# Patient Record
Sex: FEMALE | Race: WHITE | Employment: OTHER | URBAN - NONMETROPOLITAN AREA
[De-identification: names, ages, dates, MRNs, and addresses within clinical notes are randomized per-mention and may not be internally consistent; named-entity substitution may affect disease eponyms.]

---

## 2017-04-14 DIAGNOSIS — R35.1 NOCTURIA: Primary | ICD-10-CM

## 2017-04-14 RX ORDER — OXYBUTYNIN CHLORIDE 5 MG/1
TABLET ORAL
Qty: 120 TABLET | Refills: 0 | Status: SHIPPED | OUTPATIENT
Start: 2017-04-14 | End: 2017-08-14 | Stop reason: SDUPTHER

## 2017-05-18 ENCOUNTER — OFFICE VISIT (OUTPATIENT)
Dept: NEUROLOGY | Age: 82
End: 2017-05-18
Payer: MEDICARE

## 2017-05-18 VITALS
HEART RATE: 55 BPM | HEIGHT: 60 IN | SYSTOLIC BLOOD PRESSURE: 134 MMHG | WEIGHT: 124 LBS | BODY MASS INDEX: 24.35 KG/M2 | DIASTOLIC BLOOD PRESSURE: 68 MMHG | OXYGEN SATURATION: 97 %

## 2017-05-18 DIAGNOSIS — R56.9 SEIZURE AS LATE EFFECT OF CEREBROVASCULAR ACCIDENT (CVA) (HCC): ICD-10-CM

## 2017-05-18 DIAGNOSIS — G30.1 LATE ONSET ALZHEIMER'S DISEASE WITHOUT BEHAVIORAL DISTURBANCE (HCC): Primary | ICD-10-CM

## 2017-05-18 DIAGNOSIS — I69.398 SEIZURE AS LATE EFFECT OF CEREBROVASCULAR ACCIDENT (CVA) (HCC): ICD-10-CM

## 2017-05-18 DIAGNOSIS — F02.80 LATE ONSET ALZHEIMER'S DISEASE WITHOUT BEHAVIORAL DISTURBANCE (HCC): Primary | ICD-10-CM

## 2017-05-18 PROCEDURE — 1036F TOBACCO NON-USER: CPT | Performed by: PSYCHIATRY & NEUROLOGY

## 2017-05-18 PROCEDURE — 99214 OFFICE O/P EST MOD 30 MIN: CPT | Performed by: PSYCHIATRY & NEUROLOGY

## 2017-05-18 PROCEDURE — 1123F ACP DISCUSS/DSCN MKR DOCD: CPT | Performed by: PSYCHIATRY & NEUROLOGY

## 2017-05-18 PROCEDURE — 4040F PNEUMOC VAC/ADMIN/RCVD: CPT | Performed by: PSYCHIATRY & NEUROLOGY

## 2017-05-18 PROCEDURE — G8420 CALC BMI NORM PARAMETERS: HCPCS | Performed by: PSYCHIATRY & NEUROLOGY

## 2017-05-18 PROCEDURE — G8400 PT W/DXA NO RESULTS DOC: HCPCS | Performed by: PSYCHIATRY & NEUROLOGY

## 2017-05-18 PROCEDURE — 1090F PRES/ABSN URINE INCON ASSESS: CPT | Performed by: PSYCHIATRY & NEUROLOGY

## 2017-05-18 PROCEDURE — G8427 DOCREV CUR MEDS BY ELIG CLIN: HCPCS | Performed by: PSYCHIATRY & NEUROLOGY

## 2017-05-18 RX ORDER — OXYBUTYNIN CHLORIDE 5 MG/1
10 TABLET ORAL 2 TIMES DAILY
COMMUNITY
Start: 2017-05-11 | End: 2018-03-18 | Stop reason: ALTCHOICE

## 2017-05-18 RX ORDER — PANTOPRAZOLE SODIUM 20 MG/1
20 TABLET, DELAYED RELEASE ORAL DAILY
COMMUNITY
Start: 2017-05-11 | End: 2019-09-19

## 2017-06-12 ENCOUNTER — OFFICE VISIT (OUTPATIENT)
Dept: UROLOGY | Facility: CLINIC | Age: 82
End: 2017-06-12

## 2017-06-12 VITALS — TEMPERATURE: 98.3 F | WEIGHT: 129 LBS | BODY MASS INDEX: 24.35 KG/M2 | HEIGHT: 61 IN

## 2017-06-12 DIAGNOSIS — N32.81 OAB (OVERACTIVE BLADDER): ICD-10-CM

## 2017-06-12 DIAGNOSIS — R35.1 NOCTURIA: Primary | ICD-10-CM

## 2017-06-12 PROCEDURE — 99213 OFFICE O/P EST LOW 20 MIN: CPT | Performed by: UROLOGY

## 2017-06-12 NOTE — PROGRESS NOTES
Ms. Powell is 81 y.o. female    CHIEF COMPLAINT: I am here for urinary frequency.     HPI  Overactive bladder symptoms  Location: Bladder  Severity: Moderate  Duration: 3 years  Timing: Intermittent  Context: Routine daily activity  Modifying factors: Anticholinergic medications have slightly improved it.  Fluid intake particular caffeine makes it worse.  Associated signs or symptoms: Frequency urgency and occasional nocturia    The following portions of the patient's history were reviewed and updated as appropriate: allergies, current medications, past family history, past medical history, past social history, past surgical history and problem list.    Review of Systems   Constitutional: Negative for chills and fever.   Gastrointestinal: Negative for abdominal pain, anal bleeding and blood in stool.   Genitourinary: Positive for frequency. Negative for flank pain and hematuria.         Current Outpatient Prescriptions:   •  acetaminophen (TYLENOL) 650 MG 8 hr tablet, Take 650 mg by mouth As Needed for mild pain (1-3)., Disp: , Rfl:   •  alendronate (FOSAMAX) 70 MG tablet, Take 70 mg by mouth Every 7 (Seven) Days., Disp: , Rfl:   •  amLODIPine (NORVASC) 2.5 MG tablet, Take 2.5 mg by mouth Daily., Disp: , Rfl:   •  B Complex Vitamins (VITAMIN B COMPLEX PO), Take 1 tablet by mouth Daily., Disp: , Rfl:   •  butalbital-acetaminophen-caffeine (FIORICET, ESGIC) -40 MG per tablet, Take 1 tablet by mouth As Needed for headaches., Disp: , Rfl:   •  Calcium Carb-Cholecalciferol (CALCIUM 600+D3) 600-800 MG-UNIT tablet, Take 1 tablet by mouth Daily., Disp: , Rfl:   •  camphor-menthol (SARNA) 0.5-0.5 % lotion, Apply  topically As Needed for itching., Disp: , Rfl:   •  cholecalciferol (VITAMIN D3) 1000 UNITS tablet, Take 1,000 Units by mouth Daily., Disp: , Rfl:   •  CloNIDine (CATAPRES) 0.1 MG tablet, Take 0.1 mg by mouth Daily., Disp: , Rfl:   •  colestipol (COLESTID) 1 G tablet, Take 1 g by mouth As Needed., Disp: ,  Rfl:   •  dipyridamole (PERSANTINE) 75 MG tablet, Take 75 mg by mouth 2 (Two) Times a Day., Disp: , Rfl:   •  docusate sodium (COLACE) 100 MG capsule, Take 100 mg by mouth As Needed for constipation., Disp: , Rfl:   •  donepezil (ARICEPT) 10 MG tablet, Take 10 mg by mouth Every Night., Disp: , Rfl:   •  fexofenadine (ALLEGRA) 180 MG tablet, Take 180 mg by mouth As Needed., Disp: , Rfl:   •  levETIRAcetam (KEPPRA) 750 MG tablet, Take 750 mg by mouth 2 (Two) Times a Day., Disp: , Rfl:   •  Lutein 20 MG tablet, Take 1 tablet by mouth Daily., Disp: , Rfl:   •  meloxicam (MOBIC) 15 MG tablet, Take 15 mg by mouth Daily., Disp: , Rfl:   •  nebivolol (BYSTOLIC) 10 MG tablet, Take 10 mg by mouth Daily., Disp: , Rfl:   •  Omega 3-6-9 Fatty Acids (OMEGA 3-6-9 COMPLEX PO), Take 1 capsule by mouth Daily., Disp: , Rfl:   •  oxybutynin (DITROPAN) 5 MG tablet, TAKE 2 TABLETS BY MOUTH TWICE DAILY., Disp: 120 tablet, Rfl: 0  •  pantoprazole (PROTONIX) 20 MG EC tablet, Take 20 mg by mouth Daily., Disp: , Rfl:   •  pravastatin (PRAVACHOL) 20 MG tablet, Take 20 mg by mouth Daily., Disp: , Rfl:   •  promethazine (PHENERGAN) 25 MG tablet, Take 25 mg by mouth Every 4 (Four) Hours As Needed for nausea or vomiting., Disp: , Rfl:   •  ramipril (ALTACE) 5 MG capsule, Take 5 mg by mouth Daily., Disp: , Rfl:   •  raNITIdine (ZANTAC) 150 MG tablet, Take 150 mg by mouth 2 (Two) Times a Day., Disp: , Rfl:   •  simethicone (MYLICON) 80 MG chewable tablet, Chew 80 mg As Needed for flatulence., Disp: , Rfl:   •  spironolactone (ALDACTONE) 25 MG tablet, Take 25 mg by mouth Daily., Disp: , Rfl:   •  vitamin C (ASCORBIC ACID) 500 MG tablet, Take 500 mg by mouth Daily., Disp: , Rfl:     Past Medical History:   Diagnosis Date   • Arthritis    • Frequency of urination    • Nocturia    • Seizure    • Sensory urge incontinence        Past Surgical History:   Procedure Laterality Date   • CHOLECYSTECTOMY     • TONSILLECTOMY     • WISDOM TOOTH EXTRACTION    "      Social History     Social History   • Marital status:      Spouse name: N/A   • Number of children: N/A   • Years of education: N/A     Social History Main Topics   • Smoking status: Never Smoker   • Smokeless tobacco: Not on file   • Alcohol use No   • Drug use: Not on file   • Sexual activity: Not on file     Other Topics Concern   • Not on file     Social History Narrative       Family History   Problem Relation Age of Onset   • No Known Problems Mother    • No Known Problems Father        Temp 98.3 °F (36.8 °C)  Ht 61\" (154.9 cm)  Wt 129 lb (58.5 kg)  BMI 24.37 kg/m2    Physical Exam  Alert and oriented ×3  Not agitated or distressed  No obvious deformities  No respiratory distress  Skin without pallor or diaphoresis      Results for orders placed or performed in visit on 12/06/16   POC Urinalysis Dipstick, Automated   Result Value Ref Range    Color Yellow Yellow, Straw, Dark Yellow, Lynnette    Clarity, UA Clear Clear    Glucose, UA Negative Negative mg/dL    Bilirubin Negative Negative    Ketones, UA Negative Negative    Specific Gravity  1.020 1.005 - 1.030    Blood, UA Trace (A) Negative    pH, Urine 5.5 5.0 - 8.0    Protein, POC Negative Negative mg/dL    Urobilinogen, UA Normal Normal    Leukocytes Negative Negative    Nitrite, UA Positive (A) Negative       Imaging Results (last 7 days)     ** No results found for the last 168 hours. **          Assessment and Plan  Diagnoses and all orders for this visit:    Nocturia    OAB (overactive bladder)  #1. Continue oxybutynin  #2. Limit fluid intake and especially caffeine.   #3. Nocturia is explained to the patient is a manifestation of one the following or a combination of voiding dysfunction, other medical conditions, or a sleep disorder.  Nocturia as a completely isolated symptommore often represents a non-urologic problem or medical condition.  It is explained that as patient's age, they often have a reverse Circadian rhythm voiding pattern " in which up to two thirds of the urine in a 24-hour period can be excreted at night.  We also went over sleep disorders of the patient which may affect them.  Volume-related disorders the cause mobilization of peripheral edema are also possible causes of nocturia including the medications used to treat them.  Therefore, treatment must be directed at the cause of the symptom.  I explained we will do our best to evaluate any urologic cause of symptoms, but oftentimes we find no abnormality in voiding dysfunction to correct.  Evaluation options are explained to the patient.      Nemesio Foster MD  06/12/17  2:07 PM      Cc:

## 2017-07-08 ENCOUNTER — HOSPITAL ENCOUNTER (EMERGENCY)
Age: 82
Discharge: HOME | End: 2017-07-08
Attending: EMERGENCY MEDICINE
Payer: MEDICARE

## 2017-07-08 ENCOUNTER — APPOINTMENT (OUTPATIENT)
Dept: CT IMAGING | Age: 82
End: 2017-07-08
Payer: MEDICARE

## 2017-07-08 VITALS
HEIGHT: 61 IN | TEMPERATURE: 98.1 F | SYSTOLIC BLOOD PRESSURE: 129 MMHG | WEIGHT: 125 LBS | OXYGEN SATURATION: 97 % | DIASTOLIC BLOOD PRESSURE: 62 MMHG | HEART RATE: 59 BPM | BODY MASS INDEX: 23.6 KG/M2 | RESPIRATION RATE: 18 BRPM

## 2017-07-08 DIAGNOSIS — N39.0 URINARY TRACT INFECTION WITHOUT HEMATURIA, SITE UNSPECIFIED: Primary | ICD-10-CM

## 2017-07-08 LAB
ALBUMIN SERPL-MCNC: 4.3 G/DL (ref 3.5–5.2)
ALP BLD-CCNC: 52 U/L (ref 35–104)
ALT SERPL-CCNC: 19 U/L (ref 5–33)
ANION GAP SERPL CALCULATED.3IONS-SCNC: 15 MMOL/L (ref 7–19)
AST SERPL-CCNC: 21 U/L (ref 5–32)
BACTERIA: ABNORMAL /HPF
BILIRUB SERPL-MCNC: 0.4 MG/DL (ref 0.2–1.2)
BILIRUBIN URINE: NEGATIVE
BLOOD, URINE: ABNORMAL
BUN BLDV-MCNC: 19 MG/DL (ref 8–23)
CALCIUM SERPL-MCNC: 9.3 MG/DL (ref 8.8–10.2)
CHLORIDE BLD-SCNC: 92 MMOL/L (ref 98–111)
CLARITY: ABNORMAL
CO2: 23 MMOL/L (ref 22–29)
COLOR: YELLOW
CREAT SERPL-MCNC: 1.2 MG/DL (ref 0.5–0.9)
EPITHELIAL CELLS, UA: 1 /HPF (ref 0–5)
GFR NON-AFRICAN AMERICAN: 43
GLUCOSE BLD-MCNC: 107 MG/DL (ref 74–109)
GLUCOSE URINE: NEGATIVE MG/DL
HCT VFR BLD CALC: 34.3 % (ref 37–47)
HEMOGLOBIN: 12 G/DL (ref 12–16)
HYALINE CASTS: 3 /HPF (ref 0–8)
KETONES, URINE: NEGATIVE MG/DL
LEUKOCYTE ESTERASE, URINE: ABNORMAL
MCH RBC QN AUTO: 33 PG (ref 27–31)
MCHC RBC AUTO-ENTMCNC: 35 G/DL (ref 33–37)
MCV RBC AUTO: 94.2 FL (ref 81–99)
NITRITE, URINE: POSITIVE
PDW BLD-RTO: 11.5 % (ref 11.5–14.5)
PH UA: 6.5
PLATELET # BLD: 209 K/UL (ref 130–400)
PMV BLD AUTO: 9.6 FL (ref 9.4–12.3)
POTASSIUM SERPL-SCNC: 4.2 MMOL/L (ref 3.5–5)
PROTEIN UA: NEGATIVE MG/DL
RBC # BLD: 3.64 M/UL (ref 4.2–5.4)
RBC UA: 2 /HPF (ref 0–4)
SODIUM BLD-SCNC: 130 MMOL/L (ref 136–145)
SPECIFIC GRAVITY UA: 1.01
TOTAL PROTEIN: 7.1 G/DL (ref 6.6–8.7)
UROBILINOGEN, URINE: 0.2 E.U./DL
WBC # BLD: 5.3 K/UL (ref 4.8–10.8)
WBC UA: 11 /HPF (ref 0–5)

## 2017-07-08 PROCEDURE — 70450 CT HEAD/BRAIN W/O DYE: CPT

## 2017-07-08 PROCEDURE — 85027 COMPLETE CBC AUTOMATED: CPT

## 2017-07-08 PROCEDURE — 87077 CULTURE AEROBIC IDENTIFY: CPT

## 2017-07-08 PROCEDURE — 80053 COMPREHEN METABOLIC PANEL: CPT

## 2017-07-08 PROCEDURE — 87086 URINE CULTURE/COLONY COUNT: CPT

## 2017-07-08 PROCEDURE — 93005 ELECTROCARDIOGRAM TRACING: CPT

## 2017-07-08 PROCEDURE — 99282 EMERGENCY DEPT VISIT SF MDM: CPT | Performed by: EMERGENCY MEDICINE

## 2017-07-08 PROCEDURE — 99284 EMERGENCY DEPT VISIT MOD MDM: CPT

## 2017-07-08 PROCEDURE — 36415 COLL VENOUS BLD VENIPUNCTURE: CPT

## 2017-07-08 PROCEDURE — 81001 URINALYSIS AUTO W/SCOPE: CPT

## 2017-07-08 PROCEDURE — 87185 SC STD ENZYME DETCJ PER NZM: CPT

## 2017-07-08 PROCEDURE — 80299 QUANTITATIVE ASSAY DRUG: CPT

## 2017-07-08 RX ORDER — AMLODIPINE BESYLATE 10 MG/1
5 TABLET ORAL 2 TIMES DAILY
COMMUNITY
End: 2020-02-23 | Stop reason: ALTCHOICE

## 2017-07-08 RX ORDER — CEPHALEXIN 500 MG/1
500 CAPSULE ORAL 4 TIMES DAILY
Qty: 28 CAPSULE | Refills: 0 | Status: SHIPPED | OUTPATIENT
Start: 2017-07-08 | End: 2017-07-15

## 2017-07-08 ASSESSMENT — ENCOUNTER SYMPTOMS
EYE REDNESS: 0
ALLERGIC/IMMUNOLOGIC NEGATIVE: 1
ABDOMINAL DISTENTION: 0
CHOKING: 0
EYE DISCHARGE: 0
COUGH: 0
SHORTNESS OF BREATH: 0
EYE PAIN: 0
APNEA: 0
EYES NEGATIVE: 1
EYE ITCHING: 0
ABDOMINAL PAIN: 0
CHEST TIGHTNESS: 0

## 2017-07-08 ASSESSMENT — PAIN SCALES - GENERAL: PAINLEVEL_OUTOF10: 0

## 2017-07-10 ENCOUNTER — TELEPHONE (OUTPATIENT)
Dept: NEUROLOGY | Age: 82
End: 2017-07-10

## 2017-07-10 LAB
KEPPRA: 49 UG/ML (ref 12–46)
ORGANISM: ABNORMAL
URINE CULTURE, ROUTINE: ABNORMAL
URINE CULTURE, ROUTINE: ABNORMAL

## 2017-07-11 LAB
EKG P AXIS: 66 DEGREES
EKG P-R INTERVAL: 194 MS
EKG Q-T INTERVAL: 436 MS
EKG QRS DURATION: 92 MS
EKG QTC CALCULATION (BAZETT): 428 MS
EKG T AXIS: 30 DEGREES

## 2017-07-14 ENCOUNTER — TELEPHONE (OUTPATIENT)
Dept: UROLOGY | Facility: CLINIC | Age: 82
End: 2017-07-14

## 2017-07-14 NOTE — TELEPHONE ENCOUNTER
----- Message from Yaa Beebe sent at 7/11/2017 11:24 AM CDT -----  Contact: DTR - KIKI Collins was in [facility] 7/8/17 and was dx with UTI. She had been found non responsive at the assisted living home. Was put on Keflex. Pao is faxing her records and the dtr would like Dr Foster to review them and be sure she is on the correct medication and does she need an apt with him. Also was told that UTI can sometimes cause seizures and wanting to ask him about this.

## 2017-07-14 NOTE — TELEPHONE ENCOUNTER
Spoke to Jessica and informed her if patient is prone to seizures then a uti could cause this. I also informed her that patient is on the right antibiotic and she would not need a follow up with us regarding this unless patient is having a problem. Jessica Voiced understanding HC

## 2017-07-15 ENCOUNTER — HOSPITAL ENCOUNTER (EMERGENCY)
Age: 82
Discharge: HOME | End: 2017-07-15
Attending: EMERGENCY MEDICINE
Payer: MEDICARE

## 2017-07-15 ENCOUNTER — APPOINTMENT (OUTPATIENT)
Dept: GENERAL RADIOLOGY | Age: 82
End: 2017-07-15
Payer: MEDICARE

## 2017-07-15 VITALS
WEIGHT: 130 LBS | OXYGEN SATURATION: 98 % | HEART RATE: 70 BPM | HEIGHT: 61 IN | TEMPERATURE: 98.2 F | RESPIRATION RATE: 16 BRPM | DIASTOLIC BLOOD PRESSURE: 69 MMHG | SYSTOLIC BLOOD PRESSURE: 150 MMHG | BODY MASS INDEX: 24.55 KG/M2

## 2017-07-15 DIAGNOSIS — N39.0 UTI (URINARY TRACT INFECTION), BACTERIAL: ICD-10-CM

## 2017-07-15 DIAGNOSIS — J30.9 ALLERGIC RHINITIS, UNSPECIFIED ALLERGIC RHINITIS TRIGGER, UNSPECIFIED RHINITIS SEASONALITY: ICD-10-CM

## 2017-07-15 DIAGNOSIS — E87.1 HYPONATREMIA: ICD-10-CM

## 2017-07-15 DIAGNOSIS — A49.9 UTI (URINARY TRACT INFECTION), BACTERIAL: ICD-10-CM

## 2017-07-15 DIAGNOSIS — R05.9 COUGH: Primary | ICD-10-CM

## 2017-07-15 LAB
ALBUMIN SERPL-MCNC: 4.3 G/DL (ref 3.5–5.2)
ALP BLD-CCNC: 52 U/L (ref 35–104)
ALT SERPL-CCNC: 18 U/L (ref 5–33)
ANION GAP SERPL CALCULATED.3IONS-SCNC: 14 MMOL/L (ref 7–19)
AST SERPL-CCNC: 20 U/L (ref 5–32)
BACTERIA: ABNORMAL /HPF
BASOPHILS ABSOLUTE: 0 K/UL (ref 0–0.2)
BASOPHILS RELATIVE PERCENT: 0.6 % (ref 0–1)
BILIRUB SERPL-MCNC: 0.5 MG/DL (ref 0.2–1.2)
BILIRUBIN URINE: NEGATIVE
BLOOD, URINE: ABNORMAL
BUN BLDV-MCNC: 14 MG/DL (ref 8–23)
CALCIUM SERPL-MCNC: 9.5 MG/DL (ref 8.8–10.2)
CHLORIDE BLD-SCNC: 88 MMOL/L (ref 98–111)
CLARITY: CLEAR
CO2: 23 MMOL/L (ref 22–29)
COLOR: YELLOW
CREAT SERPL-MCNC: 0.8 MG/DL (ref 0.5–0.9)
EOSINOPHILS ABSOLUTE: 0.2 K/UL (ref 0–0.6)
EOSINOPHILS RELATIVE PERCENT: 3.3 % (ref 0–5)
EPITHELIAL CELLS, UA: 0 /HPF (ref 0–5)
GFR NON-AFRICAN AMERICAN: >60
GLUCOSE BLD-MCNC: 95 MG/DL (ref 74–109)
GLUCOSE URINE: NEGATIVE MG/DL
HCT VFR BLD CALC: 32 % (ref 37–47)
HEMOGLOBIN: 11.4 G/DL (ref 12–16)
HYALINE CASTS: 1 /HPF (ref 0–8)
KETONES, URINE: NEGATIVE MG/DL
LACTIC ACID: 0.7 MG/DL (ref 0.5–1.9)
LEUKOCYTE ESTERASE, URINE: ABNORMAL
LYMPHOCYTES ABSOLUTE: 1.1 K/UL (ref 1.1–4.5)
LYMPHOCYTES RELATIVE PERCENT: 23.4 % (ref 20–40)
MCH RBC QN AUTO: 32.8 PG (ref 27–31)
MCHC RBC AUTO-ENTMCNC: 35.6 G/DL (ref 33–37)
MCV RBC AUTO: 92 FL (ref 81–99)
MONOCYTES ABSOLUTE: 0.4 K/UL (ref 0–0.9)
MONOCYTES RELATIVE PERCENT: 8.4 % (ref 0–10)
NEUTROPHILS ABSOLUTE: 3 K/UL (ref 1.5–7.5)
NEUTROPHILS RELATIVE PERCENT: 63.3 % (ref 50–65)
NITRITE, URINE: POSITIVE
PDW BLD-RTO: 11.4 % (ref 11.5–14.5)
PH UA: 7
PLATELET # BLD: 213 K/UL (ref 130–400)
PMV BLD AUTO: 9.3 FL (ref 9.4–12.3)
POTASSIUM SERPL-SCNC: 4.3 MMOL/L (ref 3.5–5)
PROTEIN UA: NEGATIVE MG/DL
RBC # BLD: 3.48 M/UL (ref 4.2–5.4)
RBC UA: 2 /HPF (ref 0–4)
SODIUM BLD-SCNC: 125 MMOL/L (ref 136–145)
SPECIFIC GRAVITY UA: 1.01
TOTAL PROTEIN: 6.9 G/DL (ref 6.6–8.7)
UROBILINOGEN, URINE: 0.2 E.U./DL
WBC # BLD: 4.8 K/UL (ref 4.8–10.8)
WBC UA: 42 /HPF (ref 0–5)

## 2017-07-15 PROCEDURE — 87077 CULTURE AEROBIC IDENTIFY: CPT

## 2017-07-15 PROCEDURE — 85025 COMPLETE CBC W/AUTO DIFF WBC: CPT

## 2017-07-15 PROCEDURE — 99283 EMERGENCY DEPT VISIT LOW MDM: CPT

## 2017-07-15 PROCEDURE — 99283 EMERGENCY DEPT VISIT LOW MDM: CPT | Performed by: EMERGENCY MEDICINE

## 2017-07-15 PROCEDURE — 83605 ASSAY OF LACTIC ACID: CPT

## 2017-07-15 PROCEDURE — 87185 SC STD ENZYME DETCJ PER NZM: CPT

## 2017-07-15 PROCEDURE — 71020 XR CHEST STANDARD TWO VW: CPT

## 2017-07-15 PROCEDURE — 36415 COLL VENOUS BLD VENIPUNCTURE: CPT

## 2017-07-15 PROCEDURE — 80053 COMPREHEN METABOLIC PANEL: CPT

## 2017-07-15 PROCEDURE — 87040 BLOOD CULTURE FOR BACTERIA: CPT

## 2017-07-15 PROCEDURE — 87086 URINE CULTURE/COLONY COUNT: CPT

## 2017-07-15 PROCEDURE — 2580000003 HC RX 258: Performed by: EMERGENCY MEDICINE

## 2017-07-15 PROCEDURE — 81001 URINALYSIS AUTO W/SCOPE: CPT

## 2017-07-15 RX ORDER — BENZONATATE 200 MG/1
200 CAPSULE ORAL 3 TIMES DAILY PRN
Qty: 20 CAPSULE | Refills: 0 | Status: SHIPPED | OUTPATIENT
Start: 2017-07-15 | End: 2017-07-22

## 2017-07-15 RX ORDER — CEFDINIR 300 MG/1
300 CAPSULE ORAL 2 TIMES DAILY
COMMUNITY
End: 2018-02-12

## 2017-07-15 RX ORDER — 0.9 % SODIUM CHLORIDE 0.9 %
500 INTRAVENOUS SOLUTION INTRAVENOUS ONCE
Status: COMPLETED | OUTPATIENT
Start: 2017-07-15 | End: 2017-07-15

## 2017-07-15 RX ORDER — ALBUTEROL SULFATE 90 UG/1
2 AEROSOL, METERED RESPIRATORY (INHALATION) EVERY 4 HOURS PRN
COMMUNITY
End: 2019-07-11

## 2017-07-15 RX ADMIN — SODIUM CHLORIDE 500 ML: 9 INJECTION, SOLUTION INTRAVENOUS at 16:07

## 2017-07-15 ASSESSMENT — ENCOUNTER SYMPTOMS
RHINORRHEA: 1
SHORTNESS OF BREATH: 0
COUGH: 1

## 2017-07-15 ASSESSMENT — PAIN SCALES - GENERAL: PAINLEVEL_OUTOF10: 7

## 2017-07-19 LAB
ORGANISM: ABNORMAL
URINE CULTURE, ROUTINE: ABNORMAL
URINE CULTURE, ROUTINE: ABNORMAL

## 2017-07-20 LAB
BLOOD CULTURE, ROUTINE: NORMAL
CULTURE, BLOOD 2: NORMAL

## 2017-08-03 ENCOUNTER — TRANSCRIBE ORDERS (OUTPATIENT)
Dept: PHYSICAL THERAPY | Facility: HOSPITAL | Age: 82
End: 2017-08-03

## 2017-08-03 DIAGNOSIS — R26.9 ABNORMALITY OF GAIT: ICD-10-CM

## 2017-08-03 DIAGNOSIS — M62.81 MUSCLE WEAKNESS (GENERALIZED): Primary | ICD-10-CM

## 2017-08-14 DIAGNOSIS — R35.1 NOCTURIA: ICD-10-CM

## 2017-08-14 RX ORDER — OXYBUTYNIN CHLORIDE 5 MG/1
TABLET ORAL
Qty: 120 TABLET | Refills: 0 | Status: SHIPPED | OUTPATIENT
Start: 2017-08-14 | End: 2018-02-13 | Stop reason: SDUPTHER

## 2017-10-12 LAB
ALBUMIN SERPL-MCNC: 4.4 G/DL (ref 3.5–5.2)
ALP BLD-CCNC: 53 U/L (ref 35–104)
ALT SERPL-CCNC: 21 U/L (ref 5–33)
ANION GAP SERPL CALCULATED.3IONS-SCNC: 13 MMOL/L (ref 7–19)
AST SERPL-CCNC: 25 U/L (ref 5–32)
BASOPHILS ABSOLUTE: 0 K/UL (ref 0–0.2)
BASOPHILS RELATIVE PERCENT: 0.8 % (ref 0–1)
BILIRUB SERPL-MCNC: 0.4 MG/DL (ref 0.2–1.2)
BUN BLDV-MCNC: 18 MG/DL (ref 8–23)
CALCIUM SERPL-MCNC: 9.9 MG/DL (ref 8.8–10.2)
CHLORIDE BLD-SCNC: 93 MMOL/L (ref 98–111)
CO2: 24 MMOL/L (ref 22–29)
CREAT SERPL-MCNC: 1 MG/DL (ref 0.5–0.9)
EOSINOPHILS ABSOLUTE: 0.1 K/UL (ref 0–0.6)
EOSINOPHILS RELATIVE PERCENT: 2.1 % (ref 0–5)
GFR NON-AFRICAN AMERICAN: 53
GLUCOSE BLD-MCNC: 89 MG/DL (ref 74–109)
HCT VFR BLD CALC: 35.5 % (ref 37–47)
HEMOGLOBIN: 12.2 G/DL (ref 12–16)
LYMPHOCYTES ABSOLUTE: 1.7 K/UL (ref 1.1–4.5)
LYMPHOCYTES RELATIVE PERCENT: 33 % (ref 20–40)
MCH RBC QN AUTO: 33.5 PG (ref 27–31)
MCHC RBC AUTO-ENTMCNC: 34.4 G/DL (ref 33–37)
MCV RBC AUTO: 97.5 FL (ref 81–99)
MONOCYTES ABSOLUTE: 0.4 K/UL (ref 0–0.9)
MONOCYTES RELATIVE PERCENT: 7.1 % (ref 0–10)
NEUTROPHILS ABSOLUTE: 3 K/UL (ref 1.5–7.5)
NEUTROPHILS RELATIVE PERCENT: 56.4 % (ref 50–65)
PDW BLD-RTO: 11.9 % (ref 11.5–14.5)
PLATELET # BLD: 200 K/UL (ref 130–400)
PMV BLD AUTO: 9.8 FL (ref 9.4–12.3)
POTASSIUM SERPL-SCNC: 4.1 MMOL/L (ref 3.5–5)
RBC # BLD: 3.64 M/UL (ref 4.2–5.4)
SODIUM BLD-SCNC: 130 MMOL/L (ref 136–145)
TOTAL PROTEIN: 6.9 G/DL (ref 6.6–8.7)
WBC # BLD: 5.2 K/UL (ref 4.8–10.8)

## 2017-11-20 ENCOUNTER — TELEPHONE (OUTPATIENT)
Dept: NEUROLOGY | Age: 82
End: 2017-11-20

## 2017-11-20 LAB
ALBUMIN SERPL-MCNC: 4.7 G/DL (ref 3.5–5.2)
ALP BLD-CCNC: 51 U/L (ref 35–104)
ALT SERPL-CCNC: 17 U/L (ref 5–33)
ANION GAP SERPL CALCULATED.3IONS-SCNC: 13 MMOL/L (ref 7–19)
AST SERPL-CCNC: 24 U/L (ref 5–32)
BASOPHILS ABSOLUTE: 0 K/UL (ref 0–0.2)
BASOPHILS RELATIVE PERCENT: 0.6 % (ref 0–1)
BILIRUB SERPL-MCNC: 0.3 MG/DL (ref 0.2–1.2)
BUN BLDV-MCNC: 20 MG/DL (ref 8–23)
CALCIUM SERPL-MCNC: 9.9 MG/DL (ref 8.8–10.2)
CHLORIDE BLD-SCNC: 96 MMOL/L (ref 98–111)
CO2: 25 MMOL/L (ref 22–29)
CREAT SERPL-MCNC: 1.2 MG/DL (ref 0.5–0.9)
EOSINOPHILS ABSOLUTE: 0 K/UL (ref 0–0.6)
EOSINOPHILS RELATIVE PERCENT: 0.6 % (ref 0–5)
GFR NON-AFRICAN AMERICAN: 43
GLUCOSE BLD-MCNC: 93 MG/DL (ref 74–109)
HCT VFR BLD CALC: 36.5 % (ref 37–47)
HEMOGLOBIN: 12.2 G/DL (ref 12–16)
LYMPHOCYTES ABSOLUTE: 1.8 K/UL (ref 1.1–4.5)
LYMPHOCYTES RELATIVE PERCENT: 27.1 % (ref 20–40)
MCH RBC QN AUTO: 32.8 PG (ref 27–31)
MCHC RBC AUTO-ENTMCNC: 33.4 G/DL (ref 33–37)
MCV RBC AUTO: 98.1 FL (ref 81–99)
MONOCYTES ABSOLUTE: 0.5 K/UL (ref 0–0.9)
MONOCYTES RELATIVE PERCENT: 7.1 % (ref 0–10)
NEUTROPHILS ABSOLUTE: 4.3 K/UL (ref 1.5–7.5)
NEUTROPHILS RELATIVE PERCENT: 64.2 % (ref 50–65)
PDW BLD-RTO: 11.4 % (ref 11.5–14.5)
PLATELET # BLD: 200 K/UL (ref 130–400)
PMV BLD AUTO: 10.2 FL (ref 9.4–12.3)
POTASSIUM SERPL-SCNC: 4.7 MMOL/L (ref 3.5–5)
RBC # BLD: 3.72 M/UL (ref 4.2–5.4)
SODIUM BLD-SCNC: 134 MMOL/L (ref 136–145)
TOTAL PROTEIN: 6.9 G/DL (ref 6.6–8.7)
WBC # BLD: 6.7 K/UL (ref 4.8–10.8)

## 2017-11-22 LAB — VITAMIN D 25-HYDROXY: 44.9 NG/ML

## 2018-01-15 ENCOUNTER — TELEPHONE (OUTPATIENT)
Dept: NEUROSURGERY | Age: 83
End: 2018-01-15

## 2018-02-02 LAB
ALBUMIN SERPL-MCNC: 4.9 G/DL (ref 3.5–5.2)
ALP BLD-CCNC: 51 U/L (ref 35–104)
ALT SERPL-CCNC: 17 U/L (ref 5–33)
ANION GAP SERPL CALCULATED.3IONS-SCNC: 16 MMOL/L (ref 7–19)
AST SERPL-CCNC: 21 U/L (ref 5–32)
BACTERIA: ABNORMAL /HPF
BASOPHILS ABSOLUTE: 0 K/UL (ref 0–0.2)
BASOPHILS RELATIVE PERCENT: 0.8 % (ref 0–1)
BILIRUB SERPL-MCNC: 0.6 MG/DL (ref 0.2–1.2)
BILIRUBIN URINE: NEGATIVE
BLOOD, URINE: NEGATIVE
BUN BLDV-MCNC: 22 MG/DL (ref 8–23)
CALCIUM SERPL-MCNC: 9.9 MG/DL (ref 8.8–10.2)
CHLORIDE BLD-SCNC: 93 MMOL/L (ref 98–111)
CHOLESTEROL, TOTAL: 219 MG/DL (ref 160–199)
CLARITY: ABNORMAL
CO2: 23 MMOL/L (ref 22–29)
COLOR: YELLOW
CREAT SERPL-MCNC: 1 MG/DL (ref 0.5–0.9)
CREATININE URINE: 90.3 MG/DL (ref 4.2–622)
EOSINOPHILS ABSOLUTE: 0.2 K/UL (ref 0–0.6)
EOSINOPHILS RELATIVE PERCENT: 3.6 % (ref 0–5)
EPITHELIAL CELLS, UA: 0 /HPF (ref 0–5)
GFR NON-AFRICAN AMERICAN: 53
GLUCOSE BLD-MCNC: 82 MG/DL (ref 74–109)
GLUCOSE URINE: NEGATIVE MG/DL
HCT VFR BLD CALC: 36.8 % (ref 37–47)
HDLC SERPL-MCNC: 101 MG/DL (ref 65–121)
HEMOGLOBIN: 12.3 G/DL (ref 12–16)
HYALINE CASTS: 2 /HPF (ref 0–8)
IRON: 115 UG/DL (ref 37–145)
KETONES, URINE: NEGATIVE MG/DL
LDL CHOLESTEROL CALCULATED: 99 MG/DL
LEUKOCYTE ESTERASE, URINE: ABNORMAL
LYMPHOCYTES ABSOLUTE: 1.3 K/UL (ref 1.1–4.5)
LYMPHOCYTES RELATIVE PERCENT: 25.4 % (ref 20–40)
MCH RBC QN AUTO: 33.2 PG (ref 27–31)
MCHC RBC AUTO-ENTMCNC: 33.4 G/DL (ref 33–37)
MCV RBC AUTO: 99.2 FL (ref 81–99)
MICROALBUMIN UR-MCNC: 1.9 MG/DL (ref 0–19)
MICROALBUMIN/CREAT UR-RTO: 21 MG/G
MONOCYTES ABSOLUTE: 0.4 K/UL (ref 0–0.9)
MONOCYTES RELATIVE PERCENT: 7 % (ref 0–10)
NEUTROPHILS ABSOLUTE: 3.2 K/UL (ref 1.5–7.5)
NEUTROPHILS RELATIVE PERCENT: 62.6 % (ref 50–65)
NITRITE, URINE: POSITIVE
PDW BLD-RTO: 11.9 % (ref 11.5–14.5)
PH UA: 5.5
PLATELET # BLD: 204 K/UL (ref 130–400)
PMV BLD AUTO: 11.1 FL (ref 9.4–12.3)
POTASSIUM SERPL-SCNC: 4 MMOL/L (ref 3.5–5)
PROTEIN UA: NEGATIVE MG/DL
RBC # BLD: 3.71 M/UL (ref 4.2–5.4)
RBC UA: 19 /HPF (ref 0–4)
SODIUM BLD-SCNC: 132 MMOL/L (ref 136–145)
SPECIFIC GRAVITY UA: 1.02
T3 FREE: 2.4 PG/ML (ref 2–4.4)
T4 FREE: 1.4 NG/DL (ref 0.9–1.7)
TOTAL IRON BINDING CAPACITY: 325 UG/DL (ref 250–400)
TOTAL PROTEIN: 7.3 G/DL (ref 6.6–8.7)
TRIGL SERPL-MCNC: 95 MG/DL (ref 0–149)
TSH SERPL DL<=0.05 MIU/L-ACNC: 1.09 UIU/ML (ref 0.27–4.2)
UROBILINOGEN, URINE: 0.2 E.U./DL
VITAMIN B-12: 1304 PG/ML (ref 211–946)
VITAMIN D 25-HYDROXY: 40.6 NG/ML
WBC # BLD: 5 K/UL (ref 4.8–10.8)
WBC UA: 50 /HPF (ref 0–5)

## 2018-02-04 LAB — KEPPRA: 61 UG/ML (ref 12–46)

## 2018-02-12 ENCOUNTER — HOSPITAL ENCOUNTER (EMERGENCY)
Age: 83
Discharge: HOME | End: 2018-02-13
Payer: MEDICARE

## 2018-02-12 ENCOUNTER — APPOINTMENT (OUTPATIENT)
Dept: GENERAL RADIOLOGY | Age: 83
End: 2018-02-12
Payer: MEDICARE

## 2018-02-12 DIAGNOSIS — N39.0 URINARY TRACT INFECTION, ACUTE: Primary | ICD-10-CM

## 2018-02-12 DIAGNOSIS — E87.1 HYPONATREMIA: ICD-10-CM

## 2018-02-12 LAB
BASOPHILS ABSOLUTE: 0 K/UL (ref 0–0.2)
BASOPHILS RELATIVE PERCENT: 0.5 % (ref 0–1)
EOSINOPHILS ABSOLUTE: 0.1 K/UL (ref 0–0.6)
EOSINOPHILS RELATIVE PERCENT: 2.1 % (ref 0–5)
HCT VFR BLD CALC: 33.3 % (ref 37–47)
HEMOGLOBIN: 11.4 G/DL (ref 12–16)
LYMPHOCYTES ABSOLUTE: 1.5 K/UL (ref 1.1–4.5)
LYMPHOCYTES RELATIVE PERCENT: 26.6 % (ref 20–40)
MCH RBC QN AUTO: 33 PG (ref 27–31)
MCHC RBC AUTO-ENTMCNC: 34.2 G/DL (ref 33–37)
MCV RBC AUTO: 96.5 FL (ref 81–99)
MONOCYTES ABSOLUTE: 0.4 K/UL (ref 0–0.9)
MONOCYTES RELATIVE PERCENT: 7.5 % (ref 0–10)
NEUTROPHILS ABSOLUTE: 3.6 K/UL (ref 1.5–7.5)
NEUTROPHILS RELATIVE PERCENT: 63 % (ref 50–65)
PDW BLD-RTO: 11.5 % (ref 11.5–14.5)
PERFORMED ON: NORMAL
PLATELET # BLD: 183 K/UL (ref 130–400)
PMV BLD AUTO: 9.8 FL (ref 9.4–12.3)
POC TROPONIN I: 0.01 NG/ML (ref 0–0.08)
RBC # BLD: 3.45 M/UL (ref 4.2–5.4)
WBC # BLD: 5.7 K/UL (ref 4.8–10.8)

## 2018-02-12 PROCEDURE — 99283 EMERGENCY DEPT VISIT LOW MDM: CPT

## 2018-02-12 PROCEDURE — 93005 ELECTROCARDIOGRAM TRACING: CPT

## 2018-02-12 PROCEDURE — 72072 X-RAY EXAM THORAC SPINE 3VWS: CPT

## 2018-02-12 PROCEDURE — 71101 X-RAY EXAM UNILAT RIBS/CHEST: CPT

## 2018-02-12 RX ORDER — LUTEIN 6 MG
20 TABLET ORAL DAILY
COMMUNITY

## 2018-02-12 RX ORDER — HYDRALAZINE HYDROCHLORIDE 25 MG/1
25 TABLET, FILM COATED ORAL 2 TIMES DAILY
COMMUNITY
End: 2019-07-11

## 2018-02-12 ASSESSMENT — ENCOUNTER SYMPTOMS
TROUBLE SWALLOWING: 0
BACK PAIN: 1
CONSTIPATION: 0
SHORTNESS OF BREATH: 0
RHINORRHEA: 0
VOMITING: 0
DIARRHEA: 0
COUGH: 0
SORE THROAT: 0
NAUSEA: 0
SINUS PRESSURE: 0
ABDOMINAL PAIN: 0

## 2018-02-12 ASSESSMENT — PAIN DESCRIPTION - ORIENTATION: ORIENTATION: RIGHT;MID

## 2018-02-12 ASSESSMENT — PAIN DESCRIPTION - PAIN TYPE: TYPE: ACUTE PAIN

## 2018-02-12 ASSESSMENT — PAIN DESCRIPTION - LOCATION: LOCATION: BACK

## 2018-02-12 ASSESSMENT — PAIN SCALES - GENERAL: PAINLEVEL_OUTOF10: 9

## 2018-02-13 VITALS
DIASTOLIC BLOOD PRESSURE: 75 MMHG | BODY MASS INDEX: 22.67 KG/M2 | OXYGEN SATURATION: 95 % | TEMPERATURE: 98.4 F | SYSTOLIC BLOOD PRESSURE: 142 MMHG | RESPIRATION RATE: 17 BRPM | HEART RATE: 53 BPM | WEIGHT: 120 LBS

## 2018-02-13 DIAGNOSIS — R35.1 NOCTURIA: ICD-10-CM

## 2018-02-13 LAB
ALBUMIN SERPL-MCNC: 4.3 G/DL (ref 3.5–5.2)
ALP BLD-CCNC: 48 U/L (ref 35–104)
ALT SERPL-CCNC: 15 U/L (ref 5–33)
ANION GAP SERPL CALCULATED.3IONS-SCNC: 12 MMOL/L (ref 7–19)
AST SERPL-CCNC: 20 U/L (ref 5–32)
BACTERIA: ABNORMAL /HPF
BILIRUB SERPL-MCNC: 0.3 MG/DL (ref 0.2–1.2)
BILIRUBIN URINE: NEGATIVE
BLOOD, URINE: NEGATIVE
BUN BLDV-MCNC: 16 MG/DL (ref 8–23)
CALCIUM SERPL-MCNC: 9.8 MG/DL (ref 8.8–10.2)
CHLORIDE BLD-SCNC: 95 MMOL/L (ref 98–111)
CLARITY: ABNORMAL
CO2: 24 MMOL/L (ref 22–29)
COLOR: YELLOW
CREAT SERPL-MCNC: 1.1 MG/DL (ref 0.5–0.9)
EPITHELIAL CELLS, UA: 0 /HPF (ref 0–5)
GFR NON-AFRICAN AMERICAN: 47
GLUCOSE BLD-MCNC: 94 MG/DL (ref 74–109)
GLUCOSE URINE: NEGATIVE MG/DL
HYALINE CASTS: 0 /HPF (ref 0–8)
KETONES, URINE: NEGATIVE MG/DL
LEUKOCYTE ESTERASE, URINE: ABNORMAL
NITRITE, URINE: POSITIVE
PH UA: 7
POTASSIUM SERPL-SCNC: 4.1 MMOL/L (ref 3.5–5)
PROTEIN UA: NEGATIVE MG/DL
RBC UA: 4 /HPF (ref 0–4)
SODIUM BLD-SCNC: 131 MMOL/L (ref 136–145)
SPECIFIC GRAVITY UA: 1.01
TOTAL PROTEIN: 6.4 G/DL (ref 6.6–8.7)
UROBILINOGEN, URINE: 0.2 E.U./DL
WBC UA: 260 /HPF (ref 0–5)

## 2018-02-13 PROCEDURE — 81001 URINALYSIS AUTO W/SCOPE: CPT

## 2018-02-13 PROCEDURE — 6370000000 HC RX 637 (ALT 250 FOR IP): Performed by: NURSE PRACTITIONER

## 2018-02-13 PROCEDURE — 2580000003 HC RX 258: Performed by: NURSE PRACTITIONER

## 2018-02-13 PROCEDURE — 99284 EMERGENCY DEPT VISIT MOD MDM: CPT | Performed by: NURSE PRACTITIONER

## 2018-02-13 PROCEDURE — 85025 COMPLETE CBC W/AUTO DIFF WBC: CPT

## 2018-02-13 PROCEDURE — 80053 COMPREHEN METABOLIC PANEL: CPT

## 2018-02-13 PROCEDURE — 84484 ASSAY OF TROPONIN QUANT: CPT

## 2018-02-13 PROCEDURE — 36415 COLL VENOUS BLD VENIPUNCTURE: CPT

## 2018-02-13 RX ORDER — CEFDINIR 300 MG/1
300 CAPSULE ORAL 2 TIMES DAILY
Qty: 20 CAPSULE | Refills: 0 | Status: ON HOLD | OUTPATIENT
Start: 2018-02-13 | End: 2018-02-21 | Stop reason: ALTCHOICE

## 2018-02-13 RX ORDER — CEFDINIR 300 MG/1
300 CAPSULE ORAL ONCE
Status: COMPLETED | OUTPATIENT
Start: 2018-02-13 | End: 2018-02-13

## 2018-02-13 RX ORDER — 0.9 % SODIUM CHLORIDE 0.9 %
250 INTRAVENOUS SOLUTION INTRAVENOUS ONCE
Status: COMPLETED | OUTPATIENT
Start: 2018-02-13 | End: 2018-02-13

## 2018-02-13 RX ADMIN — CEFDINIR 300 MG: 300 CAPSULE ORAL at 00:39

## 2018-02-13 RX ADMIN — SODIUM CHLORIDE 250 ML: 9 INJECTION, SOLUTION INTRAVENOUS at 00:51

## 2018-02-13 NOTE — ED PROVIDER NOTES
intolerance and heat intolerance. Genitourinary: Negative for flank pain, menstrual problem, pelvic pain, urgency and vaginal discharge. Musculoskeletal: Positive for back pain. Negative for arthralgias. Gait problem: right mid back. Skin: Negative for rash. Neurological: Negative for headaches. Psychiatric/Behavioral: Negative for dysphoric mood and sleep disturbance. The patient is not nervous/anxious. A complete review of systems was performed and is negative except as noted above in the HPI.        PAST MEDICAL HISTORY     Past Medical History:   Diagnosis Date    Anemia     Colitis, ischemic ([address]     Dementia     Diverticulosis     Hyperlipidemia     Hypertension     Osteoarthritis     Seizures (HCC)     Spastic colon     Stroke syndrome ([address]     Urinary incontinence          SURGICAL HISTORY       Past Surgical History:   Procedure Laterality Date    BREAST SURGERY Right     Biopsy    CHOLECYSTECTOMY      COLONOSCOPY  9/25/03    Dr Ana Cortez ischemic colitis, incomplete exam    COLONOSCOPY  8/29/03    Dr Nicholas Thorne colon-ischemic colitis    EYE SURGERY      LUMBAR FUSION      SPINE SURGERY      3,4, and 5    TONSILLECTOMY AND ADENOIDECTOMY           CURRENT MEDICATIONS       Previous Medications    ACETAMINOPHEN (TYLENOL) 500 MG TABLET    Take 2 tablets by mouth every 6 hours as needed for Pain Do not exceed 4 g of acetaminophen daily (patient is also on Fioricet PRN)    ALBUTEROL SULFATE  (90 BASE) MCG/ACT INHALER    Inhale 2 puffs into the lungs every 4 hours as needed for Wheezing    ALENDRONATE (FOSAMAX) 70 MG TABLET    Take 70 mg by mouth every 7 days Takes every Sunday      AMLODIPINE (NORVASC) 10 MG TABLET    Take 10 mg by mouth daily Indications: breakfast    ASCORBIC ACID (VITAMIN C PO)    Take 500 mg by mouth Breakfast and lunch    B COMPLEX VITAMINS CAPSULE    Take 1 capsule by mouth Daily with lunch B 100

## 2018-02-14 RX ORDER — OXYBUTYNIN CHLORIDE 5 MG/1
TABLET ORAL
Qty: 120 TABLET | Refills: 0 | Status: SHIPPED | OUTPATIENT
Start: 2018-02-14 | End: 2018-03-14 | Stop reason: ALTCHOICE

## 2018-02-15 LAB
EKG P AXIS: 68 DEGREES
EKG P-R INTERVAL: 182 MS
EKG Q-T INTERVAL: 406 MS
EKG QRS DURATION: 88 MS
EKG QTC CALCULATION (BAZETT): 406 MS
EKG T AXIS: 27 DEGREES

## 2018-02-20 ENCOUNTER — APPOINTMENT (OUTPATIENT)
Dept: CT IMAGING | Age: 83
DRG: 689 | End: 2018-02-20
Payer: MEDICARE

## 2018-02-20 ENCOUNTER — HOSPITAL ENCOUNTER (INPATIENT)
Age: 83
LOS: 2 days | Discharge: HOME HEALTH | DRG: 689 | End: 2018-02-23
Attending: EMERGENCY MEDICINE | Admitting: INTERNAL MEDICINE
Payer: MEDICARE

## 2018-02-20 DIAGNOSIS — G93.40 ACUTE ENCEPHALOPATHY: Primary | ICD-10-CM

## 2018-02-20 DIAGNOSIS — N39.0 URINARY TRACT INFECTION WITHOUT HEMATURIA, SITE UNSPECIFIED: ICD-10-CM

## 2018-02-20 DIAGNOSIS — E87.1 HYPONATREMIA: ICD-10-CM

## 2018-02-20 DIAGNOSIS — I10 ESSENTIAL HYPERTENSION: ICD-10-CM

## 2018-02-20 DIAGNOSIS — R10.9 FLANK PAIN: ICD-10-CM

## 2018-02-20 LAB
ANION GAP SERPL CALCULATED.3IONS-SCNC: 14 MMOL/L (ref 7–19)
BACTERIA: NEGATIVE /HPF
BASOPHILS ABSOLUTE: 0 K/UL (ref 0–0.2)
BASOPHILS RELATIVE PERCENT: 0.7 % (ref 0–1)
BILIRUBIN URINE: NEGATIVE
BLOOD, URINE: NEGATIVE
BUN BLDV-MCNC: 19 MG/DL (ref 8–23)
CALCIUM SERPL-MCNC: 9.8 MG/DL (ref 8.8–10.2)
CHLORIDE BLD-SCNC: 92 MMOL/L (ref 98–111)
CLARITY: CLEAR
CO2: 23 MMOL/L (ref 22–29)
COLOR: ABNORMAL
CREAT SERPL-MCNC: 1 MG/DL (ref 0.5–0.9)
EOSINOPHILS ABSOLUTE: 0.1 K/UL (ref 0–0.6)
EOSINOPHILS RELATIVE PERCENT: 2.4 % (ref 0–5)
EPITHELIAL CELLS, UA: 1 /HPF (ref 0–5)
GFR NON-AFRICAN AMERICAN: 53
GLUCOSE BLD-MCNC: 108 MG/DL (ref 74–109)
GLUCOSE URINE: NEGATIVE MG/DL
HCT VFR BLD CALC: 32.2 % (ref 37–47)
HEMOGLOBIN: 10.9 G/DL (ref 12–16)
HYALINE CASTS: 1 /HPF (ref 0–8)
KETONES, URINE: 15 MG/DL
LEUKOCYTE ESTERASE, URINE: ABNORMAL
LYMPHOCYTES ABSOLUTE: 1.8 K/UL (ref 1.1–4.5)
LYMPHOCYTES RELATIVE PERCENT: 33.3 % (ref 20–40)
MCH RBC QN AUTO: 32.8 PG (ref 27–31)
MCHC RBC AUTO-ENTMCNC: 33.9 G/DL (ref 33–37)
MCV RBC AUTO: 97 FL (ref 81–99)
MONOCYTES ABSOLUTE: 0.5 K/UL (ref 0–0.9)
MONOCYTES RELATIVE PERCENT: 8.8 % (ref 0–10)
NEUTROPHILS ABSOLUTE: 2.9 K/UL (ref 1.5–7.5)
NEUTROPHILS RELATIVE PERCENT: 54.2 % (ref 50–65)
NITRITE, URINE: POSITIVE
PDW BLD-RTO: 11.8 % (ref 11.5–14.5)
PH UA: 5
PLATELET # BLD: 210 K/UL (ref 130–400)
PMV BLD AUTO: 9.4 FL (ref 9.4–12.3)
POTASSIUM SERPL-SCNC: 3.7 MMOL/L (ref 3.5–5)
PROTEIN UA: NEGATIVE MG/DL
RBC # BLD: 3.32 M/UL (ref 4.2–5.4)
RBC UA: 2 /HPF (ref 0–4)
SODIUM BLD-SCNC: 129 MMOL/L (ref 136–145)
SPECIFIC GRAVITY UA: 1.02
URINE REFLEX TO CULTURE: YES
UROBILINOGEN, URINE: 1 E.U./DL
WBC # BLD: 5.3 K/UL (ref 4.8–10.8)
WBC UA: 1 /HPF (ref 0–5)

## 2018-02-20 PROCEDURE — 74177 CT ABD & PELVIS W/CONTRAST: CPT

## 2018-02-20 PROCEDURE — 36415 COLL VENOUS BLD VENIPUNCTURE: CPT

## 2018-02-20 PROCEDURE — 87086 URINE CULTURE/COLONY COUNT: CPT

## 2018-02-20 PROCEDURE — 81001 URINALYSIS AUTO W/SCOPE: CPT

## 2018-02-20 PROCEDURE — 6360000002 HC RX W HCPCS: Performed by: EMERGENCY MEDICINE

## 2018-02-20 PROCEDURE — 96365 THER/PROPH/DIAG IV INF INIT: CPT

## 2018-02-20 PROCEDURE — 80048 BASIC METABOLIC PNL TOTAL CA: CPT

## 2018-02-20 PROCEDURE — 99285 EMERGENCY DEPT VISIT HI MDM: CPT

## 2018-02-20 PROCEDURE — 96366 THER/PROPH/DIAG IV INF ADDON: CPT

## 2018-02-20 PROCEDURE — 2580000003 HC RX 258: Performed by: EMERGENCY MEDICINE

## 2018-02-20 PROCEDURE — 6360000004 HC RX CONTRAST MEDICATION: Performed by: EMERGENCY MEDICINE

## 2018-02-20 PROCEDURE — 96375 TX/PRO/DX INJ NEW DRUG ADDON: CPT

## 2018-02-20 PROCEDURE — 85025 COMPLETE CBC W/AUTO DIFF WBC: CPT

## 2018-02-20 RX ORDER — LEVOFLOXACIN 5 MG/ML
500 INJECTION, SOLUTION INTRAVENOUS ONCE
Status: COMPLETED | OUTPATIENT
Start: 2018-02-20 | End: 2018-02-21

## 2018-02-20 RX ORDER — 0.9 % SODIUM CHLORIDE 0.9 %
1000 INTRAVENOUS SOLUTION INTRAVENOUS ONCE
Status: COMPLETED | OUTPATIENT
Start: 2018-02-20 | End: 2018-02-21

## 2018-02-20 RX ORDER — LIDOCAINE 50 MG/G
1 PATCH TOPICAL DAILY
Status: DISCONTINUED | OUTPATIENT
Start: 2018-02-21 | End: 2018-02-21

## 2018-02-20 RX ORDER — KETOROLAC TROMETHAMINE 30 MG/ML
15 INJECTION, SOLUTION INTRAMUSCULAR; INTRAVENOUS ONCE
Status: COMPLETED | OUTPATIENT
Start: 2018-02-20 | End: 2018-02-20

## 2018-02-20 RX ADMIN — SODIUM CHLORIDE 1000 ML: 9 INJECTION, SOLUTION INTRAVENOUS at 22:50

## 2018-02-20 RX ADMIN — LEVOFLOXACIN 500 MG: 5 INJECTION, SOLUTION INTRAVENOUS at 22:50

## 2018-02-20 RX ADMIN — IOPAMIDOL 90 ML: 755 INJECTION, SOLUTION INTRAVENOUS at 21:41

## 2018-02-20 RX ADMIN — KETOROLAC TROMETHAMINE 15 MG: 30 INJECTION, SOLUTION INTRAMUSCULAR at 23:06

## 2018-02-20 ASSESSMENT — PAIN DESCRIPTION - ORIENTATION: ORIENTATION: UPPER

## 2018-02-20 ASSESSMENT — PAIN DESCRIPTION - PAIN TYPE: TYPE: ACUTE PAIN

## 2018-02-20 ASSESSMENT — PAIN SCALES - GENERAL
PAINLEVEL_OUTOF10: 8
PAINLEVEL_OUTOF10: 8

## 2018-02-20 ASSESSMENT — PAIN DESCRIPTION - LOCATION: LOCATION: BACK

## 2018-02-20 ASSESSMENT — PAIN DESCRIPTION - FREQUENCY: FREQUENCY: CONTINUOUS

## 2018-02-21 ENCOUNTER — APPOINTMENT (OUTPATIENT)
Dept: CT IMAGING | Age: 83
DRG: 689 | End: 2018-02-21
Payer: MEDICARE

## 2018-02-21 PROBLEM — G93.41 METABOLIC ENCEPHALOPATHY: Status: ACTIVE | Noted: 2018-02-21

## 2018-02-21 PROBLEM — R41.82 ALTERED MENTAL STATE: Status: ACTIVE | Noted: 2018-02-21

## 2018-02-21 PROBLEM — N30.00 ACUTE CYSTITIS WITHOUT HEMATURIA: Status: ACTIVE | Noted: 2018-02-21

## 2018-02-21 LAB
ANION GAP SERPL CALCULATED.3IONS-SCNC: 12 MMOL/L (ref 7–19)
BUN BLDV-MCNC: 12 MG/DL (ref 8–23)
CALCIUM SERPL-MCNC: 9.6 MG/DL (ref 8.8–10.2)
CHLORIDE BLD-SCNC: 100 MMOL/L (ref 98–111)
CO2: 24 MMOL/L (ref 22–29)
CREAT SERPL-MCNC: 0.8 MG/DL (ref 0.5–0.9)
GFR NON-AFRICAN AMERICAN: >60
GLUCOSE BLD-MCNC: 151 MG/DL (ref 74–109)
GLUCOSE BLD-MCNC: 87 MG/DL
GLUCOSE BLD-MCNC: 87 MG/DL (ref 70–99)
PERFORMED ON: NORMAL
POTASSIUM REFLEX MAGNESIUM: 3.6 MMOL/L (ref 3.5–5)
PRO-BNP: 287 PG/ML (ref 0–1800)
SODIUM BLD-SCNC: 136 MMOL/L (ref 136–145)

## 2018-02-21 PROCEDURE — 6360000002 HC RX W HCPCS: Performed by: PHYSICIAN ASSISTANT

## 2018-02-21 PROCEDURE — 82948 REAGENT STRIP/BLOOD GLUCOSE: CPT

## 2018-02-21 PROCEDURE — 36415 COLL VENOUS BLD VENIPUNCTURE: CPT

## 2018-02-21 PROCEDURE — 70450 CT HEAD/BRAIN W/O DYE: CPT

## 2018-02-21 PROCEDURE — 99222 1ST HOSP IP/OBS MODERATE 55: CPT | Performed by: HOSPITALIST

## 2018-02-21 PROCEDURE — 95816 EEG AWAKE AND DROWSY: CPT | Performed by: PSYCHIATRY & NEUROLOGY

## 2018-02-21 PROCEDURE — 80048 BASIC METABOLIC PNL TOTAL CA: CPT

## 2018-02-21 PROCEDURE — 95816 EEG AWAKE AND DROWSY: CPT

## 2018-02-21 PROCEDURE — 99285 EMERGENCY DEPT VISIT HI MDM: CPT | Performed by: EMERGENCY MEDICINE

## 2018-02-21 PROCEDURE — 6370000000 HC RX 637 (ALT 250 FOR IP): Performed by: HOSPITALIST

## 2018-02-21 PROCEDURE — 2580000003 HC RX 258: Performed by: HOSPITALIST

## 2018-02-21 PROCEDURE — 6370000000 HC RX 637 (ALT 250 FOR IP): Performed by: EMERGENCY MEDICINE

## 2018-02-21 PROCEDURE — 1210000000 HC MED SURG R&B

## 2018-02-21 PROCEDURE — 83880 ASSAY OF NATRIURETIC PEPTIDE: CPT

## 2018-02-21 PROCEDURE — 6370000000 HC RX 637 (ALT 250 FOR IP): Performed by: PHYSICIAN ASSISTANT

## 2018-02-21 PROCEDURE — 2580000003 HC RX 258: Performed by: PHYSICIAN ASSISTANT

## 2018-02-21 RX ORDER — PROMETHAZINE HYDROCHLORIDE 25 MG/1
25 TABLET ORAL 3 TIMES DAILY PRN
Status: DISCONTINUED | OUTPATIENT
Start: 2018-02-21 | End: 2018-02-23 | Stop reason: HOSPADM

## 2018-02-21 RX ORDER — RAMIPRIL 5 MG/1
10 CAPSULE ORAL 2 TIMES DAILY
Status: DISCONTINUED | OUTPATIENT
Start: 2018-02-21 | End: 2018-02-22

## 2018-02-21 RX ORDER — LIDOCAINE 50 MG/G
1 PATCH TOPICAL ONCE
Status: COMPLETED | OUTPATIENT
Start: 2018-02-21 | End: 2018-02-21

## 2018-02-21 RX ORDER — BUTALBITAL, ACETAMINOPHEN AND CAFFEINE 50; 325; 40 MG/1; MG/1; MG/1
1 TABLET ORAL EVERY 6 HOURS PRN
Status: DISCONTINUED | OUTPATIENT
Start: 2018-02-21 | End: 2018-02-23 | Stop reason: HOSPADM

## 2018-02-21 RX ORDER — NEBIVOLOL 5 MG/1
10 TABLET ORAL NIGHTLY
Status: DISCONTINUED | OUTPATIENT
Start: 2018-02-22 | End: 2018-02-23 | Stop reason: HOSPADM

## 2018-02-21 RX ORDER — PRAVASTATIN SODIUM 20 MG
20 TABLET ORAL EVERY MORNING
Status: DISCONTINUED | OUTPATIENT
Start: 2018-02-21 | End: 2018-02-22

## 2018-02-21 RX ORDER — FLUTICASONE PROPIONATE 50 MCG
1 SPRAY, SUSPENSION (ML) NASAL DAILY PRN
Status: DISCONTINUED | OUTPATIENT
Start: 2018-02-21 | End: 2018-02-23 | Stop reason: HOSPADM

## 2018-02-21 RX ORDER — DOCUSATE SODIUM 100 MG/1
100 CAPSULE, LIQUID FILLED ORAL 2 TIMES DAILY
Status: DISCONTINUED | OUTPATIENT
Start: 2018-02-21 | End: 2018-02-21

## 2018-02-21 RX ORDER — HYDRALAZINE HYDROCHLORIDE 25 MG/1
25 TABLET, FILM COATED ORAL 2 TIMES DAILY
Status: DISCONTINUED | OUTPATIENT
Start: 2018-02-21 | End: 2018-02-22

## 2018-02-21 RX ORDER — NEBIVOLOL 5 MG/1
10 TABLET ORAL DAILY
Status: DISCONTINUED | OUTPATIENT
Start: 2018-02-21 | End: 2018-02-21

## 2018-02-21 RX ORDER — M-VIT,TX,IRON,MINS/CALC/FOLIC 27MG-0.4MG
1 TABLET ORAL
Status: DISCONTINUED | OUTPATIENT
Start: 2018-02-21 | End: 2018-02-23 | Stop reason: HOSPADM

## 2018-02-21 RX ORDER — LUTEIN 6 MG
20 TABLET ORAL
Status: DISCONTINUED | OUTPATIENT
Start: 2018-02-21 | End: 2018-02-23 | Stop reason: HOSPADM

## 2018-02-21 RX ORDER — BISACODYL 10 MG
10 SUPPOSITORY, RECTAL RECTAL DAILY PRN
Status: DISCONTINUED | OUTPATIENT
Start: 2018-02-21 | End: 2018-02-23 | Stop reason: HOSPADM

## 2018-02-21 RX ORDER — RAMIPRIL 5 MG/1
10 CAPSULE ORAL 2 TIMES DAILY
Status: DISCONTINUED | OUTPATIENT
Start: 2018-02-21 | End: 2018-02-21

## 2018-02-21 RX ORDER — ACETAMINOPHEN 325 MG/1
650 TABLET ORAL EVERY 4 HOURS PRN
Status: DISCONTINUED | OUTPATIENT
Start: 2018-02-21 | End: 2018-02-23 | Stop reason: HOSPADM

## 2018-02-21 RX ORDER — PANTOPRAZOLE SODIUM 20 MG/1
20 TABLET, DELAYED RELEASE ORAL DAILY
Status: DISCONTINUED | OUTPATIENT
Start: 2018-02-21 | End: 2018-02-22

## 2018-02-21 RX ORDER — MELOXICAM 7.5 MG/1
15 TABLET ORAL NIGHTLY
Status: DISCONTINUED | OUTPATIENT
Start: 2018-02-21 | End: 2018-02-22

## 2018-02-21 RX ORDER — LIDOCAINE 50 MG/G
1 PATCH TOPICAL NIGHTLY
Status: DISCONTINUED | OUTPATIENT
Start: 2018-02-21 | End: 2018-02-22

## 2018-02-21 RX ORDER — DONEPEZIL HYDROCHLORIDE 5 MG/1
10 TABLET, FILM COATED ORAL NIGHTLY
Status: DISCONTINUED | OUTPATIENT
Start: 2018-02-21 | End: 2018-02-22

## 2018-02-21 RX ORDER — ACETAMINOPHEN 500 MG
500 TABLET ORAL 4 TIMES DAILY
Status: DISCONTINUED | OUTPATIENT
Start: 2018-02-21 | End: 2018-02-22

## 2018-02-21 RX ORDER — DIPYRIDAMOLE 25 MG
75 TABLET ORAL 2 TIMES DAILY
Status: DISCONTINUED | OUTPATIENT
Start: 2018-02-21 | End: 2018-02-21

## 2018-02-21 RX ORDER — ALBUTEROL SULFATE 90 UG/1
2 AEROSOL, METERED RESPIRATORY (INHALATION) EVERY 4 HOURS PRN
Status: DISCONTINUED | OUTPATIENT
Start: 2018-02-21 | End: 2018-02-23 | Stop reason: HOSPADM

## 2018-02-21 RX ORDER — BENZONATATE 100 MG/1
200 CAPSULE ORAL NIGHTLY
COMMUNITY
End: 2020-09-02

## 2018-02-21 RX ORDER — SODIUM CHLORIDE 1000 MG
1 TABLET, SOLUBLE MISCELLANEOUS DAILY
Status: DISCONTINUED | OUTPATIENT
Start: 2018-02-21 | End: 2018-02-22

## 2018-02-21 RX ORDER — AMLODIPINE BESYLATE 10 MG/1
10 TABLET ORAL DAILY
Status: DISCONTINUED | OUTPATIENT
Start: 2018-02-21 | End: 2018-02-22

## 2018-02-21 RX ORDER — SODIUM CHLORIDE 9 MG/ML
INJECTION, SOLUTION INTRAVENOUS CONTINUOUS
Status: DISCONTINUED | OUTPATIENT
Start: 2018-02-21 | End: 2018-02-23 | Stop reason: HOSPADM

## 2018-02-21 RX ORDER — SPIRONOLACTONE 25 MG/1
50 TABLET ORAL DAILY
Status: DISCONTINUED | OUTPATIENT
Start: 2018-02-22 | End: 2018-02-22

## 2018-02-21 RX ORDER — VITAMIN C
1 TAB ORAL
Status: DISCONTINUED | OUTPATIENT
Start: 2018-02-21 | End: 2018-02-23 | Stop reason: HOSPADM

## 2018-02-21 RX ORDER — PHENAZOPYRIDINE HYDROCHLORIDE 100 MG/1
100 TABLET, FILM COATED ORAL 3 TIMES DAILY PRN
COMMUNITY
End: 2018-04-12 | Stop reason: ALTCHOICE

## 2018-02-21 RX ORDER — OXYBUTYNIN CHLORIDE 5 MG/1
5 TABLET ORAL 2 TIMES DAILY
Status: DISCONTINUED | OUTPATIENT
Start: 2018-02-21 | End: 2018-02-21

## 2018-02-21 RX ORDER — SPIRONOLACTONE 25 MG/1
25 TABLET ORAL DAILY
Status: DISCONTINUED | OUTPATIENT
Start: 2018-02-21 | End: 2018-02-21

## 2018-02-21 RX ORDER — PHENAZOPYRIDINE HYDROCHLORIDE 100 MG/1
100 TABLET, FILM COATED ORAL
Status: DISCONTINUED | OUTPATIENT
Start: 2018-02-21 | End: 2018-02-23 | Stop reason: HOSPADM

## 2018-02-21 RX ORDER — SIMETHICONE 80 MG
125 TABLET,CHEWABLE ORAL EVERY 6 HOURS PRN
COMMUNITY
End: 2019-04-08

## 2018-02-21 RX ORDER — DOCUSATE SODIUM 100 MG/1
100 CAPSULE, LIQUID FILLED ORAL NIGHTLY
Status: DISCONTINUED | OUTPATIENT
Start: 2018-02-21 | End: 2018-02-22

## 2018-02-21 RX ORDER — DIPYRIDAMOLE 25 MG
75 TABLET ORAL 2 TIMES DAILY
Status: DISCONTINUED | OUTPATIENT
Start: 2018-02-21 | End: 2018-02-22

## 2018-02-21 RX ORDER — PANTOPRAZOLE SODIUM 20 MG/1
20 TABLET, DELAYED RELEASE ORAL DAILY
Status: DISCONTINUED | OUTPATIENT
Start: 2018-02-21 | End: 2018-02-21

## 2018-02-21 RX ORDER — CLONIDINE HYDROCHLORIDE 0.1 MG/1
0.1 TABLET ORAL PRN
Status: DISCONTINUED | OUTPATIENT
Start: 2018-02-21 | End: 2018-02-23 | Stop reason: HOSPADM

## 2018-02-21 RX ADMIN — RAMIPRIL 10 MG: 5 CAPSULE ORAL at 22:03

## 2018-02-21 RX ADMIN — SODIUM CHLORIDE: 9 INJECTION, SOLUTION INTRAVENOUS at 19:54

## 2018-02-21 RX ADMIN — MEROPENEM 1 G: 1 INJECTION, POWDER, FOR SOLUTION INTRAVENOUS at 19:52

## 2018-02-21 RX ADMIN — PHENAZOPYRIDINE HYDROCHLORIDE 100 MG: 100 TABLET ORAL at 18:31

## 2018-02-21 RX ADMIN — SODIUM CHLORIDE TAB 1 GM 1 G: 1 TAB at 11:00

## 2018-02-21 RX ADMIN — DONEPEZIL HYDROCHLORIDE 10 MG: 5 TABLET, FILM COATED ORAL at 19:57

## 2018-02-21 RX ADMIN — MEROPENEM 1 G: 1 INJECTION, POWDER, FOR SOLUTION INTRAVENOUS at 11:30

## 2018-02-21 RX ADMIN — DOCUSATE SODIUM 100 MG: 100 CAPSULE, LIQUID FILLED ORAL at 19:57

## 2018-02-21 RX ADMIN — ACETAMINOPHEN 500 MG: 500 TABLET, FILM COATED ORAL at 19:54

## 2018-02-21 RX ADMIN — Medication 1 TABLET: at 18:30

## 2018-02-21 RX ADMIN — LEVETIRACETAM 750 MG: 250 TABLET, FILM COATED ORAL at 11:00

## 2018-02-21 RX ADMIN — VITAMIN D, TAB 1000IU (100/BT) 5000 UNITS: 25 TAB at 18:30

## 2018-02-21 RX ADMIN — BUTALBITAL, ACETAMINOPHEN, AND CAFFEINE 1 TABLET: 50; 325; 40 TABLET ORAL at 20:16

## 2018-02-21 RX ADMIN — HYDRALAZINE HYDROCHLORIDE 25 MG: 25 TABLET, FILM COATED ORAL at 18:31

## 2018-02-21 RX ADMIN — MELOXICAM 15 MG: 7.5 TABLET ORAL at 19:57

## 2018-02-21 RX ADMIN — DIPYRIDAMOLE 75 MG: 25 TABLET, FILM COATED ORAL at 19:57

## 2018-02-21 RX ADMIN — PRAVASTATIN SODIUM 20 MG: 20 TABLET ORAL at 10:30

## 2018-02-21 RX ADMIN — AMLODIPINE BESYLATE 10 MG: 10 TABLET ORAL at 11:00

## 2018-02-21 RX ADMIN — LEVETIRACETAM 750 MG: 250 TABLET, FILM COATED ORAL at 22:02

## 2018-02-21 RX ADMIN — PANTOPRAZOLE SODIUM 20 MG: 20 TABLET, DELAYED RELEASE ORAL at 11:00

## 2018-02-21 ASSESSMENT — ENCOUNTER SYMPTOMS
VOMITING: 0
ABDOMINAL PAIN: 0
SHORTNESS OF BREATH: 0

## 2018-02-21 ASSESSMENT — PAIN SCALES - GENERAL
PAINLEVEL_OUTOF10: 7
PAINLEVEL_OUTOF10: 7

## 2018-02-21 NOTE — ED PROVIDER NOTES
Pneumococcal vaccines    FAMILY HISTORY       Family History   Problem Relation Age of Onset    Colon Cancer Neg Hx     Colon Polyps Neg Hx     Esophageal Cancer Neg Hx     Liver Cancer Neg Hx     Liver Disease Neg Hx     Rectal Cancer Neg Hx     Stomach Cancer Neg Hx           SOCIAL HISTORY       Social History     Social History    Marital status:      Spouse name: N/A    Number of children: 1    Years of education: N/A     Social History Main Topics    Smoking status: Never Smoker    Smokeless tobacco: Never Used    Alcohol use No    Drug use: No    Sexual activity: Not Asked     Other Topics Concern    None     Social History Narrative    None       SCREENINGS             PHYSICAL EXAM    (up to 7 for level 4, 8 or more for level 5)     ED Triage Vitals [02/20/18 2006]   BP Temp Temp Source Pulse Resp SpO2 Height Weight   127/64 98.8 °F (37.1 °C) Temporal 58 18 94 % 5' 1\" (1.549 m) 130 lb (59 kg)       Physical Exam   Constitutional: She is oriented to person, place, and time. She appears well-developed and well-nourished. Sitting up in bed no acute distress    HENT:   Head: Normocephalic and atraumatic. Eyes: EOM are normal. Pupils are equal, round, and reactive to light. Neck: Normal range of motion. Neck supple. Cardiovascular: Normal rate, regular rhythm and normal heart sounds. Pulmonary/Chest: Effort normal and breath sounds normal. No respiratory distress. She has no wheezes. Abdominal: Soft. Bowel sounds are normal. She exhibits no distension. There is no tenderness. Musculoskeletal: Normal range of motion. Neurological: She is alert and oriented to person, place, and time. At times slightly confused    Skin: Skin is warm and dry. Psychiatric:   Anxious    Nursing note and vitals reviewed.       DIAGNOSTIC RESULTS     EKG: All EKG's are interpreted by the Emergency Department Physician who either signs or Co-signs this chart in the absence of a CONSULTS:  None    PROCEDURES:  Unless otherwise noted below, none     Procedures    FINAL IMPRESSION      1. Acute encephalopathy    2. Essential hypertension    3. Urinary tract infection without hematuria, site unspecified    4. Flank pain          DISPOSITION/PLAN   DISPOSITION Decision To Admit 02/21/2018 12:30:57 AM      PATIENT REFERRED TO:  No follow-up provider specified.     DISCHARGE MEDICATIONS:  New Prescriptions    No medications on file          (Please note that portions of this note were completed with a voice recognition program.  Efforts were made to edit the dictations but occasionally words are mis-transcribed.)    Inderjit Ruvalcaba MD (electronically signed)  Attending Emergency Physician         Inderjit Ruvalcaba MD  02/21/18 7022

## 2018-02-21 NOTE — H&P
20 MG TABS Take 20 mg by mouth Daily with lunch   Yes Historical Provider, MD   fluticasone (VERAMYST) 27.5 MCG/SPRAY nasal spray 2 sprays by Nasal route daily   Yes Historical Provider, MD   albuterol sulfate  (90 Base) MCG/ACT inhaler Inhale 2 puffs into the lungs every 4 hours as needed for Wheezing   Yes Historical Provider, MD   amLODIPine (NORVASC) 10 MG tablet Take 10 mg by mouth daily Indications: breakfast   Yes Historical Provider, MD   pantoprazole (PROTONIX) 20 MG tablet Take 20 mg by mouth daily  5/11/17  Yes Historical Provider, MD   oxybutynin (DITROPAN) 5 MG tablet Take 10 mg by mouth 2 times daily  5/11/17  Yes Historical Provider, MD   camphor-menthol (SARNA) 0.5-0.5 % lotion Apply 0.5 mLs topically 2 times daily as needed for Itching Apply topically as needed.    Yes Historical Provider, MD   butalbital-acetaminophen-caffeine (ESGIC PLUS) -40 MG per tablet Take 1 tablet by mouth every 6 hours as needed for Headaches 7/28/16  Yes Sharla Brooke MD   acetaminophen (TYLENOL) 500 MG tablet Take 2 tablets by mouth every 6 hours as needed for Pain Do not exceed 4 g of acetaminophen daily (patient is also on Fioricet PRN) 7/28/16  Yes Sharla Brooke MD   levETIRAcetam (KEPPRA) 750 MG tablet Take 1 tablet by mouth every 12 hours 7/27/16  Yes Sharla Brooke MD   cloNIDine (CATAPRES) 0.1 MG tablet Take 1 tablet by mouth 2 times daily  Patient taking differently: Take 0.1 mg by mouth daily Indications: bedtime  7/27/16  Yes Sharla Brooke MD   donepezil (ARICEPT) 10 MG tablet Take 1 tablet by mouth nightly 7/27/16  Yes Sharla Brooke MD   Calcium Carb-Cholecalciferol (CALCIUM 600+D) 600-800 MG-UNIT TABS Take 1 tablet by mouth Daily with lunch   Yes Historical Provider, MD   promethazine (PHENERGAN) 25 MG tablet Take 25 mg by mouth 3 times daily as needed for Nausea   Yes Historical Provider, MD   docusate sodium (COLACE) 100 MG capsule Take 100 mg by mouth daily as needed for Constipation   Yes Historical Provider, MD   Omega 3-6-9 Fatty Acids (OMEGA 3-6-9 COMPLEX PO) Take 1 tablet by mouth Daily with lunch    Yes Historical Provider, MD   Cholecalciferol (VITAMIN D3) 1000 UNITS CAPS Take 1 tablet by mouth Daily with lunch    Yes Historical Provider, MD   alendronate (FOSAMAX) 70 MG tablet Take 70 mg by mouth every 7 days Takes every Sunday     Yes Historical Provider, MD   MICRONIZED COLESTIPOL HCL 1 G tablet Take 2 g by mouth as needed 2 tablets daily prn   12/30/15  Yes Historical Provider, MD   dipyridamole (PERSANTINE) 75 MG tablet 75 mg 2 times daily  12/16/15  Yes Historical Provider, MD   meloxicam (MOBIC) 15 MG tablet Take 15 mg by mouth nightly  2/9/16  Yes Historical Provider, MD   BYSTOLIC 10 MG tablet Take 10 mg by mouth daily Indications: bedtime  2/18/16  Yes Historical Provider, MD   pravastatin (PRAVACHOL) 20 MG tablet Take 20 mg by mouth every morning  3/1/16  Yes Historical Provider, MD   ramipril (ALTACE) 5 MG capsule Take 10 mg by mouth daily (with breakfast)  2/26/16  Yes Historical Provider, MD   spironolactone (ALDACTONE) 25 MG tablet 50 mg daily  2/19/16  Yes Historical Provider, MD   b complex vitamins capsule Take 1 capsule by mouth Daily with lunch B 100   Yes Historical Provider, MD   Multiple Vitamins-Minerals (THERAPEUTIC MULTIVITAMIN-MINERALS) tablet Take 1 tablet by mouth Daily with lunch    Yes Historical Provider, MD   cefdinir (OMNICEF) 300 MG capsule Take 1 capsule by mouth 2 times daily for 10 days 2/13/18 2/23/18  LAYTON Hilton   pantoprazole (PROTONIX) 20 MG tablet Take 1 tablet by mouth daily 7/28/16 8/27/16  Krista Hall MD     Allergies:    Aspirin; Codeine; Demerol hcl [meperidine]; Dilaudid [hydromorphone hcl]; Namenda [memantine hcl]; Norco [hydrocodone-acetaminophen];  Ondansetron; and Pneumococcal vaccines    Social History:    The patient currently lives at [facility] adenopathy, no carotid bruit, no JVD, supple, symmetrical, trachea midline and thyroid not enlarged, symmetric, no tenderness/mass/nodules  Lungs: clear to auscultation bilaterally,no rales or wheezes   Heart: regular rate and rhythm, S1, S2 normal, no murmur  Abdomen:soft, non-tender; non-distended, normal bowel sounds no masses, no organomegaly  Extremities:No lower extremity edema,  No erythema, no tenderness to palpation  Skin: Skin color, texture, turgor normal. No rashes or lesions  Lymphatic: No palpable lymph node enlargment  Neurologic: Alert and oriented X 3, normal strength and tone. Normal symmetric reflexes. Mental status: Alert, oriented, thought content appropriate  Cranial nerves:II-XII Grossly intact Sensory: normal Motor:grossly normal  Psychiatric: Alert and oriented, thought content appropriate, normal insight, mood appropriate    Diagnostic Data:  CBC:  Recent Labs      02/20/18 2104   WBC  5.3   HGB  10.9*   HCT  32.2*   PLT  210     BMP:  Recent Labs      02/20/18   2104   NA  129*   K  3.7   CL  92*   CO2  23   BUN  19   CREATININE  1.0*   CALCIUM  9.8     Urinalysis:  Lab Results   Component Value Date    NITRU POSITIVE 02/20/2018    WBCUA 1 02/20/2018    BACTERIA NEGATIVE 02/20/2018    RBCUA 2 02/20/2018    BLOODU Negative 02/20/2018    SPECGRAV 1.018 02/20/2018    GLUCOSEU Negative 02/20/2018     CT Head 02/21/2018  No acute intracranial abnormality. Chronic ischemic and atrophic changes. If symptoms persist, further evaluation with MR imaging of the brain  may be obtained. The above study was initially reviewed and reported by stat rads. I do  not find any discrepancies. CT Abdomen/Pelvis  1. No CT evidence of acute intra-abdominal/pelvic pathological  process. Particularly no urinary tract calculi or evidence of acute  renal pathology.     Assessment:  Principal Problem:    Acute cystitis without hematuria-She has been on Rocephin as OP, daughter concerned for rxn with Levaquin,

## 2018-02-21 NOTE — ED NOTES
Report given and care transferred to Dittmer, [address] [place] [address], RN  02/21/18 1953

## 2018-02-21 NOTE — PROGRESS NOTES
Pharmacy Renal Adjustment    Leanne Pallas is a 80 y.o. female. Pharmacy has renally adjusted medications per protocol. Recent Labs      02/20/18   2104 02/21/18   1140   BUN  19  12       Recent Labs      02/20/18   2104  02/21/18   1140   CREATININE  1.0*  0.8       Estimated Creatinine Clearance: 45 mL/min (based on SCr of 0.8 mg/dL). Height:   Ht Readings from Last 1 Encounters:   02/21/18 5' 1\" (1.549 m)     Weight:  Wt Readings from Last 1 Encounters:   02/20/18 130 lb (59 kg)       Plan: Adjust the following medications based on renal function:  Change Merrem to 1gm IV q 12 hours.     DALTON DIAZ, PHARM D, 2/21/2018, 2:36 PM

## 2018-02-22 LAB
ANION GAP SERPL CALCULATED.3IONS-SCNC: 14 MMOL/L (ref 7–19)
BACTERIA: NEGATIVE /HPF
BILIRUBIN URINE: NEGATIVE
BLOOD, URINE: NEGATIVE
BUN BLDV-MCNC: 13 MG/DL (ref 8–23)
CALCIUM SERPL-MCNC: 9.2 MG/DL (ref 8.8–10.2)
CHLORIDE BLD-SCNC: 93 MMOL/L (ref 98–111)
CLARITY: CLEAR
CO2: 24 MMOL/L (ref 22–29)
COLOR: ABNORMAL
CREAT SERPL-MCNC: 0.8 MG/DL (ref 0.5–0.9)
EPITHELIAL CELLS, UA: 0 /HPF (ref 0–5)
GFR NON-AFRICAN AMERICAN: >60
GLUCOSE BLD-MCNC: 100 MG/DL (ref 74–109)
GLUCOSE URINE: NEGATIVE MG/DL
HCT VFR BLD CALC: 30.3 % (ref 37–47)
HEMOGLOBIN: 10.3 G/DL (ref 12–16)
HYALINE CASTS: 0 /HPF (ref 0–8)
KETONES, URINE: ABNORMAL MG/DL
LEUKOCYTE ESTERASE, URINE: ABNORMAL
MCH RBC QN AUTO: 32.8 PG (ref 27–31)
MCHC RBC AUTO-ENTMCNC: 34 G/DL (ref 33–37)
MCV RBC AUTO: 96.5 FL (ref 81–99)
NITRITE, URINE: POSITIVE
PDW BLD-RTO: 11.7 % (ref 11.5–14.5)
PH UA: 6
PLATELET # BLD: 205 K/UL (ref 130–400)
PMV BLD AUTO: 9.7 FL (ref 9.4–12.3)
POTASSIUM SERPL-SCNC: 3.3 MMOL/L (ref 3.5–5)
PROTEIN UA: NEGATIVE MG/DL
RBC # BLD: 3.14 M/UL (ref 4.2–5.4)
RBC UA: 0 /HPF (ref 0–4)
SODIUM BLD-SCNC: 131 MMOL/L (ref 136–145)
SPECIFIC GRAVITY UA: 1.01
URINE CULTURE, ROUTINE: NORMAL
UROBILINOGEN, URINE: 1 E.U./DL
WBC # BLD: 4.6 K/UL (ref 4.8–10.8)
WBC UA: 1 /HPF (ref 0–5)

## 2018-02-22 PROCEDURE — 1210000000 HC MED SURG R&B

## 2018-02-22 PROCEDURE — 6360000002 HC RX W HCPCS: Performed by: PHYSICIAN ASSISTANT

## 2018-02-22 PROCEDURE — 85027 COMPLETE CBC AUTOMATED: CPT

## 2018-02-22 PROCEDURE — 81001 URINALYSIS AUTO W/SCOPE: CPT

## 2018-02-22 PROCEDURE — 2580000003 HC RX 258: Performed by: PHYSICIAN ASSISTANT

## 2018-02-22 PROCEDURE — 80048 BASIC METABOLIC PNL TOTAL CA: CPT

## 2018-02-22 PROCEDURE — 36415 COLL VENOUS BLD VENIPUNCTURE: CPT

## 2018-02-22 PROCEDURE — 6360000002 HC RX W HCPCS: Performed by: INTERNAL MEDICINE

## 2018-02-22 PROCEDURE — 6370000000 HC RX 637 (ALT 250 FOR IP): Performed by: HOSPITALIST

## 2018-02-22 PROCEDURE — 99232 SBSQ HOSP IP/OBS MODERATE 35: CPT | Performed by: HOSPITALIST

## 2018-02-22 PROCEDURE — 6370000000 HC RX 637 (ALT 250 FOR IP): Performed by: PHYSICIAN ASSISTANT

## 2018-02-22 RX ORDER — PANTOPRAZOLE SODIUM 20 MG/1
20 TABLET, DELAYED RELEASE ORAL DAILY
Status: DISCONTINUED | OUTPATIENT
Start: 2018-02-23 | End: 2018-02-23 | Stop reason: HOSPADM

## 2018-02-22 RX ORDER — AMLODIPINE BESYLATE 10 MG/1
10 TABLET ORAL DAILY
Status: DISCONTINUED | OUTPATIENT
Start: 2018-02-23 | End: 2018-02-23 | Stop reason: HOSPADM

## 2018-02-22 RX ORDER — MELOXICAM 7.5 MG/1
15 TABLET ORAL NIGHTLY
Status: DISCONTINUED | OUTPATIENT
Start: 2018-02-22 | End: 2018-02-23 | Stop reason: HOSPADM

## 2018-02-22 RX ORDER — DIPYRIDAMOLE 25 MG
75 TABLET ORAL 2 TIMES DAILY
Status: DISCONTINUED | OUTPATIENT
Start: 2018-02-22 | End: 2018-02-23 | Stop reason: HOSPADM

## 2018-02-22 RX ORDER — POTASSIUM CHLORIDE 20 MEQ/1
20 TABLET, EXTENDED RELEASE ORAL ONCE
Status: COMPLETED | OUTPATIENT
Start: 2018-02-22 | End: 2018-02-22

## 2018-02-22 RX ORDER — ACETAMINOPHEN 500 MG
1000 TABLET ORAL 4 TIMES DAILY
Status: DISCONTINUED | OUTPATIENT
Start: 2018-02-22 | End: 2018-02-23 | Stop reason: HOSPADM

## 2018-02-22 RX ORDER — OXYBUTYNIN CHLORIDE 5 MG/1
5 TABLET ORAL 2 TIMES DAILY
Status: DISCONTINUED | OUTPATIENT
Start: 2018-02-22 | End: 2018-02-23 | Stop reason: HOSPADM

## 2018-02-22 RX ORDER — DONEPEZIL HYDROCHLORIDE 5 MG/1
10 TABLET, FILM COATED ORAL NIGHTLY
Status: DISCONTINUED | OUTPATIENT
Start: 2018-02-22 | End: 2018-02-23 | Stop reason: HOSPADM

## 2018-02-22 RX ORDER — SODIUM CHLORIDE 1000 MG
1 TABLET, SOLUBLE MISCELLANEOUS
Status: DISCONTINUED | OUTPATIENT
Start: 2018-02-22 | End: 2018-02-23 | Stop reason: HOSPADM

## 2018-02-22 RX ORDER — ACETAMINOPHEN 500 MG
500 TABLET ORAL 4 TIMES DAILY
Status: DISCONTINUED | OUTPATIENT
Start: 2018-02-22 | End: 2018-02-22 | Stop reason: SDUPTHER

## 2018-02-22 RX ORDER — RAMIPRIL 5 MG/1
10 CAPSULE ORAL 2 TIMES DAILY
Status: DISCONTINUED | OUTPATIENT
Start: 2018-02-22 | End: 2018-02-23 | Stop reason: HOSPADM

## 2018-02-22 RX ORDER — PRAVASTATIN SODIUM 20 MG
20 TABLET ORAL EVERY MORNING
Status: DISCONTINUED | OUTPATIENT
Start: 2018-02-23 | End: 2018-02-23 | Stop reason: HOSPADM

## 2018-02-22 RX ORDER — DOCUSATE SODIUM 100 MG/1
100 CAPSULE, LIQUID FILLED ORAL NIGHTLY
Status: DISCONTINUED | OUTPATIENT
Start: 2018-02-22 | End: 2018-02-23 | Stop reason: HOSPADM

## 2018-02-22 RX ORDER — LIDOCAINE 50 MG/G
1 PATCH TOPICAL NIGHTLY
Status: DISCONTINUED | OUTPATIENT
Start: 2018-02-22 | End: 2018-02-23 | Stop reason: HOSPADM

## 2018-02-22 RX ORDER — SPIRONOLACTONE 25 MG/1
50 TABLET ORAL DAILY
Status: DISCONTINUED | OUTPATIENT
Start: 2018-02-23 | End: 2018-02-23 | Stop reason: HOSPADM

## 2018-02-22 RX ORDER — HYDRALAZINE HYDROCHLORIDE 25 MG/1
25 TABLET, FILM COATED ORAL 2 TIMES DAILY
Status: DISCONTINUED | OUTPATIENT
Start: 2018-02-22 | End: 2018-02-23 | Stop reason: HOSPADM

## 2018-02-22 RX ORDER — SODIUM CHLORIDE 1000 MG
1 TABLET, SOLUBLE MISCELLANEOUS DAILY
Status: DISCONTINUED | OUTPATIENT
Start: 2018-02-23 | End: 2018-02-22 | Stop reason: SDUPTHER

## 2018-02-22 RX ADMIN — LEVETIRACETAM 750 MG: 250 TABLET, FILM COATED ORAL at 10:20

## 2018-02-22 RX ADMIN — POTASSIUM CHLORIDE 20 MEQ: 20 TABLET, EXTENDED RELEASE ORAL at 22:06

## 2018-02-22 RX ADMIN — PHENAZOPYRIDINE HYDROCHLORIDE 100 MG: 100 TABLET ORAL at 10:23

## 2018-02-22 RX ADMIN — PANTOPRAZOLE SODIUM 20 MG: 20 TABLET, DELAYED RELEASE ORAL at 10:22

## 2018-02-22 RX ADMIN — ENOXAPARIN SODIUM 40 MG: 40 INJECTION SUBCUTANEOUS at 10:24

## 2018-02-22 RX ADMIN — HYDRALAZINE HYDROCHLORIDE 25 MG: 25 TABLET, FILM COATED ORAL at 22:06

## 2018-02-22 RX ADMIN — VITAMIN C 1 TABLET: TAB at 11:57

## 2018-02-22 RX ADMIN — ACETAMINOPHEN 1000 MG: 500 TABLET ORAL at 22:08

## 2018-02-22 RX ADMIN — MULTIPLE VITAMINS W/ MINERALS TAB 1 TABLET: TAB at 11:57

## 2018-02-22 RX ADMIN — MEROPENEM 1 G: 1 INJECTION, POWDER, FOR SOLUTION INTRAVENOUS at 03:10

## 2018-02-22 RX ADMIN — DIPYRIDAMOLE 75 MG: 25 TABLET, FILM COATED ORAL at 22:05

## 2018-02-22 RX ADMIN — LEVETIRACETAM 750 MG: 250 TABLET, FILM COATED ORAL at 22:05

## 2018-02-22 RX ADMIN — PRAVASTATIN SODIUM 20 MG: 20 TABLET ORAL at 10:20

## 2018-02-22 RX ADMIN — OXYBUTYNIN CHLORIDE 5 MG: 5 TABLET ORAL at 22:06

## 2018-02-22 RX ADMIN — RAMIPRIL 10 MG: 5 CAPSULE ORAL at 22:05

## 2018-02-22 RX ADMIN — DOCUSATE SODIUM 100 MG: 100 CAPSULE, LIQUID FILLED ORAL at 22:06

## 2018-02-22 RX ADMIN — HYDRALAZINE HYDROCHLORIDE 25 MG: 25 TABLET, FILM COATED ORAL at 10:20

## 2018-02-22 RX ADMIN — SODIUM CHLORIDE TAB 1 GM 1 G: 1 TAB at 18:25

## 2018-02-22 RX ADMIN — PHENAZOPYRIDINE HYDROCHLORIDE 100 MG: 100 TABLET ORAL at 16:06

## 2018-02-22 RX ADMIN — VITAMIN D, TAB 1000IU (100/BT) 1000 UNITS: 25 TAB at 11:56

## 2018-02-22 RX ADMIN — SPIRONOLACTONE 50 MG: 25 TABLET, FILM COATED ORAL at 13:41

## 2018-02-22 RX ADMIN — NEBIVOLOL HYDROCHLORIDE 10 MG: 5 TABLET ORAL at 22:06

## 2018-02-22 RX ADMIN — MEROPENEM 1 G: 1 INJECTION, POWDER, FOR SOLUTION INTRAVENOUS at 15:59

## 2018-02-22 RX ADMIN — MELOXICAM 15 MG: 7.5 TABLET ORAL at 22:05

## 2018-02-22 RX ADMIN — DIPYRIDAMOLE 75 MG: 25 TABLET, FILM COATED ORAL at 10:19

## 2018-02-22 RX ADMIN — RAMIPRIL 10 MG: 5 CAPSULE ORAL at 10:23

## 2018-02-22 RX ADMIN — DONEPEZIL HYDROCHLORIDE 10 MG: 5 TABLET, FILM COATED ORAL at 22:06

## 2018-02-22 RX ADMIN — Medication 1 TABLET: at 11:57

## 2018-02-22 RX ADMIN — AMLODIPINE BESYLATE 10 MG: 10 TABLET ORAL at 10:21

## 2018-02-22 ASSESSMENT — PAIN SCALES - GENERAL: PAINLEVEL_OUTOF10: 5

## 2018-02-23 VITALS
BODY MASS INDEX: 24.55 KG/M2 | HEART RATE: 61 BPM | OXYGEN SATURATION: 93 % | RESPIRATION RATE: 20 BRPM | HEIGHT: 61 IN | DIASTOLIC BLOOD PRESSURE: 56 MMHG | TEMPERATURE: 98.1 F | WEIGHT: 130 LBS | SYSTOLIC BLOOD PRESSURE: 110 MMHG

## 2018-02-23 LAB
ANION GAP SERPL CALCULATED.3IONS-SCNC: 10 MMOL/L (ref 7–19)
BUN BLDV-MCNC: 11 MG/DL (ref 8–23)
CALCIUM SERPL-MCNC: 8.9 MG/DL (ref 8.8–10.2)
CHLORIDE BLD-SCNC: 96 MMOL/L (ref 98–111)
CO2: 25 MMOL/L (ref 22–29)
CREAT SERPL-MCNC: 0.8 MG/DL (ref 0.5–0.9)
GFR NON-AFRICAN AMERICAN: >60
GLUCOSE BLD-MCNC: 110 MG/DL (ref 74–109)
HCT VFR BLD CALC: 30.4 % (ref 37–47)
HEMOGLOBIN: 10.2 G/DL (ref 12–16)
MCH RBC QN AUTO: 33.2 PG (ref 27–31)
MCHC RBC AUTO-ENTMCNC: 33.6 G/DL (ref 33–37)
MCV RBC AUTO: 99 FL (ref 81–99)
PDW BLD-RTO: 11.8 % (ref 11.5–14.5)
PLATELET # BLD: 201 K/UL (ref 130–400)
PMV BLD AUTO: 9.9 FL (ref 9.4–12.3)
POTASSIUM SERPL-SCNC: 4.4 MMOL/L (ref 3.5–5)
RBC # BLD: 3.07 M/UL (ref 4.2–5.4)
SODIUM BLD-SCNC: 131 MMOL/L (ref 136–145)
WBC # BLD: 5 K/UL (ref 4.8–10.8)

## 2018-02-23 PROCEDURE — 6370000000 HC RX 637 (ALT 250 FOR IP): Performed by: PHYSICIAN ASSISTANT

## 2018-02-23 PROCEDURE — 2580000003 HC RX 258: Performed by: PHYSICIAN ASSISTANT

## 2018-02-23 PROCEDURE — 94761 N-INVAS EAR/PLS OXIMETRY MLT: CPT

## 2018-02-23 PROCEDURE — 36415 COLL VENOUS BLD VENIPUNCTURE: CPT

## 2018-02-23 PROCEDURE — 6370000000 HC RX 637 (ALT 250 FOR IP): Performed by: HOSPITALIST

## 2018-02-23 PROCEDURE — 6360000002 HC RX W HCPCS: Performed by: PHYSICIAN ASSISTANT

## 2018-02-23 PROCEDURE — 80048 BASIC METABOLIC PNL TOTAL CA: CPT

## 2018-02-23 PROCEDURE — 85027 COMPLETE CBC AUTOMATED: CPT

## 2018-02-23 PROCEDURE — 99239 HOSP IP/OBS DSCHRG MGMT >30: CPT | Performed by: HOSPITALIST

## 2018-02-23 RX ORDER — SULFAMETHOXAZOLE AND TRIMETHOPRIM 800; 160 MG/1; MG/1
1 TABLET ORAL 2 TIMES DAILY
Qty: 10 TABLET | Refills: 0 | Status: SHIPPED | OUTPATIENT
Start: 2018-02-23 | End: 2018-02-28

## 2018-02-23 RX ADMIN — PHENAZOPYRIDINE HYDROCHLORIDE 100 MG: 100 TABLET ORAL at 18:00

## 2018-02-23 RX ADMIN — VITAMIN C 1 TABLET: TAB at 12:55

## 2018-02-23 RX ADMIN — PHENAZOPYRIDINE HYDROCHLORIDE 100 MG: 100 TABLET ORAL at 09:25

## 2018-02-23 RX ADMIN — SODIUM CHLORIDE TAB 1 GM 1 G: 1 TAB at 18:00

## 2018-02-23 RX ADMIN — ACETAMINOPHEN 1000 MG: 500 TABLET ORAL at 17:59

## 2018-02-23 RX ADMIN — MULTIPLE VITAMINS W/ MINERALS TAB 1 TABLET: TAB at 12:52

## 2018-02-23 RX ADMIN — HYDRALAZINE HYDROCHLORIDE 25 MG: 25 TABLET, FILM COATED ORAL at 09:32

## 2018-02-23 RX ADMIN — MEROPENEM 1 G: 1 INJECTION, POWDER, FOR SOLUTION INTRAVENOUS at 16:02

## 2018-02-23 RX ADMIN — SODIUM CHLORIDE TAB 1 GM 1 G: 1 TAB at 12:52

## 2018-02-23 RX ADMIN — PRAVASTATIN SODIUM 20 MG: 20 TABLET ORAL at 09:26

## 2018-02-23 RX ADMIN — RAMIPRIL 10 MG: 5 CAPSULE ORAL at 09:26

## 2018-02-23 RX ADMIN — Medication 1 TABLET: at 12:52

## 2018-02-23 RX ADMIN — SPIRONOLACTONE 50 MG: 25 TABLET, FILM COATED ORAL at 09:28

## 2018-02-23 RX ADMIN — MEROPENEM 1 G: 1 INJECTION, POWDER, FOR SOLUTION INTRAVENOUS at 03:16

## 2018-02-23 RX ADMIN — AMLODIPINE BESYLATE 10 MG: 10 TABLET ORAL at 09:27

## 2018-02-23 RX ADMIN — PANTOPRAZOLE SODIUM 20 MG: 20 TABLET, DELAYED RELEASE ORAL at 09:27

## 2018-02-23 RX ADMIN — LEVETIRACETAM 750 MG: 250 TABLET, FILM COATED ORAL at 09:27

## 2018-02-23 RX ADMIN — SODIUM CHLORIDE TAB 1 GM 1 G: 1 TAB at 09:26

## 2018-02-23 RX ADMIN — OXYBUTYNIN CHLORIDE 5 MG: 5 TABLET ORAL at 09:28

## 2018-02-23 RX ADMIN — DIPYRIDAMOLE 75 MG: 25 TABLET, FILM COATED ORAL at 09:32

## 2018-02-23 RX ADMIN — ENOXAPARIN SODIUM 40 MG: 40 INJECTION SUBCUTANEOUS at 09:29

## 2018-02-23 RX ADMIN — ACETAMINOPHEN 1000 MG: 500 TABLET ORAL at 12:00

## 2018-02-23 RX ADMIN — Medication 20 MG: at 14:02

## 2018-02-23 RX ADMIN — VITAMIN D, TAB 1000IU (100/BT) 1000 UNITS: 25 TAB at 12:52

## 2018-02-23 ASSESSMENT — PAIN SCALES - GENERAL
PAINLEVEL_OUTOF10: 4
PAINLEVEL_OUTOF10: 4

## 2018-02-23 NOTE — PROGRESS NOTES
16:30pm - Home oxygen evaluation performed and results are as follows: SaO2 = 96% on R/A at rest, SaO2 = 96% on 2 lpm (NC) O2, SaO2 = 96% on R/A while ambulating, SaO2 = 96% on 2 lpm(NC) while ambulating. (recovery SaO2).    TS RRT/RCP

## 2018-02-23 NOTE — PROGRESS NOTES
HOSPITAL MEDICINE  - PROGRESS NOTE    Admit Date: 2/20/2018         CC;  AMS    Subjective: feels fine    Objective: no distress    Diet: DIET CARDIAC;  Pain is:Mild  Nausea:None  Bowel Movement/Flatus yes    Data:   Scheduled Meds: Reviewed  Current Facility-Administered Medications   Medication Dose Route Frequency Provider Last Rate Last Dose    [START ON 2/23/2018] amLODIPine (NORVASC) tablet 10 mg  10 mg Oral Daily Alma Delia Passy, PA-C        dipyridamole (PERSANTINE) tablet 75 mg  75 mg Oral BID Alma Delia Passy, PA-C        docusate sodium (COLACE) capsule 100 mg  100 mg Oral Nightly Alma Delia Passy, PA-C        donepezil (ARICEPT) tablet 10 mg  10 mg Oral Nightly Alma Delia Passy, PA-C        [START ON 2/23/2018] enoxaparin (LOVENOX) injection 40 mg  40 mg Subcutaneous Daily Alma Delia Passy, PA-C        hydrALAZINE (APRESOLINE) tablet 25 mg  25 mg Oral BID Alma Delia Passy, PA-C        levETIRAcetam (KEPPRA) tablet 750 mg  750 mg Oral Q12H Alma Delia Passy, PA-C        lidocaine (LIDODERM) 5 % 1 patch  1 patch Transdermal Nightly Alma Delia Passy, PA-C        meloxicam [facility]) tablet 15 mg  15 mg Oral Nightly Alma Delia Passy, PA-C        [START ON 2/23/2018] pantoprazole (PROTONIX) tablet 20 mg  20 mg Oral Daily Alma Delia Passy, PA-C        [START ON 2/23/2018] pravastatin (PRAVACHOL) tablet 20 mg  20 mg Oral QAM Alma Delia Passy, PA-C        ramipril (ALTACE) capsule 10 mg  10 mg Oral BID Alma Delia Passy, PA-C        [START ON 2/23/2018] spironolactone (ALDACTONE) tablet 50 mg  50 mg Oral Daily Alma Delia Passy, PA-C        oxybutynin (DITROPAN) tablet 5 mg  5 mg Oral BID Rema Douglass MD        sodium chloride tablet 1 g  1 g Oral TID WC Rema Douglass MD   1 g at 02/22/18 1825    acetaminophen (TYLENOL) tablet 1,000 mg  1,000 mg Oral 4x Daily Rema Douglass MD        potassium chloride (MICRO-K) extended release Infusions:   sodium chloride 50 mL/hr at 02/21/18 1954       Intake/Output Summary (Last 24 hours) at 02/22/18 1903  Last data filed at 02/22/18 1624   Gross per 24 hour   Intake          4641.51 ml   Output             1950 ml   Net          2691.51 ml     CBC:   Recent Labs      02/20/18   2104  02/22/18   0344   WBC  5.3  4.6*   HGB  10.9*  10.3*   PLT  210  205     BMP:  Recent Labs      02/20/18   2104  02/21/18   0039  02/21/18   1140  02/22/18   0344   NA  129*   --   136  131*   K  3.7   --   3.6  3.3*   CL  92*   --   100  93*   CO2  23   --   24  24   BUN  19   --   12  13   CREATININE  1.0*   --   0.8  0.8   GLUCOSE  108  87  151*  100     ABGs: Lab Results   Component Value Date    PHART 7.490 07/26/2016    PO2ART 83.0 07/26/2016    DCE5BDC 33.0 07/26/2016         Objective:   Vitals: /67   Pulse 54   Temp 97.8 °F (36.6 °C) (Temporal)   Resp 18   Ht 5' 1\" (1.549 m)   Wt 130 lb (59 kg)   SpO2 95%   BMI 24.56 kg/m²   General appearance: alert, appears stated age and cooperative  Skin: Skin color, texture, turgor normal.   HEENT: Head: Normocephalic, no lesions, without obvious abnormality.   Neck: no adenopathy, no carotid bruit, no JVD, supple, symmetrical, trachea midline and thyroid not enlarged, symmetric, no tenderness/mass/nodules  Lungs: clear to auscultation bilaterally  Heart: regular rate and rhythm, S1, S2 normal, no murmur, click, rub or gallop  Abdomen: soft, non-tender; bowel sounds normal; no masses,  no organomegaly  Extremities: extremities normal, atraumatic, no cyanosis or edema  Lymphatic: No significant lymph node enlargement papable  Neurologic: Mental status: Alert, oriented, thought content appropriate        Assessment & Plan:    Acute cystitis without hematuria-cultures grew contaminant- repeat UA and cultures if needed  Metabolic encephalopathy-resolved  Hyponatremia-chronic  Anemia   Hx seizure    Home am with [address]

## 2018-02-23 NOTE — DISCHARGE SUMMARY
Physician Discharge Summary     Patient ID:  Leanne Pallas  [id number]  3 y.o.  1935    Admit date: 2/20/2018    Discharge date and time: 2/23/2018    Discharge Physician: Kayla Harvey MD    Discharge Diagnoses:     Acute cystitis without hematuria-on bactrim- follow up final cultures OP  Metabolic encephalopathy-resolved  Hyponatremia-chronic  Anemia   Hx seizure    Discharged Condition: good    Indication for Admission: UTI/AMS    Hospital Course:     80 y.o. female presented with right flank pain for one week. The patient was seen in the ER before and started on Omnicef. The patient saw her regular doctor was then transitioned to [facility] and got Rocephin. The patient did not have fever, she had decreased by mouth intake. At times she has had some confusion. 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 The patient is on Keppra. She is taking her medications as prescribed. Her UA was consistent with UTI but urine cultures grew contaminants. She was on Merrem, repeat UA still shows UTI and final cultures are pending. She will have to follow up OP. She is discharged home pain free on Bactrim in stable condition with . Consults: none    Significant Diagnostic Studies:     CT Head WO Contrast [[id number]] Resulted: 02/21/18 0837      Order Status: Completed Updated: 02/21/18 0739     Narrative:       Examination. CT HEAD WO CONTRAST  History: Altered mental status. DLP: 887 mGy. The CT scan of the head is performed without intravenous contrast  enhancement. The images are acquired in axial plane with subsequent  reconstruction in coronal and sagittal plane. The comparison is made with the previous study dated 7/8/2017. There is no interval change. There is no evidence of intracranial hemorrhage or hematoma. Moderately dilated ventricles, basal cistern and the cortical sulci  suggest chronic cerebral atrophy.   There are extensive chronic white matter ischemic changes in the  centrum semiovale bilaterally which appears stable since the previous  study. Increased density of the superior and inferior sagittal sinuses are  probably due to the adjacent pleural calcification. There is normal gray-white matter differentiation. The images reviewed in bone window show no acute bony abnormality.     Impression:       No acute intracranial abnormality. Chronic ischemic and atrophic changes. If symptoms persist, further evaluation with MR imaging of the brain  may be obtained. The above study was initially reviewed and reported by [facility]. I do  not find any discrepancies. Signed by Dr Gus Bashir on 2/21/2018 7:37 AM     CT ABDOMEN PELVIS W IV CONTRAST Additional Contrast? None [[id number]] Resulted: 02/20/18 2306     Order Status: Completed Updated: 02/20/18 2208     Narrative:       CT ABDOMEN PELVIS W IV CONTRAST 2/20/2018 8:45 PM  HISTORY: Right-sided abdominal pain/flank pain. Clinical findings  worrisome for pyelonephritis. COMPARISON: 9/30/2013  TECHNIQUE:  Thin section sequential transaxial images were obtained. 2-D sagittal and coronal reconstruction images were generated. Intravenous contrast was administered. Oral contrast was not ingested. Radiation dose equals  mGy cm. AUTOMATED EXPOSURE CONTROL dose  reduction technique was implemented. FINDINGS:   Calcified granuloma noted in the right lung base. The gallbladder surgically absent. There are no focal hepatic lesions. The biliary tree is mildly  prominent compatible reservoir effect noted similarly on the previous  exam.  The spleen is not enlarged. There are no adrenal masses. There are no pancreatic lesions. The kidneys enhance symmetrically. There are no focal pancreatic  lesions identified. There is no pelvic caliectasis or hydroureter. There is no peripancreatic inflammatory changes.   Urinary bladder is mildly distended without focal bladder (TYLENOL) 500 MG tablet  Take 2 tablets by mouth every 6 hours as needed for Pain Do not exceed 4 g of acetaminophen daily (patient is also on Fioricet PRN)             albuterol sulfate  (90 Base) MCG/ACT inhaler  Inhale 2 puffs into the lungs every 4 hours as needed for Wheezing             alendronate (FOSAMAX) 70 MG tablet  Take 70 mg by mouth every 7 days Takes every Sunday               amLODIPine (NORVASC) 10 MG tablet  Take 10 mg by mouth daily Indications: breakfast             Ascorbic Acid (VITAMIN C PO)  Take 1,000 mg by mouth Daily with lunch Breakfast and lunch              b complex vitamins capsule  Take 1 capsule by mouth Daily with lunch B 100             benzonatate (TESSALON) 100 MG capsule  Take 200 mg by mouth 3 times daily as needed for Cough             butalbital-acetaminophen-caffeine (ESGIC PLUS) -40 MG per tablet  Take 1 tablet by mouth every 6 hours as needed for Headaches             BYSTOLIC 10 MG tablet  Take 10 mg by mouth nightly              Calcium Carb-Cholecalciferol (CALCIUM 600+D) 600-800 MG-UNIT TABS  Take 1 tablet by mouth Daily with lunch             camphor-menthol (SARNA) 0.5-0.5 % lotion  Apply 0.5 mLs topically 2 times daily as needed for Itching Apply topically as needed.              Cholecalciferol (VITAMIN D3) 5000 units TABS  Take 5,000 Units by mouth Daily with lunch             cloNIDine (CATAPRES) 0.1 MG tablet  Take 1 tablet by mouth 2 times daily             dipyridamole (PERSANTINE) 75 MG tablet  75 mg 2 times daily              docusate sodium (COLACE) 100 MG capsule  Take 100 mg by mouth nightly              donepezil (ARICEPT) 10 MG tablet  Take 1 tablet by mouth nightly             fluticasone (VERAMYST) 27.5 MCG/SPRAY nasal spray  2 sprays by Nasal route daily as needed for Rhinitis              hydrALAZINE (APRESOLINE) 25 MG tablet  Take 25 mg by mouth 2 times daily             levETIRAcetam (KEPPRA) 750 MG tablet  Take 1 tablet by mouth tolerated. Diet: DIET CARDIAC; Disposition: Patient is medically stable and will be discharged home with [facility]. Dc time 33 min.     Rosi Ruelas MD

## 2018-02-27 ENCOUNTER — TELEPHONE (OUTPATIENT)
Dept: NEUROLOGY | Age: 83
End: 2018-02-27

## 2018-03-02 ENCOUNTER — HOSPITAL ENCOUNTER (INPATIENT)
Age: 83
LOS: 4 days | Discharge: HOME HEALTH | DRG: 689 | End: 2018-03-07
Attending: EMERGENCY MEDICINE | Admitting: HOSPITALIST
Payer: MEDICARE

## 2018-03-02 DIAGNOSIS — N30.01 ACUTE CYSTITIS WITH HEMATURIA: Primary | ICD-10-CM

## 2018-03-02 DIAGNOSIS — E87.1 HYPONATREMIA: ICD-10-CM

## 2018-03-02 DIAGNOSIS — E86.0 DEHYDRATION: ICD-10-CM

## 2018-03-02 LAB
ANION GAP SERPL CALCULATED.3IONS-SCNC: 14 MMOL/L (ref 7–19)
BACTERIA: NEGATIVE /HPF
BASOPHILS ABSOLUTE: 0.1 K/UL (ref 0–0.2)
BASOPHILS RELATIVE PERCENT: 0.7 % (ref 0–1)
BILIRUBIN URINE: NEGATIVE
BLOOD, URINE: ABNORMAL
BUN BLDV-MCNC: 22 MG/DL (ref 8–23)
CALCIUM SERPL-MCNC: 9.4 MG/DL (ref 8.8–10.2)
CHLORIDE BLD-SCNC: 87 MMOL/L (ref 98–111)
CLARITY: ABNORMAL
CO2: 21 MMOL/L (ref 22–29)
COLOR: ABNORMAL
CREAT SERPL-MCNC: 1.4 MG/DL (ref 0.5–0.9)
EOSINOPHILS ABSOLUTE: 0.2 K/UL (ref 0–0.6)
EOSINOPHILS RELATIVE PERCENT: 2.3 % (ref 0–5)
EPITHELIAL CELLS, UA: 31 /HPF (ref 0–5)
GFR NON-AFRICAN AMERICAN: 36
GLUCOSE BLD-MCNC: 84 MG/DL (ref 74–109)
GLUCOSE URINE: NEGATIVE MG/DL
HCT VFR BLD CALC: 28.4 % (ref 37–47)
HEMOGLOBIN: 9.8 G/DL (ref 12–16)
HYALINE CASTS: 26 /HPF (ref 0–8)
KETONES, URINE: NEGATIVE MG/DL
LEUKOCYTE ESTERASE, URINE: ABNORMAL
LYMPHOCYTES ABSOLUTE: 1.5 K/UL (ref 1.1–4.5)
LYMPHOCYTES RELATIVE PERCENT: 20.8 % (ref 20–40)
MCH RBC QN AUTO: 33.2 PG (ref 27–31)
MCHC RBC AUTO-ENTMCNC: 34.5 G/DL (ref 33–37)
MCV RBC AUTO: 96.3 FL (ref 81–99)
MONOCYTES ABSOLUTE: 0.5 K/UL (ref 0–0.9)
MONOCYTES RELATIVE PERCENT: 7.7 % (ref 0–10)
NEUTROPHILS ABSOLUTE: 4.8 K/UL (ref 1.5–7.5)
NEUTROPHILS RELATIVE PERCENT: 68.1 % (ref 50–65)
NITRITE, URINE: NEGATIVE
PDW BLD-RTO: 11.9 % (ref 11.5–14.5)
PH UA: 5.5
PLATELET # BLD: 189 K/UL (ref 130–400)
PMV BLD AUTO: 9.3 FL (ref 9.4–12.3)
POTASSIUM SERPL-SCNC: 5.1 MMOL/L (ref 3.5–5)
PROTEIN UA: 100 MG/DL
RBC # BLD: 2.95 M/UL (ref 4.2–5.4)
RBC UA: 13 /HPF (ref 0–4)
SODIUM BLD-SCNC: 122 MMOL/L (ref 136–145)
SPECIFIC GRAVITY UA: 1.02
URINE REFLEX TO CULTURE: YES
UROBILINOGEN, URINE: 0.2 E.U./DL
WBC # BLD: 7 K/UL (ref 4.8–10.8)
WBC UA: 807 /HPF (ref 0–5)

## 2018-03-02 PROCEDURE — 99285 EMERGENCY DEPT VISIT HI MDM: CPT

## 2018-03-02 RX ORDER — SODIUM CHLORIDE 9 MG/ML
INJECTION, SOLUTION INTRAVENOUS CONTINUOUS
Status: DISCONTINUED | OUTPATIENT
Start: 2018-03-02 | End: 2018-03-07 | Stop reason: HOSPADM

## 2018-03-02 RX ORDER — 0.9 % SODIUM CHLORIDE 0.9 %
500 INTRAVENOUS SOLUTION INTRAVENOUS ONCE
Status: COMPLETED | OUTPATIENT
Start: 2018-03-02 | End: 2018-03-03

## 2018-03-02 ASSESSMENT — PAIN SCALES - GENERAL: PAINLEVEL_OUTOF10: 10

## 2018-03-02 ASSESSMENT — ENCOUNTER SYMPTOMS
ABDOMINAL PAIN: 1
NAUSEA: 0
COUGH: 0
VOMITING: 0
SHORTNESS OF BREATH: 0

## 2018-03-03 ENCOUNTER — APPOINTMENT (OUTPATIENT)
Dept: GENERAL RADIOLOGY | Age: 83
DRG: 689 | End: 2018-03-03
Payer: MEDICARE

## 2018-03-03 ENCOUNTER — APPOINTMENT (OUTPATIENT)
Dept: ULTRASOUND IMAGING | Age: 83
DRG: 689 | End: 2018-03-03
Payer: MEDICARE

## 2018-03-03 PROBLEM — N39.0 UTI (URINARY TRACT INFECTION): Status: ACTIVE | Noted: 2018-03-03

## 2018-03-03 LAB
ANION GAP SERPL CALCULATED.3IONS-SCNC: 14 MMOL/L (ref 7–19)
BUN BLDV-MCNC: 15 MG/DL (ref 8–23)
CALCIUM SERPL-MCNC: 9.4 MG/DL (ref 8.8–10.2)
CHLORIDE BLD-SCNC: 90 MMOL/L (ref 98–111)
CO2: 21 MMOL/L (ref 22–29)
CREAT SERPL-MCNC: 1 MG/DL (ref 0.5–0.9)
GFR NON-AFRICAN AMERICAN: 53
GLUCOSE BLD-MCNC: 107 MG/DL (ref 74–109)
MAGNESIUM: 1.8 MG/DL (ref 1.6–2.4)
POTASSIUM SERPL-SCNC: 4.3 MMOL/L (ref 3.5–5)
SODIUM BLD-SCNC: 125 MMOL/L (ref 136–145)

## 2018-03-03 PROCEDURE — 6370000000 HC RX 637 (ALT 250 FOR IP): Performed by: PSYCHIATRY & NEUROLOGY

## 2018-03-03 PROCEDURE — 1210000000 HC MED SURG R&B

## 2018-03-03 PROCEDURE — 6360000002 HC RX W HCPCS: Performed by: PHYSICIAN ASSISTANT

## 2018-03-03 PROCEDURE — 80048 BASIC METABOLIC PNL TOTAL CA: CPT

## 2018-03-03 PROCEDURE — 83735 ASSAY OF MAGNESIUM: CPT

## 2018-03-03 PROCEDURE — 99285 EMERGENCY DEPT VISIT HI MDM: CPT | Performed by: EMERGENCY MEDICINE

## 2018-03-03 PROCEDURE — 2580000003 HC RX 258: Performed by: PHYSICIAN ASSISTANT

## 2018-03-03 PROCEDURE — 6360000002 HC RX W HCPCS: Performed by: EMERGENCY MEDICINE

## 2018-03-03 PROCEDURE — 2580000003 HC RX 258: Performed by: EMERGENCY MEDICINE

## 2018-03-03 PROCEDURE — 99223 1ST HOSP IP/OBS HIGH 75: CPT | Performed by: INTERNAL MEDICINE

## 2018-03-03 PROCEDURE — 96374 THER/PROPH/DIAG INJ IV PUSH: CPT

## 2018-03-03 PROCEDURE — 85025 COMPLETE CBC W/AUTO DIFF WBC: CPT

## 2018-03-03 PROCEDURE — 87077 CULTURE AEROBIC IDENTIFY: CPT

## 2018-03-03 PROCEDURE — 87186 SC STD MICRODIL/AGAR DIL: CPT

## 2018-03-03 PROCEDURE — 76770 US EXAM ABDO BACK WALL COMP: CPT

## 2018-03-03 PROCEDURE — 87040 BLOOD CULTURE FOR BACTERIA: CPT

## 2018-03-03 PROCEDURE — 6370000000 HC RX 637 (ALT 250 FOR IP): Performed by: PHYSICIAN ASSISTANT

## 2018-03-03 PROCEDURE — 36415 COLL VENOUS BLD VENIPUNCTURE: CPT

## 2018-03-03 PROCEDURE — 99222 1ST HOSP IP/OBS MODERATE 55: CPT | Performed by: PSYCHIATRY & NEUROLOGY

## 2018-03-03 PROCEDURE — 87086 URINE CULTURE/COLONY COUNT: CPT

## 2018-03-03 PROCEDURE — 71046 X-RAY EXAM CHEST 2 VIEWS: CPT

## 2018-03-03 PROCEDURE — 81001 URINALYSIS AUTO W/SCOPE: CPT

## 2018-03-03 PROCEDURE — 80299 QUANTITATIVE ASSAY DRUG: CPT

## 2018-03-03 RX ORDER — SODIUM CHLORIDE 0.9 % (FLUSH) 0.9 %
10 SYRINGE (ML) INJECTION PRN
Status: DISCONTINUED | OUTPATIENT
Start: 2018-03-03 | End: 2018-03-07 | Stop reason: HOSPADM

## 2018-03-03 RX ORDER — OXYBUTYNIN CHLORIDE 5 MG/1
10 TABLET ORAL 2 TIMES DAILY
Status: DISCONTINUED | OUTPATIENT
Start: 2018-03-03 | End: 2018-03-06

## 2018-03-03 RX ORDER — SIMETHICONE 80 MG
125 TABLET,CHEWABLE ORAL EVERY 6 HOURS PRN
Status: DISCONTINUED | OUTPATIENT
Start: 2018-03-03 | End: 2018-03-07 | Stop reason: HOSPADM

## 2018-03-03 RX ORDER — DOCUSATE SODIUM 100 MG/1
100 CAPSULE, LIQUID FILLED ORAL NIGHTLY
Status: DISCONTINUED | OUTPATIENT
Start: 2018-03-03 | End: 2018-03-07 | Stop reason: HOSPADM

## 2018-03-03 RX ORDER — RAMIPRIL 5 MG/1
10 CAPSULE ORAL 2 TIMES DAILY
Status: DISCONTINUED | OUTPATIENT
Start: 2018-03-03 | End: 2018-03-07 | Stop reason: HOSPADM

## 2018-03-03 RX ORDER — LUTEIN 6 MG
20 TABLET ORAL
Status: DISCONTINUED | OUTPATIENT
Start: 2018-03-03 | End: 2018-03-03

## 2018-03-03 RX ORDER — DIPYRIDAMOLE 25 MG
75 TABLET ORAL 2 TIMES DAILY
Status: DISCONTINUED | OUTPATIENT
Start: 2018-03-03 | End: 2018-03-07 | Stop reason: HOSPADM

## 2018-03-03 RX ORDER — PROMETHAZINE HYDROCHLORIDE 25 MG/1
25 TABLET ORAL 3 TIMES DAILY PRN
Status: DISCONTINUED | OUTPATIENT
Start: 2018-03-03 | End: 2018-03-07 | Stop reason: HOSPADM

## 2018-03-03 RX ORDER — DONEPEZIL HYDROCHLORIDE 5 MG/1
10 TABLET, FILM COATED ORAL NIGHTLY
Status: DISCONTINUED | OUTPATIENT
Start: 2018-03-03 | End: 2018-03-07 | Stop reason: HOSPADM

## 2018-03-03 RX ORDER — NEBIVOLOL 5 MG/1
10 TABLET ORAL NIGHTLY
Status: DISCONTINUED | OUTPATIENT
Start: 2018-03-03 | End: 2018-03-07 | Stop reason: HOSPADM

## 2018-03-03 RX ORDER — PANTOPRAZOLE SODIUM 20 MG/1
20 TABLET, DELAYED RELEASE ORAL DAILY
Status: DISCONTINUED | OUTPATIENT
Start: 2018-03-03 | End: 2018-03-07 | Stop reason: HOSPADM

## 2018-03-03 RX ORDER — AMLODIPINE BESYLATE 5 MG/1
5 TABLET ORAL DAILY
Status: DISCONTINUED | OUTPATIENT
Start: 2018-03-03 | End: 2018-03-07 | Stop reason: HOSPADM

## 2018-03-03 RX ORDER — ACETAMINOPHEN 500 MG
1000 TABLET ORAL EVERY 8 HOURS PRN
Status: DISCONTINUED | OUTPATIENT
Start: 2018-03-03 | End: 2018-03-07 | Stop reason: HOSPADM

## 2018-03-03 RX ORDER — FLUTICASONE PROPIONATE 50 MCG
2 SPRAY, SUSPENSION (ML) NASAL DAILY
Status: DISCONTINUED | OUTPATIENT
Start: 2018-03-03 | End: 2018-03-07 | Stop reason: HOSPADM

## 2018-03-03 RX ORDER — LIDOCAINE 50 MG/G
1 PATCH TOPICAL DAILY
Status: DISCONTINUED | OUTPATIENT
Start: 2018-03-03 | End: 2018-03-07 | Stop reason: HOSPADM

## 2018-03-03 RX ORDER — LEVETIRACETAM 500 MG/1
1000 TABLET ORAL 2 TIMES DAILY
Status: DISCONTINUED | OUTPATIENT
Start: 2018-03-03 | End: 2018-03-07 | Stop reason: HOSPADM

## 2018-03-03 RX ORDER — SODIUM CHLORIDE 1000 MG
1 TABLET, SOLUBLE MISCELLANEOUS 2 TIMES DAILY WITH MEALS
Status: DISCONTINUED | OUTPATIENT
Start: 2018-03-03 | End: 2018-03-07 | Stop reason: HOSPADM

## 2018-03-03 RX ORDER — ALBUTEROL SULFATE 90 UG/1
2 AEROSOL, METERED RESPIRATORY (INHALATION) EVERY 4 HOURS PRN
Status: DISCONTINUED | OUTPATIENT
Start: 2018-03-03 | End: 2018-03-07 | Stop reason: HOSPADM

## 2018-03-03 RX ORDER — CLONIDINE HYDROCHLORIDE 0.1 MG/1
0.1 TABLET ORAL PRN
Status: DISCONTINUED | OUTPATIENT
Start: 2018-03-03 | End: 2018-03-07 | Stop reason: HOSPADM

## 2018-03-03 RX ORDER — SODIUM CHLORIDE 0.9 % (FLUSH) 0.9 %
10 SYRINGE (ML) INJECTION EVERY 12 HOURS SCHEDULED
Status: DISCONTINUED | OUTPATIENT
Start: 2018-03-03 | End: 2018-03-07 | Stop reason: HOSPADM

## 2018-03-03 RX ORDER — PRAVASTATIN SODIUM 20 MG
20 TABLET ORAL EVERY MORNING
Status: DISCONTINUED | OUTPATIENT
Start: 2018-03-03 | End: 2018-03-07 | Stop reason: HOSPADM

## 2018-03-03 RX ADMIN — LEVETIRACETAM 1000 MG: 500 TABLET ORAL at 20:49

## 2018-03-03 RX ADMIN — CHOLECALCIFEROL CAP 125 MCG (5000 UNIT) 5000 UNITS: 125 CAP at 11:36

## 2018-03-03 RX ADMIN — RAMIPRIL 10 MG: 5 CAPSULE ORAL at 17:15

## 2018-03-03 RX ADMIN — DOCUSATE SODIUM 100 MG: 100 CAPSULE, LIQUID FILLED ORAL at 20:50

## 2018-03-03 RX ADMIN — SODIUM CHLORIDE TAB 1 GM 1 G: 1 TAB at 17:15

## 2018-03-03 RX ADMIN — NEBIVOLOL HYDROCHLORIDE 10 MG: 5 TABLET ORAL at 20:50

## 2018-03-03 RX ADMIN — OXYBUTYNIN CHLORIDE 10 MG: 5 TABLET ORAL at 20:49

## 2018-03-03 RX ADMIN — AMLODIPINE BESYLATE 5 MG: 5 TABLET ORAL at 09:27

## 2018-03-03 RX ADMIN — SODIUM CHLORIDE: 9 INJECTION, SOLUTION INTRAVENOUS at 21:53

## 2018-03-03 RX ADMIN — LEVETIRACETAM 1000 MG: 500 TABLET ORAL at 11:36

## 2018-03-03 RX ADMIN — RAMIPRIL 10 MG: 5 CAPSULE ORAL at 10:14

## 2018-03-03 RX ADMIN — Medication 10 ML: at 10:15

## 2018-03-03 RX ADMIN — DIPYRIDAMOLE 75 MG: 25 TABLET, FILM COATED ORAL at 20:49

## 2018-03-03 RX ADMIN — ENOXAPARIN SODIUM 30 MG: 30 INJECTION SUBCUTANEOUS at 10:14

## 2018-03-03 RX ADMIN — SODIUM CHLORIDE: 9 INJECTION, SOLUTION INTRAVENOUS at 10:25

## 2018-03-03 RX ADMIN — FLUTICASONE PROPIONATE 2 SPRAY: 50 SPRAY, METERED NASAL at 10:14

## 2018-03-03 RX ADMIN — SODIUM CHLORIDE TAB 1 GM 1 G: 1 TAB at 11:37

## 2018-03-03 RX ADMIN — SODIUM CHLORIDE 500 ML: 9 INJECTION, SOLUTION INTRAVENOUS at 00:45

## 2018-03-03 RX ADMIN — DONEPEZIL HYDROCHLORIDE 10 MG: 5 TABLET, FILM COATED ORAL at 20:49

## 2018-03-03 RX ADMIN — SODIUM CHLORIDE: 9 INJECTION, SOLUTION INTRAVENOUS at 02:32

## 2018-03-03 RX ADMIN — DIPYRIDAMOLE 75 MG: 25 TABLET, FILM COATED ORAL at 09:27

## 2018-03-03 RX ADMIN — WATER 1 G: 1 INJECTION INTRAMUSCULAR; INTRAVENOUS; SUBCUTANEOUS at 00:45

## 2018-03-03 RX ADMIN — PANTOPRAZOLE SODIUM 20 MG: 20 TABLET, DELAYED RELEASE ORAL at 10:14

## 2018-03-03 RX ADMIN — OXYBUTYNIN CHLORIDE 10 MG: 5 TABLET ORAL at 10:14

## 2018-03-03 RX ADMIN — PRAVASTATIN SODIUM 20 MG: 20 TABLET ORAL at 10:14

## 2018-03-03 ASSESSMENT — PAIN SCALES - GENERAL
PAINLEVEL_OUTOF10: 0
PAINLEVEL_OUTOF10: 0

## 2018-03-03 NOTE — CONSULTS
ALT, ALB, BILITOT, ALKPHOS in the last 72 hours. Lipids: No results for input(s): CHOL, HDL in the last 72 hours. Invalid input(s): LDLCALCU  INR: No results for input(s): INR in the last 72 hours. ?  ?  Assessment and Plan   1. Seizure disorder possible recent recurrence. No ongoing seizure activity currently hyponatremia could have been partially causative for her recent episode. She should avoid the quinolones. We'll go ahead and increase her Keppra to 1000mgtwice a day. She is tolerating the lower dose fine so far. ?2. Recurrent urinary tract infections, per primary care physician  3. Hyponatremia per primary care physician.   ?      Conchita Su MD  Board Certified in Neurology

## 2018-03-03 NOTE — H&P
Nasal route daily as needed for Rhinitis     Historical Provider, MD   promethazine (PHENERGAN) 25 MG tablet Take 25 mg by mouth 3 times daily as needed for Nausea    Historical Provider, MD   MICRONIZED COLESTIPOL HCL 1 G tablet Take 2 g by mouth as needed 2 tablets daily prn   12/30/15   Historical Provider, MD     Allergies:    Aspirin; Codeine; Demerol hcl [meperidine]; Dilaudid [hydromorphone hcl]; Levaquin [levofloxacin in d5w]; Namenda [memantine hcl]; Norco [hydrocodone-acetaminophen]; Ondansetron; and Pneumococcal vaccines    Social History:    The patient currently lives in an assisted living facility. Tobacco:   reports that she has never smoked. She has never used smokeless tobacco.  Alcohol:   reports that she does not drink alcohol.   Illicit Drugs: denies    Family History:      Problem Relation Age of Onset    Colon Cancer Neg Hx     Colon Polyps Neg Hx     Esophageal Cancer Neg Hx     Liver Cancer Neg Hx     Liver Disease Neg Hx     Rectal Cancer Neg Hx     Stomach Cancer Neg Hx      Review of Systems:   Constitutional / general:  Denies fever / chills / sweats +fatigue  Head:  Denies headache / neck stiffness / trauma / visual change  Eyes:  Denies blurry vision / acute visual change or loss / itching / redness  ENT: Denies sore throat / hoarseness / nasal drainage / ear pain  CV:  Denies chest pain / palpitations/ orthopnea   Respiratory:  Denies cough / shortness of breath / sputum / hemoptysis  GI: Denies nausea / vomiting / abdominal pain / diarrhea / constipation  :  +dysuria /+ hesitancy /+ urgency / Denies hematuria   Neuro: Denies paralysis / syncope / seizure / dysphagia / headache / paresthesias  Musculoskeletal:  + muscle weakness /Denies joint stiffness /+ pain  Vascular: Denies edema / claudication / varicosities  Heme / endocrine: Denies easy bruising / bleeding / excessive sweating / heat or cold intolerance  Psychiatric:  Denies depression / anxiety / insomnia / mood no increased work of breathing, CTAB, no wheezing/rhonchi/rales  Heart: RRR, +s1/s2, no rub  Abdomen: soft, nt/nd, +BS, no rebound/gaurding  Extremities: atraumatic, no edema, no cyanosis   Neurologic: AAOx2-3, no focal deficits  Skin: no rashes  Psych: Normal affect      Impression / Plan:  UTI  Acute met enceph w/ underlying dementia  Hyponatremia  renetta  Dehydration  Weakness  Seizure disorder    Agree w/ above  Continue abx and f/u culture  renetta already starting to improved w/ ivf and renal u/s normal  Monitor sodium response to ivf  Continue AEDs  PT consult  Dietician consult for supplementation needs    Electronically signed by Mulugeta Ramírez DO on 3/3/2018 at 5:10 PM

## 2018-03-03 NOTE — ED TRIAGE NOTES
Pt reports \"bladder spasms\" rates them a 10/10. Pt was recently treated for a UTI. Pts daughter states [facility] reran a UA and it did not show infection.

## 2018-03-03 NOTE — ED PROVIDER NOTES
incontinence          SURGICAL HISTORY       Past Surgical History:   Procedure Laterality Date    BREAST SURGERY Right     Biopsy    CHOLECYSTECTOMY      COLONOSCOPY  9/25/03    Dr Corrinne Soho ischemic colitis, incomplete exam    COLONOSCOPY  8/29/03    Dr Alcon Shepard colon-ischemic colitis    EYE SURGERY      LUMBAR FUSION      SPINE SURGERY      3,4, and 5    TONSILLECTOMY AND ADENOIDECTOMY           CURRENT MEDICATIONS       Previous Medications    ACETAMINOPHEN (TYLENOL) 500 MG TABLET    Take 2 tablets by mouth every 6 hours as needed for Pain Do not exceed 4 g of acetaminophen daily (patient is also on Fioricet PRN)    ALBUTEROL SULFATE  (90 BASE) MCG/ACT INHALER    Inhale 2 puffs into the lungs every 4 hours as needed for Wheezing    ALENDRONATE (FOSAMAX) 70 MG TABLET    Take 70 mg by mouth every 7 days Takes every Sunday      AMLODIPINE (NORVASC) 10 MG TABLET    Take 10 mg by mouth daily Indications: breakfast    ASCORBIC ACID (VITAMIN C PO)    Take 1,000 mg by mouth Daily with lunch Breakfast and lunch     B COMPLEX VITAMINS CAPSULE    Take 1 capsule by mouth Daily with lunch B 100    BENZONATATE (TESSALON) 100 MG CAPSULE    Take 200 mg by mouth 3 times daily as needed for Cough    BUTALBITAL-ACETAMINOPHEN-CAFFEINE (ESGIC PLUS) -40 MG PER TABLET    Take 1 tablet by mouth every 6 hours as needed for Headaches    BYSTOLIC 10 MG TABLET    Take 10 mg by mouth nightly     CALCIUM CARB-CHOLECALCIFEROL (CALCIUM 600+D) 600-800 MG-UNIT TABS    Take 1 tablet by mouth Daily with lunch    CAMPHOR-MENTHOL (SARNA) 0.5-0.5 % LOTION    Apply 0.5 mLs topically 2 times daily as needed for Itching Apply topically as needed.     CHOLECALCIFEROL (VITAMIN D3) 5000 UNITS TABS    Take 5,000 Units by mouth Daily with lunch    CLONIDINE (CATAPRES) 0.1 MG TABLET    Take 1 tablet by mouth 2 times daily    DIPYRIDAMOLE (PERSANTINE) 75 MG TABLET    75 mg 2 times daily     DOCUSATE SODIUM (COLACE) 100 MG [meperidine]; Dilaudid [hydromorphone hcl]; Levaquin [levofloxacin in d5w]; Namenda [memantine hcl]; Norco [hydrocodone-acetaminophen]; Ondansetron; and Pneumococcal vaccines    FAMILY HISTORY       Family History   Problem Relation Age of Onset    Colon Cancer Neg Hx     Colon Polyps Neg Hx     Esophageal Cancer Neg Hx     Liver Cancer Neg Hx     Liver Disease Neg Hx     Rectal Cancer Neg Hx     Stomach Cancer Neg Hx           SOCIAL HISTORY       Social History     Social History    Marital status:      Spouse name: N/A    Number of children: 1    Years of education: N/A     Social History Main Topics    Smoking status: Never Smoker    Smokeless tobacco: Never Used    Alcohol use No    Drug use: No    Sexual activity: Not Asked     Other Topics Concern    None     Social History Narrative    None       SCREENINGS             PHYSICAL EXAM    (up to 7 for level 4, 8 or more for level 5)     ED Triage Vitals   BP Temp Temp Source Pulse Resp SpO2 Height Weight   03/02/18 2207 03/02/18 2158 03/02/18 2158 03/02/18 2207 03/02/18 2207 03/02/18 2207 03/02/18 2158 03/02/18 2158   138/63 98.5 °F (36.9 °C) Oral 57 16 92 % 5' 1\" (1.549 m) 120 lb (54.4 kg)       Physical Exam   Constitutional: She is oriented to person, place, and time. She is cooperative. HENT:   Head: Atraumatic. Mouth/Throat: Mucous membranes are not dry. No posterior oropharyngeal erythema. Eyes: Pupils are equal, round, and reactive to light. No scleral icterus. Neck: No tracheal deviation present. Cardiovascular: Normal rate, regular rhythm, normal heart sounds and intact distal pulses. No murmur heard. Pulmonary/Chest: Effort normal and breath sounds normal. No stridor. No respiratory distress. Abdominal: Soft. She exhibits no distension. There is tenderness (mild, suprapubic). There is no guarding. Neurological: She is alert and oriented to person, place, and time. She has normal strength.    Skin: No rash

## 2018-03-04 LAB
ANION GAP SERPL CALCULATED.3IONS-SCNC: 14 MMOL/L (ref 7–19)
BUN BLDV-MCNC: 14 MG/DL (ref 8–23)
CALCIUM SERPL-MCNC: 8.8 MG/DL (ref 8.8–10.2)
CHLORIDE BLD-SCNC: 94 MMOL/L (ref 98–111)
CO2: 21 MMOL/L (ref 22–29)
CREAT SERPL-MCNC: 0.9 MG/DL (ref 0.5–0.9)
GFR NON-AFRICAN AMERICAN: 60
GLUCOSE BLD-MCNC: 81 MG/DL (ref 74–109)
HCT VFR BLD CALC: 27.6 % (ref 37–47)
HEMOGLOBIN: 9.3 G/DL (ref 12–16)
MCH RBC QN AUTO: 33 PG (ref 27–31)
MCHC RBC AUTO-ENTMCNC: 33.7 G/DL (ref 33–37)
MCV RBC AUTO: 97.9 FL (ref 81–99)
PDW BLD-RTO: 11.9 % (ref 11.5–14.5)
PLATELET # BLD: 197 K/UL (ref 130–400)
PMV BLD AUTO: 9.8 FL (ref 9.4–12.3)
POTASSIUM REFLEX MAGNESIUM: 4.7 MMOL/L (ref 3.5–5)
RBC # BLD: 2.82 M/UL (ref 4.2–5.4)
SODIUM BLD-SCNC: 129 MMOL/L (ref 136–145)
WBC # BLD: 4.3 K/UL (ref 4.8–10.8)

## 2018-03-04 PROCEDURE — 2580000003 HC RX 258: Performed by: INTERNAL MEDICINE

## 2018-03-04 PROCEDURE — 6370000000 HC RX 637 (ALT 250 FOR IP): Performed by: PHYSICIAN ASSISTANT

## 2018-03-04 PROCEDURE — 2580000003 HC RX 258: Performed by: PHYSICIAN ASSISTANT

## 2018-03-04 PROCEDURE — 99232 SBSQ HOSP IP/OBS MODERATE 35: CPT | Performed by: PHYSICIAN ASSISTANT

## 2018-03-04 PROCEDURE — 85027 COMPLETE CBC AUTOMATED: CPT

## 2018-03-04 PROCEDURE — 6360000002 HC RX W HCPCS: Performed by: PHYSICIAN ASSISTANT

## 2018-03-04 PROCEDURE — 80048 BASIC METABOLIC PNL TOTAL CA: CPT

## 2018-03-04 PROCEDURE — 1210000000 HC MED SURG R&B

## 2018-03-04 PROCEDURE — 6370000000 HC RX 637 (ALT 250 FOR IP): Performed by: PSYCHIATRY & NEUROLOGY

## 2018-03-04 PROCEDURE — 36415 COLL VENOUS BLD VENIPUNCTURE: CPT

## 2018-03-04 PROCEDURE — 99232 SBSQ HOSP IP/OBS MODERATE 35: CPT | Performed by: PSYCHIATRY & NEUROLOGY

## 2018-03-04 PROCEDURE — 6360000002 HC RX W HCPCS: Performed by: INTERNAL MEDICINE

## 2018-03-04 RX ORDER — VANCOMYCIN HYDROCHLORIDE 1 G/200ML
1000 INJECTION, SOLUTION INTRAVENOUS
Status: DISCONTINUED | OUTPATIENT
Start: 2018-03-04 | End: 2018-03-06

## 2018-03-04 RX ORDER — POLYVINYL ALCOHOL 14 MG/ML
1 SOLUTION/ DROPS OPHTHALMIC PRN
Status: DISCONTINUED | OUTPATIENT
Start: 2018-03-04 | End: 2018-03-07 | Stop reason: HOSPADM

## 2018-03-04 RX ORDER — HYDRALAZINE HYDROCHLORIDE 25 MG/1
25 TABLET, FILM COATED ORAL 2 TIMES DAILY
Status: DISCONTINUED | OUTPATIENT
Start: 2018-03-04 | End: 2018-03-07 | Stop reason: HOSPADM

## 2018-03-04 RX ADMIN — OXYBUTYNIN CHLORIDE 10 MG: 5 TABLET ORAL at 21:01

## 2018-03-04 RX ADMIN — VANCOMYCIN HYDROCHLORIDE 1000 MG: 1 INJECTION, SOLUTION INTRAVENOUS at 13:43

## 2018-03-04 RX ADMIN — Medication 10 ML: at 08:44

## 2018-03-04 RX ADMIN — LEVETIRACETAM 1000 MG: 500 TABLET ORAL at 21:01

## 2018-03-04 RX ADMIN — NEBIVOLOL HYDROCHLORIDE 10 MG: 5 TABLET ORAL at 21:01

## 2018-03-04 RX ADMIN — OXYBUTYNIN CHLORIDE 10 MG: 5 TABLET ORAL at 08:36

## 2018-03-04 RX ADMIN — DOCUSATE SODIUM 100 MG: 100 CAPSULE, LIQUID FILLED ORAL at 21:02

## 2018-03-04 RX ADMIN — CHOLECALCIFEROL CAP 125 MCG (5000 UNIT) 5000 UNITS: 125 CAP at 11:10

## 2018-03-04 RX ADMIN — DONEPEZIL HYDROCHLORIDE 10 MG: 5 TABLET, FILM COATED ORAL at 21:02

## 2018-03-04 RX ADMIN — ENOXAPARIN SODIUM 30 MG: 30 INJECTION SUBCUTANEOUS at 11:10

## 2018-03-04 RX ADMIN — FLUTICASONE PROPIONATE 2 SPRAY: 50 SPRAY, METERED NASAL at 09:11

## 2018-03-04 RX ADMIN — Medication 1 G: at 00:31

## 2018-03-04 RX ADMIN — RAMIPRIL 10 MG: 5 CAPSULE ORAL at 08:35

## 2018-03-04 RX ADMIN — LEVETIRACETAM 1000 MG: 500 TABLET ORAL at 08:36

## 2018-03-04 RX ADMIN — PANTOPRAZOLE SODIUM 20 MG: 20 TABLET, DELAYED RELEASE ORAL at 08:36

## 2018-03-04 RX ADMIN — HYDRALAZINE HYDROCHLORIDE 25 MG: 25 TABLET, FILM COATED ORAL at 21:02

## 2018-03-04 RX ADMIN — SODIUM CHLORIDE TAB 1 GM 1 G: 1 TAB at 08:37

## 2018-03-04 RX ADMIN — AMLODIPINE BESYLATE 5 MG: 5 TABLET ORAL at 08:36

## 2018-03-04 RX ADMIN — HYDRALAZINE HYDROCHLORIDE 25 MG: 25 TABLET, FILM COATED ORAL at 09:10

## 2018-03-04 RX ADMIN — DIPYRIDAMOLE 75 MG: 25 TABLET, FILM COATED ORAL at 21:01

## 2018-03-04 RX ADMIN — DIPYRIDAMOLE 75 MG: 25 TABLET, FILM COATED ORAL at 08:35

## 2018-03-04 RX ADMIN — PRAVASTATIN SODIUM 20 MG: 20 TABLET ORAL at 08:36

## 2018-03-04 RX ADMIN — SODIUM CHLORIDE TAB 1 GM 1 G: 1 TAB at 16:40

## 2018-03-04 NOTE — PROGRESS NOTES
tongue  Neck-symmetric, no masses noted, no jugular vein distension  Respiration- chest wall appears symmetric, good expansion,   normal effort without use of accessory muscles  Cardiovascular- RRR  Musculoskeletal - no significant wasting of muscles noted, no bony deformities, gait no gross ataxia  Extremities-no clubbing, cyanosis or edema  Skin - warm, dry, and intact. No rash, erythema, or pallor. Psychiatric - mood, affect, and behavior appear normal.      Neurology  NEUROLOGICAL EXAM:      Mental status   Awake, alert, fluent oriented x 3 appropriate affect  Attention and concentration appear appropriate  Recent and remote memory appears unremarkable  Speech normal without dysarthria  No clear issues with language       Cranial Nerves   CN II- Visual fields grossly unremarkable  CN III, IV,VI-EOMI, No nystagmus, conjugate eye movements, no ptosis  CN VII-no facial assymetry  CN VIII-Hearing intact    Motor function  Antigravity x 4     Sensory function Intact to light touch     Cerebellar F-N intact     Tremor None present     Gait                  Not tested           Nursing/pcp notes, imaging,labs and vitals reviewed.      PT,OT and/or speech notes reviewed    Lab Results   Component Value Date    WBC 4.3 (L) 03/04/2018    HGB 9.3 (L) 03/04/2018    HCT 27.6 (L) 03/04/2018    MCV 97.9 03/04/2018     03/04/2018     Lab Results   Component Value Date     (L) 03/04/2018    K 4.7 03/04/2018    CL 94 (L) 03/04/2018    CO2 21 (L) 03/04/2018    BUN 14 03/04/2018    CREATININE 0.9 03/04/2018    GLUCOSE 81 03/04/2018    CALCIUM 8.8 03/04/2018    PROT 6.4 (L) 02/12/2018    LABALBU 4.3 02/12/2018    BILITOT 0.3 02/12/2018    ALKPHOS 48 02/12/2018    AST 20 02/12/2018    ALT 15 02/12/2018    LABGLOM 60 (A) 03/04/2018    GLOB 2.0 07/16/2016     Lab Results   Component Value Date    INR 0.92 07/25/2016    INR 1.01 08/30/2012     Lab Results   Component Value Date    CRP 4.5 07/25/2016             RECORD REVIEW: Previous medical records, medications were reviewed at today's visit    IMPRESSION:   1. Seizure disorder possible recent recurrence. No ongoing seizure activity currently hyponatremia could have been partially causative for her recent episode. She should avoid the quinolones. We'll go ahead and increase her Keppra to 1000mgtwice a day. She is tolerating the  dose fine so far. ?2. Recurrent urinary tract infections, per primary care physician  3. Hyponatremia per primary care physician. ?I am available as needed. Otherwise okay to discharge home from a neurological standpoint when okay with others. She should follow up with Dr. Ra Dumont as scheduled.

## 2018-03-04 NOTE — PROGRESS NOTES
paresthesias  Musculoskeletal:  + muscle weakness /joint stiffness / +pain  Vascular: Denies edema / claudication / varicosities  Heme / endocrine: Denies easy bruising / bleeding / excessive sweating / heat or cold intolerance  Psychiatric:  Denies depression / anxiety / insomnia / mood changes  Skin:  Denies new rashes / lesions / skin hair or nail changes    14 point review of systems is negative except as specifically addressed above. Objective:   VITALS:  /66   Pulse 52   Temp 96.9 °F (36.1 °C) (Temporal)   Resp 18   Ht 5' 1\" (1.549 m)   Wt 120 lb 4 oz (54.5 kg)   SpO2 93%   BMI 22.72 kg/m²   24HR INTAKE/OUTPUT:    Intake/Output Summary (Last 24 hours) at 03/04/18 0844  Last data filed at 03/04/18 0331   Gross per 24 hour   Intake          2804.65 ml   Output                0 ml   Net          2804.65 ml     General appearance: alert, appears stated age, cooperative and no distress, resting comfortably in bed   Head: Normocephalic, without obvious abnormality, atraumatic  Eyes: conjunctivae/corneas clear. PERRL, EOM's intact. Ears: normal external ears and nose, throat without exudate  Neck: no adenopathy, no carotid bruit, no JVD, supple, symmetrical, trachea midline and thyroid not enlarged, symmetric, no tenderness/mass/nodules  Lungs: clear to auscultation bilaterally,no rales or wheezes   Heart: regular rate and rhythm, S1, S2 normal, no murmur  Abdomen:soft, non-tender; non-distended, normal bowel sounds no masses, no organomegaly  Extremities:No lower extremity edema,  No erythema, no tenderness to palpation  Skin: Skin color, texture, turgor normal. No rashes or lesions  Lymphatic: No palpable lymph node enlargment  Neurologic: Alert and oriented X 3, normal strength and tone. Normal symmetric reflexes.  Mental status: Alert, oriented, thought content appropriate  Cranial nerves:II-XII Grossly intact Sensory: normal Motor:grossly normal  Psychiatric: Alert and oriented, thought content

## 2018-03-05 LAB
ANION GAP SERPL CALCULATED.3IONS-SCNC: 10 MMOL/L (ref 7–19)
BUN BLDV-MCNC: 9 MG/DL (ref 8–23)
CALCIUM SERPL-MCNC: 8.5 MG/DL (ref 8.8–10.2)
CHLORIDE BLD-SCNC: 95 MMOL/L (ref 98–111)
CO2: 25 MMOL/L (ref 22–29)
CREAT SERPL-MCNC: 0.7 MG/DL (ref 0.5–0.9)
GFR NON-AFRICAN AMERICAN: >60
GLUCOSE BLD-MCNC: 91 MG/DL (ref 74–109)
HCT VFR BLD CALC: 27 % (ref 37–47)
HEMOGLOBIN: 9.1 G/DL (ref 12–16)
MCH RBC QN AUTO: 33.2 PG (ref 27–31)
MCHC RBC AUTO-ENTMCNC: 33.7 G/DL (ref 33–37)
MCV RBC AUTO: 98.5 FL (ref 81–99)
PDW BLD-RTO: 12.2 % (ref 11.5–14.5)
PLATELET # BLD: 188 K/UL (ref 130–400)
PMV BLD AUTO: 9.7 FL (ref 9.4–12.3)
POTASSIUM SERPL-SCNC: 3.9 MMOL/L (ref 3.5–5)
RBC # BLD: 2.74 M/UL (ref 4.2–5.4)
SODIUM BLD-SCNC: 130 MMOL/L (ref 136–145)
WBC # BLD: 3.7 K/UL (ref 4.8–10.8)

## 2018-03-05 PROCEDURE — 6360000002 HC RX W HCPCS: Performed by: PHYSICIAN ASSISTANT

## 2018-03-05 PROCEDURE — 99232 SBSQ HOSP IP/OBS MODERATE 35: CPT | Performed by: INTERNAL MEDICINE

## 2018-03-05 PROCEDURE — 85027 COMPLETE CBC AUTOMATED: CPT

## 2018-03-05 PROCEDURE — 6370000000 HC RX 637 (ALT 250 FOR IP): Performed by: PSYCHIATRY & NEUROLOGY

## 2018-03-05 PROCEDURE — G8978 MOBILITY CURRENT STATUS: HCPCS

## 2018-03-05 PROCEDURE — 1210000000 HC MED SURG R&B

## 2018-03-05 PROCEDURE — 97750 PHYSICAL PERFORMANCE TEST: CPT

## 2018-03-05 PROCEDURE — 97116 GAIT TRAINING THERAPY: CPT

## 2018-03-05 PROCEDURE — 97530 THERAPEUTIC ACTIVITIES: CPT

## 2018-03-05 PROCEDURE — 80048 BASIC METABOLIC PNL TOTAL CA: CPT

## 2018-03-05 PROCEDURE — 36415 COLL VENOUS BLD VENIPUNCTURE: CPT

## 2018-03-05 PROCEDURE — 2580000003 HC RX 258: Performed by: PHYSICIAN ASSISTANT

## 2018-03-05 PROCEDURE — 6370000000 HC RX 637 (ALT 250 FOR IP): Performed by: PHYSICIAN ASSISTANT

## 2018-03-05 PROCEDURE — G8979 MOBILITY GOAL STATUS: HCPCS

## 2018-03-05 PROCEDURE — 97162 PT EVAL MOD COMPLEX 30 MIN: CPT

## 2018-03-05 PROCEDURE — 2580000003 HC RX 258: Performed by: INTERNAL MEDICINE

## 2018-03-05 RX ADMIN — AMLODIPINE BESYLATE 5 MG: 5 TABLET ORAL at 09:38

## 2018-03-05 RX ADMIN — CHOLECALCIFEROL CAP 125 MCG (5000 UNIT) 5000 UNITS: 125 CAP at 12:12

## 2018-03-05 RX ADMIN — LEVETIRACETAM 1000 MG: 500 TABLET ORAL at 20:46

## 2018-03-05 RX ADMIN — DIPYRIDAMOLE 75 MG: 25 TABLET, FILM COATED ORAL at 09:36

## 2018-03-05 RX ADMIN — ENOXAPARIN SODIUM 30 MG: 30 INJECTION SUBCUTANEOUS at 12:12

## 2018-03-05 RX ADMIN — SODIUM CHLORIDE: 9 INJECTION, SOLUTION INTRAVENOUS at 20:47

## 2018-03-05 RX ADMIN — Medication 10 ML: at 09:36

## 2018-03-05 RX ADMIN — NEBIVOLOL HYDROCHLORIDE 10 MG: 5 TABLET ORAL at 20:46

## 2018-03-05 RX ADMIN — OXYBUTYNIN CHLORIDE 10 MG: 5 TABLET ORAL at 09:36

## 2018-03-05 RX ADMIN — SODIUM CHLORIDE TAB 1 GM 1 G: 1 TAB at 17:28

## 2018-03-05 RX ADMIN — RAMIPRIL 10 MG: 5 CAPSULE ORAL at 22:14

## 2018-03-05 RX ADMIN — LEVETIRACETAM 1000 MG: 500 TABLET ORAL at 09:36

## 2018-03-05 RX ADMIN — Medication 10 ML: at 20:47

## 2018-03-05 RX ADMIN — DOCUSATE SODIUM 100 MG: 100 CAPSULE, LIQUID FILLED ORAL at 20:45

## 2018-03-05 RX ADMIN — HYDRALAZINE HYDROCHLORIDE 25 MG: 25 TABLET, FILM COATED ORAL at 20:46

## 2018-03-05 RX ADMIN — SODIUM CHLORIDE: 9 INJECTION, SOLUTION INTRAVENOUS at 00:03

## 2018-03-05 RX ADMIN — OXYBUTYNIN CHLORIDE 10 MG: 5 TABLET ORAL at 20:46

## 2018-03-05 RX ADMIN — SODIUM CHLORIDE TAB 1 GM 1 G: 1 TAB at 09:36

## 2018-03-05 RX ADMIN — PANTOPRAZOLE SODIUM 20 MG: 20 TABLET, DELAYED RELEASE ORAL at 09:36

## 2018-03-05 RX ADMIN — DONEPEZIL HYDROCHLORIDE 10 MG: 5 TABLET, FILM COATED ORAL at 20:46

## 2018-03-05 RX ADMIN — DIPYRIDAMOLE 75 MG: 25 TABLET, FILM COATED ORAL at 20:46

## 2018-03-05 RX ADMIN — HYDRALAZINE HYDROCHLORIDE 25 MG: 25 TABLET, FILM COATED ORAL at 09:37

## 2018-03-05 RX ADMIN — RAMIPRIL 10 MG: 5 CAPSULE ORAL at 09:36

## 2018-03-05 RX ADMIN — FLUTICASONE PROPIONATE 2 SPRAY: 50 SPRAY, METERED NASAL at 09:37

## 2018-03-05 RX ADMIN — PRAVASTATIN SODIUM 20 MG: 20 TABLET ORAL at 09:36

## 2018-03-05 ASSESSMENT — PAIN SCALES - GENERAL: PAINLEVEL_OUTOF10: 0

## 2018-03-05 NOTE — PLAN OF CARE
Problem: Risk for Impaired Skin Integrity  Goal: Tissue integrity - skin and mucous membranes  Structural intactness and normal physiological function of skin and  mucous membranes.    Outcome: Ongoing      Problem: Falls - Risk of  Goal: Absence of falls  Outcome: Ongoing      Problem: Nutrition  Goal: Optimal nutrition therapy  Nutrition Problem: Inadequate oral intake  Intervention: Food and/or Nutrient Delivery: Modify current diet, Start ONS  Nutritional Goals: po 50% or more, wt stable     Outcome: Ongoing

## 2018-03-05 NOTE — PROGRESS NOTES
Screening  Intervention List: Patient able to continue with treatment    Orientation  Orientation  Overall Orientation Status: Impaired  Orientation Level: Oriented to place;Oriented to person    Social/Functional History  Social/Functional History  Lives With: Alone  Type of Home: Assisted living ([facility])  Home Layout: One level  Home Equipment: Rolling walker  Receives Help From: Other (comment) (staff at the [facility])  ADL Assistance: Needs assistance (mainly indep, but needs A at times)  Ambulation Assistance: Independent (with RW or holding onto furniture in her apt.)  Transfer Assistance: Independent  Active : No  Additional Comments: pt. amb. to the DR for meals. Daughter present and giving extensive review of last few weeks. Pt. has PT/OT. Objective     Observation/Palpation  Posture: Good    AROM RLE (degrees)  RLE AROM: WFL  AROM LLE (degrees)  LLE AROM : WFL  Strength RLE  Strength RLE: WFL  Comment: grossly 4-/5  Strength LLE  Strength LLE: WFL  Comment: grossly 4-/5        Bed mobility  Supine to Sit: Contact guard assistance  Sit to Supine: Unable to assess  Comment: pt. sat on EOB x 5 mins to put on an undershirt, gown, unhook IV, etc.  Transfers  Sit to Stand: Minimal Assistance (with RW)  Stand to sit: Minimal Assistance  Comment: PCA unhooked IV prior to amb. and RN notified to [facility] it afterwards. Min A on and off toilet. Pt. stood at sink to wash hands x 1 min CGA, no LOB. Ambulation  Ambulation?: Yes  WB Status: no restrictions  Ambulation 1  Surface: level tile  Device: Rolling Walker  Assistance: Contact guard assistance  Quality of Gait: slow pace, decreased toe clearance  Distance: 15' x 2     Balance  Posture: Good  Sitting - Static: Good;+  Sitting - Dynamic: Good  Standing - Static: Good;-  Standing - Dynamic: Fair;+        Assessment   Body structures, Functions, Activity limitations: Decreased functional mobility ; Decreased strength;Decreased safe awareness;Decreased balance;Decreased endurance  Assessment: Pt. will benefit from skilled PT to decrease impairments. Pt. a fall risk and should not attempt mobility on her own at this time. Do not anticipate pt. would initiate movement on her own and do not think she would be prepared to return to the [facility] at this time due to weakness, decreased balance and lack of initiative. Treatment Diagnosis: impaired gait and mobility  Prognosis: Guarded  Decision Making: Medium Complexity  Patient Education: purpose of PT, safety, fall risk, staff A, call bell use  REQUIRES PT FOLLOW UP: Yes  Activity Tolerance  Activity Tolerance: Treatment limited secondary to agitation;Patient limited by pain  Activity Tolerance: pt. cooperative, but makes comments that indicate agitation. When asked if she was lightheaded with standing, she replied,\"What does that matter?!\"     Discharge Recommendations:  Continue to assess pending progress, Patient would benefit from continued therapy after discharge, [address] [place] [address]  Times per week: 7  Times per day: Daily  Current Treatment Recommendations: Strengthening, Balance Training, Functional Mobility Training, Transfer Training, Gait Training, Endurance Training, Safety Education & Training, Pain Management, Patient/Caregiver Education & Training, Equipment Evaluation, Education, & procurement, Positioning  Plan Comment: cont. PT per POC. Safety Devices  Type of devices: Call light within reach, Gait belt, Patient at risk for falls, Left in chair, Nurse notified (daughter present)    G-Code  PT G-Codes  Functional Assessment Tool Used: sit to stand  Score: CGA  Functional Limitation: Mobility: Walking and moving around  Mobility: Walking and Moving Around Current Status ():  At least 20 percent but less than 40 percent impaired, limited or restricted  Mobility: Walking and Moving Around Goal Status (): 0 percent impaired, limited or restricted  OutComes Score AM-PAC Score             Goals  Short term goals  Time Frame for Short term goals: 2 wks  Short term goal 1: supine to sit indep  Short term goal 2: sit to stand indep  Short term goal 3: amb. 200' with RW modified indep  Short term goal 4: bed to chair indep  Patient Goals   Patient goals : go back to the [facility].        Therapy Time   Individual Concurrent Group Co-treatment   Time In           Time Out           Minutes                   Zulema Arredondo PT    Electronically signed by Zulema Arredondo PT on 3/5/2018 at 12:32 PM

## 2018-03-05 NOTE — PROGRESS NOTES
Physical Therapy  Name: Jay Jay Nelson  MRN:  [id number]  Date of service:  3/5/2018       03/05/18 1415   Restrictions/Precautions   Restrictions/Precautions Fall Risk   Required Braces or Orthoses? No   General   Additional Pertinent Hx recent d/c from [facility] 2/23/18. Family / Caregiver Present No   Referring Practitioner Dr. Tim Finnegan wanting to go to BR; pt reports chair is hard and hurts her bottom   General Comment   Comments in chair to start, to bed at finish. Monet Mcnair pt particular about arrangment of furniture and requested recliner, bedside table, and bed be moved   Pain Screening   Patient Currently in Pain Yes  (states her R hip hurts on occasion)   Pain Assessment   Pain Assessment Advanced Dementia   Bed Mobility   Sit to Supine Stand by assistance   Transfers   Sit to Stand Minimal Assistance;Contact guard assistance   Stand to sit Contact guard assistance   Comment sit-stand from elevated toilet seat and recliner   Ambulation   Ambulation? Yes   WB Status no restrictions   Ambulation 1   Surface level tile   Device Rolling Walker   Assistance Contact guard assistance   Quality of Gait slow pace, decreased toe clearance, lurches forward onto walker on a couple of occasions reporting R hip pain   Distance 100'   Other Activities   Comment pt assisted into BR; able to manage clothing with CG@ and also stood at sink for hand and oral hygiene with CG@ and leaning on sink; pt slow with these tasks   Patient Goals    Patient goals  go back to the [facility].    Short term goals   Time Frame for Short term goals 2 wks   Short term goal 1 supine to sit indep   Short term goal 2 sit to stand indep   Short term goal 3 amb. 200' with RW modified indep   Short term goal 4 bed to chair indep   Conditions Requiring Skilled Therapeutic Intervention   Assessment pt expresses dislike for chair and likely to refuse sitting in it next visit; pt dec gt distance stating R hip hurts on 2 occasions and

## 2018-03-05 NOTE — PROGRESS NOTES
Nutrition Assessment    Type and Reason for Visit: Reassess, Consult    Nutrition Recommendations: Ensure BID. Liberalize diet for increased options. Malnutrition Assessment:  · Malnutrition Status: At risk for malnutrition  · Context: Acute illness or injury    Nutrition Diagnosis:   · Problem: Inadequate oral intake  · Etiology: related to Early satiety     Signs and symptoms:  as evidenced by Patient report of    Nutrition Assessment:  · Subjective Assessment: Consulted for supplement. Intake has been fair. Pt has multiple food preferences, reports hx of wt loss, unable to quantify. · Nutrition-Focused Physical Findings:    · Wound Type: None  · Current Nutrition Therapies:  · Oral Diet Orders: Cardiac   · Oral Diet intake: 51-75%  · Oral Nutrition Supplement (ONS) Orders: None  · Anthropometric Measures:  · Ht: 5' 1\" (154.9 cm)   · Current Body Wt:    · Admission Body Wt: 120 lb (54.4 kg)  · Usual Body Wt:    · % Weight Change:  ,     · Ideal Body Wt: 105 lb (47.6 kg), % Ideal Body    · BMI Classification: BMI 18.5 - 24.9 Normal Weight    Estimated Intake vs Estimated Needs: Intake Improving    Nutrition Risk Level: Moderate    Nutrition Interventions:   Modify current diet, Start ONS  Continued Inpatient Monitoring    Nutrition Evaluation:   · Evaluation: Progressing toward goals   · Goals: po 50% or more, wt stable    · Monitoring: Meal Intake, Supplement Intake, Weight, Pertinent Labs    See Adult Nutrition Doc Flowsheet for more detail.      Electronically signed by Yakelin Cabrera MS, RD, LD on 3/5/18 at 9:03 AM    Contact Number: [phone]

## 2018-03-06 LAB
ANION GAP SERPL CALCULATED.3IONS-SCNC: 13 MMOL/L (ref 7–19)
BUN BLDV-MCNC: 8 MG/DL (ref 8–23)
CALCIUM SERPL-MCNC: 9.2 MG/DL (ref 8.8–10.2)
CHLORIDE BLD-SCNC: 94 MMOL/L (ref 98–111)
CO2: 23 MMOL/L (ref 22–29)
CREAT SERPL-MCNC: 0.7 MG/DL (ref 0.5–0.9)
GFR NON-AFRICAN AMERICAN: >60
GLUCOSE BLD-MCNC: 142 MG/DL (ref 74–109)
HCT VFR BLD CALC: 31.5 % (ref 37–47)
HEMOGLOBIN: 10.8 G/DL (ref 12–16)
KEPPRA: 22 UG/ML (ref 12–46)
MCH RBC QN AUTO: 33.5 PG (ref 27–31)
MCHC RBC AUTO-ENTMCNC: 34.3 G/DL (ref 33–37)
MCV RBC AUTO: 97.8 FL (ref 81–99)
ORGANISM: ABNORMAL
PDW BLD-RTO: 12.1 % (ref 11.5–14.5)
PLATELET # BLD: 225 K/UL (ref 130–400)
PMV BLD AUTO: 9.7 FL (ref 9.4–12.3)
POTASSIUM SERPL-SCNC: 3.7 MMOL/L (ref 3.5–5)
RBC # BLD: 3.22 M/UL (ref 4.2–5.4)
SODIUM BLD-SCNC: 130 MMOL/L (ref 136–145)
URINE CULTURE, ROUTINE: ABNORMAL
URINE CULTURE, ROUTINE: ABNORMAL
WBC # BLD: 4.7 K/UL (ref 4.8–10.8)

## 2018-03-06 PROCEDURE — 1210000000 HC MED SURG R&B

## 2018-03-06 PROCEDURE — 99232 SBSQ HOSP IP/OBS MODERATE 35: CPT | Performed by: HOSPITALIST

## 2018-03-06 PROCEDURE — 6370000000 HC RX 637 (ALT 250 FOR IP): Performed by: PSYCHIATRY & NEUROLOGY

## 2018-03-06 PROCEDURE — 2580000003 HC RX 258: Performed by: INTERNAL MEDICINE

## 2018-03-06 PROCEDURE — 36415 COLL VENOUS BLD VENIPUNCTURE: CPT

## 2018-03-06 PROCEDURE — 97116 GAIT TRAINING THERAPY: CPT

## 2018-03-06 PROCEDURE — 6360000002 HC RX W HCPCS: Performed by: PHYSICIAN ASSISTANT

## 2018-03-06 PROCEDURE — 2580000003 HC RX 258: Performed by: PHYSICIAN ASSISTANT

## 2018-03-06 PROCEDURE — 6370000000 HC RX 637 (ALT 250 FOR IP): Performed by: PHYSICIAN ASSISTANT

## 2018-03-06 PROCEDURE — 80048 BASIC METABOLIC PNL TOTAL CA: CPT

## 2018-03-06 PROCEDURE — 85027 COMPLETE CBC AUTOMATED: CPT

## 2018-03-06 PROCEDURE — 6370000000 HC RX 637 (ALT 250 FOR IP): Performed by: HOSPITALIST

## 2018-03-06 RX ORDER — LINEZOLID 600 MG/1
600 TABLET, FILM COATED ORAL EVERY 12 HOURS SCHEDULED
Status: DISCONTINUED | OUTPATIENT
Start: 2018-03-06 | End: 2018-03-07 | Stop reason: HOSPADM

## 2018-03-06 RX ORDER — MELOXICAM 7.5 MG/1
15 TABLET ORAL NIGHTLY
Status: DISCONTINUED | OUTPATIENT
Start: 2018-03-06 | End: 2018-03-07 | Stop reason: HOSPADM

## 2018-03-06 RX ADMIN — MELOXICAM 15 MG: 7.5 TABLET ORAL at 21:04

## 2018-03-06 RX ADMIN — RAMIPRIL 10 MG: 5 CAPSULE ORAL at 08:04

## 2018-03-06 RX ADMIN — LEVETIRACETAM 1000 MG: 500 TABLET ORAL at 21:04

## 2018-03-06 RX ADMIN — SODIUM CHLORIDE: 9 INJECTION, SOLUTION INTRAVENOUS at 21:03

## 2018-03-06 RX ADMIN — SODIUM CHLORIDE: 9 INJECTION, SOLUTION INTRAVENOUS at 21:05

## 2018-03-06 RX ADMIN — RAMIPRIL 10 MG: 5 CAPSULE ORAL at 21:05

## 2018-03-06 RX ADMIN — OXYBUTYNIN CHLORIDE 10 MG: 5 TABLET ORAL at 08:04

## 2018-03-06 RX ADMIN — HYDRALAZINE HYDROCHLORIDE 25 MG: 25 TABLET, FILM COATED ORAL at 21:05

## 2018-03-06 RX ADMIN — DIPYRIDAMOLE 75 MG: 25 TABLET, FILM COATED ORAL at 08:04

## 2018-03-06 RX ADMIN — LINEZOLID 600 MG: 600 TABLET, FILM COATED ORAL at 21:05

## 2018-03-06 RX ADMIN — HYDRALAZINE HYDROCHLORIDE 25 MG: 25 TABLET, FILM COATED ORAL at 08:05

## 2018-03-06 RX ADMIN — Medication 10 ML: at 21:06

## 2018-03-06 RX ADMIN — LEVETIRACETAM 1000 MG: 500 TABLET ORAL at 08:04

## 2018-03-06 RX ADMIN — ENOXAPARIN SODIUM 30 MG: 30 INJECTION SUBCUTANEOUS at 12:06

## 2018-03-06 RX ADMIN — CHOLECALCIFEROL CAP 125 MCG (5000 UNIT) 5000 UNITS: 125 CAP at 12:06

## 2018-03-06 RX ADMIN — AMLODIPINE BESYLATE 5 MG: 5 TABLET ORAL at 08:05

## 2018-03-06 RX ADMIN — ACETAMINOPHEN 1000 MG: 500 TABLET ORAL at 08:04

## 2018-03-06 RX ADMIN — NEBIVOLOL HYDROCHLORIDE 10 MG: 5 TABLET ORAL at 21:04

## 2018-03-06 RX ADMIN — FLUTICASONE PROPIONATE 2 SPRAY: 50 SPRAY, METERED NASAL at 08:05

## 2018-03-06 RX ADMIN — SODIUM CHLORIDE TAB 1 GM 1 G: 1 TAB at 18:16

## 2018-03-06 RX ADMIN — DOCUSATE SODIUM 100 MG: 100 CAPSULE, LIQUID FILLED ORAL at 21:04

## 2018-03-06 RX ADMIN — DONEPEZIL HYDROCHLORIDE 10 MG: 5 TABLET, FILM COATED ORAL at 21:04

## 2018-03-06 RX ADMIN — Medication 10 ML: at 08:05

## 2018-03-06 RX ADMIN — VANCOMYCIN HYDROCHLORIDE 1000 MG: 1 INJECTION, SOLUTION INTRAVENOUS at 01:50

## 2018-03-06 RX ADMIN — PANTOPRAZOLE SODIUM 20 MG: 20 TABLET, DELAYED RELEASE ORAL at 08:04

## 2018-03-06 RX ADMIN — PRAVASTATIN SODIUM 20 MG: 20 TABLET ORAL at 08:04

## 2018-03-06 RX ADMIN — DIPYRIDAMOLE 75 MG: 25 TABLET, FILM COATED ORAL at 21:04

## 2018-03-06 RX ADMIN — SODIUM CHLORIDE TAB 1 GM 1 G: 1 TAB at 08:04

## 2018-03-06 ASSESSMENT — PAIN DESCRIPTION - PAIN TYPE: TYPE: ACUTE PAIN

## 2018-03-06 ASSESSMENT — PAIN SCALES - GENERAL
PAINLEVEL_OUTOF10: 4
PAINLEVEL_OUTOF10: 10
PAINLEVEL_OUTOF10: 0

## 2018-03-06 ASSESSMENT — PAIN DESCRIPTION - FREQUENCY: FREQUENCY: CONTINUOUS

## 2018-03-06 NOTE — PROGRESS NOTES
(PROTONIX) tablet 20 mg  20 mg Oral Daily Nada Enid, PA-C   20 mg at 03/06/18 9628    pravastatin (PRAVACHOL) tablet 20 mg  20 mg Oral QAM Nada Enid, PA-C   20 mg at 03/06/18 1424    promethazine (PHENERGAN) tablet 25 mg  25 mg Oral TID PRN Nada Enid, PA-C        ramipril (ALTACE) capsule 10 mg  10 mg Oral BID Paguate Enid, PA-C   10 mg at 03/06/18 8541    simethicone (MYLICON) chewable tablet 120 mg  120 mg Oral Q6H PRN Nada Enid, PA-C        sodium chloride (OCEAN, BABY AYR) 0.65 % nasal spray 1 spray  1 spray Nasal PRN Paguate Enid, PA-C        sodium chloride tablet 1 g  1 g Oral BID WC Paguate Enid, PA-C   1 g at 03/06/18 1879    sodium chloride flush 0.9 % injection 10 mL  10 mL Intravenous 2 times per day Nada Enid, PA-C   10 mL at 03/06/18 0805    sodium chloride flush 0.9 % injection 10 mL  10 mL Intravenous PRN Nada Enid, PA-C        acetaminophen (TYLENOL) tablet 1,000 mg  1,000 mg Oral Q8H PRN Nada Enid, PA-C   1,000 mg at 03/06/18 0804    magnesium hydroxide (MILK OF MAGNESIA) 400 MG/5ML suspension 30 mL  30 mL Oral Daily PRN Paguate Enid, PA-C        enoxaparin (LOVENOX) injection 30 mg  30 mg Subcutaneous Q24H Nada Enid, PA-C   30 mg at 03/05/18 1212    levETIRAcetam (KEPPRA) tablet 1,000 mg  1,000 mg Oral BID Niraj Rainey MD   1,000 mg at 03/06/18 0804    lidocaine (LIDODERM) 5 % 1 patch  1 patch Transdermal Daily Nada Enid, PA-C   1 patch at 03/06/18 0805    0.9 % sodium chloride infusion   Intravenous Continuous Vesta Lopez DO 50 mL/hr at 03/05/18 2047       DVT Prophylaxis: Lovenox 40 mg sq daily    Continuous Infusions:   sodium chloride 50 mL/hr at 03/05/18 2047       CBC:   Recent Labs      03/04/18   0316  03/05/18   0856  03/06/18   1004   WBC  4.3*  3.7*  4.7*   HGB  9.3*  9.1*  10.8*   PLT  197  188  225     BMP:  Recent Labs      03/04/18   0316  03/05/18   0856  03/06/18   1004   NA  129*  130*  130* K  4.7  3.9  3.7   CL  94*  95*  94*   CO2  21*  25  23   BUN  14  9  8   CREATININE  0.9  0.7  0.7   GLUCOSE  81  91  142*     ABGs: Lab Results   Component Value Date    PHART 7.490 07/26/2016    PO2ART 83.0 07/26/2016    ETL7KQG 33.0 07/26/2016         Objective:   Vitals: BP (!) 149/73   Pulse 61   Temp 98.1 °F (36.7 °C) (Temporal)   Resp 20   Ht 5' 1\" (1.549 m)   Wt 120 lb 4 oz (54.5 kg)   SpO2 94%   BMI 22.72 kg/m²   General appearance: alert, appears stated age and cooperative  Skin: Skin color, texture, turgor normal.   HEENT: Head: Normocephalic, no lesions, without obvious abnormality.   Neck: no adenopathy, no carotid bruit, no JVD and supple, symmetrical, trachea midline  Lungs: clear to auscultation bilaterally  Heart: regular rate and rhythm, S1, S2 normal, no murmur, click, rub or gallop  Abdomen: soft, non-tender; bowel sounds normal; no masses,  no organomegaly  Extremities: extremities normal, atraumatic, no cyanosis or edema  Lymphatic: No significant lymph node enlargement papable  Neurologic: Mental status: Alert, oriented, thought content appropriate        Assessment & Plan:    Acute cystitis with hematuria growing VRE- changes to zyvox, pt has vague allergy to [facility], and failed OP with macrobid-home today if zyvox approved  Hyponatremia - improving w/ ivf  Seizure, on keppra  Metabolic encephalopathy - resolved   Dehydration - improved  Hyperkalemia - resolved  Weakness - pt/ot  GUERO - resolved w/ ivf            Maico Jim

## 2018-03-06 NOTE — PROGRESS NOTES
Nutrition Assessment    Type and Reason for Visit: Reassess    Nutrition Recommendations: continue menu selection. Obtain actual wt. Malnutrition Assessment:  · Malnutrition Status: At risk for malnutrition  · Context: Acute illness or injury    Nutrition Diagnosis:   · Problem: Inadequate oral intake  · Etiology: related to Early satiety     Signs and symptoms:  as evidenced by Patient report of    Nutrition Assessment:  · Subjective Assessment: Intake remains fair, ate about 50% of lunch, saving supplement for later. · Nutrition-Focused Physical Findings:    · Wound Type: None  · Current Nutrition Therapies:  · Oral Diet Orders: Cardiac   · Oral Diet intake: 26-50% (variable)  · Oral Nutrition Supplement (ONS) Orders: None  · Anthropometric Measures:  · Ht: 5' 1\" (154.9 cm)   · Current Body Wt:    · Admission Body Wt: 120 lb (54.4 kg)  · Usual Body Wt:    · % Weight Change:  ,     · Ideal Body Wt: 105 lb (47.6 kg), % Ideal Body    · BMI Classification: BMI 18.5 - 24.9 Normal Weight    Estimated Intake vs Estimated Needs: Intake Improving    Nutrition Risk Level: Moderate    Nutrition Interventions:   Modify current diet, Start ONS  Continued Inpatient Monitoring    Nutrition Evaluation:   · Evaluation: Progressing toward goals   · Goals: po 50% or more, wt stable    · Monitoring: Meal Intake, Supplement Intake, Weight, Pertinent Labs    See Adult Nutrition Doc Flowsheet for more detail.      Electronically signed by Sandoval Aleman MS, RD, LD on 3/6/18 at 2:02 PM    Contact Number: [phone]

## 2018-03-07 VITALS
TEMPERATURE: 97.9 F | RESPIRATION RATE: 20 BRPM | SYSTOLIC BLOOD PRESSURE: 148 MMHG | HEART RATE: 54 BPM | BODY MASS INDEX: 22.7 KG/M2 | OXYGEN SATURATION: 94 % | WEIGHT: 120.25 LBS | HEIGHT: 61 IN | DIASTOLIC BLOOD PRESSURE: 65 MMHG

## 2018-03-07 PROCEDURE — 6370000000 HC RX 637 (ALT 250 FOR IP): Performed by: PSYCHIATRY & NEUROLOGY

## 2018-03-07 PROCEDURE — 6370000000 HC RX 637 (ALT 250 FOR IP): Performed by: HOSPITALIST

## 2018-03-07 PROCEDURE — 6360000002 HC RX W HCPCS: Performed by: PHYSICIAN ASSISTANT

## 2018-03-07 PROCEDURE — 6370000000 HC RX 637 (ALT 250 FOR IP): Performed by: PHYSICIAN ASSISTANT

## 2018-03-07 PROCEDURE — 99239 HOSP IP/OBS DSCHRG MGMT >30: CPT | Performed by: HOSPITALIST

## 2018-03-07 RX ORDER — LEVETIRACETAM 1000 MG/1
1000 TABLET ORAL 2 TIMES DAILY
Qty: 60 TABLET | Refills: 0 | Status: SHIPPED | OUTPATIENT
Start: 2018-03-07 | End: 2019-04-08 | Stop reason: SDUPTHER

## 2018-03-07 RX ORDER — LINEZOLID 600 MG/1
600 TABLET, FILM COATED ORAL EVERY 12 HOURS SCHEDULED
Qty: 20 TABLET | Refills: 0 | Status: SHIPPED | OUTPATIENT
Start: 2018-03-07 | End: 2018-03-17

## 2018-03-07 RX ADMIN — RAMIPRIL 10 MG: 5 CAPSULE ORAL at 09:52

## 2018-03-07 RX ADMIN — HYDRALAZINE HYDROCHLORIDE 25 MG: 25 TABLET, FILM COATED ORAL at 10:00

## 2018-03-07 RX ADMIN — ENOXAPARIN SODIUM 30 MG: 30 INJECTION SUBCUTANEOUS at 09:51

## 2018-03-07 RX ADMIN — FLUTICASONE PROPIONATE 2 SPRAY: 50 SPRAY, METERED NASAL at 09:50

## 2018-03-07 RX ADMIN — LEVETIRACETAM 1000 MG: 500 TABLET ORAL at 09:51

## 2018-03-07 RX ADMIN — SODIUM CHLORIDE TAB 1 GM 1 G: 1 TAB at 10:04

## 2018-03-07 RX ADMIN — PRAVASTATIN SODIUM 20 MG: 20 TABLET ORAL at 09:51

## 2018-03-07 RX ADMIN — CHOLECALCIFEROL CAP 125 MCG (5000 UNIT) 5000 UNITS: 125 CAP at 13:13

## 2018-03-07 RX ADMIN — PANTOPRAZOLE SODIUM 20 MG: 20 TABLET, DELAYED RELEASE ORAL at 09:52

## 2018-03-07 RX ADMIN — LINEZOLID 600 MG: 600 TABLET, FILM COATED ORAL at 09:52

## 2018-03-07 RX ADMIN — DIPYRIDAMOLE 75 MG: 25 TABLET, FILM COATED ORAL at 10:00

## 2018-03-07 RX ADMIN — AMLODIPINE BESYLATE 5 MG: 5 TABLET ORAL at 09:52

## 2018-03-07 NOTE — CARE COORDINATION
Pt is dc'ing home (back to [facility]) with [facility] services as well as an independent sitter per pt's daughter.

## 2018-03-07 NOTE — CARE COORDINATION
[facility] notified of patient discharge today and [employer] orders. DC Summary and DC med list faxed.   Electronically signed by Rae Liao RN on 3/7/18 at 5:55 PM

## 2018-03-07 NOTE — PROGRESS NOTES
Nutrition Assessment    Type and Reason for Visit: Consult    Nutrition Recommendations: continue POC    Malnutrition Assessment:  · Malnutrition Status: At risk for malnutrition  · Context: Acute illness or injury    Nutrition Diagnosis:   · Problem: Inadequate oral intake  · Etiology: related to Early satiety     Signs and symptoms:  as evidenced by Patient report of    Nutrition Assessment:  · Subjective Assessment: Still working on breakfast at 11 am. Intake still inadequate per documentation, but pt states she is eating \"enough,\" pleased with her meal options, reports that she does drink all of her supplement between meals. Anticipates discharge today. · Nutrition-Focused Physical Findings:    · Wound Type: None  · Current Nutrition Therapies:  · Oral Diet Orders: Cardiac   · Oral Diet intake: 1-25%  · Oral Nutrition Supplement (ONS) Orders: None  · ONS intake: %  · Anthropometric Measures:  · Ht: 5' 1\" (154.9 cm)   · Current Body Wt:    · Admission Body Wt: 120 lb (54.4 kg)  · Ideal Body Wt: 105 lb (47.6 kg), % Ideal Body    · BMI Classification: BMI 18.5 - 24.9 Normal Weight    Estimated Intake vs Estimated Needs: Intake Less Than Needs    Nutrition Risk Level: Moderate    Nutrition Interventions:   Continue current diet, Continue current ONS  Continued Inpatient Monitoring    Nutrition Evaluation:   · Evaluation: Progressing toward goals   · Goals: po 50% or more, wt stable    · Monitoring: Meal Intake, Supplement Intake, Weight, Pertinent Labs    See Adult Nutrition Doc Flowsheet for more detail.      Electronically signed by Arcenio Davenport MS, RD, LD on 3/7/18 at 12:26 PM    Contact Number: [phone]

## 2018-03-08 ENCOUNTER — OFFICE VISIT (OUTPATIENT)
Dept: UROLOGY | Facility: CLINIC | Age: 83
End: 2018-03-08

## 2018-03-08 VITALS — WEIGHT: 120 LBS | TEMPERATURE: 97.8 F | HEIGHT: 61 IN | BODY MASS INDEX: 22.66 KG/M2

## 2018-03-08 DIAGNOSIS — N39.0 URINARY TRACT INFECTION WITHOUT HEMATURIA, SITE UNSPECIFIED: Primary | ICD-10-CM

## 2018-03-08 LAB
BLOOD CULTURE, ROUTINE: NORMAL
CULTURE, BLOOD 2: NORMAL

## 2018-03-08 PROCEDURE — 99213 OFFICE O/P EST LOW 20 MIN: CPT | Performed by: UROLOGY

## 2018-03-08 RX ORDER — HYDRALAZINE HYDROCHLORIDE 50 MG/1
50 TABLET, FILM COATED ORAL 3 TIMES DAILY
COMMUNITY
End: 2018-09-19

## 2018-03-08 RX ORDER — SODIUM CHLORIDE 1000 MG
1 TABLET, SOLUBLE MISCELLANEOUS 3 TIMES DAILY
COMMUNITY

## 2018-03-08 RX ORDER — NITROFURANTOIN MACROCRYSTALS 50 MG/1
50 CAPSULE ORAL NIGHTLY
Qty: 30 CAPSULE | Refills: 2 | Status: SHIPPED | OUTPATIENT
Start: 2018-03-08 | End: 2018-06-15 | Stop reason: SDUPTHER

## 2018-03-08 RX ORDER — LINEZOLID 600 MG/1
600 TABLET, FILM COATED ORAL 2 TIMES DAILY
COMMUNITY
End: 2018-09-19

## 2018-03-11 LAB
ANION GAP SERPL CALCULATED.3IONS-SCNC: 17 MMOL/L (ref 7–19)
BUN BLDV-MCNC: 12 MG/DL (ref 8–23)
CALCIUM SERPL-MCNC: 9.9 MG/DL (ref 8.8–10.2)
CHLORIDE BLD-SCNC: 89 MMOL/L (ref 98–111)
CO2: 24 MMOL/L (ref 22–29)
CREAT SERPL-MCNC: 0.9 MG/DL (ref 0.5–0.9)
GFR NON-AFRICAN AMERICAN: 60
GLUCOSE BLD-MCNC: 126 MG/DL (ref 74–109)
POTASSIUM SERPL-SCNC: 3.9 MMOL/L (ref 3.5–5)
SODIUM BLD-SCNC: 130 MMOL/L (ref 136–145)

## 2018-03-14 ENCOUNTER — OFFICE VISIT (OUTPATIENT)
Dept: OBSTETRICS AND GYNECOLOGY | Facility: CLINIC | Age: 83
End: 2018-03-14

## 2018-03-14 ENCOUNTER — TRANSCRIBE ORDERS (OUTPATIENT)
Dept: ADMINISTRATIVE | Facility: HOSPITAL | Age: 83
End: 2018-03-14

## 2018-03-14 ENCOUNTER — APPOINTMENT (OUTPATIENT)
Dept: LAB | Facility: HOSPITAL | Age: 83
End: 2018-03-14

## 2018-03-14 VITALS
BODY MASS INDEX: 22.66 KG/M2 | SYSTOLIC BLOOD PRESSURE: 120 MMHG | WEIGHT: 120 LBS | HEIGHT: 61 IN | DIASTOLIC BLOOD PRESSURE: 70 MMHG

## 2018-03-14 DIAGNOSIS — Z87.440 HISTORY OF RECURRENT UTIS: ICD-10-CM

## 2018-03-14 DIAGNOSIS — E87.1 HYPONATREMIA: Primary | ICD-10-CM

## 2018-03-14 DIAGNOSIS — N32.81 OAB (OVERACTIVE BLADDER): Primary | ICD-10-CM

## 2018-03-14 DIAGNOSIS — N39.41 URGE INCONTINENCE: ICD-10-CM

## 2018-03-14 DIAGNOSIS — R35.0 URINARY FREQUENCY: ICD-10-CM

## 2018-03-14 DIAGNOSIS — N95.2 VAGINAL ATROPHY: ICD-10-CM

## 2018-03-14 LAB
ANION GAP SERPL CALCULATED.3IONS-SCNC: 12 MMOL/L (ref 4–13)
BUN BLD-MCNC: 17 MG/DL (ref 5–21)
BUN/CREAT SERPL: 11.5 (ref 7–25)
CALCIUM SPEC-SCNC: 9.7 MG/DL (ref 8.4–10.4)
CHLORIDE SERPL-SCNC: 89 MMOL/L (ref 98–110)
CO2 SERPL-SCNC: 24 MMOL/L (ref 24–31)
CREAT BLD-MCNC: 1.48 MG/DL (ref 0.5–1.4)
GFR SERPL CREATININE-BSD FRML MDRD: 34 ML/MIN/1.73
GLUCOSE BLD-MCNC: 89 MG/DL (ref 70–100)
POTASSIUM BLD-SCNC: 4.3 MMOL/L (ref 3.5–5.3)
SODIUM BLD-SCNC: 125 MMOL/L (ref 135–145)

## 2018-03-14 PROCEDURE — G8419 CALC BMI OUT NRM PARAM NOF/U: HCPCS | Performed by: NURSE PRACTITIONER

## 2018-03-14 PROCEDURE — 1036F TOBACCO NON-USER: CPT | Performed by: NURSE PRACTITIONER

## 2018-03-14 PROCEDURE — 99203 OFFICE O/P NEW LOW 30 MIN: CPT | Performed by: NURSE PRACTITIONER

## 2018-03-14 PROCEDURE — 80048 BASIC METABOLIC PNL TOTAL CA: CPT | Performed by: INTERNAL MEDICINE

## 2018-03-14 PROCEDURE — 36415 COLL VENOUS BLD VENIPUNCTURE: CPT | Performed by: INTERNAL MEDICINE

## 2018-03-14 NOTE — PROGRESS NOTES
Bel Powell is a 82 y.o. No obstetric history on file.     Chief Complaint   Patient presents with   • Vaginal Prolapse     Pt has had several UTI's.             HPI - Bel was in the hospital earlier in the month and had a severe UTI.  She just recently saw Dr. Foster, she is to finish her antibiotics Friday.  She is in assistant living at the [facility]. She is accompanied by her daughter.  Bel is taking  2 oxybutynin am and 2 hs.  She is also going to start daily Macrodantin in hopes of preventing future UTI's.  Her daughter tells me that she is sent here to see if she needs a pessary.  I reviewed Dr. Foster's notes and he said she could stop the oxybutynin and if incontinence and frequency returns, he suggested Myrbetriq.          The following portions of the patient's history were reviewed and updated as appropriate:vital signs, allergies, current medications, past family history, past medical history, past social history, past surgical history and problem list.      Current Outpatient Prescriptions:   •  acetaminophen (TYLENOL) 650 MG 8 hr tablet, Take 1,000 mg by mouth As Needed for Mild Pain ., Disp: , Rfl:   •  alendronate (FOSAMAX) 70 MG tablet, Take 70 mg by mouth Every 7 (Seven) Days., Disp: , Rfl:   •  amLODIPine (NORVASC) 2.5 MG tablet, Take 5 mg by mouth Daily., Disp: , Rfl:   •  B Complex Vitamins (VITAMIN B COMPLEX PO), Take 1 tablet by mouth Daily., Disp: , Rfl:   •  butalbital-acetaminophen-caffeine (FIORICET, ESGIC) -40 MG per tablet, Take 1 tablet by mouth As Needed for headaches., Disp: , Rfl:   •  Calcium Carb-Cholecalciferol (CALCIUM 600+D3) 600-800 MG-UNIT tablet, Take 1 tablet by mouth Daily., Disp: , Rfl:   •  camphor-menthol (SARNA) 0.5-0.5 % lotion, Apply  topically As Needed for itching., Disp: , Rfl:   •  cholecalciferol (VITAMIN D3) 1000 UNITS tablet, Take 1,000 Units by mouth Daily., Disp: , Rfl:   •  colestipol (COLESTID) 1 G tablet, Take 1 g by mouth As  Needed., Disp: , Rfl:   •  dipyridamole (PERSANTINE) 75 MG tablet, Take 75 mg by mouth 2 (Two) Times a Day., Disp: , Rfl:   •  docusate sodium (COLACE) 100 MG capsule, Take 100 mg by mouth As Needed for constipation., Disp: , Rfl:   •  donepezil (ARICEPT) 10 MG tablet, Take 10 mg by mouth Every Night., Disp: , Rfl:   •  hydrALAZINE (APRESOLINE) 50 MG tablet, Take 50 mg by mouth 3 (Three) Times a Day., Disp: , Rfl:   •  levETIRAcetam (KEPPRA) 750 MG tablet, Take 1,000 mg by mouth 2 (Two) Times a Day., Disp: , Rfl:   •  linezolid (ZYVOX) 600 MG tablet, Take 600 mg by mouth 2 (Two) Times a Day., Disp: , Rfl:   •  Lutein 20 MG tablet, Take 1 tablet by mouth Daily., Disp: , Rfl:   •  meloxicam (MOBIC) 15 MG tablet, Take 15 mg by mouth Daily., Disp: , Rfl:   •  nebivolol (BYSTOLIC) 10 MG tablet, Take 10 mg by mouth 2 (Two) Times a Day., Disp: , Rfl:   •  nitrofurantoin (MACRODANTIN) 50 MG capsule, Take 1 capsule by mouth Every Night for 30 days., Disp: 30 capsule, Rfl: 2  •  Omega 3-6-9 Fatty Acids (OMEGA 3-6-9 COMPLEX PO), Take 1 capsule by mouth Daily., Disp: , Rfl:   •  pantoprazole (PROTONIX) 20 MG EC tablet, Take 20 mg by mouth Daily., Disp: , Rfl:   •  pravastatin (PRAVACHOL) 20 MG tablet, Take 20 mg by mouth Daily., Disp: , Rfl:   •  promethazine (PHENERGAN) 25 MG tablet, Take 25 mg by mouth Every 4 (Four) Hours As Needed for nausea or vomiting., Disp: , Rfl:   •  ramipril (ALTACE) 5 MG capsule, Take 5 mg by mouth Daily., Disp: , Rfl:   •  simethicone (MYLICON) 80 MG chewable tablet, Chew 125 mg As Needed for Flatulence., Disp: , Rfl:   •  sodium chloride 1 g tablet, Take 1 g by mouth 3 (Three) Times a Day., Disp: , Rfl:   •  spironolactone (ALDACTONE) 25 MG tablet, Take 25 mg by mouth Daily., Disp: , Rfl:   •  vitamin C (ASCORBIC ACID) 500 MG tablet, Take 500 mg by mouth Daily., Disp: , Rfl:   •  conjugated estrogens (PREMARIN) 0.625 MG/GM vaginal cream, 1 gm vaginally three times per week for 3 weeks then twice  "weekly thereafter      Also, apply small amount to the urethra, Disp: 1 each, Rfl: 3    Review of Systems   Constitutional: Positive for fatigue. Negative for activity change.   HENT: Negative for congestion and facial swelling.    Eyes: Negative for discharge and itching.   Respiratory: Negative for apnea and wheezing.    Cardiovascular: Negative for chest pain.   Gastrointestinal: Negative for abdominal distention and nausea.   Endocrine: Negative for cold intolerance and polydipsia.   Genitourinary: Positive for frequency and urgency. Negative for difficulty urinating.        Urge and stress incontinence   Musculoskeletal: Positive for arthralgias, joint swelling and neck pain.   Neurological: Positive for seizures and weakness.   Psychiatric/Behavioral: Negative for agitation and decreased concentration. The patient is not nervous/anxious.      Breast ROS: negative    Objective      /70   Ht 154.9 cm (61\")   Wt 54.4 kg (120 lb)   BMI 22.67 kg/m²       Physical Exam   Constitutional: She appears well-developed.   HENT:   Head: Atraumatic.   Eyes: Right eye exhibits no discharge. Left eye exhibits no discharge.   Pulmonary/Chest: Effort normal.   Abdominal: Soft.   Genitourinary: Rectal exam shows no mass. There is no rash or tenderness on the right labia. There is no rash or tenderness on the left labia. Right adnexum displays no mass. Left adnexum displays no mass. There is erythema in the vagina. No foreign body in the vagina. No signs of injury around the vagina. No vaginal discharge found.   Genitourinary Comments: With small spec., the exam was uncomfortable for Bel  Tissues are very dry.    Urethral pale, no masses   Skin: Skin is warm and dry.   Psychiatric: She has a normal mood and affect. Her behavior is normal.   Nursing note and vitals reviewed.         Diagnoses and all orders for this visit:    By my exam, Bel does not have a sig. cystocele.     She and her daughter both agree that " she voids normal amounts, she denies any symptoms of not being able to urinate.  She apparently had testing 2 years ago that confirmed that she did empty her bladder, had no sig. Urinary retention.  I did not review these records but per the patient and her daughter.  I did review her recent visit with Dr. Foster.    OAB (overactive bladder)  Will stop Oxybutynin and start Myrbetriq 25 mgm with sife effects to report    Urinary frequency  Finish present antibiotic and then per Dr. Foster, she is to start macrodantin q d.    Vaginal atrophy  Premarin 1 gm PV  3 times per week for 3 week then twice per week     Apply small amount to her urethra also    GORDY (stress urinary incontinence), male    Urge incontinence  Myrbetriq 25 mgm  (samples given)    History of recurrent UTIs  Patient is to call Dr. [facility] if she has symptoms of UTI as she will need  In and out cath.    Other orders  -     conjugated estrogens (PREMARIN) 0.625 MG/GM vaginal cream; 1 gm vaginally three times per week for 3 weeks then twice weekly thereafter      Also, apply small amount to the urethra    RTO 6 months/prn        Raeann Coleman, APRN  3/14/2018

## 2018-03-18 ENCOUNTER — HOSPITAL ENCOUNTER (EMERGENCY)
Age: 83
Discharge: HOME | End: 2018-03-19
Attending: EMERGENCY MEDICINE
Payer: MEDICARE

## 2018-03-18 DIAGNOSIS — E87.1 HYPONATREMIA: Primary | ICD-10-CM

## 2018-03-18 DIAGNOSIS — N32.89 BLADDER SPASMS: ICD-10-CM

## 2018-03-18 LAB
ALBUMIN SERPL-MCNC: 4.3 G/DL (ref 3.5–5.2)
ALP BLD-CCNC: 51 U/L (ref 35–104)
ALT SERPL-CCNC: 23 U/L (ref 5–33)
ANION GAP SERPL CALCULATED.3IONS-SCNC: 12 MMOL/L (ref 7–19)
AST SERPL-CCNC: 20 U/L (ref 5–32)
BASOPHILS ABSOLUTE: 0.1 K/UL (ref 0–0.2)
BASOPHILS RELATIVE PERCENT: 1.2 % (ref 0–1)
BILIRUB SERPL-MCNC: 0.4 MG/DL (ref 0.2–1.2)
BILIRUBIN URINE: NEGATIVE
BLOOD, URINE: NEGATIVE
BUN BLDV-MCNC: 24 MG/DL (ref 8–23)
CALCIUM SERPL-MCNC: 9.6 MG/DL (ref 8.8–10.2)
CHLORIDE BLD-SCNC: 93 MMOL/L (ref 98–111)
CLARITY: CLEAR
CO2: 22 MMOL/L (ref 22–29)
COLOR: YELLOW
CREAT SERPL-MCNC: 1.2 MG/DL (ref 0.5–0.9)
EOSINOPHILS ABSOLUTE: 0.2 K/UL (ref 0–0.6)
EOSINOPHILS RELATIVE PERCENT: 3 % (ref 0–5)
GFR NON-AFRICAN AMERICAN: 43
GLUCOSE BLD-MCNC: 98 MG/DL (ref 74–109)
GLUCOSE URINE: NEGATIVE MG/DL
HCT VFR BLD CALC: 30.6 % (ref 37–47)
HEMOGLOBIN: 10.4 G/DL (ref 12–16)
KETONES, URINE: NEGATIVE MG/DL
LEUKOCYTE ESTERASE, URINE: NEGATIVE
LYMPHOCYTES ABSOLUTE: 1.4 K/UL (ref 1.1–4.5)
LYMPHOCYTES RELATIVE PERCENT: 24.3 % (ref 20–40)
MCH RBC QN AUTO: 33.9 PG (ref 27–31)
MCHC RBC AUTO-ENTMCNC: 34 G/DL (ref 33–37)
MCV RBC AUTO: 99.7 FL (ref 81–99)
MONOCYTES ABSOLUTE: 0.4 K/UL (ref 0–0.9)
MONOCYTES RELATIVE PERCENT: 6.3 % (ref 0–10)
NEUTROPHILS ABSOLUTE: 3.7 K/UL (ref 1.5–7.5)
NEUTROPHILS RELATIVE PERCENT: 65 % (ref 50–65)
NITRITE, URINE: NEGATIVE
PDW BLD-RTO: 13.1 % (ref 11.5–14.5)
PH UA: 5.5
PLATELET # BLD: 169 K/UL (ref 130–400)
PMV BLD AUTO: 9.2 FL (ref 9.4–12.3)
POTASSIUM SERPL-SCNC: 4.8 MMOL/L (ref 3.5–5)
PROTEIN UA: NEGATIVE MG/DL
RBC # BLD: 3.07 M/UL (ref 4.2–5.4)
SODIUM BLD-SCNC: 127 MMOL/L (ref 136–145)
SPECIFIC GRAVITY UA: 1.02
TOTAL PROTEIN: 6.2 G/DL (ref 6.6–8.7)
URINE REFLEX TO CULTURE: NORMAL
UROBILINOGEN, URINE: 0.2 E.U./DL
WBC # BLD: 5.8 K/UL (ref 4.8–10.8)

## 2018-03-18 PROCEDURE — 80053 COMPREHEN METABOLIC PANEL: CPT

## 2018-03-18 PROCEDURE — 99283 EMERGENCY DEPT VISIT LOW MDM: CPT

## 2018-03-18 PROCEDURE — 2580000003 HC RX 258: Performed by: EMERGENCY MEDICINE

## 2018-03-18 PROCEDURE — 85025 COMPLETE CBC W/AUTO DIFF WBC: CPT

## 2018-03-18 PROCEDURE — 36415 COLL VENOUS BLD VENIPUNCTURE: CPT

## 2018-03-18 PROCEDURE — 81003 URINALYSIS AUTO W/O SCOPE: CPT

## 2018-03-18 PROCEDURE — 99284 EMERGENCY DEPT VISIT MOD MDM: CPT | Performed by: EMERGENCY MEDICINE

## 2018-03-18 RX ORDER — NITROFURANTOIN 25; 75 MG/1; MG/1
50 CAPSULE ORAL NIGHTLY
COMMUNITY
End: 2019-04-08

## 2018-03-18 RX ORDER — 0.9 % SODIUM CHLORIDE 0.9 %
1000 INTRAVENOUS SOLUTION INTRAVENOUS ONCE
Status: COMPLETED | OUTPATIENT
Start: 2018-03-18 | End: 2018-03-19

## 2018-03-18 RX ADMIN — SODIUM CHLORIDE 1000 ML: 9 INJECTION, SOLUTION INTRAVENOUS at 23:22

## 2018-03-18 ASSESSMENT — PAIN DESCRIPTION - LOCATION: LOCATION: GROIN

## 2018-03-18 ASSESSMENT — PAIN SCALES - GENERAL: PAINLEVEL_OUTOF10: 8

## 2018-03-18 ASSESSMENT — ENCOUNTER SYMPTOMS
CHEST TIGHTNESS: 0
SHORTNESS OF BREATH: 0

## 2018-03-19 ENCOUNTER — TELEPHONE (OUTPATIENT)
Dept: OBSTETRICS AND GYNECOLOGY | Facility: CLINIC | Age: 83
End: 2018-03-19

## 2018-03-19 VITALS
TEMPERATURE: 98.4 F | OXYGEN SATURATION: 94 % | WEIGHT: 120 LBS | RESPIRATION RATE: 18 BRPM | BODY MASS INDEX: 22.66 KG/M2 | HEIGHT: 61 IN | DIASTOLIC BLOOD PRESSURE: 74 MMHG | SYSTOLIC BLOOD PRESSURE: 164 MMHG | HEART RATE: 62 BPM

## 2018-03-19 NOTE — TELEPHONE ENCOUNTER
Pt daughter called pt went to [facility] Sunday night because she had not voided since 1:30 Sunday and was very minimal. Daughter said [facility] said her sodium was decreased and they gave her fluids. Daughter is asking about her Mybetriq she started Wednesday and took Thursday night and Friday night and then she said there was a mix up at her assisted living and she was given another mybetriq Saturday morning. She is asking for a call back to find out if she is to stop the mybetriq and if she is to stop what is her replacement for her incontinence.

## 2018-03-19 NOTE — ED PROVIDER NOTES
to person, place, and time. She has normal strength. Skin: No rash noted. No pallor. Nursing note and vitals reviewed. DIAGNOSTIC RESULTS         LABS:  Labs Reviewed   COMPREHENSIVE METABOLIC PANEL - Abnormal; Notable for the following:        Result Value    Sodium 127 (*)     Chloride 93 (*)     BUN 24 (*)     CREATININE 1.2 (*)     GFR Non- 43 (*)     Total Protein 6.2 (*)     All other components within normal limits   CBC WITH AUTO DIFFERENTIAL - Abnormal; Notable for the following:     RBC 3.07 (*)     Hemoglobin 10.4 (*)     Hematocrit 30.6 (*)     MCV 99.7 (*)     MCH 33.9 (*)     MPV 9.2 (*)     Basophils % 1.2 (*)     All other components within normal limits   URINE RT REFLEX TO CULTURE       All other labs were within normal range or not returned as of this dictation. EMERGENCY DEPARTMENT COURSE and DIFFERENTIAL DIAGNOSIS/MDM:   Vitals:    Vitals:    03/18/18 2105 03/18/18 2231 03/18/18 2302 03/18/18 2332   BP: 134/71 (!) 158/62 (!) 157/85 (!) 166/73   Pulse: 60 57 61 59   Resp: 20 16 17 16   Temp: 98.4 °F (36.9 °C)      SpO2: 94% 98% 92% 94%   Weight: 120 lb (54.4 kg)      Height: 5' 1\" (1.549 m)          MDM  Number of Diagnoses or Management Options     Amount and/or Complexity of Data Reviewed  Clinical lab tests: ordered and reviewed    Risk of Complications, Morbidity, and/or Mortality  Presenting problems: high  Management options: low        Reassessment    Bladder scan revealed 80cc of urine in bladder. Laboratory studies demonstrate mild hyponatremia, was 130 upon discharge from the hospital. Her creatinine is slightly increased at 1.2. I will plan to give her normal saline fluid bolus here in the ED. Urinalysis is negative for evidence of urinary tract infection. Suspect that these bladder spasms are chronic in nature.  We'll plan to discharge her home and have her follow-up with her PMD along with home health for repeat laboratory studies this coming week.      PROCEDURES:  Unless otherwise noted below, none     Procedures    FINAL IMPRESSION      1. Hyponatremia    2.  Bladder spasms          DISPOSITION/PLAN   DISPOSITION Discharge - Pending Orders Complete 03/18/2018 11:52:20 PM      PATIENT REFERRED TO:  Nata Fernandez DO  [address] [phone]            (Please note that portions of this note were completed with a voice recognition program.  Efforts were made to edit the dictations but occasionally words are mis-transcribed.)    Nina Sanchez MD (electronically signed)  Attending Emergency Physician         Nina Sanchez MD  03/18/18 [id number]

## 2018-03-20 NOTE — TELEPHONE ENCOUNTER
I spoke with Bel's daughter (Jessica) re: Bel's visit Sunday night to the ER.  She had just recently started Myrbetriq 25 mgm vs Oxybutynin for OAB and urge incontinence.  She had taken the Myrbetriq about 4 days.  She went hours Sunday without voiding, Jessica said she could not urinate.  I reviewed the ER reports and she was diagnosed with bladder spasms and hyponatremia.  She was given IV fluids.   I advised not to take the Myrbetriq (hyponatremia not a common side effect) but should stop as inability to void is indication to DC.  She is seeing her PCP (Dr. Horacio Campos) Monday afternoon and will discuss this with him as to a plan as far as her medications .

## 2018-03-22 ENCOUNTER — OFFICE VISIT (OUTPATIENT)
Dept: UROLOGY | Age: 83
End: 2018-03-22
Payer: MEDICARE

## 2018-03-22 VITALS — TEMPERATURE: 97.1 F | WEIGHT: 120 LBS | BODY MASS INDEX: 22.66 KG/M2 | HEIGHT: 61 IN

## 2018-03-22 DIAGNOSIS — N39.46 MIXED STRESS AND URGE URINARY INCONTINENCE: ICD-10-CM

## 2018-03-22 DIAGNOSIS — R39.11 URINARY HESITANCY: ICD-10-CM

## 2018-03-22 DIAGNOSIS — N39.0 RECURRENT UTI: Primary | ICD-10-CM

## 2018-03-22 LAB
ANION GAP SERPL CALCULATED.3IONS-SCNC: 14 MMOL/L (ref 7–19)
APPEARANCE FLUID: CLEAR
BILIRUBIN, POC: 0
BLOOD URINE, POC: NORMAL
BUN BLDV-MCNC: 18 MG/DL (ref 8–23)
CALCIUM SERPL-MCNC: 9.8 MG/DL (ref 8.8–10.2)
CHLORIDE BLD-SCNC: 94 MMOL/L (ref 98–111)
CLARITY, POC: CLEAR
CO2: 24 MMOL/L (ref 22–29)
COLOR, POC: YELLOW
CREAT SERPL-MCNC: 1 MG/DL (ref 0.5–0.9)
GFR NON-AFRICAN AMERICAN: 53
GLUCOSE BLD-MCNC: 97 MG/DL (ref 74–109)
GLUCOSE URINE, POC: NORMAL
KETONES, POC: NORMAL
LEUKOCYTE EST, POC: NORMAL
NITRITE, POC: NORMAL
PH, POC: 5
POTASSIUM SERPL-SCNC: 4.2 MMOL/L (ref 3.5–5)
PROTEIN, POC: NORMAL
SODIUM BLD-SCNC: 132 MMOL/L (ref 136–145)
SPECIFIC GRAVITY, POC: 1.02
UROBILINOGEN, POC: 0.2

## 2018-03-22 PROCEDURE — 0509F URINE INCON PLAN DOCD: CPT | Performed by: PHYSICIAN ASSISTANT

## 2018-03-22 PROCEDURE — G8400 PT W/DXA NO RESULTS DOC: HCPCS | Performed by: PHYSICIAN ASSISTANT

## 2018-03-22 PROCEDURE — 81003 URINALYSIS AUTO W/O SCOPE: CPT | Performed by: PHYSICIAN ASSISTANT

## 2018-03-22 PROCEDURE — 1036F TOBACCO NON-USER: CPT | Performed by: PHYSICIAN ASSISTANT

## 2018-03-22 PROCEDURE — 1123F ACP DISCUSS/DSCN MKR DOCD: CPT | Performed by: PHYSICIAN ASSISTANT

## 2018-03-22 PROCEDURE — 1111F DSCHRG MED/CURRENT MED MERGE: CPT | Performed by: PHYSICIAN ASSISTANT

## 2018-03-22 PROCEDURE — G8428 CUR MEDS NOT DOCUMENT: HCPCS | Performed by: PHYSICIAN ASSISTANT

## 2018-03-22 PROCEDURE — 99202 OFFICE O/P NEW SF 15 MIN: CPT | Performed by: PHYSICIAN ASSISTANT

## 2018-03-22 PROCEDURE — G8484 FLU IMMUNIZE NO ADMIN: HCPCS | Performed by: PHYSICIAN ASSISTANT

## 2018-03-22 PROCEDURE — 4040F PNEUMOC VAC/ADMIN/RCVD: CPT | Performed by: PHYSICIAN ASSISTANT

## 2018-03-22 PROCEDURE — 1090F PRES/ABSN URINE INCON ASSESS: CPT | Performed by: PHYSICIAN ASSISTANT

## 2018-03-22 PROCEDURE — G8420 CALC BMI NORM PARAMETERS: HCPCS | Performed by: PHYSICIAN ASSISTANT

## 2018-03-22 ASSESSMENT — ENCOUNTER SYMPTOMS
EYE PAIN: 0
SINUS PAIN: 0
BLURRED VISION: 0
SHORTNESS OF BREATH: 0
VOMITING: 0
BACK PAIN: 0
WHEEZING: 0
ABDOMINAL PAIN: 0

## 2018-03-22 NOTE — PROGRESS NOTES
Elissa Monzon is a 80 y.o. female who presents today   Chief Complaint   Patient presents with    New Patient     SELF REFERRAL RECURRENT UTI      Recurrent uti,s:  Patient is an atrial-year-old female accompanied by her daughter with a number of urologic complaints she has a history of recurrent urinary tract infections had had fairly regular follow-up with Dr. Tiburcio Hernandez for this. She has had a culture positive urinary tract infections. She had been on daily Macrodantin. Has just been started on a compounded urethral cream which is mostly an estrogen based cream I believe by Dr. Davis Courser. These have been occurring for several months. She wanted to switch to this urology group. Most recent culture I see was done when she went to the emergency room 03/02/18 he grew enterococcue faecalis. She is still currently on a daily Macrodantin. Mixed Incontinence:  Patient with several year history of worsening incontinence. She describes uncontrollable leaking with coughing sneezing straining getting up from a seated to standing position or getting out of bed. She just has no control. Patient denies any previous  trauma. She denies any burning with urination fever chills nausea vomiting or flank pain at this time. She's never had any sling procedures done. She does have history of cystocele according to the daughter due to the fact that a pessary has been discussed as an option. However she also describes some leaking with urgency although this is not as severe as the stress incontinence. I told her other than doing Kegel exercises the only treatment option for true stress incontinence is a surgical procedure on do not think she is a candidate for the TVT sling. She has been on Myrbetriq for the urge type incontinence and other overactive bladder symptoms.     Patient's had a few episodes of what the daughter described as not be held urinary retention however when I looked at the ER visit when she went due to inability COMPLEX PO) Take 2 tablets by mouth Daily with lunch       alendronate (FOSAMAX) 70 MG tablet Take 70 mg by mouth every 7 days Takes every Sunday        MICRONIZED COLESTIPOL HCL 1 G tablet Take 2 g by mouth as needed 2 tablets daily prn        dipyridamole (PERSANTINE) 75 MG tablet 75 mg 2 times daily       meloxicam (MOBIC) 15 MG tablet Take 15 mg by mouth nightly       BYSTOLIC 10 MG tablet Take 10 mg by mouth nightly       pravastatin (PRAVACHOL) 20 MG tablet Take 20 mg by mouth every morning       ramipril (ALTACE) 5 MG capsule Take 10 mg by mouth 2 times daily       spironolactone (ALDACTONE) 25 MG tablet 50 mg daily       b complex vitamins capsule Take 1 capsule by mouth Daily with lunch B 100      Multiple Vitamins-Minerals (THERAPEUTIC MULTIVITAMIN-MINERALS) tablet Take 1 tablet by mouth Daily with lunch       Mirabegron ER (MYRBETRIQ) 25 MG TB24 Take by mouth      phenazopyridine (PYRIDIUM) 100 MG tablet Take 100 mg by mouth 3 times daily as needed for Pain      sodium chloride (OCEAN, BABY AYR) 0.65 % nasal spray 1 spray by Nasal route as needed for Congestion       No current facility-administered medications for this visit. Allergies   Allergen Reactions    Aspirin     Codeine     Demerol Hcl [Meperidine]     Dilaudid [Hydromorphone Hcl]     Levaquin [Levofloxacin In D5w] Other (See Comments)     Seizure like activity      Namenda [Memantine Hcl]     Norco [Hydrocodone-Acetaminophen]     Ondansetron     Pneumococcal Vaccines     Quinoline Yellow Ss [Yellow Dye #11]      Causes seizures.         Social History     Social History    Marital status:      Spouse name: N/A    Number of children: 1    Years of education: N/A     Social History Main Topics    Smoking status: Never Smoker    Smokeless tobacco: Never Used    Alcohol use No    Drug use: No    Sexual activity: Not Asked     Other Topics Concern    None     Social History Narrative    None

## 2018-03-23 ENCOUNTER — NURSE ONLY (OUTPATIENT)
Dept: UROLOGY | Age: 83
End: 2018-03-23
Payer: MEDICARE

## 2018-03-23 VITALS — BODY MASS INDEX: 23.95 KG/M2 | WEIGHT: 122 LBS | HEIGHT: 60 IN | TEMPERATURE: 98.1 F

## 2018-03-23 DIAGNOSIS — R39.11 URINARY HESITANCY: Primary | ICD-10-CM

## 2018-03-23 PROCEDURE — 99999 PR OFFICE/OUTPT VISIT,PROCEDURE ONLY: CPT | Performed by: PHYSICIAN ASSISTANT

## 2018-03-23 PROCEDURE — 99999 PR MEASUREMENT,POST-VOID RESIDUAL VOLUME BY US,NON-IMAGING: CPT | Performed by: PHYSICIAN ASSISTANT

## 2018-03-23 PROCEDURE — 51798 US URINE CAPACITY MEASURE: CPT | Performed by: PHYSICIAN ASSISTANT

## 2018-03-27 LAB
ANION GAP SERPL CALCULATED.3IONS-SCNC: 12 MMOL/L (ref 7–19)
BUN BLDV-MCNC: 19 MG/DL (ref 8–23)
CALCIUM SERPL-MCNC: 9.7 MG/DL (ref 8.8–10.2)
CHLORIDE BLD-SCNC: 95 MMOL/L (ref 98–111)
CO2: 26 MMOL/L (ref 22–29)
CREAT SERPL-MCNC: 1.2 MG/DL (ref 0.5–0.9)
GFR NON-AFRICAN AMERICAN: 43
GLUCOSE BLD-MCNC: 71 MG/DL (ref 74–109)
POTASSIUM SERPL-SCNC: 4.7 MMOL/L (ref 3.5–5)
SODIUM BLD-SCNC: 133 MMOL/L (ref 136–145)

## 2018-03-30 ENCOUNTER — TELEPHONE (OUTPATIENT)
Dept: UROLOGY | Age: 83
End: 2018-03-30

## 2018-04-10 LAB
ANION GAP SERPL CALCULATED.3IONS-SCNC: 18 MMOL/L (ref 7–19)
BUN BLDV-MCNC: 17 MG/DL (ref 8–23)
CALCIUM SERPL-MCNC: 10 MG/DL (ref 8.8–10.2)
CHLORIDE BLD-SCNC: 92 MMOL/L (ref 98–111)
CO2: 23 MMOL/L (ref 22–29)
CREAT SERPL-MCNC: 1 MG/DL (ref 0.5–0.9)
GFR NON-AFRICAN AMERICAN: 53
GLUCOSE BLD-MCNC: 72 MG/DL (ref 74–109)
POTASSIUM SERPL-SCNC: 4.1 MMOL/L (ref 3.5–5)
SODIUM BLD-SCNC: 133 MMOL/L (ref 136–145)

## 2018-04-12 ENCOUNTER — OFFICE VISIT (OUTPATIENT)
Dept: NEUROLOGY | Age: 83
End: 2018-04-12
Payer: MEDICARE

## 2018-04-12 VITALS
HEIGHT: 61 IN | DIASTOLIC BLOOD PRESSURE: 58 MMHG | BODY MASS INDEX: 23.03 KG/M2 | HEART RATE: 60 BPM | WEIGHT: 122 LBS | SYSTOLIC BLOOD PRESSURE: 116 MMHG

## 2018-04-12 DIAGNOSIS — F02.80 LATE ONSET ALZHEIMER'S DISEASE WITHOUT BEHAVIORAL DISTURBANCE (HCC): Primary | ICD-10-CM

## 2018-04-12 DIAGNOSIS — R56.9 SEIZURE AS LATE EFFECT OF CEREBROVASCULAR ACCIDENT (CVA) (HCC): ICD-10-CM

## 2018-04-12 DIAGNOSIS — I69.398 SEIZURE AS LATE EFFECT OF CEREBROVASCULAR ACCIDENT (CVA) (HCC): ICD-10-CM

## 2018-04-12 DIAGNOSIS — G30.1 LATE ONSET ALZHEIMER'S DISEASE WITHOUT BEHAVIORAL DISTURBANCE (HCC): Primary | ICD-10-CM

## 2018-04-12 PROCEDURE — 1090F PRES/ABSN URINE INCON ASSESS: CPT | Performed by: PSYCHIATRY & NEUROLOGY

## 2018-04-12 PROCEDURE — 1123F ACP DISCUSS/DSCN MKR DOCD: CPT | Performed by: PSYCHIATRY & NEUROLOGY

## 2018-04-12 PROCEDURE — G8400 PT W/DXA NO RESULTS DOC: HCPCS | Performed by: PSYCHIATRY & NEUROLOGY

## 2018-04-12 PROCEDURE — 99213 OFFICE O/P EST LOW 20 MIN: CPT | Performed by: PSYCHIATRY & NEUROLOGY

## 2018-04-12 PROCEDURE — 4040F PNEUMOC VAC/ADMIN/RCVD: CPT | Performed by: PSYCHIATRY & NEUROLOGY

## 2018-04-12 PROCEDURE — G8427 DOCREV CUR MEDS BY ELIG CLIN: HCPCS | Performed by: PSYCHIATRY & NEUROLOGY

## 2018-04-12 PROCEDURE — G8420 CALC BMI NORM PARAMETERS: HCPCS | Performed by: PSYCHIATRY & NEUROLOGY

## 2018-04-12 PROCEDURE — 1036F TOBACCO NON-USER: CPT | Performed by: PSYCHIATRY & NEUROLOGY

## 2018-04-12 RX ORDER — SAW/VIT E/SOD SEL/LYC/BETA/PYG 160-100
TABLET ORAL DAILY
COMMUNITY
End: 2019-04-08

## 2018-04-12 RX ORDER — CETIRIZINE HYDROCHLORIDE 10 MG/1
10 TABLET ORAL NIGHTLY PRN
COMMUNITY
End: 2019-04-08

## 2018-04-12 RX ORDER — TOLTERODINE TARTRATE 2 MG/1
2 TABLET, EXTENDED RELEASE ORAL NIGHTLY
COMMUNITY
End: 2018-08-30

## 2018-04-16 LAB
ANION GAP SERPL CALCULATED.3IONS-SCNC: 12 MMOL/L (ref 7–19)
BUN BLDV-MCNC: 20 MG/DL (ref 8–23)
CALCIUM SERPL-MCNC: 9.9 MG/DL (ref 8.8–10.2)
CHLORIDE BLD-SCNC: 97 MMOL/L (ref 98–111)
CO2: 25 MMOL/L (ref 22–29)
CREAT SERPL-MCNC: 1.1 MG/DL (ref 0.5–0.9)
GFR NON-AFRICAN AMERICAN: 47
GLUCOSE BLD-MCNC: 86 MG/DL (ref 74–109)
POTASSIUM SERPL-SCNC: 4.2 MMOL/L (ref 3.5–5)
SODIUM BLD-SCNC: 134 MMOL/L (ref 136–145)

## 2018-06-15 DIAGNOSIS — N39.0 URINARY TRACT INFECTION WITHOUT HEMATURIA, SITE UNSPECIFIED: ICD-10-CM

## 2018-06-15 RX ORDER — NITROFURANTOIN MACROCRYSTALS 50 MG/1
CAPSULE ORAL
Qty: 30 CAPSULE | Refills: 0 | Status: SHIPPED | OUTPATIENT
Start: 2018-06-15 | End: 2018-07-14 | Stop reason: SDUPTHER

## 2018-06-19 LAB
ALBUMIN SERPL-MCNC: 4.5 G/DL (ref 3.5–5.2)
ALP BLD-CCNC: 58 U/L (ref 35–104)
ALT SERPL-CCNC: 19 U/L (ref 5–33)
ANION GAP SERPL CALCULATED.3IONS-SCNC: 16 MMOL/L (ref 7–19)
AST SERPL-CCNC: 22 U/L (ref 5–32)
BASOPHILS ABSOLUTE: 0 K/UL (ref 0–0.2)
BASOPHILS RELATIVE PERCENT: 0.7 % (ref 0–1)
BILIRUB SERPL-MCNC: 0.3 MG/DL (ref 0.2–1.2)
BUN BLDV-MCNC: 27 MG/DL (ref 8–23)
CALCIUM SERPL-MCNC: 10.1 MG/DL (ref 8.8–10.2)
CHLORIDE BLD-SCNC: 98 MMOL/L (ref 98–111)
CHOLESTEROL, TOTAL: 198 MG/DL (ref 160–199)
CO2: 24 MMOL/L (ref 22–29)
CREAT SERPL-MCNC: 1.3 MG/DL (ref 0.5–0.9)
EOSINOPHILS ABSOLUTE: 0.2 K/UL (ref 0–0.6)
EOSINOPHILS RELATIVE PERCENT: 3.4 % (ref 0–5)
GFR NON-AFRICAN AMERICAN: 39
GLUCOSE BLD-MCNC: 87 MG/DL (ref 74–109)
HCT VFR BLD CALC: 36.3 % (ref 37–47)
HDLC SERPL-MCNC: 91 MG/DL (ref 65–121)
HEMOGLOBIN: 11.7 G/DL (ref 12–16)
LDL CHOLESTEROL CALCULATED: 76 MG/DL
LYMPHOCYTES ABSOLUTE: 1.6 K/UL (ref 1.1–4.5)
LYMPHOCYTES RELATIVE PERCENT: 25.9 % (ref 20–40)
MAGNESIUM: 2 MG/DL (ref 1.6–2.4)
MCH RBC QN AUTO: 32 PG (ref 27–31)
MCHC RBC AUTO-ENTMCNC: 32.2 G/DL (ref 33–37)
MCV RBC AUTO: 99.2 FL (ref 81–99)
MONOCYTES ABSOLUTE: 0.5 K/UL (ref 0–0.9)
MONOCYTES RELATIVE PERCENT: 8.3 % (ref 0–10)
NEUTROPHILS ABSOLUTE: 3.8 K/UL (ref 1.5–7.5)
NEUTROPHILS RELATIVE PERCENT: 61.2 % (ref 50–65)
PDW BLD-RTO: 11.6 % (ref 11.5–14.5)
PLATELET # BLD: 199 K/UL (ref 130–400)
PMV BLD AUTO: 10.5 FL (ref 9.4–12.3)
POTASSIUM SERPL-SCNC: 4.8 MMOL/L (ref 3.5–5)
RBC # BLD: 3.66 M/UL (ref 4.2–5.4)
SODIUM BLD-SCNC: 138 MMOL/L (ref 136–145)
T4 FREE: 1.2 NG/DL (ref 0.9–1.7)
TOTAL PROTEIN: 7 G/DL (ref 6.6–8.7)
TRIGL SERPL-MCNC: 155 MG/DL (ref 0–149)
TSH SERPL DL<=0.05 MIU/L-ACNC: 1.12 UIU/ML (ref 0.27–4.2)
VITAMIN B-12: 1497 PG/ML (ref 211–946)
VITAMIN D 25-HYDROXY: 44.2 NG/ML
WBC # BLD: 6.2 K/UL (ref 4.8–10.8)

## 2018-06-22 ENCOUNTER — OFFICE VISIT (OUTPATIENT)
Dept: UROLOGY | Age: 83
End: 2018-06-22
Payer: MEDICARE

## 2018-06-22 VITALS
DIASTOLIC BLOOD PRESSURE: 57 MMHG | BODY MASS INDEX: 23.37 KG/M2 | SYSTOLIC BLOOD PRESSURE: 128 MMHG | TEMPERATURE: 97.1 F | HEIGHT: 62 IN | WEIGHT: 127 LBS

## 2018-06-22 DIAGNOSIS — N39.0 RECURRENT UTI: Primary | ICD-10-CM

## 2018-06-22 DIAGNOSIS — N39.46 MIXED INCONTINENCE: ICD-10-CM

## 2018-06-22 LAB
BACTERIA URINE, POC: NORMAL
BILIRUBIN URINE: 0 MG/DL
BLOOD, URINE: NEGATIVE
CASTS URINE, POC: NORMAL
CLARITY: NORMAL
COLOR: NORMAL
CRYSTALS URINE, POC: NORMAL
EPI CELLS URINE, POC: NORMAL
GLUCOSE URINE: NORMAL
KETONES, URINE: NEGATIVE
LEUKOCYTE EST, POC: NORMAL
NITRITE, URINE: NEGATIVE
PH UA: 5 (ref 4.5–8)
PROTEIN UA: NEGATIVE
RBC URINE, POC: NORMAL
SPECIFIC GRAVITY UA: 1.01 (ref 1–1.03)
UROBILINOGEN, URINE: NORMAL
WBC URINE, POC: NORMAL
YEAST URINE, POC: NORMAL

## 2018-06-22 PROCEDURE — G8400 PT W/DXA NO RESULTS DOC: HCPCS | Performed by: PHYSICIAN ASSISTANT

## 2018-06-22 PROCEDURE — 0509F URINE INCON PLAN DOCD: CPT | Performed by: PHYSICIAN ASSISTANT

## 2018-06-22 PROCEDURE — 1036F TOBACCO NON-USER: CPT | Performed by: PHYSICIAN ASSISTANT

## 2018-06-22 PROCEDURE — 99213 OFFICE O/P EST LOW 20 MIN: CPT | Performed by: PHYSICIAN ASSISTANT

## 2018-06-22 PROCEDURE — 81001 URINALYSIS AUTO W/SCOPE: CPT | Performed by: PHYSICIAN ASSISTANT

## 2018-06-22 PROCEDURE — G8427 DOCREV CUR MEDS BY ELIG CLIN: HCPCS | Performed by: PHYSICIAN ASSISTANT

## 2018-06-22 PROCEDURE — 1090F PRES/ABSN URINE INCON ASSESS: CPT | Performed by: PHYSICIAN ASSISTANT

## 2018-06-22 PROCEDURE — 4040F PNEUMOC VAC/ADMIN/RCVD: CPT | Performed by: PHYSICIAN ASSISTANT

## 2018-06-22 PROCEDURE — 1123F ACP DISCUSS/DSCN MKR DOCD: CPT | Performed by: PHYSICIAN ASSISTANT

## 2018-06-22 PROCEDURE — G8420 CALC BMI NORM PARAMETERS: HCPCS | Performed by: PHYSICIAN ASSISTANT

## 2018-06-22 RX ORDER — SPIRONOLACTONE 50 MG/1
50 TABLET, FILM COATED ORAL DAILY
Status: ON HOLD | COMMUNITY
Start: 2018-06-15 | End: 2019-09-17 | Stop reason: HOSPADM

## 2018-06-22 RX ORDER — TOLTERODINE 4 MG/1
CAPSULE, EXTENDED RELEASE ORAL
COMMUNITY
Start: 2018-06-15 | End: 2019-02-18

## 2018-06-22 RX ORDER — AZELASTINE 1 MG/ML
2 SPRAY, METERED NASAL 2 TIMES DAILY
COMMUNITY
Start: 2018-05-03

## 2018-06-22 ASSESSMENT — ENCOUNTER SYMPTOMS
BLURRED VISION: 0
WHEEZING: 0
EYE PAIN: 0
ABDOMINAL PAIN: 0
VOMITING: 0
BACK PAIN: 0
SINUS PAIN: 0
SHORTNESS OF BREATH: 0

## 2018-07-11 ENCOUNTER — NURSE ONLY (OUTPATIENT)
Dept: UROLOGY | Age: 83
End: 2018-07-11
Payer: MEDICARE

## 2018-07-11 DIAGNOSIS — N39.46 MIXED STRESS AND URGE URINARY INCONTINENCE: Primary | ICD-10-CM

## 2018-07-11 PROCEDURE — 99999 PR OFFICE/OUTPT VISIT,PROCEDURE ONLY: CPT | Performed by: PHYSICIAN ASSISTANT

## 2018-07-11 PROCEDURE — 64566 NEUROELTRD STIM POST TIBIAL: CPT | Performed by: PHYSICIAN ASSISTANT

## 2018-07-11 ASSESSMENT — ENCOUNTER SYMPTOMS
NAUSEA: 0
WHEEZING: 0
SHORTNESS OF BREATH: 0
SORE THROAT: 0
BLURRED VISION: 0
HEARTBURN: 0
DOUBLE VISION: 0

## 2018-07-11 NOTE — PROGRESS NOTES
camphor-menthol (SARNA) 0.5-0.5 % lotion Apply 0.5 mLs topically 2 times daily as needed for Itching Apply topically as needed.       butalbital-acetaminophen-caffeine (ESGIC PLUS) -40 MG per tablet Take 1 tablet by mouth every 6 hours as needed for Headaches 15 tablet 0    acetaminophen (TYLENOL) 500 MG tablet Take 2 tablets by mouth every 6 hours as needed for Pain Do not exceed 4 g of acetaminophen daily (patient is also on Fioricet PRN) 100 tablet 0    cloNIDine (CATAPRES) 0.1 MG tablet Take 1 tablet by mouth 2 times daily (Patient taking differently: Take 0.1 mg by mouth as needed for High Blood Pressure (for BP greater than 170/100) ) 60 tablet 0    donepezil (ARICEPT) 10 MG tablet Take 1 tablet by mouth nightly 30 tablet 0    Calcium Carb-Cholecalciferol (CALCIUM 600+D) 600-800 MG-UNIT TABS Take 1 tablet by mouth Daily with lunch      promethazine (PHENERGAN) 25 MG tablet Take 25 mg by mouth 3 times daily as needed for Nausea      docusate sodium (COLACE) 100 MG capsule Take 100 mg by mouth nightly       Omega 3-6-9 Fatty Acids (OMEGA 3-6-9 COMPLEX PO) Take 2 tablets by mouth Daily with lunch       alendronate (FOSAMAX) 70 MG tablet Take 70 mg by mouth every 7 days Takes every Sunday        MICRONIZED COLESTIPOL HCL 1 G tablet Take 2 g by mouth as needed 2 tablets daily prn        dipyridamole (PERSANTINE) 75 MG tablet 75 mg 2 times daily       meloxicam (MOBIC) 15 MG tablet Take 15 mg by mouth nightly       BYSTOLIC 10 MG tablet Take 10 mg by mouth nightly       pravastatin (PRAVACHOL) 20 MG tablet Take 20 mg by mouth every morning       ramipril (ALTACE) 5 MG capsule Take 10 mg by mouth 2 times daily       spironolactone (ALDACTONE) 25 MG tablet 50 mg daily       b complex vitamins capsule Take 1 capsule by mouth Daily with lunch B 100      Multiple Vitamins-Minerals (THERAPEUTIC MULTIVITAMIN-MINERALS) tablet Take 1 tablet by mouth Daily with lunch        No current

## 2018-07-14 DIAGNOSIS — N39.0 URINARY TRACT INFECTION WITHOUT HEMATURIA, SITE UNSPECIFIED: ICD-10-CM

## 2018-07-16 RX ORDER — NITROFURANTOIN MACROCRYSTALS 50 MG/1
CAPSULE ORAL
Qty: 30 CAPSULE | Refills: 0 | Status: SHIPPED | OUTPATIENT
Start: 2018-07-16 | End: 2018-08-15 | Stop reason: SDUPTHER

## 2018-07-19 ENCOUNTER — NURSE ONLY (OUTPATIENT)
Dept: UROLOGY | Age: 83
End: 2018-07-19
Payer: MEDICARE

## 2018-07-19 DIAGNOSIS — N39.46 MIXED STRESS AND URGE URINARY INCONTINENCE: Primary | ICD-10-CM

## 2018-07-19 PROCEDURE — 99999 PR OFFICE/OUTPT VISIT,PROCEDURE ONLY: CPT | Performed by: PHYSICIAN ASSISTANT

## 2018-07-19 PROCEDURE — 64566 NEUROELTRD STIM POST TIBIAL: CPT | Performed by: PHYSICIAN ASSISTANT

## 2018-07-19 ASSESSMENT — ENCOUNTER SYMPTOMS
DOUBLE VISION: 0
NAUSEA: 0
WHEEZING: 0
SORE THROAT: 0
HEARTBURN: 0
SHORTNESS OF BREATH: 0
BLURRED VISION: 0

## 2018-07-19 NOTE — PROGRESS NOTES
Cassi Vee is a 80 y.o. female who presents today   Chief Complaint   Patient presents with    Follow-up     I'm here for uroplasty #2     Mixed incontinence follow-up:   Patient is a 80-year-old female with several year history of worsening incontinence she has both elements of stress and urge incontinence. She had been on Myrbetriq and one time which did not help she is currently on Detrol 4 mg and is here for her 2nd neuromodulation treatment. I excised the patient that most patients as previous studies get improvement after 5-6 weeks. I explained this procedure in detail. 1. Patient is seated with the right treatment leg elevated. 2. 34 gauge fine needle electrode is inserted into lower inner aspect of the leg. Slightly cephalad to the medial malleolus. 3. Surface electrode pad is placed over the medial aspect of the calcaneus on the same leg. 4.Needle electrode is connected to the external pulse generator. 5.The pulse generator is turned on and the millivolts used are 10. 6.Patient is treated for 30 minutes. 7.The needle and pad are removed.     Past Medical History:   Diagnosis Date    Anemia     Colitis, ischemic ([address]     Dementia     Diverticulosis     Hyperlipidemia     Hypertension     Osteoarthritis     Seizures (HCC)     Spastic colon     Stroke syndrome ([address]     Urinary incontinence        Past Surgical History:   Procedure Laterality Date    BREAST SURGERY Right     Biopsy    CHOLECYSTECTOMY      COLONOSCOPY  9/25/03    Dr Mohamud Jaimes ischemic colitis, incomplete exam    COLONOSCOPY  8/29/03    Dr Huang Gatica colon-ischemic colitis    EYE SURGERY      LUMBAR FUSION      SPINE SURGERY      3,4, and 5    TONSILLECTOMY AND ADENOIDECTOMY         Current Outpatient Prescriptions   Medication Sig Dispense Refill    tolterodine (DETROL LA) 4 MG extended release capsule       spironolactone (ALDACTONE) 50 MG tablet       azelastine (ASTELIN) 0.1 % nasal oropharyngeal exudate. Eyes: Left eye exhibits no discharge. No scleral icterus. Neck: No JVD present. No tracheal deviation present. No thyromegaly present. Cardiovascular: Exam reveals no gallop and no friction rub. Pulmonary/Chest: No stridor. She has no rales. Abdominal: She exhibits no distension and no mass. There is no rebound and no guarding. Musculoskeletal: She exhibits no edema. Neurological: No cranial nerve deficit. She exhibits normal muscle tone. Coordination normal.   Skin: She is not diaphoretic. No pallor. 1. Mixed stress and urge urinary incontinence  Patient here for 2nd posterior tibial nerve stimulation treatment. I adjusted her voltage to 10. She will return in 1 week for repeat treatment. - [place] POST TIBIAL NEUROSTIMULATION,PERC NEEDLE ELECTRODE      No orders of the defined types were placed in this encounter. No orders of the defined types were placed in this encounter. Plan:  Follow up in 1 week for next treatment.

## 2018-07-25 ENCOUNTER — NURSE ONLY (OUTPATIENT)
Dept: UROLOGY | Age: 83
End: 2018-07-25
Payer: MEDICARE

## 2018-07-25 VITALS — TEMPERATURE: 96.9 F

## 2018-07-25 DIAGNOSIS — N39.46 MIXED STRESS AND URGE URINARY INCONTINENCE: Primary | ICD-10-CM

## 2018-07-25 PROCEDURE — 99999 PR OFFICE/OUTPT VISIT,PROCEDURE ONLY: CPT | Performed by: PHYSICIAN ASSISTANT

## 2018-07-25 PROCEDURE — 64566 NEUROELTRD STIM POST TIBIAL: CPT | Performed by: PHYSICIAN ASSISTANT

## 2018-07-25 ASSESSMENT — ENCOUNTER SYMPTOMS
BLURRED VISION: 0
NAUSEA: 0
HEARTBURN: 0
DOUBLE VISION: 0
SORE THROAT: 0
SHORTNESS OF BREATH: 0
WHEEZING: 0

## 2018-07-25 NOTE — PROGRESS NOTES
Emma Guadalupe is a 80 y.o. female who presents today   Chief Complaint   Patient presents with    Follow-up     im here today for my uroplasty      Mixed incontinence follow-up:   Patient is a 80-year-old female with several year history of worsening incontinence she has both elements of stress and urge incontinence. She had been on Myrbetriq and one time which did not help she is currently on Detrol 4 mg and is here for her 2nd neuromodulation treatment. I excised the patient that most patients as previous studies get improvement after 5-6 weeks. I explained this procedure in detail. 1. Patient is seated with the right treatment leg elevated. 2. 34 gauge fine needle electrode is inserted into lower inner aspect of the leg. Slightly cephalad to the medial malleolus. 3. Surface electrode pad is placed over the medial aspect of the calcaneus on the same leg. 4.Needle electrode is connected to the external pulse generator. 5.The pulse generator is turned on and the millivolts used are 10. 6.Patient is treated for 30 minutes. 7.The needle and pad are removed.     Past Medical History:   Diagnosis Date    Anemia     Colitis, ischemic ([address]     Dementia     Diverticulosis     Hyperlipidemia     Hypertension     Osteoarthritis     Seizures (HCC)     Spastic colon     Stroke syndrome ([address]     Urinary incontinence        Past Surgical History:   Procedure Laterality Date    BREAST SURGERY Right     Biopsy    CHOLECYSTECTOMY      COLONOSCOPY  9/25/03    Dr Jessenia Green ischemic colitis, incomplete exam    COLONOSCOPY  8/29/03    Dr Sheela Haywood colon-ischemic colitis    EYE SURGERY      LUMBAR FUSION      SPINE SURGERY      3,4, and 5    TONSILLECTOMY AND ADENOIDECTOMY         Current Outpatient Prescriptions   Medication Sig Dispense Refill    tolterodine (DETROL LA) 4 MG extended release capsule       spironolactone (ALDACTONE) 50 MG tablet       azelastine (ASTELIN) 0.1 % nasal spray       Probiotic Product (PROBIOTIC & ACIDOPHILUS EX ST) CAPS Take by mouth daily      cetirizine (ZYRTEC ALLERGY) 10 MG tablet Take 10 mg by mouth nightly      tolterodine (DETROL) 2 MG tablet Take 2 mg by mouth nightly      conjugated estrogens (PREMARIN) 0.625 MG/GM vaginal cream 1 gm vaginally three times per week for 3 weeks then twice weekly thereafter      Also, apply small amount to the urethra      nitrofurantoin, macrocrystal-monohydrate, (MACROBID) 100 MG capsule Take 50 mg by mouth nightly       levETIRAcetam (KEPPRA) 1000 MG tablet Take 1 tablet by mouth 2 times daily 60 tablet 0    Cholecalciferol (VITAMIN D3) 5000 units TABS Take 5,000 Units by mouth Daily with lunch      benzonatate (TESSALON) 100 MG capsule Take 200 mg by mouth 3 times daily as needed for Cough      simethicone (MYLICON) 80 MG chewable tablet Take 125 mg by mouth every 6 hours as needed for Flatulence      polyvinyl alcohol-povidone (HYPOTEARS) 1.4-0.6 % ophthalmic solution Place 1-2 drops into both eyes as needed      sodium chloride (OCEAN, BABY AYR) 0.65 % nasal spray 1 spray by Nasal route as needed for Congestion      tobramycin-dexamethasone (TOBRADEX) 0.3-0.1 % ophthalmic ointment Place into the left eye as needed 3 times daily.  hydrALAZINE (APRESOLINE) 25 MG tablet Take 25 mg by mouth 2 times daily      SODIUM CHLORIDE PO Take 1 g by mouth 3 times daily Breakfast lunch and dinner.        Ascorbic Acid (VITAMIN C PO) Take 1,000 mg by mouth Daily with lunch Breakfast and lunch       Lutein 20 MG TABS Take 20 mg by mouth Daily with lunch      fluticasone (VERAMYST) 27.5 MCG/SPRAY nasal spray 2 sprays by Nasal route daily       albuterol sulfate  (90 Base) MCG/ACT inhaler Inhale 2 puffs into the lungs every 4 hours as needed for Wheezing      amLODIPine (NORVASC) 10 MG tablet Take by mouth daily       pantoprazole (PROTONIX) 20 MG tablet Take 20 mg by mouth daily       camphor-menthol No oropharyngeal exudate. Eyes: Left eye exhibits no discharge. No scleral icterus. Neck: No JVD present. No tracheal deviation present. No thyromegaly present. Cardiovascular: Exam reveals no gallop and no friction rub. Pulmonary/Chest: No stridor. She has no rales. Abdominal: She exhibits no distension and no mass. There is no rebound and no guarding. Musculoskeletal: She exhibits no edema. Neurological: No cranial nerve deficit. She exhibits normal muscle tone. Coordination normal.   Skin: She is not diaphoretic. No pallor. 1. Mixed stress and urge urinary incontinence  Patient here for 3rd posterior tibial nerve stimulation treatment. I adjusted her voltage to 10. She will return in 1 week for repeat treatment. - [place] POST TIBIAL NEUROSTIMULATION,PERC NEEDLE ELECTRODE      No orders of the defined types were placed in this encounter. No orders of the defined types were placed in this encounter.     Plan:  Follow up in 1 week for next treatment # 4

## 2018-08-07 ENCOUNTER — NURSE ONLY (OUTPATIENT)
Dept: UROLOGY | Age: 83
End: 2018-08-07
Payer: MEDICARE

## 2018-08-07 DIAGNOSIS — N39.46 MIXED STRESS AND URGE URINARY INCONTINENCE: Primary | ICD-10-CM

## 2018-08-07 PROCEDURE — 64566 NEUROELTRD STIM POST TIBIAL: CPT | Performed by: PHYSICIAN ASSISTANT

## 2018-08-07 PROCEDURE — 99999 PR OFFICE/OUTPT VISIT,PROCEDURE ONLY: CPT | Performed by: PHYSICIAN ASSISTANT

## 2018-08-07 ASSESSMENT — ENCOUNTER SYMPTOMS
NAUSEA: 0
SORE THROAT: 0
SHORTNESS OF BREATH: 0
BLURRED VISION: 0
HEARTBURN: 0
WHEEZING: 0
DOUBLE VISION: 0

## 2018-08-07 NOTE — PROGRESS NOTES
Per Mcdonald is a 80 y.o. female who presents today   Chief Complaint   Patient presents with    Follow-up     I am here today for my uroplasty treatment # 4 for stress incontinence     Mixed incontinence follow-up:   Patient is a 25-year-old female with several year history of worsening incontinence she has both elements of stress and urge incontinence. She had been on Myrbetriq and one time which did not help she is currently on Detrol 4 mg and is here for her 4th neuromodulation treatment. I excised the patient that most patients as previous studies get improvement after 5-6 weeks. I explained this procedure in detail. Up to this point she's had no appreciable improvement in symptoms. 1. Patient is seated with the right treatment leg elevated. 2. 34 gauge fine needle electrode is inserted into lower inner aspect of the leg. Slightly cephalad to the medial malleolus. 3. Surface electrode pad is placed over the medial aspect of the calcaneus on the same leg. 4.Needle electrode is connected to the external pulse generator. 5.The pulse generator is turned on and the millivolts used are 7. 6.Patient is treated for 30 minutes. 7.The needle and pad are removed.     Past Medical History:   Diagnosis Date    Anemia     Colitis, ischemic ([address]     Dementia     Diverticulosis     Hyperlipidemia     Hypertension     Osteoarthritis     Seizures (HCC)     Spastic colon     Stroke syndrome ([address]     Urinary incontinence        Past Surgical History:   Procedure Laterality Date    BREAST SURGERY Right     Biopsy    CHOLECYSTECTOMY      COLONOSCOPY  9/25/03    Dr Jackeline Solomon ischemic colitis, incomplete exam    COLONOSCOPY  8/29/03    Dr Domonique Fan colon-ischemic colitis    EYE SURGERY      LUMBAR FUSION      SPINE SURGERY      3,4, and 5    TONSILLECTOMY AND ADENOIDECTOMY         Current Outpatient Prescriptions   Medication Sig Dispense Refill    tolterodine (DETROL LA) 4 MG vitals taken for this visit. Physical Exam   Constitutional: No distress. HENT:   Mouth/Throat: No oropharyngeal exudate. Eyes: Left eye exhibits no discharge. No scleral icterus. Neck: No JVD present. No tracheal deviation present. No thyromegaly present. Cardiovascular: Exam reveals no gallop and no friction rub. Pulmonary/Chest: No stridor. She has no rales. Abdominal: She exhibits no distension and no mass. There is no rebound and no guarding. Musculoskeletal: She exhibits no edema. Neurological: No cranial nerve deficit. She exhibits normal muscle tone. Coordination normal.   Skin: She is not diaphoretic. No pallor. 1. Mixed stress and urge urinary incontinence  Patient here for 4th posterior tibial nerve stimulation treatment. She will return in 1 week for repeat treatment. - [place] POST TIBIAL NEUROSTIMULATION,PERC NEEDLE ELECTRODE      Orders Placed This Encounter   Procedures    [place] POST TIBIAL NEUROSTIMULATION,PERC NEEDLE ELECTRODE     # 4     No orders of the defined types were placed in this encounter.     Plan:  Follow up in 1 week for next treatment # 4

## 2018-08-14 LAB
ALBUMIN SERPL-MCNC: 4.8 G/DL (ref 3.5–5.2)
ALP BLD-CCNC: 66 U/L (ref 35–104)
ALT SERPL-CCNC: 20 U/L (ref 5–33)
ANION GAP SERPL CALCULATED.3IONS-SCNC: 13 MMOL/L (ref 7–19)
AST SERPL-CCNC: 24 U/L (ref 5–32)
BASOPHILS ABSOLUTE: 0.1 K/UL (ref 0–0.2)
BASOPHILS RELATIVE PERCENT: 0.8 % (ref 0–1)
BILIRUB SERPL-MCNC: <0.2 MG/DL (ref 0.2–1.2)
BUN BLDV-MCNC: 26 MG/DL (ref 8–23)
CALCIUM SERPL-MCNC: 10.5 MG/DL (ref 8.8–10.2)
CHLORIDE BLD-SCNC: 98 MMOL/L (ref 98–111)
CO2: 26 MMOL/L (ref 22–29)
CREAT SERPL-MCNC: 1.2 MG/DL (ref 0.5–0.9)
EOSINOPHILS ABSOLUTE: 0.1 K/UL (ref 0–0.6)
EOSINOPHILS RELATIVE PERCENT: 2 % (ref 0–5)
GFR NON-AFRICAN AMERICAN: 43
GLUCOSE BLD-MCNC: 88 MG/DL (ref 74–109)
HCT VFR BLD CALC: 35 % (ref 37–47)
HEMOGLOBIN: 11.5 G/DL (ref 12–16)
LYMPHOCYTES ABSOLUTE: 1.7 K/UL (ref 1.1–4.5)
LYMPHOCYTES RELATIVE PERCENT: 27.5 % (ref 20–40)
MCH RBC QN AUTO: 32.3 PG (ref 27–31)
MCHC RBC AUTO-ENTMCNC: 32.9 G/DL (ref 33–37)
MCV RBC AUTO: 98.3 FL (ref 81–99)
MONOCYTES ABSOLUTE: 0.4 K/UL (ref 0–0.9)
MONOCYTES RELATIVE PERCENT: 7.2 % (ref 0–10)
NEUTROPHILS ABSOLUTE: 3.8 K/UL (ref 1.5–7.5)
NEUTROPHILS RELATIVE PERCENT: 61.8 % (ref 50–65)
PDW BLD-RTO: 11.9 % (ref 11.5–14.5)
PLATELET # BLD: 197 K/UL (ref 130–400)
PMV BLD AUTO: 10.3 FL (ref 9.4–12.3)
POTASSIUM SERPL-SCNC: 4.7 MMOL/L (ref 3.5–5)
RBC # BLD: 3.56 M/UL (ref 4.2–5.4)
SODIUM BLD-SCNC: 137 MMOL/L (ref 136–145)
TOTAL PROTEIN: 7.4 G/DL (ref 6.6–8.7)
WBC # BLD: 6.1 K/UL (ref 4.8–10.8)

## 2018-08-15 ENCOUNTER — NURSE ONLY (OUTPATIENT)
Dept: UROLOGY | Age: 83
End: 2018-08-15
Payer: MEDICARE

## 2018-08-15 DIAGNOSIS — N39.0 URINARY TRACT INFECTION WITHOUT HEMATURIA, SITE UNSPECIFIED: ICD-10-CM

## 2018-08-15 DIAGNOSIS — N39.46 MIXED STRESS AND URGE URINARY INCONTINENCE: Primary | ICD-10-CM

## 2018-08-15 PROCEDURE — 64566 NEUROELTRD STIM POST TIBIAL: CPT | Performed by: PHYSICIAN ASSISTANT

## 2018-08-15 PROCEDURE — 99999 PR OFFICE/OUTPT VISIT,PROCEDURE ONLY: CPT | Performed by: PHYSICIAN ASSISTANT

## 2018-08-15 RX ORDER — NITROFURANTOIN MACROCRYSTALS 50 MG/1
CAPSULE ORAL
Qty: 30 CAPSULE | Refills: 0 | Status: SHIPPED | OUTPATIENT
Start: 2018-08-15 | End: 2018-08-23 | Stop reason: SDUPTHER

## 2018-08-15 ASSESSMENT — ENCOUNTER SYMPTOMS
NAUSEA: 0
SORE THROAT: 0
SHORTNESS OF BREATH: 0
HEARTBURN: 0
WHEEZING: 0
BLURRED VISION: 0
DOUBLE VISION: 0

## 2018-08-15 NOTE — PROGRESS NOTES
daily       pantoprazole (PROTONIX) 20 MG tablet Take 20 mg by mouth daily       camphor-menthol (SARNA) 0.5-0.5 % lotion Apply 0.5 mLs topically 2 times daily as needed for Itching Apply topically as needed.       butalbital-acetaminophen-caffeine (ESGIC PLUS) -40 MG per tablet Take 1 tablet by mouth every 6 hours as needed for Headaches 15 tablet 0    acetaminophen (TYLENOL) 500 MG tablet Take 2 tablets by mouth every 6 hours as needed for Pain Do not exceed 4 g of acetaminophen daily (patient is also on Fioricet PRN) 100 tablet 0    cloNIDine (CATAPRES) 0.1 MG tablet Take 1 tablet by mouth 2 times daily (Patient taking differently: Take 0.1 mg by mouth as needed for High Blood Pressure (for BP greater than 170/100) ) 60 tablet 0    donepezil (ARICEPT) 10 MG tablet Take 1 tablet by mouth nightly 30 tablet 0    Calcium Carb-Cholecalciferol (CALCIUM 600+D) 600-800 MG-UNIT TABS Take 1 tablet by mouth Daily with lunch      promethazine (PHENERGAN) 25 MG tablet Take 25 mg by mouth 3 times daily as needed for Nausea      docusate sodium (COLACE) 100 MG capsule Take 100 mg by mouth nightly       Omega 3-6-9 Fatty Acids (OMEGA 3-6-9 COMPLEX PO) Take 2 tablets by mouth Daily with lunch       alendronate (FOSAMAX) 70 MG tablet Take 70 mg by mouth every 7 days Takes every Sunday        MICRONIZED COLESTIPOL HCL 1 G tablet Take 2 g by mouth as needed 2 tablets daily prn        dipyridamole (PERSANTINE) 75 MG tablet 75 mg 2 times daily       meloxicam (MOBIC) 15 MG tablet Take 15 mg by mouth nightly       BYSTOLIC 10 MG tablet Take 10 mg by mouth nightly       pravastatin (PRAVACHOL) 20 MG tablet Take 20 mg by mouth every morning       ramipril (ALTACE) 5 MG capsule Take 10 mg by mouth 2 times daily       spironolactone (ALDACTONE) 25 MG tablet 50 mg daily       b complex vitamins capsule Take 1 capsule by mouth Daily with lunch B 100      Multiple Vitamins-Minerals (THERAPEUTIC MULTIVITAMIN-MINERALS) tablet Take 1 tablet by mouth Daily with lunch        No current facility-administered medications for this visit. Allergies   Allergen Reactions    Aspirin     Codeine     Demerol Hcl [Meperidine]     Dilaudid [Hydromorphone Hcl]     Levaquin [Levofloxacin In D5w] Other (See Comments)     Seizure like activity      Myrbetriq [Mirabegron]     Namenda [Memantine Hcl]     Norco [Hydrocodone-Acetaminophen]     Ondansetron     Pneumococcal Vaccines     Quinoline Yellow Ss [Yellow Dye #11]      Causes seizures. Social History     Social History    Marital status:      Spouse name: N/A    Number of children: 1    Years of education: N/A     Social History Main Topics    Smoking status: Never Smoker    Smokeless tobacco: Never Used    Alcohol use No    Drug use: No    Sexual activity: Not on file     Other Topics Concern    Not on file     Social History Narrative    No narrative on file       Family History   Problem Relation Age of Onset    Colon Cancer Neg Hx     Colon Polyps Neg Hx     Esophageal Cancer Neg Hx     Liver Cancer Neg Hx     Liver Disease Neg Hx     Rectal Cancer Neg Hx     Stomach Cancer Neg Hx        REVIEW OF SYSTEMS:  Review of Systems   Constitutional: Negative for chills and fever. HENT: Negative for congestion and sore throat. Eyes: Negative for blurred vision and double vision. Respiratory: Negative for shortness of breath and wheezing. Cardiovascular: Negative for chest pain and palpitations. Gastrointestinal: Negative for heartburn and nausea. Genitourinary: Positive for frequency and urgency. Negative for dysuria, flank pain and hematuria. Musculoskeletal: Negative for falls and neck pain. Skin: Negative for itching and rash. Neurological: Negative for dizziness and tingling. Endo/Heme/Allergies: Does not bruise/bleed easily. Psychiatric/Behavioral: The patient does not have insomnia.         PHYSICAL EXAM:  There were no

## 2018-08-22 ENCOUNTER — NURSE ONLY (OUTPATIENT)
Dept: UROLOGY | Age: 83
End: 2018-08-22
Payer: MEDICARE

## 2018-08-22 DIAGNOSIS — N39.46 MIXED STRESS AND URGE URINARY INCONTINENCE: Primary | ICD-10-CM

## 2018-08-22 PROCEDURE — 64566 NEUROELTRD STIM POST TIBIAL: CPT | Performed by: PHYSICIAN ASSISTANT

## 2018-08-22 PROCEDURE — 99999 PR OFFICE/OUTPT VISIT,PROCEDURE ONLY: CPT | Performed by: PHYSICIAN ASSISTANT

## 2018-08-22 PROCEDURE — 99999 PR POST TIBIAL NEUROSTIMULATION,PERC NEEDLE ELECTRODE: CPT | Performed by: PHYSICIAN ASSISTANT

## 2018-08-22 ASSESSMENT — ENCOUNTER SYMPTOMS
WHEEZING: 0
SHORTNESS OF BREATH: 0
NAUSEA: 0
HEARTBURN: 0
BLURRED VISION: 0
SORE THROAT: 0
DOUBLE VISION: 0

## 2018-08-22 NOTE — PROGRESS NOTES
release capsule       spironolactone (ALDACTONE) 50 MG tablet       azelastine (ASTELIN) 0.1 % nasal spray       Probiotic Product (PROBIOTIC & ACIDOPHILUS EX ST) CAPS Take by mouth daily      cetirizine (ZYRTEC ALLERGY) 10 MG tablet Take 10 mg by mouth nightly      tolterodine (DETROL) 2 MG tablet Take 2 mg by mouth nightly      conjugated estrogens (PREMARIN) 0.625 MG/GM vaginal cream 1 gm vaginally three times per week for 3 weeks then twice weekly thereafter      Also, apply small amount to the urethra      nitrofurantoin, macrocrystal-monohydrate, (MACROBID) 100 MG capsule Take 50 mg by mouth nightly       levETIRAcetam (KEPPRA) 1000 MG tablet Take 1 tablet by mouth 2 times daily 60 tablet 0    Cholecalciferol (VITAMIN D3) 5000 units TABS Take 5,000 Units by mouth Daily with lunch      benzonatate (TESSALON) 100 MG capsule Take 200 mg by mouth 3 times daily as needed for Cough      simethicone (MYLICON) 80 MG chewable tablet Take 125 mg by mouth every 6 hours as needed for Flatulence      polyvinyl alcohol-povidone (HYPOTEARS) 1.4-0.6 % ophthalmic solution Place 1-2 drops into both eyes as needed      sodium chloride (OCEAN, BABY AYR) 0.65 % nasal spray 1 spray by Nasal route as needed for Congestion      tobramycin-dexamethasone (TOBRADEX) 0.3-0.1 % ophthalmic ointment Place into the left eye as needed 3 times daily.  hydrALAZINE (APRESOLINE) 25 MG tablet Take 25 mg by mouth 2 times daily      SODIUM CHLORIDE PO Take 1 g by mouth 3 times daily Breakfast lunch and dinner.        Ascorbic Acid (VITAMIN C PO) Take 1,000 mg by mouth Daily with lunch Breakfast and lunch       Lutein 20 MG TABS Take 20 mg by mouth Daily with lunch      fluticasone (VERAMYST) 27.5 MCG/SPRAY nasal spray 2 sprays by Nasal route daily       albuterol sulfate  (90 Base) MCG/ACT inhaler Inhale 2 puffs into the lungs every 4 hours as needed for Wheezing      amLODIPine (NORVASC) 10 MG tablet Take by mouth daily       pantoprazole (PROTONIX) 20 MG tablet Take 20 mg by mouth daily       camphor-menthol (SARNA) 0.5-0.5 % lotion Apply 0.5 mLs topically 2 times daily as needed for Itching Apply topically as needed.       butalbital-acetaminophen-caffeine (ESGIC PLUS) -40 MG per tablet Take 1 tablet by mouth every 6 hours as needed for Headaches 15 tablet 0    acetaminophen (TYLENOL) 500 MG tablet Take 2 tablets by mouth every 6 hours as needed for Pain Do not exceed 4 g of acetaminophen daily (patient is also on Fioricet PRN) 100 tablet 0    cloNIDine (CATAPRES) 0.1 MG tablet Take 1 tablet by mouth 2 times daily (Patient taking differently: Take 0.1 mg by mouth as needed for High Blood Pressure (for BP greater than 170/100) ) 60 tablet 0    donepezil (ARICEPT) 10 MG tablet Take 1 tablet by mouth nightly 30 tablet 0    Calcium Carb-Cholecalciferol (CALCIUM 600+D) 600-800 MG-UNIT TABS Take 1 tablet by mouth Daily with lunch      promethazine (PHENERGAN) 25 MG tablet Take 25 mg by mouth 3 times daily as needed for Nausea      docusate sodium (COLACE) 100 MG capsule Take 100 mg by mouth nightly       Omega 3-6-9 Fatty Acids (OMEGA 3-6-9 COMPLEX PO) Take 2 tablets by mouth Daily with lunch       alendronate (FOSAMAX) 70 MG tablet Take 70 mg by mouth every 7 days Takes every Sunday        MICRONIZED COLESTIPOL HCL 1 G tablet Take 2 g by mouth as needed 2 tablets daily prn        dipyridamole (PERSANTINE) 75 MG tablet 75 mg 2 times daily       meloxicam (MOBIC) 15 MG tablet Take 15 mg by mouth nightly       BYSTOLIC 10 MG tablet Take 10 mg by mouth nightly       pravastatin (PRAVACHOL) 20 MG tablet Take 20 mg by mouth every morning       ramipril (ALTACE) 5 MG capsule Take 10 mg by mouth 2 times daily       spironolactone (ALDACTONE) 25 MG tablet 50 mg daily       b complex vitamins capsule Take 1 capsule by mouth Daily with lunch B 100      Multiple Vitamins-Minerals (THERAPEUTIC MULTIVITAMIN-MINERALS)

## 2018-08-23 DIAGNOSIS — N39.0 URINARY TRACT INFECTION WITHOUT HEMATURIA, SITE UNSPECIFIED: ICD-10-CM

## 2018-08-24 RX ORDER — NITROFURANTOIN MACROCRYSTALS 50 MG/1
CAPSULE ORAL
Qty: 30 CAPSULE | Refills: 0 | Status: SHIPPED | OUTPATIENT
Start: 2018-08-24 | End: 2018-09-19

## 2018-08-30 ENCOUNTER — NURSE ONLY (OUTPATIENT)
Dept: UROLOGY | Age: 83
End: 2018-08-30
Payer: MEDICARE

## 2018-08-30 DIAGNOSIS — N39.46 MIXED STRESS AND URGE URINARY INCONTINENCE: Primary | ICD-10-CM

## 2018-08-30 PROCEDURE — 64566 NEUROELTRD STIM POST TIBIAL: CPT | Performed by: PHYSICIAN ASSISTANT

## 2018-08-30 PROCEDURE — 99999 PR OFFICE/OUTPT VISIT,PROCEDURE ONLY: CPT | Performed by: PHYSICIAN ASSISTANT

## 2018-08-30 RX ORDER — NITROFURANTOIN MACROCRYSTALS 50 MG/1
CAPSULE ORAL
COMMUNITY
Start: 2018-08-24 | End: 2020-03-23 | Stop reason: SDUPTHER

## 2018-08-30 ASSESSMENT — ENCOUNTER SYMPTOMS
NAUSEA: 0
BLURRED VISION: 0
SHORTNESS OF BREATH: 0
SORE THROAT: 0
WHEEZING: 0
HEARTBURN: 0
DOUBLE VISION: 0

## 2018-08-30 NOTE — PROGRESS NOTES
(MACRODANTIN) 50 MG capsule TAKE 1 CAPSULE AT BEDTIME      tolterodine (DETROL LA) 4 MG extended release capsule       spironolactone (ALDACTONE) 50 MG tablet       azelastine (ASTELIN) 0.1 % nasal spray       Probiotic Product (PROBIOTIC & ACIDOPHILUS EX ST) CAPS Take by mouth daily      cetirizine (ZYRTEC ALLERGY) 10 MG tablet Take 10 mg by mouth nightly      conjugated estrogens (PREMARIN) 0.625 MG/GM vaginal cream 1 gm vaginally three times per week for 3 weeks then twice weekly thereafter      Also, apply small amount to the urethra      nitrofurantoin, macrocrystal-monohydrate, (MACROBID) 100 MG capsule Take 50 mg by mouth nightly       levETIRAcetam (KEPPRA) 1000 MG tablet Take 1 tablet by mouth 2 times daily 60 tablet 0    Cholecalciferol (VITAMIN D3) 5000 units TABS Take 5,000 Units by mouth Daily with lunch      benzonatate (TESSALON) 100 MG capsule Take 200 mg by mouth 3 times daily as needed for Cough      simethicone (MYLICON) 80 MG chewable tablet Take 125 mg by mouth every 6 hours as needed for Flatulence      polyvinyl alcohol-povidone (HYPOTEARS) 1.4-0.6 % ophthalmic solution Place 1-2 drops into both eyes as needed      sodium chloride (OCEAN, BABY AYR) 0.65 % nasal spray 1 spray by Nasal route as needed for Congestion      tobramycin-dexamethasone (TOBRADEX) 0.3-0.1 % ophthalmic ointment Place into the left eye as needed 3 times daily.  hydrALAZINE (APRESOLINE) 25 MG tablet Take 25 mg by mouth 2 times daily      SODIUM CHLORIDE PO Take 1 g by mouth 3 times daily Breakfast lunch and dinner.        Ascorbic Acid (VITAMIN C PO) Take 1,000 mg by mouth Daily with lunch Breakfast and lunch       Lutein 20 MG TABS Take 20 mg by mouth Daily with lunch      fluticasone (VERAMYST) 27.5 MCG/SPRAY nasal spray 2 sprays by Nasal route daily       albuterol sulfate  (90 Base) MCG/ACT inhaler Inhale 2 puffs into the lungs every 4 hours as needed for Wheezing      amLODIPine (THERAPEUTIC MULTIVITAMIN-MINERALS) tablet Take 1 tablet by mouth Daily with lunch        No current facility-administered medications for this visit. Allergies   Allergen Reactions    Aspirin     Codeine     Demerol Hcl [Meperidine]     Dilaudid [Hydromorphone Hcl]     Levaquin [Levofloxacin In D5w] Other (See Comments)     Seizure like activity      Myrbetriq [Mirabegron]     Namenda [Memantine Hcl]     Norco [Hydrocodone-Acetaminophen]     Ondansetron     Pneumococcal Vaccines     Quinoline Yellow Ss [Yellow Dye #11]      Causes seizures. Social History     Social History    Marital status:      Spouse name: N/A    Number of children: 1    Years of education: N/A     Social History Main Topics    Smoking status: Never Smoker    Smokeless tobacco: Never Used    Alcohol use No    Drug use: No    Sexual activity: Not Asked     Other Topics Concern    None     Social History Narrative    None       Family History   Problem Relation Age of Onset    Colon Cancer Neg Hx     Colon Polyps Neg Hx     Esophageal Cancer Neg Hx     Liver Cancer Neg Hx     Liver Disease Neg Hx     Rectal Cancer Neg Hx     Stomach Cancer Neg Hx        REVIEW OF SYSTEMS:  Review of Systems   Constitutional: Negative for chills and fever. HENT: Negative for congestion and sore throat. Eyes: Negative for blurred vision and double vision. Respiratory: Negative for shortness of breath and wheezing. Cardiovascular: Negative for chest pain and palpitations. Gastrointestinal: Negative for heartburn and nausea. Genitourinary: Positive for frequency and urgency. Negative for dysuria, flank pain and hematuria. Musculoskeletal: Negative for falls and neck pain. Skin: Negative for itching and rash. Neurological: Negative for dizziness and tingling. Endo/Heme/Allergies: Does not bruise/bleed easily. Psychiatric/Behavioral: The patient does not have insomnia.         PHYSICAL EXAM:  There were no vitals taken for this visit. Physical Exam   Constitutional: No distress. HENT:   Mouth/Throat: No oropharyngeal exudate. Eyes: Left eye exhibits no discharge. No scleral icterus. Neck: No JVD present. No tracheal deviation present. No thyromegaly present. Cardiovascular: Exam reveals no gallop and no friction rub. Pulmonary/Chest: No stridor. She has no rales. Abdominal: She exhibits no distension and no mass. There is no rebound and no guarding. Musculoskeletal: She exhibits no edema. Neurological: No cranial nerve deficit. She exhibits normal muscle tone. Coordination normal.   Skin: She is not diaphoretic. No pallor. 1. Mixed stress and urge urinary incontinence  Patient here for 7th posterior tibial nerve stimulation treatment. She will return in 1 week for repeat treatment. Firstly so far patient has not had any noticeable improvement in her symptoms with these treatments will reassess after her 12 week is complete.  - [place] POST TIBIAL NEUROSTIMULATION,PERC NEEDLE ELECTRODE      Orders Placed This Encounter   Procedures    [place] POST TIBIAL NEUROSTIMULATION,PERC NEEDLE ELECTRODE     # 7     No orders of the defined types were placed in this encounter.     Plan:  Follow up in 1 week for next treatment # 8

## 2018-09-06 ENCOUNTER — NURSE ONLY (OUTPATIENT)
Dept: UROLOGY | Age: 83
End: 2018-09-06
Payer: MEDICARE

## 2018-09-06 DIAGNOSIS — N39.46 MIXED STRESS AND URGE URINARY INCONTINENCE: Primary | ICD-10-CM

## 2018-09-06 PROCEDURE — 64566 NEUROELTRD STIM POST TIBIAL: CPT | Performed by: PHYSICIAN ASSISTANT

## 2018-09-06 ASSESSMENT — ENCOUNTER SYMPTOMS
HEARTBURN: 0
SORE THROAT: 0
NAUSEA: 0
DOUBLE VISION: 0
SHORTNESS OF BREATH: 0
WHEEZING: 0
BLURRED VISION: 0

## 2018-09-06 NOTE — PROGRESS NOTES
ADENOIDECTOMY         Current Outpatient Prescriptions   Medication Sig Dispense Refill    nitrofurantoin (MACRODANTIN) 50 MG capsule TAKE 1 CAPSULE AT BEDTIME      tolterodine (DETROL LA) 4 MG extended release capsule       spironolactone (ALDACTONE) 50 MG tablet       azelastine (ASTELIN) 0.1 % nasal spray       Probiotic Product (PROBIOTIC & ACIDOPHILUS EX ST) CAPS Take by mouth daily      cetirizine (ZYRTEC ALLERGY) 10 MG tablet Take 10 mg by mouth nightly      conjugated estrogens (PREMARIN) 0.625 MG/GM vaginal cream 1 gm vaginally three times per week for 3 weeks then twice weekly thereafter      Also, apply small amount to the urethra      nitrofurantoin, macrocrystal-monohydrate, (MACROBID) 100 MG capsule Take 50 mg by mouth nightly       levETIRAcetam (KEPPRA) 1000 MG tablet Take 1 tablet by mouth 2 times daily 60 tablet 0    Cholecalciferol (VITAMIN D3) 5000 units TABS Take 5,000 Units by mouth Daily with lunch      benzonatate (TESSALON) 100 MG capsule Take 200 mg by mouth 3 times daily as needed for Cough      simethicone (MYLICON) 80 MG chewable tablet Take 125 mg by mouth every 6 hours as needed for Flatulence      polyvinyl alcohol-povidone (HYPOTEARS) 1.4-0.6 % ophthalmic solution Place 1-2 drops into both eyes as needed      sodium chloride (OCEAN, BABY AYR) 0.65 % nasal spray 1 spray by Nasal route as needed for Congestion      tobramycin-dexamethasone (TOBRADEX) 0.3-0.1 % ophthalmic ointment Place into the left eye as needed 3 times daily.  hydrALAZINE (APRESOLINE) 25 MG tablet Take 25 mg by mouth 2 times daily      SODIUM CHLORIDE PO Take 1 g by mouth 3 times daily Breakfast lunch and dinner.        Ascorbic Acid (VITAMIN C PO) Take 1,000 mg by mouth Daily with lunch Breakfast and lunch       Lutein 20 MG TABS Take 20 mg by mouth Daily with lunch      fluticasone (VERAMYST) 27.5 MCG/SPRAY nasal spray 2 sprays by Nasal route daily       albuterol sulfate  (90 Base) Endo/Heme/Allergies: Does not bruise/bleed easily. Psychiatric/Behavioral: The patient does not have insomnia. PHYSICAL EXAM:  There were no vitals taken for this visit. Physical Exam   Constitutional: No distress. HENT:   Mouth/Throat: No oropharyngeal exudate. Eyes: Left eye exhibits no discharge. No scleral icterus. Neck: No JVD present. No tracheal deviation present. No thyromegaly present. Cardiovascular: Exam reveals no gallop and no friction rub. Pulmonary/Chest: No stridor. She has no rales. Abdominal: She exhibits no distension and no mass. There is no rebound and no guarding. Musculoskeletal: She exhibits no edema. Neurological: No cranial nerve deficit. She exhibits normal muscle tone. Coordination normal.   Skin: She is not diaphoretic. No pallor. 1. Mixed stress and urge urinary incontinence  Patient here for 8th posterior tibial nerve stimulation treatment. She will return in 1 week for repeat treatment. Firstly so far patient has not had any noticeable improvement in her symptoms with these treatments will reassess after her 12 week is complete.  - [place] POST TIBIAL NEUROSTIMULATION,PERC NEEDLE ELECTRODE      No orders of the defined types were placed in this encounter. No orders of the defined types were placed in this encounter.     Plan:  Follow up in 1 week for next treatment # 9

## 2018-09-13 ENCOUNTER — NURSE ONLY (OUTPATIENT)
Dept: UROLOGY | Age: 83
End: 2018-09-13
Payer: MEDICARE

## 2018-09-13 VITALS — TEMPERATURE: 97.3 F

## 2018-09-13 DIAGNOSIS — N39.46 MIXED STRESS AND URGE URINARY INCONTINENCE: Primary | ICD-10-CM

## 2018-09-13 PROCEDURE — 99999 PR OFFICE/OUTPT VISIT,PROCEDURE ONLY: CPT | Performed by: PHYSICIAN ASSISTANT

## 2018-09-13 PROCEDURE — 64566 NEUROELTRD STIM POST TIBIAL: CPT | Performed by: PHYSICIAN ASSISTANT

## 2018-09-13 ASSESSMENT — ENCOUNTER SYMPTOMS
SHORTNESS OF BREATH: 0
NAUSEA: 0
WHEEZING: 0
DOUBLE VISION: 0
SORE THROAT: 0
HEARTBURN: 0
BLURRED VISION: 0

## 2018-09-13 NOTE — PROGRESS NOTES
(90 Base) MCG/ACT inhaler Inhale 2 puffs into the lungs every 4 hours as needed for Wheezing      amLODIPine (NORVASC) 10 MG tablet Take by mouth daily       pantoprazole (PROTONIX) 20 MG tablet Take 20 mg by mouth daily       camphor-menthol (SARNA) 0.5-0.5 % lotion Apply 0.5 mLs topically 2 times daily as needed for Itching Apply topically as needed.       butalbital-acetaminophen-caffeine (ESGIC PLUS) -40 MG per tablet Take 1 tablet by mouth every 6 hours as needed for Headaches 15 tablet 0    acetaminophen (TYLENOL) 500 MG tablet Take 2 tablets by mouth every 6 hours as needed for Pain Do not exceed 4 g of acetaminophen daily (patient is also on Fioricet PRN) 100 tablet 0    cloNIDine (CATAPRES) 0.1 MG tablet Take 1 tablet by mouth 2 times daily (Patient taking differently: Take 0.1 mg by mouth as needed for High Blood Pressure (for BP greater than 170/100) ) 60 tablet 0    donepezil (ARICEPT) 10 MG tablet Take 1 tablet by mouth nightly 30 tablet 0    Calcium Carb-Cholecalciferol (CALCIUM 600+D) 600-800 MG-UNIT TABS Take 1 tablet by mouth Daily with lunch      promethazine (PHENERGAN) 25 MG tablet Take 25 mg by mouth 3 times daily as needed for Nausea      docusate sodium (COLACE) 100 MG capsule Take 100 mg by mouth nightly       Omega 3-6-9 Fatty Acids (OMEGA 3-6-9 COMPLEX PO) Take 2 tablets by mouth Daily with lunch       alendronate (FOSAMAX) 70 MG tablet Take 70 mg by mouth every 7 days Takes every Sunday        MICRONIZED COLESTIPOL HCL 1 G tablet Take 2 g by mouth as needed 2 tablets daily prn        dipyridamole (PERSANTINE) 75 MG tablet 75 mg 2 times daily       meloxicam (MOBIC) 15 MG tablet Take 15 mg by mouth nightly       BYSTOLIC 10 MG tablet Take 10 mg by mouth nightly       pravastatin (PRAVACHOL) 20 MG tablet Take 20 mg by mouth every morning       ramipril (ALTACE) 5 MG capsule Take 10 mg by mouth 2 times daily       spironolactone (ALDACTONE) 25 MG tablet 50 mg daily       b complex vitamins capsule Take 1 capsule by mouth Daily with lunch B 100      Multiple Vitamins-Minerals (THERAPEUTIC MULTIVITAMIN-MINERALS) tablet Take 1 tablet by mouth Daily with lunch        No current facility-administered medications for this visit. Allergies   Allergen Reactions    Aspirin     Codeine     Demerol Hcl [Meperidine]     Dilaudid [Hydromorphone Hcl]     Levaquin [Levofloxacin In D5w] Other (See Comments)     Seizure like activity      Myrbetriq [Mirabegron]     Namenda [Memantine Hcl]     Norco [Hydrocodone-Acetaminophen]     Ondansetron     Pneumococcal Vaccines     Quinoline Yellow Ss [Yellow Dye #11]      Causes seizures. Social History     Social History    Marital status:      Spouse name: N/A    Number of children: 1    Years of education: N/A     Social History Main Topics    Smoking status: Never Smoker    Smokeless tobacco: Never Used    Alcohol use No    Drug use: No    Sexual activity: Not Asked     Other Topics Concern    None     Social History Narrative    None       Family History   Problem Relation Age of Onset    Colon Cancer Neg Hx     Colon Polyps Neg Hx     Esophageal Cancer Neg Hx     Liver Cancer Neg Hx     Liver Disease Neg Hx     Rectal Cancer Neg Hx     Stomach Cancer Neg Hx        REVIEW OF SYSTEMS:  Review of Systems   Constitutional: Negative for chills and fever. HENT: Negative for congestion and sore throat. Eyes: Negative for blurred vision and double vision. Respiratory: Negative for shortness of breath and wheezing. Cardiovascular: Negative for chest pain and palpitations. Gastrointestinal: Negative for heartburn and nausea. Genitourinary: Positive for frequency and urgency. Negative for dysuria, flank pain and hematuria. Musculoskeletal: Negative for falls and neck pain. Skin: Negative for itching and rash. Neurological: Negative for dizziness and tingling.    Endo/Heme/Allergies: Does not bruise/bleed easily. Psychiatric/Behavioral: The patient does not have insomnia. PHYSICAL EXAM:  Temp 97.3 °F (36.3 °C) (Temporal)   Physical Exam   Constitutional: No distress. HENT:   Mouth/Throat: No oropharyngeal exudate. Eyes: Left eye exhibits no discharge. No scleral icterus. Neck: No JVD present. No tracheal deviation present. No thyromegaly present. Cardiovascular: Exam reveals no gallop and no friction rub. Pulmonary/Chest: No stridor. She has no rales. Abdominal: She exhibits no distension and no mass. There is no rebound and no guarding. Musculoskeletal: She exhibits no edema. Neurological: No cranial nerve deficit. She exhibits normal muscle tone. Coordination normal.   Skin: She is not diaphoretic. No pallor. 1. Mixed stress and urge urinary incontinence  Patient here for 9th posterior tibial nerve stimulation treatment. She will return in 1 week for repeat treatment. According to healthcare staff where she resides the abdomen that she is going less frequently than previous.    - OR POST TIBIAL [address]      No orders of the defined types were placed in this encounter. No orders of the defined types were placed in this encounter.     Plan:  Follow up in 1 week for next treatment # 10

## 2018-09-19 ENCOUNTER — OFFICE VISIT (OUTPATIENT)
Dept: OBSTETRICS AND GYNECOLOGY | Facility: CLINIC | Age: 83
End: 2018-09-19

## 2018-09-19 VITALS
SYSTOLIC BLOOD PRESSURE: 112 MMHG | BODY MASS INDEX: 23.6 KG/M2 | HEIGHT: 61 IN | DIASTOLIC BLOOD PRESSURE: 60 MMHG | WEIGHT: 125 LBS

## 2018-09-19 DIAGNOSIS — N95.2 VAGINAL ATROPHY: Primary | ICD-10-CM

## 2018-09-19 DIAGNOSIS — Z12.31 ENCOUNTER FOR SCREENING MAMMOGRAM FOR MALIGNANT NEOPLASM OF BREAST: ICD-10-CM

## 2018-09-19 PROCEDURE — 99213 OFFICE O/P EST LOW 20 MIN: CPT | Performed by: NURSE PRACTITIONER

## 2018-09-19 NOTE — PROGRESS NOTES
Laenn Powell is a 83 y.o. No obstetric history on file.     Chief Complaint   Patient presents with   • Vaginal Pain     Pt. is here for follow up to see if cream is helping.           HPI - LEANN IS USING COMPOUNDED BIEST 80-20  VAGINALLY TO IMPROVE THE VAGINAL TISSUES.  HER PROBLEMS WITH URINARY FREQUENCY ARE IMPROVED SINCE HAVING TREATMENTS (NERVE STIMULATION).  SHE AND HER DAUGHTER ARE BOTH HAPPY ABOUT THAT.      The following portions of the patient's history were reviewed and updated as appropriate:vital signs, allergies, current medications, past family history, past medical history, past social history, past surgical history and problem list.      Current Outpatient Prescriptions:   •  acetaminophen (TYLENOL) 650 MG 8 hr tablet, Take 1,000 mg by mouth As Needed for Mild Pain ., Disp: , Rfl:   •  alendronate (FOSAMAX) 70 MG tablet, Take 70 mg by mouth Every 7 (Seven) Days., Disp: , Rfl:   •  amLODIPine (NORVASC) 2.5 MG tablet, Take 5 mg by mouth Daily., Disp: , Rfl:   •  B Complex Vitamins (VITAMIN B COMPLEX PO), Take 1 tablet by mouth Daily., Disp: , Rfl:   •  butalbital-acetaminophen-caffeine (FIORICET, ESGIC) -40 MG per tablet, Take 1 tablet by mouth As Needed for headaches., Disp: , Rfl:   •  Calcium Carb-Cholecalciferol (CALCIUM 600+D3) 600-800 MG-UNIT tablet, Take 1 tablet by mouth Daily., Disp: , Rfl:   •  camphor-menthol (SARNA) 0.5-0.5 % lotion, Apply  topically As Needed for itching., Disp: , Rfl:   •  cholecalciferol (VITAMIN D3) 1000 UNITS tablet, Take 1,000 Units by mouth Daily., Disp: , Rfl:   •  colestipol (COLESTID) 1 G tablet, Take 1 g by mouth As Needed., Disp: , Rfl:   •  conjugated estrogens (PREMARIN) 0.625 MG/GM vaginal cream, 1 gm vaginally three times per week for 3 weeks then twice weekly thereafter      Also, apply small amount to the urethra, Disp: 1 each, Rfl: 3  •  dipyridamole (PERSANTINE) 75 MG tablet, Take 75 mg by mouth 2 (Two) Times a Day., Disp: , Rfl:    •  docusate sodium (COLACE) 100 MG capsule, Take 100 mg by mouth As Needed for constipation., Disp: , Rfl:   •  donepezil (ARICEPT) 10 MG tablet, Take 10 mg by mouth Every Night., Disp: , Rfl:   •  Hormone Cream Base cream, 80:20  Biest 1 mg/ml   Insert 1 gm vaginally 3 x per week for 3 weeks then twice per week thereafter and apply small amount to urethra, Disp: 1 bottle, Rfl: 12  •  levETIRAcetam (KEPPRA) 750 MG tablet, Take 1,000 mg by mouth 2 (Two) Times a Day., Disp: , Rfl:   •  Lutein 20 MG tablet, Take 1 tablet by mouth Daily., Disp: , Rfl:   •  meloxicam (MOBIC) 15 MG tablet, Take 15 mg by mouth Daily., Disp: , Rfl:   •  nebivolol (BYSTOLIC) 10 MG tablet, Take 10 mg by mouth 2 (Two) Times a Day., Disp: , Rfl:   •  Omega 3-6-9 Fatty Acids (OMEGA 3-6-9 COMPLEX PO), Take 1 capsule by mouth Daily., Disp: , Rfl:   •  pantoprazole (PROTONIX) 20 MG EC tablet, Take 20 mg by mouth Daily., Disp: , Rfl:   •  pravastatin (PRAVACHOL) 20 MG tablet, Take 20 mg by mouth Daily., Disp: , Rfl:   •  promethazine (PHENERGAN) 25 MG tablet, Take 25 mg by mouth Every 4 (Four) Hours As Needed for nausea or vomiting., Disp: , Rfl:   •  ramipril (ALTACE) 5 MG capsule, Take 5 mg by mouth Daily., Disp: , Rfl:   •  simethicone (MYLICON) 80 MG chewable tablet, Chew 125 mg As Needed for Flatulence., Disp: , Rfl:   •  sodium chloride 1 g tablet, Take 1 g by mouth 3 (Three) Times a Day., Disp: , Rfl:   •  spironolactone (ALDACTONE) 25 MG tablet, Take 25 mg by mouth Daily., Disp: , Rfl:   •  vitamin C (ASCORBIC ACID) 500 MG tablet, Take 500 mg by mouth Daily., Disp: , Rfl:     Review of Systems   Constitutional: Negative for activity change and fever.   HENT: Negative for congestion, dental problem, facial swelling, rhinorrhea and voice change.    Eyes: Negative for blurred vision, double vision and pain.   Respiratory: Negative for apnea, chest tightness and shortness of breath.    Gastrointestinal: Negative for abdominal distention,  "abdominal pain and GERD.   Endocrine: Positive for cold intolerance. Negative for heat intolerance and polyphagia.   Genitourinary: Positive for frequency and urgency. Negative for urinary incontinence, decreased libido, hematuria, pelvic pain and breast lump.   Musculoskeletal: Positive for arthralgias and back pain. Negative for bursitis.   Neurological: Positive for weakness, numbness and memory problem.   Psychiatric/Behavioral: Negative for agitation, behavioral problems and depressed mood.     Breast ROS: negative    Objective      /60   Ht 154.9 cm (61\")   Wt 56.7 kg (125 lb)   BMI 23.62 kg/m²       Physical Exam   Constitutional: She appears well-developed and well-nourished.   HENT:   Head: Normocephalic and atraumatic.   Eyes: Right eye exhibits no discharge. Left eye exhibits no discharge.   Pulmonary/Chest: Effort normal.   Abdominal: Soft. Bowel sounds are normal. She exhibits no distension.   Genitourinary: There is no rash, tenderness or lesion on the right labia. There is no tenderness or lesion on the left labia. Right adnexum displays no mass, no tenderness and no fullness. Left adnexum displays no mass, no tenderness and no fullness. There is tenderness in the vagina. No erythema or bleeding in the vagina. No foreign body in the vagina. No signs of injury around the vagina. No vaginal discharge found.   Musculoskeletal: She exhibits no edema or deformity.   Neurological: She is alert.   Skin: Skin is warm and dry.   Psychiatric: She has a normal mood and affect. Her behavior is normal.   Nursing note and vitals reviewed.       Assessment/Plan     Bel was seen today for vaginal pain.    Diagnoses and all orders for this visit:    Vaginal atrophy  IMPROVED WITH Bi-Est 80-29 1 ml 1-2 times per week    Encounter for screening mammogram for malignant neoplasm of breast  -     Mammo screening digital tomosynthesis bilateral w CAD; Future            Raeann Coleman, " APRN  9/19/2018

## 2018-09-20 ENCOUNTER — NURSE ONLY (OUTPATIENT)
Dept: UROLOGY | Age: 83
End: 2018-09-20
Payer: MEDICARE

## 2018-09-20 VITALS — TEMPERATURE: 97.3 F

## 2018-09-20 DIAGNOSIS — N39.46 MIXED STRESS AND URGE URINARY INCONTINENCE: Primary | ICD-10-CM

## 2018-09-20 PROCEDURE — 99999 PR OFFICE/OUTPT VISIT,PROCEDURE ONLY: CPT | Performed by: PHYSICIAN ASSISTANT

## 2018-09-20 PROCEDURE — 64566 NEUROELTRD STIM POST TIBIAL: CPT | Performed by: PHYSICIAN ASSISTANT

## 2018-09-20 ASSESSMENT — ENCOUNTER SYMPTOMS
DOUBLE VISION: 0
SORE THROAT: 0
HEARTBURN: 0
WHEEZING: 0
SHORTNESS OF BREATH: 0
BLURRED VISION: 0
NAUSEA: 0

## 2018-09-20 NOTE — PROGRESS NOTES
ADENOIDECTOMY         Current Outpatient Prescriptions   Medication Sig Dispense Refill    nitrofurantoin (MACRODANTIN) 50 MG capsule TAKE 1 CAPSULE AT BEDTIME      tolterodine (DETROL LA) 4 MG extended release capsule       spironolactone (ALDACTONE) 50 MG tablet       azelastine (ASTELIN) 0.1 % nasal spray       Probiotic Product (PROBIOTIC & ACIDOPHILUS EX ST) CAPS Take by mouth daily      cetirizine (ZYRTEC ALLERGY) 10 MG tablet Take 10 mg by mouth nightly      conjugated estrogens (PREMARIN) 0.625 MG/GM vaginal cream 1 gm vaginally three times per week for 3 weeks then twice weekly thereafter      Also, apply small amount to the urethra      nitrofurantoin, macrocrystal-monohydrate, (MACROBID) 100 MG capsule Take 50 mg by mouth nightly       levETIRAcetam (KEPPRA) 1000 MG tablet Take 1 tablet by mouth 2 times daily 60 tablet 0    Cholecalciferol (VITAMIN D3) 5000 units TABS Take 5,000 Units by mouth Daily with lunch      benzonatate (TESSALON) 100 MG capsule Take 200 mg by mouth 3 times daily as needed for Cough      simethicone (MYLICON) 80 MG chewable tablet Take 125 mg by mouth every 6 hours as needed for Flatulence      polyvinyl alcohol-povidone (HYPOTEARS) 1.4-0.6 % ophthalmic solution Place 1-2 drops into both eyes as needed      sodium chloride (OCEAN, BABY AYR) 0.65 % nasal spray 1 spray by Nasal route as needed for Congestion      tobramycin-dexamethasone (TOBRADEX) 0.3-0.1 % ophthalmic ointment Place into the left eye as needed 3 times daily.  hydrALAZINE (APRESOLINE) 25 MG tablet Take 25 mg by mouth 2 times daily      SODIUM CHLORIDE PO Take 1 g by mouth 3 times daily Breakfast lunch and dinner.        Ascorbic Acid (VITAMIN C PO) Take 1,000 mg by mouth Daily with lunch Breakfast and lunch       Lutein 20 MG TABS Take 20 mg by mouth Daily with lunch      fluticasone (VERAMYST) 27.5 MCG/SPRAY nasal spray 2 sprays by Nasal route daily       albuterol sulfate  (90 Base) MCG/ACT inhaler Inhale 2 puffs into the lungs every 4 hours as needed for Wheezing      amLODIPine (NORVASC) 10 MG tablet Take by mouth daily       pantoprazole (PROTONIX) 20 MG tablet Take 20 mg by mouth daily       camphor-menthol (SARNA) 0.5-0.5 % lotion Apply 0.5 mLs topically 2 times daily as needed for Itching Apply topically as needed.       butalbital-acetaminophen-caffeine (ESGIC PLUS) -40 MG per tablet Take 1 tablet by mouth every 6 hours as needed for Headaches 15 tablet 0    acetaminophen (TYLENOL) 500 MG tablet Take 2 tablets by mouth every 6 hours as needed for Pain Do not exceed 4 g of acetaminophen daily (patient is also on Fioricet PRN) 100 tablet 0    cloNIDine (CATAPRES) 0.1 MG tablet Take 1 tablet by mouth 2 times daily (Patient taking differently: Take 0.1 mg by mouth as needed for High Blood Pressure (for BP greater than 170/100) ) 60 tablet 0    donepezil (ARICEPT) 10 MG tablet Take 1 tablet by mouth nightly 30 tablet 0    Calcium Carb-Cholecalciferol (CALCIUM 600+D) 600-800 MG-UNIT TABS Take 1 tablet by mouth Daily with lunch      promethazine (PHENERGAN) 25 MG tablet Take 25 mg by mouth 3 times daily as needed for Nausea      docusate sodium (COLACE) 100 MG capsule Take 100 mg by mouth nightly       Omega 3-6-9 Fatty Acids (OMEGA 3-6-9 COMPLEX PO) Take 2 tablets by mouth Daily with lunch       alendronate (FOSAMAX) 70 MG tablet Take 70 mg by mouth every 7 days Takes every Sunday        MICRONIZED COLESTIPOL HCL 1 G tablet Take 2 g by mouth as needed 2 tablets daily prn        dipyridamole (PERSANTINE) 75 MG tablet 75 mg 2 times daily       meloxicam (MOBIC) 15 MG tablet Take 15 mg by mouth nightly       BYSTOLIC 10 MG tablet Take 10 mg by mouth nightly       pravastatin (PRAVACHOL) 20 MG tablet Take 20 mg by mouth every morning       ramipril (ALTACE) 5 MG capsule Take 10 mg by mouth 2 times daily       spironolactone (ALDACTONE) 25 MG tablet 50 mg daily       b complex vitamins capsule Take 1 capsule by mouth Daily with lunch B 100      Multiple Vitamins-Minerals (THERAPEUTIC MULTIVITAMIN-MINERALS) tablet Take 1 tablet by mouth Daily with lunch        No current facility-administered medications for this visit. Allergies   Allergen Reactions    Aspirin     Codeine     Demerol Hcl [Meperidine]     Dilaudid [Hydromorphone Hcl]     Levaquin [Levofloxacin In D5w] Other (See Comments)     Seizure like activity      Myrbetriq [Mirabegron]     Namenda [Memantine Hcl]     Norco [Hydrocodone-Acetaminophen]     Ondansetron     Pneumococcal Vaccines     Quinoline Yellow Ss [Yellow Dye #11] Itching     Causes seizures. Causes seizures. Social History     Social History    Marital status:      Spouse name: N/A    Number of children: 1    Years of education: N/A     Social History Main Topics    Smoking status: Never Smoker    Smokeless tobacco: Never Used    Alcohol use No    Drug use: No    Sexual activity: Not on file     Other Topics Concern    Not on file     Social History Narrative    No narrative on file       Family History   Problem Relation Age of Onset    Colon Cancer Neg Hx     Colon Polyps Neg Hx     Esophageal Cancer Neg Hx     Liver Cancer Neg Hx     Liver Disease Neg Hx     Rectal Cancer Neg Hx     Stomach Cancer Neg Hx        REVIEW OF SYSTEMS:  Review of Systems   Constitutional: Negative for chills and fever. HENT: Negative for congestion and sore throat. Eyes: Negative for blurred vision and double vision. Respiratory: Negative for shortness of breath and wheezing. Cardiovascular: Negative for chest pain and palpitations. Gastrointestinal: Negative for heartburn and nausea. Genitourinary: Positive for frequency and urgency. Negative for dysuria, flank pain and hematuria. Musculoskeletal: Negative for falls and neck pain. Skin: Negative for itching and rash.    Neurological: Negative for dizziness

## 2018-10-03 ENCOUNTER — NURSE ONLY (OUTPATIENT)
Dept: UROLOGY | Age: 83
End: 2018-10-03
Payer: MEDICARE

## 2018-10-03 DIAGNOSIS — N39.46 MIXED INCONTINENCE: Primary | ICD-10-CM

## 2018-10-03 PROCEDURE — 64566 NEUROELTRD STIM POST TIBIAL: CPT | Performed by: PHYSICIAN ASSISTANT

## 2018-10-03 PROCEDURE — 99999 PR OFFICE/OUTPT VISIT,PROCEDURE ONLY: CPT | Performed by: PHYSICIAN ASSISTANT

## 2018-10-03 ASSESSMENT — ENCOUNTER SYMPTOMS
HEARTBURN: 0
WHEEZING: 0
NAUSEA: 0
DOUBLE VISION: 0
BLURRED VISION: 0
SORE THROAT: 0
SHORTNESS OF BREATH: 0

## 2018-10-03 NOTE — PROGRESS NOTES
BEDTIME      tolterodine (DETROL LA) 4 MG extended release capsule       spironolactone (ALDACTONE) 50 MG tablet       azelastine (ASTELIN) 0.1 % nasal spray       Probiotic Product (PROBIOTIC & ACIDOPHILUS EX ST) CAPS Take by mouth daily      cetirizine (ZYRTEC ALLERGY) 10 MG tablet Take 10 mg by mouth nightly      conjugated estrogens (PREMARIN) 0.625 MG/GM vaginal cream 1 gm vaginally three times per week for 3 weeks then twice weekly thereafter      Also, apply small amount to the urethra      nitrofurantoin, macrocrystal-monohydrate, (MACROBID) 100 MG capsule Take 50 mg by mouth nightly       levETIRAcetam (KEPPRA) 1000 MG tablet Take 1 tablet by mouth 2 times daily 60 tablet 0    Cholecalciferol (VITAMIN D3) 5000 units TABS Take 5,000 Units by mouth Daily with lunch      benzonatate (TESSALON) 100 MG capsule Take 200 mg by mouth 3 times daily as needed for Cough      simethicone (MYLICON) 80 MG chewable tablet Take 125 mg by mouth every 6 hours as needed for Flatulence      polyvinyl alcohol-povidone (HYPOTEARS) 1.4-0.6 % ophthalmic solution Place 1-2 drops into both eyes as needed      sodium chloride (OCEAN, BABY AYR) 0.65 % nasal spray 1 spray by Nasal route as needed for Congestion      tobramycin-dexamethasone (TOBRADEX) 0.3-0.1 % ophthalmic ointment Place into the left eye as needed 3 times daily.  hydrALAZINE (APRESOLINE) 25 MG tablet Take 25 mg by mouth 2 times daily      SODIUM CHLORIDE PO Take 1 g by mouth 3 times daily Breakfast lunch and dinner.        Ascorbic Acid (VITAMIN C PO) Take 1,000 mg by mouth Daily with lunch Breakfast and lunch       Lutein 20 MG TABS Take 20 mg by mouth Daily with lunch      fluticasone (VERAMYST) 27.5 MCG/SPRAY nasal spray 2 sprays by Nasal route daily       albuterol sulfate  (90 Base) MCG/ACT inhaler Inhale 2 puffs into the lungs every 4 hours as needed for Wheezing      amLODIPine (NORVASC) 10 MG tablet Take by mouth daily       tablet by mouth Daily with lunch        No current facility-administered medications for this visit. Allergies   Allergen Reactions    Aspirin     Codeine     Demerol Hcl [Meperidine]     Dilaudid [Hydromorphone Hcl]     Levaquin [Levofloxacin In D5w] Other (See Comments)     Seizure like activity      Myrbetriq [Mirabegron]     Namenda [Memantine Hcl]     Norco [Hydrocodone-Acetaminophen]     Ondansetron     Pneumococcal Vaccines     Quinoline Yellow Ss [Yellow Dye #11] Itching     Causes seizures. Causes seizures. Social History     Social History    Marital status:      Spouse name: N/A    Number of children: 1    Years of education: N/A     Social History Main Topics    Smoking status: Never Smoker    Smokeless tobacco: Never Used    Alcohol use No    Drug use: No    Sexual activity: Not on file     Other Topics Concern    Not on file     Social History Narrative    No narrative on file       Family History   Problem Relation Age of Onset    Colon Cancer Neg Hx     Colon Polyps Neg Hx     Esophageal Cancer Neg Hx     Liver Cancer Neg Hx     Liver Disease Neg Hx     Rectal Cancer Neg Hx     Stomach Cancer Neg Hx        REVIEW OF SYSTEMS:  Review of Systems   Constitutional: Negative for chills and fever. HENT: Negative for congestion and sore throat. Eyes: Negative for blurred vision and double vision. Respiratory: Negative for shortness of breath and wheezing. Cardiovascular: Negative for chest pain and palpitations. Gastrointestinal: Negative for heartburn and nausea. Genitourinary: Positive for frequency and urgency. Negative for dysuria, flank pain and hematuria. Musculoskeletal: Negative for falls and neck pain. Skin: Negative for itching and rash. Neurological: Negative for dizziness and tingling. Endo/Heme/Allergies: Does not bruise/bleed easily. Psychiatric/Behavioral: The patient does not have insomnia.         PHYSICAL

## 2018-10-08 DIAGNOSIS — Z12.31 ENCOUNTER FOR SCREENING MAMMOGRAM FOR MALIGNANT NEOPLASM OF BREAST: ICD-10-CM

## 2018-10-08 LAB
ALBUMIN SERPL-MCNC: 4.6 G/DL (ref 3.5–5.2)
ALP BLD-CCNC: 59 U/L (ref 35–104)
ALT SERPL-CCNC: 22 U/L (ref 5–33)
ANION GAP SERPL CALCULATED.3IONS-SCNC: 14 MMOL/L (ref 7–19)
AST SERPL-CCNC: 24 U/L (ref 5–32)
BASOPHILS ABSOLUTE: 0 K/UL (ref 0–0.2)
BASOPHILS RELATIVE PERCENT: 0.7 % (ref 0–1)
BILIRUB SERPL-MCNC: 0.5 MG/DL (ref 0.2–1.2)
BUN BLDV-MCNC: 18 MG/DL (ref 8–23)
CALCIUM SERPL-MCNC: 10.8 MG/DL (ref 8.8–10.2)
CHLORIDE BLD-SCNC: 98 MMOL/L (ref 98–111)
CHOLESTEROL, TOTAL: 190 MG/DL (ref 160–199)
CO2: 24 MMOL/L (ref 22–29)
CREAT SERPL-MCNC: 1.2 MG/DL (ref 0.5–0.9)
EOSINOPHILS ABSOLUTE: 0.2 K/UL (ref 0–0.6)
EOSINOPHILS RELATIVE PERCENT: 3.5 % (ref 0–5)
GFR NON-AFRICAN AMERICAN: 43
GLUCOSE BLD-MCNC: 98 MG/DL (ref 74–109)
HBA1C MFR BLD: 4.8 % (ref 4–6)
HCT VFR BLD CALC: 35.3 % (ref 37–47)
HDLC SERPL-MCNC: 80 MG/DL (ref 65–121)
HEMOGLOBIN: 11.9 G/DL (ref 12–16)
LDL CHOLESTEROL CALCULATED: 82 MG/DL
LYMPHOCYTES ABSOLUTE: 1.2 K/UL (ref 1.1–4.5)
LYMPHOCYTES RELATIVE PERCENT: 28.5 % (ref 20–40)
MAGNESIUM: 1.9 MG/DL (ref 1.6–2.4)
MCH RBC QN AUTO: 32.5 PG (ref 27–31)
MCHC RBC AUTO-ENTMCNC: 33.7 G/DL (ref 33–37)
MCV RBC AUTO: 96.4 FL (ref 81–99)
MONOCYTES ABSOLUTE: 0.4 K/UL (ref 0–0.9)
MONOCYTES RELATIVE PERCENT: 9.6 % (ref 0–10)
NEUTROPHILS ABSOLUTE: 2.4 K/UL (ref 1.5–7.5)
NEUTROPHILS RELATIVE PERCENT: 57 % (ref 50–65)
PDW BLD-RTO: 11.9 % (ref 11.5–14.5)
PLATELET # BLD: 202 K/UL (ref 130–400)
PMV BLD AUTO: 10.1 FL (ref 9.4–12.3)
POTASSIUM SERPL-SCNC: 4.4 MMOL/L (ref 3.5–5)
RBC # BLD: 3.66 M/UL (ref 4.2–5.4)
SODIUM BLD-SCNC: 136 MMOL/L (ref 136–145)
T3 FREE: 2.6 PG/ML (ref 2–4.4)
T4 FREE: 1.3 NG/DL (ref 0.9–1.7)
TOTAL PROTEIN: 6.8 G/DL (ref 6.6–8.7)
TRIGL SERPL-MCNC: 138 MG/DL (ref 0–149)
TSH SERPL DL<=0.05 MIU/L-ACNC: 1.21 UIU/ML (ref 0.27–4.2)
VITAMIN B-12: 1548 PG/ML (ref 211–946)
VITAMIN D 25-HYDROXY: 46.5 NG/ML
WBC # BLD: 4.3 K/UL (ref 4.8–10.8)

## 2018-10-10 ENCOUNTER — NURSE ONLY (OUTPATIENT)
Dept: UROLOGY | Age: 83
End: 2018-10-10
Payer: MEDICARE

## 2018-10-10 DIAGNOSIS — N39.46 MIXED INCONTINENCE: Primary | ICD-10-CM

## 2018-10-10 DIAGNOSIS — N39.46 MIXED STRESS AND URGE URINARY INCONTINENCE: ICD-10-CM

## 2018-10-10 PROCEDURE — 99999 PR OFFICE/OUTPT VISIT,PROCEDURE ONLY: CPT | Performed by: NURSE PRACTITIONER

## 2018-10-10 PROCEDURE — 64566 NEUROELTRD STIM POST TIBIAL: CPT | Performed by: NURSE PRACTITIONER

## 2018-10-10 ASSESSMENT — ENCOUNTER SYMPTOMS
BLURRED VISION: 0
DOUBLE VISION: 0
NAUSEA: 0
SORE THROAT: 0
SHORTNESS OF BREATH: 0
WHEEZING: 0
HEARTBURN: 0

## 2018-10-10 NOTE — PROGRESS NOTES
by mouth daily       camphor-menthol (SARNA) 0.5-0.5 % lotion Apply 0.5 mLs topically 2 times daily as needed for Itching Apply topically as needed.       butalbital-acetaminophen-caffeine (ESGIC PLUS) -40 MG per tablet Take 1 tablet by mouth every 6 hours as needed for Headaches 15 tablet 0    acetaminophen (TYLENOL) 500 MG tablet Take 2 tablets by mouth every 6 hours as needed for Pain Do not exceed 4 g of acetaminophen daily (patient is also on Fioricet PRN) 100 tablet 0    cloNIDine (CATAPRES) 0.1 MG tablet Take 1 tablet by mouth 2 times daily (Patient taking differently: Take 0.1 mg by mouth as needed for High Blood Pressure (for BP greater than 170/100) ) 60 tablet 0    donepezil (ARICEPT) 10 MG tablet Take 1 tablet by mouth nightly 30 tablet 0    Calcium Carb-Cholecalciferol (CALCIUM 600+D) 600-800 MG-UNIT TABS Take 1 tablet by mouth Daily with lunch      promethazine (PHENERGAN) 25 MG tablet Take 25 mg by mouth 3 times daily as needed for Nausea      docusate sodium (COLACE) 100 MG capsule Take 100 mg by mouth nightly       Omega 3-6-9 Fatty Acids (OMEGA 3-6-9 COMPLEX PO) Take 2 tablets by mouth Daily with lunch       alendronate (FOSAMAX) 70 MG tablet Take 70 mg by mouth every 7 days Takes every Sunday        MICRONIZED COLESTIPOL HCL 1 G tablet Take 2 g by mouth as needed 2 tablets daily prn        dipyridamole (PERSANTINE) 75 MG tablet 75 mg 2 times daily       meloxicam (MOBIC) 15 MG tablet Take 15 mg by mouth nightly       BYSTOLIC 10 MG tablet Take 10 mg by mouth nightly       pravastatin (PRAVACHOL) 20 MG tablet Take 20 mg by mouth every morning       ramipril (ALTACE) 5 MG capsule Take 10 mg by mouth 2 times daily       spironolactone (ALDACTONE) 25 MG tablet 50 mg daily       b complex vitamins capsule Take 1 capsule by mouth Daily with lunch B 100      Multiple Vitamins-Minerals (THERAPEUTIC MULTIVITAMIN-MINERALS) tablet Take 1 tablet by mouth Daily with lunch        No visit.  Physical Exam   Constitutional: No distress. HENT:   Mouth/Throat: No oropharyngeal exudate. Eyes: Left eye exhibits no discharge. No scleral icterus. Neck: No JVD present. No tracheal deviation present. No thyromegaly present. Cardiovascular: Exam reveals no gallop and no friction rub. Pulmonary/Chest: No stridor. She has no rales. Abdominal: She exhibits no distension and no mass. There is no rebound and no guarding. Musculoskeletal: She exhibits no edema. Neurological: No cranial nerve deficit. She exhibits normal muscle tone. Coordination normal.   Skin: She is not diaphoretic. No pallor. 1. Mixed stress and urge urinary incontinence  Patient here for 12 th posterior tibial nerve stimulation treatment. She will return in 1 month for repeat treatment.  According to healthcare staff and family member she is able to make it to the commode most of the time without incontinence which is a big improvement from previous. - [place] [facility] [address]      Plan:  Follow up in 1 month for next uroplasty    LAYTON Cao

## 2018-10-11 ENCOUNTER — OFFICE VISIT (OUTPATIENT)
Dept: NEUROLOGY | Age: 83
End: 2018-10-11
Payer: MEDICARE

## 2018-10-11 VITALS
HEART RATE: 58 BPM | SYSTOLIC BLOOD PRESSURE: 123 MMHG | WEIGHT: 133.4 LBS | RESPIRATION RATE: 16 BRPM | DIASTOLIC BLOOD PRESSURE: 68 MMHG | HEIGHT: 61 IN | BODY MASS INDEX: 25.19 KG/M2

## 2018-10-11 DIAGNOSIS — G30.1 LATE ONSET ALZHEIMER'S DISEASE WITHOUT BEHAVIORAL DISTURBANCE (HCC): Primary | ICD-10-CM

## 2018-10-11 DIAGNOSIS — F02.80 LATE ONSET ALZHEIMER'S DISEASE WITHOUT BEHAVIORAL DISTURBANCE (HCC): Primary | ICD-10-CM

## 2018-10-11 DIAGNOSIS — I69.398 SEIZURE AS LATE EFFECT OF CEREBROVASCULAR ACCIDENT (CVA) (HCC): ICD-10-CM

## 2018-10-11 DIAGNOSIS — G43.009 MIGRAINE WITHOUT AURA AND WITHOUT STATUS MIGRAINOSUS, NOT INTRACTABLE: ICD-10-CM

## 2018-10-11 DIAGNOSIS — R56.9 SEIZURE AS LATE EFFECT OF CEREBROVASCULAR ACCIDENT (CVA) (HCC): ICD-10-CM

## 2018-10-11 PROCEDURE — G8419 CALC BMI OUT NRM PARAM NOF/U: HCPCS | Performed by: PSYCHIATRY & NEUROLOGY

## 2018-10-11 PROCEDURE — G8427 DOCREV CUR MEDS BY ELIG CLIN: HCPCS | Performed by: PSYCHIATRY & NEUROLOGY

## 2018-10-11 PROCEDURE — 1090F PRES/ABSN URINE INCON ASSESS: CPT | Performed by: PSYCHIATRY & NEUROLOGY

## 2018-10-11 PROCEDURE — 99213 OFFICE O/P EST LOW 20 MIN: CPT | Performed by: PSYCHIATRY & NEUROLOGY

## 2018-10-11 PROCEDURE — 1123F ACP DISCUSS/DSCN MKR DOCD: CPT | Performed by: PSYCHIATRY & NEUROLOGY

## 2018-10-11 PROCEDURE — G8484 FLU IMMUNIZE NO ADMIN: HCPCS | Performed by: PSYCHIATRY & NEUROLOGY

## 2018-10-11 PROCEDURE — 1101F PT FALLS ASSESS-DOCD LE1/YR: CPT | Performed by: PSYCHIATRY & NEUROLOGY

## 2018-10-11 PROCEDURE — 4040F PNEUMOC VAC/ADMIN/RCVD: CPT | Performed by: PSYCHIATRY & NEUROLOGY

## 2018-10-11 PROCEDURE — G8400 PT W/DXA NO RESULTS DOC: HCPCS | Performed by: PSYCHIATRY & NEUROLOGY

## 2018-10-11 PROCEDURE — 1036F TOBACCO NON-USER: CPT | Performed by: PSYCHIATRY & NEUROLOGY

## 2018-10-11 NOTE — PROGRESS NOTES
[id number] [facility]  [address] [place] [address] Drive, [address]  [place], [place]  Phone [phone]  Fax [phone] [address] [facility] Follow Up Encounter  10/11/2018 3:18 PM    Information:   Patient Name: Daria Lemons  :   1935  Age:   80 y.o. MRN:   [id number]  Account #:  [de-identified]  Today:  10/11/18    Provider: Darryl Lopez M.D. Chief Complaint:   Chief Complaint   Patient presents with    6 Month Follow-Up     Alzheimer's disease       Subjective:   Daria Lemons is a 80 y.o. woman  with a history of stroke, small vessel cerebrovascular disease, dementia, and seizures who is following up. She resides in the [facility]. She has had some further memory decline. She requires her medications to be managed. She has had no further stroke symptoms or seizures. Objective:     Past Medical History:  Past Medical History:   Diagnosis Date    Anemia     Colitis, ischemic (HCC)     Dementia     Diverticulosis     Hyperlipidemia     Hypertension     Osteoarthritis     Seizures ([address]     Spastic colon     Stroke syndrome     Urinary incontinence        Past Surgical History:   Procedure Laterality Date    BREAST SURGERY Right     Biopsy    CHOLECYSTECTOMY      COLONOSCOPY  03    Dr Rudolph Vale ischemic colitis, incomplete exam    COLONOSCOPY  03    Dr Salinas Rater colon-ischemic colitis    EYE SURGERY      LUMBAR FUSION      SPINE SURGERY      3,4, and 5    TONSILLECTOMY AND ADENOIDECTOMY         Recent Hospitalizations  · None    Significant Injuries  · None    Habits  Daria Lemons reports that she has never smoked. She has never used smokeless tobacco. She reports that she does not drink alcohol or use drugs.     Family History   Problem Relation Age of Onset    Colon Cancer Neg Hx     Colon Polyps Neg Hx     Esophageal Cancer Neg Hx     Liver Cancer Neg Hx     Liver Disease Neg Hx     Rectal Cancer Neg Hx     Stomach

## 2018-11-12 ENCOUNTER — NURSE ONLY (OUTPATIENT)
Dept: UROLOGY | Age: 83
End: 2018-11-12
Payer: MEDICARE

## 2018-11-12 DIAGNOSIS — N39.46 MIXED STRESS AND URGE URINARY INCONTINENCE: Primary | ICD-10-CM

## 2018-11-12 PROCEDURE — 64566 NEUROELTRD STIM POST TIBIAL: CPT | Performed by: PHYSICIAN ASSISTANT

## 2018-11-12 PROCEDURE — 99999 PR OFFICE/OUTPT VISIT,PROCEDURE ONLY: CPT | Performed by: PHYSICIAN ASSISTANT

## 2018-11-12 ASSESSMENT — ENCOUNTER SYMPTOMS
HEARTBURN: 0
SORE THROAT: 0
SHORTNESS OF BREATH: 0
DOUBLE VISION: 0
WHEEZING: 0
BLURRED VISION: 0
NAUSEA: 0

## 2018-11-12 NOTE — PROGRESS NOTES
Apply 0.5 mLs topically 2 times daily as needed for Itching Apply topically as needed.  butalbital-acetaminophen-caffeine (ESGIC PLUS) -40 MG per tablet Take 1 tablet by mouth every 6 hours as needed for Headaches 15 tablet 0    acetaminophen (TYLENOL) 500 MG tablet Take 2 tablets by mouth every 6 hours as needed for Pain Do not exceed 4 g of acetaminophen daily (patient is also on Fioricet PRN) 100 tablet 0    cloNIDine (CATAPRES) 0.1 MG tablet Take 1 tablet by mouth 2 times daily (Patient taking differently: Take 0.1 mg by mouth as needed for High Blood Pressure (for BP greater than 170/100) ) 60 tablet 0    donepezil (ARICEPT) 10 MG tablet Take 1 tablet by mouth nightly 30 tablet 0    Calcium Carb-Cholecalciferol (CALCIUM 600+D) 600-800 MG-UNIT TABS Take 1 tablet by mouth Daily with lunch      promethazine (PHENERGAN) 25 MG tablet Take 25 mg by mouth 3 times daily as needed for Nausea      docusate sodium (COLACE) 100 MG capsule Take 100 mg by mouth nightly       Omega 3-6-9 Fatty Acids (OMEGA 3-6-9 COMPLEX PO) Take 2 tablets by mouth Daily with lunch       alendronate (FOSAMAX) 70 MG tablet Take 70 mg by mouth every 7 days Takes every Sunday        MICRONIZED COLESTIPOL HCL 1 G tablet Take 2 g by mouth as needed 2 tablets daily prn        dipyridamole (PERSANTINE) 75 MG tablet 75 mg 2 times daily       meloxicam (MOBIC) 15 MG tablet Take 15 mg by mouth nightly       BYSTOLIC 10 MG tablet Take 10 mg by mouth nightly       pravastatin (PRAVACHOL) 20 MG tablet Take 20 mg by mouth every morning       ramipril (ALTACE) 5 MG capsule Take 10 mg by mouth 2 times daily       spironolactone (ALDACTONE) 25 MG tablet 50 mg daily       b complex vitamins capsule Take 1 capsule by mouth Daily with lunch B 100      Multiple Vitamins-Minerals (THERAPEUTIC MULTIVITAMIN-MINERALS) tablet Take 1 tablet by mouth Daily with lunch        No current facility-administered medications for this visit. Allergies   Allergen Reactions    Aspirin     Codeine     Demerol Hcl [Meperidine]     Dilaudid [Hydromorphone Hcl]     Levaquin [Levofloxacin In D5w] Other (See Comments)     Seizure like activity      Myrbetriq [Mirabegron]     Namenda [Memantine Hcl]     Norco [Hydrocodone-Acetaminophen]     Ondansetron     Pneumococcal Vaccines     Quinoline Yellow Ss [Yellow Dye #11] Itching     Causes seizures. Causes seizures. Social History     Social History    Marital status:      Spouse name: N/A    Number of children: 1    Years of education: N/A     Social History Main Topics    Smoking status: Never Smoker    Smokeless tobacco: Never Used    Alcohol use No    Drug use: No    Sexual activity: Not on file     Other Topics Concern    Not on file     Social History Narrative    No narrative on file       Family History   Problem Relation Age of Onset    Colon Cancer Neg Hx     Colon Polyps Neg Hx     Esophageal Cancer Neg Hx     Liver Cancer Neg Hx     Liver Disease Neg Hx     Rectal Cancer Neg Hx     Stomach Cancer Neg Hx        REVIEW OF SYSTEMS:  Review of Systems   Constitutional: Negative for chills and fever. HENT: Negative for congestion and sore throat. Eyes: Negative for blurred vision and double vision. Respiratory: Negative for shortness of breath and wheezing. Cardiovascular: Negative for chest pain and palpitations. Gastrointestinal: Negative for heartburn and nausea. Genitourinary: Positive for frequency and urgency. Negative for dysuria, flank pain and hematuria. Musculoskeletal: Negative for falls and neck pain. Skin: Negative for itching and rash. Neurological: Negative for dizziness and tingling. Endo/Heme/Allergies: Does not bruise/bleed easily. Psychiatric/Behavioral: The patient does not have insomnia. PHYSICAL EXAM:  There were no vitals taken for this visit. Physical Exam   Constitutional: No distress.    HENT:

## 2018-12-13 ENCOUNTER — NURSE ONLY (OUTPATIENT)
Dept: UROLOGY | Age: 83
End: 2018-12-13
Payer: MEDICARE

## 2018-12-13 DIAGNOSIS — N39.46 MIXED STRESS AND URGE URINARY INCONTINENCE: Primary | ICD-10-CM

## 2018-12-13 PROCEDURE — 64566 NEUROELTRD STIM POST TIBIAL: CPT | Performed by: PHYSICIAN ASSISTANT

## 2018-12-13 PROCEDURE — 99999 PR POST TIBIAL NEUROSTIMULATION,PERC NEEDLE ELECTRODE: CPT | Performed by: PHYSICIAN ASSISTANT

## 2018-12-13 PROCEDURE — 99999 PR OFFICE/OUTPT VISIT,PROCEDURE ONLY: CPT | Performed by: PHYSICIAN ASSISTANT

## 2018-12-13 ASSESSMENT — ENCOUNTER SYMPTOMS
BLURRED VISION: 0
WHEEZING: 0
SHORTNESS OF BREATH: 0
NAUSEA: 0
DOUBLE VISION: 0
SORE THROAT: 0
HEARTBURN: 0

## 2018-12-13 NOTE — PROGRESS NOTES
spironolactone (ALDACTONE) 50 MG tablet       azelastine (ASTELIN) 0.1 % nasal spray       Probiotic Product (PROBIOTIC & ACIDOPHILUS EX ST) CAPS Take by mouth daily      cetirizine (ZYRTEC ALLERGY) 10 MG tablet Take 10 mg by mouth nightly      conjugated estrogens (PREMARIN) 0.625 MG/GM vaginal cream 1 gm vaginally weekly     Also, apply small amount to the urethra      nitrofurantoin, macrocrystal-monohydrate, (MACROBID) 100 MG capsule Take 50 mg by mouth nightly       levETIRAcetam (KEPPRA) 1000 MG tablet Take 1 tablet by mouth 2 times daily 60 tablet 0    Cholecalciferol (VITAMIN D3) 5000 units TABS Take 5,000 Units by mouth Daily with lunch      benzonatate (TESSALON) 100 MG capsule Take 200 mg by mouth 3 times daily as needed for Cough      simethicone (MYLICON) 80 MG chewable tablet Take 125 mg by mouth every 6 hours as needed for Flatulence      polyvinyl alcohol-povidone (HYPOTEARS) 1.4-0.6 % ophthalmic solution Place 1-2 drops into both eyes as needed      sodium chloride (OCEAN, BABY AYR) 0.65 % nasal spray 1 spray by Nasal route as needed for Congestion      tobramycin-dexamethasone (TOBRADEX) 0.3-0.1 % ophthalmic ointment Place into the left eye as needed 3 times daily.  hydrALAZINE (APRESOLINE) 25 MG tablet Take 25 mg by mouth 2 times daily      SODIUM CHLORIDE PO Take 1 g by mouth 3 times daily Breakfast lunch and dinner.        Ascorbic Acid (VITAMIN C PO) Take 1,000 mg by mouth Daily with lunch Breakfast and lunch       Lutein 20 MG TABS Take 20 mg by mouth Daily with lunch      fluticasone (VERAMYST) 27.5 MCG/SPRAY nasal spray 2 sprays by Nasal route daily       albuterol sulfate  (90 Base) MCG/ACT inhaler Inhale 2 puffs into the lungs every 4 hours as needed for Wheezing      amLODIPine (NORVASC) 10 MG tablet Take by mouth daily       pantoprazole (PROTONIX) 20 MG tablet Take 20 mg by mouth daily       camphor-menthol (SARNA) 0.5-0.5 % lotion Apply 0.5 mLs topically

## 2019-01-14 ENCOUNTER — NURSE ONLY (OUTPATIENT)
Dept: UROLOGY | Age: 84
End: 2019-01-14
Payer: MEDICARE

## 2019-01-14 ENCOUNTER — HOSPITAL ENCOUNTER (EMERGENCY)
Age: 84
Discharge: HOME | End: 2019-01-14
Attending: EMERGENCY MEDICINE
Payer: MEDICARE

## 2019-01-14 VITALS
WEIGHT: 140 LBS | DIASTOLIC BLOOD PRESSURE: 72 MMHG | HEIGHT: 65 IN | BODY MASS INDEX: 23.32 KG/M2 | RESPIRATION RATE: 17 BRPM | TEMPERATURE: 98.2 F | OXYGEN SATURATION: 98 % | HEART RATE: 82 BPM | SYSTOLIC BLOOD PRESSURE: 152 MMHG

## 2019-01-14 DIAGNOSIS — N39.0 RECURRENT UTI: Primary | ICD-10-CM

## 2019-01-14 DIAGNOSIS — N30.00 ACUTE CYSTITIS WITHOUT HEMATURIA: Primary | ICD-10-CM

## 2019-01-14 LAB
ALBUMIN SERPL-MCNC: 5 G/DL (ref 3.5–5.2)
ALP BLD-CCNC: 72 U/L (ref 35–104)
ALT SERPL-CCNC: 20 U/L (ref 5–33)
ANION GAP SERPL CALCULATED.3IONS-SCNC: 10 MMOL/L (ref 7–19)
AST SERPL-CCNC: 23 U/L (ref 5–32)
BACTERIA: NORMAL /HPF
BASOPHILS ABSOLUTE: 0 K/UL (ref 0–0.2)
BASOPHILS RELATIVE PERCENT: 0.4 % (ref 0–1)
BILIRUB SERPL-MCNC: 0.5 MG/DL (ref 0.2–1.2)
BILIRUBIN URINE: NEGATIVE
BLOOD, URINE: ABNORMAL
BUN BLDV-MCNC: 20 MG/DL (ref 8–23)
CALCIUM SERPL-MCNC: 10.6 MG/DL (ref 8.8–10.2)
CHLORIDE BLD-SCNC: 101 MMOL/L (ref 98–111)
CLARITY: ABNORMAL
CO2: 26 MMOL/L (ref 22–29)
COLOR: YELLOW
CREAT SERPL-MCNC: 0.9 MG/DL (ref 0.5–0.9)
EOSINOPHILS ABSOLUTE: 0.1 K/UL (ref 0–0.6)
EOSINOPHILS RELATIVE PERCENT: 0.9 % (ref 0–5)
EPITHELIAL CELLS, UA: NORMAL /HPF
GFR NON-AFRICAN AMERICAN: 60
GLUCOSE BLD-MCNC: 117 MG/DL (ref 74–109)
GLUCOSE URINE: NEGATIVE MG/DL
HCT VFR BLD CALC: 36.6 % (ref 37–47)
HEMOGLOBIN: 12.5 G/DL (ref 12–16)
KETONES, URINE: ABNORMAL MG/DL
LEUKOCYTE ESTERASE, URINE: ABNORMAL
LYMPHOCYTES ABSOLUTE: 0.8 K/UL (ref 1.1–4.5)
LYMPHOCYTES RELATIVE PERCENT: 10.8 % (ref 20–40)
MCH RBC QN AUTO: 33 PG (ref 27–31)
MCHC RBC AUTO-ENTMCNC: 34.2 G/DL (ref 33–37)
MCV RBC AUTO: 96.6 FL (ref 81–99)
MONOCYTES ABSOLUTE: 0.3 K/UL (ref 0–0.9)
MONOCYTES RELATIVE PERCENT: 4 % (ref 0–10)
NEUTROPHILS ABSOLUTE: 6.4 K/UL (ref 1.5–7.5)
NEUTROPHILS RELATIVE PERCENT: 83.5 % (ref 50–65)
NITRITE, URINE: NEGATIVE
PDW BLD-RTO: 11.7 % (ref 11.5–14.5)
PH UA: 6.5
PLATELET # BLD: 184 K/UL (ref 130–400)
PMV BLD AUTO: 9.9 FL (ref 9.4–12.3)
POTASSIUM SERPL-SCNC: 4.2 MMOL/L (ref 3.5–5)
PROTEIN UA: 30 MG/DL
RBC # BLD: 3.79 M/UL (ref 4.2–5.4)
RBC UA: NORMAL /HPF (ref 0–2)
SODIUM BLD-SCNC: 137 MMOL/L (ref 136–145)
SPECIFIC GRAVITY UA: 1.02
TOTAL PROTEIN: 7.2 G/DL (ref 6.6–8.7)
TROPONIN: <0.01 NG/ML (ref 0–0.03)
URINE REFLEX TO CULTURE: YES
UROBILINOGEN, URINE: 0.2 E.U./DL
WBC # BLD: 7.7 K/UL (ref 4.8–10.8)
WBC UA: NORMAL /HPF (ref 0–5)

## 2019-01-14 PROCEDURE — 84484 ASSAY OF TROPONIN QUANT: CPT

## 2019-01-14 PROCEDURE — 99283 EMERGENCY DEPT VISIT LOW MDM: CPT

## 2019-01-14 PROCEDURE — 99284 EMERGENCY DEPT VISIT MOD MDM: CPT | Performed by: EMERGENCY MEDICINE

## 2019-01-14 PROCEDURE — 81001 URINALYSIS AUTO W/SCOPE: CPT

## 2019-01-14 PROCEDURE — P9612 CATHETERIZE FOR URINE SPEC: HCPCS | Performed by: PHYSICIAN ASSISTANT

## 2019-01-14 PROCEDURE — 99999 PR OFFICE/OUTPT VISIT,PROCEDURE ONLY: CPT | Performed by: PHYSICIAN ASSISTANT

## 2019-01-14 PROCEDURE — 85025 COMPLETE CBC W/AUTO DIFF WBC: CPT

## 2019-01-14 PROCEDURE — 2580000003 HC RX 258: Performed by: EMERGENCY MEDICINE

## 2019-01-14 PROCEDURE — 87086 URINE CULTURE/COLONY COUNT: CPT

## 2019-01-14 PROCEDURE — 93005 ELECTROCARDIOGRAM TRACING: CPT

## 2019-01-14 PROCEDURE — 80053 COMPREHEN METABOLIC PANEL: CPT

## 2019-01-14 PROCEDURE — 36415 COLL VENOUS BLD VENIPUNCTURE: CPT

## 2019-01-14 RX ORDER — CEFDINIR 300 MG/1
300 CAPSULE ORAL 2 TIMES DAILY
Qty: 14 CAPSULE | Refills: 0 | Status: SHIPPED | OUTPATIENT
Start: 2019-01-14 | End: 2019-01-21

## 2019-01-14 RX ORDER — 0.9 % SODIUM CHLORIDE 0.9 %
500 INTRAVENOUS SOLUTION INTRAVENOUS ONCE
Status: COMPLETED | OUTPATIENT
Start: 2019-01-14 | End: 2019-01-14

## 2019-01-14 RX ADMIN — SODIUM CHLORIDE 500 ML: 9 INJECTION, SOLUTION INTRAVENOUS at 12:35

## 2019-01-14 ASSESSMENT — ENCOUNTER SYMPTOMS
DIARRHEA: 0
SORE THROAT: 0
ABDOMINAL PAIN: 0
RHINORRHEA: 0
VOMITING: 0
NAUSEA: 0
SHORTNESS OF BREATH: 0
BACK PAIN: 0
COUGH: 0

## 2019-01-15 LAB
EKG P AXIS: 75 DEGREES
EKG P-R INTERVAL: 190 MS
EKG Q-T INTERVAL: 412 MS
EKG QRS DURATION: 90 MS
EKG QTC CALCULATION (BAZETT): 419 MS
EKG T AXIS: 50 DEGREES

## 2019-01-16 ENCOUNTER — NURSE ONLY (OUTPATIENT)
Dept: UROLOGY | Age: 84
End: 2019-01-16
Payer: MEDICARE

## 2019-01-16 VITALS — TEMPERATURE: 97.3 F

## 2019-01-16 DIAGNOSIS — N39.46 MIXED STRESS AND URGE URINARY INCONTINENCE: Primary | ICD-10-CM

## 2019-01-16 LAB
URINE CULTURE, ROUTINE: NORMAL
URINE CULTURE, ROUTINE: NORMAL

## 2019-01-16 PROCEDURE — 99999 PR OFFICE/OUTPT VISIT,PROCEDURE ONLY: CPT | Performed by: PHYSICIAN ASSISTANT

## 2019-01-16 PROCEDURE — 64566 NEUROELTRD STIM POST TIBIAL: CPT | Performed by: PHYSICIAN ASSISTANT

## 2019-01-16 ASSESSMENT — ENCOUNTER SYMPTOMS
HEARTBURN: 0
SHORTNESS OF BREATH: 0
NAUSEA: 0
DOUBLE VISION: 0
WHEEZING: 0
SORE THROAT: 0
BLURRED VISION: 0

## 2019-01-21 ENCOUNTER — OFFICE VISIT (OUTPATIENT)
Dept: NEUROLOGY | Age: 84
End: 2019-01-21
Payer: MEDICARE

## 2019-01-21 VITALS — HEART RATE: 58 BPM | SYSTOLIC BLOOD PRESSURE: 130 MMHG | DIASTOLIC BLOOD PRESSURE: 70 MMHG | RESPIRATION RATE: 16 BRPM

## 2019-01-21 DIAGNOSIS — I63.9 SMALL VESSEL CEREBROVASCULAR ACCIDENT (CVA) (HCC): ICD-10-CM

## 2019-01-21 DIAGNOSIS — G30.1 LATE ONSET ALZHEIMER'S DISEASE WITHOUT BEHAVIORAL DISTURBANCE (HCC): ICD-10-CM

## 2019-01-21 DIAGNOSIS — G45.9 TIA (TRANSIENT ISCHEMIC ATTACK): Primary | ICD-10-CM

## 2019-01-21 DIAGNOSIS — I69.398 SEIZURE AS LATE EFFECT OF CEREBROVASCULAR ACCIDENT (CVA) (HCC): ICD-10-CM

## 2019-01-21 DIAGNOSIS — F02.80 LATE ONSET ALZHEIMER'S DISEASE WITHOUT BEHAVIORAL DISTURBANCE (HCC): ICD-10-CM

## 2019-01-21 DIAGNOSIS — R56.9 SEIZURE AS LATE EFFECT OF CEREBROVASCULAR ACCIDENT (CVA) (HCC): ICD-10-CM

## 2019-01-21 DIAGNOSIS — I63.89 OTHER CEREBRAL INFARCTION (HCC): ICD-10-CM

## 2019-01-21 PROCEDURE — G8484 FLU IMMUNIZE NO ADMIN: HCPCS | Performed by: PSYCHIATRY & NEUROLOGY

## 2019-01-21 PROCEDURE — G8400 PT W/DXA NO RESULTS DOC: HCPCS | Performed by: PSYCHIATRY & NEUROLOGY

## 2019-01-21 PROCEDURE — G8599 NO ASA/ANTIPLAT THER USE RNG: HCPCS | Performed by: PSYCHIATRY & NEUROLOGY

## 2019-01-21 PROCEDURE — 1101F PT FALLS ASSESS-DOCD LE1/YR: CPT | Performed by: PSYCHIATRY & NEUROLOGY

## 2019-01-21 PROCEDURE — G8427 DOCREV CUR MEDS BY ELIG CLIN: HCPCS | Performed by: PSYCHIATRY & NEUROLOGY

## 2019-01-21 PROCEDURE — 99214 OFFICE O/P EST MOD 30 MIN: CPT | Performed by: PSYCHIATRY & NEUROLOGY

## 2019-01-21 PROCEDURE — 1036F TOBACCO NON-USER: CPT | Performed by: PSYCHIATRY & NEUROLOGY

## 2019-01-21 PROCEDURE — 4040F PNEUMOC VAC/ADMIN/RCVD: CPT | Performed by: PSYCHIATRY & NEUROLOGY

## 2019-01-21 PROCEDURE — G8420 CALC BMI NORM PARAMETERS: HCPCS | Performed by: PSYCHIATRY & NEUROLOGY

## 2019-01-21 PROCEDURE — 1090F PRES/ABSN URINE INCON ASSESS: CPT | Performed by: PSYCHIATRY & NEUROLOGY

## 2019-01-21 PROCEDURE — 1123F ACP DISCUSS/DSCN MKR DOCD: CPT | Performed by: PSYCHIATRY & NEUROLOGY

## 2019-01-21 RX ORDER — SIMETHICONE 125 MG
125 TABLET,CHEWABLE ORAL
COMMUNITY
End: 2020-03-09

## 2019-01-21 RX ORDER — LEVOCETIRIZINE DIHYDROCHLORIDE 5 MG/1
5 TABLET, FILM COATED ORAL NIGHTLY PRN
COMMUNITY
Start: 2019-01-10 | End: 2019-07-11

## 2019-01-22 ENCOUNTER — TELEPHONE (OUTPATIENT)
Dept: NEUROLOGY | Age: 84
End: 2019-01-22

## 2019-01-25 ENCOUNTER — HOSPITAL ENCOUNTER (OUTPATIENT)
Dept: MRI IMAGING | Age: 84
Discharge: HOME | End: 2019-01-25
Payer: MEDICARE

## 2019-01-25 ENCOUNTER — HOSPITAL ENCOUNTER (OUTPATIENT)
Dept: VASCULAR LAB | Age: 84
Discharge: HOME | End: 2019-01-25
Payer: MEDICARE

## 2019-01-25 ENCOUNTER — HOSPITAL ENCOUNTER (OUTPATIENT)
Dept: NEUROLOGY | Age: 84
Discharge: HOME | End: 2019-01-25
Payer: MEDICARE

## 2019-01-25 DIAGNOSIS — I69.398 SEIZURE AS LATE EFFECT OF CEREBROVASCULAR ACCIDENT (CVA) (HCC): ICD-10-CM

## 2019-01-25 DIAGNOSIS — I63.9 SMALL VESSEL CEREBROVASCULAR ACCIDENT (CVA) (HCC): ICD-10-CM

## 2019-01-25 DIAGNOSIS — G45.9 TIA (TRANSIENT ISCHEMIC ATTACK): ICD-10-CM

## 2019-01-25 DIAGNOSIS — I63.89 OTHER CEREBRAL INFARCTION (HCC): ICD-10-CM

## 2019-01-25 DIAGNOSIS — R56.9 SEIZURE AS LATE EFFECT OF CEREBROVASCULAR ACCIDENT (CVA) (HCC): ICD-10-CM

## 2019-01-25 PROCEDURE — 95812 EEG 41-60 MINUTES: CPT

## 2019-01-25 PROCEDURE — 95819 EEG AWAKE AND ASLEEP: CPT | Performed by: PSYCHIATRY & NEUROLOGY

## 2019-01-25 PROCEDURE — 70551 MRI BRAIN STEM W/O DYE: CPT

## 2019-01-25 PROCEDURE — 93880 EXTRACRANIAL BILAT STUDY: CPT

## 2019-01-27 LAB — KEPPRA: 79 UG/ML (ref 12–46)

## 2019-01-30 ENCOUNTER — TELEPHONE (OUTPATIENT)
Dept: NEUROLOGY | Age: 84
End: 2019-01-30

## 2019-02-11 ENCOUNTER — TELEPHONE (OUTPATIENT)
Dept: NEUROLOGY | Age: 84
End: 2019-02-11

## 2019-02-15 ENCOUNTER — TELEPHONE (OUTPATIENT)
Dept: NEUROLOGY | Age: 84
End: 2019-02-15

## 2019-02-15 ENCOUNTER — TELEPHONE (OUTPATIENT)
Dept: UROLOGY | Age: 84
End: 2019-02-15

## 2019-02-18 ENCOUNTER — NURSE ONLY (OUTPATIENT)
Dept: UROLOGY | Age: 84
End: 2019-02-18
Payer: MEDICARE

## 2019-02-18 VITALS — TEMPERATURE: 96.6 F

## 2019-02-18 DIAGNOSIS — N39.46 MIXED STRESS AND URGE URINARY INCONTINENCE: Primary | ICD-10-CM

## 2019-02-18 PROCEDURE — 99999 PR OFFICE/OUTPT VISIT,PROCEDURE ONLY: CPT | Performed by: PHYSICIAN ASSISTANT

## 2019-02-18 PROCEDURE — 64566 NEUROELTRD STIM POST TIBIAL: CPT | Performed by: PHYSICIAN ASSISTANT

## 2019-02-18 RX ORDER — NITROFURANTOIN MACROCRYSTALS 50 MG/1
50 CAPSULE ORAL 4 TIMES DAILY
Qty: 30 CAPSULE | Refills: 5 | Status: SHIPPED | OUTPATIENT
Start: 2019-02-18 | End: 2019-02-18 | Stop reason: CLARIF

## 2019-02-18 RX ORDER — NITROFURANTOIN MACROCRYSTALS 50 MG/1
50 CAPSULE ORAL NIGHTLY
Qty: 30 CAPSULE | Refills: 5 | Status: SHIPPED | OUTPATIENT
Start: 2019-02-18 | End: 2019-02-19 | Stop reason: SDUPTHER

## 2019-02-18 ASSESSMENT — ENCOUNTER SYMPTOMS
BLURRED VISION: 0
NAUSEA: 0
SORE THROAT: 0
WHEEZING: 0
HEARTBURN: 0
DOUBLE VISION: 0
SHORTNESS OF BREATH: 0

## 2019-02-19 RX ORDER — NITROFURANTOIN MACROCRYSTALS 50 MG/1
50 CAPSULE ORAL NIGHTLY
Qty: 30 CAPSULE | Refills: 5 | Status: SHIPPED | OUTPATIENT
Start: 2019-02-19 | End: 2019-03-21

## 2019-03-18 ENCOUNTER — NURSE ONLY (OUTPATIENT)
Dept: UROLOGY | Age: 84
End: 2019-03-18
Payer: MEDICARE

## 2019-03-18 DIAGNOSIS — N39.46 MIXED STRESS AND URGE URINARY INCONTINENCE: Primary | ICD-10-CM

## 2019-03-18 PROCEDURE — 99999 PR OFFICE/OUTPT VISIT,PROCEDURE ONLY: CPT | Performed by: PHYSICIAN ASSISTANT

## 2019-03-18 PROCEDURE — 64566 NEUROELTRD STIM POST TIBIAL: CPT | Performed by: PHYSICIAN ASSISTANT

## 2019-03-18 ASSESSMENT — ENCOUNTER SYMPTOMS
SORE THROAT: 0
HEARTBURN: 0
WHEEZING: 0
DOUBLE VISION: 0
BLURRED VISION: 0
SHORTNESS OF BREATH: 0
NAUSEA: 0

## 2019-03-21 LAB
ALBUMIN SERPL-MCNC: 4.7 G/DL (ref 3.5–5.2)
ALP BLD-CCNC: 69 U/L (ref 35–104)
ALT SERPL-CCNC: 17 U/L (ref 5–33)
ANION GAP SERPL CALCULATED.3IONS-SCNC: 12 MMOL/L (ref 7–19)
AST SERPL-CCNC: 28 U/L (ref 5–32)
BASOPHILS ABSOLUTE: 0.1 K/UL (ref 0–0.2)
BASOPHILS RELATIVE PERCENT: 0.8 % (ref 0–1)
BILIRUB SERPL-MCNC: 0.4 MG/DL (ref 0.2–1.2)
BUN BLDV-MCNC: 18 MG/DL (ref 8–23)
CALCIUM SERPL-MCNC: 10.1 MG/DL (ref 8.8–10.2)
CHLORIDE BLD-SCNC: 105 MMOL/L (ref 98–111)
CHOLESTEROL, TOTAL: 171 MG/DL (ref 160–199)
CO2: 21 MMOL/L (ref 22–29)
CREAT SERPL-MCNC: 1.2 MG/DL (ref 0.5–0.9)
CREATININE URINE: 176.4 MG/DL (ref 4.2–622)
EOSINOPHILS ABSOLUTE: 0.5 K/UL (ref 0–0.6)
EOSINOPHILS RELATIVE PERCENT: 8.1 % (ref 0–5)
GFR NON-AFRICAN AMERICAN: 43
GLUCOSE BLD-MCNC: 92 MG/DL (ref 74–109)
HCT VFR BLD CALC: 35 % (ref 37–47)
HDLC SERPL-MCNC: 68 MG/DL (ref 65–121)
HEMOGLOBIN: 12.1 G/DL (ref 12–16)
LDL CHOLESTEROL CALCULATED: 74 MG/DL
LYMPHOCYTES ABSOLUTE: 1.8 K/UL (ref 1.1–4.5)
LYMPHOCYTES RELATIVE PERCENT: 27.9 % (ref 20–40)
MAGNESIUM: 1.7 MG/DL (ref 1.6–2.4)
MCH RBC QN AUTO: 34.3 PG (ref 27–31)
MCHC RBC AUTO-ENTMCNC: 34.6 G/DL (ref 33–37)
MCV RBC AUTO: 99.2 FL (ref 81–99)
MONOCYTES ABSOLUTE: 0.4 K/UL (ref 0–0.9)
MONOCYTES RELATIVE PERCENT: 5.6 % (ref 0–10)
NEUTROPHILS ABSOLUTE: 3.7 K/UL (ref 1.5–7.5)
NEUTROPHILS RELATIVE PERCENT: 57.1 % (ref 50–65)
PDW BLD-RTO: 12.3 % (ref 11.5–14.5)
PLATELET # BLD: 238 K/UL (ref 130–400)
PMV BLD AUTO: 10.4 FL (ref 9.4–12.3)
POTASSIUM SERPL-SCNC: 4 MMOL/L (ref 3.5–5)
RBC # BLD: 3.53 M/UL (ref 4.2–5.4)
SODIUM BLD-SCNC: 138 MMOL/L (ref 136–145)
SODIUM URINE: 72 MMOL/L
TOTAL PROTEIN: 6.9 G/DL (ref 6.6–8.7)
TRIGL SERPL-MCNC: 145 MG/DL (ref 0–149)
TSH SERPL DL<=0.05 MIU/L-ACNC: 1.13 UIU/ML (ref 0.27–4.2)
VITAMIN B-12: 1844 PG/ML (ref 211–946)
VITAMIN D 25-HYDROXY: 56.9 NG/ML
WBC # BLD: 6.6 K/UL (ref 4.8–10.8)

## 2019-03-28 ENCOUNTER — TELEPHONE (OUTPATIENT)
Dept: NEUROLOGY | Age: 84
End: 2019-03-28

## 2019-04-08 ENCOUNTER — OFFICE VISIT (OUTPATIENT)
Dept: NEUROLOGY | Age: 84
End: 2019-04-08
Payer: MEDICARE

## 2019-04-08 VITALS
SYSTOLIC BLOOD PRESSURE: 114 MMHG | DIASTOLIC BLOOD PRESSURE: 56 MMHG | HEART RATE: 60 BPM | HEIGHT: 61 IN | BODY MASS INDEX: 23.98 KG/M2 | WEIGHT: 127 LBS

## 2019-04-08 DIAGNOSIS — G30.1 LATE ONSET ALZHEIMER'S DISEASE WITHOUT BEHAVIORAL DISTURBANCE (HCC): ICD-10-CM

## 2019-04-08 DIAGNOSIS — F02.80 LATE ONSET ALZHEIMER'S DISEASE WITHOUT BEHAVIORAL DISTURBANCE (HCC): ICD-10-CM

## 2019-04-08 DIAGNOSIS — I69.398 SEIZURE AS LATE EFFECT OF CEREBROVASCULAR ACCIDENT (CVA) (HCC): Primary | ICD-10-CM

## 2019-04-08 DIAGNOSIS — R56.9 SEIZURE AS LATE EFFECT OF CEREBROVASCULAR ACCIDENT (CVA) (HCC): Primary | ICD-10-CM

## 2019-04-08 PROCEDURE — 1036F TOBACCO NON-USER: CPT | Performed by: PSYCHIATRY & NEUROLOGY

## 2019-04-08 PROCEDURE — 4040F PNEUMOC VAC/ADMIN/RCVD: CPT | Performed by: PSYCHIATRY & NEUROLOGY

## 2019-04-08 PROCEDURE — G8599 NO ASA/ANTIPLAT THER USE RNG: HCPCS | Performed by: PSYCHIATRY & NEUROLOGY

## 2019-04-08 PROCEDURE — 1123F ACP DISCUSS/DSCN MKR DOCD: CPT | Performed by: PSYCHIATRY & NEUROLOGY

## 2019-04-08 PROCEDURE — G8427 DOCREV CUR MEDS BY ELIG CLIN: HCPCS | Performed by: PSYCHIATRY & NEUROLOGY

## 2019-04-08 PROCEDURE — G8400 PT W/DXA NO RESULTS DOC: HCPCS | Performed by: PSYCHIATRY & NEUROLOGY

## 2019-04-08 PROCEDURE — 99214 OFFICE O/P EST MOD 30 MIN: CPT | Performed by: PSYCHIATRY & NEUROLOGY

## 2019-04-08 PROCEDURE — 1090F PRES/ABSN URINE INCON ASSESS: CPT | Performed by: PSYCHIATRY & NEUROLOGY

## 2019-04-08 PROCEDURE — G8420 CALC BMI NORM PARAMETERS: HCPCS | Performed by: PSYCHIATRY & NEUROLOGY

## 2019-04-08 RX ORDER — LEVETIRACETAM 750 MG/1
750 TABLET ORAL 2 TIMES DAILY
Qty: 60 TABLET | Refills: 5 | Status: ON HOLD | OUTPATIENT
Start: 2019-04-08 | End: 2019-09-14 | Stop reason: CLARIF

## 2019-04-08 NOTE — PROGRESS NOTES
[facility] Neurology  [address] [place] [address] Drive, [address]  [place], Wil[place] Horowitz  Phone [phone]  Fax [phone]     [facility] Neurology Follow Up Encounter  2019 11:44 AM    Information:   Patient Name: Dominic Hanna  :   1935  Age:   80 y.o. MRN:   [id number]  Account #:  [de-identified]  Today:  19    Provider: Delorse Hashimoto, M.D. Chief Complaint:   Chief Complaint   Patient presents with    Follow-up    Transient Ischemic Attack       Subjective:   Dominic Hanna is a 80 y.o. woman with a history of stroke, seizures, and dementia who is following up. She has had small strokes years ago. She did not tolerate Aggrenox and was a fast metabolizer of Plavix per [id number] testing. She takes ASA and persantine. Her daughter reports that she has had high BP. She has had no further spells. She is improving. She is ambulating. Her daughter complains of her having a tremor in her hands. She and her daughter complain that it takes her up to 4 hours to have a bowel movement in the am.  They deny constipation though. Objective:     Past Medical History:  Past Medical History:   Diagnosis Date    Anemia     Colitis, ischemic (HCC)     Dementia     Diverticulosis     Hyperlipidemia     Hypertension     Osteoarthritis     Seizures ([address]     Spastic colon     Stroke syndrome     Urinary incontinence        Past Surgical History:   Procedure Laterality Date    BREAST SURGERY Right     Biopsy    CHOLECYSTECTOMY      COLONOSCOPY  03    Dr Ashley Krishnan ischemic colitis, incomplete exam    COLONOSCOPY  03    Dr Eagle Cook colon-ischemic colitis    EYE SURGERY      LUMBAR FUSION      SPINE SURGERY      3,4, and 5    TONSILLECTOMY AND ADENOIDECTOMY         Recent Hospitalizations  · None    Significant Injuries  · None    Habits  Dominic Hanna reports that she has never smoked.  She has never used smokeless tobacco. She reports that she does not drink alcohol or use drugs. Family History   Problem Relation Age of Onset    Colon Cancer Neg Hx     Colon Polyps Neg Hx     Esophageal Cancer Neg Hx     Liver Cancer Neg Hx     Liver Disease Neg Hx     Rectal Cancer Neg Hx     Stomach Cancer Neg Hx        Social History  Temo Arce is , lives in [place], [place], and is retired. Medications:  Current Outpatient Medications   Medication Sig Dispense Refill    tiotropium (SPIRIVA) 18 MCG inhalation capsule Inhale 18 mcg into the lungs nightly      levocetirizine (XYZAL) 5 MG tablet Take 5 mg by mouth nightly as needed       simethicone (PHAZYME) 125 MG chewable tablet Take 250 mg by mouth 2 times daily       nitrofurantoin (MACRODANTIN) 50 MG capsule TAKE 1 CAPSULE AT BEDTIME      spironolactone (ALDACTONE) 50 MG tablet Take 50 mg by mouth daily       azelastine (ASTELIN) 0.1 % nasal spray 2 sprays by Nasal route 2 times daily       conjugated estrogens (PREMARIN) 0.625 MG/GM vaginal cream 1 gm vaginally weekly     Also, apply small amount to the urethra      levETIRAcetam (KEPPRA) 1000 MG tablet Take 1 tablet by mouth 2 times daily 60 tablet 0    Cholecalciferol (VITAMIN D3) 5000 units TABS Take 5,000 Units by mouth Daily with lunch      benzonatate (TESSALON) 100 MG capsule Take 200 mg by mouth 3 times daily as needed for Cough      polyvinyl alcohol-povidone (HYPOTEARS) 1.4-0.6 % ophthalmic solution Place 1-2 drops into both eyes as needed      sodium chloride (OCEAN, BABY AYR) 0.65 % nasal spray 1 spray by Nasal route as needed for Congestion      tobramycin-dexamethasone (TOBRADEX) 0.3-0.1 % ophthalmic ointment Place into the left eye as needed 3 times daily.  hydrALAZINE (APRESOLINE) 25 MG tablet Take 25 mg by mouth 2 times daily      SODIUM CHLORIDE PO Take 1 g by mouth 3 times daily Breakfast lunch and dinner.        Ascorbic Acid (VITAMIN C PO) Take 1,000 mg by mouth Daily with lunch Breakfast and lunch       Lutein 20 MG TABS Take 20 mg by mouth Daily with lunch      fluticasone (VERAMYST) 27.5 MCG/SPRAY nasal spray 2 sprays by Nasal route daily       albuterol sulfate  (90 Base) MCG/ACT inhaler Inhale 2 puffs into the lungs every 4 hours as needed for Wheezing      amLODIPine (NORVASC) 10 MG tablet Take by mouth daily       pantoprazole (PROTONIX) 20 MG tablet Take 20 mg by mouth daily       camphor-menthol (SARNA) 0.5-0.5 % lotion Apply 0.5 mLs topically 2 times daily as needed for Itching Apply topically as needed.       butalbital-acetaminophen-caffeine (ESGIC PLUS) -40 MG per tablet Take 1 tablet by mouth every 6 hours as needed for Headaches 15 tablet 0    acetaminophen (TYLENOL) 500 MG tablet Take 2 tablets by mouth every 6 hours as needed for Pain Do not exceed 4 g of acetaminophen daily (patient is also on Fioricet PRN) 100 tablet 0    cloNIDine (CATAPRES) 0.1 MG tablet Take 1 tablet by mouth 2 times daily (Patient taking differently: Take 0.1 mg by mouth as needed for High Blood Pressure (for BP greater than 170/100) ) 60 tablet 0    donepezil (ARICEPT) 10 MG tablet Take 1 tablet by mouth nightly 30 tablet 0    Calcium Carb-Cholecalciferol (CALCIUM 600+D) 600-800 MG-UNIT TABS Take 1 tablet by mouth Daily with lunch      promethazine (PHENERGAN) 25 MG tablet Take 25 mg by mouth 3 times daily as needed for Nausea      docusate sodium (COLACE) 100 MG capsule Take 100 mg by mouth nightly as needed for Constipation       Omega 3-6-9 Fatty Acids (OMEGA 3-6-9 COMPLEX PO) Take 2 tablets by mouth Daily with lunch       alendronate (FOSAMAX) 70 MG tablet Take 70 mg by mouth every 7 days Takes every Sunday        MICRONIZED COLESTIPOL HCL 1 G tablet Take 2 g by mouth as needed 2 tablets daily prn        dipyridamole (PERSANTINE) 75 MG tablet 75 mg 2 times daily       meloxicam (MOBIC) 15 MG tablet Take 15 mg by mouth nightly       BYSTOLIC 10 MG tablet Take 10 mg by mouth daily Indications: in the a.m.       pravastatin (PRAVACHOL) 20 MG tablet Take 20 mg by mouth every morning       ramipril (ALTACE) 5 MG capsule Take 10 mg by mouth 2 times daily       b complex vitamins capsule Take 1 capsule by mouth Daily with lunch B 100      Multiple Vitamins-Minerals (THERAPEUTIC MULTIVITAMIN-MINERALS) tablet Take 1 tablet by mouth Daily with lunch        No current facility-administered medications for this visit. Allergies:   Allergies as of 04/08/2019 - Review Complete 04/08/2019   Allergen Reaction Noted    Aspirin  03/17/2016    Codeine  08/11/2016    Demerol hcl [meperidine]  03/17/2016    Dilaudid [hydromorphone hcl]  03/17/2016    Levaquin [levofloxacin in d5w] Other (See Comments) 02/21/2018    Myrbetriq [mirabegron]  04/12/2018    Namenda [memantine hcl]  03/17/2016    Norco [hydrocodone-acetaminophen]  03/17/2016    Ondansetron  03/17/2016    Pneumococcal vaccines  03/17/2016    Quinoline yellow ss [yellow dye #11] Itching 03/19/2018       Review of Systems:  General ROS: negative for - chills or fever  Psychological ROS: negative for - anxiety or depression  ENT ROS: negative for - headaches or sinus pain  Hematological and Lymphatic ROS: negative for - bleeding problems, bruising or swollen lymph nodes  Respiratory ROS: negative for - cough, hemoptysis or shortness of breath  Cardiovascular ROS: negative for - chest pain or palpitations  Gastrointestinal ROS: negative for - blood in stools, constipation, diarrhea or nausea/vomiting  Genito-Urinary ROS: negative for - hematuria or urinary frequency/urgency  Musculoskeletal ROS: negative for - joint pain, joint stiffness or joint swelling  Neurological ROS: negative for - numbness/tingling or weakness     Examination:  Vitals:  BP (!) 114/56   Pulse 60   Ht 5' 1\" (1.549 m)   Wt 127 lb (57.6 kg)   BMI 24.00 kg/m²   General appearance: alert and cooperative with exam  HEENT: Sclera clear, anicteric, Oropharynx clear, no lesions, Neck supple with midline trachea, Thyroid without masses and Trachea midline  Heart:: regular rate and rhythm, S1, S2 normal, no murmur, click, rub or gallop  Lungs: clear to auscultation bilaterally  Extremities: extremities normal, atraumatic, no cyanosis or edema  Neurologic: Extraocular movements are intact without nystagmus. Visual fields are full to confrontation. Facial movements are symmetrical and normal. Speech is precise. Extremity strength is normal in both uppers and lowers. Deep tendon reflexes are intact and symmetrical. Rapid alternating movements are unimpaired. Finger-to-nose testing is performed well, without dysmetria. Her balance is poor and her gait with walker is very slow. Pertinent Diagnostic Studies:  Labs. Keppra level was elevated but was not a trough level. Assessment:       ICD-10-CM    1. 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 Late onset Alzheimer's disease without behavioral disturbance G30.1     F02.80    she is improving. Plan:   1. Reduce the Keppra to 750 mg twice a day  2.  Continue the other medications      Electronically signed by Jenny Cloud MD on 4/8/2019

## 2019-04-18 ENCOUNTER — NURSE ONLY (OUTPATIENT)
Dept: UROLOGY | Age: 84
End: 2019-04-18
Payer: MEDICARE

## 2019-04-18 DIAGNOSIS — N39.46 MIXED STRESS AND URGE URINARY INCONTINENCE: Primary | ICD-10-CM

## 2019-04-18 PROCEDURE — 64566 NEUROELTRD STIM POST TIBIAL: CPT | Performed by: PHYSICIAN ASSISTANT

## 2019-04-18 ASSESSMENT — ENCOUNTER SYMPTOMS
SHORTNESS OF BREATH: 0
DOUBLE VISION: 0
SORE THROAT: 0
NAUSEA: 0
BLURRED VISION: 0
HEARTBURN: 0
WHEEZING: 0

## 2019-04-18 NOTE — PROGRESS NOTES
Leila Ortega is a 80 y.o. female who presents today   Chief Complaint   Patient presents with    Follow-up     1 month uroplasty     Mixed incontinence follow-up:     Patient is a 49-year-old female with several year history of worsening incontinence she has both elements of stress and urge incontinence. She had no improvement on lifestyle changes and or medications the last few weeks. Patient is here for her monthly treatment. She has had significant improvement in her overall urinary symptoms and complaints. 1. Patient is seated with the left treatment leg elevated. 2. 34 gauge fine needle electrode is inserted into lower inner aspect of the leg. Slightly cephalad to the medial malleolus. 3. Surface electrode pad is placed over the medial aspect of the calcaneus on the same leg. 4.Needle electrode is connected to the external pulse generator. 5.The pulse generator is turned on and the millivolts used are 10. 6.Patient is treated for 30 minutes. 7.The needle and pad are removed. Patient tolerated procedure.     Past Medical History:   Diagnosis Date    Anemia     Colitis, ischemic ([address]     Dementia     Diverticulosis     Hyperlipidemia     Hypertension     Osteoarthritis     Seizures (HCC)     Spastic colon     Stroke syndrome     Urinary incontinence        Past Surgical History:   Procedure Laterality Date    BREAST SURGERY Right     Biopsy    CHOLECYSTECTOMY      COLONOSCOPY  9/25/03    Dr Yvette Adorno ischemic colitis, incomplete exam    COLONOSCOPY  8/29/03    Dr German Lujan colon-ischemic colitis    EYE SURGERY      LUMBAR FUSION      SPINE SURGERY      3,4, and 5    TONSILLECTOMY AND ADENOIDECTOMY         Current Outpatient Medications   Medication Sig Dispense Refill    tiotropium (SPIRIVA) 18 MCG inhalation capsule Inhale 18 mcg into the lungs nightly      levETIRAcetam (KEPPRA) 750 MG tablet Take 1 tablet by mouth 2 times daily 60 tablet 5    levocetirizine (XYZAL) 5 MG tablet Take 5 mg by mouth nightly as needed       simethicone (PHAZYME) 125 MG chewable tablet Take 250 mg by mouth 2 times daily       nitrofurantoin (MACRODANTIN) 50 MG capsule TAKE 1 CAPSULE AT BEDTIME      spironolactone (ALDACTONE) 50 MG tablet Take 50 mg by mouth daily       azelastine (ASTELIN) 0.1 % nasal spray 2 sprays by Nasal route 2 times daily       conjugated estrogens (PREMARIN) 0.625 MG/GM vaginal cream 1 gm vaginally weekly     Also, apply small amount to the urethra      Cholecalciferol (VITAMIN D3) 5000 units TABS Take 5,000 Units by mouth Daily with lunch      benzonatate (TESSALON) 100 MG capsule Take 200 mg by mouth 3 times daily as needed for Cough      polyvinyl alcohol-povidone (HYPOTEARS) 1.4-0.6 % ophthalmic solution Place 1-2 drops into both eyes as needed      sodium chloride (OCEAN, BABY AYR) 0.65 % nasal spray 1 spray by Nasal route as needed for Congestion      tobramycin-dexamethasone (TOBRADEX) 0.3-0.1 % ophthalmic ointment Place into the left eye as needed 3 times daily.  hydrALAZINE (APRESOLINE) 25 MG tablet Take 25 mg by mouth 2 times daily      SODIUM CHLORIDE PO Take 1 g by mouth 3 times daily Breakfast lunch and dinner.  Ascorbic Acid (VITAMIN C PO) Take 1,000 mg by mouth Daily with lunch Breakfast and lunch       Lutein 20 MG TABS Take 20 mg by mouth Daily with lunch      fluticasone (VERAMYST) 27.5 MCG/SPRAY nasal spray 2 sprays by Nasal route daily       albuterol sulfate  (90 Base) MCG/ACT inhaler Inhale 2 puffs into the lungs every 4 hours as needed for Wheezing      amLODIPine (NORVASC) 10 MG tablet Take by mouth daily       pantoprazole (PROTONIX) 20 MG tablet Take 20 mg by mouth daily       camphor-menthol (SARNA) 0.5-0.5 % lotion Apply 0.5 mLs topically 2 times daily as needed for Itching Apply topically as needed.       butalbital-acetaminophen-caffeine (ESGIC PLUS) -40 MG per tablet Take 1 tablet by mouth every 6 hours as needed for Headaches 15 tablet 0    acetaminophen (TYLENOL) 500 MG tablet Take 2 tablets by mouth every 6 hours as needed for Pain Do not exceed 4 g of acetaminophen daily (patient is also on Fioricet PRN) 100 tablet 0    cloNIDine (CATAPRES) 0.1 MG tablet Take 1 tablet by mouth 2 times daily (Patient taking differently: Take 0.1 mg by mouth as needed for High Blood Pressure (for BP greater than 170/100) ) 60 tablet 0    donepezil (ARICEPT) 10 MG tablet Take 1 tablet by mouth nightly 30 tablet 0    Calcium Carb-Cholecalciferol (CALCIUM 600+D) 600-800 MG-UNIT TABS Take 1 tablet by mouth Daily with lunch      promethazine (PHENERGAN) 25 MG tablet Take 25 mg by mouth 3 times daily as needed for Nausea      docusate sodium (COLACE) 100 MG capsule Take 100 mg by mouth nightly as needed for Constipation       Omega 3-6-9 Fatty Acids (OMEGA 3-6-9 COMPLEX PO) Take 2 tablets by mouth Daily with lunch       alendronate (FOSAMAX) 70 MG tablet Take 70 mg by mouth every 7 days Takes every Sunday        MICRONIZED COLESTIPOL HCL 1 G tablet Take 2 g by mouth as needed 2 tablets daily prn        dipyridamole (PERSANTINE) 75 MG tablet 75 mg 2 times daily       meloxicam (MOBIC) 15 MG tablet Take 15 mg by mouth nightly       BYSTOLIC 10 MG tablet Take 10 mg by mouth daily Indications: in the a.m.  pravastatin (PRAVACHOL) 20 MG tablet Take 20 mg by mouth every morning       ramipril (ALTACE) 5 MG capsule Take 10 mg by mouth 2 times daily       b complex vitamins capsule Take 1 capsule by mouth Daily with lunch B 100      Multiple Vitamins-Minerals (THERAPEUTIC MULTIVITAMIN-MINERALS) tablet Take 1 tablet by mouth Daily with lunch        No current facility-administered medications for this visit.         Allergies   Allergen Reactions    Aspirin     Codeine     Demerol Hcl [Meperidine]     Dilaudid [Hydromorphone Hcl]     Levaquin [Levofloxacin In D5w] Other (See Comments)     Seizure like activity      Myrbetriq [Mirabegron]     Namenda [Memantine Hcl]     Norco [Hydrocodone-Acetaminophen]     Ondansetron     Pneumococcal Vaccines     Quinoline Yellow Ss [Yellow Dye #11] Itching     Causes seizures. Causes seizures. Social History     Socioeconomic History    Marital status:      Spouse name: Not on file    Number of children: 1    Years of education: Not on file    Highest education level: Not on file   Occupational History    Not on file   Social Needs    Financial resource strain: Not on file    Food insecurity:     Worry: Not on file     Inability: Not on file    Transportation needs:     Medical: Not on file     Non-medical: Not on file   Tobacco Use    Smoking status: Never Smoker    Smokeless tobacco: Never Used   Substance and Sexual Activity    Alcohol use: No    Drug use: No    Sexual activity: Not on file   Lifestyle    Physical activity:     Days per week: Not on file     Minutes per session: Not on file    Stress: Not on file   Relationships    Social connections:     Talks on phone: Not on file     Gets together: Not on file     Attends Spiritism service: Not on file     Active member of club or organization: Not on file     Attends meetings of clubs or organizations: Not on file     Relationship status: Not on file    Intimate partner violence:     Fear of current or ex partner: Not on file     Emotionally abused: Not on file     Physically abused: Not on file     Forced sexual activity: Not on file   Other Topics Concern    Not on file   Social History Narrative    Not on file       Family History   Problem Relation Age of Onset    Colon Cancer Neg Hx     Colon Polyps Neg Hx     Esophageal Cancer Neg Hx     Liver Cancer Neg Hx     Liver Disease Neg Hx     Rectal Cancer Neg Hx     Stomach Cancer Neg Hx        REVIEW OF SYSTEMS:  Review of Systems   Constitutional: Negative for chills and fever.    HENT: Negative for congestion and sore throat. Eyes: Negative for blurred vision and double vision. Respiratory: Negative for shortness of breath and wheezing. Cardiovascular: Negative for chest pain and palpitations. Gastrointestinal: Negative for heartburn and nausea. Genitourinary: Positive for frequency and urgency. Negative for dysuria, flank pain and hematuria. Musculoskeletal: Negative for falls and neck pain. Skin: Negative for itching and rash. Neurological: Negative for dizziness and tingling. Endo/Heme/Allergies: Does not bruise/bleed easily. Psychiatric/Behavioral: The patient does not have insomnia. PHYSICAL EXAM:  There were no vitals taken for this visit. Physical Exam   Constitutional: No distress. HENT:   Mouth/Throat: No oropharyngeal exudate. Eyes: Left eye exhibits no discharge. No scleral icterus. Neck: No JVD present. No tracheal deviation present. No thyromegaly present. Cardiovascular: Exam reveals no gallop and no friction rub. Pulmonary/Chest: No stridor. She has no rales. Abdominal: She exhibits no distension and no mass. There is no rebound and no guarding. Musculoskeletal: She exhibits no edema. Neurological: No cranial nerve deficit. She exhibits normal muscle tone. Coordination normal.   Skin: She is not diaphoretic. No pallor. 1. Mixed stress and urge urinary incontinence  Patient here for her 1 month treatment. Patient continues to have control and improvement in symptoms on the monthly treatments after about the 3rd week she does have some worsening of symptoms before her next treatment.    - LA POST TIBIAL NEUROSTIMULATION,PERC NEEDLE ELECTRODE        Plan:  Follow up in 1 month for next uroplasty    Merritt Wilhelm PA-C

## 2019-05-03 ENCOUNTER — OFFICE VISIT (OUTPATIENT)
Dept: GASTROENTEROLOGY | Age: 84
End: 2019-05-03
Payer: MEDICARE

## 2019-05-03 VITALS
BODY MASS INDEX: 24.13 KG/M2 | SYSTOLIC BLOOD PRESSURE: 110 MMHG | WEIGHT: 127.8 LBS | HEART RATE: 64 BPM | DIASTOLIC BLOOD PRESSURE: 70 MMHG | HEIGHT: 61 IN

## 2019-05-03 DIAGNOSIS — R14.1 GAS PAIN: Primary | ICD-10-CM

## 2019-05-03 DIAGNOSIS — R10.31 CHRONIC BILATERAL LOWER ABDOMINAL PAIN: ICD-10-CM

## 2019-05-03 DIAGNOSIS — Z87.19 HISTORY OF ISCHEMIC COLITIS: ICD-10-CM

## 2019-05-03 DIAGNOSIS — R10.32 CHRONIC BILATERAL LOWER ABDOMINAL PAIN: ICD-10-CM

## 2019-05-03 DIAGNOSIS — G89.29 CHRONIC BILATERAL LOWER ABDOMINAL PAIN: ICD-10-CM

## 2019-05-03 PROCEDURE — 1123F ACP DISCUSS/DSCN MKR DOCD: CPT | Performed by: NURSE PRACTITIONER

## 2019-05-03 PROCEDURE — G8427 DOCREV CUR MEDS BY ELIG CLIN: HCPCS | Performed by: NURSE PRACTITIONER

## 2019-05-03 PROCEDURE — G8400 PT W/DXA NO RESULTS DOC: HCPCS | Performed by: NURSE PRACTITIONER

## 2019-05-03 PROCEDURE — 1090F PRES/ABSN URINE INCON ASSESS: CPT | Performed by: NURSE PRACTITIONER

## 2019-05-03 PROCEDURE — 1036F TOBACCO NON-USER: CPT | Performed by: NURSE PRACTITIONER

## 2019-05-03 PROCEDURE — 4040F PNEUMOC VAC/ADMIN/RCVD: CPT | Performed by: NURSE PRACTITIONER

## 2019-05-03 PROCEDURE — G8599 NO ASA/ANTIPLAT THER USE RNG: HCPCS | Performed by: NURSE PRACTITIONER

## 2019-05-03 PROCEDURE — 99203 OFFICE O/P NEW LOW 30 MIN: CPT | Performed by: NURSE PRACTITIONER

## 2019-05-03 PROCEDURE — G8420 CALC BMI NORM PARAMETERS: HCPCS | Performed by: NURSE PRACTITIONER

## 2019-05-03 ASSESSMENT — ENCOUNTER SYMPTOMS
TROUBLE SWALLOWING: 0
BLOOD IN STOOL: 0
DIARRHEA: 0
CONSTIPATION: 1
VOICE CHANGE: 0
ANAL BLEEDING: 0
BACK PAIN: 1
COUGH: 0
ABDOMINAL DISTENTION: 0
NAUSEA: 0
SHORTNESS OF BREATH: 1
VOMITING: 0
RECTAL PAIN: 0
ABDOMINAL PAIN: 1
SORE THROAT: 0

## 2019-05-03 NOTE — PATIENT INSTRUCTIONS
You are going to have a colonoscopy and here are some instructions: You will be given specific directions regarding restrictions to diet and bowel prep instructions including laxatives. Please read these instructions one week prior to your scheduled procedure to ensure that you are prepared. Follow prep instructions provided for bowel prep. Take all of the bowel prep as directed. If you are having problems with nausea, stop your prep for 30-45 min to allow the nausea to subside before resuming your prep. Nothing to eat or drink after midnight the day of the procedure EXCEPT:  PLEASE TAKE MEDICATION(S) FOR HIGH BLOOD PRESSURE, THYROID, SEIZURES, AND HEART THE MORNING OF THE PROCEDURE WITH A SIP OF WATER  AT LEAST 2 HOURS PRIOR TO ARRIVAL TIME.   YOU MAY ALSO TAKE ANY INHALERS YOU ARE PRESCRIBED. You will not be able to drive for 24 hours after the procedure due to sedation. Bring a  with you the day of the procedure. No aspirin, ibuprofen, naproxen, fish oil or vitamin E for 5 days before procedure. If you are on blood thinners, clearance from the prescribing physician will be obtained before your procedure is scheduled. Increased [employer] may be associated with discontinuation of your blood thinner and include, but not limited to, stroke, TIA, or cardiac event. If polyps are removed during the procedure they will be sent to a pathologist for analysis. You will be notified by mail of the pathology results in 2-3 weeks. Your physician may also schedule a follow up appointment with the nurse practitioner to discuss pathology, symptoms or to check if you have had any problems related to your procedure. If you prefer not to return to the office after your procedure please discuss this with your physician on the day of your colonoscopy. Final recommendations are based on the pathologist report.      Your diet before a colonoscopy bowel preparation is very important to ensure a successful colon exam. It is recommended to consider certain changes to your diet three to four days prior to the procedure. Remember that your bowels need to be empty for the exam.    What foods are good to eat? Cut down on heavy solid foods three to four days before the procedure and start introducing lighter meals to your diet. The following food suggestions are a good part of your diet before a colonoscopy bowel preparation. Light meat that is easily digestible such as chicken (without the skin)   Potatoes without skin   Cheese   Eggs   A light meal of steamed white fish   Light clear soups    Foods and drinks to avoid  Avoid foods that contain too much fiber. Stay clear of dark colored beverages. They can stick to the walls of the digestive tract and make it difficult to differentiate from blood. Some of these foods are:  Red meat, rice, nuts and vegetables   Milk, other milk based fluids and cream   Most fruit and puddings   Whole grain pasta   Cereals, bran and seeds   Colored beverages, especially those that are red or purple in color   Red colored Jell-O   On the day before the colonoscopy, continue to drink plenty of clear fluids. It is important   to keep yourself hydrated before the exam.     Please follow all instructions as provided for cleansing the bowel. Failure to have an adequately prepped colon may cause you to have incomplete exam with further testing required.

## 2019-05-03 NOTE — PROGRESS NOTES
Subjective:      Sandip Carroll is a80 y.o. female  Chief Complaint   Patient presents with    Abdominal Pain     since 1961 when her daughter was born, Dr Josh Oviedo is requesting a colonoscopy       HPI  PCP: Josh Oviedo DO  Referring Provider: Trent Hernandez DO  Pt is referred here for a colonoscopy by her PCP. Pt reports she has abd cramping, gas, and mild constipation. Patient's daughter reports she has lower abd cramping every morning for 4 hours. However pt reports this isn't true, that she just has cramping sometimes. Chronic conditions since 1961, at which time she was diagnosed with IBS. Currently on metamucil daily and colace daily. And this helps sometimes. Gas-X helps sometimes too. Has taken antispasmotics, which worsen her complaints. We have previously deferred a colonoscopy when I saw her before. Today she agrees to have a colonoscopy. But is requesting a clearance with Dr Jojo Caruso, neuro, due to her hx of jha and TIAs.    Colonoscopy 9/2003 [facility])- (performed as a f/u from ischemic colitis)- healed ischemic colitis with incomplete exam without visualization of right colon. BE ordered, I do not have the report of this for review. Family HX:    Pt denies family hx of colon polyps, colon CA, inflammatory bowel dx, gastric CA and esophageal CA.     Past Medical History:   Diagnosis Date    Anemia     Back pain     Colitis, ischemic (HCC)     Dementia     Diverticulosis     Hyperlipidemia     Hypertension     Osteoarthritis     Seizures (HCC)     Spastic colon     Stroke syndrome     Urinary incontinence           Past Surgical History:   Procedure Laterality Date    BREAST SURGERY Right     Biopsy    CHOLECYSTECTOMY      COLONOSCOPY  9/25/03    Dr Alicia Schuster ischemic colitis, incomplete exam    COLONOSCOPY  8/29/03    Dr Beard Patches colon-ischemic colitis    EYE SURGERY      LUMBAR FUSION      SPINE SURGERY      3,4, and 5    TONSILLECTOMY AND ADENOIDECTOMY         Social History     Socioeconomic History    Marital status:      Spouse name: None    Number of children: 1    Years of education: None    Highest education level: None   Occupational History    None   Social Needs    Financial resource strain: None    Food insecurity:     Worry: None     Inability: None    Transportation needs:     Medical: None     Non-medical: None   Tobacco Use    Smoking status: Never Smoker    Smokeless tobacco: Never Used   Substance and Sexual Activity    Alcohol use: No    Drug use: No    Sexual activity: None   Lifestyle    Physical activity:     Days per week: None     Minutes per session: None    Stress: None   Relationships    Social connections:     Talks on phone: None     Gets together: None     Attends Roman Catholic service: None     Active member of club or organization: None     Attends meetings of clubs or organizations: None     Relationship status: None    Intimate partner violence:     Fear of current or ex partner: None     Emotionally abused: None     Physically abused: None     Forced sexual activity: None   Other Topics Concern    None   Social History Narrative    None       Allergies   Allergen Reactions    Aspirin     Codeine     Demerol Hcl [Meperidine]     Dilaudid [Hydromorphone Hcl]     Levaquin [Levofloxacin In D5w] Other (See Comments)     Seizure like activity      Myrbetriq [Mirabegron]     Namenda [Memantine Hcl]     Norco [Hydrocodone-Acetaminophen]     Ondansetron     Pneumococcal Vaccines     Quinoline Yellow Ss [Yellow Dye #11] Itching     Causes seizures. Causes seizures.         Current Outpatient Medications   Medication Sig Dispense Refill    tiotropium (SPIRIVA) 18 MCG inhalation capsule Inhale 18 mcg into the lungs nightly      levETIRAcetam (KEPPRA) 750 MG tablet Take 1 tablet by mouth 2 times daily 60 tablet 5    levocetirizine (XYZAL) 5 MG tablet Take 5 mg by mouth nightly as needed  simethicone (PHAZYME) 125 MG chewable tablet Take 250 mg by mouth 2 times daily       nitrofurantoin (MACRODANTIN) 50 MG capsule TAKE 1 CAPSULE AT BEDTIME      spironolactone (ALDACTONE) 50 MG tablet Take 50 mg by mouth daily       azelastine (ASTELIN) 0.1 % nasal spray 2 sprays by Nasal route 2 times daily       conjugated estrogens (PREMARIN) 0.625 MG/GM vaginal cream 1 gm vaginally weekly     Also, apply small amount to the urethra      Cholecalciferol (VITAMIN D3) 5000 units TABS Take 5,000 Units by mouth Daily with lunch      benzonatate (TESSALON) 100 MG capsule Take 200 mg by mouth 3 times daily as needed for Cough      polyvinyl alcohol-povidone (HYPOTEARS) 1.4-0.6 % ophthalmic solution Place 1-2 drops into both eyes as needed      sodium chloride (OCEAN, BABY AYR) 0.65 % nasal spray 1 spray by Nasal route as needed for Congestion      tobramycin-dexamethasone (TOBRADEX) 0.3-0.1 % ophthalmic ointment Place into the left eye as needed 3 times daily.  SODIUM CHLORIDE PO Take 1 g by mouth 3 times daily Breakfast lunch and dinner.  Ascorbic Acid (VITAMIN C PO) Take 1,000 mg by mouth Daily with lunch Breakfast and lunch       Lutein 20 MG TABS Take 20 mg by mouth Daily with lunch      fluticasone (VERAMYST) 27.5 MCG/SPRAY nasal spray 2 sprays by Nasal route daily       albuterol sulfate  (90 Base) MCG/ACT inhaler Inhale 2 puffs into the lungs every 4 hours as needed for Wheezing      amLODIPine (NORVASC) 10 MG tablet Take by mouth daily       pantoprazole (PROTONIX) 20 MG tablet Take 20 mg by mouth daily       camphor-menthol (SARNA) 0.5-0.5 % lotion Apply 0.5 mLs topically 2 times daily as needed for Itching Apply topically as needed.       butalbital-acetaminophen-caffeine (ESGIC PLUS) -40 MG per tablet Take 1 tablet by mouth every 6 hours as needed for Headaches 15 tablet 0    acetaminophen (TYLENOL) 500 MG tablet Take 2 tablets by mouth every 6 hours as needed for Pain Do not exceed 4 g of acetaminophen daily (patient is also on Fioricet PRN) 100 tablet 0    cloNIDine (CATAPRES) 0.1 MG tablet Take 1 tablet by mouth 2 times daily (Patient taking differently: Take 0.1 mg by mouth as needed for High Blood Pressure (for BP greater than 170/100) ) 60 tablet 0    donepezil (ARICEPT) 10 MG tablet Take 1 tablet by mouth nightly 30 tablet 0    Calcium Carb-Cholecalciferol (CALCIUM 600+D) 600-800 MG-UNIT TABS Take 1 tablet by mouth Daily with lunch      promethazine (PHENERGAN) 25 MG tablet Take 25 mg by mouth 3 times daily as needed for Nausea      docusate sodium (COLACE) 100 MG capsule Take 100 mg by mouth nightly as needed for Constipation       Omega 3-6-9 Fatty Acids (OMEGA 3-6-9 COMPLEX PO) Take 2 tablets by mouth Daily with lunch       alendronate (FOSAMAX) 70 MG tablet Take 70 mg by mouth every 7 days Takes every Sunday        MICRONIZED COLESTIPOL HCL 1 G tablet Take 2 g by mouth as needed 2 tablets daily prn        dipyridamole (PERSANTINE) 75 MG tablet 75 mg 2 times daily       meloxicam (MOBIC) 15 MG tablet Take 15 mg by mouth nightly       BYSTOLIC 10 MG tablet Take 10 mg by mouth daily Indications: in the a.m.  pravastatin (PRAVACHOL) 20 MG tablet Take 20 mg by mouth every morning       ramipril (ALTACE) 5 MG capsule Take 10 mg by mouth 2 times daily       b complex vitamins capsule Take 1 capsule by mouth Daily with lunch B 100      Multiple Vitamins-Minerals (THERAPEUTIC MULTIVITAMIN-MINERALS) tablet Take 1 tablet by mouth Daily with lunch       Psyllium (METAMUCIL FIBER PO) Take by mouth daily      hydrALAZINE (APRESOLINE) 25 MG tablet Take 25 mg by mouth 2 times daily       No current facility-administered medications for this visit. Review of Systems   Constitutional: Negative for appetite change, fatigue, fever and unexpected weight change. HENT: Negative for sore throat, trouble swallowing and voice change.     Respiratory: Positive for shortness of breath (at times). Negative for cough. Cardiovascular: Negative for chest pain, palpitations and leg swelling. Gastrointestinal: Positive for abdominal pain and constipation (controlled). Negative for abdominal distention, anal bleeding, blood in stool, diarrhea, nausea, rectal pain and vomiting. Genitourinary: Negative for hematuria. Musculoskeletal: Positive for arthralgias, back pain and neck pain. Neurological: Negative for dizziness, weakness, light-headedness and headaches. Psychiatric/Behavioral: Positive for confusion, dysphoric mood and sleep disturbance. The patient is nervous/anxious. All other systems reviewed and are negative. Objective:     Physical Exam   Constitutional: She is oriented to person, place, and time. She appears well-developed and well-nourished. /70   Pulse 64   Ht 5' 1\" (1.549 m)   Wt 127 lb 12.8 oz (58 kg)   BMI 24.15 kg/m²    Eyes: Pupils are equal, round, and reactive to light. Conjunctivae and EOM are normal. No scleral icterus. Neck: Normal range of motion. Neck supple. No thyromegaly present. Cardiovascular: Normal rate, regular rhythm and normal heart sounds. Exam reveals no gallop and no friction rub. No murmur heard. Pulmonary/Chest: Effort normal and breath sounds normal. No respiratory distress. Abdominal: Soft. Bowel sounds are normal. She exhibits no distension. There is no tenderness. There is no rebound. Musculoskeletal: Normal range of motion. She exhibits no edema or deformity. Neurological: She is alert and oriented to person, place, and time. No cranial nerve deficit. Psychiatric: She has a normal mood and affect. Judgment normal.   Nursing note and vitals reviewed. Assessment:       Diagnosis Orders   1. Gas pain  COLONOSCOPY W/ OR W/O BIOPSY   2.  Chronic bilateral lower abdominal pain  COLONOSCOPY W/ OR W/O BIOPSY   3. History of ischemic colitis  COLONOSCOPY W/ OR W/O BIOPSY         Plan: 1.Schedule outpatient colonoscopy- trilight prep due to pt reported hx of renal impairment. Patient advised no Aspirin, Fish Oil, Vit E or NSAIDs 5 (five) days before procedure. Follow-up Visit: per Dr Frannie Quevedo clearance from Dr. Jarrett Abdalla for conscious sedation. Pt education:  Risks, benefits, and alternatives to colonoscopy were discussed. Risks of colonoscopy include, but are not limited to, perforation, bleeding, and infection. We discussed that the risk for perforation is 1-3 in 5,000  at the time of colonoscopy;   and 1-2% risk of bleeding post-polypectomy. All questions answered to the satisfaction of the patient. Pt is agreeable to proceed. 2. Connor Jitendra notified that all her appts need to be 40 min appts due to the time involved obtaining information from patient and her daughter.

## 2019-05-15 ENCOUNTER — NURSE ONLY (OUTPATIENT)
Dept: UROLOGY | Age: 84
End: 2019-05-15
Payer: MEDICARE

## 2019-05-15 DIAGNOSIS — N39.46 MIXED STRESS AND URGE URINARY INCONTINENCE: Primary | ICD-10-CM

## 2019-05-15 LAB
BILIRUBIN, POC: 0
BLOOD URINE, POC: NORMAL
CLARITY, POC: CLEAR
COLOR, POC: YELLOW
GLUCOSE URINE, POC: NORMAL
KETONES, POC: NORMAL
LEUKOCYTE EST, POC: NORMAL
NITRITE, POC: NORMAL
PH, POC: 5
PROTEIN, POC: NORMAL
SPECIFIC GRAVITY, POC: 1.02
UROBILINOGEN, POC: 0.2

## 2019-05-15 PROCEDURE — 64566 NEUROELTRD STIM POST TIBIAL: CPT | Performed by: NURSE PRACTITIONER

## 2019-05-15 PROCEDURE — 81003 URINALYSIS AUTO W/O SCOPE: CPT | Performed by: NURSE PRACTITIONER

## 2019-05-15 PROCEDURE — P9612 CATHETERIZE FOR URINE SPEC: HCPCS | Performed by: NURSE PRACTITIONER

## 2019-05-15 ASSESSMENT — ENCOUNTER SYMPTOMS
BLURRED VISION: 0
DOUBLE VISION: 0
SORE THROAT: 0
NAUSEA: 0
SHORTNESS OF BREATH: 0
HEARTBURN: 0
WHEEZING: 0

## 2019-05-15 NOTE — PROGRESS NOTES
Westley Briones is a 80 y.o. female who presents today   Chief Complaint   Patient presents with    Follow-up     pt here today for uroplasty      Mixed incontinence follow-up:     Patient is a 70-year-old female with several year history of worsening incontinence she has both elements of stress and urge incontinence. She had no improvement on lifestyle changes and or medications the last few weeks. Patient is here for her monthly treatment. She has had significant improvement in her overall urinary symptoms and complaints. She does complain that the treatments are lasting around 3 weeks. 1. Patient is seated with the left treatment leg elevated. 2. 34 gauge fine needle electrode is inserted into lower inner aspect of the leg. Slightly cephalad to the medial malleolus. 3. Surface electrode pad is placed over the medial aspect of the calcaneus on the same leg. 4.Needle electrode is connected to the external pulse generator. 5.The pulse generator is turned on and the millivolts used are 7. 6.Patient is treated for 30 minutes. 7.The needle and pad are removed. Patient tolerated procedure. Patient daughter also complained of patient having fatigue. The fatigue started 3 days ago. They are concerned for UTI.   Past Medical History:   Diagnosis Date    Anemia     Back pain     Colitis, ischemic (HCC)     Dementia     Diverticulosis     Hyperlipidemia     Hypertension     Osteoarthritis     Seizures (HCC)     Spastic colon     Stroke syndrome     Urinary incontinence        Past Surgical History:   Procedure Laterality Date    BREAST SURGERY Right     Biopsy    CHOLECYSTECTOMY      COLONOSCOPY  9/25/03    Dr Urban Sofia ischemic colitis, incomplete exam    COLONOSCOPY  8/29/03    Dr Yury Whitman colon-ischemic colitis    EYE SURGERY      LUMBAR FUSION      SPINE SURGERY      3,4, and 5    TONSILLECTOMY AND ADENOIDECTOMY         Current Outpatient Medications Medication Sig Dispense Refill    Psyllium (METAMUCIL FIBER PO) Take by mouth daily      tiotropium (SPIRIVA) 18 MCG inhalation capsule Inhale 18 mcg into the lungs nightly      levETIRAcetam (KEPPRA) 750 MG tablet Take 1 tablet by mouth 2 times daily 60 tablet 5    levocetirizine (XYZAL) 5 MG tablet Take 5 mg by mouth nightly as needed       simethicone (PHAZYME) 125 MG chewable tablet Take 250 mg by mouth 2 times daily       nitrofurantoin (MACRODANTIN) 50 MG capsule TAKE 1 CAPSULE AT BEDTIME      spironolactone (ALDACTONE) 50 MG tablet Take 50 mg by mouth daily       azelastine (ASTELIN) 0.1 % nasal spray 2 sprays by Nasal route 2 times daily       conjugated estrogens (PREMARIN) 0.625 MG/GM vaginal cream 1 gm vaginally weekly     Also, apply small amount to the urethra      Cholecalciferol (VITAMIN D3) 5000 units TABS Take 5,000 Units by mouth Daily with lunch      benzonatate (TESSALON) 100 MG capsule Take 200 mg by mouth 3 times daily as needed for Cough      polyvinyl alcohol-povidone (HYPOTEARS) 1.4-0.6 % ophthalmic solution Place 1-2 drops into both eyes as needed      sodium chloride (OCEAN, BABY AYR) 0.65 % nasal spray 1 spray by Nasal route as needed for Congestion      tobramycin-dexamethasone (TOBRADEX) 0.3-0.1 % ophthalmic ointment Place into the left eye as needed 3 times daily.  hydrALAZINE (APRESOLINE) 25 MG tablet Take 25 mg by mouth 2 times daily      SODIUM CHLORIDE PO Take 1 g by mouth 3 times daily Breakfast lunch and dinner.        Ascorbic Acid (VITAMIN C PO) Take 1,000 mg by mouth Daily with lunch Breakfast and lunch       Lutein 20 MG TABS Take 20 mg by mouth Daily with lunch      fluticasone (VERAMYST) 27.5 MCG/SPRAY nasal spray 2 sprays by Nasal route daily       albuterol sulfate  (90 Base) MCG/ACT inhaler Inhale 2 puffs into the lungs every 4 hours as needed for Wheezing      amLODIPine (NORVASC) 10 MG tablet Take by mouth daily       pantoprazole (PROTONIX) 20 MG tablet Take 20 mg by mouth daily       camphor-menthol (SARNA) 0.5-0.5 % lotion Apply 0.5 mLs topically 2 times daily as needed for Itching Apply topically as needed.  butalbital-acetaminophen-caffeine (ESGIC PLUS) -40 MG per tablet Take 1 tablet by mouth every 6 hours as needed for Headaches 15 tablet 0    acetaminophen (TYLENOL) 500 MG tablet Take 2 tablets by mouth every 6 hours as needed for Pain Do not exceed 4 g of acetaminophen daily (patient is also on Fioricet PRN) 100 tablet 0    cloNIDine (CATAPRES) 0.1 MG tablet Take 1 tablet by mouth 2 times daily (Patient taking differently: Take 0.1 mg by mouth as needed for High Blood Pressure (for BP greater than 170/100) ) 60 tablet 0    donepezil (ARICEPT) 10 MG tablet Take 1 tablet by mouth nightly 30 tablet 0    Calcium Carb-Cholecalciferol (CALCIUM 600+D) 600-800 MG-UNIT TABS Take 1 tablet by mouth Daily with lunch      promethazine (PHENERGAN) 25 MG tablet Take 25 mg by mouth 3 times daily as needed for Nausea      docusate sodium (COLACE) 100 MG capsule Take 100 mg by mouth nightly as needed for Constipation       Omega 3-6-9 Fatty Acids (OMEGA 3-6-9 COMPLEX PO) Take 2 tablets by mouth Daily with lunch       alendronate (FOSAMAX) 70 MG tablet Take 70 mg by mouth every 7 days Takes every Sunday        MICRONIZED COLESTIPOL HCL 1 G tablet Take 2 g by mouth as needed 2 tablets daily prn        dipyridamole (PERSANTINE) 75 MG tablet 75 mg 2 times daily       meloxicam (MOBIC) 15 MG tablet Take 15 mg by mouth nightly       BYSTOLIC 10 MG tablet Take 10 mg by mouth daily Indications: in the a.m.        pravastatin (PRAVACHOL) 20 MG tablet Take 20 mg by mouth every morning       ramipril (ALTACE) 5 MG capsule Take 10 mg by mouth 2 times daily       b complex vitamins capsule Take 1 capsule by mouth Daily with lunch B 100      Multiple Vitamins-Minerals (THERAPEUTIC MULTIVITAMIN-MINERALS) tablet Take 1 tablet by mouth Daily with lunch        No current facility-administered medications for this visit. Allergies   Allergen Reactions    Aspirin     Codeine     Demerol Hcl [Meperidine]     Dilaudid [Hydromorphone Hcl]     Levaquin [Levofloxacin In D5w] Other (See Comments)     Seizure like activity      Myrbetriq [Mirabegron]     Namenda [Memantine Hcl]     Norco [Hydrocodone-Acetaminophen]     Ondansetron     Pneumococcal Vaccines     Quinoline Yellow Ss [Yellow Dye #11] Itching     Causes seizures. Causes seizures.         Social History     Socioeconomic History    Marital status:      Spouse name: None    Number of children: 1    Years of education: None    Highest education level: None   Occupational History    None   Social Needs    Financial resource strain: None    Food insecurity:     Worry: None     Inability: None    Transportation needs:     Medical: None     Non-medical: None   Tobacco Use    Smoking status: Never Smoker    Smokeless tobacco: Never Used   Substance and Sexual Activity    Alcohol use: No    Drug use: No    Sexual activity: None   Lifestyle    Physical activity:     Days per week: None     Minutes per session: None    Stress: None   Relationships    Social connections:     Talks on phone: None     Gets together: None     Attends Hinduism service: None     Active member of club or organization: None     Attends meetings of clubs or organizations: None     Relationship status: None    Intimate partner violence:     Fear of current or ex partner: None     Emotionally abused: None     Physically abused: None     Forced sexual activity: None   Other Topics Concern    None   Social History Narrative    None       Family History   Problem Relation Age of Onset    Colon Cancer Neg Hx     Colon Polyps Neg Hx     Esophageal Cancer Neg Hx     Liver Cancer Neg Hx     Liver Disease Neg Hx     Rectal Cancer Neg Hx     Stomach Cancer Neg Hx        REVIEW OF SYSTEMS:  Review of Systems   Constitutional: Negative for chills and fever. HENT: Negative for congestion and sore throat. Eyes: Negative for blurred vision and double vision. Respiratory: Negative for shortness of breath and wheezing. Cardiovascular: Negative for chest pain and palpitations. Gastrointestinal: Negative for heartburn and nausea. Genitourinary: Positive for frequency and urgency. Negative for dysuria, flank pain and hematuria. Musculoskeletal: Negative for falls and neck pain. Skin: Negative for itching and rash. Neurological: Negative for dizziness and tingling. Endo/Heme/Allergies: Does not bruise/bleed easily. Psychiatric/Behavioral: The patient does not have insomnia. PHYSICAL EXAM:    Physical Exam   Constitutional: No distress. HENT:   Mouth/Throat: No oropharyngeal exudate. Eyes: Left eye exhibits no discharge. No scleral icterus. Neck: No JVD present. No tracheal deviation present. No thyromegaly present. Cardiovascular: Exam reveals no gallop and no friction rub. Pulmonary/Chest: No stridor. She has no rales. Abdominal: She exhibits no distension and no mass. There is no rebound and no guarding. Musculoskeletal: She exhibits no edema. Neurological: No cranial nerve deficit. She exhibits normal muscle tone. Coordination normal.   Skin: She is not diaphoretic. No pallor. 1. Mixed stress and urge urinary incontinence  Patient here for her 1 month treatment. Patient continues to have control and improvement in symptoms on the monthly treatments after about the 3rd week she does have some worsening of symptoms before her next treatment. - PA POST TIBIAL NEUROSTIMULATION,PERC NEEDLE ELECTRODE    Sterile catheterized urine specimen was obtained and urine dipstick was negative for any signs of infection. Plan:  Follow up in 1 month for next uroplasty  Shahana Rosenberg

## 2019-06-12 ENCOUNTER — NURSE ONLY (OUTPATIENT)
Dept: UROLOGY | Age: 84
End: 2019-06-12
Payer: MEDICARE

## 2019-06-12 VITALS — TEMPERATURE: 97.3 F

## 2019-06-12 DIAGNOSIS — N39.46 MIXED STRESS AND URGE URINARY INCONTINENCE: Primary | ICD-10-CM

## 2019-06-12 PROCEDURE — 64566 NEUROELTRD STIM POST TIBIAL: CPT | Performed by: NURSE PRACTITIONER

## 2019-06-12 RX ORDER — VALACYCLOVIR HYDROCHLORIDE 1 G/1
TABLET, FILM COATED ORAL PRN
COMMUNITY
Start: 2019-06-06

## 2019-06-12 ASSESSMENT — ENCOUNTER SYMPTOMS
SHORTNESS OF BREATH: 0
WHEEZING: 0
HEARTBURN: 0
BLURRED VISION: 0
SORE THROAT: 0
DOUBLE VISION: 0
NAUSEA: 0

## 2019-06-12 NOTE — PROGRESS NOTES
Phil Garcia is a 80 y.o. female who presents today   Chief Complaint   Patient presents with    Follow-up     monthly uroplasty      Mixed incontinence follow-up:     Patient is a 63-year-old female with several year history of worsening incontinence she has both elements of stress and urge incontinence. She had no improvement on lifestyle changes and or medications the last few weeks. Patient is here for her monthly treatment. She has had significant improvement in her overall urinary symptoms and complaints. She does complain that the treatments are lasting around 3 weeks. 1. Patient is seated with the left treatment leg elevated. 2. 34 gauge fine needle electrode is inserted into lower inner aspect of the leg. Slightly cephalad to the medial malleolus. 3. Surface electrode pad is placed over the medial aspect of the calcaneus on the same leg. 4.Needle electrode is connected to the external pulse generator. 5.The pulse generator is turned on and the millivolts used are 10. 6.Patient is treated for 30 minutes. 7.The needle and pad are removed. Patient tolerated procedure. Patient daughter also complained of patient having fatigue. The fatigue started 3 days ago. They are concerned for UTI.   Past Medical History:   Diagnosis Date    Anemia     Back pain     Colitis, ischemic (HCC)     Dementia     Diverticulosis     Hyperlipidemia     Hypertension     Osteoarthritis     Seizures (HCC)     Spastic colon     Stroke syndrome     Urinary incontinence        Past Surgical History:   Procedure Laterality Date    BREAST SURGERY Right     Biopsy    CHOLECYSTECTOMY      COLONOSCOPY  9/25/03    Dr Herminio Mack ischemic colitis, incomplete exam    COLONOSCOPY  8/29/03    Dr Faheem Garcia colon-ischemic colitis    EYE SURGERY      LUMBAR FUSION      SPINE SURGERY      3,4, and 5    TONSILLECTOMY AND ADENOIDECTOMY         Current Outpatient Medications   Medication Sig Dispense Refill    Psyllium (METAMUCIL FIBER PO) Take by mouth daily      tiotropium (SPIRIVA) 18 MCG inhalation capsule Inhale 18 mcg into the lungs nightly      levETIRAcetam (KEPPRA) 750 MG tablet Take 1 tablet by mouth 2 times daily 60 tablet 5    levocetirizine (XYZAL) 5 MG tablet Take 5 mg by mouth nightly as needed       simethicone (PHAZYME) 125 MG chewable tablet Take 250 mg by mouth 2 times daily       nitrofurantoin (MACRODANTIN) 50 MG capsule TAKE 1 CAPSULE AT BEDTIME      spironolactone (ALDACTONE) 50 MG tablet Take 50 mg by mouth daily       azelastine (ASTELIN) 0.1 % nasal spray 2 sprays by Nasal route 2 times daily       conjugated estrogens (PREMARIN) 0.625 MG/GM vaginal cream 1 gm vaginally weekly     Also, apply small amount to the urethra      Cholecalciferol (VITAMIN D3) 5000 units TABS Take 5,000 Units by mouth Daily with lunch      benzonatate (TESSALON) 100 MG capsule Take 200 mg by mouth 3 times daily as needed for Cough      polyvinyl alcohol-povidone (HYPOTEARS) 1.4-0.6 % ophthalmic solution Place 1-2 drops into both eyes as needed      sodium chloride (OCEAN, BABY AYR) 0.65 % nasal spray 1 spray by Nasal route as needed for Congestion      tobramycin-dexamethasone (TOBRADEX) 0.3-0.1 % ophthalmic ointment Place into the left eye as needed 3 times daily.  hydrALAZINE (APRESOLINE) 25 MG tablet Take 25 mg by mouth 2 times daily      SODIUM CHLORIDE PO Take 1 g by mouth 3 times daily Breakfast lunch and dinner.        Ascorbic Acid (VITAMIN C PO) Take 1,000 mg by mouth Daily with lunch Breakfast and lunch       Lutein 20 MG TABS Take 20 mg by mouth Daily with lunch      fluticasone (VERAMYST) 27.5 MCG/SPRAY nasal spray 2 sprays by Nasal route daily       albuterol sulfate  (90 Base) MCG/ACT inhaler Inhale 2 puffs into the lungs every 4 hours as needed for Wheezing      amLODIPine (NORVASC) 10 MG tablet Take by mouth daily       pantoprazole (PROTONIX) 20 MG tablet Take 20 mg by mouth daily       camphor-menthol (SARNA) 0.5-0.5 % lotion Apply 0.5 mLs topically 2 times daily as needed for Itching Apply topically as needed.  butalbital-acetaminophen-caffeine (ESGIC PLUS) -40 MG per tablet Take 1 tablet by mouth every 6 hours as needed for Headaches 15 tablet 0    acetaminophen (TYLENOL) 500 MG tablet Take 2 tablets by mouth every 6 hours as needed for Pain Do not exceed 4 g of acetaminophen daily (patient is also on Fioricet PRN) 100 tablet 0    cloNIDine (CATAPRES) 0.1 MG tablet Take 1 tablet by mouth 2 times daily (Patient taking differently: Take 0.1 mg by mouth as needed for High Blood Pressure (for BP greater than 170/100) ) 60 tablet 0    donepezil (ARICEPT) 10 MG tablet Take 1 tablet by mouth nightly 30 tablet 0    Calcium Carb-Cholecalciferol (CALCIUM 600+D) 600-800 MG-UNIT TABS Take 1 tablet by mouth Daily with lunch      promethazine (PHENERGAN) 25 MG tablet Take 25 mg by mouth 3 times daily as needed for Nausea      docusate sodium (COLACE) 100 MG capsule Take 100 mg by mouth nightly as needed for Constipation       Omega 3-6-9 Fatty Acids (OMEGA 3-6-9 COMPLEX PO) Take 2 tablets by mouth Daily with lunch       alendronate (FOSAMAX) 70 MG tablet Take 70 mg by mouth every 7 days Takes every Sunday        MICRONIZED COLESTIPOL HCL 1 G tablet Take 2 g by mouth as needed 2 tablets daily prn        dipyridamole (PERSANTINE) 75 MG tablet 75 mg 2 times daily       meloxicam (MOBIC) 15 MG tablet Take 15 mg by mouth nightly       BYSTOLIC 10 MG tablet Take 10 mg by mouth daily Indications: in the a.m.        pravastatin (PRAVACHOL) 20 MG tablet Take 20 mg by mouth every morning       ramipril (ALTACE) 5 MG capsule Take 10 mg by mouth 2 times daily       b complex vitamins capsule Take 1 capsule by mouth Daily with lunch B 100      Multiple Vitamins-Minerals (THERAPEUTIC MULTIVITAMIN-MINERALS) tablet Take 1 tablet by mouth Daily with lunch  valACYclovir (VALTREX) 1 g tablet        No current facility-administered medications for this visit. Allergies   Allergen Reactions    Aspirin     Codeine     Demerol Hcl [Meperidine]     Dilaudid [Hydromorphone Hcl]     Levaquin [Levofloxacin In D5w] Other (See Comments)     Seizure like activity      Myrbetriq [Mirabegron]     Namenda [Memantine Hcl]     Norco [Hydrocodone-Acetaminophen]     Ondansetron     Pneumococcal Vaccines     Quinoline Yellow Ss [Yellow Dye #11] Itching     Causes seizures. Causes seizures.         Social History     Socioeconomic History    Marital status:      Spouse name: Not on file    Number of children: 1    Years of education: Not on file    Highest education level: Not on file   Occupational History    Not on file   Social Needs    Financial resource strain: Not on file    Food insecurity:     Worry: Not on file     Inability: Not on file    Transportation needs:     Medical: Not on file     Non-medical: Not on file   Tobacco Use    Smoking status: Never Smoker    Smokeless tobacco: Never Used   Substance and Sexual Activity    Alcohol use: No    Drug use: No    Sexual activity: Not on file   Lifestyle    Physical activity:     Days per week: Not on file     Minutes per session: Not on file    Stress: Not on file   Relationships    Social connections:     Talks on phone: Not on file     Gets together: Not on file     Attends Tenriism service: Not on file     Active member of club or organization: Not on file     Attends meetings of clubs or organizations: Not on file     Relationship status: Not on file    Intimate partner violence:     Fear of current or ex partner: Not on file     Emotionally abused: Not on file     Physically abused: Not on file     Forced sexual activity: Not on file   Other Topics Concern    Not on file   Social History Narrative    Not on file       Family History   Problem Relation Age of Onset    Colon Cancer Neg Hx     Colon Polyps Neg Hx     Esophageal Cancer Neg Hx     Liver Cancer Neg Hx     Liver Disease Neg Hx     Rectal Cancer Neg Hx     Stomach Cancer Neg Hx        REVIEW OF SYSTEMS:  Review of Systems   Constitutional: Negative for chills and fever. HENT: Negative for congestion and sore throat. Eyes: Negative for blurred vision and double vision. Respiratory: Negative for shortness of breath and wheezing. Cardiovascular: Negative for chest pain and palpitations. Gastrointestinal: Negative for heartburn and nausea. Genitourinary: Positive for frequency and urgency. Negative for dysuria, flank pain and hematuria. Musculoskeletal: Negative for falls and neck pain. Skin: Negative for itching and rash. Neurological: Negative for dizziness and tingling. Endo/Heme/Allergies: Does not bruise/bleed easily. Psychiatric/Behavioral: The patient does not have insomnia. PHYSICAL EXAM:    Physical Exam   Constitutional: No distress. HENT:   Mouth/Throat: No oropharyngeal exudate. Eyes: Left eye exhibits no discharge. No scleral icterus. Neck: No JVD present. No tracheal deviation present. No thyromegaly present. Cardiovascular: Exam reveals no gallop and no friction rub. Pulmonary/Chest: No stridor. She has no rales. Abdominal: She exhibits no distension and no mass. There is no rebound and no guarding. Musculoskeletal: She exhibits no edema. Neurological: No cranial nerve deficit. She exhibits normal muscle tone. Coordination normal.   Skin: She is not diaphoretic. No pallor. 1. Mixed stress and urge urinary incontinence  Patient here for her 1 month treatment. Patient continues to have control and improvement in symptoms on the monthly treatments after about the 3rd week she does have some worsening of symptoms before her next treatment.    - [place] POST TIBIAL NEUROSTIMULATION,PERC NEEDLE ELECTRODE    Sterile catheterized urine specimen was obtained and urine

## 2019-07-01 ENCOUNTER — TELEPHONE (OUTPATIENT)
Dept: UROLOGY | Age: 84
End: 2019-07-01

## 2019-07-08 ENCOUNTER — NURSE ONLY (OUTPATIENT)
Dept: UROLOGY | Age: 84
End: 2019-07-08
Payer: MEDICARE

## 2019-07-08 DIAGNOSIS — N39.46 MIXED STRESS AND URGE URINARY INCONTINENCE: Primary | ICD-10-CM

## 2019-07-08 PROCEDURE — 64566 NEUROELTRD STIM POST TIBIAL: CPT | Performed by: PHYSICIAN ASSISTANT

## 2019-07-08 ASSESSMENT — ENCOUNTER SYMPTOMS
BLURRED VISION: 0
WHEEZING: 0
HEARTBURN: 0
NAUSEA: 0
DOUBLE VISION: 0
SORE THROAT: 0
SHORTNESS OF BREATH: 0

## 2019-07-10 ENCOUNTER — TELEPHONE (OUTPATIENT)
Dept: NEUROLOGY | Age: 84
End: 2019-07-10

## 2019-07-11 ENCOUNTER — OFFICE VISIT (OUTPATIENT)
Dept: NEUROLOGY | Age: 84
End: 2019-07-11
Payer: MEDICARE

## 2019-07-11 VITALS
WEIGHT: 128 LBS | HEIGHT: 61 IN | BODY MASS INDEX: 24.17 KG/M2 | SYSTOLIC BLOOD PRESSURE: 109 MMHG | HEART RATE: 64 BPM | DIASTOLIC BLOOD PRESSURE: 64 MMHG

## 2019-07-11 DIAGNOSIS — G30.1 LATE ONSET ALZHEIMER'S DISEASE WITHOUT BEHAVIORAL DISTURBANCE (HCC): ICD-10-CM

## 2019-07-11 DIAGNOSIS — I63.9 SMALL VESSEL CEREBROVASCULAR ACCIDENT (CVA) (HCC): ICD-10-CM

## 2019-07-11 DIAGNOSIS — F02.80 LATE ONSET ALZHEIMER'S DISEASE WITHOUT BEHAVIORAL DISTURBANCE (HCC): ICD-10-CM

## 2019-07-11 DIAGNOSIS — R56.9 SEIZURE AS LATE EFFECT OF CEREBROVASCULAR ACCIDENT (CVA) (HCC): Primary | ICD-10-CM

## 2019-07-11 DIAGNOSIS — I69.398 SEIZURE AS LATE EFFECT OF CEREBROVASCULAR ACCIDENT (CVA) (HCC): Primary | ICD-10-CM

## 2019-07-11 PROCEDURE — G8420 CALC BMI NORM PARAMETERS: HCPCS | Performed by: PSYCHIATRY & NEUROLOGY

## 2019-07-11 PROCEDURE — 99215 OFFICE O/P EST HI 40 MIN: CPT | Performed by: PSYCHIATRY & NEUROLOGY

## 2019-07-11 PROCEDURE — 4040F PNEUMOC VAC/ADMIN/RCVD: CPT | Performed by: PSYCHIATRY & NEUROLOGY

## 2019-07-11 PROCEDURE — G8599 NO ASA/ANTIPLAT THER USE RNG: HCPCS | Performed by: PSYCHIATRY & NEUROLOGY

## 2019-07-11 PROCEDURE — 1090F PRES/ABSN URINE INCON ASSESS: CPT | Performed by: PSYCHIATRY & NEUROLOGY

## 2019-07-11 PROCEDURE — G8427 DOCREV CUR MEDS BY ELIG CLIN: HCPCS | Performed by: PSYCHIATRY & NEUROLOGY

## 2019-07-11 PROCEDURE — 1036F TOBACCO NON-USER: CPT | Performed by: PSYCHIATRY & NEUROLOGY

## 2019-07-11 PROCEDURE — 1123F ACP DISCUSS/DSCN MKR DOCD: CPT | Performed by: PSYCHIATRY & NEUROLOGY

## 2019-07-11 PROCEDURE — G8400 PT W/DXA NO RESULTS DOC: HCPCS | Performed by: PSYCHIATRY & NEUROLOGY

## 2019-07-11 RX ORDER — DIPYRIDAMOLE 50 MG
100 TABLET ORAL 2 TIMES DAILY
Qty: 120 TABLET | Refills: 5 | Status: SHIPPED | OUTPATIENT
Start: 2019-07-11 | End: 2020-06-11 | Stop reason: SDUPTHER

## 2019-07-13 NOTE — PROGRESS NOTES
Visual Disturbance [] Double Vision [x] Slurred Speech [] Trouble swallowing  [] Vertigo [] Tingling [] Numbness [] Weakness [] Loss of Balance   [] Loss of Consciousness [x] Memory Loss [x] Seizures  [] Denies all unless marked  Psychiatric/Behavioral:[] Depression [x] Anxiety  [] Denies all unless marked  Sleep: []  Insomnia [] Sleep Disturbance [] Snoring [] Restless Legs [] Daytime Sleepiness [] Sleep Apnea  [x] Denies all unless marked                 Current Outpatient Medications   Medication Sig Dispense Refill    dipyridamole (PERSANTINE) 50 MG tablet Take 2 tablets by mouth 2 times daily 120 tablet 5    valACYclovir (VALTREX) 1 g tablet       Psyllium (METAMUCIL FIBER PO) Take by mouth daily      tiotropium (SPIRIVA) 18 MCG inhalation capsule Inhale 18 mcg into the lungs nightly      levETIRAcetam (KEPPRA) 750 MG tablet Take 1 tablet by mouth 2 times daily 60 tablet 5    simethicone (PHAZYME) 125 MG chewable tablet Take 250 mg by mouth 2 times daily       nitrofurantoin (MACRODANTIN) 50 MG capsule TAKE 1 CAPSULE AT BEDTIME      spironolactone (ALDACTONE) 50 MG tablet Take 50 mg by mouth daily       azelastine (ASTELIN) 0.1 % nasal spray 2 sprays by Nasal route 2 times daily       conjugated estrogens (PREMARIN) 0.625 MG/GM vaginal cream 1 gm vaginally weekly     Also, apply small amount to the urethra      Cholecalciferol (VITAMIN D3) 5000 units TABS Take 5,000 Units by mouth Daily with lunch      benzonatate (TESSALON) 100 MG capsule Take 200 mg by mouth 3 times daily as needed for Cough      polyvinyl alcohol-povidone (HYPOTEARS) 1.4-0.6 % ophthalmic solution Place 1-2 drops into both eyes as needed      sodium chloride (OCEAN, BABY AYR) 0.65 % nasal spray 1 spray by Nasal route as needed for Congestion      tobramycin-dexamethasone (TOBRADEX) 0.3-0.1 % ophthalmic ointment Place into the left eye as needed 3 times daily.       SODIUM CHLORIDE PO Take 1 g by mouth 3 times daily Breakfast

## 2019-07-18 ENCOUNTER — TELEPHONE (OUTPATIENT)
Dept: GASTROENTEROLOGY | Age: 84
End: 2019-07-18

## 2019-08-02 ENCOUNTER — NURSE ONLY (OUTPATIENT)
Dept: UROLOGY | Age: 84
End: 2019-08-02
Payer: MEDICARE

## 2019-08-02 DIAGNOSIS — N32.81 OAB (OVERACTIVE BLADDER): Primary | ICD-10-CM

## 2019-08-02 PROCEDURE — 64566 NEUROELTRD STIM POST TIBIAL: CPT | Performed by: NURSE PRACTITIONER

## 2019-08-02 PROCEDURE — P9612 CATHETERIZE FOR URINE SPEC: HCPCS | Performed by: NURSE PRACTITIONER

## 2019-08-02 ASSESSMENT — ENCOUNTER SYMPTOMS
DOUBLE VISION: 0
SORE THROAT: 0
SHORTNESS OF BREATH: 0
NAUSEA: 0
HEARTBURN: 0
BLURRED VISION: 0
WHEEZING: 0

## 2019-08-02 NOTE — PROGRESS NOTES
Tristan Quick is a 80 y.o. female who presents today   Chief Complaint   Patient presents with    Follow-up     uroplasty     Mixed incontinence follow-up:     Patient is a 80-year-old female with several year history of worsening incontinence she has both elements of stress and urge incontinence. She had no improvement on lifestyle changes and or medications the last few weeks. Patient is here for her monthly treatment. She has had significant improvement in her overall urinary symptoms and complaints. She does complain that the treatments are lasting around 3 weeks. 1. Patient is seated with the left treatment leg elevated. 2. 34 gauge fine needle electrode is inserted into lower inner aspect of the leg. Slightly cephalad to the medial malleolus. 3. Surface electrode pad is placed over the medial aspect of the calcaneus on the same leg. 4.Needle electrode is connected to the external pulse generator. 5.The pulse generator is turned on and the millivolts used are 3. 6.Patient is treated for 30 minutes. 7.The needle and pad are removed. Patient tolerated procedure. Patient daughter also complained of patient having fatigue. The fatigue started 3 days ago. They are concerned for UTI.   Past Medical History:   Diagnosis Date    Anemia     Back pain     Colitis, ischemic (HCC)     Dementia     Diverticulosis     Hyperlipidemia     Hypertension     Osteoarthritis     Seizures (HCC)     Spastic colon     Stroke syndrome     Urinary incontinence        Past Surgical History:   Procedure Laterality Date    BREAST SURGERY Right     Biopsy    CHOLECYSTECTOMY      COLONOSCOPY  9/25/03    Dr Honorio Mahajan ischemic colitis, incomplete exam    COLONOSCOPY  8/29/03    Dr Bree Thibodeaux colon-ischemic colitis    EYE SURGERY      LUMBAR FUSION      SPINE SURGERY      3,4, and 5    TONSILLECTOMY AND ADENOIDECTOMY         Current Outpatient Medications   Medication Sig Dispense pain and palpitations. Gastrointestinal: Negative for heartburn and nausea. Genitourinary: Positive for frequency and urgency. Negative for dysuria, flank pain and hematuria. Musculoskeletal: Negative for falls and neck pain. Skin: Negative for itching and rash. Neurological: Negative for dizziness and tingling. Endo/Heme/Allergies: Does not bruise/bleed easily. Psychiatric/Behavioral: The patient does not have insomnia. PHYSICAL EXAM:    Physical Exam   Constitutional: No distress. HENT:   Mouth/Throat: No oropharyngeal exudate. Eyes: Left eye exhibits no discharge. No scleral icterus. Neck: No JVD present. No tracheal deviation present. No thyromegaly present. Cardiovascular: Exam reveals no gallop and no friction rub. Pulmonary/Chest: No stridor. She has no rales. Abdominal: She exhibits no distension and no mass. There is no rebound and no guarding. Musculoskeletal: She exhibits no edema. Neurological: No cranial nerve deficit. She exhibits normal muscle tone. Coordination normal.   Skin: She is not diaphoretic. No pallor. 1. Mixed stress and urge urinary incontinence  Patient here for her 1 month treatment. Patient continues to have control and improvement in symptoms on the monthly treatments after about the 3rd week she does have some worsening of symptoms before her next treatment. - [place] POST TIBIAL NEUROSTIMULATION,PERC NEEDLE ELECTRODE    Sterile catheterized urine specimen was obtained and urine dipstick was negative for any signs of infection. Plan:  Follow up in 1 month for next uroplasty  Shahana Tamez

## 2019-08-13 ENCOUNTER — TELEPHONE (OUTPATIENT)
Dept: UROLOGY | Age: 84
End: 2019-08-13

## 2019-08-23 ENCOUNTER — HOSPITAL ENCOUNTER (EMERGENCY)
Age: 84
Discharge: HOME | End: 2019-08-23
Attending: EMERGENCY MEDICINE
Payer: MEDICARE

## 2019-08-23 ENCOUNTER — APPOINTMENT (OUTPATIENT)
Dept: CT IMAGING | Age: 84
End: 2019-08-23
Payer: MEDICARE

## 2019-08-23 VITALS
RESPIRATION RATE: 18 BRPM | HEART RATE: 60 BPM | DIASTOLIC BLOOD PRESSURE: 68 MMHG | SYSTOLIC BLOOD PRESSURE: 118 MMHG | OXYGEN SATURATION: 94 % | TEMPERATURE: 98.2 F

## 2019-08-23 DIAGNOSIS — R56.9 SEIZURE-LIKE ACTIVITY (HCC): ICD-10-CM

## 2019-08-23 DIAGNOSIS — R25.1 TREMOR: Primary | ICD-10-CM

## 2019-08-23 LAB
ALBUMIN SERPL-MCNC: 4.9 G/DL (ref 3.5–5.2)
ALP BLD-CCNC: 89 U/L (ref 35–104)
ALT SERPL-CCNC: 19 U/L (ref 5–33)
ANION GAP SERPL CALCULATED.3IONS-SCNC: 14 MMOL/L (ref 7–19)
APTT: 28.6 SEC (ref 26–36.2)
AST SERPL-CCNC: 21 U/L (ref 5–32)
BASOPHILS ABSOLUTE: 0.1 K/UL (ref 0–0.2)
BASOPHILS RELATIVE PERCENT: 0.8 % (ref 0–1)
BILIRUB SERPL-MCNC: <0.2 MG/DL (ref 0.2–1.2)
BILIRUBIN URINE: NEGATIVE
BLOOD, URINE: NEGATIVE
BUN BLDV-MCNC: 30 MG/DL (ref 8–23)
CALCIUM SERPL-MCNC: 10 MG/DL (ref 8.8–10.2)
CHLORIDE BLD-SCNC: 94 MMOL/L (ref 98–111)
CLARITY: CLEAR
CO2: 22 MMOL/L (ref 22–29)
COLOR: YELLOW
CREAT SERPL-MCNC: 1.3 MG/DL (ref 0.5–0.9)
EOSINOPHILS ABSOLUTE: 0.2 K/UL (ref 0–0.6)
EOSINOPHILS RELATIVE PERCENT: 2.9 % (ref 0–5)
GFR NON-AFRICAN AMERICAN: 39
GLUCOSE BLD-MCNC: 106 MG/DL (ref 74–109)
GLUCOSE URINE: NEGATIVE MG/DL
HCT VFR BLD CALC: 32.2 % (ref 37–47)
HEMOGLOBIN: 10.7 G/DL (ref 12–16)
IMMATURE GRANULOCYTES #: 0.1 K/UL
INR BLD: 0.99 (ref 0.88–1.18)
KETONES, URINE: NEGATIVE MG/DL
LEUKOCYTE ESTERASE, URINE: NEGATIVE
LYMPHOCYTES ABSOLUTE: 1.5 K/UL (ref 1.1–4.5)
LYMPHOCYTES RELATIVE PERCENT: 21 % (ref 20–40)
MCH RBC QN AUTO: 34.2 PG (ref 27–31)
MCHC RBC AUTO-ENTMCNC: 33.2 G/DL (ref 33–37)
MCV RBC AUTO: 102.9 FL (ref 81–99)
MONOCYTES ABSOLUTE: 0.7 K/UL (ref 0–0.9)
MONOCYTES RELATIVE PERCENT: 9.3 % (ref 0–10)
NEUTROPHILS ABSOLUTE: 4.7 K/UL (ref 1.5–7.5)
NEUTROPHILS RELATIVE PERCENT: 64.1 % (ref 50–65)
NITRITE, URINE: NEGATIVE
PDW BLD-RTO: 11.9 % (ref 11.5–14.5)
PH UA: 5.5 (ref 5–8)
PLATELET # BLD: 228 K/UL (ref 130–400)
PMV BLD AUTO: 9.7 FL (ref 9.4–12.3)
POTASSIUM SERPL-SCNC: 5.1 MMOL/L (ref 3.5–5)
PROTEIN UA: NEGATIVE MG/DL
PROTHROMBIN TIME: 12.5 SEC (ref 12–14.6)
RBC # BLD: 3.13 M/UL (ref 4.2–5.4)
SODIUM BLD-SCNC: 130 MMOL/L (ref 136–145)
SPECIFIC GRAVITY UA: 1.02 (ref 1–1.03)
TOTAL PROTEIN: 7.4 G/DL (ref 6.6–8.7)
URINE REFLEX TO CULTURE: NORMAL
UROBILINOGEN, URINE: 0.2 E.U./DL
WBC # BLD: 7.3 K/UL (ref 4.8–10.8)

## 2019-08-23 PROCEDURE — 70450 CT HEAD/BRAIN W/O DYE: CPT

## 2019-08-23 PROCEDURE — 85025 COMPLETE CBC W/AUTO DIFF WBC: CPT

## 2019-08-23 PROCEDURE — 85730 THROMBOPLASTIN TIME PARTIAL: CPT

## 2019-08-23 PROCEDURE — 85610 PROTHROMBIN TIME: CPT

## 2019-08-23 PROCEDURE — 99285 EMERGENCY DEPT VISIT HI MDM: CPT

## 2019-08-23 PROCEDURE — 80053 COMPREHEN METABOLIC PANEL: CPT

## 2019-08-23 PROCEDURE — 81003 URINALYSIS AUTO W/O SCOPE: CPT

## 2019-08-23 PROCEDURE — 93005 ELECTROCARDIOGRAM TRACING: CPT

## 2019-08-23 PROCEDURE — 36415 COLL VENOUS BLD VENIPUNCTURE: CPT

## 2019-08-23 RX ORDER — DESONIDE 0.5 MG/G
CREAM TOPICAL PRN
COMMUNITY
End: 2021-10-20

## 2019-08-23 RX ORDER — MUPIROCIN CALCIUM 20 MG/G
CREAM TOPICAL PRN
COMMUNITY
End: 2021-07-21

## 2019-08-23 ASSESSMENT — ENCOUNTER SYMPTOMS
SORE THROAT: 0
SHORTNESS OF BREATH: 0
BACK PAIN: 0
COUGH: 0
RHINORRHEA: 0
ABDOMINAL PAIN: 0
VOMITING: 0
NAUSEA: 0
DIARRHEA: 0

## 2019-08-23 NOTE — LETTER
[address] [facility]  [place] [address]  Phone: [phone]               August 23, 2019    Patient: Donnie Cool   YOB: 1935   Date of Visit: 8/23/2019       To Whom It May Concern:    Anabella Mariee was seen and treated in our emergency department on 8/23/2019. She may return to normal activities as tolerated.       Sincerely,       Ksenia Hitchcock RN         Signature:__________________________________

## 2019-08-24 NOTE — ED NOTES
approx 6 weeks ago, symptoms of daily TIAs (garbbled speech, wobbley legs, front lobe head pain to back of right neck, right hand tremors), Today started walking to the left, equilibrium is off, pupils dilated, right hand tremors increased.       Brian Vega RN  08/23/19 [id number]

## 2019-08-24 NOTE — ED PROVIDER NOTES
Historical Med      Cholecalciferol (VITAMIN D3) 5000 units TABS Take 5,000 Units by mouth Daily with lunchHistorical Med      polyvinyl alcohol-povidone (HYPOTEARS) 1.4-0.6 % ophthalmic solution Place 1-2 drops into both eyes as neededHistorical Med      sodium chloride (OCEAN, BABY AYR) 0.65 % nasal spray 1 spray by Nasal route as needed for CongestionHistorical Med      SODIUM CHLORIDE PO Take 1 g by mouth 3 times daily Breakfast lunch and dinner.  Historical Med      Ascorbic Acid (VITAMIN C PO) Take 1,000 mg by mouth Daily with lunch Breakfast and lunch Historical Med      Lutein 20 MG TABS Take 20 mg by mouth Daily with lunchHistorical Med      fluticasone (VERAMYST) 27.5 MCG/SPRAY nasal spray 2 sprays by Nasal route daily Historical Med      amLODIPine (NORVASC) 10 MG tablet Take 5 mg by mouth daily Indications: 7.5mg in morning and 2.5 mg at 5pm Historical Med      pantoprazole (PROTONIX) 20 MG tablet Take 20 mg by mouth daily Historical Med      camphor-menthol (SARNA) 0.5-0.5 % lotion Apply 0.5 mLs topically 2 times daily as needed for Itching Apply topically as needed., Topical, 2 TIMES DAILY PRN, Until Discontinued, Historical Med      butalbital-acetaminophen-caffeine (ESGIC PLUS) -40 MG per tablet Take 1 tablet by mouth every 6 hours as needed for Headaches, Disp-15 tablet, R-0      acetaminophen (TYLENOL) 500 MG tablet Take 2 tablets by mouth every 6 hours as needed for Pain Do not exceed 4 g of acetaminophen daily (patient is also on Fioricet PRN), Disp-100 tablet, R-0      donepezil (ARICEPT) 10 MG tablet Take 1 tablet by mouth nightly, Disp-30 tablet, R-0      Calcium Carb-Cholecalciferol (CALCIUM 600+D) 600-800 MG-UNIT TABS Take 1 tablet by mouth Daily with lunch      Omega 3-6-9 Fatty Acids (OMEGA 3-6-9 COMPLEX PO) Take 2 tablets by mouth Daily with lunch Historical Med      alendronate (FOSAMAX) 70 MG tablet Take 70 mg by mouth every 7 days Takes every Sunday        !! dipyridamole (PERSANTINE) 75 MG tablet 50 mg 2 times daily Historical Med      meloxicam (MOBIC) 15 MG tablet Take 15 mg by mouth nightly       BYSTOLIC 5 MG tablet Take 5 mg by mouth daily Indications: in the a.m. , DAWHistorical Med      pravastatin (PRAVACHOL) 20 MG tablet Take 20 mg by mouth every morning       ramipril (ALTACE) 5 MG capsule Take 10 mg by mouth 2 times daily Historical Med      b complex vitamins capsule Take 1 capsule by mouth Daily with lunch B 100Historical Med      Multiple Vitamins-Minerals (THERAPEUTIC MULTIVITAMIN-MINERALS) tablet Take 1 tablet by mouth Daily with lunch       mupirocin (BACTROBAN) 2 % cream Apply topically as needed Apply topically 3 times daily. , Topical, PRN, Historical Med      valACYclovir (VALTREX) 1 g tablet Historical Med      benzonatate (TESSALON) 100 MG capsule Take 200 mg by mouth 3 times daily as needed for CoughHistorical Med      tobramycin-dexamethasone (TOBRADEX) 0.3-0.1 % ophthalmic ointment Place into the left eye as needed 3 times daily. , Left Eye, PRN, Historical Med      cloNIDine (CATAPRES) 0.1 MG tablet Take 1 tablet by mouth 2 times daily, Disp-60 tablet, R-0      promethazine (PHENERGAN) 25 MG tablet Take 25 mg by mouth 3 times daily as needed for Nausea      docusate sodium (COLACE) 100 MG capsule Take 100 mg by mouth nightly as needed for Constipation Historical Med       !! - Potential duplicate medications found. Please discuss with provider. ALLERGIES     Aspirin; Codeine; Demerol hcl [meperidine]; Dilaudid [hydromorphone hcl]; Levaquin [levofloxacin in d5w]; Cruz Amend; Namenda [memantine hcl]; Norco [hydrocodone-acetaminophen];  Ondansetron; Pneumococcal vaccines; and Quinoline yellow ss [yellow dye #11]    FAMILY HISTORY       Family History   Problem Relation Age of Onset    Colon Cancer Neg Hx     Colon Polyps Neg Hx     Esophageal Cancer Neg Hx     Liver Cancer Neg Hx     Liver Disease Neg Hx     Rectal Cancer Neg Hx     EOM are normal.   Neck: Normal range of motion. No tracheal deviation present. Cardiovascular: Normal rate, regular rhythm, normal heart sounds and intact distal pulses. No murmur heard. Pulmonary/Chest: Breath sounds normal. No respiratory distress. She has no wheezes. She has no rales. Abdominal: Soft. She exhibits no mass. There is no tenderness. Musculoskeletal: Normal range of motion. She exhibits no edema. Neurological: She is alert and oriented to person, place, and time. No cranial nerve deficit or sensory deficit. She exhibits normal muscle tone. Coordination normal. GCS eye subscore is 4. GCS verbal subscore is 5. GCS motor subscore is 6. Speech clear, no aphasia, no dysmetria   Skin: Skin is warm and dry. Vitals reviewed. DIAGNOSTIC RESULTS     EKG: All EKG's areinterpreted by the Emergency Department Physician who either signs or Co-signs this chart in the absence of a cardiologist.    64, normal sinus rhythm, no ST changes, nondiagnostic EKG    RADIOLOGY:  Non-plain film images such as CT, Ultrasound and MRI are read by the radiologist. Plain radiographic images are visualized and preliminarily interpreted bythe emergency physician with the below findings:      CT Head WO Contrast   Final Result   Moderate cerebral and cerebellar volume loss with chronic   microvascular disease but no evidence of acute intracranial process.    Signed by Dr Crystal Garcia on 8/23/2019 10:03 PM              LABS:  Labs Reviewed   CBC WITH AUTO DIFFERENTIAL - Abnormal; Notable for the following components:       Result Value    RBC 3.13 (*)     Hemoglobin 10.7 (*)     Hematocrit 32.2 (*)     .9 (*)     MCH 34.2 (*)     All other components within normal limits   COMPREHENSIVE METABOLIC PANEL - Abnormal; Notable for the following components:    Sodium 130 (*)     Potassium 5.1 (*)     Chloride 94 (*)     BUN 30 (*)     CREATININE 1.3 (*)     GFR Non- 39 (*)     All other components

## 2019-08-25 LAB
EKG P AXIS: 75 DEGREES
EKG P-R INTERVAL: 182 MS
EKG Q-T INTERVAL: 410 MS
EKG QRS DURATION: 84 MS
EKG QTC CALCULATION (BAZETT): 415 MS
EKG T AXIS: 40 DEGREES

## 2019-08-26 ENCOUNTER — NURSE ONLY (OUTPATIENT)
Dept: UROLOGY | Age: 84
End: 2019-08-26
Payer: MEDICARE

## 2019-08-26 ENCOUNTER — TELEPHONE (OUTPATIENT)
Dept: NEUROLOGY | Age: 84
End: 2019-08-26

## 2019-08-26 DIAGNOSIS — N39.46 MIXED STRESS AND URGE URINARY INCONTINENCE: ICD-10-CM

## 2019-08-26 DIAGNOSIS — N32.81 OAB (OVERACTIVE BLADDER): Primary | ICD-10-CM

## 2019-08-26 PROCEDURE — 64566 NEUROELTRD STIM POST TIBIAL: CPT | Performed by: PHYSICIAN ASSISTANT

## 2019-08-26 ASSESSMENT — ENCOUNTER SYMPTOMS
BLURRED VISION: 0
SORE THROAT: 0
NAUSEA: 0
DOUBLE VISION: 0
WHEEZING: 0
SHORTNESS OF BREATH: 0
HEARTBURN: 0

## 2019-08-26 NOTE — PROGRESS NOTES
ADENOIDECTOMY         Current Outpatient Medications   Medication Sig Dispense Refill    desonide (DESOWEN) 0.05 % cream Apply topically 2 times daily Apply topically 2 times daily.  mupirocin (BACTROBAN) 2 % cream Apply topically as needed Apply topically 3 times daily.  dipyridamole (PERSANTINE) 50 MG tablet Take 2 tablets by mouth 2 times daily 120 tablet 5    valACYclovir (VALTREX) 1 g tablet       Psyllium (METAMUCIL FIBER PO) Take by mouth daily      tiotropium (SPIRIVA) 18 MCG inhalation capsule Inhale 18 mcg into the lungs nightly      levETIRAcetam (KEPPRA) 750 MG tablet Take 1 tablet by mouth 2 times daily 60 tablet 5    simethicone (PHAZYME) 125 MG chewable tablet Take 250 mg by mouth 2 times daily       nitrofurantoin (MACRODANTIN) 50 MG capsule TAKE 1 CAPSULE AT BEDTIME      spironolactone (ALDACTONE) 50 MG tablet Take 50 mg by mouth daily       azelastine (ASTELIN) 0.1 % nasal spray 2 sprays by Nasal route 2 times daily       conjugated estrogens (PREMARIN) 0.625 MG/GM vaginal cream 1 gm vaginally weekly     Also, apply small amount to the urethra      Cholecalciferol (VITAMIN D3) 5000 units TABS Take 5,000 Units by mouth Daily with lunch      benzonatate (TESSALON) 100 MG capsule Take 200 mg by mouth 3 times daily as needed for Cough      polyvinyl alcohol-povidone (HYPOTEARS) 1.4-0.6 % ophthalmic solution Place 1-2 drops into both eyes as needed      sodium chloride (OCEAN, BABY AYR) 0.65 % nasal spray 1 spray by Nasal route as needed for Congestion      tobramycin-dexamethasone (TOBRADEX) 0.3-0.1 % ophthalmic ointment Place into the left eye as needed 3 times daily.  SODIUM CHLORIDE PO Take 1 g by mouth 3 times daily Breakfast lunch and dinner.        Ascorbic Acid (VITAMIN C PO) Take 1,000 mg by mouth Daily with lunch Breakfast and lunch       Lutein 20 MG TABS Take 20 mg by mouth Daily with lunch      fluticasone (VERAMYST) 27.5 MCG/SPRAY nasal spray 2 sprays by

## 2019-09-03 ENCOUNTER — OFFICE VISIT (OUTPATIENT)
Dept: NEUROLOGY | Age: 84
End: 2019-09-03
Payer: MEDICARE

## 2019-09-03 VITALS
DIASTOLIC BLOOD PRESSURE: 63 MMHG | SYSTOLIC BLOOD PRESSURE: 100 MMHG | BODY MASS INDEX: 22.86 KG/M2 | HEART RATE: 60 BPM | WEIGHT: 121 LBS

## 2019-09-03 DIAGNOSIS — G40.219 PARTIAL SYMPTOMATIC EPILEPSY WITH COMPLEX PARTIAL SEIZURES, INTRACTABLE, WITHOUT STATUS EPILEPTICUS (HCC): Primary | ICD-10-CM

## 2019-09-03 DIAGNOSIS — F01.50 VASCULAR DEMENTIA WITHOUT BEHAVIORAL DISTURBANCE (HCC): ICD-10-CM

## 2019-09-03 DIAGNOSIS — I67.9 CEREBROVASCULAR SMALL VESSEL DISEASE: ICD-10-CM

## 2019-09-03 DIAGNOSIS — G44.89 OTHER HEADACHE SYNDROME: ICD-10-CM

## 2019-09-03 DIAGNOSIS — G40.219 PARTIAL SYMPTOMATIC EPILEPSY WITH COMPLEX PARTIAL SEIZURES, INTRACTABLE, WITHOUT STATUS EPILEPTICUS (HCC): ICD-10-CM

## 2019-09-03 LAB
ALBUMIN SERPL-MCNC: 4.4 G/DL (ref 3.5–5.2)
ALP BLD-CCNC: 83 U/L (ref 35–104)
ALT SERPL-CCNC: 20 U/L (ref 5–33)
ANION GAP SERPL CALCULATED.3IONS-SCNC: 17 MMOL/L (ref 7–19)
AST SERPL-CCNC: 21 U/L (ref 5–32)
BACTERIA: NEGATIVE /HPF
BASOPHILS ABSOLUTE: 0.1 K/UL (ref 0–0.2)
BASOPHILS RELATIVE PERCENT: 1.1 % (ref 0–1)
BILIRUB SERPL-MCNC: 0.3 MG/DL (ref 0.2–1.2)
BILIRUBIN URINE: NEGATIVE
BLOOD, URINE: ABNORMAL
BUN BLDV-MCNC: 25 MG/DL (ref 8–23)
CALCIUM SERPL-MCNC: 9.6 MG/DL (ref 8.8–10.2)
CHLORIDE BLD-SCNC: 92 MMOL/L (ref 98–111)
CHOLESTEROL, TOTAL: 190 MG/DL (ref 160–199)
CLARITY: ABNORMAL
CO2: 16 MMOL/L (ref 22–29)
COLOR: YELLOW
CREAT SERPL-MCNC: 1.3 MG/DL (ref 0.5–0.9)
CREATININE URINE: 107.6 MG/DL (ref 4.2–622)
EOSINOPHILS ABSOLUTE: 0.2 K/UL (ref 0–0.6)
EOSINOPHILS RELATIVE PERCENT: 2.4 % (ref 0–5)
EPITHELIAL CELLS, UA: 0 /HPF (ref 0–5)
GFR NON-AFRICAN AMERICAN: 39
GLUCOSE BLD-MCNC: 63 MG/DL (ref 74–109)
GLUCOSE URINE: NEGATIVE MG/DL
HBA1C MFR BLD: 4.9 % (ref 4–6)
HCT VFR BLD CALC: 32.7 % (ref 37–47)
HDLC SERPL-MCNC: 66 MG/DL (ref 65–121)
HEMOGLOBIN: 11 G/DL (ref 12–16)
HYALINE CASTS: 9 /HPF (ref 0–8)
IMMATURE GRANULOCYTES #: 0.1 K/UL
KETONES, URINE: NEGATIVE MG/DL
LDL CHOLESTEROL CALCULATED: 96 MG/DL
LEUKOCYTE ESTERASE, URINE: ABNORMAL
LYMPHOCYTES ABSOLUTE: 1.5 K/UL (ref 1.1–4.5)
LYMPHOCYTES RELATIVE PERCENT: 23.3 % (ref 20–40)
MAGNESIUM: 1.7 MG/DL (ref 1.6–2.4)
MCH RBC QN AUTO: 34.8 PG (ref 27–31)
MCHC RBC AUTO-ENTMCNC: 33.6 G/DL (ref 33–37)
MCV RBC AUTO: 103.5 FL (ref 81–99)
MICROALBUMIN UR-MCNC: 8.2 MG/DL (ref 0–19)
MICROALBUMIN/CREAT UR-RTO: 76.2 MG/G
MONOCYTES ABSOLUTE: 0.6 K/UL (ref 0–0.9)
MONOCYTES RELATIVE PERCENT: 9.6 % (ref 0–10)
NEUTROPHILS ABSOLUTE: 4.1 K/UL (ref 1.5–7.5)
NEUTROPHILS RELATIVE PERCENT: 61.8 % (ref 50–65)
NITRITE, URINE: NEGATIVE
PDW BLD-RTO: 11.7 % (ref 11.5–14.5)
PH UA: 5.5 (ref 5–8)
PLATELET # BLD: 263 K/UL (ref 130–400)
PMV BLD AUTO: 9.8 FL (ref 9.4–12.3)
POTASSIUM SERPL-SCNC: 4.6 MMOL/L (ref 3.5–5)
PROTEIN UA: ABNORMAL MG/DL
RBC # BLD: 3.16 M/UL (ref 4.2–5.4)
RBC UA: 5 /HPF (ref 0–4)
SEDIMENTATION RATE, ERYTHROCYTE: 13 MM/HR (ref 0–25)
SODIUM BLD-SCNC: 125 MMOL/L (ref 136–145)
SPECIFIC GRAVITY UA: 1.02 (ref 1–1.03)
TOTAL PROTEIN: 6.7 G/DL (ref 6.6–8.7)
TRIGL SERPL-MCNC: 142 MG/DL (ref 0–149)
TSH SERPL DL<=0.05 MIU/L-ACNC: 1.07 UIU/ML (ref 0.27–4.2)
UROBILINOGEN, URINE: 0.2 E.U./DL
VITAMIN B-12: >2000 PG/ML (ref 211–946)
VITAMIN D 25-HYDROXY: 57.2 NG/ML
WBC # BLD: 6.6 K/UL (ref 4.8–10.8)
WBC UA: 69 /HPF (ref 0–5)

## 2019-09-03 PROCEDURE — 1036F TOBACCO NON-USER: CPT | Performed by: PSYCHIATRY & NEUROLOGY

## 2019-09-03 PROCEDURE — G8400 PT W/DXA NO RESULTS DOC: HCPCS | Performed by: PSYCHIATRY & NEUROLOGY

## 2019-09-03 PROCEDURE — G8420 CALC BMI NORM PARAMETERS: HCPCS | Performed by: PSYCHIATRY & NEUROLOGY

## 2019-09-03 PROCEDURE — 4040F PNEUMOC VAC/ADMIN/RCVD: CPT | Performed by: PSYCHIATRY & NEUROLOGY

## 2019-09-03 PROCEDURE — 1123F ACP DISCUSS/DSCN MKR DOCD: CPT | Performed by: PSYCHIATRY & NEUROLOGY

## 2019-09-03 PROCEDURE — G8599 NO ASA/ANTIPLAT THER USE RNG: HCPCS | Performed by: PSYCHIATRY & NEUROLOGY

## 2019-09-03 PROCEDURE — 99214 OFFICE O/P EST MOD 30 MIN: CPT | Performed by: PSYCHIATRY & NEUROLOGY

## 2019-09-03 PROCEDURE — G8427 DOCREV CUR MEDS BY ELIG CLIN: HCPCS | Performed by: PSYCHIATRY & NEUROLOGY

## 2019-09-03 PROCEDURE — 1090F PRES/ABSN URINE INCON ASSESS: CPT | Performed by: PSYCHIATRY & NEUROLOGY

## 2019-09-03 NOTE — PROGRESS NOTES
50 MG capsule TAKE 1 CAPSULE AT BEDTIME      spironolactone (ALDACTONE) 50 MG tablet Take 50 mg by mouth daily       azelastine (ASTELIN) 0.1 % nasal spray 2 sprays by Nasal route 2 times daily       conjugated estrogens (PREMARIN) 0.625 MG/GM vaginal cream 1 gm vaginally weekly     Also, apply small amount to the urethra      Cholecalciferol (VITAMIN D3) 5000 units TABS Take 5,000 Units by mouth Daily with lunch      benzonatate (TESSALON) 100 MG capsule Take 200 mg by mouth 3 times daily as needed for Cough      polyvinyl alcohol-povidone (HYPOTEARS) 1.4-0.6 % ophthalmic solution Place 1-2 drops into both eyes as needed      sodium chloride (OCEAN, BABY AYR) 0.65 % nasal spray 1 spray by Nasal route as needed for Congestion      tobramycin-dexamethasone (TOBRADEX) 0.3-0.1 % ophthalmic ointment Place into the left eye as needed 3 times daily.  SODIUM CHLORIDE PO Take 1 g by mouth 3 times daily Breakfast lunch and dinner.  Ascorbic Acid (VITAMIN C PO) Take 1,000 mg by mouth Daily with lunch Breakfast and lunch       Lutein 20 MG TABS Take 20 mg by mouth Daily with lunch      fluticasone (VERAMYST) 27.5 MCG/SPRAY nasal spray 2 sprays by Nasal route daily       amLODIPine (NORVASC) 10 MG tablet Take 5 mg by mouth daily Indications: 7.5mg in morning and 2.5 mg at 5pm       pantoprazole (PROTONIX) 20 MG tablet Take 20 mg by mouth daily       camphor-menthol (SARNA) 0.5-0.5 % lotion Apply 0.5 mLs topically 2 times daily as needed for Itching Apply topically as needed.       butalbital-acetaminophen-caffeine (ESGIC PLUS) -40 MG per tablet Take 1 tablet by mouth every 6 hours as needed for Headaches 15 tablet 0    acetaminophen (TYLENOL) 500 MG tablet Take 2 tablets by mouth every 6 hours as needed for Pain Do not exceed 4 g of acetaminophen daily (patient is also on Fioricet PRN) 100 tablet 0    cloNIDine (CATAPRES) 0.1 MG tablet Take 1 tablet by mouth 2 times daily (Patient taking moderate cerebral   and cerebellar volume loss, with an associated increase in the   prominence of the ventricles and sulci. The basilar cisterns are   normal in size and configuration. There is no evidence of intracranial   hemorrhage or mass-effect. There is low attenuation in the   periventricular white matter, consistent with chronic ischemic change. There are no abnormal extra-axial fluid collections. There is no   evidence of tonsillar herniation. The included orbits and their contents are unremarkable. The   visualized paranasal sinuses, mastoid air cells and middle ear   cavities are clear. The visualized osseous structures and overlying   soft tissues of the skull and face are intact.        Impression   Moderate cerebral and cerebellar volume loss with chronic   microvascular disease but no evidence of acute intracranial process. Signed by Dr Crystal Garcia on 8/23/2019 10:03 PM       Assessment:       ICD-10-CM    1. Partial symptomatic epilepsy with complex partial seizures, intractable, without status epilepticus ([address] G40.219    2. Cerebrovascular small vessel disease I67.9    3. Vascular dementia without behavioral disturbance F01.50    4. Other headache syndrome G44.89    Not much to offer given age, degenerative nature of her diseases, and previous medication trials, allergies, and intolerances. Plan:   1. Labs - Keppra level and sed rate  2. Continue same medications for now  3.  FU in 6 months    Electronically signed by Dallas Samuel MD on 9/3/2019

## 2019-09-05 ENCOUNTER — TELEPHONE (OUTPATIENT)
Dept: NEUROLOGY | Age: 84
End: 2019-09-05

## 2019-09-05 LAB
INSULIN: 16 UIU/ML (ref 3–19)
KEPPRA: 65 UG/ML (ref 12–46)

## 2019-09-06 ENCOUNTER — TELEPHONE (OUTPATIENT)
Dept: NEUROLOGY | Age: 84
End: 2019-09-06

## 2019-09-06 RX ORDER — LEVETIRACETAM 500 MG/1
500 TABLET ORAL 2 TIMES DAILY
Qty: 60 TABLET | Refills: 5 | Status: SHIPPED | OUTPATIENT
Start: 2019-09-06 | End: 2019-09-18 | Stop reason: SDUPTHER

## 2019-09-06 NOTE — TELEPHONE ENCOUNTER
----- Message from Fabiola Rasheed MD sent at 9/6/2019 12:15 AM CDT -----  Keppra level was still high. Cut back to 500 mg BID. [facility] Also her sodium is low at 125. Needs to talk to her PCP regarding this. Otherwise labs were OK.

## 2019-09-09 ENCOUNTER — OFFICE VISIT (OUTPATIENT)
Dept: UROLOGY | Age: 84
End: 2019-09-09
Payer: MEDICARE

## 2019-09-09 DIAGNOSIS — N39.0 URINARY TRACT INFECTION WITHOUT HEMATURIA, SITE UNSPECIFIED: ICD-10-CM

## 2019-09-09 DIAGNOSIS — N39.0 RECURRENT UTI: Primary | ICD-10-CM

## 2019-09-09 PROCEDURE — G8599 NO ASA/ANTIPLAT THER USE RNG: HCPCS | Performed by: PHYSICIAN ASSISTANT

## 2019-09-09 PROCEDURE — 1123F ACP DISCUSS/DSCN MKR DOCD: CPT | Performed by: PHYSICIAN ASSISTANT

## 2019-09-09 PROCEDURE — G8420 CALC BMI NORM PARAMETERS: HCPCS | Performed by: PHYSICIAN ASSISTANT

## 2019-09-09 PROCEDURE — 1090F PRES/ABSN URINE INCON ASSESS: CPT | Performed by: PHYSICIAN ASSISTANT

## 2019-09-09 PROCEDURE — G8400 PT W/DXA NO RESULTS DOC: HCPCS | Performed by: PHYSICIAN ASSISTANT

## 2019-09-09 PROCEDURE — G8427 DOCREV CUR MEDS BY ELIG CLIN: HCPCS | Performed by: PHYSICIAN ASSISTANT

## 2019-09-09 PROCEDURE — 4040F PNEUMOC VAC/ADMIN/RCVD: CPT | Performed by: PHYSICIAN ASSISTANT

## 2019-09-09 PROCEDURE — 99214 OFFICE O/P EST MOD 30 MIN: CPT | Performed by: PHYSICIAN ASSISTANT

## 2019-09-09 PROCEDURE — 1036F TOBACCO NON-USER: CPT | Performed by: PHYSICIAN ASSISTANT

## 2019-09-09 RX ORDER — CEFDINIR 300 MG/1
300 CAPSULE ORAL 2 TIMES DAILY
Qty: 6 CAPSULE | Refills: 0 | Status: SHIPPED | OUTPATIENT
Start: 2019-09-09 | End: 2019-09-12

## 2019-09-09 ASSESSMENT — ENCOUNTER SYMPTOMS
ABDOMINAL PAIN: 0
EYE DISCHARGE: 0
SHORTNESS OF BREATH: 0
BACK PAIN: 0
COUGH: 0
DIARRHEA: 0
CONSTIPATION: 0
EYE REDNESS: 0
WHEEZING: 0

## 2019-09-12 LAB — URINE CULTURE, ROUTINE: NORMAL

## 2019-09-13 ENCOUNTER — TELEPHONE (OUTPATIENT)
Dept: UROLOGY | Age: 84
End: 2019-09-13

## 2019-09-13 ENCOUNTER — APPOINTMENT (OUTPATIENT)
Dept: GENERAL RADIOLOGY | Age: 84
DRG: 641 | End: 2019-09-13
Payer: MEDICARE

## 2019-09-13 ENCOUNTER — APPOINTMENT (OUTPATIENT)
Dept: CT IMAGING | Age: 84
DRG: 641 | End: 2019-09-13
Payer: MEDICARE

## 2019-09-13 ENCOUNTER — HOSPITAL ENCOUNTER (INPATIENT)
Age: 84
LOS: 4 days | Discharge: OTHER INSTITUTION | DRG: 641 | End: 2019-09-17
Attending: EMERGENCY MEDICINE | Admitting: FAMILY MEDICINE
Payer: MEDICARE

## 2019-09-13 DIAGNOSIS — K57.90 DIVERTICULOSIS: ICD-10-CM

## 2019-09-13 DIAGNOSIS — R10.31 RIGHT LOWER QUADRANT ABDOMINAL PAIN: ICD-10-CM

## 2019-09-13 DIAGNOSIS — E87.1 HYPONATREMIA: Primary | ICD-10-CM

## 2019-09-13 DIAGNOSIS — H35.3210 EXUDATIVE AGE-RELATED MACULAR DEGENERATION OF RIGHT EYE, UNSPECIFIED STAGE (HCC): ICD-10-CM

## 2019-09-13 DIAGNOSIS — R53.1 GENERALIZED WEAKNESS: ICD-10-CM

## 2019-09-13 DIAGNOSIS — E44.1 MILD PROTEIN-CALORIE MALNUTRITION (HCC): ICD-10-CM

## 2019-09-13 DIAGNOSIS — R93.5 ABNORMAL ABDOMINAL CT SCAN: ICD-10-CM

## 2019-09-13 DIAGNOSIS — H35.3120 NONEXUDATIVE AGE-RELATED MACULAR DEGENERATION OF LEFT EYE, UNSPECIFIED STAGE: ICD-10-CM

## 2019-09-13 PROBLEM — Z91.81 RISK FOR FALLS: Chronic | Status: ACTIVE | Noted: 2019-09-13

## 2019-09-13 LAB
ALBUMIN SERPL-MCNC: 4.3 G/DL (ref 3.5–5.2)
ALP BLD-CCNC: 77 U/L (ref 35–104)
ALT SERPL-CCNC: 18 U/L (ref 5–33)
ANION GAP SERPL CALCULATED.3IONS-SCNC: 14 MMOL/L (ref 7–19)
AST SERPL-CCNC: 19 U/L (ref 5–32)
BASOPHILS ABSOLUTE: 0 K/UL (ref 0–0.2)
BASOPHILS RELATIVE PERCENT: 0.4 % (ref 0–1)
BILIRUB SERPL-MCNC: 0.4 MG/DL (ref 0.2–1.2)
BILIRUBIN URINE: NEGATIVE
BLOOD, URINE: NEGATIVE
BUN BLDV-MCNC: 21 MG/DL (ref 8–23)
CALCIUM SERPL-MCNC: 9.9 MG/DL (ref 8.8–10.2)
CHLORIDE BLD-SCNC: 85 MMOL/L (ref 98–111)
CLARITY: CLEAR
CO2: 21 MMOL/L (ref 22–29)
COLOR: YELLOW
CREAT SERPL-MCNC: 1.1 MG/DL (ref 0.5–0.9)
EOSINOPHILS ABSOLUTE: 0.1 K/UL (ref 0–0.6)
EOSINOPHILS RELATIVE PERCENT: 1.9 % (ref 0–5)
GFR NON-AFRICAN AMERICAN: 47
GLUCOSE BLD-MCNC: 92 MG/DL (ref 74–109)
GLUCOSE URINE: NEGATIVE MG/DL
HCT VFR BLD CALC: 30 % (ref 37–47)
HEMOGLOBIN: 10.6 G/DL (ref 12–16)
IMMATURE GRANULOCYTES #: 0.1 K/UL
KETONES, URINE: NEGATIVE MG/DL
LEUKOCYTE ESTERASE, URINE: NEGATIVE
LYMPHOCYTES ABSOLUTE: 1.4 K/UL (ref 1.1–4.5)
LYMPHOCYTES RELATIVE PERCENT: 18.9 % (ref 20–40)
MCH RBC QN AUTO: 35.1 PG (ref 27–31)
MCHC RBC AUTO-ENTMCNC: 35.3 G/DL (ref 33–37)
MCV RBC AUTO: 99.3 FL (ref 81–99)
MONOCYTES ABSOLUTE: 0.5 K/UL (ref 0–0.9)
MONOCYTES RELATIVE PERCENT: 6.4 % (ref 0–10)
NEUTROPHILS ABSOLUTE: 5.2 K/UL (ref 1.5–7.5)
NEUTROPHILS RELATIVE PERCENT: 71.3 % (ref 50–65)
NITRITE, URINE: NEGATIVE
OSMOLALITY URINE: 230 MOSM/KG (ref 250–1200)
OSMOLALITY: 257 MOSM/KG (ref 275–300)
PDW BLD-RTO: 11.2 % (ref 11.5–14.5)
PH UA: 7 (ref 5–8)
PLATELET # BLD: 207 K/UL (ref 130–400)
PMV BLD AUTO: 9.1 FL (ref 9.4–12.3)
POTASSIUM REFLEX MAGNESIUM: 5.2 MMOL/L (ref 3.5–5)
PROTEIN UA: NEGATIVE MG/DL
RBC # BLD: 3.02 M/UL (ref 4.2–5.4)
SODIUM BLD-SCNC: 120 MMOL/L (ref 136–145)
SODIUM URINE: 59 MMOL/L
SPECIFIC GRAVITY UA: 1.01 (ref 1–1.03)
TOTAL PROTEIN: 6.8 G/DL (ref 6.6–8.7)
TROPONIN: <0.01 NG/ML (ref 0–0.03)
TSH SERPL DL<=0.05 MIU/L-ACNC: 0.96 UIU/ML (ref 0.27–4.2)
URINE REFLEX TO CULTURE: NORMAL
UROBILINOGEN, URINE: 0.2 E.U./DL
WBC # BLD: 7.2 K/UL (ref 4.8–10.8)

## 2019-09-13 PROCEDURE — 85025 COMPLETE CBC W/AUTO DIFF WBC: CPT

## 2019-09-13 PROCEDURE — 6360000004 HC RX CONTRAST MEDICATION: Performed by: EMERGENCY MEDICINE

## 2019-09-13 PROCEDURE — 80053 COMPREHEN METABOLIC PANEL: CPT

## 2019-09-13 PROCEDURE — 84484 ASSAY OF TROPONIN QUANT: CPT

## 2019-09-13 PROCEDURE — 84300 ASSAY OF URINE SODIUM: CPT

## 2019-09-13 PROCEDURE — 83935 ASSAY OF URINE OSMOLALITY: CPT

## 2019-09-13 PROCEDURE — 71045 X-RAY EXAM CHEST 1 VIEW: CPT

## 2019-09-13 PROCEDURE — 2580000003 HC RX 258: Performed by: EMERGENCY MEDICINE

## 2019-09-13 PROCEDURE — 36415 COLL VENOUS BLD VENIPUNCTURE: CPT

## 2019-09-13 PROCEDURE — 1210000000 HC MED SURG R&B

## 2019-09-13 PROCEDURE — 74177 CT ABD & PELVIS W/CONTRAST: CPT

## 2019-09-13 PROCEDURE — 99285 EMERGENCY DEPT VISIT HI MDM: CPT

## 2019-09-13 PROCEDURE — 83930 ASSAY OF BLOOD OSMOLALITY: CPT

## 2019-09-13 PROCEDURE — 84443 ASSAY THYROID STIM HORMONE: CPT

## 2019-09-13 PROCEDURE — 93005 ELECTROCARDIOGRAM TRACING: CPT | Performed by: EMERGENCY MEDICINE

## 2019-09-13 PROCEDURE — 2580000003 HC RX 258: Performed by: HOSPITALIST

## 2019-09-13 PROCEDURE — 81003 URINALYSIS AUTO W/O SCOPE: CPT

## 2019-09-13 RX ORDER — SODIUM CHLORIDE 0.9 % (FLUSH) 0.9 %
10 SYRINGE (ML) INJECTION EVERY 12 HOURS SCHEDULED
Status: DISCONTINUED | OUTPATIENT
Start: 2019-09-13 | End: 2019-09-17 | Stop reason: HOSPADM

## 2019-09-13 RX ORDER — POLYETHYLENE GLYCOL 3350 17 G/17G
17 POWDER, FOR SOLUTION ORAL DAILY PRN
Status: DISCONTINUED | OUTPATIENT
Start: 2019-09-13 | End: 2019-09-17 | Stop reason: HOSPADM

## 2019-09-13 RX ORDER — SODIUM CHLORIDE 9 MG/ML
INJECTION, SOLUTION INTRAVENOUS CONTINUOUS
Status: DISCONTINUED | OUTPATIENT
Start: 2019-09-13 | End: 2019-09-14

## 2019-09-13 RX ORDER — SODIUM CHLORIDE 0.9 % (FLUSH) 0.9 %
10 SYRINGE (ML) INJECTION PRN
Status: DISCONTINUED | OUTPATIENT
Start: 2019-09-13 | End: 2019-09-17 | Stop reason: HOSPADM

## 2019-09-13 RX ORDER — 0.9 % SODIUM CHLORIDE 0.9 %
1000 INTRAVENOUS SOLUTION INTRAVENOUS ONCE
Status: COMPLETED | OUTPATIENT
Start: 2019-09-13 | End: 2019-09-13

## 2019-09-13 RX ADMIN — IOPAMIDOL 75 ML: 755 INJECTION, SOLUTION INTRAVENOUS at 19:25

## 2019-09-13 RX ADMIN — SODIUM CHLORIDE 1000 ML: 9 INJECTION, SOLUTION INTRAVENOUS at 20:00

## 2019-09-13 RX ADMIN — Medication 10 ML: at 23:46

## 2019-09-13 RX ADMIN — SODIUM CHLORIDE: 9 INJECTION, SOLUTION INTRAVENOUS at 23:45

## 2019-09-13 SDOH — HEALTH STABILITY: MENTAL HEALTH: HOW OFTEN DO YOU HAVE A DRINK CONTAINING ALCOHOL?: NEVER

## 2019-09-13 ASSESSMENT — ENCOUNTER SYMPTOMS
CHOKING: 0
SORE THROAT: 0
ABDOMINAL PAIN: 1
NAUSEA: 0
FACIAL SWELLING: 0
SINUS PRESSURE: 0
EYE DISCHARGE: 0
VOICE CHANGE: 0
SHORTNESS OF BREATH: 0
DIARRHEA: 0
APNEA: 0
CONSTIPATION: 0
BLOOD IN STOOL: 0

## 2019-09-13 NOTE — LETTER
[facility]  [address]  Phone: [phone]          September 17, 2019     Patient: Sergey Phelan   YOB: 1935   Date of Visit: 9/13/2019       To Whom It May Concern: It is my medical opinion that Estevan Arriaga needs to have meal trays delivered to room for safety until cleared by her PCP. If you have any questions or concerns, please don't hesitate to call.     Sincerely,      Jey La LPN

## 2019-09-13 NOTE — TELEPHONE ENCOUNTER
Patient daughter, Tavares Hewitt called wanting culture results. I told her it came back no growth. She asked if someone could call her as she does not understand why urine and labs were abnormal that dr Dominic Florentino did prior.

## 2019-09-14 LAB
ANION GAP SERPL CALCULATED.3IONS-SCNC: 13 MMOL/L (ref 7–19)
ANION GAP SERPL CALCULATED.3IONS-SCNC: 13 MMOL/L (ref 7–19)
ANION GAP SERPL CALCULATED.3IONS-SCNC: 16 MMOL/L (ref 7–19)
BASOPHILS ABSOLUTE: 0.1 K/UL (ref 0–0.2)
BASOPHILS RELATIVE PERCENT: 0.7 % (ref 0–1)
BUN BLDV-MCNC: 20 MG/DL (ref 8–23)
BUN BLDV-MCNC: 22 MG/DL (ref 8–23)
BUN BLDV-MCNC: 25 MG/DL (ref 8–23)
CALCIUM SERPL-MCNC: 9.8 MG/DL (ref 8.8–10.2)
CALCIUM SERPL-MCNC: 9.9 MG/DL (ref 8.8–10.2)
CALCIUM SERPL-MCNC: 9.9 MG/DL (ref 8.8–10.2)
CHLORIDE BLD-SCNC: 95 MMOL/L (ref 98–111)
CHLORIDE BLD-SCNC: 96 MMOL/L (ref 98–111)
CHLORIDE BLD-SCNC: 97 MMOL/L (ref 98–111)
CO2: 18 MMOL/L (ref 22–29)
CO2: 21 MMOL/L (ref 22–29)
CO2: 22 MMOL/L (ref 22–29)
CREAT SERPL-MCNC: 1 MG/DL (ref 0.5–0.9)
CREAT SERPL-MCNC: 1.1 MG/DL (ref 0.5–0.9)
CREAT SERPL-MCNC: 1.2 MG/DL (ref 0.5–0.9)
EOSINOPHILS ABSOLUTE: 0.2 K/UL (ref 0–0.6)
EOSINOPHILS RELATIVE PERCENT: 2.2 % (ref 0–5)
GFR NON-AFRICAN AMERICAN: 43
GFR NON-AFRICAN AMERICAN: 47
GFR NON-AFRICAN AMERICAN: 53
GLUCOSE BLD-MCNC: 105 MG/DL (ref 74–109)
GLUCOSE BLD-MCNC: 108 MG/DL (ref 74–109)
GLUCOSE BLD-MCNC: 82 MG/DL (ref 74–109)
HCT VFR BLD CALC: 33.2 % (ref 37–47)
HEMOGLOBIN: 11.3 G/DL (ref 12–16)
IMMATURE GRANULOCYTES #: 0.1 K/UL
LACTIC ACID: 1.2 MMOL/L (ref 0.5–1.9)
LYMPHOCYTES ABSOLUTE: 1.6 K/UL (ref 1.1–4.5)
LYMPHOCYTES RELATIVE PERCENT: 18.4 % (ref 20–40)
MCH RBC QN AUTO: 34.5 PG (ref 27–31)
MCHC RBC AUTO-ENTMCNC: 34 G/DL (ref 33–37)
MCV RBC AUTO: 101.2 FL (ref 81–99)
MONOCYTES ABSOLUTE: 0.7 K/UL (ref 0–0.9)
MONOCYTES RELATIVE PERCENT: 7.4 % (ref 0–10)
NEUTROPHILS ABSOLUTE: 6.2 K/UL (ref 1.5–7.5)
NEUTROPHILS RELATIVE PERCENT: 69.7 % (ref 50–65)
PDW BLD-RTO: 11.1 % (ref 11.5–14.5)
PLATELET # BLD: 244 K/UL (ref 130–400)
PMV BLD AUTO: 9.9 FL (ref 9.4–12.3)
POTASSIUM REFLEX MAGNESIUM: 4.7 MMOL/L (ref 3.5–5)
POTASSIUM SERPL-SCNC: 4.9 MMOL/L (ref 3.5–5)
POTASSIUM SERPL-SCNC: 5.1 MMOL/L (ref 3.5–5)
PRO-BNP: 430 PG/ML (ref 0–1800)
RBC # BLD: 3.28 M/UL (ref 4.2–5.4)
SODIUM BLD-SCNC: 129 MMOL/L (ref 136–145)
SODIUM BLD-SCNC: 129 MMOL/L (ref 136–145)
SODIUM BLD-SCNC: 131 MMOL/L (ref 136–145)
SODIUM BLD-SCNC: 131 MMOL/L (ref 136–145)
WBC # BLD: 8.9 K/UL (ref 4.8–10.8)

## 2019-09-14 PROCEDURE — 2580000003 HC RX 258: Performed by: HOSPITALIST

## 2019-09-14 PROCEDURE — 84295 ASSAY OF SERUM SODIUM: CPT

## 2019-09-14 PROCEDURE — 6370000000 HC RX 637 (ALT 250 FOR IP): Performed by: HOSPITALIST

## 2019-09-14 PROCEDURE — 85025 COMPLETE CBC W/AUTO DIFF WBC: CPT

## 2019-09-14 PROCEDURE — 83605 ASSAY OF LACTIC ACID: CPT

## 2019-09-14 PROCEDURE — 83880 ASSAY OF NATRIURETIC PEPTIDE: CPT

## 2019-09-14 PROCEDURE — 80048 BASIC METABOLIC PNL TOTAL CA: CPT

## 2019-09-14 PROCEDURE — 6360000002 HC RX W HCPCS: Performed by: HOSPITALIST

## 2019-09-14 PROCEDURE — 1210000000 HC MED SURG R&B

## 2019-09-14 PROCEDURE — 36415 COLL VENOUS BLD VENIPUNCTURE: CPT

## 2019-09-14 RX ORDER — BLACK COHOSH ROOT EXTRACT 80 MG
1 CAPSULE ORAL DAILY
Status: DISCONTINUED | OUTPATIENT
Start: 2019-09-14 | End: 2019-09-14 | Stop reason: CLARIF

## 2019-09-14 RX ORDER — AMLODIPINE BESYLATE 5 MG/1
5 TABLET ORAL DAILY
Status: DISCONTINUED | OUTPATIENT
Start: 2019-09-14 | End: 2019-09-14

## 2019-09-14 RX ORDER — AZELASTINE 1 MG/ML
2 SPRAY, METERED NASAL 2 TIMES DAILY
Status: DISCONTINUED | OUTPATIENT
Start: 2019-09-14 | End: 2019-09-14 | Stop reason: CLARIF

## 2019-09-14 RX ORDER — M-VIT,TX,IRON,MINS/CALC/FOLIC 27MG-0.4MG
1 TABLET ORAL DAILY
Status: DISCONTINUED | OUTPATIENT
Start: 2019-09-14 | End: 2019-09-14

## 2019-09-14 RX ORDER — LIDOCAINE 4 G/G
1 PATCH TOPICAL DAILY PRN
Status: DISCONTINUED | OUTPATIENT
Start: 2019-09-14 | End: 2019-09-17 | Stop reason: HOSPADM

## 2019-09-14 RX ORDER — FLUTICASONE PROPIONATE 50 MCG
2 SPRAY, SUSPENSION (ML) NASAL DAILY
Status: DISCONTINUED | OUTPATIENT
Start: 2019-09-14 | End: 2019-09-14

## 2019-09-14 RX ORDER — SODIUM CHLORIDE 1000 MG
1 TABLET, SOLUBLE MISCELLANEOUS 3 TIMES DAILY
Status: DISCONTINUED | OUTPATIENT
Start: 2019-09-14 | End: 2019-09-14

## 2019-09-14 RX ORDER — FLUTICASONE PROPIONATE 50 MCG
2 SPRAY, SUSPENSION (ML) NASAL 2 TIMES DAILY
Status: DISCONTINUED | OUTPATIENT
Start: 2019-09-14 | End: 2019-09-17 | Stop reason: HOSPADM

## 2019-09-14 RX ORDER — ACETAMINOPHEN 500 MG
1000 TABLET ORAL EVERY 6 HOURS PRN
Status: DISCONTINUED | OUTPATIENT
Start: 2019-09-14 | End: 2019-09-17 | Stop reason: HOSPADM

## 2019-09-14 RX ORDER — NEBIVOLOL 5 MG/1
5 TABLET ORAL NIGHTLY
Status: DISCONTINUED | OUTPATIENT
Start: 2019-09-14 | End: 2019-09-17 | Stop reason: HOSPADM

## 2019-09-14 RX ORDER — POLYVINYL ALCOHOL 14 MG/ML
1 SOLUTION/ DROPS OPHTHALMIC PRN
Status: DISCONTINUED | OUTPATIENT
Start: 2019-09-14 | End: 2019-09-17 | Stop reason: HOSPADM

## 2019-09-14 RX ORDER — BENZONATATE 100 MG/1
200 CAPSULE ORAL 3 TIMES DAILY PRN
Status: DISCONTINUED | OUTPATIENT
Start: 2019-09-14 | End: 2019-09-17 | Stop reason: HOSPADM

## 2019-09-14 RX ORDER — AMLODIPINE BESYLATE 5 MG/1
7.5 TABLET ORAL DAILY
Status: DISCONTINUED | OUTPATIENT
Start: 2019-09-15 | End: 2019-09-17 | Stop reason: HOSPADM

## 2019-09-14 RX ORDER — AMLODIPINE BESYLATE 5 MG/1
5 TABLET ORAL NIGHTLY
COMMUNITY
End: 2019-09-25

## 2019-09-14 RX ORDER — M-VIT,TX,IRON,MINS/CALC/FOLIC 27MG-0.4MG
1 TABLET ORAL DAILY
Status: DISCONTINUED | OUTPATIENT
Start: 2019-09-14 | End: 2019-09-17 | Stop reason: HOSPADM

## 2019-09-14 RX ORDER — ASCORBIC ACID 500 MG
500 TABLET ORAL EVERY 12 HOURS
Status: DISCONTINUED | OUTPATIENT
Start: 2019-09-14 | End: 2019-09-15

## 2019-09-14 RX ORDER — PANTOPRAZOLE SODIUM 20 MG/1
20 TABLET, DELAYED RELEASE ORAL DAILY
Status: DISCONTINUED | OUTPATIENT
Start: 2019-09-14 | End: 2019-09-15

## 2019-09-14 RX ORDER — LEVETIRACETAM 500 MG/1
500 TABLET ORAL 2 TIMES DAILY
Status: DISCONTINUED | OUTPATIENT
Start: 2019-09-14 | End: 2019-09-17 | Stop reason: HOSPADM

## 2019-09-14 RX ORDER — DONEPEZIL HYDROCHLORIDE 5 MG/1
10 TABLET, FILM COATED ORAL NIGHTLY
Status: DISCONTINUED | OUTPATIENT
Start: 2019-09-14 | End: 2019-09-17 | Stop reason: HOSPADM

## 2019-09-14 RX ORDER — RAMIPRIL 5 MG/1
10 CAPSULE ORAL 2 TIMES DAILY
Status: DISCONTINUED | OUTPATIENT
Start: 2019-09-14 | End: 2019-09-14

## 2019-09-14 RX ORDER — LUTEIN 6 MG
20 TABLET ORAL DAILY
Status: DISCONTINUED | OUTPATIENT
Start: 2019-09-14 | End: 2019-09-14 | Stop reason: CLARIF

## 2019-09-14 RX ORDER — NITROFURANTOIN MACROCRYSTALS 50 MG/1
50 CAPSULE ORAL NIGHTLY
Status: DISCONTINUED | OUTPATIENT
Start: 2019-09-14 | End: 2019-09-17 | Stop reason: HOSPADM

## 2019-09-14 RX ORDER — SIMETHICONE 80 MG
125 TABLET,CHEWABLE ORAL
Status: DISCONTINUED | OUTPATIENT
Start: 2019-09-14 | End: 2019-09-17 | Stop reason: HOSPADM

## 2019-09-14 RX ORDER — SPIRONOLACTONE 50 MG/1
50 TABLET, FILM COATED ORAL DAILY
Status: DISCONTINUED | OUTPATIENT
Start: 2019-09-14 | End: 2019-09-14

## 2019-09-14 RX ORDER — PROMETHAZINE HYDROCHLORIDE 25 MG/1
25 TABLET ORAL 3 TIMES DAILY PRN
Status: DISCONTINUED | OUTPATIENT
Start: 2019-09-14 | End: 2019-09-17 | Stop reason: HOSPADM

## 2019-09-14 RX ORDER — PRAVASTATIN SODIUM 20 MG
20 TABLET ORAL EVERY MORNING
Status: DISCONTINUED | OUTPATIENT
Start: 2019-09-14 | End: 2019-09-17 | Stop reason: HOSPADM

## 2019-09-14 RX ORDER — HEPARIN SODIUM 5000 [USP'U]/ML
5000 INJECTION, SOLUTION INTRAVENOUS; SUBCUTANEOUS EVERY 8 HOURS SCHEDULED
Status: DISCONTINUED | OUTPATIENT
Start: 2019-09-14 | End: 2019-09-17 | Stop reason: HOSPADM

## 2019-09-14 RX ORDER — AMLODIPINE BESYLATE 5 MG/1
7.5 TABLET ORAL DAILY
Status: DISCONTINUED | OUTPATIENT
Start: 2019-09-14 | End: 2019-09-14

## 2019-09-14 RX ORDER — DEXTROSE MONOHYDRATE 50 MG/ML
INJECTION, SOLUTION INTRAVENOUS CONTINUOUS
Status: ACTIVE | OUTPATIENT
Start: 2019-09-14 | End: 2019-09-14

## 2019-09-14 RX ORDER — VITAMIN C
1 TAB ORAL DAILY
Status: DISCONTINUED | OUTPATIENT
Start: 2019-09-14 | End: 2019-09-17 | Stop reason: HOSPADM

## 2019-09-14 RX ORDER — DIPYRIDAMOLE 25 MG
100 TABLET ORAL 2 TIMES DAILY
Status: DISCONTINUED | OUTPATIENT
Start: 2019-09-14 | End: 2019-09-17 | Stop reason: HOSPADM

## 2019-09-14 RX ORDER — AMLODIPINE BESYLATE 5 MG/1
5 TABLET ORAL NIGHTLY
Status: DISCONTINUED | OUTPATIENT
Start: 2019-09-14 | End: 2019-09-17 | Stop reason: HOSPADM

## 2019-09-14 RX ORDER — LIDOCAINE 4 G/G
1 PATCH TOPICAL DAILY PRN
COMMUNITY

## 2019-09-14 RX ORDER — VITAMIN C
1 TAB ORAL DAILY
Status: DISCONTINUED | OUTPATIENT
Start: 2019-09-14 | End: 2019-09-14

## 2019-09-14 RX ADMIN — SODIUM CHLORIDE: 4.5 INJECTION, SOLUTION INTRAVENOUS at 10:01

## 2019-09-14 RX ADMIN — OXYCODONE HYDROCHLORIDE AND ACETAMINOPHEN 500 MG: 500 TABLET ORAL at 09:57

## 2019-09-14 RX ADMIN — LEVETIRACETAM 500 MG: 500 TABLET, FILM COATED ORAL at 09:46

## 2019-09-14 RX ADMIN — AMLODIPINE BESYLATE 5 MG: 5 TABLET ORAL at 09:47

## 2019-09-14 RX ADMIN — NITROFURANTOIN MACROCRYSTALS 50 MG: 50 CAPSULE ORAL at 21:39

## 2019-09-14 RX ADMIN — Medication 1 TABLET: at 14:58

## 2019-09-14 RX ADMIN — NEBIVOLOL HYDROCHLORIDE 5 MG: 5 TABLET ORAL at 21:38

## 2019-09-14 RX ADMIN — SIMETHICONE CHEW TAB 80 MG 120 MG: 80 TABLET ORAL at 12:20

## 2019-09-14 RX ADMIN — Medication 1 G: at 14:58

## 2019-09-14 RX ADMIN — DIPYRIDAMOLE 100 MG: 25 TABLET, FILM COATED ORAL at 14:58

## 2019-09-14 RX ADMIN — DIPYRIDAMOLE 100 MG: 25 TABLET, FILM COATED ORAL at 21:37

## 2019-09-14 RX ADMIN — ACETAMINOPHEN 1000 MG: 500 TABLET, FILM COATED ORAL at 18:11

## 2019-09-14 RX ADMIN — FLUTICASONE PROPIONATE 2 SPRAY: 50 SPRAY, METERED NASAL at 21:39

## 2019-09-14 RX ADMIN — HEPARIN SODIUM 5000 UNITS: 5000 INJECTION, SOLUTION INTRAVENOUS; SUBCUTANEOUS at 21:39

## 2019-09-14 RX ADMIN — PANTOPRAZOLE SODIUM 20 MG: 20 TABLET, DELAYED RELEASE ORAL at 09:47

## 2019-09-14 RX ADMIN — TIOTROPIUM BROMIDE INHALATION SPRAY 2 PUFF: 3.12 SPRAY, METERED RESPIRATORY (INHALATION) at 21:56

## 2019-09-14 RX ADMIN — Medication 10 ML: at 10:00

## 2019-09-14 RX ADMIN — VITAMIN D, TAB 1000IU (100/BT) 1000 UNITS: 25 TAB at 14:58

## 2019-09-14 RX ADMIN — VITAMIN C 1 TABLET: TAB at 14:58

## 2019-09-14 RX ADMIN — SIMETHICONE CHEW TAB 80 MG 120 MG: 80 TABLET ORAL at 16:07

## 2019-09-14 RX ADMIN — MULTIPLE VITAMINS W/ MINERALS TAB 1 TABLET: TAB at 14:58

## 2019-09-14 RX ADMIN — ACETAMINOPHEN 1000 MG: 500 TABLET, FILM COATED ORAL at 09:44

## 2019-09-14 RX ADMIN — LEVETIRACETAM 500 MG: 500 TABLET, FILM COATED ORAL at 21:38

## 2019-09-14 RX ADMIN — AMLODIPINE BESYLATE 5 MG: 5 TABLET ORAL at 21:38

## 2019-09-14 RX ADMIN — Medication 1 G: at 09:46

## 2019-09-14 RX ADMIN — PRAVASTATIN SODIUM 20 MG: 20 TABLET ORAL at 09:46

## 2019-09-14 RX ADMIN — PSYLLIUM HUSK 1 PACKET: 3.4 POWDER ORAL at 21:39

## 2019-09-14 RX ADMIN — DEXTROSE MONOHYDRATE: 50 INJECTION, SOLUTION INTRAVENOUS at 20:15

## 2019-09-14 RX ADMIN — SIMETHICONE CHEW TAB 80 MG 120 MG: 80 TABLET ORAL at 06:37

## 2019-09-14 RX ADMIN — DONEPEZIL HYDROCHLORIDE 10 MG: 5 TABLET, FILM COATED ORAL at 21:39

## 2019-09-14 RX ADMIN — Medication 10 ML: at 21:39

## 2019-09-14 RX ADMIN — HEPARIN SODIUM 5000 UNITS: 5000 INJECTION, SOLUTION INTRAVENOUS; SUBCUTANEOUS at 06:37

## 2019-09-14 RX ADMIN — FLUTICASONE PROPIONATE 2 SPRAY: 50 SPRAY, METERED NASAL at 09:44

## 2019-09-14 RX ADMIN — OXYCODONE HYDROCHLORIDE AND ACETAMINOPHEN 500 MG: 500 TABLET ORAL at 21:39

## 2019-09-14 ASSESSMENT — PAIN SCALES - GENERAL
PAINLEVEL_OUTOF10: 0
PAINLEVEL_OUTOF10: 3
PAINLEVEL_OUTOF10: 0
PAINLEVEL_OUTOF10: 3

## 2019-09-14 ASSESSMENT — PAIN DESCRIPTION - DESCRIPTORS: DESCRIPTORS: ACHING

## 2019-09-14 ASSESSMENT — PAIN DESCRIPTION - LOCATION: LOCATION: BACK

## 2019-09-14 ASSESSMENT — PAIN DESCRIPTION - FREQUENCY: FREQUENCY: INTERMITTENT

## 2019-09-14 ASSESSMENT — PAIN DESCRIPTION - PROGRESSION: CLINICAL_PROGRESSION: NOT CHANGED

## 2019-09-14 ASSESSMENT — PAIN DESCRIPTION - PAIN TYPE: TYPE: CHRONIC PAIN

## 2019-09-14 ASSESSMENT — PAIN DESCRIPTION - ONSET: ONSET: ON-GOING

## 2019-09-14 ASSESSMENT — PAIN DESCRIPTION - ORIENTATION: ORIENTATION: MID;LOWER

## 2019-09-14 ASSESSMENT — PAIN - FUNCTIONAL ASSESSMENT: PAIN_FUNCTIONAL_ASSESSMENT: PREVENTS OR INTERFERES SOME ACTIVE ACTIVITIES AND ADLS

## 2019-09-14 NOTE — PROGRESS NOTES
Intake 838.33 ml   Output 2900 ml   Net -2061.67 ml     CBC:   Recent Labs     09/13/19  1752 09/14/19  0200   WBC 7.2 8.9   HGB 10.6* 11.3*    244     BMP:  Recent Labs     09/13/19  1752 09/14/19  0207   * 129*   K 5.2* 4.7   CL 85* 95*   CO2 21* 18*   BUN 21 20   CREATININE 1.1* 1.0*   GLUCOSE 92 82     ABGs:   Lab Results   Component Value Date    PHART 7.490 07/26/2016    PO2ART 83.0 07/26/2016    HDN3NMO 33.0 07/26/2016     INR: No results for input(s): INR in the last 72 hours. Objective:   Vitals: /65   Pulse 68   Temp 97.7 °F (36.5 °C) (Temporal)   Resp 18   Ht 5' 5\" (1.651 m)   Wt 130 lb 12.8 oz (59.3 kg)   SpO2 96%   BMI 21.77 kg/m²   General appearance: alert, appears stated age and cooperative  Skin: Skin color, texture, turgor normal.   HEENT: Head: Normocephalic, no lesions, without obvious abnormality.   Neck: no adenopathy, no carotid bruit, no JVD and supple, symmetrical, trachea midline  Lungs: clear to auscultation bilaterally  Heart: regular rate and rhythm, S1, S2 normal, no murmur, click, rub or gallop  Abdomen: soft, non-tender; bowel sounds normal; no masses,  no organomegaly  Extremities: extremities normal, atraumatic, no cyanosis or edema  Lymphatic: No significant lymph node enlargement papable  Neurologic: Mental status: Alert, oriented, thought content appropriate        Assessment & Plan:    · Hyponatremia - corrected already - keep sodium below 130 - stop ACEI and spironolactone - further recommendation per nephrology   · Questionable seizures in pt with known seizure history- continue keppra- neurology consult  · CKD  · Late onset Alzheimer's disease without behavioral disturbance  · Seizure as late effect of cerebrovascular accident (CVA) ([facility] [address]  · Hyperkalemia       Disposition: NOE Garcia

## 2019-09-14 NOTE — PROGRESS NOTES
PHARMACY NOTE  Manjit Frias was ordered [employer]. Per the [address], this medication is non-formulary and not stocked by pharmacy. It has been discontinued. The medication can be reordered at discharge.      Electronically signed by Dante Ramirez [facility] on 9/14/2019 at 3:33 AM

## 2019-09-14 NOTE — PROGRESS NOTES
PHARMACY NOTE  Manjit Frias was ordered [employer]. Per the [address], this medication is non-formulary and not stocked by pharmacy. It has been discontinued. The medication can be reordered at discharge.      Electronically signed by Dante Ramirez, [address] [place] [address] on 9/14/2019 at 3:14 AM

## 2019-09-15 PROBLEM — R26.9 GAIT ABNORMALITY: Status: ACTIVE | Noted: 2019-09-15

## 2019-09-15 LAB
ANION GAP SERPL CALCULATED.3IONS-SCNC: 12 MMOL/L (ref 7–19)
ANION GAP SERPL CALCULATED.3IONS-SCNC: 12 MMOL/L (ref 7–19)
BUN BLDV-MCNC: 24 MG/DL (ref 8–23)
BUN BLDV-MCNC: 29 MG/DL (ref 8–23)
CALCIUM SERPL-MCNC: 9.1 MG/DL (ref 8.8–10.2)
CALCIUM SERPL-MCNC: 9.5 MG/DL (ref 8.8–10.2)
CHLORIDE BLD-SCNC: 97 MMOL/L (ref 98–111)
CHLORIDE BLD-SCNC: 98 MMOL/L (ref 98–111)
CO2: 19 MMOL/L (ref 22–29)
CO2: 20 MMOL/L (ref 22–29)
CREAT SERPL-MCNC: 1.2 MG/DL (ref 0.5–0.9)
CREAT SERPL-MCNC: 1.4 MG/DL (ref 0.5–0.9)
EKG P AXIS: 63 DEGREES
EKG P-R INTERVAL: 196 MS
EKG Q-T INTERVAL: 420 MS
EKG QRS DURATION: 88 MS
EKG QTC CALCULATION (BAZETT): 405 MS
EKG T AXIS: 28 DEGREES
GFR NON-AFRICAN AMERICAN: 36
GFR NON-AFRICAN AMERICAN: 43
GLUCOSE BLD-MCNC: 119 MG/DL (ref 74–109)
GLUCOSE BLD-MCNC: 99 MG/DL (ref 74–109)
OSMOLALITY URINE: 446 MOSM/KG (ref 250–1200)
POTASSIUM SERPL-SCNC: 4.7 MMOL/L (ref 3.5–5)
POTASSIUM SERPL-SCNC: 5.4 MMOL/L (ref 3.5–5)
SODIUM BLD-SCNC: 128 MMOL/L (ref 136–145)
SODIUM BLD-SCNC: 130 MMOL/L (ref 136–145)
SODIUM URINE: 49 MMOL/L
URIC ACID, SERUM: 4.3 MG/DL (ref 2.4–5.7)

## 2019-09-15 PROCEDURE — 84300 ASSAY OF URINE SODIUM: CPT

## 2019-09-15 PROCEDURE — 6370000000 HC RX 637 (ALT 250 FOR IP): Performed by: HOSPITALIST

## 2019-09-15 PROCEDURE — 83935 ASSAY OF URINE OSMOLALITY: CPT

## 2019-09-15 PROCEDURE — 2580000003 HC RX 258: Performed by: HOSPITALIST

## 2019-09-15 PROCEDURE — 1210000000 HC MED SURG R&B

## 2019-09-15 PROCEDURE — 36415 COLL VENOUS BLD VENIPUNCTURE: CPT

## 2019-09-15 PROCEDURE — 99223 1ST HOSP IP/OBS HIGH 75: CPT | Performed by: PSYCHIATRY & NEUROLOGY

## 2019-09-15 PROCEDURE — 80048 BASIC METABOLIC PNL TOTAL CA: CPT

## 2019-09-15 PROCEDURE — 6360000002 HC RX W HCPCS: Performed by: HOSPITALIST

## 2019-09-15 PROCEDURE — 84550 ASSAY OF BLOOD/URIC ACID: CPT

## 2019-09-15 PROCEDURE — 6370000000 HC RX 637 (ALT 250 FOR IP): Performed by: INTERNAL MEDICINE

## 2019-09-15 RX ORDER — SODIUM BICARBONATE 650 MG/1
1300 TABLET ORAL 3 TIMES DAILY
Status: DISCONTINUED | OUTPATIENT
Start: 2019-09-15 | End: 2019-09-17 | Stop reason: HOSPADM

## 2019-09-15 RX ORDER — PANTOPRAZOLE SODIUM 40 MG/1
40 TABLET, DELAYED RELEASE ORAL DAILY
Status: DISCONTINUED | OUTPATIENT
Start: 2019-09-16 | End: 2019-09-17 | Stop reason: HOSPADM

## 2019-09-15 RX ORDER — SODIUM BICARBONATE 650 MG/1
650 TABLET ORAL 4 TIMES DAILY
Status: DISCONTINUED | OUTPATIENT
Start: 2019-09-15 | End: 2019-09-15

## 2019-09-15 RX ORDER — FUROSEMIDE 20 MG/1
10 TABLET ORAL DAILY
Status: DISCONTINUED | OUTPATIENT
Start: 2019-09-15 | End: 2019-09-17 | Stop reason: HOSPADM

## 2019-09-15 RX ADMIN — SIMETHICONE CHEW TAB 80 MG 120 MG: 80 TABLET ORAL at 16:04

## 2019-09-15 RX ADMIN — PRAVASTATIN SODIUM 20 MG: 20 TABLET ORAL at 10:30

## 2019-09-15 RX ADMIN — VITAMIN C 1 TABLET: TAB at 14:48

## 2019-09-15 RX ADMIN — NITROFURANTOIN MACROCRYSTALS 50 MG: 50 CAPSULE ORAL at 22:08

## 2019-09-15 RX ADMIN — FUROSEMIDE 10 MG: 20 TABLET ORAL at 14:48

## 2019-09-15 RX ADMIN — NEBIVOLOL HYDROCHLORIDE 5 MG: 5 TABLET ORAL at 22:08

## 2019-09-15 RX ADMIN — AMLODIPINE BESYLATE 5 MG: 5 TABLET ORAL at 22:09

## 2019-09-15 RX ADMIN — FLUTICASONE PROPIONATE 2 SPRAY: 50 SPRAY, METERED NASAL at 10:30

## 2019-09-15 RX ADMIN — HEPARIN SODIUM 5000 UNITS: 5000 INJECTION, SOLUTION INTRAVENOUS; SUBCUTANEOUS at 14:48

## 2019-09-15 RX ADMIN — MULTIPLE VITAMINS W/ MINERALS TAB 1 TABLET: TAB at 14:48

## 2019-09-15 RX ADMIN — LEVETIRACETAM 500 MG: 500 TABLET, FILM COATED ORAL at 22:08

## 2019-09-15 RX ADMIN — DONEPEZIL HYDROCHLORIDE 10 MG: 5 TABLET, FILM COATED ORAL at 22:09

## 2019-09-15 RX ADMIN — SODIUM BICARBONATE 1300 MG: 650 TABLET ORAL at 22:09

## 2019-09-15 RX ADMIN — PSYLLIUM HUSK 1 PACKET: 3.4 POWDER ORAL at 22:08

## 2019-09-15 RX ADMIN — PANTOPRAZOLE SODIUM 20 MG: 20 TABLET, DELAYED RELEASE ORAL at 07:00

## 2019-09-15 RX ADMIN — AMLODIPINE BESYLATE 7.5 MG: 5 TABLET ORAL at 10:30

## 2019-09-15 RX ADMIN — TIOTROPIUM BROMIDE INHALATION SPRAY 2 PUFF: 3.12 SPRAY, METERED RESPIRATORY (INHALATION) at 23:10

## 2019-09-15 RX ADMIN — FLUTICASONE PROPIONATE 2 SPRAY: 50 SPRAY, METERED NASAL at 22:26

## 2019-09-15 RX ADMIN — SIMETHICONE CHEW TAB 80 MG 120 MG: 80 TABLET ORAL at 11:19

## 2019-09-15 RX ADMIN — Medication 10 ML: at 22:27

## 2019-09-15 RX ADMIN — Medication 10 ML: at 10:31

## 2019-09-15 RX ADMIN — SODIUM BICARBONATE 1300 MG: 650 TABLET ORAL at 14:48

## 2019-09-15 RX ADMIN — Medication 1 TABLET: at 14:49

## 2019-09-15 RX ADMIN — HEPARIN SODIUM 5000 UNITS: 5000 INJECTION, SOLUTION INTRAVENOUS; SUBCUTANEOUS at 22:09

## 2019-09-15 RX ADMIN — SIMETHICONE CHEW TAB 80 MG 120 MG: 80 TABLET ORAL at 07:50

## 2019-09-15 RX ADMIN — VITAMIN D, TAB 1000IU (100/BT) 1000 UNITS: 25 TAB at 14:48

## 2019-09-15 RX ADMIN — OXYCODONE HYDROCHLORIDE AND ACETAMINOPHEN 500 MG: 500 TABLET ORAL at 10:30

## 2019-09-15 RX ADMIN — LEVETIRACETAM 500 MG: 500 TABLET, FILM COATED ORAL at 10:30

## 2019-09-15 RX ADMIN — DIPYRIDAMOLE 100 MG: 25 TABLET, FILM COATED ORAL at 22:08

## 2019-09-15 RX ADMIN — DIPYRIDAMOLE 100 MG: 25 TABLET, FILM COATED ORAL at 10:30

## 2019-09-15 ASSESSMENT — PAIN SCALES - GENERAL
PAINLEVEL_OUTOF10: 0
PAINLEVEL_OUTOF10: 0

## 2019-09-15 NOTE — CONSULTS
[facility] Neurology Consult        Patient:   Ronnie Jackson  MR#:    [id number]  Account Number:                   [id number]      Room:    [address] [place] [address]-   YOB: 1935  Date of Progress Note: 9/15/2019  Time of Note                           11:09 AM  Attending Physician:  Julienne Mark, *  Consulting Physician:   Dana Lorenz M.D.        [address]:  Seizure        HISTORY OF PRESENT ILLNESS:   This 80 y.o. female seen for evaluation of weakness. The patient's daughter provides the history. In 2016 she had a cardiac arrest with a generalized seizure with her body contorting to the left although Dr Wilbert Perla note indicates she has right sided symptoms and her EEG report indicates she had runs of left fronto central sharp and slow waves. She was having issues with aphasia at that time and the daughter indicates that prior to that she was having episodes where she did not recall what happened. She had a prolonged hospitalized followed by aggressive therapy and she soon returned to her baseline. She was started on Keppra 750 mg twice a day. She had chronic issues with sodium but was doing relatively well. In January of 2019 she had had a rather precipitous decline She has generalized weakness and fatigue along with tremulousness and slurred speech. She was thought to have a urinary tract infection which has been recurrent due to her urinary incontinence. Her MRI of the brain following this had no evidence acute ischemic changes and her EEG was normal. Her Keppra was increased to 1000 mg twice a day. The daughter feels that her right leg has been weak since then. She does get injections in her back. She has had issues and decline in function since then. She has had mild renal dysfunction and issues with hyponatremia as well since January. In April she was having issues with tremor in her hands and her Keppra was decreased to 750 mg twice a day.  She has had two elevated Keppra levels since  LUMBAR FUSION      2012 90 days after the spine surgeries    SPINE SURGERY      L3,4, and 5   done in 2012   Nay Haskins      as a child at age 9        Medications in Hospital:      Current Facility-Administered Medications:     sodium bicarbonate tablet 650 mg, 650 mg, Oral, 4x Daily, Juan Ward MD    acetaminophen (TYLENOL) tablet 1,000 mg, 1,000 mg, Oral, Q6H PRN, Lucinda Boyer MD, 1,000 mg at 09/14/19 1811    amLODIPine (NORVASC) tablet 5 mg, 5 mg, Oral, Nightly, Lucinda Boyer MD, 5 mg at 09/14/19 2138    vitamin C (ASCORBIC ACID) tablet 500 mg, 500 mg, Oral, Q12H, Lucinda Boyer MD, 500 mg at 09/15/19 1030    benzonatate (TESSALON) capsule 200 mg, 200 mg, Oral, TID PRN, Lucinda Boyer MD    nebivolol (BYSTOLIC) tablet 5 mg, 5 mg, Oral, Nightly, Lucinda Boyer MD, 5 mg at 09/14/19 2138    Calcium + D3 600-200 MG-UNIT tablet 1 tablet, 1 tablet, Oral, Daily, Lucinda Boyer MD, 1 tablet at 09/14/19 1458    camphor-menthol (SARNA) lotion 0.5 mL, 0.5 mL, Topical, BID PRN, Lucinda Boyer MD    donepezil (ARICEPT) tablet 10 mg, 10 mg, Oral, Nightly, Lucinda Boyer MD, 10 mg at 09/14/19 2139    levETIRAcetam (KEPPRA) tablet 500 mg, 500 mg, Oral, BID, Lucinda Boyer MD, 500 mg at 09/15/19 1030    lidocaine 4 % external patch 1 patch, 1 patch, Transdermal, Daily PRN, Lucidna Boyer MD    sodium chloride (OCEAN, BABY AYR) 0.65 % nasal spray 1 spray, 1 spray, Nasal, PRN, Lucinda Boyer MD    [facility]) chewable tablet 120 mg, 120 mg, Oral, TID AC, Lucinda Boyer MD, 120 mg at 09/15/19 0750    psyllium (METAMUCIL) 58.12 % packet 1 packet, 1 packet, Oral, Nightly, Lucinda Boyer MD, 1 packet at 09/14/19 7793    promethazine (PHENERGAN) tablet 25 mg, 25 mg, Oral, TID PRN, Lucinda Boyer MD    pravastatin (PRAVACHOL) tablet 20 mg, 20 mg, Oral, QAM, Lucinda Boyer MD, 20 mg at 09/15/19 1030    polyvinyl alcohol (LIQUIFILM TEARS) 1.4 % ophthalmic solution 1

## 2019-09-15 NOTE — PLAN OF CARE
needs will improve  Description  Identification of resources available to assist in meeting health care needs will improve  Outcome: Ongoing     Problem: Role Relationship:  Goal: Ability to participate appropriately in conversations will improve  Description  Ability to participate appropriately in conversations will improve  Outcome: Ongoing     Problem: Self-Care:  Goal: Ability to participate in self-care as condition permits will improve  Description  Ability to participate in self-care as condition permits will improve  Outcome: Ongoing     Problem: Coping:  Goal: Ability to verbalize feelings about incontinence will improve  Description  Ability to verbalize feelings about incontinence will improve  Outcome: Ongoing     Problem: Urinary Elimination:  Goal: Risk for impaired skin integrity will decrease  Description  Risk for impaired skin integrity will decrease  Outcome: Ongoing  Goal: Pelvic muscle control will improve  Description  Pelvic muscle control will improve  Outcome: Ongoing  Goal: Incidence of incontinence will decrease  Description  Incidence of incontinence will decrease  Outcome: Ongoing

## 2019-09-15 NOTE — CONSULTS
Nephrology ([facility]) Consult Note      Patient:  Maida Avila  YOB: 1935  Date of Service: 9/15/2019  MRN: [id number]   Acct: [de-identified]   Primary Care Physician: Kvng Fajardo DO  Advance Directive: DNR-CCA  Admit Date: 9/13/2019       Hospital Day: 2  Referring Provider: Zan Aggarwal, *    Patient independently seen and examined, Chart, Consults, Notes, Operative notes, Labs, Cardiology, and Radiology studies reviewed as able. Chief complaint: Abnormal labs. Subjective:  Maida Avila is a 80 y.o. female  whom we were consulted for HYPONATREMIA/CKD stage III. Patient was admitted on September 13 with multiple complaints including nausea, fatigue and lack of energy. Incidental finding of the lab shows patient has hyponatremia, serum sodium was 120 mmol. Apparently she has been on Aldactone and was drinking lots of fluid to keep herself hydrated. Her blood pressure was also running low. Patient also has possible bladder prolapse and has stress incontinence of urine with frequency, recurrent urinary tract infection. Hospital course remarkable for discontinuation of Aldactone and restriction of her intake leading to improvement of serum sodium. Her creatinine is around 1.3 mg with estimated GFR of 45 mL which seems to be a baseline related to hypertensive renal disease. This morning she looks more alert and awake and talking clearly. Her daughter is at the bedside who has given me extensive history regarding her medical problems. Allergies:  Aspirin; Dilaudid [hydromorphone hcl]; Ondansetron; Quinolones; Sulfa antibiotics; Codeine; Demerol hcl [meperidine]; Levaquin [levofloxacin in d5w]; Adriane Bathe; Namenda [memantine hcl];  Norco [hydrocodone-acetaminophen]; and Pneumococcal vaccines    Medicines:  Current Facility-Administered Medications   Medication Dose Route Frequency Provider Last Rate Last Dose    sodium bicarbonate lumbar spine and lower thoracic spine. There are no acute osseous abnormalities identified. 1. Cystic changes centrally within the uterus; a fluid distended endometrial cavity considered, GYN consultation suggested. Uterine fibroids. 2. Colonic diverticulosis without CT evidence of acute diverticulitis. Otherwise, No CT evidence of acute bowel pathology 3. Uncomplicated left-sided superior lumbar hernia. Signed by Dr Regine Mcdonough on 9/13/2019 8:19 PM    Xr Chest Portable    Result Date: 9/13/2019  XR CHEST PORTABLE 9/13/2019 8:00 PM HISTORY:   Hyponatremia, fatigue  Single view. COMPARISONS:  3/3/2018 FINDINGS: The level of inspiration is shallow and lung volumes diminished. Granuloma appreciated. The lungs are clear without acute infiltrates. The heart is likely normal in size without heart failure. Changes from lumbar spine surgery noted. Degenerative changes observed in the glenohumeral joints particularly on the right. Radiographically, the chest is unchanged. No acute cardiopulmonary process. Signed by Dr Regine Mcdonough on 9/13/2019 10:22 PM       Assessment   1. HYPONATREMIA related to use of Aldactone. 2.  Excessive intake of water contributed to hyponatremia. 3.  Stage III chronic kidney disease baseline. 4.  Metabolic acidemia. 5.  History of stress incontinence related to bladder prolapse. 6.  Recurrent urinary tract infection. 7.  Possible dementia. 8.  History of seizure disorders. Plan:  1.  P.o. water restriction to 40 ounces a day. 2.  Low-dose loop diuretics. 3.  Sodium bicarbonate. 4.  Extensively discussed with her daughter. Thank you for the consult, we appreciate the opportunity to provide care to your patients. Feel free to contact me if I can be of any further assistance.       Vandana Roth MD  09/15/19  11:03 AM

## 2019-09-16 LAB
ALBUMIN SERPL-MCNC: 4.1 G/DL (ref 3.5–5.2)
ALP BLD-CCNC: 68 U/L (ref 35–104)
ALT SERPL-CCNC: 16 U/L (ref 5–33)
ANION GAP SERPL CALCULATED.3IONS-SCNC: 10 MMOL/L (ref 7–19)
AST SERPL-CCNC: 16 U/L (ref 5–32)
BILIRUB SERPL-MCNC: 0.3 MG/DL (ref 0.2–1.2)
BUN BLDV-MCNC: 18 MG/DL (ref 8–23)
CALCIUM SERPL-MCNC: 9.4 MG/DL (ref 8.8–10.2)
CHLORIDE BLD-SCNC: 94 MMOL/L (ref 98–111)
CO2: 23 MMOL/L (ref 22–29)
CREAT SERPL-MCNC: 1 MG/DL (ref 0.5–0.9)
GFR NON-AFRICAN AMERICAN: 53
GLUCOSE BLD-MCNC: 87 MG/DL (ref 74–109)
HCT VFR BLD CALC: 29.6 % (ref 37–47)
HEMOGLOBIN: 10 G/DL (ref 12–16)
MCH RBC QN AUTO: 34 PG (ref 27–31)
MCHC RBC AUTO-ENTMCNC: 33.8 G/DL (ref 33–37)
MCV RBC AUTO: 100.7 FL (ref 81–99)
PDW BLD-RTO: 11.2 % (ref 11.5–14.5)
PLATELET # BLD: 214 K/UL (ref 130–400)
PMV BLD AUTO: 9.3 FL (ref 9.4–12.3)
POTASSIUM SERPL-SCNC: 4.5 MMOL/L (ref 3.5–5)
RBC # BLD: 2.94 M/UL (ref 4.2–5.4)
SODIUM BLD-SCNC: 127 MMOL/L (ref 136–145)
TOTAL PROTEIN: 6.2 G/DL (ref 6.6–8.7)
WBC # BLD: 5.6 K/UL (ref 4.8–10.8)

## 2019-09-16 PROCEDURE — 6370000000 HC RX 637 (ALT 250 FOR IP): Performed by: HOSPITALIST

## 2019-09-16 PROCEDURE — 99233 SBSQ HOSP IP/OBS HIGH 50: CPT | Performed by: PSYCHIATRY & NEUROLOGY

## 2019-09-16 PROCEDURE — 1210000000 HC MED SURG R&B

## 2019-09-16 PROCEDURE — 92610 EVALUATE SWALLOWING FUNCTION: CPT

## 2019-09-16 PROCEDURE — 36415 COLL VENOUS BLD VENIPUNCTURE: CPT

## 2019-09-16 PROCEDURE — 2580000003 HC RX 258: Performed by: HOSPITALIST

## 2019-09-16 PROCEDURE — 80053 COMPREHEN METABOLIC PANEL: CPT

## 2019-09-16 PROCEDURE — 85027 COMPLETE CBC AUTOMATED: CPT

## 2019-09-16 PROCEDURE — 6370000000 HC RX 637 (ALT 250 FOR IP): Performed by: FAMILY MEDICINE

## 2019-09-16 PROCEDURE — 6360000002 HC RX W HCPCS: Performed by: HOSPITALIST

## 2019-09-16 PROCEDURE — 6370000000 HC RX 637 (ALT 250 FOR IP): Performed by: INTERNAL MEDICINE

## 2019-09-16 RX ORDER — DRONABINOL 2.5 MG/1
2.5 CAPSULE ORAL 2 TIMES DAILY
Status: DISCONTINUED | OUTPATIENT
Start: 2019-09-16 | End: 2019-09-17 | Stop reason: HOSPADM

## 2019-09-16 RX ADMIN — VITAMIN D, TAB 1000IU (100/BT) 1000 UNITS: 25 TAB at 14:29

## 2019-09-16 RX ADMIN — SODIUM BICARBONATE 1300 MG: 650 TABLET ORAL at 14:29

## 2019-09-16 RX ADMIN — NITROFURANTOIN MACROCRYSTALS 50 MG: 50 CAPSULE ORAL at 21:31

## 2019-09-16 RX ADMIN — PANTOPRAZOLE SODIUM 40 MG: 40 TABLET, DELAYED RELEASE ORAL at 07:10

## 2019-09-16 RX ADMIN — LEVETIRACETAM 500 MG: 500 TABLET, FILM COATED ORAL at 09:11

## 2019-09-16 RX ADMIN — DIPYRIDAMOLE 100 MG: 25 TABLET, FILM COATED ORAL at 21:31

## 2019-09-16 RX ADMIN — VITAMIN C 1 TABLET: TAB at 14:29

## 2019-09-16 RX ADMIN — LEVETIRACETAM 500 MG: 500 TABLET, FILM COATED ORAL at 21:30

## 2019-09-16 RX ADMIN — PRAVASTATIN SODIUM 20 MG: 20 TABLET ORAL at 09:07

## 2019-09-16 RX ADMIN — DIPYRIDAMOLE 100 MG: 25 TABLET, FILM COATED ORAL at 10:11

## 2019-09-16 RX ADMIN — FLUTICASONE PROPIONATE 2 SPRAY: 50 SPRAY, METERED NASAL at 09:11

## 2019-09-16 RX ADMIN — DRONABINOL 2.5 MG: 2.5 CAPSULE ORAL at 21:30

## 2019-09-16 RX ADMIN — Medication 10 ML: at 21:40

## 2019-09-16 RX ADMIN — HEPARIN SODIUM 5000 UNITS: 5000 INJECTION, SOLUTION INTRAVENOUS; SUBCUTANEOUS at 21:38

## 2019-09-16 RX ADMIN — Medication 10 ML: at 09:08

## 2019-09-16 RX ADMIN — MULTIPLE VITAMINS W/ MINERALS TAB 1 TABLET: TAB at 14:29

## 2019-09-16 RX ADMIN — SODIUM BICARBONATE 1300 MG: 650 TABLET ORAL at 21:30

## 2019-09-16 RX ADMIN — SIMETHICONE CHEW TAB 80 MG 120 MG: 80 TABLET ORAL at 10:12

## 2019-09-16 RX ADMIN — SODIUM BICARBONATE 1300 MG: 650 TABLET ORAL at 09:07

## 2019-09-16 RX ADMIN — AMLODIPINE BESYLATE 5 MG: 5 TABLET ORAL at 21:30

## 2019-09-16 RX ADMIN — Medication 1 TABLET: at 14:29

## 2019-09-16 RX ADMIN — AMLODIPINE BESYLATE 7.5 MG: 5 TABLET ORAL at 09:07

## 2019-09-16 RX ADMIN — TIOTROPIUM BROMIDE INHALATION SPRAY 2 PUFF: 3.12 SPRAY, METERED RESPIRATORY (INHALATION) at 21:31

## 2019-09-16 RX ADMIN — FLUTICASONE PROPIONATE 2 SPRAY: 50 SPRAY, METERED NASAL at 21:50

## 2019-09-16 RX ADMIN — HEPARIN SODIUM 5000 UNITS: 5000 INJECTION, SOLUTION INTRAVENOUS; SUBCUTANEOUS at 14:33

## 2019-09-16 RX ADMIN — DONEPEZIL HYDROCHLORIDE 10 MG: 5 TABLET, FILM COATED ORAL at 21:31

## 2019-09-16 RX ADMIN — NEBIVOLOL HYDROCHLORIDE 5 MG: 5 TABLET ORAL at 21:37

## 2019-09-16 RX ADMIN — SIMETHICONE CHEW TAB 80 MG 120 MG: 80 TABLET ORAL at 16:36

## 2019-09-16 RX ADMIN — SIMETHICONE CHEW TAB 80 MG 120 MG: 80 TABLET ORAL at 07:10

## 2019-09-16 RX ADMIN — FUROSEMIDE 10 MG: 20 TABLET ORAL at 09:07

## 2019-09-16 RX ADMIN — HEPARIN SODIUM 5000 UNITS: 5000 INJECTION, SOLUTION INTRAVENOUS; SUBCUTANEOUS at 07:26

## 2019-09-16 ASSESSMENT — PAIN SCALES - GENERAL: PAINLEVEL_OUTOF10: 0

## 2019-09-16 NOTE — PROGRESS NOTES
protection.)  Pharyngeal: No outward S/S penetration/aspiration was noted with any regular solid consistency trial, nectar thick liquid trial, or thin liquid trial administered during assessment this date. At this time, recommend continuation current diet. Patient may benefit from appetite stimulant if PO intake is documented as low.      Electronically signed by CHENTE Reyna on 9/16/2019 at 10:54 AM

## 2019-09-16 NOTE — PROGRESS NOTES
Robert Goldman MD   5,000 Units at 09/16/19 2187    therapeutic multivitamin-minerals 1 tablet  1 tablet Oral Daily Yoanna Escalante MD   1 tablet at 09/15/19 1448    vitamin B and C (TOTAL B-C) 1 tablet  1 tablet Oral Daily Yoanna Escalante MD   1 tablet at 09/15/19 1448    vitamin D (CHOLECALCIFEROL) tablet 1,000 Units  1,000 Units Oral Daily oYanna Escalante MD   1,000 Units at 09/15/19 1448    amLODIPine (NORVASC) tablet 7.5 mg  7.5 mg Oral Daily Yoanna Escalante MD   7.5 mg at 09/16/19 5016    fluticasone (FLONASE) 50 MCG/ACT nasal spray 2 spray  2 spray Each Nostril BID Yoanna Escalante MD   2 spray at 09/16/19 0911    dipyridamole (PERSANTINE) tablet 100 mg  100 mg Oral BID Yoanna Escalante MD   100 mg at 09/16/19 1011    tiotropium (SPIRIVA RESPIMAT) 2.5 MCG/ACT inhaler 2 puff  2 puff Inhalation Nightly Yoanna Escalante MD   2 puff at 09/15/19 2310    sodium chloride flush 0.9 % injection 10 mL  10 mL Intravenous 2 times per day Shantanu Aldana MD   10 mL at 09/16/19 0908    sodium chloride flush 0.9 % injection 10 mL  10 mL Intravenous PRN Shantanu Aldana MD        polyethylene glycol [facility]) packet 17 g  17 g Oral Daily PRN Shantanu Aldana MD           Past Medical History:  Past Medical History:   Diagnosis Date    Age-related macular degeneration, wet, right eye ([address]     Anemia     Back pain     Colitis, ischemic ([address]     Dementia     Diverticulosis     Dry age-related macular degeneration of left eye     Hyperlipidemia     Hypertension     Osteoarthritis     Risk for falls 9/13/2019    Seizures ([address]     Spastic colon     Stroke syndrome     Urinary incontinence        Past Surgical History:  Past Surgical History:   Procedure Laterality Date    BREAST SURGERY Right     FNA Right Breast \"oh heavens, maybe 20 years ago\"    CHOLECYSTECTOMY      COLONOSCOPY  9/25/03    Dr Boubacar Velasco ischemic colitis, incomplete exam    kidneys enhance symmetrically. There are no urinary tract calculi or obstructive uropathy changes. The urinary bladder is minimally distended without focal bladder wall abnormality. Uterine fibroids appreciated. There is cystic changes identified in the uterine fundus centrally, dilated fluid-filled endometrial cavity questioned. GYN consultation suggested. There is stool and gas identified throughout the colon including including the rectum without dilatation. A moderate amount of stool observed. There are scattered diverticuli in the sigmoid region. The cecum extends into the right hemipelvis. The appendix is not inflamed. The small bowel is not dilated. The duodenal sweep imaged appropriately. The stomach is not distended. There is a left-sided superior lumbar hernia with left colon extending into the defect without evidence of incarceration or destruction. There is atherosclerotic aortoiliac calcifications. There are origin calcifications along the celiac axis and SMA with out significant stenosis. Distally the vessels are intact without thromboembolic changes. The SONNY is intact. A circumaortic left renal vein identified, normal variant. There are no pathologically enlarged lymph nodes. There is no ascites or pneumoperitoneum. Extensive postsurgical changes noted in the lumbar spine. There is advanced disc degeneration spondylosis throughout the lumbar spine and lower thoracic spine. There are no acute osseous abnormalities identified. 1. Cystic changes centrally within the uterus; a fluid distended endometrial cavity considered, GYN consultation suggested. Uterine fibroids. 2. Colonic diverticulosis without CT evidence of acute diverticulitis. Otherwise, No CT evidence of acute bowel pathology 3. Uncomplicated left-sided superior lumbar hernia.  Signed by Dr Basilia Miller on 9/13/2019 8:19 PM    Xr Chest Portable    Result Date: 9/13/2019  XR CHEST PORTABLE 9/13/2019 8:00 PM HISTORY:   Hyponatremia, fatigue

## 2019-09-16 NOTE — PROGRESS NOTES
0710    acetaminophen (TYLENOL) tablet 1,000 mg, 1,000 mg, Oral, Q6H PRN, Anurag Maynard MD, 1,000 mg at 09/14/19 1811    amLODIPine (NORVASC) tablet 5 mg, 5 mg, Oral, Nightly, Anurag Maynard MD, 5 mg at 09/15/19 2209    benzonatate (TESSALON) capsule 200 mg, 200 mg, Oral, TID PRN, Anurag Maynard MD    nebivolol (BYSTOLIC) tablet 5 mg, 5 mg, Oral, Nightly, Anurag Maynard MD, 5 mg at 09/15/19 2208    Calcium + D3 600-200 MG-UNIT tablet 1 tablet, 1 tablet, Oral, Daily, Anurag Maynard MD, 1 tablet at 09/15/19 1449    camphor-menthol (SARNA) lotion 0.5 mL, 0.5 mL, Topical, BID PRN, Anurag Maynard MD    donepezil (ARICEPT) tablet 10 mg, 10 mg, Oral, Nightly, Anurag Maynard MD, 10 mg at 09/15/19 2209    levETIRAcetam (KEPPRA) tablet 500 mg, 500 mg, Oral, BID, Anurag Maynard MD, 500 mg at 09/15/19 2208    lidocaine 4 % external patch 1 patch, 1 patch, Transdermal, Daily PRN, Anurag Maynard MD    sodium chloride (OCEAN, BABY AYR) 0.65 % nasal spray 1 spray, 1 spray, Nasal, PRN, Anurag Maynard MD    [facility]) chewable tablet 120 mg, 120 mg, Oral, TID AC, Anurag Maynard MD, 120 mg at 09/16/19 0710    psyllium (METAMUCIL) 58.12 % packet 1 packet, 1 packet, Oral, Nightly, Anurag Maynard MD, 1 packet at 09/15/19 2208    promethazine (PHENERGAN) tablet 25 mg, 25 mg, Oral, TID PRN, Anurag Maynard MD    pravastatin (PRAVACHOL) tablet 20 mg, 20 mg, Oral, QAM, Anurag Maynard MD, 20 mg at 09/15/19 1030    polyvinyl alcohol (LIQUIFILM TEARS) 1.4 % ophthalmic solution 1 drop, 1 drop, Both Eyes, PRN, Anurag Maynard MD    nitrofurantoin (MACRODANTIN) capsule 50 mg, 50 mg, Oral, Nightly, Anurag Maynard MD, 50 mg at 09/15/19 2208    heparin (porcine) injection 5,000 Units, 5,000 Units, Subcutaneous, 3 times per day, Anurag Maynard MD, 5,000 Units at 09/16/19 0726    therapeutic multivitamin-minerals 1 tablet, 1 tablet, Oral, Daily, Yesy Kunz MD, 1 tablet at 09/15/19 1448    vitamin B and C (TOTAL

## 2019-09-17 VITALS
BODY MASS INDEX: 21.83 KG/M2 | OXYGEN SATURATION: 95 % | HEIGHT: 65 IN | HEART RATE: 60 BPM | TEMPERATURE: 97.8 F | RESPIRATION RATE: 16 BRPM | SYSTOLIC BLOOD PRESSURE: 108 MMHG | DIASTOLIC BLOOD PRESSURE: 59 MMHG | WEIGHT: 131 LBS

## 2019-09-17 PROBLEM — Z51.5 PALLIATIVE CARE PATIENT: Status: ACTIVE | Noted: 2019-09-17

## 2019-09-17 LAB
ANION GAP SERPL CALCULATED.3IONS-SCNC: 13 MMOL/L (ref 7–19)
BUN BLDV-MCNC: 21 MG/DL (ref 8–23)
CALCIUM SERPL-MCNC: 9.8 MG/DL (ref 8.8–10.2)
CHLORIDE BLD-SCNC: 94 MMOL/L (ref 98–111)
CO2: 24 MMOL/L (ref 22–29)
CREAT SERPL-MCNC: 1.2 MG/DL (ref 0.5–0.9)
GFR NON-AFRICAN AMERICAN: 43
GLUCOSE BLD-MCNC: 95 MG/DL (ref 74–109)
POTASSIUM SERPL-SCNC: 4.2 MMOL/L (ref 3.5–5)
SODIUM BLD-SCNC: 131 MMOL/L (ref 136–145)

## 2019-09-17 PROCEDURE — 2580000003 HC RX 258: Performed by: HOSPITALIST

## 2019-09-17 PROCEDURE — 6370000000 HC RX 637 (ALT 250 FOR IP): Performed by: HOSPITALIST

## 2019-09-17 PROCEDURE — 36415 COLL VENOUS BLD VENIPUNCTURE: CPT

## 2019-09-17 PROCEDURE — 92526 ORAL FUNCTION THERAPY: CPT

## 2019-09-17 PROCEDURE — 6370000000 HC RX 637 (ALT 250 FOR IP): Performed by: INTERNAL MEDICINE

## 2019-09-17 PROCEDURE — 6370000000 HC RX 637 (ALT 250 FOR IP): Performed by: FAMILY MEDICINE

## 2019-09-17 PROCEDURE — 80048 BASIC METABOLIC PNL TOTAL CA: CPT

## 2019-09-17 PROCEDURE — 6360000002 HC RX W HCPCS: Performed by: HOSPITALIST

## 2019-09-17 RX ORDER — SODIUM BICARBONATE 650 MG/1
1300 TABLET ORAL 3 TIMES DAILY
Qty: 90 TABLET | Refills: 0 | Status: SHIPPED | OUTPATIENT
Start: 2019-09-17 | End: 2019-09-25

## 2019-09-17 RX ORDER — DRONABINOL 2.5 MG/1
2.5 CAPSULE ORAL 2 TIMES DAILY
Qty: 6 CAPSULE | Refills: 0 | Status: SHIPPED | OUTPATIENT
Start: 2019-09-17 | End: 2019-09-20

## 2019-09-17 RX ORDER — FUROSEMIDE 20 MG/1
10 TABLET ORAL DAILY
Qty: 15 TABLET | Refills: 0 | Status: SHIPPED | OUTPATIENT
Start: 2019-09-18 | End: 2020-02-23 | Stop reason: ALTCHOICE

## 2019-09-17 RX ADMIN — ACETAMINOPHEN 1000 MG: 500 TABLET, FILM COATED ORAL at 01:40

## 2019-09-17 RX ADMIN — FLUTICASONE PROPIONATE 2 SPRAY: 50 SPRAY, METERED NASAL at 09:27

## 2019-09-17 RX ADMIN — VITAMIN D, TAB 1000IU (100/BT) 1000 UNITS: 25 TAB at 16:13

## 2019-09-17 RX ADMIN — SODIUM BICARBONATE 1300 MG: 650 TABLET ORAL at 09:25

## 2019-09-17 RX ADMIN — SODIUM BICARBONATE 1300 MG: 650 TABLET ORAL at 16:14

## 2019-09-17 RX ADMIN — Medication 1 TABLET: at 16:20

## 2019-09-17 RX ADMIN — Medication 10 ML: at 09:26

## 2019-09-17 RX ADMIN — MULTIPLE VITAMINS W/ MINERALS TAB 1 TABLET: TAB at 16:13

## 2019-09-17 RX ADMIN — PRAVASTATIN SODIUM 20 MG: 20 TABLET ORAL at 09:26

## 2019-09-17 RX ADMIN — ACETAMINOPHEN 1000 MG: 500 TABLET, FILM COATED ORAL at 16:20

## 2019-09-17 RX ADMIN — HEPARIN SODIUM 5000 UNITS: 5000 INJECTION, SOLUTION INTRAVENOUS; SUBCUTANEOUS at 07:11

## 2019-09-17 RX ADMIN — SIMETHICONE CHEW TAB 80 MG 120 MG: 80 TABLET ORAL at 12:29

## 2019-09-17 RX ADMIN — SIMETHICONE CHEW TAB 80 MG 120 MG: 80 TABLET ORAL at 06:44

## 2019-09-17 RX ADMIN — LEVETIRACETAM 500 MG: 500 TABLET, FILM COATED ORAL at 09:25

## 2019-09-17 RX ADMIN — DRONABINOL 2.5 MG: 2.5 CAPSULE ORAL at 20:31

## 2019-09-17 RX ADMIN — DRONABINOL 2.5 MG: 2.5 CAPSULE ORAL at 09:23

## 2019-09-17 RX ADMIN — VITAMIN C 1 TABLET: TAB at 16:13

## 2019-09-17 RX ADMIN — PANTOPRAZOLE SODIUM 40 MG: 40 TABLET, DELAYED RELEASE ORAL at 06:44

## 2019-09-17 RX ADMIN — SODIUM BICARBONATE 1300 MG: 650 TABLET ORAL at 20:30

## 2019-09-17 RX ADMIN — FUROSEMIDE 10 MG: 20 TABLET ORAL at 09:23

## 2019-09-17 RX ADMIN — DIPYRIDAMOLE 100 MG: 25 TABLET, FILM COATED ORAL at 09:23

## 2019-09-17 RX ADMIN — AMLODIPINE BESYLATE 7.5 MG: 5 TABLET ORAL at 09:25

## 2019-09-17 ASSESSMENT — PAIN SCALES - GENERAL
PAINLEVEL_OUTOF10: 9
PAINLEVEL_OUTOF10: 8

## 2019-09-17 NOTE — PROGRESS NOTES
Nephrology ([address]) Progress Note    Patient:  Sarwat Watt  YOB: 1935  Date of Service: 9/17/2019  MRN: [id number]   Acct: [de-identified]   Primary Care Physician: Antonio Parkinson DO  Advance Directive: DNR-CCA  Admit Date: 9/13/2019       Hospital Day: 4  Referring Provider: Corinne Griffith, DO    Patient independently seen and examined, Chart, Consults, Notes, Operative notes, Labs, Cardiology, and Radiology studies reviewed as able. Subjective:  Sarwat Watt is a 80 y.o. female  whom we were consulted for hyponatremia and CKD stage III. Patient was admitted on September 13 with multiple complaints including nausea, fatigue and lack of energy. Incidental finding of the lab work shows patient has hyponatremia, serum sodium was 120. Apparently she has been on Aldactone and was drinking lots of fluid to keep herself hydrated. Her blood pressure was also running low. Patient also has possible bladder prolapse and has stress incontinence of urine with frequency, recurrent urinary tract infection. Hospital course remarkable for discontinuation of Aldactone and restriction of her intake leading to some improvement of serum sodium. Her creatinine was around 1.3 with estimated GFR of 45 which seems to be a baseline related to hypertensive renal disease.     Today, no new events. Family present. Questions about fluid management. She denies headache, dizziness, chest pain, nausea or vomiting. Allergies:  Aspirin; Dilaudid [hydromorphone hcl]; Ondansetron; Quinolones; Sulfa antibiotics; Codeine; Demerol hcl [meperidine]; Levaquin [levofloxacin in d5w]; Lawerence Draft; Namenda [memantine hcl];  Norco [hydrocodone-acetaminophen]; and Pneumococcal vaccines    Medicines:  Current Facility-Administered Medications   Medication Dose Route Frequency Provider Last Rate Last Dose    dronabinol (MARINOL) capsule 2.5 mg  2.5 mg Oral BID Corinne Griffith, DO   2.5 mg at 09/17/19 week: Not on file     Minutes per session: Not on file    Stress: Not on file   Relationships    Social connections:     Talks on phone: Not on file     Gets together: Not on file     Attends Adventist service: Not on file     Active member of club or organization: Not on file     Attends meetings of clubs or organizations: Not on file     Relationship status: Not on file    Intimate partner violence:     Fear of current or ex partner: Not on file     Emotionally abused: Not on file     Physically abused: Not on file     Forced sexual activity: Not on file   Other Topics Concern    Not on file   Social History Narrative    CODE STATUS: DNR-CCA    HEALTH CARE PROXY / Legal PoA Financial and Healthcare: her daughter, Mrs. Rahel Gannon, [phone]    AMBULATES: with walker during day, wheelchair at night    DOMICILED: lives in the [facility] assisted living [facility], has no stairs or steps, has a cat, her daughter is there periodically         Review of Systems:  History obtained from chart review and the patient  General ROS: No fever or chills  Respiratory ROS: No cough, shortness of breath, wheezing  Cardiovascular ROS: No chest pain or palpitations  Gastrointestinal ROS: No abdominal pain or melena  Genito-Urinary ROS: No dysuria or hematuria  Musculoskeletal ROS: No joint pain or swelling         Objective:  Patient Vitals for the past 24 hrs:   BP Temp Temp src Pulse Resp SpO2 Weight   09/17/19 0750 -- 97.8 °F (36.6 °C) Temporal -- -- 95 % --   09/17/19 0646 (!) 108/59 -- -- 60 16 -- --   09/17/19 0310 -- -- -- -- -- -- 131 lb (59.4 kg)   09/16/19 1919 (!) 128/59 97.5 °F (36.4 °C) Temporal 62 16 99 % --       Intake/Output Summary (Last 24 hours) at 9/17/2019 1538  Last data filed at 9/17/2019 1530  Gross per 24 hour   Intake 677 ml   Output 775 ml   Net -98 ml     General: awake/alert   Chest:  clear to auscultation bilaterally without respiratory distress  CVS: regular rate and rhythm  Abdominal: soft, radiation dose. TECHNIQUE: Serial axial tomographic images of the brain were obtained without the use of intravenous contrast. FINDINGS: The midline structures are nondisplaced. There is moderate cerebral and cerebellar volume loss, with an associated increase in the prominence of the ventricles and sulci. The basilar cisterns are normal in size and configuration. There is no evidence of intracranial hemorrhage or mass-effect. There is low attenuation in the periventricular white matter, consistent with chronic ischemic change. There are no abnormal extra-axial fluid collections. There is no evidence of tonsillar herniation. The included orbits and their contents are unremarkable. The visualized paranasal sinuses, mastoid air cells and middle ear cavities are clear. The visualized osseous structures and overlying soft tissues of the skull and face are intact. Moderate cerebral and cerebellar volume loss with chronic microvascular disease but no evidence of acute intracranial process. Signed by Dr Deejay Galvan on 8/23/2019 10:03 PM    Ct Abdomen Pelvis W Iv Contrast Additional Contrast? None    Result Date: 9/13/2019  CT ABDOMEN PELVIS W IV CONTRAST 9/13/2019 5:45 PM HISTORY: Right lower quadrant pain COMPARISON: Abdomen pelvis CT dated 2/20/2018 TECHNIQUE:  Thin section sequential transaxial images were obtained. 2-D sagittal and coronal reconstruction images were generated. Intravenous contrast was administered. Oral contrast was not ingested. Radiation dose equals  mGy cm. AUTOMATED EXPOSURE CONTROL dose reduction technique was implemented. FINDINGS: Calcified granuloma observed in the right middle lobe. The lung bases are grossly clear. Gallbladder surgically absent. There is no biliary ductal dilatation with mild reservoir effect observed. Mild diffuse hepatic steatosis questioned. The spleen is not enlarged. There are no adrenal masses. No pancreatic abnormalities appreciated.  The kidneys enhance

## 2019-09-17 NOTE — PROGRESS NOTES
Thin  Recommended Form of Meds: PO     Compensatory Swallowing Strategies  Compensatory Swallowing Strategies: Upright as possible for all oral intake;Small bites/sips;Eat/Feed slowly; Alternate solids and liquids; Remain upright for 30-45 minutes after meals     General  Chart Reviewed: Yes  Behavior/Cognition: Alert; Cooperative  Communication Observation: (SLP ranked functional intelligibility of speech for unfamiliar listeners at 100% in verbalizations.)  Follows Directions: Simple  Dentition: Adequate  Patient Positioning: Upright in bed  Consistencies Administered: Reg solid; Thin - cup     Observed patient's swallowing function with the following observations noted:     Oral Phase  Oral Phase - Comment: Patient exhibited slow oral prep of regular solid consistency presentations administered independently. Oral transit of regular solid consistency primarily measured 1-2 seconds in length and min oral cavity residue was noted post swallows; residue cleared with additional dry swallows. Oral transit of thin liquid presentations, administered independently via cup, primarily measured 1 second in length. Pharyngeal Phase  Decreased Laryngeal Elevation: (Patient exhibited sluggish, moderate-severely decreased laryngeal elevation for swallow airway protection.)  Pharyngeal: No outward S/S penetration/aspiration was noted with any regular solid consistency presentation or thin liquid presentation administered during treatment session this date.     At this time, recommend continuation current diet. No further acute speech therapy is felt to be warranted. Patient to be D/C.      Electronically signed by CHENTE Silver on 9/17/2019 at 10:32 AM

## 2019-09-17 NOTE — CARE COORDINATION
SW received return call from [facility] re: script faxed for Marinol; rep indicated the cost was 8.50 for 6 capsules.     Electronically signed by ASHELY Vigil on 9/17/2019 at 3:29 PM

## 2019-09-17 NOTE — DISCHARGE SUMMARY
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 None    Result Date: 2019  CT ABDOMEN PELVIS W IV CONTRAST 2019 5:45 PM HISTORY: Right lower quadrant pain COMPARISON: Abdomen pelvis CT dated 2018 TECHNIQUE:  Thin section sequential transaxial images were obtained. 2-D sagittal and coronal reconstruction images were generated. Intravenous contrast was administered. Oral contrast was not ingested. Radiation dose equals  mGy cm. AUTOMATED EXPOSURE CONTROL dose reduction technique was implemented. FINDINGS: Calcified granuloma observed in the right middle lobe. The lung bases are grossly clear. Gallbladder surgically absent. There is no biliary ductal dilatation with mild reservoir effect observed. Mild diffuse hepatic steatosis questioned. The spleen is not enlarged. There are no adrenal masses. No pancreatic abnormalities appreciated. The kidneys enhance symmetrically. There are no urinary tract calculi or obstructive uropathy changes. The urinary bladder is minimally distended without focal bladder wall abnormality. Uterine fibroids appreciated. There is cystic changes identified in the uterine fundus centrally, dilated fluid-filled endometrial cavity questioned. GYN consultation suggested. There is stool and gas identified throughout the colon including including the rectum without dilatation.  A moderate amount of stool camphor-menthol (SARNA) 0.5-0.5 % lotion  Apply 0.5 mLs topically 2 times daily as needed for Itching Apply topically as needed. Cholecalciferol (VITAMIN D3) 5000 units TABS  Take 1,000 Units by mouth daily Indications: AT 2:30 PM              cloNIDine (CATAPRES) 0.1 MG tablet  Take 1 tablet by mouth 2 times daily             conjugated estrogens (PREMARIN) 0.625 MG/GM vaginal cream  1 gm vaginally weekly     Also, apply small amount to the urethra             desonide (DESOWEN) 0.05 % cream  Apply topically 2 times daily Apply topically 2 times daily. dipyridamole (PERSANTINE) 50 MG tablet  Take 2 tablets by mouth 2 times daily             docusate sodium (COLACE) 100 MG capsule  Take 100 mg by mouth nightly as needed for Constipation              donepezil (ARICEPT) 10 MG tablet  Take 1 tablet by mouth nightly             dronabinol (MARINOL) 2.5 MG capsule  Take 1 capsule by mouth 2 times daily for 3 days. fluticasone (VERAMYST) 27.5 MCG/SPRAY nasal spray  2 sprays by Nasal route every 12 hours              furosemide (LASIX) 20 MG tablet  Take 0.5 tablets by mouth daily             levETIRAcetam (KEPPRA) 500 MG tablet  Take 1 tablet by mouth 2 times daily             lidocaine 4 % external patch  Place 1 patch onto the skin daily as needed (LEFT LOWER BACK PAIN)             Lutein 20 MG TABS  Take 20 mg by mouth daily Indications: 2:30 PM DAILY              Multiple Vitamins-Minerals (THERAPEUTIC MULTIVITAMIN-MINERALS) tablet  Take 1 tablet by mouth daily Indications: AT 2:30 PM              mupirocin (BACTROBAN) 2 % cream  Apply topically as needed Apply topically 3 times daily.              nitrofurantoin (MACRODANTIN) 50 MG capsule  TAKE 1 CAPSULE AT BEDTIME             NONFORMULARY  Indications: dic 3%, lidocaine 2%, cyclo 2%, prilo 2% baclo 2% 1 to 2 pumps up to TID PRN              Omega 3-6-9 Fatty Acids (OMEGA 3-6-9 COMPLEX PO)  Take 2 tablets by mouth daily

## 2019-09-18 ENCOUNTER — TELEPHONE (OUTPATIENT)
Dept: GASTROENTEROLOGY | Age: 84
End: 2019-09-18

## 2019-09-18 ENCOUNTER — APPOINTMENT (OUTPATIENT)
Dept: CT IMAGING | Age: 84
End: 2019-09-18
Payer: MEDICARE

## 2019-09-18 ENCOUNTER — HOSPITAL ENCOUNTER (EMERGENCY)
Age: 84
Discharge: HOME | End: 2019-09-18
Attending: EMERGENCY MEDICINE
Payer: MEDICARE

## 2019-09-18 ENCOUNTER — APPOINTMENT (OUTPATIENT)
Dept: GENERAL RADIOLOGY | Age: 84
End: 2019-09-18
Payer: MEDICARE

## 2019-09-18 VITALS
SYSTOLIC BLOOD PRESSURE: 108 MMHG | OXYGEN SATURATION: 95 % | HEART RATE: 54 BPM | HEIGHT: 65 IN | DIASTOLIC BLOOD PRESSURE: 48 MMHG | WEIGHT: 131 LBS | RESPIRATION RATE: 14 BRPM | BODY MASS INDEX: 21.83 KG/M2

## 2019-09-18 DIAGNOSIS — R40.20 LOSS OF CONSCIOUSNESS (HCC): Primary | ICD-10-CM

## 2019-09-18 LAB
ALBUMIN SERPL-MCNC: 4.1 G/DL (ref 3.5–5.2)
ALP BLD-CCNC: 66 U/L (ref 35–104)
ALT SERPL-CCNC: 16 U/L (ref 5–33)
ANION GAP SERPL CALCULATED.3IONS-SCNC: 12 MMOL/L (ref 7–19)
APTT: 28.6 SEC (ref 26–36.2)
AST SERPL-CCNC: 18 U/L (ref 5–32)
BASOPHILS ABSOLUTE: 0 K/UL (ref 0–0.2)
BASOPHILS RELATIVE PERCENT: 0.5 % (ref 0–1)
BILIRUB SERPL-MCNC: 0.3 MG/DL (ref 0.2–1.2)
BILIRUBIN URINE: NEGATIVE
BLOOD, URINE: NEGATIVE
BUN BLDV-MCNC: 26 MG/DL (ref 8–23)
CALCIUM SERPL-MCNC: 9.6 MG/DL (ref 8.8–10.2)
CHLORIDE BLD-SCNC: 90 MMOL/L (ref 98–111)
CLARITY: CLEAR
CO2: 26 MMOL/L (ref 22–29)
COLOR: YELLOW
CREAT SERPL-MCNC: 1.2 MG/DL (ref 0.5–0.9)
EOSINOPHILS ABSOLUTE: 0.1 K/UL (ref 0–0.6)
EOSINOPHILS RELATIVE PERCENT: 1.5 % (ref 0–5)
GFR NON-AFRICAN AMERICAN: 43
GLUCOSE BLD-MCNC: 111 MG/DL (ref 74–109)
GLUCOSE URINE: NEGATIVE MG/DL
HCT VFR BLD CALC: 29.7 % (ref 37–47)
HEMOGLOBIN: 10.1 G/DL (ref 12–16)
IMMATURE GRANULOCYTES #: 0.1 K/UL
KETONES, URINE: NEGATIVE MG/DL
LEUKOCYTE ESTERASE, URINE: NEGATIVE
LYMPHOCYTES ABSOLUTE: 0.8 K/UL (ref 1.1–4.5)
LYMPHOCYTES RELATIVE PERCENT: 9.6 % (ref 20–40)
MCH RBC QN AUTO: 34.2 PG (ref 27–31)
MCHC RBC AUTO-ENTMCNC: 34 G/DL (ref 33–37)
MCV RBC AUTO: 100.7 FL (ref 81–99)
MONOCYTES ABSOLUTE: 0.7 K/UL (ref 0–0.9)
MONOCYTES RELATIVE PERCENT: 8.4 % (ref 0–10)
NEUTROPHILS ABSOLUTE: 6.4 K/UL (ref 1.5–7.5)
NEUTROPHILS RELATIVE PERCENT: 79 % (ref 50–65)
NITRITE, URINE: NEGATIVE
PDW BLD-RTO: 11.2 % (ref 11.5–14.5)
PH UA: 7.5 (ref 5–8)
PLATELET # BLD: 191 K/UL (ref 130–400)
PMV BLD AUTO: 9.2 FL (ref 9.4–12.3)
POTASSIUM REFLEX MAGNESIUM: 4.2 MMOL/L (ref 3.5–5)
PROTEIN UA: NEGATIVE MG/DL
RBC # BLD: 2.95 M/UL (ref 4.2–5.4)
SODIUM BLD-SCNC: 128 MMOL/L (ref 136–145)
SPECIFIC GRAVITY UA: 1.01 (ref 1–1.03)
TOTAL PROTEIN: 6.4 G/DL (ref 6.6–8.7)
TROPONIN: <0.01 NG/ML (ref 0–0.03)
URINE REFLEX TO CULTURE: NORMAL
UROBILINOGEN, URINE: 0.2 E.U./DL
WBC # BLD: 8.1 K/UL (ref 4.8–10.8)

## 2019-09-18 PROCEDURE — 36415 COLL VENOUS BLD VENIPUNCTURE: CPT

## 2019-09-18 PROCEDURE — 70450 CT HEAD/BRAIN W/O DYE: CPT

## 2019-09-18 PROCEDURE — 85025 COMPLETE CBC W/AUTO DIFF WBC: CPT

## 2019-09-18 PROCEDURE — 93005 ELECTROCARDIOGRAM TRACING: CPT | Performed by: EMERGENCY MEDICINE

## 2019-09-18 PROCEDURE — 71045 X-RAY EXAM CHEST 1 VIEW: CPT

## 2019-09-18 PROCEDURE — 85730 THROMBOPLASTIN TIME PARTIAL: CPT

## 2019-09-18 PROCEDURE — 84484 ASSAY OF TROPONIN QUANT: CPT

## 2019-09-18 PROCEDURE — 81003 URINALYSIS AUTO W/O SCOPE: CPT

## 2019-09-18 PROCEDURE — 99285 EMERGENCY DEPT VISIT HI MDM: CPT

## 2019-09-18 PROCEDURE — 80053 COMPREHEN METABOLIC PANEL: CPT

## 2019-09-18 PROCEDURE — 6370000000 HC RX 637 (ALT 250 FOR IP): Performed by: EMERGENCY MEDICINE

## 2019-09-18 RX ORDER — FUROSEMIDE 40 MG/1
20 TABLET ORAL ONCE
Status: COMPLETED | OUTPATIENT
Start: 2019-09-18 | End: 2019-09-18

## 2019-09-18 RX ORDER — LEVETIRACETAM 750 MG/1
750 TABLET ORAL 2 TIMES DAILY
Qty: 60 TABLET | Refills: 0 | Status: SHIPPED | OUTPATIENT
Start: 2019-09-18 | End: 2020-02-23 | Stop reason: ALTCHOICE

## 2019-09-18 RX ADMIN — FUROSEMIDE 20 MG: 40 TABLET ORAL at 16:46

## 2019-09-18 ASSESSMENT — ENCOUNTER SYMPTOMS
SHORTNESS OF BREATH: 0
PHOTOPHOBIA: 0
ABDOMINAL PAIN: 1
VOMITING: 0
EYE PAIN: 0
RHINORRHEA: 0
CHEST TIGHTNESS: 0
DIARRHEA: 0
NAUSEA: 0
SORE THROAT: 0
BACK PAIN: 0
COUGH: 0

## 2019-09-18 ASSESSMENT — PAIN DESCRIPTION - LOCATION: LOCATION: ABDOMEN

## 2019-09-18 ASSESSMENT — PAIN SCALES - GENERAL: PAINLEVEL_OUTOF10: 10

## 2019-09-18 NOTE — ED PROVIDER NOTES
amount to the urethra      benzonatate (TESSALON) 100 MG capsule Take 200 mg by mouth 3 times daily as needed for Cough      polyvinyl alcohol-povidone (HYPOTEARS) 1.4-0.6 % ophthalmic solution Place 1-2 drops into both eyes as needed      sodium chloride (OCEAN, BABY AYR) 0.65 % nasal spray 1 spray by Nasal route as needed for Congestion      tobramycin-dexamethasone (TOBRADEX) 0.3-0.1 % ophthalmic ointment Place into the left eye as needed 3 times daily.       Lutein 20 MG TABS Take 20 mg by mouth daily Indications: 2:30 PM DAILY       fluticasone (VERAMYST) 27.5 MCG/SPRAY nasal spray 2 sprays by Nasal route every 12 hours       !! amLODIPine (NORVASC) 10 MG tablet Take 7.5 mg by mouth daily       pantoprazole (PROTONIX) 20 MG tablet Take 20 mg by mouth daily       butalbital-acetaminophen-caffeine (ESGIC PLUS) -40 MG per tablet Take 1 tablet by mouth every 6 hours as needed for Headaches  Qty: 15 tablet, Refills: 0      acetaminophen (TYLENOL) 500 MG tablet Take 2 tablets by mouth every 6 hours as needed for Pain Do not exceed 4 g of acetaminophen daily (patient is also on Fioricet PRN)  Qty: 100 tablet, Refills: 0      cloNIDine (CATAPRES) 0.1 MG tablet Take 1 tablet by mouth 2 times daily  Qty: 60 tablet, Refills: 0      donepezil (ARICEPT) 10 MG tablet Take 1 tablet by mouth nightly  Qty: 30 tablet, Refills: 0      Calcium Carb-Cholecalciferol (CALCIUM 600+D) 600-800 MG-UNIT TABS Take 1 tablet by mouth daily Indications: AT 2:30 PM       promethazine (PHENERGAN) 25 MG tablet Take 25 mg by mouth 3 times daily as needed for Nausea      docusate sodium (COLACE) 100 MG capsule Take 100 mg by mouth nightly as needed for Constipation       Omega 3-6-9 Fatty Acids (OMEGA 3-6-9 COMPLEX PO) Take 2 tablets by mouth daily Indications: AT 2:30 PM       alendronate (FOSAMAX) 70 MG tablet Take 70 mg by mouth every 7 days Takes every Sunday        BYSTOLIC 5 MG tablet Take 5 mg by mouth nightly Indications: nightly AUTO DIFFERENTIAL - Abnormal; Notable for the following components:       Result Value    RBC 2.95 (*)     Hemoglobin 10.1 (*)     Hematocrit 29.7 (*)     .7 (*)     MCH 34.2 (*)     RDW 11.2 (*)     MPV 9.2 (*)     Neutrophils % 79.0 (*)     Lymphocytes % 9.6 (*)     Lymphocytes Absolute 0.8 (*)     All other components within normal limits   COMPREHENSIVE METABOLIC PANEL W/ REFLEX TO MG FOR LOW K - Abnormal; Notable for the following components:    Sodium 128 (*)     Chloride 90 (*)     Glucose 111 (*)     BUN 26 (*)     CREATININE 1.2 (*)     GFR Non- 43 (*)     Total Protein 6.4 (*)     All other components within normal limits   TROPONIN   APTT   URINE RT REFLEX TO CULTURE     other labs were within normal range or not returned as of this dictation. EMERGENCY DEPARTMENT COURSE and DIFFERENTIAL DIAGNOSIS/MDM:   Vitals:    Vitals:    09/18/19 1302 09/18/19 1332 09/18/19 1401 09/18/19 1502   BP: (!) 107/53 (!) 110/51 (!) 105/54 (!) 108/48   Pulse:       Resp:       SpO2: 94% 93% 94% 95%   Weight:       Height:           MDM  Number of Diagnoses or Management Options  Diagnosis management comments: 3year-old female with a loss of consciousness and convulsive-like movements while trying to have a bowel movement this morning. Possible postictal interval.  Plan for orthostatics, telemetry monitoring, EKG, imaging, labs and reevaluation. Will discuss with neurology and see about further recommendations regarding potential EEG versus medication changes. ED Course  ED Course as of Sep 18 1642   Wed Sep 18, 2019   1310 Eating lab results. Spoke with Dr. Javier House from neurology. He does not feel that the patient requires an EEG at this point in time, thinks it potentially could be convulsive syncope. Feels that the patient could go home with an increase of her maintenance Keppra pending any significant abnormalities. Will discuss further if there are any significant abnormalities.     [EP] [id number] with patient and the daughter regarding the labs. His sodium although low, no significant difference from prior 2 days. Awaiting urinalysis. Discussed possibility of convulsive syncope versus seizure. Likely to be discharged home with increase of Keppra. [EP]      ED Course User Index  [EP] Jeanne Jernigan MD     The patient has no history or physical exam evidence concerning for intracranial injury, acute stroke, aneurysm, or other serious neurologic etiology. Physical exam is nonfocal and patient demonstrates normal gross motor function as well as normal cerebellar function. Patient reexamined prior to discharge. Appears clinically well and neurological exam remains nonfocal. Appears to be at baseline mental status. Strict return precautions were reviewed. Patient and/or guardian demonstrates understanding and agreement with proposed plan. Patient is well-appearing, stable for discharge and follow-up without fail with his/her medical doctor. I had a detailed discussion with the patient and/or guardian regarding the historical points, exam findings, and any diagnostic results supporting the discharge diagnosis. The patient was educated on care and need for follow-up. Strict return instructions including red flag signs and symptoms were discussed with the patient. Medications for discharge discussed, and adverse effects reviewed. Questions invited and answered. Patient shows understanding of the discharge information and agrees to follow-up. CONSULTS:  IP CONSULT TO NEUROLOGY    PROCEDURES:  Unless otherwise noted below, n     Procedures    FINAL IMPRESSION      1.  Loss of consciousness [facility])          DISPOSITION/PLAN   DISPOSITION Decision To Discharge 09/18/2019 03:53:07 PM      PATIENT REFERRED TO:  Pablo Barrera DO  [address] [place] [address]  [phone]    Call in 1 day  For post emergency department visit follow-up    Kriss Gale MD  [address] [facility] [address]

## 2019-09-19 ENCOUNTER — NURSE ONLY (OUTPATIENT)
Dept: UROLOGY | Age: 84
End: 2019-09-19
Payer: MEDICARE

## 2019-09-19 DIAGNOSIS — N32.81 OAB (OVERACTIVE BLADDER): Primary | ICD-10-CM

## 2019-09-19 LAB
EKG P AXIS: 62 DEGREES
EKG P-R INTERVAL: 192 MS
EKG Q-T INTERVAL: 430 MS
EKG QRS DURATION: 90 MS
EKG QTC CALCULATION (BAZETT): 440 MS
EKG T AXIS: 39 DEGREES

## 2019-09-19 PROCEDURE — 64566 NEUROELTRD STIM POST TIBIAL: CPT | Performed by: PHYSICIAN ASSISTANT

## 2019-09-19 ASSESSMENT — ENCOUNTER SYMPTOMS
WHEEZING: 0
SHORTNESS OF BREATH: 0
DOUBLE VISION: 0
BLURRED VISION: 0
SORE THROAT: 0
HEARTBURN: 0
NAUSEA: 0

## 2019-09-19 NOTE — PROGRESS NOTES
[facility]-limited colon-ischemic colitis    DILATION AND CURETTAGE OF UTERUS      x2, in [facility] tears, laser suregry, adn cataract with IOL b/l    LUMBAR FUSION      2012 90 days after the spine surgeries    SPINE SURGERY      L3,4, and 5   done in 2012   [address]      as a child at age 9    [address] [facility] [address]  08/28/2003    DR Lainey Champion:  Duodenal scarring but no evidence of active ulcer disease. Current Outpatient Medications   Medication Sig Dispense Refill    levETIRAcetam (KEPPRA) 750 MG tablet Take 1 tablet by mouth 2 times daily 60 tablet 0    sodium bicarbonate 650 MG tablet Take 2 tablets by mouth 3 times daily 90 tablet 0    dronabinol (MARINOL) 2.5 MG capsule Take 1 capsule by mouth 2 times daily for 3 days. 6 capsule 0    furosemide (LASIX) 20 MG tablet Take 0.5 tablets by mouth daily 15 tablet 0    amLODIPine (NORVASC) 5 MG tablet Take 5 mg by mouth nightly      lidocaine 4 % external patch Place 1 patch onto the skin daily as needed (LEFT LOWER BACK PAIN)      NONFORMULARY Indications: dic 3%, lidocaine 2%, cyclo 2%, prilo 2% baclo 2% 1 to 2 pumps up to TID PRN       desonide (DESOWEN) 0.05 % cream Apply topically 2 times daily Apply topically 2 times daily.  mupirocin (BACTROBAN) 2 % cream Apply topically as needed Apply topically 3 times daily.       dipyridamole (PERSANTINE) 50 MG tablet Take 2 tablets by mouth 2 times daily 120 tablet 5    valACYclovir (VALTREX) 1 g tablet as needed       Psyllium (METAMUCIL FIBER PO) Take by mouth nightly       tiotropium (SPIRIVA) 18 MCG inhalation capsule Inhale 18 mcg into the lungs nightly      simethicone (PHAZYME) 125 MG chewable tablet Take 125 mg by mouth 3 times daily (before meals)       nitrofurantoin (MACRODANTIN) 50 MG capsule TAKE 1 CAPSULE AT BEDTIME      azelastine (ASTELIN) 0.1 % nasal spray 2 sprays by Nasal route 2 times daily       conjugated  promethazine (PHENERGAN) 25 MG tablet Take 25 mg by mouth 3 times daily as needed for Nausea      docusate sodium (COLACE) 100 MG capsule Take 100 mg by mouth nightly as needed for Constipation       Omega 3-6-9 Fatty Acids (OMEGA 3-6-9 COMPLEX PO) Take 2 tablets by mouth daily Indications: AT 2:30 PM       alendronate (FOSAMAX) 70 MG tablet Take 70 mg by mouth every 7 days Takes every Sunday        BYSTOLIC 5 MG tablet Take 5 mg by mouth nightly Indications: nightly       pravastatin (PRAVACHOL) 20 MG tablet Take 20 mg by mouth every morning       ramipril (ALTACE) 5 MG capsule Take 10 mg by mouth 2 times daily       b complex vitamins capsule Take 1 capsule by mouth daily Indications: AT 2:30 PM B 100      Multiple Vitamins-Minerals (THERAPEUTIC MULTIVITAMIN-MINERALS) tablet Take 1 tablet by mouth daily Indications: AT 2:30 PM        No current facility-administered medications for this visit.         Allergies   Allergen Reactions    Aspirin Shortness Of Breath    Dilaudid [Hydromorphone Hcl]     Ondansetron     Quinolones     Sulfa Antibiotics     Codeine Rash and Other (See Comments)     Makes her \"crazy and mean\" and gives her a rash    Demerol Hcl [Meperidine] Other (See Comments)     Made her \"crazy and mean\", and \"loopy\"    Levaquin [Levofloxacin In D5w] Other (See Comments)     Seizure like activity      Myrbetriq [Mirabegron] Other (See Comments)     Unable to micturate    Namenda [Memantine Hcl] Other (See Comments)     made her \"loopy\"    Norco [Hydrocodone-Acetaminophen] Rash and Other (See Comments)     \"crazy and mean\"    Pneumococcal Vaccines Swelling       Social History     Socioeconomic History    Marital status:      Spouse name: None    Number of children: 3    Years of education: None    Highest education level: None   Occupational History    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mployee of [employer]    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,mployee of [employer]    Financial resource strain: None    Food insecurity:     Worry: None     Inability: None    Transportation needs:     Medical: None     Non-medical: None   Tobacco Use    Smoking status: Never Smoker    Smokeless tobacco: Never Used   Substance and Sexual Activity    Alcohol use: Never     Frequency: Never    Drug use: Never    Sexual activity: None     Comment: has 3 kids   Lifestyle    Physical activity:     Days per week: None     Minutes per session: None    Stress: None   Relationships    Social connections:     Talks on phone: None     Gets together: None     Attends Scientologist service: None     Active member of club or organization: None     Attends meetings of clubs or organizations: None     Relationship status: None    Intimate partner violence:     Fear of current or ex partner: None     Emotionally abused: None     Physically abused: None     Forced sexual activity: None   Other Topics Concern    None   Social History Narrative    CODE STATUS: DNR-CCA    HEALTH CARE PROXY / Legal PoA Financial and Healthcare: her daughter, Mrs. Karyle Ford, [phone]    AMBULATES: with walker during day, wheelchair at night    DOMICILED: lives in the [facility] assisted living facility, has no stairs or steps, has a cat, her daughter is there periodically       Family History   Problem Relation Age of Onset    Heart Defect Mother     Hypertension Mother     Heart Attack Father         x3 within 24 h and then , abused ciggarettes    No Known Problems Daughter     No Known Problems Son     No Known Problems Son     Colon Cancer Neg Hx     Colon Polyps Neg Hx     Esophageal Cancer Neg Hx     Liver Cancer Neg Hx     Liver Disease Neg Hx     Rectal Cancer Neg Hx     Stomach Cancer Neg Hx        REVIEW OF SYSTEMS:  Review of Systems   Constitutional: Negative for chills and fever. HENT: Negative for congestion and sore throat. Eyes: Negative for blurred vision and double vision.

## 2019-09-23 ENCOUNTER — OFFICE VISIT (OUTPATIENT)
Dept: NEUROSURGERY | Age: 84
End: 2019-09-23
Payer: MEDICARE

## 2019-09-23 VITALS
HEIGHT: 60 IN | SYSTOLIC BLOOD PRESSURE: 97 MMHG | OXYGEN SATURATION: 97 % | BODY MASS INDEX: 25.32 KG/M2 | HEART RATE: 58 BPM | DIASTOLIC BLOOD PRESSURE: 61 MMHG | WEIGHT: 129 LBS

## 2019-09-23 DIAGNOSIS — G40.909 SEIZURE DISORDER (HCC): ICD-10-CM

## 2019-09-23 DIAGNOSIS — G40.909 SEIZURE DISORDER (HCC): Primary | ICD-10-CM

## 2019-09-23 PROCEDURE — 4040F PNEUMOC VAC/ADMIN/RCVD: CPT | Performed by: NURSE PRACTITIONER

## 2019-09-23 PROCEDURE — G8427 DOCREV CUR MEDS BY ELIG CLIN: HCPCS | Performed by: NURSE PRACTITIONER

## 2019-09-23 PROCEDURE — G8419 CALC BMI OUT NRM PARAM NOF/U: HCPCS | Performed by: NURSE PRACTITIONER

## 2019-09-23 PROCEDURE — 1036F TOBACCO NON-USER: CPT | Performed by: NURSE PRACTITIONER

## 2019-09-23 PROCEDURE — 99214 OFFICE O/P EST MOD 30 MIN: CPT | Performed by: NURSE PRACTITIONER

## 2019-09-23 PROCEDURE — 1123F ACP DISCUSS/DSCN MKR DOCD: CPT | Performed by: NURSE PRACTITIONER

## 2019-09-23 PROCEDURE — G8400 PT W/DXA NO RESULTS DOC: HCPCS | Performed by: NURSE PRACTITIONER

## 2019-09-23 PROCEDURE — 1090F PRES/ABSN URINE INCON ASSESS: CPT | Performed by: NURSE PRACTITIONER

## 2019-09-23 PROCEDURE — 1111F DSCHRG MED/CURRENT MED MERGE: CPT | Performed by: NURSE PRACTITIONER

## 2019-09-23 PROCEDURE — G8599 NO ASA/ANTIPLAT THER USE RNG: HCPCS | Performed by: NURSE PRACTITIONER

## 2019-09-23 NOTE — PROGRESS NOTES
REVIEW OF SYSTEMS    Constitutional: []Fever []Sweat []Chills [] Recent Injury [x] Denies all unless marked  HEENT:[]Headache  [] Head Injury/Hearing Loss  [] Sore Throat  [] Ear Ache/Dizziness  [x] Denies all unless marked  Spine:  [] Neck pain  [] Back pain  [] Sciaticia  [x] Denies all unless marked  Cardiovascular:[]Heart Disease []Chest Pain [] Palpitations  [x] Denies all unless marked  Pulmonary: []Shortness of Breath []Cough   [x] Denies all unless marke  Gastrointestinal: []Nausea  []Vomiting  []Abdominal Pain  []Constipation  []Diarrhea  []Dark Bloody Stools  [x] Denies all unless marked  Psychiatric/Behavioral:[] Depression [] Anxiety [x] Denies all unless marked  Genitourinary:   [] Frequency  [] Urgency  [] Incontinence [] Pain with Urination  [x] Denies all unless marked  Extremities: []Pain  []Swelling  [x] Denies all unless marked  Musculoskeletal: [] Muscle Pain  [] Joint Pain  [] Arthritis [] Muscle Cramps [] Muscle Twitches  [x] Denies all unless marked  Sleep: [] Insomnia [] Snoring [] Restless Legs [] Sleep Apnea  [] Daytime Sleepiness  [x] Denies all unless marked  Skin:[] Rash [] Skin Discoloration [x] Denies all unless marked   Neurological: []Visual Disturbance/Memory Loss [] Loss of Balance [] Slurred Speech/Weakness [x] Seizures  [] Vertigo/Dizziness [] Denies all unless marked
pneumothorax, pleural effusion or focal consolidation. Calcified aortic atherosclerosis. Cardiac mediastinal silhouette within normal limits. Degenerative changes of the partially visualized shoulders. 1. No acute cardiopulmonary finding. Signed by Dr Panchito Fuller on 9/18/2019 1:27 PM    Reviewed  Hospital records     ASSESSMENT:    Huan Jung is a 80y.o. year old female here for ER follow up. Recently seen in ER for vasovagal episode vs seizure. Question if event was vasovagal in nature given that she was sitting on the commode, straining during event. Currently on Keppra 750mg BID, daughter reports she is concerned this is too high of a dose for patient. Will plan to check level today and adjust accordingly. Discussed continuing to follow with PCP and nephrology for further adjustments of other medications. ICD-10-CM    1. Seizure disorder ([facility] [address] G40.909 Levetiracetam Level       PLAN:  1. Continue Keppra 750mg BID. Discussed side effects. 2. Keppra level today   3. Follow up with nephrology and PCP as previously scheduled   4. Keep previously scheduled follow up. Return for follow up, sooner if worsening. LAYTON Fontana    Note:  A total of >50% (>13 minutes) of 25 minutes was spent discussing the pathophysiology and treatment and/or coordination of care of the above diagnoses. This dictation was generated by voice recognition computer software. Although all attempts are made to edit the dictation for accuracy, there may be errors in the transcription that are not intended.

## 2019-09-25 ENCOUNTER — APPOINTMENT (OUTPATIENT)
Dept: CT IMAGING | Age: 84
End: 2019-09-25
Payer: MEDICARE

## 2019-09-25 ENCOUNTER — HOSPITAL ENCOUNTER (EMERGENCY)
Age: 84
Discharge: HOME | End: 2019-09-25
Attending: EMERGENCY MEDICINE
Payer: MEDICARE

## 2019-09-25 VITALS
HEART RATE: 56 BPM | RESPIRATION RATE: 18 BRPM | SYSTOLIC BLOOD PRESSURE: 139 MMHG | HEIGHT: 61 IN | DIASTOLIC BLOOD PRESSURE: 68 MMHG | WEIGHT: 125 LBS | BODY MASS INDEX: 23.6 KG/M2 | OXYGEN SATURATION: 96 % | TEMPERATURE: 97.8 F

## 2019-09-25 DIAGNOSIS — R42 VERTIGO: Primary | ICD-10-CM

## 2019-09-25 LAB
ALBUMIN SERPL-MCNC: 4.5 G/DL (ref 3.5–5.2)
ALP BLD-CCNC: 79 U/L (ref 35–104)
ALT SERPL-CCNC: 26 U/L (ref 5–33)
ANION GAP SERPL CALCULATED.3IONS-SCNC: 14 MMOL/L (ref 7–19)
AST SERPL-CCNC: 21 U/L (ref 5–32)
BASOPHILS ABSOLUTE: 0 K/UL (ref 0–0.2)
BASOPHILS RELATIVE PERCENT: 0.8 % (ref 0–1)
BILIRUB SERPL-MCNC: 0.4 MG/DL (ref 0.2–1.2)
BUN BLDV-MCNC: 23 MG/DL (ref 8–23)
CALCIUM SERPL-MCNC: 10 MG/DL (ref 8.8–10.2)
CHLORIDE BLD-SCNC: 100 MMOL/L (ref 98–111)
CO2: 22 MMOL/L (ref 22–29)
CREAT SERPL-MCNC: 1.1 MG/DL (ref 0.5–0.9)
EOSINOPHILS ABSOLUTE: 0.2 K/UL (ref 0–0.6)
EOSINOPHILS RELATIVE PERCENT: 4.3 % (ref 0–5)
GFR NON-AFRICAN AMERICAN: 47
GLUCOSE BLD-MCNC: 100 MG/DL (ref 74–109)
HCT VFR BLD CALC: 31.1 % (ref 37–47)
HEMOGLOBIN: 10.4 G/DL (ref 12–16)
IMMATURE GRANULOCYTES #: 0.1 K/UL
KEPPRA: 58 UG/ML (ref 12–46)
LYMPHOCYTES ABSOLUTE: 1.3 K/UL (ref 1.1–4.5)
LYMPHOCYTES RELATIVE PERCENT: 24.5 % (ref 20–40)
MCH RBC QN AUTO: 34.3 PG (ref 27–31)
MCHC RBC AUTO-ENTMCNC: 33.4 G/DL (ref 33–37)
MCV RBC AUTO: 102.6 FL (ref 81–99)
MONOCYTES ABSOLUTE: 0.5 K/UL (ref 0–0.9)
MONOCYTES RELATIVE PERCENT: 8.9 % (ref 0–10)
NEUTROPHILS ABSOLUTE: 3.2 K/UL (ref 1.5–7.5)
NEUTROPHILS RELATIVE PERCENT: 60.4 % (ref 50–65)
PDW BLD-RTO: 11.6 % (ref 11.5–14.5)
PLATELET # BLD: 209 K/UL (ref 130–400)
PMV BLD AUTO: 9.3 FL (ref 9.4–12.3)
POTASSIUM SERPL-SCNC: 3.8 MMOL/L (ref 3.5–5)
RBC # BLD: 3.03 M/UL (ref 4.2–5.4)
SODIUM BLD-SCNC: 136 MMOL/L (ref 136–145)
TOTAL PROTEIN: 6.9 G/DL (ref 6.6–8.7)
WBC # BLD: 5.3 K/UL (ref 4.8–10.8)

## 2019-09-25 PROCEDURE — 36415 COLL VENOUS BLD VENIPUNCTURE: CPT

## 2019-09-25 PROCEDURE — 85025 COMPLETE CBC W/AUTO DIFF WBC: CPT

## 2019-09-25 PROCEDURE — 80053 COMPREHEN METABOLIC PANEL: CPT

## 2019-09-25 PROCEDURE — 99285 EMERGENCY DEPT VISIT HI MDM: CPT

## 2019-09-25 PROCEDURE — 70450 CT HEAD/BRAIN W/O DYE: CPT

## 2019-09-25 RX ORDER — SODIUM CHLORIDE 1000 MG
1 TABLET, SOLUBLE MISCELLANEOUS 3 TIMES DAILY
COMMUNITY

## 2019-09-25 NOTE — ED NOTES
Bed: 05  Expected date:   Expected time:   Means of arrival:   Comments:  [address] RN  09/25/19 [id number]

## 2019-09-25 NOTE — ED PROVIDER NOTES
Palliative care patient 09/17/2019    Risk for falls 9/13/2019    Seizures ([facility] [address]     Spastic colon     Stroke syndrome     Urinary incontinence          SURGICAL HISTORY       Past Surgical History:   Procedure Laterality Date    BREAST SURGERY Right     FNA Right Breast \"oh hemarie, maybe 20 years ago\"    CHOLECYSTECTOMY      COLONOSCOPY  9/25/03    Dr Karla Allen ischemic colitis, incomplete exam    COLONOSCOPY  8/29/03    Dr Mikayla North colon-ischemic colitis    DILATION AND CURETTAGE OF UTERUS      x2, in [facility] tears, laser suregry, adn cataract with IOL b/l    LUMBAR FUSION      2012 90 days after the spine surgeries    SPINE SURGERY      L3,4, and 5   done in 2012   [address] 76      as a child at age 9    [address]  08/28/2003    DR Shawn Aguilar:  Duodenal scarring but no evidence of active ulcer disease.          CURRENT MEDICATIONS     Previous Medications    ACETAMINOPHEN (TYLENOL) 500 MG TABLET    Take 2 tablets by mouth every 6 hours as needed for Pain Do not exceed 4 g of acetaminophen daily (patient is also on Fioricet PRN)    ALENDRONATE (FOSAMAX) 70 MG TABLET    Take 70 mg by mouth every 7 days Takes every Sunday      AMLODIPINE (NORVASC) 10 MG TABLET    Take 2.5 mg by mouth daily     ASCORBIC ACID (VITAMIN C PO)    Take 500 mg by mouth every 12 hours Breakfast and lunch     AZELASTINE (ASTELIN) 0.1 % NASAL SPRAY    2 sprays by Nasal route 2 times daily     B COMPLEX VITAMINS CAPSULE    Take 1 capsule by mouth daily Indications: AT 2:30 PM B 100    BENZONATATE (TESSALON) 100 MG CAPSULE    Take 200 mg by mouth 3 times daily as needed for Cough    BUTALBITAL-ACETAMINOPHEN-CAFFEINE (ESGIC PLUS) -40 MG PER TABLET    Take 1 tablet by mouth every 6 hours as needed for Headaches    BYSTOLIC 5 MG TABLET    Take 5 mg by mouth nightly Indications: nightly     CALCIUM CARB-CHOLECALCIFEROL (CALCIUM 600+D)

## 2019-09-26 ENCOUNTER — TELEPHONE (OUTPATIENT)
Dept: NEUROLOGY | Age: 84
End: 2019-09-26

## 2019-09-27 ENCOUNTER — TELEPHONE (OUTPATIENT)
Dept: NEUROSURGERY | Age: 84
End: 2019-09-27

## 2019-09-27 ENCOUNTER — HOSPITAL ENCOUNTER (OUTPATIENT)
Dept: NON INVASIVE DIAGNOSTICS | Age: 84
Discharge: HOME | End: 2019-09-27
Payer: MEDICARE

## 2019-09-27 DIAGNOSIS — R00.1 BRADYCARDIA: ICD-10-CM

## 2019-09-27 DIAGNOSIS — R00.1 BRADYCARDIA: Primary | ICD-10-CM

## 2019-09-27 PROCEDURE — 93229 REMOTE 30 DAY ECG TECH SUPP: CPT

## 2019-10-02 ENCOUNTER — TELEPHONE (OUTPATIENT)
Dept: UROLOGY | Age: 84
End: 2019-10-02

## 2019-10-02 ENCOUNTER — NURSE ONLY (OUTPATIENT)
Dept: UROLOGY | Age: 84
End: 2019-10-02
Payer: MEDICARE

## 2019-10-02 DIAGNOSIS — E87.1 HYPONATREMIA: ICD-10-CM

## 2019-10-02 DIAGNOSIS — N39.0 RECURRENT UTI: Primary | ICD-10-CM

## 2019-10-02 PROCEDURE — P9612 CATHETERIZE FOR URINE SPEC: HCPCS | Performed by: PHYSICIAN ASSISTANT

## 2019-10-03 VITALS — TEMPERATURE: 98.2 F

## 2019-10-03 LAB — SODIUM URINE: 67 MMOL/L

## 2019-10-04 PROCEDURE — 0298T PR EXT ECG > 48HR TO 21 DAY REVIEW AND INTERPRETATN: CPT | Performed by: INTERNAL MEDICINE

## 2019-10-06 ASSESSMENT — ENCOUNTER SYMPTOMS
ABDOMINAL PAIN: 0
VOMITING: 0
NAUSEA: 0
SHORTNESS OF BREATH: 0
DIARRHEA: 0

## 2019-10-08 ENCOUNTER — OFFICE VISIT (OUTPATIENT)
Dept: OBSTETRICS AND GYNECOLOGY | Facility: CLINIC | Age: 84
End: 2019-10-08

## 2019-10-08 VITALS — SYSTOLIC BLOOD PRESSURE: 124 MMHG | DIASTOLIC BLOOD PRESSURE: 68 MMHG | WEIGHT: 128 LBS | BODY MASS INDEX: 24.19 KG/M2

## 2019-10-08 DIAGNOSIS — N95.2 VAGINAL ATROPHY: Primary | ICD-10-CM

## 2019-10-08 DIAGNOSIS — R93.89 ABNORMAL CT SCAN: ICD-10-CM

## 2019-10-08 PROCEDURE — 99213 OFFICE O/P EST LOW 20 MIN: CPT | Performed by: NURSE PRACTITIONER

## 2019-10-08 RX ORDER — M-VIT,TX,IRON,MINS/CALC/FOLIC 27MG-0.4MG
1 TABLET ORAL DAILY
COMMUNITY

## 2019-10-08 RX ORDER — DESONIDE 0.5 MG/G
1 CREAM TOPICAL 2 TIMES DAILY
COMMUNITY
End: 2023-01-11 | Stop reason: ALTCHOICE

## 2019-10-08 RX ORDER — NITROFURANTOIN MACROCRYSTALS 50 MG/1
50 CAPSULE ORAL NIGHTLY
COMMUNITY
Start: 2019-10-01 | End: 2023-01-11

## 2019-10-08 RX ORDER — VALACYCLOVIR HYDROCHLORIDE 1 G/1
1000 TABLET, FILM COATED ORAL DAILY PRN
COMMUNITY
Start: 2019-06-06

## 2019-10-08 RX ORDER — BENZONATATE 100 MG/1
200 CAPSULE ORAL 3 TIMES DAILY PRN
COMMUNITY

## 2019-10-08 RX ORDER — ACETAMINOPHEN 500 MG
1000 TABLET ORAL EVERY 6 HOURS PRN
COMMUNITY
Start: 2016-07-28

## 2019-10-08 RX ORDER — TIOTROPIUM BROMIDE 18 UG/1
1 CAPSULE ORAL; RESPIRATORY (INHALATION) 2 TIMES DAILY PRN
Refills: 2 | COMMUNITY
Start: 2019-07-12 | End: 2023-01-17

## 2019-10-08 RX ORDER — MUPIROCIN CALCIUM 20 MG/G
1 CREAM TOPICAL 2 TIMES DAILY PRN
COMMUNITY
End: 2023-01-11 | Stop reason: ALTCHOICE

## 2019-10-08 RX ORDER — FUROSEMIDE 20 MG/1
10 TABLET ORAL
COMMUNITY
Start: 2019-09-18 | End: 2019-10-18

## 2019-10-08 RX ORDER — FLUTICASONE PROPIONATE 50 MCG
2 SPRAY, SUSPENSION (ML) NASAL DAILY
COMMUNITY
Start: 2019-09-11 | End: 2023-01-11 | Stop reason: SDUPTHER

## 2019-10-08 RX ORDER — LEVETIRACETAM 750 MG/1
750 TABLET ORAL
COMMUNITY
Start: 2019-09-18 | End: 2019-10-18

## 2019-10-08 RX ORDER — AZELASTINE 1 MG/ML
2 SPRAY, METERED NASAL 2 TIMES DAILY
COMMUNITY
Start: 2018-05-03

## 2019-10-08 RX ORDER — LIDOCAINE 4 G/G
1 PATCH TOPICAL 2 TIMES DAILY
COMMUNITY

## 2019-10-08 RX ORDER — DIPYRIDAMOLE 50 MG
100 TABLET ORAL 2 TIMES DAILY
COMMUNITY
Start: 2019-07-11

## 2019-10-08 RX ORDER — CLONIDINE HYDROCHLORIDE 0.1 MG/1
0.1 TABLET ORAL 2 TIMES DAILY PRN
COMMUNITY
Start: 2016-07-27

## 2019-10-08 NOTE — PROGRESS NOTES
Bel Powell is a 84 y.o. No obstetric history on file.     Chief Complaint   Patient presents with   • Pelvic Pain     Pt states that she has pain in lower right quadrant x 2-3 years. Pt was taken to [facility] ER due to vagel response due to pain. Pt was told that she had endometrial thickening and uterine fibroids, pts daughter reports that pt was admitted in 9/13/2019- 9/17/19.  pt is currently on 48 oz fluid restriction as of right now. Daughter reports in the last month, that the pt has had a stroke and a seizure.   • Follow-up     pt here for f/u of bladder incontinence.   • Med Refill     premarin cream           HPI -Bel has not been using her compounded estrogen cream faithfully.  She is to use it twice per week anternally and apply a small amount externally.  She had a CT scan of the abd. and pelvis at [facility] in Sept. That required gyn followup, the uterus showed fluid within the uterine cavity and cystic changes.. She has no uterine bleeding.  She has been having discomfort in the right groin and has discussed this with the ER physician.  She sees urology for bladder issues, she stopped Myrbetriq due to it causing her to not be able to empty her bladder.      The following portions of the patient's history were reviewed and updated as appropriate:vital signs, allergies, current medications, past family history, past medical history, past social history, past surgical history and problem list.      Current Outpatient Medications:   •  acetaminophen (TYLENOL) 500 MG tablet, Take 1,000 mg by mouth., Disp: , Rfl:   •  alendronate (FOSAMAX) 70 MG tablet, Take 70 mg by mouth Every 7 (Seven) Days., Disp: , Rfl:   •  amLODIPine (NORVASC) 2.5 MG tablet, Take 5 mg by mouth Daily., Disp: , Rfl:   •  azelastine (ASTELIN) 0.1 % nasal spray, 2 sprays into the nostril(s) as directed by provider., Disp: , Rfl:   •  B Complex Vitamins (VITAMIN B COMPLEX PO), Take 1 tablet by mouth Daily., Disp: , Rfl:    •  benzonatate (TESSALON) 100 MG capsule, Take 200 mg by mouth., Disp: , Rfl:   •  butalbital-acetaminophen-caffeine (FIORICET, ESGIC) -40 MG per tablet, Take 1 tablet by mouth As Needed for headaches., Disp: , Rfl:   •  Calcium Carb-Cholecalciferol (CALCIUM 600+D3) 600-800 MG-UNIT tablet, Take 1 tablet by mouth Daily., Disp: , Rfl:   •  camphor-menthol (SARNA) 0.5-0.5 % lotion, Apply  topically As Needed for itching., Disp: , Rfl:   •  cholecalciferol (VITAMIN D3) 1000 UNITS tablet, Take 1,000 Units by mouth Daily., Disp: , Rfl:   •  cloNIDine (CATAPRES) 0.1 MG tablet, Take 0.1 mg by mouth., Disp: , Rfl:   •  colestipol (COLESTID) 1 G tablet, Take 1 g by mouth As Needed., Disp: , Rfl:   •  desonide (DESOWEN) 0.05 % cream, Apply  topically to the appropriate area as directed., Disp: , Rfl:   •  dipyridamole (PERSANTINE) 50 MG tablet, Take 100 mg by mouth., Disp: , Rfl:   •  docusate sodium (COLACE) 100 MG capsule, Take 100 mg by mouth As Needed for constipation., Disp: , Rfl:   •  donepezil (ARICEPT) 10 MG tablet, Take 10 mg by mouth Every Night., Disp: , Rfl:   •  fluticasone (FLONASE) 50 MCG/ACT nasal spray, , Disp: , Rfl:   •  furosemide (LASIX) 20 MG tablet, Take 10 mg by mouth., Disp: , Rfl:   •  Hormone Cream Base cream, 80:20  Biest 1 mg/ml   Insert 1 gm vaginally 3 x per week for 3 weeks then twice per week thereafter and apply small amount to urethra, Disp: 1 bottle, Rfl: 12  •  INV ALLIANCE, AMB, KIT, Pharmacy Label,, Indications: dic 3%, lidocaine 2%, cyclo 2%, prilo 2% baclo 2% 1 to 2 pumps up to TID PRN, Disp: , Rfl:   •  levETIRAcetam (KEPPRA) 750 MG tablet, Take 750 mg by mouth., Disp: , Rfl:   •  Lidocaine 4 % patch, Place 1 patch on the skin as directed by provider., Disp: , Rfl:   •  Lutein 20 MG tablet, Take 1 tablet by mouth Daily., Disp: , Rfl:   •  mupirocin (BACTROBAN) 2 % cream, Apply  topically to the appropriate area as directed., Disp: , Rfl:   •  nebivolol (BYSTOLIC) 10 MG tablet,  Take 10 mg by mouth 2 (Two) Times a Day., Disp: , Rfl:   •  nitrofurantoin (MACRODANTIN) 50 MG capsule, , Disp: , Rfl:   •  Omega 3-6-9 Fatty Acids (OMEGA 3-6-9 COMPLEX PO), Take 1 capsule by mouth Daily., Disp: , Rfl:   •  Polyvinyl Alcohol-Povidone PF (HYPOTEARS) 1.4-0.6 % ophthalmic solution, 1-2 drops., Disp: , Rfl:   •  pravastatin (PRAVACHOL) 20 MG tablet, Take 20 mg by mouth Daily., Disp: , Rfl:   •  promethazine (PHENERGAN) 25 MG tablet, Take 25 mg by mouth Every 4 (Four) Hours As Needed for nausea or vomiting., Disp: , Rfl:   •  Psyllium 51.7 % pack, Take  by mouth., Disp: , Rfl:   •  ramipril (ALTACE) 5 MG capsule, Take 5 mg by mouth Daily., Disp: , Rfl:   •  saline 0.65 % nasal solution (BABY AYR) 0.65 % solution, 1 spray into the nostril(s) as directed by provider., Disp: , Rfl:   •  simethicone (MYLICON) 80 MG chewable tablet, Chew 125 mg As Needed for Flatulence., Disp: , Rfl:   •  sodium chloride 1 g tablet, Take 1 g by mouth 3 (Three) Times a Day., Disp: , Rfl:   •  SPIRIVA HANDIHALER 18 MCG per inhalation capsule, INHALE 1 CAPSULE IN HANDIHALER EVERY DAY, Disp: , Rfl: 2  •  therapeutic multivitamin-minerals (THERAGRAN-M) tablet, Take 1 tablet by mouth., Disp: , Rfl:   •  tobramycin-dexamethasone (TOBRADEX) 0.3-0.1 % ophthalmic ointment, Place into the left eye as needed 3 times daily., Disp: , Rfl:   •  valACYclovir (VALTREX) 1000 MG tablet, as needed, Disp: , Rfl:     Review of Systems   Constitutional: Negative for activity change and fever.   HENT: Negative for congestion, dental problem, facial swelling, rhinorrhea, swollen glands and voice change.    Eyes: Negative for blurred vision, double vision and pain.   Respiratory: Negative for apnea, chest tightness and shortness of breath.    Cardiovascular:        History of stroke   Gastrointestinal: Negative for abdominal distention, abdominal pain and GERD.   Endocrine: Positive for cold intolerance. Negative for heat intolerance and polyphagia.    Genitourinary: Positive for frequency and urgency. Negative for amenorrhea, breast lump, decreased libido, hematuria, pelvic pain and urinary incontinence.        Vaginal dryness and atrophy    CT showed fibroid uterus with cystic changes and fluid within the uterus   Musculoskeletal: Positive for arthralgias and back pain. Negative for bursitis.   Neurological: Positive for weakness, numbness and memory problem.        History of stroke and TIA    Daughter reports a vasovagal reaction followed by a seizure last month   Went to ER   Psychiatric/Behavioral: Negative for agitation, behavioral problems and depressed mood.         Objective      /68   Wt 58.1 kg (128 lb)   BMI 24.19 kg/m²       Physical Exam   Constitutional:   Elderly woman in wheelchair accompanied by her daughter   HENT:   Head: Normocephalic and atraumatic.   Eyes: Right eye exhibits no discharge. Left eye exhibits no discharge.   Pulmonary/Chest: Effort normal.   Neurological: She is alert.   Skin: Skin is warm and dry.   Psychiatric: She has a normal mood and affect. Her behavior is normal.   Nursing note and vitals reviewed.       Assessment/Plan     Bel was seen today for pelvic pain, follow-up and med refill.    Diagnoses and all orders for this visit:    Vaginal atrophy  Comments:  Continue Bi-Est vaginal cream  twice per week and apply externally  faithfully    Abnormal CT scan  Comments:  RTO for TVUS of pelvis to evaluate uterus  (CT showed cystic changes and fluid within the endometrium)              Raeann Coleman, ANGEL  10/8/2019

## 2019-10-14 ENCOUNTER — NURSE ONLY (OUTPATIENT)
Dept: UROLOGY | Age: 84
End: 2019-10-14
Payer: MEDICARE

## 2019-10-14 ENCOUNTER — TELEPHONE (OUTPATIENT)
Dept: NEUROLOGY | Age: 84
End: 2019-10-14

## 2019-10-14 VITALS
BODY MASS INDEX: 23 KG/M2 | SYSTOLIC BLOOD PRESSURE: 116 MMHG | DIASTOLIC BLOOD PRESSURE: 62 MMHG | WEIGHT: 125 LBS | TEMPERATURE: 96.6 F | HEIGHT: 62 IN

## 2019-10-14 DIAGNOSIS — N39.46 MIXED STRESS AND URGE URINARY INCONTINENCE: Primary | ICD-10-CM

## 2019-10-14 PROCEDURE — 64566 NEUROELTRD STIM POST TIBIAL: CPT | Performed by: PHYSICIAN ASSISTANT

## 2019-10-14 ASSESSMENT — ENCOUNTER SYMPTOMS
BLURRED VISION: 0
WHEEZING: 0
HEARTBURN: 0
NAUSEA: 0
SORE THROAT: 0
SHORTNESS OF BREATH: 0
DOUBLE VISION: 0

## 2019-10-21 ENCOUNTER — OFFICE VISIT (OUTPATIENT)
Dept: OBSTETRICS AND GYNECOLOGY | Facility: CLINIC | Age: 84
End: 2019-10-21

## 2019-10-21 ENCOUNTER — PROCEDURE VISIT (OUTPATIENT)
Dept: OBSTETRICS AND GYNECOLOGY | Facility: CLINIC | Age: 84
End: 2019-10-21

## 2019-10-21 VITALS
BODY MASS INDEX: 29.62 KG/M2 | DIASTOLIC BLOOD PRESSURE: 68 MMHG | RESPIRATION RATE: 16 BRPM | SYSTOLIC BLOOD PRESSURE: 140 MMHG | WEIGHT: 128 LBS | HEIGHT: 55 IN | OXYGEN SATURATION: 96 % | HEART RATE: 52 BPM

## 2019-10-21 DIAGNOSIS — R93.89 THICKENED ENDOMETRIUM: Primary | ICD-10-CM

## 2019-10-21 PROCEDURE — G0123 SCREEN CERV/VAG THIN LAYER: HCPCS | Performed by: NURSE PRACTITIONER

## 2019-10-21 PROCEDURE — 76830 TRANSVAGINAL US NON-OB: CPT | Performed by: OBSTETRICS & GYNECOLOGY

## 2019-10-21 PROCEDURE — 99213 OFFICE O/P EST LOW 20 MIN: CPT | Performed by: NURSE PRACTITIONER

## 2019-10-21 RX ORDER — FUROSEMIDE 20 MG/1
10 TABLET ORAL DAILY
COMMUNITY
End: 2023-01-11 | Stop reason: ALTCHOICE

## 2019-10-21 RX ORDER — TRAMADOL HYDROCHLORIDE 50 MG/1
25 TABLET ORAL EVERY 6 HOURS PRN
COMMUNITY
End: 2020-01-07

## 2019-10-21 RX ORDER — AMLODIPINE BESYLATE 5 MG/1
5 TABLET ORAL EVERY MORNING
COMMUNITY
Start: 2019-10-18

## 2019-10-21 RX ORDER — LEVETIRACETAM 750 MG/1
500 TABLET ORAL 2 TIMES DAILY
COMMUNITY
Start: 2019-09-18 | End: 2023-01-11 | Stop reason: DRUGHIGH

## 2019-10-24 LAB
GEN CATEG CVX/VAG CYTO-IMP: NORMAL
LAB AP CASE REPORT: NORMAL
LAB AP GYN ADDITIONAL INFORMATION: NORMAL
LAB AP GYN OTHER FINDINGS: NORMAL
PATH INTERP SPEC-IMP: NORMAL
STAT OF ADQ CVX/VAG CYTO-IMP: NORMAL

## 2019-10-28 ENCOUNTER — TELEPHONE (OUTPATIENT)
Dept: UROLOGY | Age: 84
End: 2019-10-28

## 2019-10-28 ENCOUNTER — NURSE ONLY (OUTPATIENT)
Dept: UROLOGY | Age: 84
End: 2019-10-28
Payer: MEDICARE

## 2019-10-28 ENCOUNTER — OFFICE VISIT (OUTPATIENT)
Dept: OBSTETRICS AND GYNECOLOGY | Facility: CLINIC | Age: 84
End: 2019-10-28

## 2019-10-28 VITALS
DIASTOLIC BLOOD PRESSURE: 64 MMHG | WEIGHT: 128 LBS | HEART RATE: 64 BPM | BODY MASS INDEX: 29.62 KG/M2 | SYSTOLIC BLOOD PRESSURE: 110 MMHG | HEIGHT: 55 IN

## 2019-10-28 DIAGNOSIS — N39.46 MIXED STRESS AND URGE URINARY INCONTINENCE: Primary | ICD-10-CM

## 2019-10-28 DIAGNOSIS — R93.89 ENDOMETRIAL THICKENING ON ULTRASOUND: Primary | ICD-10-CM

## 2019-10-28 DIAGNOSIS — Z78.9 NON-SMOKER: ICD-10-CM

## 2019-10-28 LAB
BILIRUBIN URINE: NEGATIVE
BLOOD, URINE: NEGATIVE
CLARITY: CLEAR
COLOR: YELLOW
GLUCOSE URINE: NEGATIVE MG/DL
KETONES, URINE: NEGATIVE MG/DL
LEUKOCYTE ESTERASE, URINE: NEGATIVE
NITRITE, URINE: NEGATIVE
PH UA: 5 (ref 5–8)
PROTEIN UA: NEGATIVE MG/DL
SPECIFIC GRAVITY UA: 1.01 (ref 1–1.03)
UROBILINOGEN, URINE: 0.2 E.U./DL

## 2019-10-28 PROCEDURE — P9612 CATHETERIZE FOR URINE SPEC: HCPCS | Performed by: PHYSICIAN ASSISTANT

## 2019-10-28 PROCEDURE — 88305 TISSUE EXAM BY PATHOLOGIST: CPT | Performed by: OBSTETRICS & GYNECOLOGY

## 2019-10-28 PROCEDURE — 58100 BIOPSY OF UTERUS LINING: CPT | Performed by: OBSTETRICS & GYNECOLOGY

## 2019-10-28 NOTE — PROGRESS NOTES
Subjective   Bel Powell is a 84 y.o. female is here today for follow-up.    Patient presents today with thickened endometrial lining.    History of Present Illness     She is accompanied by her daughter.  It is apparent that she has at least a certain level of dementia.  She has no vaginal bleeding.  Ultrasound does show a 2.8 cm endometrial thickening.  It is irregular in appearance.    The following portions of the patient's history were reviewed and updated as appropriate: allergies, current medications, past family history, past medical history, past social history, past surgical history and problem list.    Review of Systems   All other systems reviewed and are negative.      Objective   Physical Exam   Constitutional: She appears well-developed and well-nourished.   HENT:   Head: Normocephalic and atraumatic.   Genitourinary:   Genitourinary Comments: Cervix is grossly normal.  Os is stenotic.  2 passes were attempted.  I feel that at best, I was in the lower uterine segment.  Minimal tissue return.   Nursing note and vitals reviewed.        Assessment/Plan   Bel was seen today for follow-up.    Diagnoses and all orders for this visit:    Endometrial thickening on ultrasound    Non-smoker      If we can get a diagnosis from this, further treatment will be dictated based on that.  If not diagnostic, will need to go to the operating room for D&C with hysteroscopy.    Fran Hooper MD

## 2019-10-31 NOTE — PROGRESS NOTES
Bel Powell is a 84 y.o. No obstetric history on file.     Chief Complaint   Patient presents with   • Follow-up     Pt here with follow-up of US.           HPI -Bel is in for US due to findings on a CT of the abd. and pelvis 9 2019.  She has no vaginal bleeding.      The following portions of the patient's history were reviewed and updated as appropriate:vital signs, allergies, current medications, past family history, past medical history, past social history, past surgical history and problem list.      Current Outpatient Medications:   •  acetaminophen (TYLENOL) 500 MG tablet, Take 1,000 mg by mouth 3 (Three) Times a Day As Needed., Disp: , Rfl:   •  alendronate (FOSAMAX) 70 MG tablet, Take 70 mg by mouth Every 7 (Seven) Days., Disp: , Rfl:   •  amLODIPine (NORVASC) 5 MG tablet, 1 tablet Every Morning., Disp: , Rfl:   •  azelastine (ASTELIN) 0.1 % nasal spray, 2 sprays into the nostril(s) as directed by provider., Disp: , Rfl:   •  B Complex Vitamins (VITAMIN B COMPLEX PO), Take 1 tablet by mouth Daily., Disp: , Rfl:   •  benzonatate (TESSALON) 100 MG capsule, Take 200 mg by mouth As Needed., Disp: , Rfl:   •  butalbital-acetaminophen-caffeine (FIORICET, ESGIC) -40 MG per tablet, Take 1 tablet by mouth As Needed for headaches., Disp: , Rfl:   •  Calcium Carb-Cholecalciferol (CALCIUM 600+D3) 600-800 MG-UNIT tablet, Take 1 tablet by mouth Daily., Disp: , Rfl:   •  camphor-menthol (SARNA) 0.5-0.5 % lotion, Apply  topically As Needed for itching., Disp: , Rfl:   •  cholecalciferol (VITAMIN D3) 1000 UNITS tablet, Take 1,000 Units by mouth Daily., Disp: , Rfl:   •  cloNIDine (CATAPRES) 0.1 MG tablet, Take 0.1 mg by mouth As Needed (As needed if BP exceeds 160/100.)., Disp: , Rfl:   •  colestipol (COLESTID) 1 G tablet, Take 1 g by mouth As Needed., Disp: , Rfl:   •  desonide (DESOWEN) 0.05 % cream, Apply  topically to the appropriate area as directed., Disp: , Rfl:   •  dipyridamole (PERSANTINE)  50 MG tablet, Take 100 mg by mouth., Disp: , Rfl:   •  docusate sodium (COLACE) 100 MG capsule, Take 100 mg by mouth As Needed for constipation., Disp: , Rfl:   •  donepezil (ARICEPT) 10 MG tablet, Take 10 mg by mouth Every Night., Disp: , Rfl:   •  fluticasone (FLONASE) 50 MCG/ACT nasal spray, , Disp: , Rfl:   •  furosemide (LASIX) 20 MG tablet, Take 20 mg by mouth Daily. Takes 1/2 tablet Mon, Wed and Fri., Disp: , Rfl:   •  Hormone Cream Base cream, 80:20  Biest 1 mg/ml   Insert 1 gm vaginally 3 x per week for 3 weeks then twice per week thereafter and apply small amount to urethra (Patient taking differently: 80:20  Biest 1 mg/ml   Insert 1 gm vaginally twice per week thereafter and apply small amount to urethra), Disp: 1 bottle, Rfl: 12  •  INV ALLIANCE, AMB, KIT, Pharmacy Label,, Indications: dic 3%, lidocaine 2%, cyclo 2%, prilo 2% baclo 2% 1 to 2 pumps up to TID PRN, Disp: , Rfl:   •  levETIRAcetam (KEPPRA) 750 MG tablet, Take 750 mg by mouth 2 (Two) Times a Day., Disp: , Rfl:   •  Lidocaine 4 % patch, Place 1 patch on the skin as directed by provider., Disp: , Rfl:   •  Lutein 20 MG tablet, Take 1 tablet by mouth Daily., Disp: , Rfl:   •  mupirocin (BACTROBAN) 2 % cream, Apply  topically to the appropriate area as directed., Disp: , Rfl:   •  nebivolol (BYSTOLIC) 10 MG tablet, Take 10 mg by mouth 2 (Two) Times a Day., Disp: , Rfl:   •  nitrofurantoin (MACRODANTIN) 50 MG capsule, , Disp: , Rfl:   •  Omega 3-6-9 Fatty Acids (OMEGA 3-6-9 COMPLEX PO), Take 1 capsule by mouth Daily., Disp: , Rfl:   •  Polyvinyl Alcohol-Povidone PF (HYPOTEARS) 1.4-0.6 % ophthalmic solution, 1-2 drops., Disp: , Rfl:   •  pravastatin (PRAVACHOL) 20 MG tablet, Take 20 mg by mouth Daily., Disp: , Rfl:   •  promethazine (PHENERGAN) 25 MG tablet, Take 25 mg by mouth Every 4 (Four) Hours As Needed for nausea or vomiting., Disp: , Rfl:   •  Psyllium 51.7 % pack, Take  by mouth., Disp: , Rfl:   •  ramipril (ALTACE) 5 MG capsule, Take 5 mg  by mouth Daily., Disp: , Rfl:   •  saline 0.65 % nasal solution (BABY AYR) 0.65 % solution, 1 spray into the nostril(s) as directed by provider., Disp: , Rfl:   •  simethicone (MYLICON) 80 MG chewable tablet, Chew 125 mg As Needed for Flatulence., Disp: , Rfl:   •  sodium chloride 1 g tablet, Take 1 g by mouth 3 (Three) Times a Day., Disp: , Rfl:   •  SPIRIVA HANDIHALER 18 MCG per inhalation capsule, INHALE 1 CAPSULE IN HANDIHALER EVERY DAY, Disp: , Rfl: 2  •  therapeutic multivitamin-minerals (THERAGRAN-M) tablet, Take 1 tablet by mouth., Disp: , Rfl:   •  tobramycin-dexamethasone (TOBRADEX) 0.3-0.1 % ophthalmic ointment, Place into the left eye as needed 3 times daily., Disp: , Rfl:   •  traMADol (ULTRAM) 50 MG tablet, Take 50 mg by mouth 2 (Two) Times a Day. Takes 1/2 tablet two times daily as needed., Disp: , Rfl:   •  valACYclovir (VALTREX) 1000 MG tablet, as needed, Disp: , Rfl:   •  amLODIPine (NORVASC) 2.5 MG tablet, Take 5 mg by mouth Daily., Disp: , Rfl:     Review of Systems   Constitutional: Negative for activity change and fever.   HENT: Negative for congestion, dental problem, facial swelling, rhinorrhea, swollen glands and voice change.    Eyes: Negative for blurred vision, double vision and pain.   Respiratory: Negative for apnea, chest tightness and shortness of breath.    Cardiovascular:        History of stroke   Gastrointestinal: Negative for abdominal distention, abdominal pain and GERD.   Endocrine: Positive for cold intolerance. Negative for heat intolerance and polyphagia.   Genitourinary: Positive for frequency, urgency and urinary incontinence. Negative for amenorrhea, breast lump, decreased libido, hematuria and pelvic pain.        Vaginal dryness and atrophy    CT showed fibroid uterus with cystic changes and fluid within the uterus   Musculoskeletal: Positive for arthralgias and back pain. Negative for bursitis.   Skin: Positive for dry skin.   Neurological: Positive for weakness,  "numbness and memory problem.        History of stroke and TIA    Daughter reports a vasovagal reaction followed by a seizure last month and  went to ER   Psychiatric/Behavioral: Negative for agitation, behavioral problems and depressed mood.       Objective      /68   Pulse 52   Resp 16   Ht 129.5 cm (51\")   Wt 58.1 kg (128 lb)   SpO2 96%   BMI 34.60 kg/m²       Physical Exam   Constitutional: She is oriented to person, place, and time. She appears well-developed and well-nourished. No distress.   HENT:   Head: Normocephalic and atraumatic.   Pulmonary/Chest: Effort normal.   Abdominal: Soft. There is no tenderness. There is no guarding.   Genitourinary: Cervix normal. Rectal exam shows no mass. Pelvic exam was performed with patient supine. There is no tenderness or lesion on the right labia. There is no tenderness or lesion on the left labia. Uterus is not enlarged or tender. Cervix does not exhibit motion tenderness, discharge or friability. Right adnexum displays no tenderness and no fullness. Left adnexum displays no tenderness and no fullness. Vagina exhibits loss of rugae. No erythema, tenderness or bleeding in the vagina. No foreign body in the vagina. No signs of injury around the vagina. No vaginal discharge found.   Neurological: She is alert and oriented to person, place, and time.   Psychiatric: She has a normal mood and affect. Her behavior is normal.   Nursing note and vitals reviewed.       Assessment/Plan     ASSESSMENT/PLAN    Bel was seen today for follow-up.    Diagnoses and all orders for this visit:    Thickened endometrium  Comments:  28 mm and irregular endometrium  post menopausal with NO BLEEDING  Orders:  -     Liquid-based Pap Smear, Screening      RTO for consultation with Dr. Hooper re: thickened  uterine lining and additional evaluation.    Patient is accompanied by her daughter and voiced understanding of the need for consultation with Dr. Hooper due to the endometrial " thickness.              Raeann Coleman, APRN  10/31/2019

## 2019-11-01 LAB
CYTO UR: NORMAL
LAB AP CASE REPORT: NORMAL
LAB AP CLINICAL INFORMATION: NORMAL
PATH REPORT.FINAL DX SPEC: NORMAL
PATH REPORT.GROSS SPEC: NORMAL

## 2019-11-07 ENCOUNTER — NURSE ONLY (OUTPATIENT)
Dept: UROLOGY | Age: 84
End: 2019-11-07
Payer: MEDICARE

## 2019-11-07 VITALS — WEIGHT: 125 LBS | BODY MASS INDEX: 22.15 KG/M2 | TEMPERATURE: 97.3 F | HEIGHT: 63 IN

## 2019-11-07 DIAGNOSIS — N39.46 MIXED STRESS AND URGE URINARY INCONTINENCE: Primary | ICD-10-CM

## 2019-11-07 PROCEDURE — 64566 NEUROELTRD STIM POST TIBIAL: CPT | Performed by: PHYSICIAN ASSISTANT

## 2019-11-07 ASSESSMENT — ENCOUNTER SYMPTOMS
SHORTNESS OF BREATH: 0
SORE THROAT: 0
WHEEZING: 0
NAUSEA: 0
HEARTBURN: 0
BLURRED VISION: 0
DOUBLE VISION: 0

## 2019-11-13 ENCOUNTER — OFFICE VISIT (OUTPATIENT)
Dept: OBSTETRICS AND GYNECOLOGY | Facility: CLINIC | Age: 84
End: 2019-11-13

## 2019-11-13 VITALS
SYSTOLIC BLOOD PRESSURE: 132 MMHG | BODY MASS INDEX: 23.79 KG/M2 | HEIGHT: 61 IN | DIASTOLIC BLOOD PRESSURE: 70 MMHG | HEART RATE: 64 BPM | WEIGHT: 126 LBS

## 2019-11-13 DIAGNOSIS — R93.89 ENDOMETRIAL THICKENING ON ULTRASOUND: Primary | ICD-10-CM

## 2019-11-13 DIAGNOSIS — Z78.9 NON-SMOKER: ICD-10-CM

## 2019-11-13 PROCEDURE — 99213 OFFICE O/P EST LOW 20 MIN: CPT | Performed by: OBSTETRICS & GYNECOLOGY

## 2019-11-13 NOTE — PROGRESS NOTES
Subjective   Bel Powell is a 84 y.o. female is here today for follow-up.    Patient presents today for follow-up on endometrial biopsy.    History of Present Illness     Patient had endometrial biopsy done in the office 2 weeks ago.  Ultrasound is reviewed.  Although she has not had any bleeding, she does have a thickened and irregular appearance to the endometrial cavity.  She is have no problems.    Although no evidence of hyperplasia or malignancy, my concern is that it is an inadequate biopsy.  This was discussed with the patient as well as her daughter.    The following portions of the patient's history were reviewed and updated as appropriate: allergies, current medications, past family history, past medical history, past social history, past surgical history and problem list.    Review of Systems   All other systems reviewed and are negative.      Objective   Physical Exam   Constitutional: She is oriented to person, place, and time. She appears well-developed and well-nourished.   HENT:   Head: Normocephalic and atraumatic.   Abdominal: Soft. Bowel sounds are normal.   Neurological: She is alert and oriented to person, place, and time.   Skin: Skin is warm and dry.   Psychiatric: She has a normal mood and affect. Her behavior is normal. Judgment and thought content normal.   Nursing note and vitals reviewed.        Assessment/Plan   Bel was seen today for follow-up.    Diagnoses and all orders for this visit:    Endometrial thickening on ultrasound    Non-smoker      She has had some recent neurologic events that she sees neurology for.  She also sees her primary care physician next week.  Although D&C with hysteroscopy as indicated, understand her desire to clear this through her specialist.  Will return office in 2 weeks.  If not felt to be a surgical candidate, will surveilled with ultrasound.  Further recommendations pending others opinions.    Fran Hooper MD

## 2019-11-20 ENCOUNTER — OFFICE VISIT (OUTPATIENT)
Dept: GASTROENTEROLOGY | Age: 84
End: 2019-11-20
Payer: MEDICARE

## 2019-11-20 VITALS
DIASTOLIC BLOOD PRESSURE: 60 MMHG | HEIGHT: 61 IN | WEIGHT: 127.6 LBS | BODY MASS INDEX: 24.09 KG/M2 | SYSTOLIC BLOOD PRESSURE: 100 MMHG | OXYGEN SATURATION: 97 % | HEART RATE: 57 BPM

## 2019-11-20 DIAGNOSIS — G89.29 CHRONIC BILATERAL LOWER ABDOMINAL PAIN: ICD-10-CM

## 2019-11-20 DIAGNOSIS — R14.0 BLOATING: ICD-10-CM

## 2019-11-20 DIAGNOSIS — R10.31 CHRONIC BILATERAL LOWER ABDOMINAL PAIN: ICD-10-CM

## 2019-11-20 DIAGNOSIS — R14.1 GAS PAIN: Primary | ICD-10-CM

## 2019-11-20 DIAGNOSIS — R10.32 CHRONIC BILATERAL LOWER ABDOMINAL PAIN: ICD-10-CM

## 2019-11-20 PROCEDURE — 4040F PNEUMOC VAC/ADMIN/RCVD: CPT | Performed by: NURSE PRACTITIONER

## 2019-11-20 PROCEDURE — 1123F ACP DISCUSS/DSCN MKR DOCD: CPT | Performed by: NURSE PRACTITIONER

## 2019-11-20 PROCEDURE — G8599 NO ASA/ANTIPLAT THER USE RNG: HCPCS | Performed by: NURSE PRACTITIONER

## 2019-11-20 PROCEDURE — 99215 OFFICE O/P EST HI 40 MIN: CPT | Performed by: NURSE PRACTITIONER

## 2019-11-20 PROCEDURE — G8400 PT W/DXA NO RESULTS DOC: HCPCS | Performed by: NURSE PRACTITIONER

## 2019-11-20 PROCEDURE — 1090F PRES/ABSN URINE INCON ASSESS: CPT | Performed by: NURSE PRACTITIONER

## 2019-11-20 PROCEDURE — G8484 FLU IMMUNIZE NO ADMIN: HCPCS | Performed by: NURSE PRACTITIONER

## 2019-11-20 PROCEDURE — G8427 DOCREV CUR MEDS BY ELIG CLIN: HCPCS | Performed by: NURSE PRACTITIONER

## 2019-11-20 PROCEDURE — G8420 CALC BMI NORM PARAMETERS: HCPCS | Performed by: NURSE PRACTITIONER

## 2019-11-20 PROCEDURE — 1036F TOBACCO NON-USER: CPT | Performed by: NURSE PRACTITIONER

## 2019-11-20 RX ORDER — TRAMADOL HYDROCHLORIDE 50 MG/1
25 TABLET ORAL 2 TIMES DAILY
COMMUNITY
End: 2020-03-09

## 2019-11-20 ASSESSMENT — ENCOUNTER SYMPTOMS
ANAL BLEEDING: 0
BACK PAIN: 1
ABDOMINAL DISTENTION: 1
TROUBLE SWALLOWING: 0
ABDOMINAL PAIN: 1
NAUSEA: 0
VOICE CHANGE: 0
BLOOD IN STOOL: 0
PHOTOPHOBIA: 0
SORE THROAT: 0
RECTAL PAIN: 0
CONSTIPATION: 0
SHORTNESS OF BREATH: 0
COUGH: 0
DIARRHEA: 0
VOMITING: 0

## 2019-11-24 ENCOUNTER — APPOINTMENT (OUTPATIENT)
Dept: GENERAL RADIOLOGY | Age: 84
End: 2019-11-24
Payer: MEDICARE

## 2019-11-24 ENCOUNTER — APPOINTMENT (OUTPATIENT)
Dept: CT IMAGING | Age: 84
End: 2019-11-24
Payer: MEDICARE

## 2019-11-24 ENCOUNTER — HOSPITAL ENCOUNTER (EMERGENCY)
Age: 84
Discharge: HOME | End: 2019-11-25
Attending: EMERGENCY MEDICINE
Payer: MEDICARE

## 2019-11-24 DIAGNOSIS — S09.90XA CLOSED HEAD INJURY, INITIAL ENCOUNTER: Primary | ICD-10-CM

## 2019-11-24 DIAGNOSIS — S70.01XA CONTUSION OF RIGHT HIP, INITIAL ENCOUNTER: ICD-10-CM
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
## 2019-11-24 PROCEDURE — 72128 CT CHEST SPINE W/O DYE: CPT

## 2019-11-24 PROCEDURE — 99284 EMERGENCY DEPT VISIT MOD MDM: CPT

## 2019-11-24 PROCEDURE — 70450 CT HEAD/BRAIN W/O DYE: CPT

## 2019-11-24 PROCEDURE — 72125 CT NECK SPINE W/O DYE: CPT

## 2019-11-24 PROCEDURE — 73030 X-RAY EXAM OF SHOULDER: CPT

## 2019-11-24 PROCEDURE — 73560 X-RAY EXAM OF KNEE 1 OR 2: CPT

## 2019-11-24 PROCEDURE — 73502 X-RAY EXAM HIP UNI 2-3 VIEWS: CPT

## 2019-11-24 RX ORDER — FUROSEMIDE 20 MG/1
20 TABLET ORAL 2 TIMES DAILY
COMMUNITY
End: 2019-11-24

## 2019-11-25 VITALS
WEIGHT: 129 LBS | HEART RATE: 79 BPM | SYSTOLIC BLOOD PRESSURE: 165 MMHG | TEMPERATURE: 98.9 F | HEIGHT: 61 IN | BODY MASS INDEX: 24.35 KG/M2 | OXYGEN SATURATION: 94 % | RESPIRATION RATE: 18 BRPM | DIASTOLIC BLOOD PRESSURE: 75 MMHG

## 2019-12-03 ENCOUNTER — NURSE ONLY (OUTPATIENT)
Dept: UROLOGY | Age: 84
End: 2019-12-03
Payer: MEDICARE

## 2019-12-03 DIAGNOSIS — N39.46 MIXED STRESS AND URGE URINARY INCONTINENCE: Primary | ICD-10-CM

## 2019-12-03 PROCEDURE — 64566 NEUROELTRD STIM POST TIBIAL: CPT | Performed by: PHYSICIAN ASSISTANT

## 2019-12-03 ASSESSMENT — ENCOUNTER SYMPTOMS
WHEEZING: 0
SHORTNESS OF BREATH: 0
DIARRHEA: 0
ABDOMINAL PAIN: 0
EYE DISCHARGE: 0
EYE REDNESS: 0
CONSTIPATION: 0
BACK PAIN: 0
COUGH: 0

## 2019-12-04 ENCOUNTER — OFFICE VISIT (OUTPATIENT)
Dept: OBSTETRICS AND GYNECOLOGY | Facility: CLINIC | Age: 84
End: 2019-12-04

## 2019-12-04 ENCOUNTER — TELEPHONE (OUTPATIENT)
Dept: OBSTETRICS AND GYNECOLOGY | Facility: CLINIC | Age: 84
End: 2019-12-04

## 2019-12-04 VITALS
SYSTOLIC BLOOD PRESSURE: 142 MMHG | DIASTOLIC BLOOD PRESSURE: 60 MMHG | HEIGHT: 61 IN | WEIGHT: 128 LBS | BODY MASS INDEX: 24.17 KG/M2

## 2019-12-04 DIAGNOSIS — N95.0 PMB (POSTMENOPAUSAL BLEEDING): ICD-10-CM

## 2019-12-04 DIAGNOSIS — F02.80 ALZHEIMER'S DEMENTIA WITHOUT BEHAVIORAL DISTURBANCE, UNSPECIFIED TIMING OF DEMENTIA ONSET: ICD-10-CM

## 2019-12-04 DIAGNOSIS — I10 CHRONIC HYPERTENSION: ICD-10-CM

## 2019-12-04 DIAGNOSIS — G30.9 ALZHEIMER'S DEMENTIA WITHOUT BEHAVIORAL DISTURBANCE, UNSPECIFIED TIMING OF DEMENTIA ONSET: ICD-10-CM

## 2019-12-04 DIAGNOSIS — R93.89 ENDOMETRIAL THICKENING ON ULTRASOUND: Primary | ICD-10-CM

## 2019-12-04 DIAGNOSIS — Z78.9 NON-SMOKER: ICD-10-CM

## 2019-12-04 PROCEDURE — 99214 OFFICE O/P EST MOD 30 MIN: CPT | Performed by: OBSTETRICS & GYNECOLOGY

## 2019-12-04 RX ORDER — SIMETHICONE 125 MG
250 TABLET,CHEWABLE ORAL
COMMUNITY
End: 2023-01-11

## 2019-12-04 RX ORDER — RAMIPRIL 10 MG/1
10 CAPSULE ORAL DAILY
COMMUNITY
Start: 2016-02-26

## 2019-12-04 RX ORDER — NEBIVOLOL HYDROCHLORIDE 5 MG/1
5 TABLET ORAL
Refills: 2 | COMMUNITY
Start: 2019-11-26 | End: 2023-01-11 | Stop reason: ALTCHOICE

## 2019-12-04 NOTE — PROGRESS NOTES
"[facility]  Bel Powell  : 1935  MRN: [id number]  CSN: [id number]    History and Physical    Subjective   Bel Powell is a 84 y.o. year old No obstetric history on file. who presents for consultation about VB.  Her only complaint is pain; patient reports that her whole body is hurting \"10 out of 10\" since she fell 10 days ago.  The patient has had multiple CT scans and x-rays that showed no acute injury or broken bones.  I have reviewed the records from her ER visit at [facility].    Patient had recently been evaluated by Dr. Hooper for thick endo lining, but at that time she reported no VB.  On her recent u/s, the endo lining was 3 cm.  Patient had an endo biopsy in the office which was non-diagnostic, but declined hysteroscopy/D&C with Dr. Hooper because she wanted to check with her PCP first.  Since that time, patient has started having bright red vaginal bleeding.  The daughter is wondering whether the fall caused the bleeding to start, but I have tried to inform them that the bleeding is secondary to thick lining that was already pathologically thickened.    Past Medical History:   Diagnosis Date   • Arthritis    • Frequency of urination    • Nocturia    • Seizure (CMS/HCC)    • Sensory urge incontinence    • TIA (transient ischemic attack)    • Vaginal atrophy      Past Surgical History:   Procedure Laterality Date   • BACK SURGERY     • CATARACT EXTRACTION     • CHOLECYSTECTOMY     • TONSILLECTOMY     • WISDOM TOOTH EXTRACTION       Social History    Tobacco Use      Smoking status: Never Smoker      Smokeless tobacco: Never Used      Current Outpatient Medications:   •  acetaminophen (TYLENOL) 500 MG tablet, Take 1,000 mg by mouth 3 (Three) Times a Day As Needed., Disp: , Rfl:   •  alendronate (FOSAMAX) 70 MG tablet, Take 70 mg by mouth Every 7 (Seven) Days., Disp: , Rfl:   •  amLODIPine (NORVASC) 2.5 MG tablet, Take 5 mg by mouth Daily., Disp: , Rfl:   •  amLODIPine (NORVASC) 5 MG tablet, " 1 tablet Every Morning., Disp: , Rfl:   •  azelastine (ASTELIN) 0.1 % nasal spray, 2 sprays into the nostril(s) as directed by provider., Disp: , Rfl:   •  B Complex Vitamins (VITAMIN B COMPLEX PO), Take 1 tablet by mouth Daily., Disp: , Rfl:   •  benzonatate (TESSALON) 100 MG capsule, Take 200 mg by mouth As Needed., Disp: , Rfl:   •  butalbital-acetaminophen-caffeine (FIORICET, ESGIC) -40 MG per tablet, Take 1 tablet by mouth As Needed for headaches., Disp: , Rfl:   •  BYSTOLIC 5 MG tablet, Take 5 mg by mouth every night at bedtime., Disp: , Rfl: 2  •  Calcium Carb-Cholecalciferol (CALCIUM 600+D3) 600-800 MG-UNIT tablet, Take 1 tablet by mouth Daily., Disp: , Rfl:   •  camphor-menthol (SARNA) 0.5-0.5 % lotion, Apply  topically As Needed for itching., Disp: , Rfl:   •  cholecalciferol (VITAMIN D3) 1000 UNITS tablet, Take 1,000 Units by mouth Daily., Disp: , Rfl:   •  cloNIDine (CATAPRES) 0.1 MG tablet, Take 0.1 mg by mouth As Needed (As needed if BP exceeds 160/100.)., Disp: , Rfl:   •  colestipol (COLESTID) 1 G tablet, Take 1 g by mouth As Needed., Disp: , Rfl:   •  desonide (DESOWEN) 0.05 % cream, Apply  topically to the appropriate area as directed., Disp: , Rfl:   •  diclofenac (VOLTAREN) 1 % gel gel, Apply 2 g topically to the appropriate area as directed., Disp: , Rfl:   •  dipyridamole (PERSANTINE) 50 MG tablet, Take 100 mg by mouth., Disp: , Rfl:   •  docusate sodium (COLACE) 100 MG capsule, Take 100 mg by mouth As Needed for constipation., Disp: , Rfl:   •  donepezil (ARICEPT) 10 MG tablet, Take 10 mg by mouth Every Night., Disp: , Rfl:   •  fluticasone (FLONASE) 50 MCG/ACT nasal spray, , Disp: , Rfl:   •  fluticasone (VERAMYST) 27.5 MCG/SPRAY nasal spray, 2 sprays into the nostril(s) as directed by provider., Disp: , Rfl:   •  furosemide (LASIX) 20 MG tablet, Take 20 mg by mouth Daily. Takes 1/2 tablet Mon, Wed and Fri., Disp: , Rfl:   •  Hormone Cream Base cream, 80:20  Biest 1 mg/ml   Insert 1 gm  vaginally 3 x per week for 3 weeks then twice per week thereafter and apply small amount to urethra (Patient taking differently: 80:20  Biest 1 mg/ml   Insert 1 gm vaginally twice per week thereafter and apply small amount to urethra), Disp: 1 bottle, Rfl: 12  •  INV ROSAS LOMAX, KIT, Pharmacy Label,, Indications: dic 3%, lidocaine 2%, cyclo 2%, prilo 2% baclo 2% 1 to 2 pumps up to TID PRN, Disp: , Rfl:   •  levETIRAcetam (KEPPRA) 750 MG tablet, Take 750 mg by mouth 2 (Two) Times a Day., Disp: , Rfl:   •  Lidocaine 4 % patch, Place 1 patch on the skin as directed by provider., Disp: , Rfl:   •  Lutein 20 MG tablet, Take 1 tablet by mouth Daily., Disp: , Rfl:   •  mupirocin (BACTROBAN) 2 % cream, Apply  topically to the appropriate area as directed., Disp: , Rfl:   •  nitrofurantoin (MACRODANTIN) 50 MG capsule, , Disp: , Rfl:   •  Omega 3-6-9 Fatty Acids (OMEGA 3-6-9 COMPLEX PO), Take 1 capsule by mouth Daily., Disp: , Rfl:   •  Polyvinyl Alcohol-Povidone PF (HYPOTEARS) 1.4-0.6 % ophthalmic solution, 1-2 drops., Disp: , Rfl:   •  pravastatin (PRAVACHOL) 20 MG tablet, Take 20 mg by mouth Daily., Disp: , Rfl:   •  promethazine (PHENERGAN) 25 MG tablet, Take 25 mg by mouth Every 4 (Four) Hours As Needed for nausea or vomiting., Disp: , Rfl:   •  Psyllium 51.7 % pack, Take  by mouth., Disp: , Rfl:   •  ramipril (ALTACE) 10 MG capsule, Take 10 mg by mouth., Disp: , Rfl:   •  saline 0.65 % nasal solution (BABY AYR) 0.65 % solution, 1 spray into the nostril(s) as directed by provider., Disp: , Rfl:   •  simethicone (MYLICON) 125 MG chewable tablet, Chew 125 mg., Disp: , Rfl:   •  sodium chloride 1 g tablet, Take 1 g by mouth 3 (Three) Times a Day., Disp: , Rfl:   •  SPIRIVA HANDIHALER 18 MCG per inhalation capsule, INHALE 1 CAPSULE IN HANDIHALER EVERY DAY, Disp: , Rfl: 2  •  therapeutic multivitamin-minerals (THERAGRAN-M) tablet, Take 1 tablet by mouth., Disp: , Rfl:   •  tobramycin-dexamethasone (TOBRADEX) 0.3-0.1 %  "ophthalmic ointment, Place into the left eye as needed 3 times daily., Disp: , Rfl:   •  traMADol (ULTRAM) 50 MG tablet, Take 50 mg by mouth 2 (Two) Times a Day. Takes 1/2 tablet two times daily as needed., Disp: , Rfl:   •  valACYclovir (VALTREX) 1000 MG tablet, as needed, Disp: , Rfl:     Allergies   Allergen Reactions   • Aspirin Unknown (See Comments)   • Codeine Unknown (See Comments)   • Levofloxacin Unknown (See Comments)     Seizure like activity   • Lortab [Hydrocodone-Acetaminophen] Unknown (See Comments)   • Memantine Hcl Unknown (See Comments)   • Meperidine Unknown (See Comments)   • Mirabegron Other (See Comments)   • Ondansetron Unknown (See Comments)   • Penicillins    • Pneumococcal Vaccines Unknown (See Comments)   • Spironolactone Unknown (See Comments)     Low sodium   • Sulfa Antibiotics Nausea And Vomiting   • Yellow Dye #11 Itching     Causes seizures.        Family History   Problem Relation Age of Onset   • No Known Problems Mother    • No Known Problems Father      Review of Systems   Constitutional: Negative for activity change and unexpected weight change.   Respiratory: Negative for shortness of breath.    Cardiovascular: Negative for chest pain.        + CHTN   Gastrointestinal: Positive for abdominal pain. Negative for constipation and diarrhea.   Genitourinary: Positive for enuresis and vaginal bleeding (bright red for past week).   Musculoskeletal: Positive for arthralgias and back pain.   Neurological: Positive for dizziness (vasovagal responses) and seizures (most-recently in September 2019).         Objective   /60   Ht 154.9 cm (61\")   Wt 58.1 kg (128 lb)   BMI 24.19 kg/m²     Physical Exam   Physical Exam   Constitutional: She is oriented to person, place, and time. She appears well-developed and well-nourished. No distress.   HENT:   Head: Normocephalic and atraumatic.   Eyes: EOM are normal.   Neck: Normal range of motion.   Pulmonary/Chest: Effort normal. "   Abdominal: Soft. She exhibits no distension. There is tenderness (L>R).   Genitourinary:   Genitourinary Comments: Thick endo lining 3 cm on recent u/s last month.  Ovaries not visualized   Musculoskeletal:   Riding in wheelchair, patient with pain in muscles and joints, especially in region of L hip since her recent fall   Neurological: She is alert and oriented to person, place, and time.   Skin: Skin is warm and dry.   Psychiatric: She has a normal mood and affect. Her behavior is normal.   Daughter is POA, patient with some confusion and limited decision making ability   Nursing note and vitals reviewed.      Labs  Lab Results   Component Value Date     09/25/2019    HGB 10.4 (L) 09/25/2019    HCT 31.1 (L) 09/25/2019    WBC 5.3 09/25/2019     09/25/2019    K 3.8 09/25/2019     09/25/2019    CO2 22 09/25/2019    BUN 23 09/25/2019    CREATININE 1.1 (H) 09/25/2019    GLUCOSE 100 09/25/2019    ALBUMIN 4.5 09/25/2019    CALCIUM 10.0 09/25/2019    AST 21 09/25/2019    ALT 26 09/25/2019    BILITOT 0.4 09/25/2019        Assessment & Plan    Bel was seen today for vaginal bleeding.    Diagnoses and all orders for this visit:    Endometrial thickening on ultrasound: Have discussed with patient and her daughter that I feel thickening on her ultrasound likely represents uterine cancer, especially in light of the fact that she is bleeding.  They do understand that there is a possibility that this represents a benign change but that we have to assume malignancy until it is proven otherwise.  The patient but has been offered the option of empiric treatment with Megace versus proceeding to the OR for a D&C.  I have advised the patient that it is not prudent to go have surgery unless she would follow with treatment.  Although the patient says she is not certain she would undergo a hysterectomy, or take chemo/radiation, the patient and her daughter would like to have a definitive diagnosis.  They have  significant concerns about her labile blood pressure and the potential for a seizure but they have been reassured that either of these can be handled in the operating room at the hands of the anesthesia team.  45 minutes was spent with the patient and her daughter discussing all of the details of the procedure and answering their questions.  -     Case Request; Standing  -     CBC and Differential; Future  -     ECG 12 Lead; Future  -     Case Request    PMB (postmenopausal bleeding)  -     Case Request; Standing  -     CBC and Differential; Future  -     ECG 12 Lead; Future  -     Case Request    Alzheimer's dementia without behavioral disturbance, unspecified timing of dementia onset (CMS/[facility])    Non-smoker    BMI 24.0-24.9, adult    Chronic hypertension    Other orders  -     Follow Anesthesia Guidelines / Standing Orders; Future  -     Chlorhexidine Skin Prep; Future  -     Follow Anesthesia Guidelines / Standing Orders; Standing  -     Place sequential compression device; Standing  -     Verify / Perform Chlorhexidine Skin Prep; Standing  -     Type & Screen; Standing  -     Obtain informed consent; Standing        Lynnette Andujar MD  12/4/2019  2:26 PM

## 2019-12-04 NOTE — H&P (VIEW-ONLY)
"[facility]  Bel Powell  : 1935  MRN: [id number]  CSN: [id number]    History and Physical    Subjective   Bel Powell is a 84 y.o. year old No obstetric history on file. who presents for consultation about VB.  Her only complaint is pain; patient reports that her whole body is hurting \"10 out of 10\" since she fell 10 days ago.  The patient has had multiple CT scans and x-rays that showed no acute injury or broken bones.  I have reviewed the records from her ER visit at [facility].    Patient had recently been evaluated by Dr. Hooper for thick endo lining, but at that time she reported no VB.  On her recent u/s, the endo lining was 3 cm.  Patient had an endo biopsy in the office which was non-diagnostic, but declined hysteroscopy/D&C with Dr. Hooper because she wanted to check with her PCP first.  Since that time, patient has started having bright red vaginal bleeding.  The daughter is wondering whether the fall caused the bleeding to start, but I have tried to inform them that the bleeding is secondary to thick lining that was already pathologically thickened.    Past Medical History:   Diagnosis Date   • Arthritis    • Frequency of urination    • Nocturia    • Seizure (CMS/HCC)    • Sensory urge incontinence    • TIA (transient ischemic attack)    • Vaginal atrophy      Past Surgical History:   Procedure Laterality Date   • BACK SURGERY     • CATARACT EXTRACTION     • CHOLECYSTECTOMY     • TONSILLECTOMY     • WISDOM TOOTH EXTRACTION       Social History    Tobacco Use      Smoking status: Never Smoker      Smokeless tobacco: Never Used      Current Outpatient Medications:   •  acetaminophen (TYLENOL) 500 MG tablet, Take 1,000 mg by mouth 3 (Three) Times a Day As Needed., Disp: , Rfl:   •  alendronate (FOSAMAX) 70 MG tablet, Take 70 mg by mouth Every 7 (Seven) Days., Disp: , Rfl:   •  amLODIPine (NORVASC) 2.5 MG tablet, Take 5 mg by mouth Daily., Disp: , Rfl:   •  amLODIPine (NORVASC) 5 MG tablet, " 1 tablet Every Morning., Disp: , Rfl:   •  azelastine (ASTELIN) 0.1 % nasal spray, 2 sprays into the nostril(s) as directed by provider., Disp: , Rfl:   •  B Complex Vitamins (VITAMIN B COMPLEX PO), Take 1 tablet by mouth Daily., Disp: , Rfl:   •  benzonatate (TESSALON) 100 MG capsule, Take 200 mg by mouth As Needed., Disp: , Rfl:   •  butalbital-acetaminophen-caffeine (FIORICET, ESGIC) -40 MG per tablet, Take 1 tablet by mouth As Needed for headaches., Disp: , Rfl:   •  BYSTOLIC 5 MG tablet, Take 5 mg by mouth every night at bedtime., Disp: , Rfl: 2  •  Calcium Carb-Cholecalciferol (CALCIUM 600+D3) 600-800 MG-UNIT tablet, Take 1 tablet by mouth Daily., Disp: , Rfl:   •  camphor-menthol (SARNA) 0.5-0.5 % lotion, Apply  topically As Needed for itching., Disp: , Rfl:   •  cholecalciferol (VITAMIN D3) 1000 UNITS tablet, Take 1,000 Units by mouth Daily., Disp: , Rfl:   •  cloNIDine (CATAPRES) 0.1 MG tablet, Take 0.1 mg by mouth As Needed (As needed if BP exceeds 160/100.)., Disp: , Rfl:   •  colestipol (COLESTID) 1 G tablet, Take 1 g by mouth As Needed., Disp: , Rfl:   •  desonide (DESOWEN) 0.05 % cream, Apply  topically to the appropriate area as directed., Disp: , Rfl:   •  diclofenac (VOLTAREN) 1 % gel gel, Apply 2 g topically to the appropriate area as directed., Disp: , Rfl:   •  dipyridamole (PERSANTINE) 50 MG tablet, Take 100 mg by mouth., Disp: , Rfl:   •  docusate sodium (COLACE) 100 MG capsule, Take 100 mg by mouth As Needed for constipation., Disp: , Rfl:   •  donepezil (ARICEPT) 10 MG tablet, Take 10 mg by mouth Every Night., Disp: , Rfl:   •  fluticasone (FLONASE) 50 MCG/ACT nasal spray, , Disp: , Rfl:   •  fluticasone (VERAMYST) 27.5 MCG/SPRAY nasal spray, 2 sprays into the nostril(s) as directed by provider., Disp: , Rfl:   •  furosemide (LASIX) 20 MG tablet, Take 20 mg by mouth Daily. Takes 1/2 tablet Mon, Wed and Fri., Disp: , Rfl:   •  Hormone Cream Base cream, 80:20  Biest 1 mg/ml   Insert 1 gm  vaginally 3 x per week for 3 weeks then twice per week thereafter and apply small amount to urethra (Patient taking differently: 80:20  Biest 1 mg/ml   Insert 1 gm vaginally twice per week thereafter and apply small amount to urethra), Disp: 1 bottle, Rfl: 12  •  INV ROSAS LOMAX, KIT, Pharmacy Label,, Indications: dic 3%, lidocaine 2%, cyclo 2%, prilo 2% baclo 2% 1 to 2 pumps up to TID PRN, Disp: , Rfl:   •  levETIRAcetam (KEPPRA) 750 MG tablet, Take 750 mg by mouth 2 (Two) Times a Day., Disp: , Rfl:   •  Lidocaine 4 % patch, Place 1 patch on the skin as directed by provider., Disp: , Rfl:   •  Lutein 20 MG tablet, Take 1 tablet by mouth Daily., Disp: , Rfl:   •  mupirocin (BACTROBAN) 2 % cream, Apply  topically to the appropriate area as directed., Disp: , Rfl:   •  nitrofurantoin (MACRODANTIN) 50 MG capsule, , Disp: , Rfl:   •  Omega 3-6-9 Fatty Acids (OMEGA 3-6-9 COMPLEX PO), Take 1 capsule by mouth Daily., Disp: , Rfl:   •  Polyvinyl Alcohol-Povidone PF (HYPOTEARS) 1.4-0.6 % ophthalmic solution, 1-2 drops., Disp: , Rfl:   •  pravastatin (PRAVACHOL) 20 MG tablet, Take 20 mg by mouth Daily., Disp: , Rfl:   •  promethazine (PHENERGAN) 25 MG tablet, Take 25 mg by mouth Every 4 (Four) Hours As Needed for nausea or vomiting., Disp: , Rfl:   •  Psyllium 51.7 % pack, Take  by mouth., Disp: , Rfl:   •  ramipril (ALTACE) 10 MG capsule, Take 10 mg by mouth., Disp: , Rfl:   •  saline 0.65 % nasal solution (BABY AYR) 0.65 % solution, 1 spray into the nostril(s) as directed by provider., Disp: , Rfl:   •  simethicone (MYLICON) 125 MG chewable tablet, Chew 125 mg., Disp: , Rfl:   •  sodium chloride 1 g tablet, Take 1 g by mouth 3 (Three) Times a Day., Disp: , Rfl:   •  SPIRIVA HANDIHALER 18 MCG per inhalation capsule, INHALE 1 CAPSULE IN HANDIHALER EVERY DAY, Disp: , Rfl: 2  •  therapeutic multivitamin-minerals (THERAGRAN-M) tablet, Take 1 tablet by mouth., Disp: , Rfl:   •  tobramycin-dexamethasone (TOBRADEX) 0.3-0.1 %  "ophthalmic ointment, Place into the left eye as needed 3 times daily., Disp: , Rfl:   •  traMADol (ULTRAM) 50 MG tablet, Take 50 mg by mouth 2 (Two) Times a Day. Takes 1/2 tablet two times daily as needed., Disp: , Rfl:   •  valACYclovir (VALTREX) 1000 MG tablet, as needed, Disp: , Rfl:     Allergies   Allergen Reactions   • Aspirin Unknown (See Comments)   • Codeine Unknown (See Comments)   • Levofloxacin Unknown (See Comments)     Seizure like activity   • Lortab [Hydrocodone-Acetaminophen] Unknown (See Comments)   • Memantine Hcl Unknown (See Comments)   • Meperidine Unknown (See Comments)   • Mirabegron Other (See Comments)   • Ondansetron Unknown (See Comments)   • Penicillins    • Pneumococcal Vaccines Unknown (See Comments)   • Spironolactone Unknown (See Comments)     Low sodium   • Sulfa Antibiotics Nausea And Vomiting   • Yellow Dye #11 Itching     Causes seizures.        Family History   Problem Relation Age of Onset   • No Known Problems Mother    • No Known Problems Father      Review of Systems   Constitutional: Negative for activity change and unexpected weight change.   Respiratory: Negative for shortness of breath.    Cardiovascular: Negative for chest pain.        + CHTN   Gastrointestinal: Positive for abdominal pain. Negative for constipation and diarrhea.   Genitourinary: Positive for enuresis and vaginal bleeding (bright red for past week).   Musculoskeletal: Positive for arthralgias and back pain.   Neurological: Positive for dizziness (vasovagal responses) and seizures (most-recently in September 2019).         Objective   /60   Ht 154.9 cm (61\")   Wt 58.1 kg (128 lb)   BMI 24.19 kg/m²      Physical Exam   Physical Exam   Constitutional: She is oriented to person, place, and time. She appears well-developed and well-nourished. No distress.   HENT:   Head: Normocephalic and atraumatic.   Eyes: EOM are normal.   Neck: Normal range of motion.   Pulmonary/Chest: Effort normal. "   Abdominal: Soft. She exhibits no distension. There is tenderness (L>R).   Genitourinary:   Genitourinary Comments: Thick endo lining 3 cm on recent u/s last month.  Ovaries not visualized   Musculoskeletal:   Riding in wheelchair, patient with pain in muscles and joints, especially in region of L hip since her recent fall   Neurological: She is alert and oriented to person, place, and time.   Skin: Skin is warm and dry.   Psychiatric: She has a normal mood and affect. Her behavior is normal.   Daughter is POA, patient with some confusion and limited decision making ability   Nursing note and vitals reviewed.      Labs  Lab Results   Component Value Date     09/25/2019    HGB 10.4 (L) 09/25/2019    HCT 31.1 (L) 09/25/2019    WBC 5.3 09/25/2019     09/25/2019    K 3.8 09/25/2019     09/25/2019    CO2 22 09/25/2019    BUN 23 09/25/2019    CREATININE 1.1 (H) 09/25/2019    GLUCOSE 100 09/25/2019    ALBUMIN 4.5 09/25/2019    CALCIUM 10.0 09/25/2019    AST 21 09/25/2019    ALT 26 09/25/2019    BILITOT 0.4 09/25/2019        Assessment & Plan    Bel was seen today for vaginal bleeding.    Diagnoses and all orders for this visit:    Endometrial thickening on ultrasound: Have discussed with patient and her daughter that I feel thickening on her ultrasound likely represents uterine cancer, especially in light of the fact that she is bleeding.  They do understand that there is a possibility that this represents a benign change but that we have to assume malignancy until it is proven otherwise.  The patient but has been offered the option of empiric treatment with Megace versus proceeding to the OR for a D&C.  I have advised the patient that it is not prudent to go have surgery unless she would follow with treatment.  Although the patient says she is not certain she would undergo a hysterectomy, or take chemo/radiation, the patient and her daughter would like to have a definitive diagnosis.  They have  significant concerns about her labile blood pressure and the potential for a seizure but they have been reassured that either of these can be handled in the operating room at the hands of the anesthesia team.  45 minutes was spent with the patient and her daughter discussing all of the details of the procedure and answering their questions.  -     Case Request; Standing  -     CBC and Differential; Future  -     ECG 12 Lead; Future  -     Case Request    PMB (postmenopausal bleeding)  -     Case Request; Standing  -     CBC and Differential; Future  -     ECG 12 Lead; Future  -     Case Request    Alzheimer's dementia without behavioral disturbance, unspecified timing of dementia onset (CMS/[facility])    Non-smoker    BMI 24.0-24.9, adult    Chronic hypertension    Other orders  -     Follow Anesthesia Guidelines / Standing Orders; Future  -     Chlorhexidine Skin Prep; Future  -     Follow Anesthesia Guidelines / Standing Orders; Standing  -     Place sequential compression device; Standing  -     Verify / Perform Chlorhexidine Skin Prep; Standing  -     Type & Screen; Standing  -     Obtain informed consent; Standing        Lynnette Andujar MD  12/4/2019  2:26 PM

## 2019-12-04 NOTE — H&P
"[facility]  Bel Powell  : 1935  MRN: [id number]  CSN: [id number]    History and Physical    Subjective   Bel Powell is a 84 y.o. year old No obstetric history on file. who presents for consultation about VB.  Her only complaint is pain; patient reports that her whole body is hurting \"10 out of 10\" since she fell 10 days ago.  The patient has had multiple CT scans and x-rays that showed no acute injury or broken bones.  I have reviewed the records from her ER visit at [facility].    Patient had recently been evaluated by Dr. Hooper for thick endo lining, but at that time she reported no VB.  On her recent u/s, the endo lining was 3 cm.  Patient had an endo biopsy in the office which was non-diagnostic, but declined hysteroscopy/D&C with Dr. Hooper because she wanted to check with her PCP first.  Since that time, patient has started having bright red vaginal bleeding.  The daughter is wondering whether the fall caused the bleeding to start, but I have tried to inform them that the bleeding is secondary to thick lining that was already pathologically thickened.    Past Medical History:   Diagnosis Date   • Arthritis    • Frequency of urination    • Nocturia    • Seizure (CMS/HCC)    • Sensory urge incontinence    • TIA (transient ischemic attack)    • Vaginal atrophy      Past Surgical History:   Procedure Laterality Date   • BACK SURGERY     • CATARACT EXTRACTION     • CHOLECYSTECTOMY     • TONSILLECTOMY     • WISDOM TOOTH EXTRACTION       Social History    Tobacco Use      Smoking status: Never Smoker      Smokeless tobacco: Never Used      Current Outpatient Medications:   •  acetaminophen (TYLENOL) 500 MG tablet, Take 1,000 mg by mouth 3 (Three) Times a Day As Needed., Disp: , Rfl:   •  alendronate (FOSAMAX) 70 MG tablet, Take 70 mg by mouth Every 7 (Seven) Days., Disp: , Rfl:   •  amLODIPine (NORVASC) 2.5 MG tablet, Take 5 mg by mouth Daily., Disp: , Rfl:   •  amLODIPine (NORVASC) 5 MG tablet, " 1 tablet Every Morning., Disp: , Rfl:   •  azelastine (ASTELIN) 0.1 % nasal spray, 2 sprays into the nostril(s) as directed by provider., Disp: , Rfl:   •  B Complex Vitamins (VITAMIN B COMPLEX PO), Take 1 tablet by mouth Daily., Disp: , Rfl:   •  benzonatate (TESSALON) 100 MG capsule, Take 200 mg by mouth As Needed., Disp: , Rfl:   •  butalbital-acetaminophen-caffeine (FIORICET, ESGIC) -40 MG per tablet, Take 1 tablet by mouth As Needed for headaches., Disp: , Rfl:   •  BYSTOLIC 5 MG tablet, Take 5 mg by mouth every night at bedtime., Disp: , Rfl: 2  •  Calcium Carb-Cholecalciferol (CALCIUM 600+D3) 600-800 MG-UNIT tablet, Take 1 tablet by mouth Daily., Disp: , Rfl:   •  camphor-menthol (SARNA) 0.5-0.5 % lotion, Apply  topically As Needed for itching., Disp: , Rfl:   •  cholecalciferol (VITAMIN D3) 1000 UNITS tablet, Take 1,000 Units by mouth Daily., Disp: , Rfl:   •  cloNIDine (CATAPRES) 0.1 MG tablet, Take 0.1 mg by mouth As Needed (As needed if BP exceeds 160/100.)., Disp: , Rfl:   •  colestipol (COLESTID) 1 G tablet, Take 1 g by mouth As Needed., Disp: , Rfl:   •  desonide (DESOWEN) 0.05 % cream, Apply  topically to the appropriate area as directed., Disp: , Rfl:   •  diclofenac (VOLTAREN) 1 % gel gel, Apply 2 g topically to the appropriate area as directed., Disp: , Rfl:   •  dipyridamole (PERSANTINE) 50 MG tablet, Take 100 mg by mouth., Disp: , Rfl:   •  docusate sodium (COLACE) 100 MG capsule, Take 100 mg by mouth As Needed for constipation., Disp: , Rfl:   •  donepezil (ARICEPT) 10 MG tablet, Take 10 mg by mouth Every Night., Disp: , Rfl:   •  fluticasone (FLONASE) 50 MCG/ACT nasal spray, , Disp: , Rfl:   •  fluticasone (VERAMYST) 27.5 MCG/SPRAY nasal spray, 2 sprays into the nostril(s) as directed by provider., Disp: , Rfl:   •  furosemide (LASIX) 20 MG tablet, Take 20 mg by mouth Daily. Takes 1/2 tablet Mon, Wed and Fri., Disp: , Rfl:   •  Hormone Cream Base cream, 80:20  Biest 1 mg/ml   Insert 1 gm  vaginally 3 x per week for 3 weeks then twice per week thereafter and apply small amount to urethra (Patient taking differently: 80:20  Biest 1 mg/ml   Insert 1 gm vaginally twice per week thereafter and apply small amount to urethra), Disp: 1 bottle, Rfl: 12  •  INV ROSAS LOMAX, KIT, Pharmacy Label,, Indications: dic 3%, lidocaine 2%, cyclo 2%, prilo 2% baclo 2% 1 to 2 pumps up to TID PRN, Disp: , Rfl:   •  levETIRAcetam (KEPPRA) 750 MG tablet, Take 750 mg by mouth 2 (Two) Times a Day., Disp: , Rfl:   •  Lidocaine 4 % patch, Place 1 patch on the skin as directed by provider., Disp: , Rfl:   •  Lutein 20 MG tablet, Take 1 tablet by mouth Daily., Disp: , Rfl:   •  mupirocin (BACTROBAN) 2 % cream, Apply  topically to the appropriate area as directed., Disp: , Rfl:   •  nitrofurantoin (MACRODANTIN) 50 MG capsule, , Disp: , Rfl:   •  Omega 3-6-9 Fatty Acids (OMEGA 3-6-9 COMPLEX PO), Take 1 capsule by mouth Daily., Disp: , Rfl:   •  Polyvinyl Alcohol-Povidone PF (HYPOTEARS) 1.4-0.6 % ophthalmic solution, 1-2 drops., Disp: , Rfl:   •  pravastatin (PRAVACHOL) 20 MG tablet, Take 20 mg by mouth Daily., Disp: , Rfl:   •  promethazine (PHENERGAN) 25 MG tablet, Take 25 mg by mouth Every 4 (Four) Hours As Needed for nausea or vomiting., Disp: , Rfl:   •  Psyllium 51.7 % pack, Take  by mouth., Disp: , Rfl:   •  ramipril (ALTACE) 10 MG capsule, Take 10 mg by mouth., Disp: , Rfl:   •  saline 0.65 % nasal solution (BABY AYR) 0.65 % solution, 1 spray into the nostril(s) as directed by provider., Disp: , Rfl:   •  simethicone (MYLICON) 125 MG chewable tablet, Chew 125 mg., Disp: , Rfl:   •  sodium chloride 1 g tablet, Take 1 g by mouth 3 (Three) Times a Day., Disp: , Rfl:   •  SPIRIVA HANDIHALER 18 MCG per inhalation capsule, INHALE 1 CAPSULE IN HANDIHALER EVERY DAY, Disp: , Rfl: 2  •  therapeutic multivitamin-minerals (THERAGRAN-M) tablet, Take 1 tablet by mouth., Disp: , Rfl:   •  tobramycin-dexamethasone (TOBRADEX) 0.3-0.1 %  "ophthalmic ointment, Place into the left eye as needed 3 times daily., Disp: , Rfl:   •  traMADol (ULTRAM) 50 MG tablet, Take 50 mg by mouth 2 (Two) Times a Day. Takes 1/2 tablet two times daily as needed., Disp: , Rfl:   •  valACYclovir (VALTREX) 1000 MG tablet, as needed, Disp: , Rfl:     Allergies   Allergen Reactions   • Aspirin Unknown (See Comments)   • Codeine Unknown (See Comments)   • Levofloxacin Unknown (See Comments)     Seizure like activity   • Lortab [Hydrocodone-Acetaminophen] Unknown (See Comments)   • Memantine Hcl Unknown (See Comments)   • Meperidine Unknown (See Comments)   • Mirabegron Other (See Comments)   • Ondansetron Unknown (See Comments)   • Penicillins    • Pneumococcal Vaccines Unknown (See Comments)   • Spironolactone Unknown (See Comments)     Low sodium   • Sulfa Antibiotics Nausea And Vomiting   • Yellow Dye #11 Itching     Causes seizures.        Family History   Problem Relation Age of Onset   • No Known Problems Mother    • No Known Problems Father      Review of Systems   Constitutional: Negative for activity change and unexpected weight change.   Respiratory: Negative for shortness of breath.    Cardiovascular: Negative for chest pain.        + CHTN   Gastrointestinal: Positive for abdominal pain. Negative for constipation and diarrhea.   Genitourinary: Positive for enuresis and vaginal bleeding (bright red for past week).   Musculoskeletal: Positive for arthralgias and back pain.   Neurological: Positive for dizziness (vasovagal responses) and seizures (most-recently in September 2019).         Objective   /60   Ht 154.9 cm (61\")   Wt 58.1 kg (128 lb)   BMI 24.19 kg/m²      Physical Exam   Physical Exam   Constitutional: She is oriented to person, place, and time. She appears well-developed and well-nourished. No distress.   HENT:   Head: Normocephalic and atraumatic.   Eyes: EOM are normal.   Neck: Normal range of motion.   Pulmonary/Chest: Effort normal. "   Abdominal: Soft. She exhibits no distension. There is tenderness (L>R).   Genitourinary:   Genitourinary Comments: Thick endo lining 3 cm on recent u/s last month.  Ovaries not visualized   Musculoskeletal:   Riding in wheelchair, patient with pain in muscles and joints, especially in region of L hip since her recent fall   Neurological: She is alert and oriented to person, place, and time.   Skin: Skin is warm and dry.   Psychiatric: She has a normal mood and affect. Her behavior is normal.   Daughter is POA, patient with some confusion and limited decision making ability   Nursing note and vitals reviewed.      Labs  Lab Results   Component Value Date     09/25/2019    HGB 10.4 (L) 09/25/2019    HCT 31.1 (L) 09/25/2019    WBC 5.3 09/25/2019     09/25/2019    K 3.8 09/25/2019     09/25/2019    CO2 22 09/25/2019    BUN 23 09/25/2019    CREATININE 1.1 (H) 09/25/2019    GLUCOSE 100 09/25/2019    ALBUMIN 4.5 09/25/2019    CALCIUM 10.0 09/25/2019    AST 21 09/25/2019    ALT 26 09/25/2019    BILITOT 0.4 09/25/2019        Assessment & Plan    Bel was seen today for vaginal bleeding.    Diagnoses and all orders for this visit:    Endometrial thickening on ultrasound: Have discussed with patient and her daughter that I feel thickening on her ultrasound likely represents uterine cancer, especially in light of the fact that she is bleeding.  They do understand that there is a possibility that this represents a benign change but that we have to assume malignancy until it is proven otherwise.  The patient but has been offered the option of empiric treatment with Megace versus proceeding to the OR for a D&C.  I have advised the patient that it is not prudent to go have surgery unless she would follow with treatment.  Although the patient says she is not certain she would undergo a hysterectomy, or take chemo/radiation, the patient and her daughter would like to have a definitive diagnosis.  They have  significant concerns about her labile blood pressure and the potential for a seizure but they have been reassured that either of these can be handled in the operating room at the hands of the anesthesia team.  45 minutes was spent with the patient and her daughter discussing all of the details of the procedure and answering their questions.  -     Case Request; Standing  -     CBC and Differential; Future  -     ECG 12 Lead; Future  -     Case Request    PMB (postmenopausal bleeding)  -     Case Request; Standing  -     CBC and Differential; Future  -     ECG 12 Lead; Future  -     Case Request    Alzheimer's dementia without behavioral disturbance, unspecified timing of dementia onset (CMS/[facility])    Non-smoker    BMI 24.0-24.9, adult    Chronic hypertension    Other orders  -     Follow Anesthesia Guidelines / Standing Orders; Future  -     Chlorhexidine Skin Prep; Future  -     Follow Anesthesia Guidelines / Standing Orders; Standing  -     Place sequential compression device; Standing  -     Verify / Perform Chlorhexidine Skin Prep; Standing  -     Type & Screen; Standing  -     Obtain informed consent; Standing        Lynnette Andujar MD  12/4/2019  2:26 PM

## 2019-12-06 ENCOUNTER — TELEPHONE (OUTPATIENT)
Dept: OBSTETRICS AND GYNECOLOGY | Facility: CLINIC | Age: 84
End: 2019-12-06

## 2019-12-06 NOTE — TELEPHONE ENCOUNTER
Pt daughter called stating that her mom is now bleeding more vaginally and wanted to know if it was possible for her surgery to be moved up to sooner than 12-23-19. Told her I would speak with Dr. Andujar and see if that was something we could do. Told her I would get back with her on Monday 12-9-19. Pt daughter understood.

## 2019-12-09 ENCOUNTER — APPOINTMENT (OUTPATIENT)
Dept: PREADMISSION TESTING | Facility: HOSPITAL | Age: 84
End: 2019-12-09

## 2019-12-09 ENCOUNTER — RESULTS ENCOUNTER (OUTPATIENT)
Dept: OBSTETRICS AND GYNECOLOGY | Facility: CLINIC | Age: 84
End: 2019-12-09

## 2019-12-09 VITALS
DIASTOLIC BLOOD PRESSURE: 53 MMHG | SYSTOLIC BLOOD PRESSURE: 116 MMHG | OXYGEN SATURATION: 97 % | RESPIRATION RATE: 24 BRPM | HEART RATE: 65 BPM

## 2019-12-09 DIAGNOSIS — N95.0 PMB (POSTMENOPAUSAL BLEEDING): ICD-10-CM

## 2019-12-09 DIAGNOSIS — R93.89 ENDOMETRIAL THICKENING ON ULTRASOUND: ICD-10-CM

## 2019-12-09 LAB
ANION GAP SERPL CALCULATED.3IONS-SCNC: 12 MMOL/L (ref 5–15)
BASOPHILS # BLD AUTO: 0.06 10*3/MM3 (ref 0–0.2)
BASOPHILS NFR BLD AUTO: 1 % (ref 0–1.5)
BUN BLD-MCNC: 21 MG/DL (ref 8–23)
BUN/CREAT SERPL: 22.1 (ref 7–25)
CALCIUM SPEC-SCNC: 9.6 MG/DL (ref 8.6–10.5)
CHLORIDE SERPL-SCNC: 101 MMOL/L (ref 98–107)
CO2 SERPL-SCNC: 28 MMOL/L (ref 22–29)
CREAT BLD-MCNC: 0.95 MG/DL (ref 0.57–1)
DEPRECATED RDW RBC AUTO: 38.7 FL (ref 37–54)
EOSINOPHIL # BLD AUTO: 0.29 10*3/MM3 (ref 0–0.4)
EOSINOPHIL NFR BLD AUTO: 5 % (ref 0.3–6.2)
ERYTHROCYTE [DISTWIDTH] IN BLOOD BY AUTOMATED COUNT: 11.4 % (ref 12.3–15.4)
GFR SERPL CREATININE-BSD FRML MDRD: 56 ML/MIN/1.73
GLUCOSE BLD-MCNC: 107 MG/DL (ref 65–99)
HCT VFR BLD AUTO: 34.6 % (ref 34–46.6)
HGB BLD-MCNC: 11.7 G/DL (ref 12–15.9)
IMM GRANULOCYTES # BLD AUTO: 0.05 10*3/MM3 (ref 0–0.05)
IMM GRANULOCYTES NFR BLD AUTO: 0.9 % (ref 0–0.5)
LYMPHOCYTES # BLD AUTO: 1.12 10*3/MM3 (ref 0.7–3.1)
LYMPHOCYTES NFR BLD AUTO: 19.4 % (ref 19.6–45.3)
MCH RBC QN AUTO: 31.5 PG (ref 26.6–33)
MCHC RBC AUTO-ENTMCNC: 33.8 G/DL (ref 31.5–35.7)
MCV RBC AUTO: 93.3 FL (ref 79–97)
MONOCYTES # BLD AUTO: 0.47 10*3/MM3 (ref 0.1–0.9)
MONOCYTES NFR BLD AUTO: 8.2 % (ref 5–12)
NEUTROPHILS # BLD AUTO: 3.77 10*3/MM3 (ref 1.7–7)
NEUTROPHILS NFR BLD AUTO: 65.5 % (ref 42.7–76)
NRBC BLD AUTO-RTO: 0 /100 WBC (ref 0–0.2)
PLATELET # BLD AUTO: 285 10*3/MM3 (ref 140–450)
PMV BLD AUTO: 10 FL (ref 6–12)
POTASSIUM BLD-SCNC: 4.2 MMOL/L (ref 3.5–5.2)
RBC # BLD AUTO: 3.71 10*6/MM3 (ref 3.77–5.28)
SODIUM BLD-SCNC: 141 MMOL/L (ref 136–145)
WBC NRBC COR # BLD: 5.76 10*3/MM3 (ref 3.4–10.8)

## 2019-12-09 PROCEDURE — 85025 COMPLETE CBC W/AUTO DIFF WBC: CPT | Performed by: OBSTETRICS & GYNECOLOGY

## 2019-12-09 PROCEDURE — 93010 ELECTROCARDIOGRAM REPORT: CPT | Performed by: INTERNAL MEDICINE

## 2019-12-09 PROCEDURE — 80048 BASIC METABOLIC PNL TOTAL CA: CPT | Performed by: OBSTETRICS & GYNECOLOGY

## 2019-12-09 PROCEDURE — 93005 ELECTROCARDIOGRAM TRACING: CPT

## 2019-12-09 PROCEDURE — 36415 COLL VENOUS BLD VENIPUNCTURE: CPT

## 2019-12-09 RX ORDER — MELATONIN
1000 DAILY
COMMUNITY
End: 2019-12-09

## 2019-12-09 NOTE — DISCHARGE INSTRUCTIONS
DAY OF SURGERY INSTRUCTIONS        YOUR SURGEON: ***SAVELLS  PROCEDURE: ***D&C HYSTEROSCOPY    DATE OF SURGERY: ***DEC 23 2019    ARRIVAL TIME: AS DIRECTED BY OFFICE    YOU MAY TAKE THE FOLLOWING MEDICATION(S) THE MORNING OF SURGERY WITH A SIP OF WATER: KEPPRA AND ULTRAM    ALL OTHER HOME MEDICATIONS CHECK WITH YOUR DOCTOR              MANAGING PAIN AFTER SURGERY    We know you are probably wondering what your pain will be like after surgery.  Following surgery it is unrealistic to expect you will not have pain.   Pain is how our bodies let us know that something is wrong or cautions us to be careful.  That said, our goal is to make your pain tolerable.    Methods we may use to treat your pain include (oral or IV medications, PCAs, epidurals, nerve blocks, etc.)   While some procedures require IV pain medications for a short time after surgery, transitioning to pain medications by mouth allows for better management of pain.   Your nurse will encourage you to take oral pain medications whenever possible.  IV medications work almost immediately, but only last a short while.  Taking medications by mouth allows for a more constant level of medication in your blood stream for a longer period of time.      Once your pain is out of control it is harder to get back under control.  It is important you are aware when your next dose of pain medication is due.  If you are admitted, your nurse may write the time of your next dose on the white board in your room to help you remember.      We are interested in your pain and encourage you to inform us about aggravating factors during your visit.   Many times a simple repositioning every few hours can make a big difference.    If your physician says it is okay, do not let your pain prevent you from getting out of bed. Be sure to call your nurse for assistance prior to getting up so you do not fall.      Before surgery, please decide your tolerable pain goal.  These faces help  describe the pain ratings we use on a 0-10 scale.   Be prepared to tell us your goal and whether or not you take pain or anxiety medications at home.      BEFORE YOU COME TO THE HOSPITAL  (Pre-op instructions)  • Do not eat, drink, smoke or chew gum after midnight the night before surgery.  This also includes no mints.  • Morning of surgery take only the medicines you have been instructed with a sip of water unless otherwise instructed  by your physician.  • Do not shave, wear makeup or dark nail polish.  • Remove all jewelry including rings.  • Leave anything you consider valuable at home.  • Leave your suitcase in the car until after your surgery.  • Bring the following with you if applicable:  o Picture ID and insurance, Medicare or Medicaid cards  o Co-pay/deductible required by insurance (cash, check, credit card)  o Copy of advance directive, living will or power-of- documents if not brought to PAT  o CPAP or BIPAP mask and tubing  o Relaxation aids ( book, magazine), etc.  o Hearing aids                                 ON THE DAY OF SURGERY  · On the day of surgery check in at registration located at the main entrance of the hospital.   ? You will be registered and given a beeper with instructions where to wait in the main lobby.  ? When your beeper lights up and vibrates a member of the Outpatient Surgery staff will meet you at the double doors under the stair steps and escort you to your preoperative room.   · You may have cloth compression devices placed on your legs. These help to prevent blood clots and reduce swelling in your legs.  · An IV may be inserted into one of your veins.  · In the operating room, you may be given one or more of the following:  ? A medicine to help you relax (sedative).  ? A medicine to numb the area (local anesthetic).  ? A medicine to make you fall asleep (general anesthetic).  ? A medicine that is injected into an area of your body to numb everything below the  "injection site (regional anesthetic).  · Your surgical site will be marked or identified.  · You may be given an antibiotic through your IV to help prevent infection.  Contact a health care provider if you:  · Develop a fever of more than 100.4°F (38°C) or other feelings of illness during the 48 hours before your surgery.  · Have symptoms that get worse.  Have questions or concerns about your surgery    General Anesthesia/Surgery, Adult  General anesthesia is the use of medicines to make a person \"go to sleep\" (unconscious) for a medical procedure. General anesthesia must be used for certain procedures, and is often recommended for procedures that:  · Last a long time.  · Require you to be still or in an unusual position.  · Are major and can cause blood loss.  The medicines used for general anesthesia are called general anesthetics. As well as making you unconscious for a certain amount of time, these medicines:  · Prevent pain.  · Control your blood pressure.  · Relax your muscles.  Tell a health care provider about:  · Any allergies you have.  · All medicines you are taking, including vitamins, herbs, eye drops, creams, and over-the-counter medicines.  · Any problems you or family members have had with anesthetic medicines.  · Types of anesthetics you have had in the past.  · Any blood disorders you have.  · Any surgeries you have had.  · Any medical conditions you have.  · Any recent upper respiratory, chest, or ear infections.  · Any history of:  ? Heart or lung conditions, such as heart failure, sleep apnea, asthma, or chronic obstructive pulmonary disease (COPD).  ?  service.  ? Depression or anxiety.  · Any tobacco or drug use, including marijuana or alcohol use.  · Whether you are pregnant or may be pregnant.  What are the risks?  Generally, this is a safe procedure. However, problems may occur, including:  · Allergic reaction.  · Lung and heart problems.  · Inhaling food or liquid from the stomach " into the lungs (aspiration).  · Nerve injury.  · Air in the bloodstream, which can lead to stroke.  · Extreme agitation or confusion (delirium) when you wake up from the anesthetic.  · Waking up during your procedure and being unable to move. This is rare.  These problems are more likely to develop if you are having a major surgery or if you have an advanced or serious medical condition. You can prevent some of these complications by answering all of your health care provider's questions thoroughly and by following all instructions before your procedure.  General anesthesia can cause side effects, including:  · Nausea or vomiting.  · A sore throat from the breathing tube.  · Hoarseness.  · Wheezing or coughing.  · Shaking chills.  · Tiredness.  · Body aches.  · Anxiety.  · Sleepiness or drowsiness.  · Confusion or agitation.  RISKS AND COMPLICATIONS OF SURGERY  Your health care provider will discuss possible risks and complications with you before surgery. Common risks and complications include:    · Problems due to the use of anesthetics.  · Blood loss and replacement (does not apply to minor surgical procedures).  · Temporary increase in pain due to surgery.  · Uncorrected pain or problems that the surgery was meant to correct.  · Infection.  · New damage.    What happens before the procedure?    Medicines  Ask your health care provider about:  · Changing or stopping your regular medicines. This is especially important if you are taking diabetes medicines or blood thinners.  · Taking medicines such as aspirin and ibuprofen. These medicines can thin your blood. Do not take these medicines unless your health care provider tells you to take them.  · Taking over-the-counter medicines, vitamins, herbs, and supplements. Do not take these during the week before your procedure unless your health care provider approves them.  General instructions  · Starting 3-6 weeks before the procedure, do not use any products that  contain nicotine or tobacco, such as cigarettes and e-cigarettes. If you need help quitting, ask your health care provider.  · If you brush your teeth on the morning of the procedure, make sure to spit out all of the toothpaste.  · Tell your health care provider if you become ill or develop a cold, cough, or fever.  · If instructed by your health care provider, bring your sleep apnea device with you on the day of your surgery (if applicable).  · Ask your health care provider if you will be going home the same day, the following day, or after a longer hospital stay.  ? Plan to have someone take you home from the hospital or clinic.  ? Plan to have a responsible adult care for you for at least 24 hours after you leave the hospital or clinic. This is important.  What happens during the procedure?  · You will be given anesthetics through both of the following:  ? A mask placed over your nose and mouth.  ? An IV in one of your veins.  · You may receive a medicine to help you relax (sedative).  · After you are unconscious, a breathing tube may be inserted down your throat to help you breathe. This will be removed before you wake up.  · An anesthesia specialist will stay with you throughout your procedure. He or she will:  ? Keep you comfortable and safe by continuing to give you medicines and adjusting the amount of medicine that you get.  ? Monitor your blood pressure, pulse, and oxygen levels to make sure that the anesthetics do not cause any problems.  The procedure may vary among health care providers and hospitals.  What happens after the procedure?  · Your blood pressure, temperature, heart rate, breathing rate, and blood oxygen level will be monitored until the medicines you were given have worn off.  · You will wake up in a recovery area. You may wake up slowly.  · If you feel anxious or agitated, you may be given medicine to help you calm down.  · If you will be going home the same day, your health care provider  may check to make sure you can walk, drink, and urinate.  · Your health care provider will treat any pain or side effects you have before you go home.  · Do not drive for 24 hours if you were given a sedative.  Summary  · General anesthesia is used to keep you still and prevent pain during a procedure.  · It is important to tell your healthcare provider about your medical history and any surgeries you have had, and previous experience with anesthesia.  · Follow your healthcare provider’s instructions about when to stop eating, drinking, or taking certain medicines before your procedure.  · Plan to have someone take you home from the hospital or clinic.  This information is not intended to replace advice given to you by your health care provider. Make sure you discuss any questions you have with your health care provider.  Document Released: 03/26/2009 Document Revised: 08/03/2018 Document Reviewed: 08/03/2018  [employer] Interactive Patient Education © 2019 [employer]      Fall Prevention in Hospitals, Adult  As a hospital patient, your condition and the treatments you receive can increase your risk for falls. Some additional risk factors for falls in a hospital include:  · Being in an unfamiliar environment.  · Being on bed rest.  · Your surgery.  · Taking certain medicines.  · Your tubing requirements, such as intravenous (IV) therapy or catheters.  It is important that you learn how to decrease fall risks while at the hospital. Below are important tips that can help prevent falls.  SAFETY TIPS FOR PREVENTING FALLS  Talk about your risk of falling.  · Ask your health care provider why you are at risk for falling. Is it your medicine, illness, tubing placement, or something else?  · Make a plan with your health care provider to keep you safe from falls.  · Ask your health care provider or pharmacist about side effects of your medicines. Some medicines can make you dizzy or affect your coordination.  Ask for  help.  · Ask for help before getting out of bed. You may need to press your call button.  · Ask for assistance in getting safely to the toilet.  · Ask for a walker or cane to be put at your bedside. Ask that most of the side rails on your bed be placed up before your health care provider leaves the room.  · Ask family or friends to sit with you.  · Ask for things that are out of your reach, such as your glasses, hearing aids, telephone, bedside table, or call button.  Follow these tips to avoid falling:  · Stay lying or seated, rather than standing, while waiting for help.  · Wear rubber-soled slippers or shoes whenever you walk in the hospital.  · Avoid quick, sudden movements.  ¨ Change positions slowly.  ¨ Sit on the side of your bed before standing.  ¨ Stand up slowly and wait before you start to walk.  · Let your health care provider know if there is a spill on the floor.  · Pay careful attention to the medical equipment, electrical cords, and tubes around you.  · When you need help, use your call button by your bed or in the bathroom. Wait for one of your health care providers to help you.  · If you feel dizzy or unsure of your footing, return to bed and wait for assistance.  · Avoid being distracted by the TV, telephone, or another person in your room.  · Do not lean or support yourself on rolling objects, such as IV poles or bedside tables.     This information is not intended to replace advice given to you by your health care provider. Make sure you discuss any questions you have with your health care provider.     Document Released: 12/15/2001 Document Revised: 01/08/2016 Document Reviewed: 08/25/2013  [employer] Interactive Patient Education ©2016 [employer]       Surgical Site Infections FAQs  What is a Surgical Site Infection (SSI)?  A surgical site infection is an infection that occurs after surgery in the part of the body where the surgery took place. Most patients who have surgery do not develop an  infection. However, infections develop in about 1 to 3 out of every 100 patients who have surgery.  Some of the common symptoms of a surgical site infection are:  · Redness and pain around the area where you had surgery  · Drainage of cloudy fluid from your surgical wound  · Fever  Can SSIs be treated?  Yes. Most surgical site infections can be treated with antibiotics. The antibiotic given to you depends on the bacteria (germs) causing the infection. Sometimes patients with SSIs also need another surgery to treat the infection.  What are some of the things that hospitals are doing to prevent SSIs?  To prevent SSIs, doctors, nurses, and other healthcare providers:  · Clean their hands and arms up to their elbows with an antiseptic agent just before the surgery.  · Clean their hands with soap and water or an alcohol-based hand rub before and after caring for each patient.  · May remove some of your hair immediately before your surgery using electric clippers if the hair is in the same area where the procedure will occur. They should not shave you with a razor.  · Wear special hair covers, masks, gowns, and gloves during surgery to keep the surgery area clean.  · Give you antibiotics before your surgery starts. In most cases, you should get antibiotics within 60 minutes before the surgery starts and the antibiotics should be stopped within 24 hours after surgery.  · Clean the skin at the site of your surgery with a special soap that kills germs.  What can I do to help prevent SSIs?  Before your surgery:  · Tell your doctor about other medical problems you may have. Health problems such as allergies, diabetes, and obesity could affect your surgery and your treatment.  · Quit smoking. Patients who smoke get more infections. Talk to your doctor about how you can quit before your surgery.  · Do not shave near where you will have surgery. Shaving with a razor can irritate your skin and make it easier to develop an  infection.  At the time of your surgery:  · Speak up if someone tries to shave you with a razor before surgery. Ask why you need to be shaved and talk with your surgeon if you have any concerns.  · Ask if you will get antibiotics before surgery.  After your surgery:  · Make sure that your healthcare providers clean their hands before examining you, either with soap and water or an alcohol-based hand rub.  · If you do not see your providers clean their hands, please ask them to do so.  · Family and friends who visit you should not touch the surgical wound or dressings.  · Family and friends should clean their hands with soap and water or an alcohol-based hand rub before and after visiting you. If you do not see them clean their hands, ask them to clean their hands.  What do I need to do when I go home from the hospital?  · Before you go home, your doctor or nurse should explain everything you need to know about taking care of your wound. Make sure you understand how to care for your wound before you leave the hospital.  · Always clean your hands before and after caring for your wound.  · Before you go home, make sure you know who to contact if you have questions or problems after you get home.  · If you have any symptoms of an infection, such as redness and pain at the surgery site, drainage, or fever, call your doctor immediately.  If you have additional questions, please ask your doctor or nurse.  Developed and co-sponsored by The Society for Healthcare Epidemiology of [place] (SHEA); Infectious Diseases Society of [place] (IDSA); American Hospital Association; Association for Professionals in Infection Control and Epidemiology (APIC); Centers for Disease Control and Prevention (CDC); and The Joint Commission.     This information is not intended to replace advice given to you by your health care provider. Make sure you discuss any questions you have with your health care provider.     Document Released: 12/23/2014  Document Revised: 01/08/2016 Document Reviewed: 03/02/2016  [employer] Interactive Patient Education ©2016 [employer]     PATIENT/FAMILY/RESPONSIBLE PARTY VERBALIZES UNDERSTANDING OF ABOVE EDUCATION.  COPY OF PAIN SCALE GIVEN AND REVIEWED WITH VERBALIZED UNDERSTANDING.

## 2019-12-10 NOTE — TELEPHONE ENCOUNTER
Please assess how much bleeding the patient is having.  Is she wearing pantyliners or full pads?  How often is she changing that?

## 2019-12-13 NOTE — TELEPHONE ENCOUNTER
Discussed with pt daughter that her mom is not having heavy bleeding. Let her know that I spoke with you and that we were to keep her scheduled on 12-23-19. She understood. BS

## 2019-12-19 ENCOUNTER — OFFICE VISIT (OUTPATIENT)
Dept: NEUROLOGY | Age: 84
End: 2019-12-19
Payer: MEDICARE

## 2019-12-19 VITALS
SYSTOLIC BLOOD PRESSURE: 108 MMHG | DIASTOLIC BLOOD PRESSURE: 60 MMHG | BODY MASS INDEX: 23.79 KG/M2 | HEIGHT: 61 IN | WEIGHT: 126 LBS | HEART RATE: 57 BPM

## 2019-12-19 DIAGNOSIS — M54.42 CHRONIC BILATERAL LOW BACK PAIN WITH LEFT-SIDED SCIATICA: ICD-10-CM

## 2019-12-19 DIAGNOSIS — M25.50 ARTHRALGIA OF MULTIPLE JOINTS: ICD-10-CM

## 2019-12-19 DIAGNOSIS — G89.29 CHRONIC BILATERAL LOW BACK PAIN WITH LEFT-SIDED SCIATICA: ICD-10-CM

## 2019-12-19 DIAGNOSIS — I67.9 CEREBROVASCULAR SMALL VESSEL DISEASE: Primary | ICD-10-CM

## 2019-12-19 DIAGNOSIS — F01.50 VASCULAR DEMENTIA WITHOUT BEHAVIORAL DISTURBANCE (HCC): ICD-10-CM

## 2019-12-19 DIAGNOSIS — G40.219 PARTIAL SYMPTOMATIC EPILEPSY WITH COMPLEX PARTIAL SEIZURES, INTRACTABLE, WITHOUT STATUS EPILEPTICUS (HCC): ICD-10-CM

## 2019-12-19 PROCEDURE — G8599 NO ASA/ANTIPLAT THER USE RNG: HCPCS | Performed by: PSYCHIATRY & NEUROLOGY

## 2019-12-19 PROCEDURE — 99214 OFFICE O/P EST MOD 30 MIN: CPT | Performed by: PSYCHIATRY & NEUROLOGY

## 2019-12-19 PROCEDURE — 1123F ACP DISCUSS/DSCN MKR DOCD: CPT | Performed by: PSYCHIATRY & NEUROLOGY

## 2019-12-19 PROCEDURE — 4040F PNEUMOC VAC/ADMIN/RCVD: CPT | Performed by: PSYCHIATRY & NEUROLOGY

## 2019-12-19 PROCEDURE — G8420 CALC BMI NORM PARAMETERS: HCPCS | Performed by: PSYCHIATRY & NEUROLOGY

## 2019-12-19 PROCEDURE — G8400 PT W/DXA NO RESULTS DOC: HCPCS | Performed by: PSYCHIATRY & NEUROLOGY

## 2019-12-19 PROCEDURE — 1036F TOBACCO NON-USER: CPT | Performed by: PSYCHIATRY & NEUROLOGY

## 2019-12-19 PROCEDURE — 1090F PRES/ABSN URINE INCON ASSESS: CPT | Performed by: PSYCHIATRY & NEUROLOGY

## 2019-12-19 PROCEDURE — G8484 FLU IMMUNIZE NO ADMIN: HCPCS | Performed by: PSYCHIATRY & NEUROLOGY

## 2019-12-19 PROCEDURE — G8427 DOCREV CUR MEDS BY ELIG CLIN: HCPCS | Performed by: PSYCHIATRY & NEUROLOGY

## 2019-12-19 RX ORDER — GABAPENTIN 100 MG/1
200 CAPSULE ORAL 2 TIMES DAILY
Qty: 120 CAPSULE | Refills: 5 | Status: SHIPPED | OUTPATIENT
Start: 2019-12-19 | End: 2020-03-09

## 2019-12-19 RX ORDER — LEVETIRACETAM 500 MG/1
500 TABLET ORAL 2 TIMES DAILY
Qty: 60 TABLET | Refills: 5 | Status: SHIPPED | OUTPATIENT
Start: 2019-12-19 | End: 2020-06-11 | Stop reason: SDUPTHER

## 2019-12-20 ENCOUNTER — PATIENT MESSAGE (OUTPATIENT)
Dept: NEUROLOGY | Age: 84
End: 2019-12-20

## 2019-12-20 DIAGNOSIS — G40.219 PARTIAL SYMPTOMATIC EPILEPSY WITH COMPLEX PARTIAL SEIZURES, INTRACTABLE, WITHOUT STATUS EPILEPTICUS (HCC): ICD-10-CM

## 2019-12-21 RX ORDER — GABAPENTIN 100 MG/1
100 CAPSULE ORAL 2 TIMES DAILY
Qty: 60 CAPSULE | Refills: 5 | OUTPATIENT
Start: 2019-12-21 | End: 2020-06-18

## 2019-12-23 ENCOUNTER — ANESTHESIA (OUTPATIENT)
Dept: PERIOP | Facility: HOSPITAL | Age: 84
End: 2019-12-23

## 2019-12-23 ENCOUNTER — HOSPITAL ENCOUNTER (OUTPATIENT)
Facility: HOSPITAL | Age: 84
Setting detail: HOSPITAL OUTPATIENT SURGERY
Discharge: HOME | End: 2019-12-23
Attending: OBSTETRICS & GYNECOLOGY | Admitting: OBSTETRICS & GYNECOLOGY

## 2019-12-23 ENCOUNTER — ANESTHESIA EVENT (OUTPATIENT)
Dept: PERIOP | Facility: HOSPITAL | Age: 84
End: 2019-12-23

## 2019-12-23 VITALS
OXYGEN SATURATION: 93 % | SYSTOLIC BLOOD PRESSURE: 136 MMHG | BODY MASS INDEX: 24.4 KG/M2 | TEMPERATURE: 98 F | RESPIRATION RATE: 16 BRPM | WEIGHT: 121.03 LBS | HEART RATE: 64 BPM | HEIGHT: 59 IN | DIASTOLIC BLOOD PRESSURE: 73 MMHG

## 2019-12-23 DIAGNOSIS — R93.89 ENDOMETRIAL THICKENING ON ULTRASOUND: ICD-10-CM

## 2019-12-23 DIAGNOSIS — N95.0 PMB (POSTMENOPAUSAL BLEEDING): ICD-10-CM

## 2019-12-23 LAB
ABO GROUP BLD: NORMAL
BLD GP AB SCN SERPL QL: NEGATIVE
RH BLD: POSITIVE
T&S EXPIRATION DATE: NORMAL

## 2019-12-23 PROCEDURE — 86850 RBC ANTIBODY SCREEN: CPT | Performed by: OBSTETRICS & GYNECOLOGY

## 2019-12-23 PROCEDURE — 58558 HYSTEROSCOPY BIOPSY: CPT | Performed by: OBSTETRICS & GYNECOLOGY

## 2019-12-23 PROCEDURE — 25010000002 FENTANYL CITRATE (PF) 100 MCG/2ML SOLUTION: Performed by: NURSE ANESTHETIST, CERTIFIED REGISTERED

## 2019-12-23 PROCEDURE — 25010000002 PROPOFOL 10 MG/ML EMULSION: Performed by: NURSE ANESTHETIST, CERTIFIED REGISTERED

## 2019-12-23 PROCEDURE — 86900 BLOOD TYPING SEROLOGIC ABO: CPT | Performed by: OBSTETRICS & GYNECOLOGY

## 2019-12-23 PROCEDURE — 25010000002 DEXAMETHASONE PER 1 MG: Performed by: NURSE ANESTHETIST, CERTIFIED REGISTERED

## 2019-12-23 PROCEDURE — 25010000002 DEXAMETHASONE PER 1 MG: Performed by: ANESTHESIOLOGY

## 2019-12-23 PROCEDURE — 86901 BLOOD TYPING SEROLOGIC RH(D): CPT | Performed by: OBSTETRICS & GYNECOLOGY

## 2019-12-23 PROCEDURE — 88305 TISSUE EXAM BY PATHOLOGIST: CPT | Performed by: OBSTETRICS & GYNECOLOGY

## 2019-12-23 RX ORDER — FENTANYL CITRATE 50 UG/ML
25 INJECTION, SOLUTION INTRAMUSCULAR; INTRAVENOUS AS NEEDED
Status: DISCONTINUED | OUTPATIENT
Start: 2019-12-23 | End: 2019-12-23 | Stop reason: HOSPADM

## 2019-12-23 RX ORDER — SODIUM CHLORIDE 9 MG/ML
INJECTION, SOLUTION INTRAVENOUS AS NEEDED
Status: DISCONTINUED | OUTPATIENT
Start: 2019-12-23 | End: 2019-12-23 | Stop reason: HOSPADM

## 2019-12-23 RX ORDER — SODIUM CHLORIDE 0.9 % (FLUSH) 0.9 %
3-10 SYRINGE (ML) INJECTION AS NEEDED
Status: DISCONTINUED | OUTPATIENT
Start: 2019-12-23 | End: 2019-12-23 | Stop reason: HOSPADM

## 2019-12-23 RX ORDER — GABAPENTIN 100 MG/1
100 CAPSULE ORAL
COMMUNITY

## 2019-12-23 RX ORDER — FENTANYL CITRATE 50 UG/ML
INJECTION, SOLUTION INTRAMUSCULAR; INTRAVENOUS AS NEEDED
Status: DISCONTINUED | OUTPATIENT
Start: 2019-12-23 | End: 2019-12-23 | Stop reason: SURG

## 2019-12-23 RX ORDER — CODEINE SULFATE 30 MG/1
15 TABLET ORAL EVERY 4 HOURS PRN
Status: DISCONTINUED | OUTPATIENT
Start: 2019-12-23 | End: 2019-12-23 | Stop reason: HOSPADM

## 2019-12-23 RX ORDER — OXYCODONE AND ACETAMINOPHEN 7.5; 325 MG/1; MG/1
1 TABLET ORAL EVERY 4 HOURS PRN
Status: CANCELLED | OUTPATIENT
Start: 2019-12-23 | End: 2020-01-02

## 2019-12-23 RX ORDER — ONDANSETRON 2 MG/ML
INJECTION INTRAMUSCULAR; INTRAVENOUS AS NEEDED
Status: DISCONTINUED | OUTPATIENT
Start: 2019-12-23 | End: 2019-12-23

## 2019-12-23 RX ORDER — DEXAMETHASONE SODIUM PHOSPHATE 4 MG/ML
4 INJECTION, SOLUTION INTRA-ARTICULAR; INTRALESIONAL; INTRAMUSCULAR; INTRAVENOUS; SOFT TISSUE ONCE AS NEEDED
Status: COMPLETED | OUTPATIENT
Start: 2019-12-23 | End: 2019-12-23

## 2019-12-23 RX ORDER — LABETALOL HYDROCHLORIDE 5 MG/ML
5 INJECTION, SOLUTION INTRAVENOUS
Status: DISCONTINUED | OUTPATIENT
Start: 2019-12-23 | End: 2019-12-23 | Stop reason: HOSPADM

## 2019-12-23 RX ORDER — SODIUM CHLORIDE 0.9 % (FLUSH) 0.9 %
3 SYRINGE (ML) INJECTION EVERY 12 HOURS SCHEDULED
Status: DISCONTINUED | OUTPATIENT
Start: 2019-12-23 | End: 2019-12-23 | Stop reason: HOSPADM

## 2019-12-23 RX ORDER — PROPOFOL 10 MG/ML
VIAL (ML) INTRAVENOUS AS NEEDED
Status: DISCONTINUED | OUTPATIENT
Start: 2019-12-23 | End: 2019-12-23 | Stop reason: SURG

## 2019-12-23 RX ORDER — CODEINE SULFATE 15 MG/1
15 TABLET ORAL EVERY 6 HOURS PRN
Qty: 8 TABLET | Refills: 0 | Status: SHIPPED | OUTPATIENT
Start: 2019-12-23 | End: 2023-01-11 | Stop reason: ALTCHOICE

## 2019-12-23 RX ORDER — SODIUM CHLORIDE, SODIUM LACTATE, POTASSIUM CHLORIDE, CALCIUM CHLORIDE 600; 310; 30; 20 MG/100ML; MG/100ML; MG/100ML; MG/100ML
1000 INJECTION, SOLUTION INTRAVENOUS CONTINUOUS
Status: DISCONTINUED | OUTPATIENT
Start: 2019-12-23 | End: 2019-12-23 | Stop reason: HOSPADM

## 2019-12-23 RX ORDER — FAMOTIDINE 10 MG/ML
20 INJECTION, SOLUTION INTRAVENOUS
Status: COMPLETED | OUTPATIENT
Start: 2019-12-23 | End: 2019-12-23

## 2019-12-23 RX ORDER — METOCLOPRAMIDE HYDROCHLORIDE 5 MG/ML
5 INJECTION INTRAMUSCULAR; INTRAVENOUS
Status: DISCONTINUED | OUTPATIENT
Start: 2019-12-23 | End: 2019-12-23 | Stop reason: HOSPADM

## 2019-12-23 RX ORDER — DEXAMETHASONE SODIUM PHOSPHATE 4 MG/ML
INJECTION, SOLUTION INTRA-ARTICULAR; INTRALESIONAL; INTRAMUSCULAR; INTRAVENOUS; SOFT TISSUE AS NEEDED
Status: DISCONTINUED | OUTPATIENT
Start: 2019-12-23 | End: 2019-12-23 | Stop reason: SURG

## 2019-12-23 RX ORDER — SODIUM CHLORIDE 0.9 % (FLUSH) 0.9 %
3 SYRINGE (ML) INJECTION AS NEEDED
Status: DISCONTINUED | OUTPATIENT
Start: 2019-12-23 | End: 2019-12-23 | Stop reason: HOSPADM

## 2019-12-23 RX ORDER — IPRATROPIUM BROMIDE AND ALBUTEROL SULFATE 2.5; .5 MG/3ML; MG/3ML
3 SOLUTION RESPIRATORY (INHALATION) ONCE AS NEEDED
Status: DISCONTINUED | OUTPATIENT
Start: 2019-12-23 | End: 2019-12-23 | Stop reason: HOSPADM

## 2019-12-23 RX ORDER — NALOXONE HCL 0.4 MG/ML
0.4 VIAL (ML) INJECTION AS NEEDED
Status: DISCONTINUED | OUTPATIENT
Start: 2019-12-23 | End: 2019-12-23 | Stop reason: HOSPADM

## 2019-12-23 RX ORDER — SODIUM CHLORIDE, SODIUM LACTATE, POTASSIUM CHLORIDE, CALCIUM CHLORIDE 600; 310; 30; 20 MG/100ML; MG/100ML; MG/100ML; MG/100ML
100 INJECTION, SOLUTION INTRAVENOUS CONTINUOUS
Status: DISCONTINUED | OUTPATIENT
Start: 2019-12-23 | End: 2019-12-23 | Stop reason: HOSPADM

## 2019-12-23 RX ORDER — OXYCODONE HYDROCHLORIDE AND ACETAMINOPHEN 5; 325 MG/1; MG/1
1 TABLET ORAL ONCE AS NEEDED
Status: DISCONTINUED | OUTPATIENT
Start: 2019-12-23 | End: 2019-12-23 | Stop reason: HOSPADM

## 2019-12-23 RX ADMIN — DEXAMETHASONE SODIUM PHOSPHATE 4 MG: 4 INJECTION, SOLUTION INTRAMUSCULAR; INTRAVENOUS at 11:59

## 2019-12-23 RX ADMIN — FENTANYL CITRATE 100 MCG: 50 INJECTION, SOLUTION INTRAMUSCULAR; INTRAVENOUS at 12:20

## 2019-12-23 RX ADMIN — SODIUM CHLORIDE, POTASSIUM CHLORIDE, SODIUM LACTATE AND CALCIUM CHLORIDE 1000 ML: 600; 310; 30; 20 INJECTION, SOLUTION INTRAVENOUS at 11:21

## 2019-12-23 RX ADMIN — PROPOFOL 80 MG: 10 INJECTION, EMULSION INTRAVENOUS at 12:20

## 2019-12-23 RX ADMIN — DEXAMETHASONE SODIUM PHOSPHATE 4 MG: 4 INJECTION, SOLUTION INTRAMUSCULAR; INTRAVENOUS at 12:25

## 2019-12-23 RX ADMIN — FAMOTIDINE 20 MG: 10 INJECTION, SOLUTION INTRAVENOUS at 11:59

## 2019-12-23 NOTE — INTERVAL H&P NOTE
"H&P reviewed. The patient was examined and there are no changes to the H&P.  Daughter reports patient still having daily bleeding that creates a half-dollar sized stain on her pad.    Patient also still having a lot of pain that radiates from low back down in to her L leg.  She was just started on Neurontin for this last Thursday by Dr. Cobos.  In addition, the patient has \"internal knots\" down the lateral aspect of her L shin and a tight feeling across the top of her foot \"like her shoelace is too tight\" even though she is not wearing shoes.  These sensations are both new in just the past two days.  "

## 2019-12-23 NOTE — DISCHARGE INSTRUCTIONS

## 2019-12-23 NOTE — ANESTHESIA PREPROCEDURE EVALUATION
" Anesthesia Evaluation     no history of anesthetic complications:  NPO Solid Status: > 8 hours  NPO Liquid Status: > 8 hours           Airway   Mallampati: III  TM distance: >3 FB  Neck ROM: full  No difficulty expected  Dental          Pulmonary    Cardiovascular   Exercise tolerance: poor (<4 METS)    ECG reviewed  Patient on routine beta blocker and Beta blocker given within 24 hours of surgery    (+) hypertension, dysrhythmias (daughter reports frequent \"vagal\" response),       Neuro/Psych  (+) seizures well controlled, TIA, CVA, weakness,     GI/Hepatic/Renal/Endo    (-) liver disease, no renal disease, diabetes    Musculoskeletal         ROS comment: Recent fall, seen at [facility], no acute injury, per records she has severe pain, daughter reports mri showed sciatica. She was recently started on gabapentin 1 dose nightly.   Abdominal    Substance History      OB/GYN          Other   arthritis,          Phys Exam Other: Pt not cooperative with airway exam                Anesthesia Plan    ASA 3     general     intravenous induction     Anesthetic plan, all risks, benefits, and alternatives have been provided, discussed and informed consent has been obtained with: patient.      "

## 2019-12-23 NOTE — ANESTHESIA PROCEDURE NOTES
Airway  Date/Time: 12/23/2019 12:21 PM    General Information and Staff    CRNA: Elias Fatima CRNA    Indications and Patient Condition  Indications for airway management: CNS depression    Preoxygenated: yes  Mask difficulty assessment: 0 - not attempted    Final Airway Details  Final airway type: supraglottic airway      Successful airway: unique  Size 3    Number of attempts at approach: 1  Assessment: lips, teeth, and gum same as pre-op and atraumatic intubation    Additional Comments  Atraumatic Insertion

## 2019-12-23 NOTE — OP NOTE
[facility]  Bel Powell  1935  [id number]  [id number]  12/23/2019      Pre-operative Diagnosis: Thickened endometrial lining and Postmenopausal Vaginal Bleeding    Post-operative Diagnosis: Thickened endometrial lining and Postmenopausal Vaginal Bleeding    Operation: Diagnostic Hysterscopy, Endocervical Currettage and Endometrial Currettage    Surgeon: Lynnette Andujar MD, FACOG    Assistants: OR staff    Anesthesia: General endotracheal anesthesia    Findings: Minimal uterine descent and atrphic cervix, mild urethral prolapse, and endometrial cavity full of proliferative tissue.  Tubal ostia could not be identified.  Cavity enlarged.    Estimated Blood Loss: minimal      Specimens: Endocervical currettings and Endometrial currettings    Complications:  None    Disposition: PACU - hemodynamically stable.      Procedure Details      After consents were obtained the patient was taken to the operating room, where general anesthesia was administered. She was placed in dorsal lithotomy position, with care taken in placement of legs to avoid nerve injury. She is prepped and draped in the usual sterile fashion.  An In-and-out catheter was performed for bladder for decompression. The cervix  was identified and grasped with a single-tooth tenaculum. Please see comments above regarding details of the exam.   Endocervical curettage was performed sharply and specimen sent separately. The cervix was then gradually and gently dilated with serial Heather dilators to allow introduction of the hysteroscope.   The above findings were noted.  The uterus was grossly enlarged in size and completely full of proliferative tissue.  Neither tubal ostia could ever be identified.   Sharp curettage was then performed until a good uterine cry was noted throughout.     The patient tolerated the procedure well. Instrument counts are correct. She was extubated, awakened, and taken to recovery room in stable condition, with  instructions for discharge and a script for pain medication.       Lynnette Andujar MD  12/23/2019  12:51 PM

## 2019-12-24 LAB
CYTO UR: NORMAL
LAB AP CASE REPORT: NORMAL
PATH REPORT.FINAL DX SPEC: NORMAL
PATH REPORT.GROSS SPEC: NORMAL

## 2019-12-24 NOTE — ANESTHESIA POSTPROCEDURE EVALUATION
"Patient: Bel Powell    Procedure Summary     Date:  12/23/19 Room / Location:   [facility] 14 /  [facility]    Anesthesia Start:  1218 Anesthesia Stop:  1257    Procedure:  DILATATION AND CURETTAGE HYSTEROSCOPY (N/A Uterus) Diagnosis:       Endometrial thickening on ultrasound      PMB (postmenopausal bleeding)      (Endometrial thickening on ultrasound [R93.89])      (PMB (postmenopausal bleeding) [N95.0])    Surgeon:  Lynnette Andujar MD Provider:  Elias Fatima CRNA    Anesthesia Type:  general ASA Status:  3          Anesthesia Type: general    Vitals  Vitals Value Taken Time   /70 12/23/2019  1:25 PM   Temp 98 °F (36.7 °C) 12/23/2019  1:25 PM   Pulse 61 12/23/2019  1:28 PM   Resp 14 12/23/2019  1:25 PM   SpO2 94 % 12/23/2019  1:28 PM   Vitals shown include unvalidated device data.        Post Anesthesia Care and Evaluation    Patient location during evaluation: PACU  Patient participation: complete - patient participated  Level of consciousness: awake and alert  Pain management: adequate  Airway patency: patent  Anesthetic complications: No anesthetic complications    Cardiovascular status: acceptable  Respiratory status: acceptable  Hydration status: acceptable    Comments: Blood pressure 136/73, pulse 64, temperature 98 °F (36.7 °C), temperature source Temporal, resp. rate 16, height 150 cm (59.06\"), weight 54.9 kg (121 lb 0.5 oz), SpO2 93 %, not currently breastfeeding.    Pt discharged from PACU based on timur score >8      "

## 2019-12-26 ENCOUNTER — TELEPHONE (OUTPATIENT)
Dept: OBSTETRICS AND GYNECOLOGY | Facility: CLINIC | Age: 84
End: 2019-12-26

## 2019-12-26 ASSESSMENT — ENCOUNTER SYMPTOMS
VOMITING: 0
BACK PAIN: 1
SHORTNESS OF BREATH: 0
ABDOMINAL PAIN: 0

## 2019-12-27 ENCOUNTER — NURSE ONLY (OUTPATIENT)
Dept: UROLOGY | Age: 84
End: 2019-12-27
Payer: MEDICARE

## 2019-12-27 ENCOUNTER — TELEPHONE (OUTPATIENT)
Dept: UROLOGY | Age: 84
End: 2019-12-27

## 2019-12-27 DIAGNOSIS — N39.46 MIXED STRESS AND URGE URINARY INCONTINENCE: Primary | ICD-10-CM

## 2019-12-27 DIAGNOSIS — C55 ENDOMETRIOID ADENOCARCINOMA OF UTERUS (HCC): Primary | ICD-10-CM

## 2019-12-27 PROCEDURE — 64566 NEUROELTRD STIM POST TIBIAL: CPT | Performed by: NURSE PRACTITIONER

## 2019-12-27 ASSESSMENT — ENCOUNTER SYMPTOMS
CONSTIPATION: 0
BACK PAIN: 0
DIARRHEA: 0
SHORTNESS OF BREATH: 0
EYE REDNESS: 0
WHEEZING: 0
COUGH: 0
ABDOMINAL PAIN: 0
EYE DISCHARGE: 0

## 2020-01-07 ENCOUNTER — OFFICE VISIT (OUTPATIENT)
Dept: OBSTETRICS AND GYNECOLOGY | Facility: CLINIC | Age: 85
End: 2020-01-07

## 2020-01-07 VITALS
DIASTOLIC BLOOD PRESSURE: 52 MMHG | HEIGHT: 61 IN | BODY MASS INDEX: 22.84 KG/M2 | SYSTOLIC BLOOD PRESSURE: 118 MMHG | WEIGHT: 121 LBS

## 2020-01-07 DIAGNOSIS — C55 ENDOMETRIOID ADENOCARCINOMA OF UTERUS (HCC): Primary | ICD-10-CM

## 2020-01-07 DIAGNOSIS — Z78.9 NON-SMOKER: ICD-10-CM

## 2020-01-07 DIAGNOSIS — Z09 S/P GYNECOLOGICAL SURGERY, FOLLOW-UP EXAM: ICD-10-CM

## 2020-01-07 PROCEDURE — 99024 POSTOP FOLLOW-UP VISIT: CPT | Performed by: OBSTETRICS & GYNECOLOGY

## 2020-01-07 NOTE — PROGRESS NOTES
"Subjective   Chief Complaint   Patient presents with   • Post-op     Patient here 2 weeks post op D&C     Bel Powell is a 84 y.o. year old No obstetric history on file. presenting to be seen for her post-operative visit.  She had a hysteroscopy/D&C 2 weeks ago.  Currently she reports that she never really had pain after surgery.  She is having scant spotting when she wipes, but it is only pale pink.  For the first few days after surgery her bleeding was a little heavier than it had been previously.    Recent pathology reviewed:  Final Diagnosis   1.  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     2.  Cervix, endocervical curettings:  Benign endocervical glands.     Electronically signed by Medhat Vargas MD on 12/24/2019 at 0939       No Additional Complaints Reported    The following portions of the patient's history were reviewed and updated as appropriate:current medications and allergies    Review of Systems      Objective   /52   Ht 154.9 cm (61\")   Wt 54.9 kg (121 lb)   Breastfeeding No   BMI 22.86 kg/m²     General:  well developed; well nourished  no acute distress   Abdomen: soft, non-tender; no masses   Pelvis: Not performed.          Assessment   1. Pt is 2 weeks s/p hysteroscopy/D&C  2. New diagnosis of endometrial cancer     Plan   1. Patient offered Megace vs. Consultation with Gyn Oncology.  She would like to have consultation and appointment has been made for Thursday.    No orders of the defined types were placed in this encounter.         This note was electronically signed.    Lynnette Andujar MD  January 7, 2020  "

## 2020-01-08 ENCOUNTER — TELEPHONE (OUTPATIENT)
Dept: GASTROENTEROLOGY | Age: 85
End: 2020-01-08

## 2020-01-09 ENCOUNTER — TELEPHONE (OUTPATIENT)
Dept: NEUROLOGY | Age: 85
End: 2020-01-09

## 2020-01-09 NOTE — TELEPHONE ENCOUNTER
Thank you Serg for calling this patient for me yesterday, I greatly appreciate this, your awesome.  [facility]

## 2020-01-09 NOTE — TELEPHONE ENCOUNTER
Dr. Niurka Glez Oncologist from [place] called and left message stating that the patient needs surgery but given her past neurological history he wanted your professional opinion. He would like a call from you at his personal cell phone number [phone]. *He will be in the operating room tomorrow 1/10/20. Please advise.

## 2020-01-20 ENCOUNTER — NURSE ONLY (OUTPATIENT)
Dept: UROLOGY | Age: 85
End: 2020-01-20
Payer: MEDICARE

## 2020-01-20 PROCEDURE — 64566 NEUROELTRD STIM POST TIBIAL: CPT | Performed by: PHYSICIAN ASSISTANT

## 2020-01-20 ASSESSMENT — ENCOUNTER SYMPTOMS
COUGH: 0
SHORTNESS OF BREATH: 0
EYE REDNESS: 0
BACK PAIN: 0
CONSTIPATION: 0
WHEEZING: 0
EYE DISCHARGE: 0
ABDOMINAL PAIN: 0
DIARRHEA: 0

## 2020-01-20 NOTE — PROGRESS NOTES
vasu, adn cataract with IOL b/l    LUMBAR FUSION      2012 90 days after the spine surgeries    SPINE SURGERY      L3,4, and 5   done in 2012   Nay Haskins      as a child at age 9    [address]  08/28/2003    DR Marianne Villalobos:  Duodenal scarring but no evidence of active ulcer disease. Current Outpatient Medications   Medication Sig Dispense Refill    levETIRAcetam (KEPPRA) 500 MG tablet Take 1 tablet by mouth 2 times daily 60 tablet 5    gabapentin (NEURONTIN) 100 MG capsule Take 2 capsules by mouth 2 times daily for 180 days. Intended supply: 30 days 120 capsule 5    diclofenac sodium 1 % GEL Apply 2 g topically 4 times daily as needed for Pain      diclofenac 2 % SOLN Place onto the skin 4 times daily as needed      traMADol (ULTRAM) 50 MG tablet Take 25 mg by mouth 2 times daily.  sodium chloride 1 g tablet Take 1 g by mouth 3 times daily      lidocaine 4 % external patch Place 1 patch onto the skin daily as needed (LEFT LOWER BACK PAIN)      NONFORMULARY Indications: dic 3%, lidocaine 2%, cyclo 2%, prilo 2% baclo 2% 1 to 2 pumps up to TID PRN       desonide (DESOWEN) 0.05 % cream Apply topically as needed Apply topically 2 times daily.  mupirocin (BACTROBAN) 2 % cream Apply topically as needed Apply topically 3 times daily.       dipyridamole (PERSANTINE) 50 MG tablet Take 2 tablets by mouth 2 times daily 120 tablet 5    valACYclovir (VALTREX) 1 g tablet as needed       Psyllium (METAMUCIL FIBER PO) Take by mouth nightly       tiotropium (SPIRIVA) 18 MCG inhalation capsule Inhale 18 mcg into the lungs nightly      simethicone (PHAZYME) 125 MG chewable tablet Take 125 mg by mouth 3 times daily (before meals) Takes 2 tabs every am then as needed      nitrofurantoin (MACRODANTIN) 50 MG capsule TAKE 1 CAPSULE AT BEDTIME      azelastine (ASTELIN) 0.1 % nasal spray 2 sprays by Nasal route 2 times daily       Cholecalciferol (VITAMIN D3) for Constipation       Omega 3-6-9 Fatty Acids (OMEGA 3-6-9 COMPLEX PO) Take 2 tablets by mouth daily Indications: AT 2:30 PM       alendronate (FOSAMAX) 70 MG tablet Take 70 mg by mouth every 7 days Wednesday at [address] 5 MG tablet Take 5 mg by mouth nightly Indications: nightly       pravastatin (PRAVACHOL) 20 MG tablet Take 20 mg by mouth every morning       ramipril (ALTACE) 10 MG capsule Take 10 mg by mouth 2 times daily       b complex vitamins capsule Take 1 capsule by mouth daily Indications: AT 2:30 PM B 100      Multiple Vitamins-Minerals (THERAPEUTIC MULTIVITAMIN-MINERALS) tablet Take 1 tablet by mouth daily Indications: AT 2:30 PM       levETIRAcetam (KEPPRA) 750 MG tablet Take 1 tablet by mouth 2 times daily 60 tablet 0    furosemide (LASIX) 20 MG tablet Take 0.5 tablets by mouth daily (Patient taking differently: Take 10 mg by mouth daily M, W, F only) 15 tablet 0     No current facility-administered medications for this visit.         Allergies   Allergen Reactions    Aspirin Shortness Of Breath    Dilaudid [Hydromorphone Hcl]     Ondansetron     Quinolones     Spironolactone      Low sodium    Sulfa Antibiotics     Codeine Rash and Other (See Comments)     Makes her \"crazy and mean\" and gives her a rash    Demerol Hcl [Meperidine] Other (See Comments)     Made her \"crazy and mean\", and \"loopy\"    Levaquin [Levofloxacin In D5w] Other (See Comments)     Seizure like activity      Myrbetriq [Mirabegron] Other (See Comments)     Unable to micturate    Namenda [Memantine Hcl] Other (See Comments)     made her \"loopy\"    Norco [Hydrocodone-Acetaminophen] Rash and Other (See Comments)     \"crazy and mean\"    Pneumococcal Vaccines Swelling          Social History     Socioeconomic History    Marital status:      Spouse name: None    Number of children: 3    Years of education: None    Highest education level: None   Occupational History    Occupation: retired Negative for congestion and hearing loss. Eyes: Negative for discharge and redness. Respiratory: Negative for cough, shortness of breath and wheezing. Cardiovascular: Negative for leg swelling. Gastrointestinal: Negative for abdominal pain, constipation and diarrhea. Endocrine: Negative for cold intolerance and heat intolerance. Genitourinary: Negative for decreased urine volume, difficulty urinating, dysuria, enuresis, flank pain, frequency, genital sores, hematuria and urgency. Musculoskeletal: Negative for back pain and gait problem. Skin: Negative for rash. Allergic/Immunologic: Negative for environmental allergies. Neurological: Negative for dizziness, weakness and headaches. Hematological: Does not bruise/bleed easily. Psychiatric/Behavioral: Negative for behavioral problems, confusion and sleep disturbance. DATA:  U/A:    Lab Results   Component Value Date    NITRITE neg 05/15/2019    COLORU YELLOW 10/28/2019    PROTEINU Negative 10/28/2019    PHUR 5.0 10/28/2019    LABCAST 3-5 WBC 07/26/2016    WBCUA 69 09/03/2019    RBCUA 5 09/03/2019    BACTERIA NEGATIVE 09/03/2019    CLARITYU Clear 10/28/2019    SPECGRAV 1.013 10/28/2019    LEUKOCYTESUR Negative 10/28/2019    UROBILINOGEN 0.2 10/28/2019    BILIRUBINUR Negative 10/28/2019    BILIRUBINUR 0 05/15/2019    BLOODU Negative 10/28/2019    GLUCOSEU Negative 10/28/2019           1. Mixed stress and urge urinary incontinence  Patient here for maintenance uro-plasty treatment. His treatments every 24th day due to the fact if she waits a whole month her symptoms return or get worse. She has continued good response to the treatments. - [place] POST TIBIAL NEUROSTIMULATION,PERC NEEDLE ELECTRODE      No orders of the defined types were placed in this encounter. No orders of the defined types were placed in this encounter. Plan:  Follow up for next treatment.

## 2020-01-20 NOTE — TELEPHONE ENCOUNTER
Letter done as daughter requested. I have no records regarding what was diagnosed, what surgery is planned. .. I have no information on what the concern is, but I can guess.

## 2020-01-23 ENCOUNTER — TELEPHONE (OUTPATIENT)
Dept: NEUROLOGY | Age: 85
End: 2020-01-23

## 2020-01-24 ENCOUNTER — TELEPHONE (OUTPATIENT)
Dept: NEUROLOGY | Age: 85
End: 2020-01-24

## 2020-01-24 NOTE — TELEPHONE ENCOUNTER
Spoke to patients daughter Gianna Lew about stopping dipyridamole for surgery on Wednesday 1-29-20. She was worried about mom stopping the medication, explained that Dr Heaven Menard is OK with her holding the med for a short period of time. Explained that the benefit of surgery outweighs the risk of stopping the medication. She was happy with that.

## 2020-02-11 ENCOUNTER — NURSE ONLY (OUTPATIENT)
Dept: UROLOGY | Age: 85
End: 2020-02-11
Payer: MEDICARE

## 2020-02-11 VITALS — TEMPERATURE: 96.7 F

## 2020-02-11 PROCEDURE — P9612 CATHETERIZE FOR URINE SPEC: HCPCS | Performed by: NURSE PRACTITIONER

## 2020-02-11 NOTE — PROGRESS NOTES
Pt came in to office today for a sterile cath urine culture ,  Using a sterile technique we insert red rubber cath to obtain urine for culture  Pt tolerated procedure well and will follow up  Pending culture

## 2020-02-13 LAB — URINE CULTURE, ROUTINE: NORMAL

## 2020-02-14 ENCOUNTER — NURSE ONLY (OUTPATIENT)
Dept: UROLOGY | Age: 85
End: 2020-02-14
Payer: MEDICARE

## 2020-02-14 PROCEDURE — 64566 NEUROELTRD STIM POST TIBIAL: CPT | Performed by: PHYSICIAN ASSISTANT

## 2020-02-14 ASSESSMENT — ENCOUNTER SYMPTOMS
SINUS PAIN: 0
EYE PAIN: 0
BACK PAIN: 0
VOMITING: 0
ABDOMINAL PAIN: 0
WHEEZING: 0
SHORTNESS OF BREATH: 0

## 2020-02-14 NOTE — PROGRESS NOTES
tobramycin-dexamethasone (TOBRADEX) 0.3-0.1 % ophthalmic ointment Place into the left eye as needed 3 times daily.  Ascorbic Acid (VITAMIN C PO) Take 500 mg by mouth every 12 hours Breakfast and lunch       Lutein 20 MG TABS Take 20 mg by mouth daily Indications: 2:30 PM DAILY       fluticasone (VERAMYST) 27.5 MCG/SPRAY nasal spray 2 sprays by Nasal route nightly       amLODIPine (NORVASC) 10 MG tablet Take 5 mg by mouth 2 times daily 5mg in the morning and 2.5 mg at 7pm      camphor-menthol (SARNA) 0.5-0.5 % lotion Apply 0.5 mLs topically 2 times daily as needed for Itching Apply topically as needed.       butalbital-acetaminophen-caffeine (ESGIC PLUS) -40 MG per tablet Take 1 tablet by mouth every 6 hours as needed for Headaches 15 tablet 0    acetaminophen (TYLENOL) 500 MG tablet Take 2 tablets by mouth every 6 hours as needed for Pain Do not exceed 4 g of acetaminophen daily (patient is also on Fioricet PRN) 100 tablet 0    cloNIDine (CATAPRES) 0.1 MG tablet Take 1 tablet by mouth 2 times daily (Patient taking differently: Take 0.1 mg by mouth as needed for High Blood Pressure (for BP greater than 160/100) ) 60 tablet 0    donepezil (ARICEPT) 10 MG tablet Take 1 tablet by mouth nightly 30 tablet 0    Calcium Carb-Cholecalciferol (CALCIUM 600+D) 600-800 MG-UNIT TABS Take 1 tablet by mouth daily Indications: AT 2:30 PM       promethazine (PHENERGAN) 25 MG tablet Take 25 mg by mouth 3 times daily as needed for Nausea      docusate sodium (COLACE) 100 MG capsule Take 100 mg by mouth nightly as needed for Constipation       Omega 3-6-9 Fatty Acids (OMEGA 3-6-9 COMPLEX PO) Take 2 tablets by mouth daily Indications: AT 2:30 PM       alendronate (FOSAMAX) 70 MG tablet Take 70 mg by mouth every 7 days Wednesday at [address] [facility] 5 MG tablet Take 5 mg by mouth nightly Indications: nightly       pravastatin (PRAVACHOL) 20 MG tablet Take 20 mg by mouth every morning       ramipril (ALTACE) 10 for rash. Allergic/Immunologic: Negative for environmental allergies. Neurological: Negative for tremors. Psychiatric/Behavioral: Negative for hallucinations. The patient is not nervous/anxious. Procedure:  1. Patient is seated with the right treatment leg elevated. 2. 34 gauge fine needle electrode is inserted into lower inner aspect of the leg. Slightly cephalad to the medial malleolus. 3. Surface electrode pad is placed over the medial aspect of the calcaneus on the same leg. 4.Needle electrode is connected to the external pulse generator. 5.The pulse generator is turned on and the millivolts used are 3. 6.Patient is treated for 30 minutes. 7.The needle and pad are removed. PHYSICAL EXAM:  There were no vitals taken for this visit. Physical Exam  Constitutional:       General: She is not in acute distress. Appearance: Normal appearance. She is not ill-appearing. Neurological:      Mental Status: She is alert. 1. Mixed stress and urge urinary incontinence  Patient who has failed on oral medications and had been responding well to maintenance uro-plasty treatments which he does every 24 days. Last few weeks he has had significant worsening of symptoms particularly urge incontinence. This has occurred since hysterectomy she had done due to diagnosis of uterine cancer. Hopefully her symptoms will improve. No orders of the defined types were placed in this encounter. No orders of the defined types were placed in this encounter. Plan:  Follow up in 24 days for next treatment.

## 2020-02-14 NOTE — PROGRESS NOTES
Subjective:      Patient ID: Sofia Larsen is a 80 y.o. female.     HPI    Review of Systems    Objective:   Physical Exam    Assessment:      ***      Plan:      ***        Daija Esquivel PA-C

## 2020-02-23 ENCOUNTER — HOSPITAL ENCOUNTER (EMERGENCY)
Age: 85
Discharge: HOME | End: 2020-02-24
Payer: MEDICARE

## 2020-02-23 ENCOUNTER — APPOINTMENT (OUTPATIENT)
Dept: GENERAL RADIOLOGY | Age: 85
End: 2020-02-23
Payer: MEDICARE

## 2020-02-23 LAB
ALBUMIN SERPL-MCNC: 4.2 G/DL (ref 3.5–5.2)
ALP BLD-CCNC: 85 U/L (ref 35–104)
ALT SERPL-CCNC: 14 U/L (ref 5–33)
ANION GAP SERPL CALCULATED.3IONS-SCNC: 13 MMOL/L (ref 7–19)
APTT: 29.6 SEC (ref 26–36.2)
AST SERPL-CCNC: 17 U/L (ref 5–32)
BASOPHILS ABSOLUTE: 0 K/UL (ref 0–0.2)
BASOPHILS RELATIVE PERCENT: 0.3 % (ref 0–1)
BILIRUB SERPL-MCNC: 0.4 MG/DL (ref 0.2–1.2)
BILIRUBIN URINE: NEGATIVE
BLOOD, URINE: NEGATIVE
BUN BLDV-MCNC: 30 MG/DL (ref 8–23)
CALCIUM SERPL-MCNC: 10 MG/DL (ref 8.8–10.2)
CHLORIDE BLD-SCNC: 100 MMOL/L (ref 98–111)
CLARITY: CLEAR
CO2: 24 MMOL/L (ref 22–29)
COLOR: NORMAL
CREAT SERPL-MCNC: 0.9 MG/DL (ref 0.5–0.9)
EOSINOPHILS ABSOLUTE: 0.1 K/UL (ref 0–0.6)
EOSINOPHILS RELATIVE PERCENT: 1.1 % (ref 0–5)
GFR NON-AFRICAN AMERICAN: 60
GLUCOSE BLD-MCNC: 123 MG/DL (ref 74–109)
GLUCOSE URINE: NEGATIVE MG/DL
HCT VFR BLD CALC: 41.1 % (ref 37–47)
HEMOGLOBIN: 13.1 G/DL (ref 12–16)
IMMATURE GRANULOCYTES #: 0 K/UL
INR BLD: 0.94 (ref 0.88–1.18)
KETONES, URINE: NEGATIVE MG/DL
LACTIC ACID: 1.4 MMOL/L (ref 0.5–1.9)
LEUKOCYTE ESTERASE, URINE: NEGATIVE
LIPASE: 26 U/L (ref 13–60)
LYMPHOCYTES ABSOLUTE: 0.4 K/UL (ref 1.1–4.5)
LYMPHOCYTES RELATIVE PERCENT: 5.2 % (ref 20–40)
MCH RBC QN AUTO: 31.6 PG (ref 27–31)
MCHC RBC AUTO-ENTMCNC: 31.9 G/DL (ref 33–37)
MCV RBC AUTO: 99.3 FL (ref 81–99)
MONOCYTES ABSOLUTE: 0.2 K/UL (ref 0–0.9)
MONOCYTES RELATIVE PERCENT: 3.2 % (ref 0–10)
NEUTROPHILS ABSOLUTE: 6.4 K/UL (ref 1.5–7.5)
NEUTROPHILS RELATIVE PERCENT: 89.9 % (ref 50–65)
NITRITE, URINE: NEGATIVE
PDW BLD-RTO: 15.2 % (ref 11.5–14.5)
PH UA: 5.5 (ref 5–8)
PLATELET # BLD: 219 K/UL (ref 130–400)
PMV BLD AUTO: 10.8 FL (ref 9.4–12.3)
POTASSIUM REFLEX MAGNESIUM: 3.8 MMOL/L (ref 3.5–5)
PRO-BNP: 305 PG/ML (ref 0–1800)
PROTEIN UA: NEGATIVE MG/DL
PROTHROMBIN TIME: 12.5 SEC (ref 12–14.6)
RBC # BLD: 4.14 M/UL (ref 4.2–5.4)
SODIUM BLD-SCNC: 137 MMOL/L (ref 136–145)
SPECIFIC GRAVITY UA: 1.03 (ref 1–1.03)
TOTAL PROTEIN: 6.8 G/DL (ref 6.6–8.7)
TROPONIN: <0.01 NG/ML (ref 0–0.03)
URINE REFLEX TO CULTURE: NORMAL
UROBILINOGEN, URINE: 0.2 E.U./DL
WBC # BLD: 7.1 K/UL (ref 4.8–10.8)

## 2020-02-23 PROCEDURE — 99284 EMERGENCY DEPT VISIT MOD MDM: CPT

## 2020-02-23 PROCEDURE — 71045 X-RAY EXAM CHEST 1 VIEW: CPT

## 2020-02-23 PROCEDURE — 93005 ELECTROCARDIOGRAM TRACING: CPT | Performed by: NURSE PRACTITIONER

## 2020-02-23 RX ORDER — AMLODIPINE BESYLATE 2.5 MG/1
2.5 TABLET ORAL NIGHTLY
COMMUNITY
End: 2020-09-02

## 2020-02-23 RX ORDER — AMLODIPINE BESYLATE 5 MG/1
5 TABLET ORAL 2 TIMES DAILY
Status: ON HOLD | COMMUNITY
End: 2022-10-31 | Stop reason: ALTCHOICE

## 2020-02-23 RX ORDER — BUTALBITAL, ACETAMINOPHEN AND CAFFEINE 50; 325; 40 MG/1; MG/1; MG/1
1 TABLET ORAL EVERY 6 HOURS PRN
COMMUNITY

## 2020-02-23 ASSESSMENT — PAIN SCALES - GENERAL: PAINLEVEL_OUTOF10: 6

## 2020-02-23 ASSESSMENT — PAIN DESCRIPTION - LOCATION: LOCATION: ABDOMEN

## 2020-02-24 ENCOUNTER — APPOINTMENT (OUTPATIENT)
Dept: CT IMAGING | Age: 85
End: 2020-02-24
Payer: MEDICARE

## 2020-02-24 VITALS
DIASTOLIC BLOOD PRESSURE: 78 MMHG | TEMPERATURE: 98 F | WEIGHT: 122 LBS | SYSTOLIC BLOOD PRESSURE: 148 MMHG | BODY MASS INDEX: 23.03 KG/M2 | OXYGEN SATURATION: 98 % | HEART RATE: 78 BPM | HEIGHT: 61 IN | RESPIRATION RATE: 20 BRPM

## 2020-02-24 LAB
EKG P AXIS: 71 DEGREES
EKG P-R INTERVAL: 160 MS
EKG Q-T INTERVAL: 366 MS
EKG QRS DURATION: 86 MS
EKG QTC CALCULATION (BAZETT): 408 MS
EKG T AXIS: 45 DEGREES

## 2020-02-24 PROCEDURE — 83605 ASSAY OF LACTIC ACID: CPT

## 2020-02-24 PROCEDURE — 85610 PROTHROMBIN TIME: CPT

## 2020-02-24 PROCEDURE — 83880 ASSAY OF NATRIURETIC PEPTIDE: CPT

## 2020-02-24 PROCEDURE — 85730 THROMBOPLASTIN TIME PARTIAL: CPT

## 2020-02-24 PROCEDURE — 6360000002 HC RX W HCPCS: Performed by: EMERGENCY MEDICINE

## 2020-02-24 PROCEDURE — 93010 ELECTROCARDIOGRAM REPORT: CPT | Performed by: INTERNAL MEDICINE

## 2020-02-24 PROCEDURE — 83690 ASSAY OF LIPASE: CPT

## 2020-02-24 PROCEDURE — 6360000004 HC RX CONTRAST MEDICATION: Performed by: NURSE PRACTITIONER

## 2020-02-24 PROCEDURE — 96374 THER/PROPH/DIAG INJ IV PUSH: CPT

## 2020-02-24 PROCEDURE — 36415 COLL VENOUS BLD VENIPUNCTURE: CPT

## 2020-02-24 PROCEDURE — 85025 COMPLETE CBC W/AUTO DIFF WBC: CPT

## 2020-02-24 PROCEDURE — 84484 ASSAY OF TROPONIN QUANT: CPT

## 2020-02-24 PROCEDURE — 80053 COMPREHEN METABOLIC PANEL: CPT

## 2020-02-24 PROCEDURE — 74177 CT ABD & PELVIS W/CONTRAST: CPT

## 2020-02-24 PROCEDURE — 81003 URINALYSIS AUTO W/O SCOPE: CPT

## 2020-02-24 RX ORDER — ONDANSETRON 2 MG/ML
INJECTION INTRAMUSCULAR; INTRAVENOUS
Status: DISCONTINUED
Start: 2020-02-24 | End: 2020-02-24 | Stop reason: WASHOUT

## 2020-02-24 RX ORDER — PROMETHAZINE HYDROCHLORIDE 25 MG/ML
12.5 INJECTION, SOLUTION INTRAMUSCULAR; INTRAVENOUS ONCE
Status: COMPLETED | OUTPATIENT
Start: 2020-02-24 | End: 2020-02-24

## 2020-02-24 RX ADMIN — PROMETHAZINE HYDROCHLORIDE 12.5 MG: 25 INJECTION INTRAMUSCULAR; INTRAVENOUS at 02:18

## 2020-02-24 RX ADMIN — IOPAMIDOL 90 ML: 755 INJECTION, SOLUTION INTRAVENOUS at 01:00

## 2020-02-24 ASSESSMENT — ENCOUNTER SYMPTOMS
COLOR CHANGE: 0
EYE DISCHARGE: 0
TROUBLE SWALLOWING: 0
EYE ITCHING: 0
VOMITING: 0
BLOOD IN STOOL: 0
PHOTOPHOBIA: 0
ABDOMINAL PAIN: 1
EYE PAIN: 0
BACK PAIN: 0
EYE REDNESS: 0
DIARRHEA: 1
SHORTNESS OF BREATH: 0
ALLERGIC/IMMUNOLOGIC NEGATIVE: 1
STRIDOR: 0
CHEST TIGHTNESS: 0
CONSTIPATION: 0
SORE THROAT: 0
WHEEZING: 0
NAUSEA: 0
SINUS PAIN: 0
ABDOMINAL DISTENTION: 0

## 2020-02-24 ASSESSMENT — PAIN SCALES - GENERAL: PAINLEVEL_OUTOF10: 0

## 2020-02-24 NOTE — ED PROVIDER NOTES
ear pain, sinus pain, sore throat and trouble swallowing. Eyes: Negative for photophobia, pain, discharge, redness and itching. Respiratory: Negative for chest tightness, shortness of breath, wheezing and stridor. Cardiovascular: Negative for chest pain and leg swelling. Gastrointestinal: Positive for abdominal pain and diarrhea. Negative for abdominal distention, blood in stool, constipation, nausea and vomiting. Endocrine: Negative. Genitourinary: Negative for difficulty urinating and dysuria. Musculoskeletal: Negative for back pain, joint swelling, neck pain and neck stiffness. Skin: Negative for color change, pallor, rash and wound. Allergic/Immunologic: Negative. Neurological: Negative for dizziness and headaches. Hematological: Negative. Psychiatric/Behavioral: Negative. Except as noted above the remainder of the review of systems was reviewed and negative.        PAST MEDICAL HISTORY     Past Medical History:   Diagnosis Date    Age-related macular degeneration, wet, right eye ([address]     Anemia     Back pain     Chronic bilateral low back pain with left-sided sciatica 12/19/2019    Colitis, ischemic ([address]     Dementia ([address]     Diverticulosis     Dry age-related macular degeneration of left eye     Hyperlipidemia     Hypertension     Osteoarthritis     Palliative care patient 09/17/2019    Risk for falls 9/13/2019    Seizures (HCC)     Spastic colon     Stroke syndrome     Urinary incontinence     Uterine cancer ([address]          SURGICAL HISTORY       Past Surgical History:   Procedure Laterality Date    BREAST SURGERY Right     FNA Right Breast \"oh heavens, maybe 20 years ago\"    CHOLECYSTECTOMY      COLONOSCOPY  9/25/03    Dr Nella Barksdale ischemic colitis, incomplete exam    COLONOSCOPY  08/29/2003    Dr Louise Cerrato:  limited colon-ischemic colitis    DILATION AND CURETTAGE OF UTERUS      x2, in [facility] tears, laser suregry, nightly, Disp-30 tablet, R-0      Calcium Carb-Cholecalciferol (CALCIUM 600+D) 600-800 MG-UNIT TABS Take 1 tablet by mouth daily Indications: AT 2:30 PM Historical Med      Omega 3-6-9 Fatty Acids (OMEGA 3-6-9 COMPLEX PO) Take 2 tablets by mouth daily Indications: AT 2:30 PM Historical Med      alendronate (FOSAMAX) 70 MG tablet Take 70 mg by mouth every 7 days Wednesday at 1830Historical Med      pravastatin (PRAVACHOL) 20 MG tablet Take 20 mg by mouth every morning       ramipril (ALTACE) 10 MG capsule Take 10 mg by mouth 2 times daily Historical Med      diclofenac sodium 1 % GEL Apply 2 g topically 4 times daily as needed for Pain, Topical, 4 TIMES DAILY PRN, Historical Med      diclofenac 2 % SOLN Place onto the skin 4 times daily as neededHistorical Med      traMADol (ULTRAM) 50 MG tablet Take 25 mg by mouth 2 times daily. Historical Med      lidocaine 4 % external patch Place 1 patch onto the skin daily as needed (LEFT LOWER BACK PAIN), Transdermal, DAILY PRN, Historical Med      NONFORMULARY Indications: dic 3%, lidocaine 2%, cyclo 2%, prilo 2% baclo 2% 1 to 2 pumps up to TID PRN Historical Med      desonide (DESOWEN) 0.05 % cream Apply topically as needed Apply topically 2 times daily. , Topical, PRN, Historical Med      mupirocin (BACTROBAN) 2 % cream Apply topically as needed Apply topically 3 times daily. , Topical, PRN, Historical Med      valACYclovir (VALTREX) 1 g tablet as needed Historical Med      tiotropium (SPIRIVA) 18 MCG inhalation capsule Inhale 18 mcg into the lungs nightlyHistorical Med      Cholecalciferol (VITAMIN D3) 5000 units TABS Take 1,000 Units by mouth daily Indications: AT 2:30 PM Historical Med      benzonatate (TESSALON) 100 MG capsule Take 200 mg by mouth nightly as needed for Cough Historical Med      polyvinyl alcohol-povidone (HYPOTEARS) 1.4-0.6 % ophthalmic solution Place 1-2 drops into both eyes as neededHistorical Med      sodium chloride (OCEAN, BABY AYR) 0.65 % nasal spray 1 spray by Nasal route as needed for CongestionHistorical Med      tobramycin-dexamethasone (TOBRADEX) 0.3-0.1 % ophthalmic ointment Place into the left eye as needed 3 times daily. , Left Eye, PRN, Historical Med      Ascorbic Acid (VITAMIN C PO) Take 500 mg by mouth every 12 hours Breakfast and lunch Historical Med      camphor-menthol (SARNA) 0.5-0.5 % lotion Apply 0.5 mLs topically 2 times daily as needed for Itching Apply topically as needed., Topical, 2 TIMES DAILY PRN, Until Discontinued, Historical Med      acetaminophen (TYLENOL) 500 MG tablet Take 2 tablets by mouth every 6 hours as needed for Pain Do not exceed 4 g of acetaminophen daily (patient is also on Fioricet PRN), Disp-100 tablet, R-0      cloNIDine (CATAPRES) 0.1 MG tablet Take 1 tablet by mouth 2 times daily, Disp-60 tablet, R-0      promethazine (PHENERGAN) 25 MG tablet Take 25 mg by mouth 3 times daily as needed for Nausea      docusate sodium (COLACE) 100 MG capsule Take 100 mg by mouth nightly as needed for Constipation Historical Med      BYSTOLIC 5 MG tablet Take 5 mg by mouth nightly Indications: nightly , DAWHistorical Med      b complex vitamins capsule Take 1 capsule by mouth daily Indications: AT 2:30 PM B 100Historical Med      Multiple Vitamins-Minerals (THERAPEUTIC MULTIVITAMIN-MINERALS) tablet Take 1 tablet by mouth daily Indications: AT 2:30 PM Historical Med       !! - Potential duplicate medications found. Please discuss with provider. ALLERGIES     Aspirin; Dilaudid [hydromorphone hcl]; Ondansetron; Quinolones; Spironolactone; Sulfa antibiotics; Codeine; Demerol hcl [meperidine]; Levaquin [levofloxacin in d5w]; Shankar Marysol; Namenda [memantine hcl];  Norco [hydrocodone-acetaminophen]; and Pneumococcal vaccines    FAMILY HISTORY       Family History   Problem Relation Age of Onset    Heart Defect Mother     Hypertension Mother     Heart Attack Father         x3 within 24 h and then , abused ciggarettes PHYSICAL EXAM    (up to 7 forlevel 4, 8 or more for level 5)     ED Triage Vitals   BP Temp Temp src Pulse Resp SpO2 Height Weight   02/23/20 2259 02/23/20 2259 -- 02/23/20 2259 02/23/20 2259 02/23/20 2259 02/23/20 2251 02/23/20 2251   137/87 98 °F (36.7 °C)  90 20 95 % 5' 1\" (1.549 m) 122 lb (55.3 kg)       Physical Exam  Vitals signs and nursing note reviewed. Constitutional:       General: She is not in acute distress. Appearance: Normal appearance. She is well-developed. She is not ill-appearing, toxic-appearing or diaphoretic. HENT:      Head: Normocephalic and atraumatic. Nose: Nose normal.      Mouth/Throat:      Mouth: Mucous membranes are moist.      Pharynx: No oropharyngeal exudate. Eyes:      General: No scleral icterus. Right eye: No discharge. Left eye: No discharge. Conjunctiva/sclera: Conjunctivae normal.      Pupils: Pupils are equal, round, and reactive to light. Neck:      Musculoskeletal: Normal range of motion and neck supple. Vascular: No JVD. Trachea: No tracheal deviation. Cardiovascular:      Rate and Rhythm: Normal rate and regular rhythm. Pulses: Normal pulses. Heart sounds: Normal heart sounds. No murmur. No friction rub. No gallop. Pulmonary:      Effort: Pulmonary effort is normal. No respiratory distress. Breath sounds: Normal breath sounds. No stridor. No wheezing, rhonchi or rales. Chest:      Chest wall: No tenderness. Abdominal:      General: Bowel sounds are normal. There is no distension. Palpations: Abdomen is soft. There is no mass. Tenderness: There is generalized abdominal tenderness. There is no guarding or rebound. Hernia: No hernia is present. Musculoskeletal: Normal range of motion. General: No tenderness or deformity. Lymphadenopathy:      Cervical: No cervical adenopathy. Skin:     General: Skin is warm and dry.       Capillary Refill: Capillary refill takes less than other components within normal limits   COMPREHENSIVE METABOLIC PANEL W/ REFLEX TO MG FOR LOW K - Abnormal; Notable for the following components:    Glucose 123 (*)     BUN 30 (*)     GFR Non- 60 (*)     All other components within normal limits   LIPASE   TROPONIN   BRAIN NATRIURETIC PEPTIDE   PROTIME-INR   APTT   LACTIC ACID, PLASMA   URINE RT REFLEX TO CULTURE       All other labs were within normal range or notreturned as of this dictation. RE-ASSESSMENT          EMERGENCY DEPARTMENT COURSE and DIFFERENTIAL DIAGNOSIS/MDM:   Vitals:    Vitals:    02/24/20 0028 02/24/20 0159 02/24/20 0308 02/24/20 0322   BP: (!) 142/69 (!) 150/78 (!) 157/72 (!) 148/78   Pulse: 84 85 90 78   Resp: 20 20 20 20   Temp:    98 °F (36.7 °C)   SpO2: 99% 98% 95% 98%   Weight:       Height:             MDM  Number of Diagnoses or Management Options  Diagnosis management comments: Patient presented to the emergency department for evaluation of diarrhea and abdominal pain. Lab work showed an unremarkable CBC, CMP shows a normal sodium of 137 and creatinine and GFR were actually improved compared to previous labs. Her lactic was 1.4. Lipase normal.  Cath urinalysis negative for infection. Troponin negative. EKG: Regular rate and rhythm. Normal P waves and normal MO interval. Normal QRS. No ST elevations or ST depressions. No significant Twave abnormalities. I sent patient for a CT of the abdomen and pelvis with IV contrast and it was pending patient to Dr. Tere Caruso. Patient was in stable condition. PROCEDURES:    Procedures      FINAL IMPRESSION      1.  Diarrhea, unspecified type          DISPOSITION/PLAN   DISPOSITION        PATIENT REFERRED TO:  Tessa Lindsay DO  [address] [place] [address]  [phone]    Schedule an appointment as soon as possible for a visit in 2 days        DISCHARGE MEDICATIONS:  Discharge Medication List as of 2/24/2020  3:22 AM          (Please note that portions of this note

## 2020-02-24 NOTE — ED NOTES
Assisted to bedside commode  Pt vomited stomach content of soup she had for supper  Orders received for darrin Porter, [facility]  02/24/20 0098

## 2020-03-04 ENCOUNTER — TELEPHONE (OUTPATIENT)
Dept: OBSTETRICS AND GYNECOLOGY | Facility: CLINIC | Age: 85
End: 2020-03-04

## 2020-03-04 NOTE — TELEPHONE ENCOUNTER
Called and spoke with Jessica - she is questioning leaving off Biest...  Asking for recommendations to keep vaginal area moist as to prevent UTI's and for comfort as she stays very dry.      Re: appointments: Pt will see Dr. Abad every 6 months for two years.

## 2020-03-04 NOTE — TELEPHONE ENCOUNTER
Pt's daughter, Jessica calls stating Bel has been released from Dr. Abad post surgery for uterine cancer removal. Has follow up appt in July 2020.      Asking when does provider need to see Bel again in office?  Also asks if it is appropriate for pt to continue to use 80:20 Biest cream ?    Please advise.  Thank you

## 2020-03-04 NOTE — TELEPHONE ENCOUNTER
I do not feel that patient still needs Biest cream at her age.  I suspect this could have been discontinued years ago.    In regard to follow-up, Dr. Abad will give them directions at the July appointment about whether he wants to see her again afterwards or whether she will have alternating visits between his office and ours (which is what many patients do, with a visit at one or the other every 6 months)

## 2020-03-04 NOTE — TELEPHONE ENCOUNTER
Estrogen cream for vaginal atrophy is not the same as creams used for hormone replacement.  I would recommend discontinuing a treatment that is meant to address hot flashes and night sweats, and replace it with a vaginal estrogen cream that is specifically meant for vaginal atrophy.    If she is seeing Dr. Abad every 6 months for two years, then patient just needs to be seen here for annual breast/gyn exam.  Thanks

## 2020-03-05 DIAGNOSIS — Z78.0 MENOPAUSE: Primary | ICD-10-CM

## 2020-03-05 RX ORDER — ESTRADIOL 0.1 MG/G
2 CREAM VAGINAL DAILY
Qty: 42.5 G | Refills: 3 | Status: SHIPPED | OUTPATIENT
Start: 2020-03-05 | End: 2023-01-11 | Stop reason: ALTCHOICE

## 2020-03-05 NOTE — TELEPHONE ENCOUNTER
Jessica notified - she wants cream sent to [facility].    Script verbally called in.  Was unable to change in Epic from  Pharmacy

## 2020-03-09 ENCOUNTER — OFFICE VISIT (OUTPATIENT)
Dept: NEUROLOGY | Age: 85
End: 2020-03-09
Payer: MEDICARE

## 2020-03-09 VITALS
DIASTOLIC BLOOD PRESSURE: 66 MMHG | BODY MASS INDEX: 23.03 KG/M2 | WEIGHT: 122 LBS | RESPIRATION RATE: 14 BRPM | HEART RATE: 60 BPM | HEIGHT: 61 IN | SYSTOLIC BLOOD PRESSURE: 108 MMHG

## 2020-03-09 PROCEDURE — G8400 PT W/DXA NO RESULTS DOC: HCPCS | Performed by: PSYCHIATRY & NEUROLOGY

## 2020-03-09 PROCEDURE — 1090F PRES/ABSN URINE INCON ASSESS: CPT | Performed by: PSYCHIATRY & NEUROLOGY

## 2020-03-09 PROCEDURE — G8420 CALC BMI NORM PARAMETERS: HCPCS | Performed by: PSYCHIATRY & NEUROLOGY

## 2020-03-09 PROCEDURE — 1123F ACP DISCUSS/DSCN MKR DOCD: CPT | Performed by: PSYCHIATRY & NEUROLOGY

## 2020-03-09 PROCEDURE — G8484 FLU IMMUNIZE NO ADMIN: HCPCS | Performed by: PSYCHIATRY & NEUROLOGY

## 2020-03-09 PROCEDURE — 99214 OFFICE O/P EST MOD 30 MIN: CPT | Performed by: PSYCHIATRY & NEUROLOGY

## 2020-03-09 PROCEDURE — 4040F PNEUMOC VAC/ADMIN/RCVD: CPT | Performed by: PSYCHIATRY & NEUROLOGY

## 2020-03-09 PROCEDURE — 1036F TOBACCO NON-USER: CPT | Performed by: PSYCHIATRY & NEUROLOGY

## 2020-03-09 PROCEDURE — G8427 DOCREV CUR MEDS BY ELIG CLIN: HCPCS | Performed by: PSYCHIATRY & NEUROLOGY

## 2020-03-09 RX ORDER — SIMETHICONE 125 MG
125 TABLET,CHEWABLE ORAL 3 TIMES DAILY
COMMUNITY

## 2020-03-09 RX ORDER — GABAPENTIN 100 MG/1
100 CAPSULE ORAL 2 TIMES DAILY
COMMUNITY
End: 2020-06-11 | Stop reason: ALTCHOICE

## 2020-03-09 NOTE — PROGRESS NOTES
[facility]  [address] [place] [address] Drive, [address] [place] [address],[address]  Wilmer Barton  Phone [phone]  Fax [phone]     [facility] Follow Up Encounter  3/9/2020 2:49 PM    Information:   Patient Name: Aruna Mock  :   1935  Age:   80 y.o. MRN:   [id number]  Account #:  [de-identified]  Today:  3/9/20    Provider: Richard Couch M.D. Chief Complaint:   Chief Complaint   Patient presents with    Follow-up     Hyst for CA       Subjective:   Aruna Mock is a 80 y. o. woman with a history of vessel cerebrovascular disease, strokes, vascular dementia, and intractable seizures who is following up. She did have a vaginal hysterectomy at [facility] for low grade endometrial cancer. She did have a syncopal episode the night after that. Other than that, she did well. She has had no further episodes. Her memory is stable and she is fairly independent at home. Her daughter manages her medications.         Objective:     Past Medical History:  Past Medical History:   Diagnosis Date    Age-related macular degeneration, wet, right eye ([address]     Anemia     Back pain     Chronic bilateral low back pain with left-sided sciatica 2019    Colitis, ischemic ([address]     Dementia ([address]     Diverticulosis     Dry age-related macular degeneration of left eye     Hyperlipidemia     Hypertension     Osteoarthritis     Palliative care patient 2019    Risk for falls 2019    Seizures (HCC)     Spastic colon     Stroke syndrome     Urinary incontinence     Uterine cancer [facility])        Past Surgical History:   Procedure Laterality Date    BREAST SURGERY Right     FNA Right Breast \"oh heavens, maybe 20 years ago\"    CHOLECYSTECTOMY      COLONOSCOPY  03    Dr Mota Serelifant ischemic colitis, incomplete exam    COLONOSCOPY  2003     Brain :  limited colon-ischemic colitis    DILATION AND CURETTAGE OF UTERUS      x2, in [facility] tears, laser suregry, adn cataract with IOL b/l    HYSTERECTOMY      ovaries and tubes    LUMBAR FUSION       90 days after the spine surgeries    SPINE SURGERY      L3,4, and 5   done in    [place]      as a child at age 9    [address] [facility] [address]  2003    DR Alan Joseph:  Duodenal scarring but no evidence of active ulcer disease. Recent Hospitalizations  · None    Significant Injuries  · None    Habits  Ginny Wang reports that she has never smoked. She has never used smokeless tobacco. She reports that she does not drink alcohol or use drugs. Family History   Problem Relation Age of Onset    Heart Defect Mother     Hypertension Mother     Heart Attack Father         x3 within 24 h and then , abused ciggarettes    No Known Problems Daughter     No Known Problems Son     No Known Problems Son     Colon Cancer Neg Hx     Colon Polyps Neg Hx     Esophageal Cancer Neg Hx     Liver Cancer Neg Hx     Liver Disease Neg Hx     Rectal Cancer Neg Hx     Stomach Cancer Neg Hx        Social History  Ginny Wang is , lives in [facility], and is retired. Medications:  Current Outpatient Medications   Medication Sig Dispense Refill    gabapentin (NEURONTIN) 100 MG capsule Take 100 mg by mouth 2 times daily.       simethicone (GAS-X EXTRA STRENGTH) 125 MG chewable tablet Take 125 mg by mouth 2 PO 5:30 AM, 2 10:30 AM, 1 4:00 PM and PRN      butalbital-acetaminophen-caffeine (FIORICET, ESGIC) -40 MG per tablet Take 1 tablet by mouth every 4 hours as needed for Headaches      amLODIPine (NORVASC) 5 MG tablet Take 5 mg by mouth daily      amLODIPine (NORVASC) 2.5 MG tablet Take 2.5 mg by mouth nightly      levETIRAcetam (KEPPRA) 500 MG tablet Take 1 tablet by mouth 2 times daily 60 tablet 5    diclofenac sodium 1 % GEL Apply 2 g topically 4 times daily as needed for Pain      sodium chloride 1 g tablet Take 1 g by mouth 3 times daily      lidocaine 4 % external patch Place 1 patch onto the skin daily as needed (LEFT LOWER BACK PAIN)      NONFORMULARY Indications: dic 3%, lidocaine 2%, cyclo 2%, prilo 2% baclo 2% 1 to 2 pumps up to TID PRN       desonide (DESOWEN) 0.05 % cream Apply topically as needed Apply topically 2 times daily.  mupirocin (BACTROBAN) 2 % cream Apply topically as needed Apply topically 3 times daily.  dipyridamole (PERSANTINE) 50 MG tablet Take 2 tablets by mouth 2 times daily 120 tablet 5    valACYclovir (VALTREX) 1 g tablet as needed       Psyllium (METAMUCIL FIBER PO) Take by mouth nightly       tiotropium (SPIRIVA) 18 MCG inhalation capsule Inhale 18 mcg into the lungs nightly      nitrofurantoin (MACRODANTIN) 50 MG capsule TAKE 1 CAPSULE AT BEDTIME      azelastine (ASTELIN) 0.1 % nasal spray 2 sprays by Nasal route 2 times daily       Cholecalciferol (VITAMIN D3) 5000 units TABS Take 1,000 Units by mouth daily Indications: AT 2:30 PM       benzonatate (TESSALON) 100 MG capsule Take 200 mg by mouth nightly as needed for Cough       polyvinyl alcohol-povidone (HYPOTEARS) 1.4-0.6 % ophthalmic solution Place 1-2 drops into both eyes as needed      sodium chloride (OCEAN, BABY AYR) 0.65 % nasal spray 1 spray by Nasal route as needed for Congestion      tobramycin-dexamethasone (TOBRADEX) 0.3-0.1 % ophthalmic ointment Place into the left eye as needed 3 times daily.  Lutein 20 MG TABS Take 20 mg by mouth daily Indications: 2:30 PM DAILY       fluticasone (VERAMYST) 27.5 MCG/SPRAY nasal spray 2 sprays by Nasal route 2 times daily       camphor-menthol (SARNA) 0.5-0.5 % lotion Apply 0.5 mLs topically 2 times daily as needed for Itching Apply topically as needed.       acetaminophen (TYLENOL) 500 MG tablet Take 2 tablets by mouth every 6 hours as needed for Pain Do not exceed 4 g of acetaminophen daily (patient is also on Fioricet PRN) 100 tablet 0    cloNIDine (CATAPRES) 0.1 MG tablet Take 1 tablet by mouth 2 times daily (Patient taking differently: Take 0.1 mg by mouth as needed for High Blood Pressure (for BP greater than 160/100) ) 60 tablet 0    donepezil (ARICEPT) 10 MG tablet Take 1 tablet by mouth nightly 30 tablet 0    Calcium Carb-Cholecalciferol (CALCIUM 600+D) 600-800 MG-UNIT TABS Take 1 tablet by mouth daily Indications: AT 2:30 PM       promethazine (PHENERGAN) 25 MG tablet Take 25 mg by mouth 3 times daily as needed for Nausea      docusate sodium (COLACE) 100 MG capsule Take 100 mg by mouth nightly as needed for Constipation       Omega 3-6-9 Fatty Acids (OMEGA 3-6-9 COMPLEX PO) Take 2 tablets by mouth daily Indications: AT 2:30 PM       alendronate (FOSAMAX) 70 MG tablet Take 70 mg by mouth every 7 days Wednesday at [facility] 5 MG tablet Take 5 mg by mouth nightly Indications: nightly       pravastatin (PRAVACHOL) 20 MG tablet Take 20 mg by mouth every morning       ramipril (ALTACE) 10 MG capsule Take 10 mg by mouth 2 times daily       b complex vitamins capsule Take 1 capsule by mouth daily Indications: AT 2:30 PM B 100      Multiple Vitamins-Minerals (THERAPEUTIC MULTIVITAMIN-MINERALS) tablet Take 1 tablet by mouth daily Indications: AT 2:30 PM        No current facility-administered medications for this visit. Allergies:   Allergies as of 03/09/2020 - Review Complete 03/09/2020   Allergen Reaction Noted    Aspirin Shortness Of Breath 03/17/2016    Dilaudid [hydromorphone hcl]  03/17/2016    Ondansetron  03/17/2016    Quinolones  09/14/2019    Spironolactone  09/25/2019    Sulfa antibiotics  03/17/2016    Codeine Rash and Other (See Comments) 08/11/2016    Demerol hcl [meperidine] Other (See Comments) 03/17/2016    Levaquin [levofloxacin in d5w] Other (See Comments) 02/21/2018    Myrbetriq [mirabegron] Other (See Comments) 04/12/2018    Namenda [memantine hcl] Other (See Comments) 03/17/2016    Norco [hydrocodone-acetaminophen] Rash and Other (See Comments) 03/17/2016    Pneumococcal vaccines Swelling 03/17/2016       Review of Systems:  General ROS: negative for - chills or fever  Psychological ROS: negative for - anxiety or depression  ENT ROS: negative for - headaches or sinus pain  Hematological and Lymphatic ROS: negative for - bleeding problems, bruising or swollen lymph nodes  Respiratory ROS: negative for - cough, hemoptysis or shortness of breath  Cardiovascular ROS: negative for - chest pain or palpitations  Gastrointestinal ROS: negative for - blood in stools, constipation, diarrhea or nausea/vomiting  Genito-Urinary ROS: negative for - hematuria or urinary frequency/urgency  Musculoskeletal ROS: postive for - joint pain, joint stiffness or joint swelling  Neurological ROS: positive for - memory loss, weakness     Examination:  Vitals:  /66   Pulse 60   Resp 14   Ht 5' 1\" (1.549 m)   Wt 122 lb (55.3 kg)   BMI 23.05 kg/m²   General appearance: alert and cooperative with exam  HEENT: Sclera clear, anicteric, Oropharynx clear, no lesions, Neck supple with midline trachea, Thyroid without masses and Trachea midline  Heart:: regular rate and rhythm, S1, S2 normal, no murmur, click, rub or gallop  Lungs: clear to auscultation bilaterally  Extremities: extremities normal, atraumatic, no cyanosis or edema  Neurologic: Extraocular movements are intact without nystagmus. Visual fields are full to confrontation. Facial movements are symmetrical and normal. Speech is precise. Extremity strength is normal in both uppers and lowers. Deep tendon reflexes are intact and symmetrical. Rapid alternating movements are unimpaired. Finger-to-nose testing is performed well, without dysmetria. She is in a wheelchair today. Assessment:       ICD-10-CM    1. Partial symptomatic epilepsy with complex partial seizures, intractable, without status epilepticus ([facility] G40.219    2. Cerebrovascular small vessel disease I67.9    3.  Vascular dementia without behavioral disturbance (HCC) F01.50    Clinically stable. Plan:   1. Reduce the gabapentin to 100 mg at bedtime for a week, then stop it.   2. Continue the other medications and care  3. FU in 3 months    Electronically signed by Tristan Opitz, MD on 3/9/2020

## 2020-03-10 ENCOUNTER — NURSE ONLY (OUTPATIENT)
Dept: UROLOGY | Age: 85
End: 2020-03-10
Payer: MEDICARE

## 2020-03-10 PROCEDURE — 64566 NEUROELTRD STIM POST TIBIAL: CPT | Performed by: PHYSICIAN ASSISTANT

## 2020-03-10 ASSESSMENT — ENCOUNTER SYMPTOMS
DIARRHEA: 0
CONSTIPATION: 0
EYE REDNESS: 0
BACK PAIN: 0
EYE DISCHARGE: 0
SHORTNESS OF BREATH: 0
WHEEZING: 0
ABDOMINAL PAIN: 0
COUGH: 0

## 2020-03-10 NOTE — PROGRESS NOTES
(NEURONTIN) 100 MG capsule Take 100 mg by mouth 2 times daily.  simethicone (GAS-X EXTRA STRENGTH) 125 MG chewable tablet Take 125 mg by mouth 2 PO 5:30 AM, 2 10:30 AM, 1 4:00 PM and PRN      butalbital-acetaminophen-caffeine (FIORICET, ESGIC) -40 MG per tablet Take 1 tablet by mouth every 4 hours as needed for Headaches      amLODIPine (NORVASC) 5 MG tablet Take 5 mg by mouth daily      amLODIPine (NORVASC) 2.5 MG tablet Take 2.5 mg by mouth nightly      levETIRAcetam (KEPPRA) 500 MG tablet Take 1 tablet by mouth 2 times daily 60 tablet 5    diclofenac sodium 1 % GEL Apply 2 g topically 4 times daily as needed for Pain      sodium chloride 1 g tablet Take 1 g by mouth 3 times daily      lidocaine 4 % external patch Place 1 patch onto the skin daily as needed (LEFT LOWER BACK PAIN)      NONFORMULARY Indications: dic 3%, lidocaine 2%, cyclo 2%, prilo 2% baclo 2% 1 to 2 pumps up to TID PRN       desonide (DESOWEN) 0.05 % cream Apply topically as needed Apply topically 2 times daily.  mupirocin (BACTROBAN) 2 % cream Apply topically as needed Apply topically 3 times daily.       dipyridamole (PERSANTINE) 50 MG tablet Take 2 tablets by mouth 2 times daily 120 tablet 5    valACYclovir (VALTREX) 1 g tablet as needed       Psyllium (METAMUCIL FIBER PO) Take by mouth nightly       tiotropium (SPIRIVA) 18 MCG inhalation capsule Inhale 18 mcg into the lungs nightly      nitrofurantoin (MACRODANTIN) 50 MG capsule TAKE 1 CAPSULE AT BEDTIME      azelastine (ASTELIN) 0.1 % nasal spray 2 sprays by Nasal route 2 times daily       Cholecalciferol (VITAMIN D3) 5000 units TABS Take 1,000 Units by mouth daily Indications: AT 2:30 PM       benzonatate (TESSALON) 100 MG capsule Take 200 mg by mouth nightly as needed for Cough       polyvinyl alcohol-povidone (HYPOTEARS) 1.4-0.6 % ophthalmic solution Place 1-2 drops into both eyes as needed      sodium chloride (OCEAN, BABY AYR) 0.65 % nasal spray 1 spray

## 2020-03-23 RX ORDER — NITROFURANTOIN MACROCRYSTALS 50 MG/1
CAPSULE ORAL
Qty: 30 CAPSULE | Refills: 1 | Status: SHIPPED | OUTPATIENT
Start: 2020-03-23 | End: 2020-05-08 | Stop reason: SDUPTHER

## 2020-04-01 ENCOUNTER — TELEPHONE (OUTPATIENT)
Dept: UROLOGY | Age: 85
End: 2020-04-01

## 2020-04-01 NOTE — TELEPHONE ENCOUNTER
Called and spoke to patient's daughter about cancelling Uroplasty. She was upset because she said she called Monday and was told we were still going to do it. She said her mother urinates all over herself when she doesn't have the  Uroplasty. She wants a call to explain what they are suppose to do.

## 2020-04-01 NOTE — TELEPHONE ENCOUNTER
Patient has spoke with Melissa Paez. I have reschd patient to June. I assured her that her mom was on the waiting list. If the restrictions of COVID-19 at all changed before then that she is on a waiting list. Pt confirmed understanding.

## 2020-04-30 ENCOUNTER — TELEPHONE (OUTPATIENT)
Dept: ADMINISTRATIVE | Age: 85
End: 2020-04-30

## 2020-05-08 RX ORDER — NITROFURANTOIN MACROCRYSTALS 50 MG/1
CAPSULE ORAL
Qty: 30 CAPSULE | Refills: 3 | Status: SHIPPED | OUTPATIENT
Start: 2020-05-08 | End: 2021-11-10 | Stop reason: ALTCHOICE

## 2020-05-11 ENCOUNTER — NURSE ONLY (OUTPATIENT)
Dept: UROLOGY | Age: 85
End: 2020-05-11
Payer: MEDICARE

## 2020-05-11 VITALS — TEMPERATURE: 98.7 F

## 2020-05-11 PROCEDURE — 64566 NEUROELTRD STIM POST TIBIAL: CPT | Performed by: PHYSICIAN ASSISTANT

## 2020-05-11 RX ORDER — HYDRALAZINE HYDROCHLORIDE 25 MG/1
50 TABLET, FILM COATED ORAL NIGHTLY
COMMUNITY
End: 2021-10-20

## 2020-05-11 ASSESSMENT — ENCOUNTER SYMPTOMS
BACK PAIN: 0
WHEEZING: 0
DIARRHEA: 0
COUGH: 0
CONSTIPATION: 0
EYE REDNESS: 0
SHORTNESS OF BREATH: 0
ABDOMINAL PAIN: 0
EYE DISCHARGE: 0

## 2020-05-11 NOTE — PROGRESS NOTES
Kaylen Haines is a 80 y.o. female who presents today   Chief Complaint   Patient presents with    Follow-up     Uroplasty     Posterior tibial nerve stimulation treatment:  Patient with urge and urge incontinence had failed on medications in the past she did 12-week course of uro-plasty treatments he is now on a 24 day cycle of treatments. Her last treatment was 03/10/2020 and had to cancel treatments due to the COVID-19 pandemic. She has had worsening symptoms since she has not been on her regular treatment cycle. He has had difficulty holding her urine and increased incontinence which is helped with the posterior tibial nerve stimulation treatments.       Past Medical History:   Diagnosis Date    Age-related macular degeneration, wet, right eye ([address]     Anemia     Back pain     Chronic bilateral low back pain with left-sided sciatica 12/19/2019    [facility]itis, ischemic ([facility]     Dementia ([facility]     Diverticulosis     Dry age-related macular degeneration of left eye     Hyperlipidemia     Hypertension     Osteoarthritis     Palliative care patient 09/17/2019    Risk for falls 9/13/2019    Seizures (HCC)     Spastic colon     Stroke syndrome     Urinary incontinence     Uterine cancer [facility])        Past Surgical History:   Procedure Laterality Date    BREAST SURGERY Right     FNA Right Breast \"oh heavens, maybe 20 years ago\"    CHOLECYSTECTOMY      COLONOSCOPY  9/25/03    Dr Abhishek Cuevas ischemic colitis, incomplete exam    COLONOSCOPY  08/29/2003    Dr Kenji Heck:  limited colon-ischemic colitis    DILATION AND CURETTAGE OF UTERUS      x2, in [facility] tears, laser suregry, adn cataract with IOL b/l    HYSTERECTOMY      ovaries and tubes    LUMBAR FUSION      2012 90 days after the spine surgeries    SPINE SURGERY      L3,4, and 5   done in 2012   [address]      as a child at age 9    [address] [facility] [address]  08/28/2003     STEVE:  Duodenal scarring but no evidence of active ulcer disease. Current Outpatient Medications   Medication Sig Dispense Refill    nitrofurantoin (MACRODANTIN) 50 MG capsule TAKE 1 CAPSULE AT BEDTIME 30 capsule 3    gabapentin (NEURONTIN) 100 MG capsule Take 100 mg by mouth 2 times daily.  simethicone (GAS-X EXTRA STRENGTH) 125 MG chewable tablet Take 125 mg by mouth 2 PO 5:30 AM, 2 10:30 AM, 1 4:00 PM and PRN      butalbital-acetaminophen-caffeine (FIORICET, ESGIC) -40 MG per tablet Take 1 tablet by mouth every 4 hours as needed for Headaches      amLODIPine (NORVASC) 5 MG tablet Take 5 mg by mouth daily      amLODIPine (NORVASC) 2.5 MG tablet Take 2.5 mg by mouth nightly      levETIRAcetam (KEPPRA) 500 MG tablet Take 1 tablet by mouth 2 times daily 60 tablet 5    diclofenac sodium 1 % GEL Apply 2 g topically 4 times daily as needed for Pain      sodium chloride 1 g tablet Take 1 g by mouth 3 times daily      lidocaine 4 % external patch Place 1 patch onto the skin daily as needed (LEFT LOWER BACK PAIN)      NONFORMULARY Indications: dic 3%, lidocaine 2%, cyclo 2%, prilo 2% baclo 2% 1 to 2 pumps up to TID PRN       desonide (DESOWEN) 0.05 % cream Apply topically as needed Apply topically 2 times daily.  mupirocin (BACTROBAN) 2 % cream Apply topically as needed Apply topically 3 times daily.       dipyridamole (PERSANTINE) 50 MG tablet Take 2 tablets by mouth 2 times daily 120 tablet 5    valACYclovir (VALTREX) 1 g tablet as needed       Psyllium (METAMUCIL FIBER PO) Take by mouth nightly       tiotropium (SPIRIVA) 18 MCG inhalation capsule Inhale 18 mcg into the lungs nightly      azelastine (ASTELIN) 0.1 % nasal spray 2 sprays by Nasal route 2 times daily       Cholecalciferol (VITAMIN D3) 5000 units TABS Take 1,000 Units by mouth daily Indications: AT 2:30 PM       benzonatate (TESSALON) 100 MG capsule Take 200 mg by mouth nightly as needed for Cough       polyvinyl alcohol-povidone (HYPOTEARS) 1.4-0.6 % ophthalmic solution Place 1-2 drops into both eyes as needed      sodium chloride (OCEAN, BABY AYR) 0.65 % nasal spray 1 spray by Nasal route as needed for Congestion      tobramycin-dexamethasone (TOBRADEX) 0.3-0.1 % ophthalmic ointment Place into the left eye as needed 3 times daily.  Lutein 20 MG TABS Take 20 mg by mouth daily Indications: 2:30 PM DAILY       fluticasone (VERAMYST) 27.5 MCG/SPRAY nasal spray 2 sprays by Nasal route 2 times daily       camphor-menthol (SARNA) 0.5-0.5 % lotion Apply 0.5 mLs topically 2 times daily as needed for Itching Apply topically as needed.       acetaminophen (TYLENOL) 500 MG tablet Take 2 tablets by mouth every 6 hours as needed for Pain Do not exceed 4 g of acetaminophen daily (patient is also on Fioricet PRN) 100 tablet 0    cloNIDine (CATAPRES) 0.1 MG tablet Take 1 tablet by mouth 2 times daily (Patient taking differently: Take 0.1 mg by mouth as needed for High Blood Pressure (for BP greater than 160/100) ) 60 tablet 0    donepezil (ARICEPT) 10 MG tablet Take 1 tablet by mouth nightly 30 tablet 0    Calcium Carb-Cholecalciferol (CALCIUM 600+D) 600-800 MG-UNIT TABS Take 1 tablet by mouth daily Indications: AT 2:30 PM       promethazine (PHENERGAN) 25 MG tablet Take 25 mg by mouth 3 times daily as needed for Nausea      docusate sodium (COLACE) 100 MG capsule Take 100 mg by mouth nightly as needed for Constipation       Omega 3-6-9 Fatty Acids (OMEGA 3-6-9 COMPLEX PO) Take 2 tablets by mouth daily Indications: AT 2:30 PM       alendronate (FOSAMAX) 70 MG tablet Take 70 mg by mouth every 7 days Wednesday at [facility] 5 MG tablet Take 5 mg by mouth nightly Indications: nightly       pravastatin (PRAVACHOL) 20 MG tablet Take 20 mg by mouth every morning       ramipril (ALTACE) 10 MG capsule Take 10 mg by mouth 2 times daily       b complex vitamins capsule Take 1 capsule by mouth daily Indications: Attends meetings of clubs or organizations: None     Relationship status: None    Intimate partner violence     Fear of current or ex partner: None     Emotionally abused: None     Physically abused: None     Forced sexual activity: None   Other Topics Concern    None   Social History Narrative    CODE STATUS: DNR-CCA    HEALTH CARE PROXY / [employer] and Healthcare: her daughter, Mrs. Laurie Alfaro, [phone]    AMBULATES: with walker during day, wheelchair at night    DOMICILED: lives in the [facility] assisted living facility, has no stairs or steps, has a cat, her daughter is there periodically       Family History   Problem Relation Age of Onset    Heart Defect Mother     Hypertension Mother     Heart Attack Father         x3 within 24 h and then , abused ciggarettes    No Known Problems Daughter     No Known Problems Son     No Known Problems Son     Colon Cancer Neg Hx     Colon Polyps Neg Hx     Esophageal Cancer Neg Hx     Liver Cancer Neg Hx     Liver Disease Neg Hx     Rectal Cancer Neg Hx     Stomach Cancer Neg Hx        REVIEW OF SYSTEMS:  Review of Systems   Constitutional: Negative for chills and fever. HENT: Negative for congestion and hearing loss. Eyes: Negative for discharge and redness. Respiratory: Negative for cough, shortness of breath and wheezing. Cardiovascular: Negative for leg swelling. Gastrointestinal: Negative for abdominal pain, constipation and diarrhea. Endocrine: Negative for cold intolerance and heat intolerance. Genitourinary: Negative for decreased urine volume, difficulty urinating, dysuria, enuresis, flank pain, frequency, genital sores, hematuria and urgency. Musculoskeletal: Negative for back pain and gait problem. Skin: Negative for rash. Allergic/Immunologic: Negative for environmental allergies. Neurological: Negative for dizziness, weakness and headaches. Hematological: Does not bruise/bleed easily.

## 2020-06-03 ENCOUNTER — OFFICE VISIT (OUTPATIENT)
Dept: UROLOGY | Age: 85
End: 2020-06-03
Payer: MEDICARE

## 2020-06-03 LAB
BILIRUBIN, POC: NORMAL
BLOOD URINE, POC: NORMAL
CLARITY, POC: CLEAR
COLOR, POC: YELLOW
GLUCOSE URINE, POC: NORMAL
KETONES, POC: NORMAL
LEUKOCYTE EST, POC: NORMAL
NITRITE, POC: NORMAL
PH, POC: 5
PROTEIN, POC: NORMAL
SPECIFIC GRAVITY, POC: 1.02
UROBILINOGEN, POC: 0.2

## 2020-06-03 PROCEDURE — 64566 NEUROELTRD STIM POST TIBIAL: CPT | Performed by: PHYSICIAN ASSISTANT

## 2020-06-03 PROCEDURE — 81003 URINALYSIS AUTO W/O SCOPE: CPT | Performed by: PHYSICIAN ASSISTANT

## 2020-06-03 ASSESSMENT — ENCOUNTER SYMPTOMS
SHORTNESS OF BREATH: 0
EYE DISCHARGE: 0
EYE REDNESS: 0
COUGH: 0
WHEEZING: 0
BACK PAIN: 0
DIARRHEA: 0
CONSTIPATION: 0
ABDOMINAL PAIN: 0

## 2020-06-03 NOTE — PROGRESS NOTES
alcohol-povidone (HYPOTEARS) 1.4-0.6 % ophthalmic solution Place 1-2 drops into both eyes as needed      sodium chloride (OCEAN, BABY AYR) 0.65 % nasal spray 1 spray by Nasal route as needed for Congestion      tobramycin-dexamethasone (TOBRADEX) 0.3-0.1 % ophthalmic ointment Place into the left eye as needed 3 times daily.  Lutein 20 MG TABS Take 20 mg by mouth daily Indications: 2:30 PM DAILY       fluticasone (VERAMYST) 27.5 MCG/SPRAY nasal spray 2 sprays by Nasal route 2 times daily       camphor-menthol (SARNA) 0.5-0.5 % lotion Apply 0.5 mLs topically 2 times daily as needed for Itching Apply topically as needed.       acetaminophen (TYLENOL) 500 MG tablet Take 2 tablets by mouth every 6 hours as needed for Pain Do not exceed 4 g of acetaminophen daily (patient is also on Fioricet PRN) 100 tablet 0    cloNIDine (CATAPRES) 0.1 MG tablet Take 1 tablet by mouth 2 times daily (Patient taking differently: Take 0.1 mg by mouth as needed for High Blood Pressure (for BP greater than 160/100) ) 60 tablet 0    donepezil (ARICEPT) 10 MG tablet Take 1 tablet by mouth nightly 30 tablet 0    Calcium Carb-Cholecalciferol (CALCIUM 600+D) 600-800 MG-UNIT TABS Take 1 tablet by mouth daily Indications: AT 2:30 PM       promethazine (PHENERGAN) 25 MG tablet Take 25 mg by mouth 3 times daily as needed for Nausea      docusate sodium (COLACE) 100 MG capsule Take 100 mg by mouth nightly as needed for Constipation       Omega 3-6-9 Fatty Acids (OMEGA 3-6-9 COMPLEX PO) Take 2 tablets by mouth daily Indications: AT 2:30 PM       alendronate (FOSAMAX) 70 MG tablet Take 70 mg by mouth every 7 days Wednesday at [facility] 5 MG tablet Take 5 mg by mouth nightly Indications: nightly       pravastatin (PRAVACHOL) 20 MG tablet Take 20 mg by mouth every morning       ramipril (ALTACE) 10 MG capsule Take 10 mg by mouth 2 times daily       b complex vitamins capsule Take 1 capsule by mouth daily Indications: AT 2:30 PM B 100      Multiple Vitamins-Minerals (THERAPEUTIC MULTIVITAMIN-MINERALS) tablet Take 1 tablet by mouth daily Indications: AT 2:30 PM        No current facility-administered medications for this visit.         Allergies   Allergen Reactions    Aspirin Shortness Of Breath    Dilaudid [Hydromorphone Hcl]     Ondansetron     Quinolones     Spironolactone      Low sodium    Sulfa Antibiotics     Codeine Rash and Other (See Comments)     Makes her \"crazy and mean\" and gives her a rash    Demerol Hcl [Meperidine] Other (See Comments)     Made her \"crazy and mean\", and \"loopy\"    Levaquin [Levofloxacin In D5w] Other (See Comments)     Seizure like activity      Myrbetriq [Mirabegron] Other (See Comments)     Unable to micturate    Namenda [Memantine Hcl] Other (See Comments)     made her \"loopy\"    Norco [Hydrocodone-Acetaminophen] Rash and Other (See Comments)     \"crazy and mean\"    Pneumococcal Vaccines Swelling       Social History     Socioeconomic History    Marital status:      Spouse name: None    Number of children: 3    Years of education: None    Highest education level: None   Occupational History    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mployee of [employer]    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,mployee of [employer]   Social Needs    Financial resource strain: None    Food insecurity     Worry: None     Inability: None    Transportation needs     Medical: None     Non-medical: None   Tobacco Use    Smoking status: Never Smoker    Smokeless tobacco: Never Used   Substance and Sexual Activity    Alcohol use: Never     Frequency: Never    Drug use: Never    Sexual activity: None     Comment: has 3 kids   Lifestyle    Physical activity     Days per week: None     Minutes per session: None    Stress: None   Relationships    Social connections     Talks on phone: None     Gets together: None     Attends Shinto service: None     Active member of club or organization: None     Attends

## 2020-06-11 ENCOUNTER — OFFICE VISIT (OUTPATIENT)
Dept: NEUROLOGY | Age: 85
End: 2020-06-11
Payer: MEDICARE

## 2020-06-11 VITALS — DIASTOLIC BLOOD PRESSURE: 58 MMHG | SYSTOLIC BLOOD PRESSURE: 112 MMHG | HEART RATE: 64 BPM

## 2020-06-11 PROCEDURE — 1123F ACP DISCUSS/DSCN MKR DOCD: CPT | Performed by: PSYCHIATRY & NEUROLOGY

## 2020-06-11 PROCEDURE — 99214 OFFICE O/P EST MOD 30 MIN: CPT | Performed by: PSYCHIATRY & NEUROLOGY

## 2020-06-11 PROCEDURE — 4040F PNEUMOC VAC/ADMIN/RCVD: CPT | Performed by: PSYCHIATRY & NEUROLOGY

## 2020-06-11 PROCEDURE — G8400 PT W/DXA NO RESULTS DOC: HCPCS | Performed by: PSYCHIATRY & NEUROLOGY

## 2020-06-11 PROCEDURE — 1090F PRES/ABSN URINE INCON ASSESS: CPT | Performed by: PSYCHIATRY & NEUROLOGY

## 2020-06-11 PROCEDURE — G8427 DOCREV CUR MEDS BY ELIG CLIN: HCPCS | Performed by: PSYCHIATRY & NEUROLOGY

## 2020-06-11 PROCEDURE — G8420 CALC BMI NORM PARAMETERS: HCPCS | Performed by: PSYCHIATRY & NEUROLOGY

## 2020-06-11 PROCEDURE — 1036F TOBACCO NON-USER: CPT | Performed by: PSYCHIATRY & NEUROLOGY

## 2020-06-11 RX ORDER — DIPYRIDAMOLE 50 MG
100 TABLET ORAL 2 TIMES DAILY
Qty: 120 TABLET | Refills: 11 | Status: SHIPPED | OUTPATIENT
Start: 2020-06-11 | End: 2021-03-17 | Stop reason: SDUPTHER

## 2020-06-11 RX ORDER — LEVETIRACETAM 500 MG/1
500 TABLET ORAL 2 TIMES DAILY
Qty: 60 TABLET | Refills: 11 | Status: SHIPPED | OUTPATIENT
Start: 2020-06-11 | End: 2021-03-17 | Stop reason: SDUPTHER

## 2020-06-11 NOTE — PROGRESS NOTES
[facility]  [address] [place] [address] Drive, [address] [address]  Wilmer Barton  Phone [phone]  Fax [phone]     [facility] Follow Up Encounter  20 2:36 PM CDT    Information:   Patient Name: Cherelle Khan  :   1935  Age:   80 y.o. MRN:   [id number]  Account #:  [de-identified]  Today:  20    Provider: Danielle Rahman M.D. Chief Complaint:   Chief Complaint   Patient presents with    Seizures     follow up visit, pt has been having left sided head and eye pain and she has started getting eye injections        Subjective:   Cherelle Khan is a 80 y. o. woman with a history of vessel cerebrovascular disease, strokes, vascular dementia, and intractable seizures who is following up. She has had high BP lately and Dr. Lindsay Castellanos has had to increase her BP medications. She has been having left temporal head pains/aching. She gets nauseated. This comes and goes and may last minutes or hours. She has had to take Fioricet a few times. She is getting left eye injections for wet macular degeneration lately. She has little vision in the right eye. She is able to ambulate a little. She is now living in her home with her daughter.         Objective:     Past Medical History:  Past Medical History:   Diagnosis Date    Age-related macular degeneration, wet, right eye ([address]     Anemia     Back pain     Chronic bilateral low back pain with left-sided sciatica 2019    Colitis, ischemic ([address]     Dementia ([address]     Diverticulosis     Dry age-related macular degeneration of left eye     Hyperlipidemia     Hypertension     Osteoarthritis     Palliative care patient 2019    Risk for falls 2019    Seizures (HCC)     Spastic colon     Stroke syndrome     Urinary incontinence     Uterine cancer [facility])        Past Surgical History:   Procedure Laterality Date    BREAST SURGERY Right     FNA Right Breast \"oh heavens, maybe 20 years ago\"    CHOLECYSTECTOMY      COLONOSCOPY  03    Dr Donna Westbrook ischemic colitis, incomplete exam    COLONOSCOPY  2003    Dr Bustos Innocent:  limited colon-ischemic colitis    DILATION AND CURETTAGE OF UTERUS      x2, in [facility] tears, laser suregry, adn cataract with IOL b/l    HYSTERECTOMY      ovaries and tubes    LUMBAR FUSION       90 days after the spine surgeries    OTHER SURGICAL HISTORY      inner lymph nodes    SPINE SURGERY      L3,4, and 5   done in    [place] 76      as a child at age 9    [address]  [address]    DR Karlie Henderson:  Duodenal scarring but no evidence of active ulcer disease. Recent Hospitalizations  · None    Significant Injuries  · None    Habits  Grace Wilburn reports that she has never smoked. She has never used smokeless tobacco. She reports that she does not drink alcohol or use drugs. Family History   Problem Relation Age of Onset    Heart Defect Mother     Hypertension Mother     Heart Attack Father         x3 within 24 h and then , abused ciggarettes    No Known Problems Daughter     No Known Problems Son     No Known Problems Son     Colon Cancer Neg Hx     Colon Polyps Neg Hx     Esophageal Cancer Neg Hx     Liver Cancer Neg Hx     Liver Disease Neg Hx     Rectal Cancer Neg Hx     Stomach Cancer Neg Hx        Social History  Grace Wilburn is , lives in [place], [place], and is retired.     Medications:  Current Outpatient Medications   Medication Sig Dispense Refill    Docosanol (ABREVA EX) Apply topically      hydrALAZINE (APRESOLINE) 25 MG tablet Take 25 mg by mouth 2 times daily Patient takes 1/2 tab at 9:00 am and 5:00 pm      nitrofurantoin (MACRODANTIN) 50 MG capsule TAKE 1 CAPSULE AT BEDTIME 30 capsule 3    simethicone (GAS-X EXTRA STRENGTH) 125 MG chewable tablet Take 125 mg by mouth 2 PO 5:30 AM,1- 2 10:30 AM, 1 4:00 PM and PRN      (VERAMYST) 27.5 MCG/SPRAY nasal spray 2 sprays by Nasal route 2 times daily       camphor-menthol (SARNA) 0.5-0.5 % lotion Apply 0.5 mLs topically 2 times daily as needed for Itching Apply topically as needed.  acetaminophen (TYLENOL) 500 MG tablet Take 2 tablets by mouth every 6 hours as needed for Pain Do not exceed 4 g of acetaminophen daily (patient is also on Fioricet PRN) 100 tablet 0    cloNIDine (CATAPRES) 0.1 MG tablet Take 1 tablet by mouth 2 times daily (Patient taking differently: Take 0.1 mg by mouth as needed for High Blood Pressure (for BP greater than 160/100) ) 60 tablet 0    donepezil (ARICEPT) 10 MG tablet Take 1 tablet by mouth nightly 30 tablet 0    Calcium Carb-Cholecalciferol (CALCIUM 600+D) 600-800 MG-UNIT TABS Take 1 tablet by mouth daily Indications: AT 2:30 PM       promethazine (PHENERGAN) 25 MG tablet Take 25 mg by mouth 3 times daily as needed for Nausea      docusate sodium (COLACE) 100 MG capsule Take 100 mg by mouth nightly as needed for Constipation       Omega 3-6-9 Fatty Acids (OMEGA 3-6-9 COMPLEX PO) Take 2 tablets by mouth daily Indications: AT 2:30 PM       alendronate (FOSAMAX) 70 MG tablet Take 70 mg by mouth every 7 days Wednesday at [address] [place] [address] 5 MG tablet Take 5 mg by mouth nightly Indications: nightly       pravastatin (PRAVACHOL) 20 MG tablet Take 20 mg by mouth every morning       ramipril (ALTACE) 10 MG capsule Take 10 mg by mouth 2 times daily       b complex vitamins capsule Take 1 capsule by mouth daily Indications: AT 2:30 PM B 100      Multiple Vitamins-Minerals (THERAPEUTIC MULTIVITAMIN-MINERALS) tablet Take 1 tablet by mouth daily Indications: AT 2:30 PM        No current facility-administered medications for this visit. Allergies:   Allergies as of 06/11/2020 - Review Complete 06/11/2020   Allergen Reaction Noted    Aspirin Shortness Of Breath 03/17/2016    Dilaudid [hydromorphone hcl]  03/17/2016    Ondansetron  03/17/2016    mild peripheral edema, No cyanosis or clubbing. No carotid bruits. Pulmonary:  Lungs are clear to auscultation. Breathing appears normal, good expansion, normal effort without use of accessory muscles  Musculoskeletal:  Joints are arthritic   Integument:  No rash, erythema, or pallor  Psychiatric:  Mood, affect, and behavior appear normal      NEUROLOGIC EXAMINATION:  Mental Status:  alert, oriented to person, place, and time. Speech:  Clear without dysarthria or dysphonia  Language:  Fluent without aphasia  Cranial Nerves:   II Visual fields are full to confrontation   III,IV, VI Extraocular movements are full   VII Facial movements are symmetrical without weakness   VIII Hearing is intact   IX,X Shoulder shrug and head rotation strength are intact   XII No tongue atrophy or fasciculations. Normal tongue protrusion. No tongue weakness  Motor:  Normal strength in both upper and lower extremities. Normal muscle tone and bulk. Vazquez's signs are absent bilaterally. There is no ankle clonus on either side. Coordination:  Rapid alternating movements are normal in both upper and lower extremities. Finger to nose testing is unimpaired bilaterally. Assessment:       ICD-10-CM    1. Partial symptomatic epilepsy with complex partial seizures, intractable, without status epilepticus ([address] G40.219    2. Small vessel cerebrovascular accident (CVA) ([facility] I63.9    3. Vascular dementia without behavioral disturbance (HCC) F01.50    4. Other headache syndrome G44.89    Ms. Fracisco Kenyon has no complaints but her daughter says she has numerous symptoms that are all nonspecific and some fleeting. Plan:   1. Try a different BP cuff  2. If that does not help, will restart gabapentin  3. Will arrange CT head without contrast  4. Continue same medications  5.  FU in 3 months    Electronically signed by Maximus Franco MD on 6/11/20

## 2020-06-12 ENCOUNTER — HOSPITAL ENCOUNTER (OUTPATIENT)
Dept: CT IMAGING | Age: 85
Discharge: HOME | End: 2020-06-12
Payer: MEDICARE

## 2020-06-12 PROCEDURE — 70450 CT HEAD/BRAIN W/O DYE: CPT

## 2020-06-16 ENCOUNTER — TELEPHONE (OUTPATIENT)
Dept: NEUROLOGY | Age: 85
End: 2020-06-16

## 2020-06-17 ENCOUNTER — TELEPHONE (OUTPATIENT)
Dept: NEUROLOGY | Age: 85
End: 2020-06-17

## 2020-06-19 RX ORDER — GABAPENTIN 100 MG/1
100 CAPSULE ORAL 3 TIMES DAILY
Qty: 90 CAPSULE | Refills: 5 | Status: SHIPPED | OUTPATIENT
Start: 2020-06-19 | End: 2020-09-24 | Stop reason: SDUPTHER

## 2020-06-19 NOTE — TELEPHONE ENCOUNTER
Spoke to patients daughter and instructed to restart gabapentin. She will titrate up to BID or TID if needed.

## 2020-06-26 ENCOUNTER — NURSE ONLY (OUTPATIENT)
Dept: UROLOGY | Age: 85
End: 2020-06-26
Payer: MEDICARE

## 2020-06-26 PROCEDURE — 64566 NEUROELTRD STIM POST TIBIAL: CPT | Performed by: NURSE PRACTITIONER

## 2020-06-26 NOTE — PROGRESS NOTES
Systems   Constitutional: Negative for chills and fever. Genitourinary: Positive for frequency and urgency. Negative for difficulty urinating. All other systems reviewed and are negative. Objective:     Physical Exam  Vitals signs reviewed. Constitutional:       General: She is not in acute distress. Appearance: She is well-developed. HENT:      Head: Normocephalic and atraumatic. Eyes:      Conjunctiva/sclera: Conjunctivae normal.      Pupils: Pupils are equal, round, and reactive to light. Neck:      Musculoskeletal: Normal range of motion and neck supple. Cardiovascular:      Rate and Rhythm: Normal rate. Pulmonary:      Effort: Pulmonary effort is normal. No respiratory distress. Abdominal:      Palpations: Abdomen is soft. Musculoskeletal: Normal range of motion. Skin:     General: Skin is warm and dry. Neurological:      Mental Status: She is alert and oriented to person, place, and time. Psychiatric:         Behavior: Behavior normal.         Thought Content: Thought content normal.         Judgment: Judgment normal.           Assessment/ Plan       Diagnosis Orders   1. OAB (overactive bladder)          Discussed use, benefit, and side effects of prescribed medications. All patient questions answered. Pt voiced understanding. Patient agreed with treatment plan. Electronically signed by LAYTON Almanzar on 6/26/2020 at 3:55 PM     EMR dragon/transcription disclaimer: Much of this encounter note is electronic transcription/translation of spoken language to printed tach. Electronic translation of spoken language may be erroneous, or at times, nonsensical words or phrases may be inadvertently transcribed.  Although, I have reviewed the note for such errors, some may still exist.

## 2020-07-21 ENCOUNTER — NURSE ONLY (OUTPATIENT)
Dept: UROLOGY | Age: 85
End: 2020-07-21
Payer: MEDICARE

## 2020-07-21 PROCEDURE — 64566 NEUROELTRD STIM POST TIBIAL: CPT | Performed by: NURSE PRACTITIONER

## 2020-07-21 NOTE — PROGRESS NOTES
b/l    HYSTERECTOMY      ovaries and tubes    LUMBAR FUSION       90 days after the spine surgeries    OTHER SURGICAL HISTORY      inner lymph nodes    SPINE SURGERY      L3,4, and 5   done in 2012   [address] 76      as a child at age 9    [facility]  2003    DR Sandrine Crockett:  Duodenal scarring but no evidence of active ulcer disease. Family History   Problem Relation Age of Onset    Heart Defect Mother     Hypertension Mother     Heart Attack Father         x3 within 24 h and then , abused ciggarettes    No Known Problems Daughter     No Known Problems Son     No Known Problems Son     Colon Cancer Neg Hx     Colon Polyps Neg Hx     Esophageal Cancer Neg Hx     Liver Cancer Neg Hx     Liver Disease Neg Hx     Rectal Cancer Neg Hx     Stomach Cancer Neg Hx        Social History     Tobacco Use    Smoking status: Never Smoker    Smokeless tobacco: Never Used   Substance Use Topics    Alcohol use: Never     Frequency: Never      Current Outpatient Medications   Medication Sig Dispense Refill    gabapentin (NEURONTIN) 100 MG capsule Take 1 capsule by mouth 3 times daily for 180 days.  Intended supply: 30 days 90 capsule 5    Docosanol (ABREVA EX) Apply topically      levETIRAcetam (KEPPRA) 500 MG tablet Take 1 tablet by mouth 2 times daily 60 tablet 11    dipyridamole (PERSANTINE) 50 MG tablet Take 2 tablets by mouth 2 times daily 120 tablet 11    hydrALAZINE (APRESOLINE) 25 MG tablet Take 25 mg by mouth 2 times daily Patient takes 1/2 tab at 9:00 am and 5:00 pm      nitrofurantoin (MACRODANTIN) 50 MG capsule TAKE 1 CAPSULE AT BEDTIME 30 capsule 3    simethicone (GAS-X EXTRA STRENGTH) 125 MG chewable tablet Take 125 mg by mouth 2 PO 5:30 AM,1- 2 10:30 AM, 1 4:00 PM and PRN      butalbital-acetaminophen-caffeine (FIORICET, ESGIC) -40 MG per tablet Take 1 tablet by mouth every 4 hours as needed for Headaches      amLODIPine (NORVASC) 5 MG tablet Take 5 mg by mouth every morning (before breakfast)       amLODIPine (NORVASC) 2.5 MG tablet Take 2.5 mg by mouth nightly      diclofenac sodium 1 % GEL Apply 2 g topically 4 times daily as needed for Pain      sodium chloride 1 g tablet Take 1 g by mouth 3 times daily      lidocaine 4 % external patch Place 1 patch onto the skin daily as needed (LEFT LOWER BACK PAIN)      NONFORMULARY Indications: dic 3%, lidocaine 2%, cyclo 2%, prilo 2% baclo 2% 1 to 2 pumps up to TID PRN       desonide (DESOWEN) 0.05 % cream Apply topically as needed Apply topically 2 times daily.  mupirocin (BACTROBAN) 2 % cream Apply topically as needed Apply topically 3 times daily.  valACYclovir (VALTREX) 1 g tablet as needed       Psyllium (METAMUCIL FIBER PO) Take by mouth nightly       tiotropium (SPIRIVA) 18 MCG inhalation capsule Inhale 18 mcg into the lungs nightly      azelastine (ASTELIN) 0.1 % nasal spray 2 sprays by Nasal route 2 times daily       Cholecalciferol (VITAMIN D3) 5000 units TABS Take 1,000 Units by mouth daily Indications: AT 2:30 PM       benzonatate (TESSALON) 100 MG capsule Take 200 mg by mouth nightly       polyvinyl alcohol-povidone (HYPOTEARS) 1.4-0.6 % ophthalmic solution Place 1-2 drops into both eyes as needed      sodium chloride (OCEAN, BABY AYR) 0.65 % nasal spray 1 spray by Nasal route as needed for Congestion      tobramycin-dexamethasone (TOBRADEX) 0.3-0.1 % ophthalmic ointment Place into the left eye as needed 3 times daily.  Lutein 20 MG TABS Take 20 mg by mouth daily Indications: 2:30 PM DAILY       fluticasone (VERAMYST) 27.5 MCG/SPRAY nasal spray 2 sprays by Nasal route 2 times daily       camphor-menthol (SARNA) 0.5-0.5 % lotion Apply 0.5 mLs topically 2 times daily as needed for Itching Apply topically as needed.       acetaminophen (TYLENOL) 500 MG tablet Take 2 tablets by mouth every 6 hours as needed for Pain Do not exceed 4 g of acetaminophen daily (patient is also on Fioricet PRN) 100 tablet 0    cloNIDine (CATAPRES) 0.1 MG tablet Take 1 tablet by mouth 2 times daily (Patient taking differently: Take 0.1 mg by mouth as needed for High Blood Pressure (for BP greater than 160/100) ) 60 tablet 0    donepezil (ARICEPT) 10 MG tablet Take 1 tablet by mouth nightly 30 tablet 0    Calcium Carb-Cholecalciferol (CALCIUM 600+D) 600-800 MG-UNIT TABS Take 1 tablet by mouth daily Indications: AT 2:30 PM       promethazine (PHENERGAN) 25 MG tablet Take 25 mg by mouth 3 times daily as needed for Nausea      docusate sodium (COLACE) 100 MG capsule Take 100 mg by mouth nightly as needed for Constipation       Omega 3-6-9 Fatty Acids (OMEGA 3-6-9 COMPLEX PO) Take 2 tablets by mouth daily Indications: AT 2:30 PM       alendronate (FOSAMAX) 70 MG tablet Take 70 mg by mouth every 7 days Wednesday at [address] 5 MG tablet Take 5 mg by mouth nightly Indications: nightly       pravastatin (PRAVACHOL) 20 MG tablet Take 20 mg by mouth every morning       ramipril (ALTACE) 10 MG capsule Take 10 mg by mouth 2 times daily       b complex vitamins capsule Take 1 capsule by mouth daily Indications: AT 2:30 PM B 100      Multiple Vitamins-Minerals (THERAPEUTIC MULTIVITAMIN-MINERALS) tablet Take 1 tablet by mouth daily Indications: AT 2:30 PM        No current facility-administered medications for this visit.       Allergies   Allergen Reactions    Aspirin Shortness Of Breath    Dilaudid [Hydromorphone Hcl]     Ondansetron     Quinolones     Spironolactone      Low sodium    Sulfa Antibiotics     Codeine Rash and Other (See Comments)     Makes her \"crazy and mean\" and gives her a rash    Demerol Hcl [Meperidine] Other (See Comments)     Made her \"crazy and mean\", and \"loopy\"    Levaquin [Levofloxacin In D5w] Other (See Comments)     Seizure like activity      Myrbetriq [Mirabegron] Other (See Comments)     Unable to micturate    Namenda [Memantine Hcl] Other (See Comments)     made her \"loopy\"    Norco [Hydrocodone-Acetaminophen] Rash and Other (See Comments)     \"crazy and mean\"    Pneumococcal Vaccines Swelling         Subjective:      Review of Systems   Constitutional: Negative for chills and fever. Genitourinary: Negative for difficulty urinating, dysuria, frequency and urgency. Musculoskeletal: Positive for arthralgias, back pain and myalgias. All other systems reviewed and are negative. Objective:     Physical Exam  Vitals signs reviewed. Constitutional:       General: She is not in acute distress. Appearance: She is well-developed. HENT:      Head: Normocephalic and atraumatic. Eyes:      Conjunctiva/sclera: Conjunctivae normal.      Pupils: Pupils are equal, round, and reactive to light. Neck:      Musculoskeletal: Normal range of motion and neck supple. Cardiovascular:      Rate and Rhythm: Normal rate. Pulmonary:      Effort: Pulmonary effort is normal. No respiratory distress. Abdominal:      Palpations: Abdomen is soft. Musculoskeletal: Normal range of motion. Skin:     General: Skin is warm and dry. Neurological:      Mental Status: She is alert and oriented to person, place, and time. Psychiatric:         Behavior: Behavior normal.         Thought Content: Thought content normal.         Judgment: Judgment normal.           Assessment/ Plan       Diagnosis Orders   1. Overactive bladder  TX POST TIBIAL NEUROSTIMULATION,PERC NEEDLE ELECTRODE     Patient will return in 24 days for her next uro-plasty   Discussed use, benefit, and side effects of prescribed medications. All patient questions answered. Pt voiced understanding. Patient agreed with treatment plan. Electronically signed by LAYTON Brown on 7/22/2020 at 6:04 PM     EMR dragon/transcription disclaimer: Much of this encounter note is electronic transcription/translation of spoken language to printed tach.  Electronic

## 2020-07-22 ASSESSMENT — ENCOUNTER SYMPTOMS: BACK PAIN: 1

## 2020-07-29 ENCOUNTER — TELEPHONE (OUTPATIENT)
Dept: NEUROLOGY | Age: 85
End: 2020-07-29

## 2020-07-29 NOTE — TELEPHONE ENCOUNTER
Patient daughter Diallo Kunz (on HIPAA), states that she is having trouble getting the patient Fioricet refilled due to Dr Reina Gallego still needing to complete some type of paper work. Patient daughter states that Dr Page Pope refilled her Fioricet one time in place of Dr Reina Gallego because Dr Reina Gallego was out of office and the patient needed a refill. Patient daughter states that she has been getting the run around for weeks now just trying to get the patient Fioricet filled and she is getting to the point where she is frustrated because no one will listen to her when she is telling them that Dr Reina Gallego is the provider who originally filled the medication the first time, and not the patient PCP. Patient daughter states that the last fill of her Fioricet came from Dr Brenda Britt around August of last year; ([place] [facility] confirms this), and the patient now has two tablets left and is in severe pain. Patient daughter states that the patient only received 15 tablets total, and that was due to it being a temp refill until Dr Reina Gallego could refill it for her again. Patient daughter would like some answers and for the medication to be filled for her mother (the patient) and she needs a call back as soon as this has been completed. Dr Reina Gallego please advise, thank you!

## 2020-08-04 NOTE — TELEPHONE ENCOUNTER
I do not know what she is talking about. I know nothing of any paperwork. There is no paperwork under media. Moreover, no one at this office has ever prescribed Fioricet for her, that dates back over 4 years. None of the office visits listed Fioricet as a current medication. None of the [facility] have that medication being prescribed. That medication was requested on 7/24 but denied due to the above.

## 2020-08-05 NOTE — TELEPHONE ENCOUNTER
Called and left a detailed message. Tanisha Greco to call the office back if she has any further concerns and or questions.

## 2020-08-13 ENCOUNTER — NURSE ONLY (OUTPATIENT)
Dept: UROLOGY | Age: 85
End: 2020-08-13
Payer: MEDICARE

## 2020-08-13 PROCEDURE — 64566 NEUROELTRD STIM POST TIBIAL: CPT | Performed by: PHYSICIAN ASSISTANT

## 2020-08-14 ASSESSMENT — ENCOUNTER SYMPTOMS
CONSTIPATION: 0
ABDOMINAL PAIN: 0
DIARRHEA: 0
EYE REDNESS: 0
SHORTNESS OF BREATH: 0
COUGH: 0
EYE DISCHARGE: 0
BACK PAIN: 0
WHEEZING: 0

## 2020-08-14 NOTE — PROGRESS NOTES
Alecia Barrientos is a 80 y.o. female who presents today   Chief Complaint   Patient presents with    Follow-up     I am here for my uroplasty      HPI:  Patient with history of overactive bladder here for her maintenance uro-plasty treatment. She has treatment approximately every 23 to 24 days due to the fact that if she waits a full month her symptoms worsen prior to her next treatment she has done well with this timeframe. Still continues to have good response to the treatments. Past Medical History:   Diagnosis Date    Age-related macular degeneration, wet, right eye ([address]     Anemia     Back pain     Chronic bilateral low back pain with left-sided sciatica 12/19/2019    Colitis, ischemic ([address]     Dementia ([address]     Diverticulosis     Dry age-related macular degeneration of left eye     Hyperlipidemia     Hypertension     Osteoarthritis     Palliative care patient 09/17/2019    Risk for falls 9/13/2019    Seizures (HCC)     Spastic colon     Stroke syndrome     Urinary incontinence     Uterine cancer [facility])        Past Surgical History:   Procedure Laterality Date    BREAST SURGERY Right     FNA Right Breast \"oh heavens, maybe 20 years ago\"    CHOLECYSTECTOMY      COLONOSCOPY  9/25/03    Dr Herminia Kaur ischemic colitis, incomplete exam    COLONOSCOPY  08/29/2003    Dr Faustino Owens:  limited colon-ischemic colitis    DILATION AND CURETTAGE OF UTERUS      x2, in [facility] tears, laser suregry, adn cataract with IOL b/l    HYSTERECTOMY      ovaries and tubes    LUMBAR FUSION      2012 90 days after the spine surgeries    OTHER SURGICAL HISTORY      inner lymph nodes    SPINE SURGERY      L3,4, and 5   done in 2012   Nay Le[address]uga 76      as a child at age 9    [address]  08/28/2003    DR Yelitza Ty:  Duodenal scarring but no evidence of active ulcer disease.        Current Outpatient Medications   Medication Sig Dispense Refill    gabapentin (NEURONTIN) 100 MG capsule Take 1 capsule by mouth 3 times daily for 180 days. Intended supply: 30 days 90 capsule 5    Docosanol (ABREVA EX) Apply topically      levETIRAcetam (KEPPRA) 500 MG tablet Take 1 tablet by mouth 2 times daily 60 tablet 11    dipyridamole (PERSANTINE) 50 MG tablet Take 2 tablets by mouth 2 times daily 120 tablet 11    hydrALAZINE (APRESOLINE) 25 MG tablet Take 25 mg by mouth 2 times daily Patient takes 1/2 tab at 9:00 am and 5:00 pm      nitrofurantoin (MACRODANTIN) 50 MG capsule TAKE 1 CAPSULE AT BEDTIME 30 capsule 3    simethicone (GAS-X EXTRA STRENGTH) 125 MG chewable tablet Take 125 mg by mouth 2 PO 5:30 AM,1- 2 10:30 AM, 1 4:00 PM and PRN      butalbital-acetaminophen-caffeine (FIORICET, ESGIC) -40 MG per tablet Take 1 tablet by mouth every 4 hours as needed for Headaches      amLODIPine (NORVASC) 5 MG tablet Take 5 mg by mouth every morning (before breakfast)       amLODIPine (NORVASC) 2.5 MG tablet Take 2.5 mg by mouth nightly      diclofenac sodium 1 % GEL Apply 2 g topically 4 times daily as needed for Pain      sodium chloride 1 g tablet Take 1 g by mouth 3 times daily      lidocaine 4 % external patch Place 1 patch onto the skin daily as needed (LEFT LOWER BACK PAIN)      NONFORMULARY Indications: dic 3%, lidocaine 2%, cyclo 2%, prilo 2% baclo 2% 1 to 2 pumps up to TID PRN       desonide (DESOWEN) 0.05 % cream Apply topically as needed Apply topically 2 times daily.  mupirocin (BACTROBAN) 2 % cream Apply topically as needed Apply topically 3 times daily.       valACYclovir (VALTREX) 1 g tablet as needed       Psyllium (METAMUCIL FIBER PO) Take by mouth nightly       tiotropium (SPIRIVA) 18 MCG inhalation capsule Inhale 18 mcg into the lungs nightly      azelastine (ASTELIN) 0.1 % nasal spray 2 sprays by Nasal route 2 times daily       Cholecalciferol (VITAMIN D3) 5000 units TABS Take 1,000 Units by mouth daily Indications: AT 2:30 PM       benzonatate (TESSALON) 100 MG capsule Take 200 mg by mouth nightly       polyvinyl alcohol-povidone (HYPOTEARS) 1.4-0.6 % ophthalmic solution Place 1-2 drops into both eyes as needed      sodium chloride (OCEAN, BABY AYR) 0.65 % nasal spray 1 spray by Nasal route as needed for Congestion      tobramycin-dexamethasone (TOBRADEX) 0.3-0.1 % ophthalmic ointment Place into the left eye as needed 3 times daily.  Lutein 20 MG TABS Take 20 mg by mouth daily Indications: 2:30 PM DAILY       fluticasone (VERAMYST) 27.5 MCG/SPRAY nasal spray 2 sprays by Nasal route 2 times daily       camphor-menthol (SARNA) 0.5-0.5 % lotion Apply 0.5 mLs topically 2 times daily as needed for Itching Apply topically as needed.       acetaminophen (TYLENOL) 500 MG tablet Take 2 tablets by mouth every 6 hours as needed for Pain Do not exceed 4 g of acetaminophen daily (patient is also on Fioricet PRN) 100 tablet 0    cloNIDine (CATAPRES) 0.1 MG tablet Take 1 tablet by mouth 2 times daily (Patient taking differently: Take 0.1 mg by mouth as needed for High Blood Pressure (for BP greater than 160/100) ) 60 tablet 0    donepezil (ARICEPT) 10 MG tablet Take 1 tablet by mouth nightly 30 tablet 0    Calcium Carb-Cholecalciferol (CALCIUM 600+D) 600-800 MG-UNIT TABS Take 1 tablet by mouth daily Indications: AT 2:30 PM       promethazine (PHENERGAN) 25 MG tablet Take 25 mg by mouth 3 times daily as needed for Nausea      docusate sodium (COLACE) 100 MG capsule Take 100 mg by mouth nightly as needed for Constipation       Omega 3-6-9 Fatty Acids (OMEGA 3-6-9 COMPLEX PO) Take 2 tablets by mouth daily Indications: AT 2:30 PM       alendronate (FOSAMAX) 70 MG tablet Take 70 mg by mouth every 7 days Wednesday at [address] [facility] 5 MG tablet Take 5 mg by mouth nightly Indications: nightly       pravastatin (PRAVACHOL) 20 MG tablet Take 20 mg by mouth every morning       ramipril (ALTACE) 10 MG capsule Take 10 mg by mouth 2 times daily       b complex vitamins capsule Take 1 capsule by mouth daily Indications: AT 2:30 PM B 100      Multiple Vitamins-Minerals (THERAPEUTIC MULTIVITAMIN-MINERALS) tablet Take 1 tablet by mouth daily Indications: AT 2:30 PM        No current facility-administered medications for this visit.         Allergies   Allergen Reactions    Aspirin Shortness Of Breath    Dilaudid [Hydromorphone Hcl]     Ondansetron     Quinolones     Spironolactone      Low sodium    Sulfa Antibiotics     Codeine Rash and Other (See Comments)     Makes her \"crazy and mean\" and gives her a rash    Demerol Hcl [Meperidine] Other (See Comments)     Made her \"crazy and mean\", and \"loopy\"    Levaquin [Levofloxacin In D5w] Other (See Comments)     Seizure like activity      Myrbetriq [Mirabegron] Other (See Comments)     Unable to micturate    Namenda [Memantine Hcl] Other (See Comments)     made her \"loopy\"    Norco [Hydrocodone-Acetaminophen] Rash and Other (See Comments)     \"crazy and mean\"    Pneumococcal Vaccines Swelling       Social History     Socioeconomic History    Marital status:      Spouse name: None    Number of children: 3    Years of education: None    Highest education level: None   Occupational History    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mployee of [employer]    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,mployee of [employer]   Social Needs    Financial resource strain: None    Food insecurity     Worry: None     Inability: None    Transportation needs     Medical: None     Non-medical: None   Tobacco Use    Smoking status: Never Smoker    Smokeless tobacco: Never Used   Substance and Sexual Activity    Alcohol use: Never     Frequency: Never    Drug use: Never    Sexual activity: None     Comment: has 3 kids   Lifestyle    Physical activity     Days per week: None     Minutes per session: None    Stress: None   Relationships    Social connections     Talks on phone: None Gets together: None     Attends Gnosticist service: None     Active member of club or organization: None     Attends meetings of clubs or organizations: None     Relationship status: None    Intimate partner violence     Fear of current or ex partner: None     Emotionally abused: None     Physically abused: None     Forced sexual activity: None   Other Topics Concern    None   Social History Narrative    CODE STATUS: [id number]    HEALTH CARE PROXY / [employer] and Healthcare: her daughter, Mrs. Brook Nascimento, [phone]    AMBULATES: with walker during day, wheelchair at night    DOMICILED: lives in the [facility] assisted living [facility], has no stairs or steps, has a cat, her daughter is there periodically       Family History   Problem Relation Age of Onset    Heart Defect Mother     Hypertension Mother     Heart Attack Father         x3 within 24 h and then , abused ciggarettes    No Known Problems Daughter     No Known Problems Son     No Known Problems Son     Colon Cancer Neg Hx     Colon Polyps Neg Hx     Esophageal Cancer Neg Hx     Liver Cancer Neg Hx     Liver Disease Neg Hx     Rectal Cancer Neg Hx     Stomach Cancer Neg Hx        REVIEW OF SYSTEMS:  Review of Systems   Constitutional: Negative for chills and fever. HENT: Negative for congestion and hearing loss. Eyes: Negative for discharge and redness. Respiratory: Negative for cough, shortness of breath and wheezing. Cardiovascular: Negative for leg swelling. Gastrointestinal: Negative for abdominal pain, constipation and diarrhea. Endocrine: Negative for cold intolerance and heat intolerance. Genitourinary: Negative for decreased urine volume, difficulty urinating, dysuria, enuresis, flank pain, frequency, genital sores, hematuria and urgency. Musculoskeletal: Negative for back pain and gait problem. Skin: Negative for rash. Allergic/Immunologic: Negative for environmental allergies.    Neurological: Negative for dizziness, weakness and headaches. Hematological: Does not bruise/bleed easily. Psychiatric/Behavioral: Negative for behavioral problems, confusion and sleep disturbance. PHYSICAL EXAM:  There were no vitals taken for this visit. Physical Exam  Constitutional:       General: She is not in acute distress. Appearance: Normal appearance. She is not ill-appearing. HENT:      Head: Normocephalic and atraumatic. Right Ear: External ear normal.      Left Ear: External ear normal.   Eyes:      General: No scleral icterus. Right eye: No discharge. Left eye: No discharge. Conjunctiva/sclera: Conjunctivae normal.   Pulmonary:      Effort: Pulmonary effort is normal.   Musculoskeletal:      Right lower leg: No edema. Left lower leg: No edema. Skin:     General: Skin is warm and dry. Findings: No rash. Neurological:      Mental Status: She is alert. Mental status is at baseline. Psychiatric:         Mood and Affect: Mood normal.         Behavior: Behavior normal.         1. Patient is seated with the right treatment leg elevated. 2. 34 gauge fine needle electrode is inserted into lower inner aspect of the leg. Slightly cephalad to the medial malleolus. 3. Surface electrode pad is placed over the medial aspect of the calcaneus on the same leg. 4.Needle electrode is connected to the external pulse generator. 5.The pulse generator is turned on and the millivolts used are 7. 6.Patient is treated for 30 minutes. 7.The needle and pad are removed. 1. Overactive bladder  Patient here for her maintenance treatment she is done well with the posterior tibial nerve stimulation treatments symptoms are markedly improved from before treatment will continue treating every 23 to 24 days due to the fact of patient goes a full month her symptoms worsen considerably prior to the next treatment.   - LA POST TIBIAL NEUROSTIMULATION,PERC NEEDLE ELECTRODE      Orders Placed This Encounter   Procedures    [place] POST TIBIAL NEUROSTIMULATION,PERC NEEDLE ELECTRODE     No orders of the defined types were placed in this encounter. Plan:  Follow-up in 23 to 24 days for next treatment.

## 2020-09-02 ENCOUNTER — OFFICE VISIT (OUTPATIENT)
Dept: CARDIOLOGY | Age: 85
End: 2020-09-02
Payer: MEDICARE

## 2020-09-02 VITALS
SYSTOLIC BLOOD PRESSURE: 122 MMHG | HEART RATE: 56 BPM | WEIGHT: 126 LBS | DIASTOLIC BLOOD PRESSURE: 60 MMHG | BODY MASS INDEX: 24.74 KG/M2 | HEIGHT: 60 IN

## 2020-09-02 PROBLEM — R07.9 CHEST PAIN: Status: ACTIVE | Noted: 2020-09-02

## 2020-09-02 PROBLEM — I34.0 MODERATE MITRAL REGURGITATION: Status: ACTIVE | Noted: 2020-09-02

## 2020-09-02 PROBLEM — R94.31 ABNORMAL EKG: Status: ACTIVE | Noted: 2020-09-02

## 2020-09-02 PROBLEM — I10 ESSENTIAL HYPERTENSION: Status: ACTIVE | Noted: 2020-09-02

## 2020-09-02 PROCEDURE — G8427 DOCREV CUR MEDS BY ELIG CLIN: HCPCS | Performed by: NURSE PRACTITIONER

## 2020-09-02 PROCEDURE — 1090F PRES/ABSN URINE INCON ASSESS: CPT | Performed by: NURSE PRACTITIONER

## 2020-09-02 PROCEDURE — 93000 ELECTROCARDIOGRAM COMPLETE: CPT | Performed by: NURSE PRACTITIONER

## 2020-09-02 PROCEDURE — G8400 PT W/DXA NO RESULTS DOC: HCPCS | Performed by: NURSE PRACTITIONER

## 2020-09-02 PROCEDURE — 4040F PNEUMOC VAC/ADMIN/RCVD: CPT | Performed by: NURSE PRACTITIONER

## 2020-09-02 PROCEDURE — 1123F ACP DISCUSS/DSCN MKR DOCD: CPT | Performed by: NURSE PRACTITIONER

## 2020-09-02 PROCEDURE — 1036F TOBACCO NON-USER: CPT | Performed by: NURSE PRACTITIONER

## 2020-09-02 PROCEDURE — 99213 OFFICE O/P EST LOW 20 MIN: CPT | Performed by: NURSE PRACTITIONER

## 2020-09-02 PROCEDURE — G8420 CALC BMI NORM PARAMETERS: HCPCS | Performed by: NURSE PRACTITIONER

## 2020-09-02 NOTE — PROGRESS NOTES
[facility], [place]. [address] [facility]  [phone] office  [phone] fax      OFFICE VISIT:  2020    Jose Meade - : 1935  Reason For Visit:  Elena Sow is a 80 y.o. female who is here for New Patient (Patient referred by Dr. Maria C Austin for hypertension, some chest pain off and on and SOA)    History:   Diagnosis Orders   1. Chest pain, unspecified type  NM MYOCARDIAL SPECT REST EXERCISE OR RX    ECHO Complete 2D W Doppler W Color   2. Essential hypertension  EKG 12 lead    ECHO Complete 2D W Doppler W Color   3. Moderate mitral regurgitation  ECHO Complete 2D W Doppler W Color   4. Abnormal EKG       The patient presents today as a new patient referral from Dr. Maria C Austin for chest pain. The patient reports no prior history of CAD. She has a history of strokes and seizures in the past under the care of Dr. Marquise Britt. The daughter reports hx of vasovagal syncope with BM in the past. No report of irregular heart rhythm. The patient has random episodes of left head pain which radiates to left neck, chest and arm. Onset . The patient had an echo in 2016 showing normal EF with mild to moderate MR.  BP is well controlled on current regimen. The patient reports family history of CAD - maternal side of family. PCP follows lipids. 9/3/19 LDL was 96. The patient had surgery in past for uterine cancer. Carlee Rowell denies shortness of breath, orthopnea, paroxysmal nocturnal dyspnea, syncope, presyncope, sensed arrhythmia, edema and fatigue. The patient denies numbness or weakness to suggest cerebrovascular accident or transient ischemic attack. + random episodes of left head pain radiating into left chest and arm. Onset .       Jose Meade has the following history as recorded in [facility]:  Patient Active Problem List   Diagnosis Code    Irritable bowel syndrome CURETTAGE OF UTERUS      x2, in [facility] tears, laser suregry, adn cataract with IOL b/l    HYSTERECTOMY      ovaries and tubes    LUMBAR FUSION       90 days after the spine surgeries    OTHER SURGICAL HISTORY      inner lymph nodes    SPINE SURGERY      L3,4, and 5   done in    [place]      as a child at age 9    [address]  [address]    DR Yelitza Ty:  Duodenal scarring but no evidence of active ulcer disease.      Family History   Problem Relation Age of Onset    Heart Defect Mother     Hypertension Mother     Heart Attack Father         x3 within 24 h and then , abused ciggarettes    No Known Problems Daughter     No Known Problems Son     No Known Problems Son     Colon Cancer Neg Hx     Colon Polyps Neg Hx     Esophageal Cancer Neg Hx     Liver Cancer Neg Hx     Liver Disease Neg Hx     Rectal Cancer Neg Hx     Stomach Cancer Neg Hx      Social History     Tobacco Use    Smoking status: Never Smoker    Smokeless tobacco: Never Used   Substance Use Topics    Alcohol use: Never     Frequency: Never      Current Outpatient Medications   Medication Sig Dispense Refill    Docosanol (ABREVA EX) Apply topically      levETIRAcetam (KEPPRA) 500 MG tablet Take 1 tablet by mouth 2 times daily 60 tablet 11    dipyridamole (PERSANTINE) 50 MG tablet Take 2 tablets by mouth 2 times daily 120 tablet 11    hydrALAZINE (APRESOLINE) 25 MG tablet Take 25 mg by mouth 3 times daily       nitrofurantoin (MACRODANTIN) 50 MG capsule TAKE 1 CAPSULE AT BEDTIME 30 capsule 3    simethicone (GAS-X EXTRA STRENGTH) 125 MG chewable tablet Take 125 mg by mouth 2 PO 5:30 AM,1- 2 10:30 AM, 1 4:00 PM and PRN      butalbital-acetaminophen-caffeine (FIORICET, ESGIC) -40 MG per tablet Take 1 tablet by mouth every 4 hours as needed for Headaches      amLODIPine (NORVASC) 5 MG tablet Take 5 mg by mouth 2 times daily       mouth nightly 30 tablet 0    Calcium Carb-Cholecalciferol (CALCIUM 600+D) 600-800 MG-UNIT TABS Take 1 tablet by mouth daily Indications: AT 2:30 PM       promethazine (PHENERGAN) 25 MG tablet Take 25 mg by mouth 3 times daily as needed for Nausea      docusate sodium (COLACE) 100 MG capsule Take 100 mg by mouth nightly as needed for Constipation       Omega 3-6-9 Fatty Acids (OMEGA 3-6-9 COMPLEX PO) Take 2 tablets by mouth daily Indications: AT 2:30 PM       alendronate (FOSAMAX) 70 MG tablet Take 70 mg by mouth every 7 days Wednesday at [address] 5 MG tablet Take 5 mg by mouth nightly Indications: nightly       pravastatin (PRAVACHOL) 20 MG tablet Take 20 mg by mouth every morning       ramipril (ALTACE) 10 MG capsule Take 10 mg by mouth 2 times daily       b complex vitamins capsule Take 1 capsule by mouth daily Indications: AT 2:30 PM B 100      Multiple Vitamins-Minerals (THERAPEUTIC MULTIVITAMIN-MINERALS) tablet Take 1 tablet by mouth daily Indications: AT 2:30 PM       gabapentin (NEURONTIN) 100 MG capsule Take 1 capsule by mouth 3 times daily for 180 days. Intended supply: 30 days (Patient not taking: Reported on 9/2/2020) 90 capsule 5     No current facility-administered medications for this visit. Allergies: Aspirin; Dilaudid [hydromorphone hcl]; Ondansetron; Quinolones; Spironolactone; Sulfa antibiotics; Codeine; Demerol hcl [meperidine]; Levaquin [levofloxacin in d5w]; [employer]; Namenda [memantine hcl]; Norco [hydrocodone-acetaminophen]; and Pneumococcal vaccines    Review of Systems  Constitutional - no appetite change, or unexpected weight change. No fever, chills or diaphoresis. No significant change in activity level or new onset of fatigue. HEENT - no significant rhinorrhea or epistaxis. No tinnitus or significant hearing loss. Eyes - no sudden vision change or amaurosis. No corneal arcus, xantholasma, subconjunctival hemorrhage or discharge.   Respiratory - no significant wheezing, stridor, apnea or cough. No dyspnea on exertion or shortness of air. Cardiovascular - no orthopnea or PND. No sensation of sustained arrythmia. No occurrence of slow heart rate. No palpitations. No claudication. + random episodes of left head pain radiating into left chest and arm. Onset 2/20. Gastrointestinal - no abdominal swelling or pain. No blood in stool. No severe constipation, diarrhea, nausea, or vomiting. Genitourinary - no dysuria, frequency, or urgency. No flank pain or hematuria. Musculoskeletal - no myalgia. Ambulates with walker. Reports issues with imbalance. + chronic back pain. Extremities - no clubbing, cyanosis or extremity edema. Skin - no color change or rash. No pallor. No new surgical incision. Neurologic - no speech difficulty, facial asymmetry or lateralizing weakness. No significant dizziness. Follows with neurology for seizure disorder. Hx of vasovagal syncope with BM. Hematologic - no easy bruising or excessive bleeding. Psychiatric - no severe anxiety or insomnia. No confusion. All other review of systems are negative. Objective  Vital Signs - /60   Pulse 56   Ht 5' (1.524 m)   Wt 126 lb (57.2 kg)   BMI 24.61 kg/m²   General - Joan Puente is alert, cooperative, and pleasant. Well groomed. No acute distress. Body habitus - Body mass index is 24.61 kg/m². HEENT - Head is normocephalic. No circumoral cyanosis. Dentition is normal.  EYES -   Lids normal without ptosis. No discharge, edema or subconjunctival hemorrhage. Neck - Symmetrical without apparent mass or lymphadenopathy. Respiratory - Normal respiratory effort without use of accessory muscles. Ausculatation reveals vesicular breath sounds without crackles, wheezes, rub or rhonchi. Cardiovascular - No jugular venous distention. Auscultation reveals regular rate and rhythm. No audible clicks, gallop or rub. No murmur.   No lower extremity varicosities. No carotid bruits. Abdominal -  No visible distention, mass or pulsations. Extremities - No clubbing or cyanosis. No statis dermatitis or ulcers. No edema. Musculoskeletal -   No Osler's nodes. No kyphosis or scoliosis. Gait is even and regular without limp or shuffle. Ambulates with walker. Skin -  Warm and dry; no rash or pallor. No new surgical wound. Neurological - No focal neurological deficits. Thought processes coherent. No apparent tremor. Oriented to person, place and time. Psychiatric -  Appropriate affect and mood. Assessment:     Diagnosis Orders   1. Chest pain, unspecified type  NM MYOCARDIAL SPECT REST EXERCISE OR RX    ECHO Complete 2D W Doppler W Color   2. Essential hypertension  EKG 12 lead    ECHO Complete 2D W Doppler W Color   3. Moderate mitral regurgitation  ECHO Complete 2D W Doppler W Color   4. Abnormal EKG       Data reviewed:  PROCEDURE DATE:  07/20/2016   M-MODE AND TWO-DIMENSIONAL ECHOCARDIOGRAM   1. The aortic root appears normal.  2.  There are sclerotic changes of the aortic valve with normal leaflet mobility. 3.  The mitral valve leaflets have normal thickness and mobility. 4.  The tricuspid valve leaflets have normal thickness and mobility. 5.  Concentric left ventricular hypertrophy, with normal left ventricular systolic function. The left ventricular ejection fraction is estimated to be 60% or greater. 6.  Normal right ventricular size and wall motion. 7.  Normal right atrial size. 8.  Normal left atrial size. 9.  No pericardial effusion or intracavitary thrombus noted.   COLOR FLOW AND DOPPLER WAVEFORM ANALYSIS  1. No aortic stenosis or insufficiency. 2.  The mitral inflow pattern appears normal.  There is mild to moderate mitral regurgitation. 3.  Mild to moderate tricuspid regurgitation. The right ventricular systolic pressure is estimated to be 38 mmHg.   IMPRESSION  1.   Normal left ventricular systolic function, with a BPM; negative T waves V 1-3; no ectopy    Hx left chest and arm pain with abnormal EKG - schedule Lexiscan rx to rule out ischemia. Moderate MR - check 2D echocardiogram.  HTN - normotensive on current regimen. Patient is compliant with medication regimen. BP Readings from Last 3 Encounters:   09/02/20 122/60   06/11/20 (!) 112/58   03/09/20 108/66    Pulse Readings from Last 3 Encounters:   09/02/20 56   06/11/20 64   03/09/20 60        Wt Readings from Last 3 Encounters:   09/02/20 126 lb (57.2 kg)   03/09/20 122 lb (55.3 kg)   02/23/20 122 lb (55.3 kg)     Plan  Previous cardiac history and records reviewed. Continue current medical management. Schedule Lexiscan rx and 2D echocardiogram.  Continue other current medications as directed. Continue to follow up with primary care provider for non cardiac medical problems. Call the office with any problems, questions or concerns at [phone]. Cardiology follow up: one month. Educational included in patient instructions. Heart health.      LAYTON Garcia

## 2020-09-02 NOTE — PATIENT INSTRUCTIONS
New instructions for today:    Patient Instructions:  Continue current medications as prescribed. Always keep a current medication list. Bring your medications to every office visit. Continue to follow up with primary care provider for non cardiac medical problems. Call the office with any problems, questions or concerns at [phone]. If you have been asked to keep a blood pressure log, do so for 2 weeks. Call the office to report readings to the triage nurse at [phone]. Follow up with cardiologist as scheduled. The following educational material has been included in this after visit summary for your review: Life simple 7. Heart health. Life simple 7  1) Manage blood pressure - high blood pressure is a major risk factor for heart disease and stroke. Keeping blood pressure in health range reduces strain on your heart, arteries and kidneys. Blood pressure goal is less than 130/80. 2) Control cholesterol - contributes to plaque, which can clog arteries and lead to heart disease and stroke. When you control your cholesterol you are giving your arteries their best chance to remain clear. It is recommended that you get cholesterol lab work done once a year. 3) Reduce blood sugar - most of the food we eat is turning into glucose or blood sugar that our body uses for energy. Over time, high levels of blood sugar can damage your heart, kidneys, eyes and nerves. 4) Get active - living an active life is one of the most rewarding gifts you can give yourself and those you love. Simply put, daily physical activity increases your length and quality of life. Strive to exercise 15 minutes most days of the week. 5)  Eat better - A healthy diet is one of your best weapons for fighting cardiovascular disease. When you eat a heart healthy diet, you improve your chances for feeling good and staying healthy for life.   6)  Lose weight - when you shed extra fat an unnecessary pounds, you reduce the burden on olive, peanut, or canola oil when you cook. Bake, broil, and steam foods instead of frying them. · Eat a variety of fruit and vegetables every day. Dark green, deep orange, red, or yellow fruits and vegetables are especially good for you. Examples include spinach, carrots, peaches, and berries. · Foods high in fiber can reduce your cholesterol and provide important vitamins and minerals. High-fiber foods include whole-grain cereals and breads, oatmeal, beans, brown rice, citrus fruits, and apples. · Eat lean proteins. Heart-healthy proteins include seafood, lean meats and poultry, eggs, beans, peas, nuts, seeds, and soy products. · Limit drinks and foods with added sugar. These include candy, desserts, and soda pop. Lifestyle changes  · If your doctor recommends it, get more exercise. Walking is a good choice. Bit by bit, increase the amount you walk every day. Try for at least 30 minutes on most days of the week. You also may want to swim, bike, or do other activities. · Do not smoke. If you need help quitting, talk to your doctor about stop-smoking programs and medicines. These can increase your chances of quitting for good. Quitting smoking may be the most important step you can take to protect your heart. It is never too late to quit. · Limit alcohol to 2 drinks a day for men and 1 drink a day for women. Too much alcohol can cause health problems. · Manage other health problems such as diabetes, high blood pressure, and high cholesterol. If you think you may have a problem with alcohol or drug use, talk to your doctor. Medicines  · Take your medicines exactly as prescribed. Call your doctor if you think you are having a problem with your medicine. · If your doctor recommends aspirin, take the amount directed each day. Make sure you take aspirin and not another kind of pain reliever, such as acetaminophen (Tylenol). When should you call for help? [id number] if you have symptoms of a heart attack.  These may include:  · Chest pain or pressure, or a strange feeling in the chest.  · Sweating. · Shortness of breath. · Pain, pressure, or a strange feeling in the back, neck, jaw, or upper belly or in one or both shoulders or arms. · Lightheadedness or sudden weakness. · A fast or irregular heartbeat. After you call 911, the  may tell you to chew 1 adult-strength or 2 to 4 low-dose aspirin. Wait for an ambulance. Do not try to drive yourself. Watch closely for changes in your health, and be sure to contact your doctor if you have any problems. Where can you learn more? Go to https://[employer]. org and sign in to your [employer] account. Enter O151 in the [employer] box to learn more about \"A Healthy Heart: Care Instructions. \"     If you do not have an account, please click on the \"Sign Up Now\" link. Current as of: December 16, 2019               Content Version: 12.5  © 1530-7679 [employer]. Care instructions adapted under license by [employer] [address] [place] [address]. If you have questions about a medical condition or this instruction, always ask your healthcare professional. [place] [address] any warranty or liability for your use of this information. [employer] Nuclear Stress Test     Date/Time:    [place] at the [facility] and [address] [place] [address] located on the first floor of [address] [place] [address] through hospital main entrance and turn immediately to your left. Test Description:  There are two parts to a Lexiscan stress test: the rest portion and the exercise portion. For the rest portion, a radioactive tracer is injected into your arm through the IV. After 30 to 60 minutes, the process of imaging will begin. A nuclear camera will be placed on your chest area and images are taken for the next 15 to 20 minutes. For the exercise portion, a nurse will attach EKG electrodes to your chest to monitor your heart rate.   The drug [employer] is administered to simulate stress on the heart. Your heart rhythm will then be monitored for the next few minutes. Your blood pressure will also be monitored throughout the exercise portion. [place] through the exercise portion, a second round of radioactive tracer is injected into your body. Your heart rate and EKG will be monitored for another few minutes after administering the drug. Test Preparation:     Bring a list of your current medications. Do not take any of your medications the morning of the test, but bring all morning medications with you as you will take them after the stress portion of the test is completed.  No caffeine 12 hours prior to the testing. This includes: coffee, pop/soda, chocolate, cold medications, etc.  Any product that might contain caffeine.  No nicotine or alcohol 12 hours prior to your test.    Nothing to eat or drink 4-6 hours prior to appointment time. It is okay to drink small amounts of water during the four hours prior to the test.   Nitroglycerin patches must be taken off 1 hour before testing.  Wear comfortable clothing.  Please refrain from any strenuous exercise or activities the day before your test, or the day of your test.   The Nuclear Lexiscan Stress test takes about 2 ½ to 3 hours to complete. If for any reason you are unable to keep this appointment, please contact Outpatient Scheduling, [phone], as soon as possible to reschedule. [place] at the [facility] and [facility] located on the first floor [place] [address] through hospital main entrance and turn immediately to your left. Date/Time:     Patient's contact number:  [phone] (home)     Echocardiogram -  No prep. Takes approximately 30 min. An echocardiogram uses sound waves to produce images of your heart. This commonly used test allows your doctor to see how your heart is beating and pumping blood.  Your doctor can use the

## 2020-09-04 ENCOUNTER — NURSE ONLY (OUTPATIENT)
Dept: UROLOGY | Age: 85
End: 2020-09-04
Payer: MEDICARE

## 2020-09-04 PROCEDURE — 64566 NEUROELTRD STIM POST TIBIAL: CPT | Performed by: PHYSICIAN ASSISTANT

## 2020-09-04 ASSESSMENT — ENCOUNTER SYMPTOMS
ABDOMINAL PAIN: 0
BACK PAIN: 0
CONSTIPATION: 0
DIARRHEA: 0
EYE REDNESS: 0
EYE DISCHARGE: 0
SHORTNESS OF BREATH: 0
WHEEZING: 0
COUGH: 0

## 2020-09-04 NOTE — PROGRESS NOTES
Louann Carlos is a 80 y.o. female who presents today   Chief Complaint   Patient presents with    Follow-up     patient here for uroplasty     HPI:  Patient with history of overactive bladder here for her maintenance uro-plasty treatment. She has treatment approximately every 23 to 24 days due to the fact that if she waits a full month her symptoms worsen prior to her next treatment she has done well with this timeframe. Still continues to have good response to the treatments. Patient is living at home now and appears overall much improved demeanor and appearance.       Past Medical History:   Diagnosis Date    Age-related macular degeneration, wet, right eye ([address]     Anemia     Back pain     Chronic bilateral low back pain with left-sided sciatica 12/19/2019    Colitis, ischemic ([address]     Dementia ([address]     Diverticulosis     Dry age-related macular degeneration of left eye     Hyperlipidemia     Hypertension     Osteoarthritis     Palliative care patient 09/17/2019    Risk for falls 9/13/2019    Seizures (HCC)     Spastic colon     Stroke syndrome     Urinary incontinence     Uterine cancer [facility])        Past Surgical History:   Procedure Laterality Date    BREAST SURGERY Right     FNA Right Breast \"oh heavens, maybe 20 years ago\"    CHOLECYSTECTOMY      COLONOSCOPY  9/25/03    Dr Jesus Mead ischemic colitis, incomplete exam    COLONOSCOPY  08/29/2003    Dr Nayeli Tee:  limited colon-ischemic colitis    DILATION AND CURETTAGE OF UTERUS      x2, in [facility] tears, laser suregry, adn cataract with IOL b/l    HYSTERECTOMY      ovaries and tubes    LUMBAR FUSION      2012 90 days after the spine surgeries    OTHER SURGICAL HISTORY      inner lymph nodes    SPINE SURGERY      L3,4, and 5   done in 2012   Nay For[address] 76      as a child at age 9    [address]  08/28/2003    DR Arce Comment:  Duodenal scarring but no evidence of active ulcer disease. Current Outpatient Medications   Medication Sig Dispense Refill    gabapentin (NEURONTIN) 100 MG capsule Take 1 capsule by mouth 3 times daily for 180 days. Intended supply: 30 days (Patient not taking: Reported on 9/2/2020) 90 capsule 5    Docosanol (ABREVA EX) Apply topically      levETIRAcetam (KEPPRA) 500 MG tablet Take 1 tablet by mouth 2 times daily 60 tablet 11    dipyridamole (PERSANTINE) 50 MG tablet Take 2 tablets by mouth 2 times daily 120 tablet 11    hydrALAZINE (APRESOLINE) 25 MG tablet Take 25 mg by mouth 3 times daily       nitrofurantoin (MACRODANTIN) 50 MG capsule TAKE 1 CAPSULE AT BEDTIME 30 capsule 3    simethicone (GAS-X EXTRA STRENGTH) 125 MG chewable tablet Take 125 mg by mouth 2 PO 5:30 AM,1- 2 10:30 AM, 1 4:00 PM and PRN      butalbital-acetaminophen-caffeine (FIORICET, ESGIC) -40 MG per tablet Take 1 tablet by mouth every 4 hours as needed for Headaches      amLODIPine (NORVASC) 5 MG tablet Take 5 mg by mouth 2 times daily       diclofenac sodium 1 % GEL Apply 2 g topically 4 times daily as needed for Pain      sodium chloride 1 g tablet Take 1 g by mouth 3 times daily      lidocaine 4 % external patch Place 1 patch onto the skin daily as needed (LEFT LOWER BACK PAIN)      NONFORMULARY Indications: dic 3%, lidocaine 2%, cyclo 2%, prilo 2% baclo 2% 1 to 2 pumps up to TID PRN       desonide (DESOWEN) 0.05 % cream Apply topically as needed Apply topically 2 times daily.  mupirocin (BACTROBAN) 2 % cream Apply topically as needed Apply topically 3 times daily.       valACYclovir (VALTREX) 1 g tablet as needed       Psyllium (METAMUCIL FIBER PO) Take by mouth nightly       azelastine (ASTELIN) 0.1 % nasal spray 2 sprays by Nasal route 2 times daily       Cholecalciferol (VITAMIN D3) 5000 units TABS Take 5,000 Units by mouth daily Indications: AT 2:30 PM       polyvinyl alcohol-povidone (HYPOTEARS) 1.4-0.6 % ophthalmic solution Place 1-2 drops into both eyes as needed      sodium chloride (OCEAN, BABY AYR) 0.65 % nasal spray 1 spray by Nasal route as needed for Congestion      tobramycin-dexamethasone (TOBRADEX) 0.3-0.1 % ophthalmic ointment Place into the left eye as needed 3 times daily.  Lutein 20 MG TABS Take 20 mg by mouth daily Indications: 2:30 PM DAILY       fluticasone (VERAMYST) 27.5 MCG/SPRAY nasal spray 2 sprays by Nasal route 2 times daily       camphor-menthol (SARNA) 0.5-0.5 % lotion Apply 0.5 mLs topically 2 times daily as needed for Itching Apply topically as needed.       acetaminophen (TYLENOL) 500 MG tablet Take 2 tablets by mouth every 6 hours as needed for Pain Do not exceed 4 g of acetaminophen daily (patient is also on Fioricet PRN) 100 tablet 0    cloNIDine (CATAPRES) 0.1 MG tablet Take 1 tablet by mouth 2 times daily (Patient taking differently: Take 0.1 mg by mouth as needed for High Blood Pressure (for BP greater than 160/100) ) 60 tablet 0    donepezil (ARICEPT) 10 MG tablet Take 1 tablet by mouth nightly 30 tablet 0    Calcium Carb-Cholecalciferol (CALCIUM 600+D) 600-800 MG-UNIT TABS Take 1 tablet by mouth daily Indications: AT 2:30 PM       promethazine (PHENERGAN) 25 MG tablet Take 25 mg by mouth 3 times daily as needed for Nausea      docusate sodium (COLACE) 100 MG capsule Take 100 mg by mouth nightly as needed for Constipation       Omega 3-6-9 Fatty Acids (OMEGA 3-6-9 COMPLEX PO) Take 2 tablets by mouth daily Indications: AT 2:30 PM       alendronate (FOSAMAX) 70 MG tablet Take 70 mg by mouth every 7 days Wednesday at [facility] 5 MG tablet Take 5 mg by mouth nightly Indications: nightly       pravastatin (PRAVACHOL) 20 MG tablet Take 20 mg by mouth every morning       ramipril (ALTACE) 10 MG capsule Take 10 mg by mouth 2 times daily       b complex vitamins capsule Take 1 capsule by mouth daily Indications: AT 2:30 PM B 100      Multiple Vitamins-Minerals (THERAPEUTIC MULTIVITAMIN-MINERALS) tablet Take 1 tablet by mouth daily Indications: AT 2:30 PM        No current facility-administered medications for this visit.         Allergies   Allergen Reactions    Aspirin Shortness Of Breath    Dilaudid [Hydromorphone Hcl]     Ondansetron     Quinolones     Spironolactone      Low sodium    Sulfa Antibiotics     Codeine Rash and Other (See Comments)     Makes her \"crazy and mean\" and gives her a rash    Demerol Hcl [Meperidine] Other (See Comments)     Made her \"crazy and mean\", and \"loopy\"    Levaquin [Levofloxacin In D5w] Other (See Comments)     Seizure like activity      Myrbetriq [Mirabegron] Other (See Comments)     Unable to micturate    Namenda [Memantine Hcl] Other (See Comments)     made her \"loopy\"    Norco [Hydrocodone-Acetaminophen] Rash and Other (See Comments)     \"crazy and mean\"    Pneumococcal Vaccines Swelling       Social History     Socioeconomic History    Marital status:      Spouse name: None    Number of children: 3    Years of education: None    Highest education level: None   Occupational History    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mployee of [employer]    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,mployee of [employer]   Social Needs    Financial resource strain: None    Food insecurity     Worry: None     Inability: None    Transportation needs     Medical: None     Non-medical: None   Tobacco Use    Smoking status: Never Smoker    Smokeless tobacco: Never Used   Substance and Sexual Activity    Alcohol use: Never     Frequency: Never    Drug use: Never    Sexual activity: None     Comment: has 3 kids   Lifestyle    Physical activity     Days per week: None     Minutes per session: None    Stress: None   Relationships    Social connections     Talks on phone: None     Gets together: None     Attends Mandaeism service: None     Active member of club or organization: None     Attends meetings of clubs or organizations: None Relationship status: None    Intimate partner violence     Fear of current or ex partner: None     Emotionally abused: None     Physically abused: None     Forced sexual activity: None   Other Topics Concern    None   Social History Narrative    CODE STATUS: DNR-CCA    HEALTH CARE PROXY / [employer] and Healthcare: her daughter, Mrs. Riddhi Miller, [phone]    AMBULATES: with walker during day, wheelchair at night    DOMICILED: lives in the [facility] assisted living facility, has no stairs or steps, has a cat, her daughter is there periodically       Family History   Problem Relation Age of Onset    Heart Defect Mother     Hypertension Mother     Heart Attack Father         x3 within 24 h and then , abused ciggarettes    No Known Problems Daughter     No Known Problems Son     No Known Problems Son     Colon Cancer Neg Hx     Colon Polyps Neg Hx     Esophageal Cancer Neg Hx     Liver Cancer Neg Hx     Liver Disease Neg Hx     Rectal Cancer Neg Hx     Stomach Cancer Neg Hx        REVIEW OF SYSTEMS:  Review of Systems   Constitutional: Negative for chills and fever. HENT: Negative for congestion and hearing loss. Eyes: Negative for discharge and redness. Respiratory: Negative for cough, shortness of breath and wheezing. Cardiovascular: Negative for leg swelling. Gastrointestinal: Negative for abdominal pain, constipation and diarrhea. Endocrine: Negative for cold intolerance and heat intolerance. Genitourinary: Negative for decreased urine volume, difficulty urinating, dysuria, enuresis, flank pain, frequency, genital sores, hematuria and urgency. Musculoskeletal: Negative for back pain and gait problem. Skin: Negative for rash. Allergic/Immunologic: Negative for environmental allergies. Neurological: Negative for dizziness, weakness and headaches. Hematological: Does not bruise/bleed easily.    Psychiatric/Behavioral: Negative for behavioral problems, confusion and sleep disturbance. PHYSICAL EXAM:  There were no vitals taken for this visit. Physical Exam  Constitutional:       General: She is not in acute distress. Appearance: Normal appearance. She is not ill-appearing. HENT:      Head: Normocephalic and atraumatic. Right Ear: External ear normal.      Left Ear: External ear normal.   Eyes:      General: No scleral icterus. Right eye: No discharge. Left eye: No discharge. Conjunctiva/sclera: Conjunctivae normal.   Pulmonary:      Effort: Pulmonary effort is normal.   Musculoskeletal:      Right lower leg: No edema. Left lower leg: No edema. Skin:     General: Skin is warm and dry. Findings: No rash. Neurological:      Mental Status: She is alert. Mental status is at baseline. Psychiatric:         Mood and Affect: Mood normal.         Behavior: Behavior normal.         1. Patient is seated with the right treatment leg elevated. 2. 34 gauge fine needle electrode is inserted into lower inner aspect of the leg. Slightly cephalad to the medial malleolus. 3. Surface electrode pad is placed over the medial aspect of the calcaneus on the same leg. 4.Needle electrode is connected to the external pulse generator. 5.The pulse generator is turned on and the millivolts used are 8. 6.Patient is treated for 30 minutes. 7.The needle and pad are removed. 1. Overactive bladder  Patient here for her maintenance treatment she is done well with the posterior tibial nerve stimulation treatments symptoms are markedly improved from before treatment will continue treating every 23 to 24 days due to the fact of patient goes a full month her symptoms worsen considerably prior to the next treatment.   - UT POST TIBIAL NEUROSTIMULATION,PERC NEEDLE ELECTRODE      Orders Placed This Encounter   Procedures    UT POST TIBIAL NEUROSTIMULATION,PERC NEEDLE ELECTRODE     No orders of the defined types were placed in this encounter. Plan:  Follow-up in 23 to 24 days for next treatment.

## 2020-09-16 ENCOUNTER — TELEPHONE (OUTPATIENT)
Dept: UROLOGY | Age: 85
End: 2020-09-16

## 2020-09-16 NOTE — TELEPHONE ENCOUNTER
Pharmacy called requesting refill on macrodantin 50mg. They could not get erx to go through.  Gave verbal script take 1 po hs #30 x 5 refills

## 2020-09-17 ENCOUNTER — HOSPITAL ENCOUNTER (OUTPATIENT)
Dept: NON INVASIVE DIAGNOSTICS | Age: 85
Discharge: HOME | End: 2020-09-17
Payer: MEDICARE

## 2020-09-17 ENCOUNTER — HOSPITAL ENCOUNTER (OUTPATIENT)
Dept: NUCLEAR MEDICINE | Age: 85
Discharge: HOME | End: 2020-09-19
Payer: MEDICARE

## 2020-09-17 LAB
LV EF: 58 %
LVEF MODALITY: NORMAL

## 2020-09-17 PROCEDURE — C8929 TTE W OR WO FOL WCON,DOPPLER: HCPCS

## 2020-09-17 PROCEDURE — 3430000000 HC RX DIAGNOSTIC RADIOPHARMACEUTICAL: Performed by: NURSE PRACTITIONER

## 2020-09-17 PROCEDURE — 78452 HT MUSCLE IMAGE SPECT MULT: CPT

## 2020-09-17 PROCEDURE — A9500 TC99M SESTAMIBI: HCPCS | Performed by: NURSE PRACTITIONER

## 2020-09-17 PROCEDURE — 6360000002 HC RX W HCPCS: Performed by: NURSE PRACTITIONER

## 2020-09-17 RX ADMIN — TETRAKIS(2-METHOXYISOBUTYLISOCYANIDE)COPPER(I) TETRAFLUOROBORATE 10 MILLICURIE: 1 INJECTION, POWDER, LYOPHILIZED, FOR SOLUTION INTRAVENOUS at 13:06

## 2020-09-17 RX ADMIN — TETRAKIS(2-METHOXYISOBUTYLISOCYANIDE)COPPER(I) TETRAFLUOROBORATE 30 MILLICURIE: 1 INJECTION, POWDER, LYOPHILIZED, FOR SOLUTION INTRAVENOUS at 13:06

## 2020-09-17 RX ADMIN — REGADENOSON 0.4 MG: 0.08 INJECTION, SOLUTION INTRAVENOUS at 14:29

## 2020-09-18 LAB
LV EF: 84 %
LVEF MODALITY: NORMAL

## 2020-09-24 ENCOUNTER — TELEMEDICINE (OUTPATIENT)
Dept: NEUROLOGY | Age: 85
End: 2020-09-24
Payer: MEDICARE

## 2020-09-24 ENCOUNTER — TELEPHONE (OUTPATIENT)
Dept: NEUROLOGY | Age: 85
End: 2020-09-24

## 2020-09-24 PROCEDURE — G8400 PT W/DXA NO RESULTS DOC: HCPCS | Performed by: PSYCHIATRY & NEUROLOGY

## 2020-09-24 PROCEDURE — 1123F ACP DISCUSS/DSCN MKR DOCD: CPT | Performed by: PSYCHIATRY & NEUROLOGY

## 2020-09-24 PROCEDURE — G8427 DOCREV CUR MEDS BY ELIG CLIN: HCPCS | Performed by: PSYCHIATRY & NEUROLOGY

## 2020-09-24 PROCEDURE — 99213 OFFICE O/P EST LOW 20 MIN: CPT | Performed by: PSYCHIATRY & NEUROLOGY

## 2020-09-24 PROCEDURE — 4040F PNEUMOC VAC/ADMIN/RCVD: CPT | Performed by: PSYCHIATRY & NEUROLOGY

## 2020-09-24 PROCEDURE — 1090F PRES/ABSN URINE INCON ASSESS: CPT | Performed by: PSYCHIATRY & NEUROLOGY

## 2020-09-24 RX ORDER — GABAPENTIN 100 MG/1
100 CAPSULE ORAL 3 TIMES DAILY
Qty: 90 CAPSULE | Refills: 5 | Status: SHIPPED | OUTPATIENT
Start: 2020-09-24 | End: 2020-10-15

## 2020-09-24 RX ORDER — DONEPEZIL HYDROCHLORIDE 10 MG/1
10 TABLET, FILM COATED ORAL NIGHTLY
Qty: 90 TABLET | Refills: 3 | Status: SHIPPED | OUTPATIENT
Start: 2020-09-24

## 2020-09-24 RX ORDER — BUTALBITAL, ACETAMINOPHEN AND CAFFEINE 50; 325; 40 MG/1; MG/1; MG/1
1 TABLET ORAL EVERY 4 HOURS PRN
Qty: 40 TABLET | Refills: 3 | Status: SHIPPED | OUTPATIENT
Start: 2020-09-24 | End: 2020-10-15

## 2020-09-24 NOTE — PROGRESS NOTES
[facility]  [address] [place] [address] Drive, [address] [facility] [address]  [facility] [address]  Phone [phone]  Fax [phone]     [facility] Virtual Video Follow Up Encounter  20 12:48 PM CDT  The Following was conducted as a Telehealth Evaluation - Audio/Visual (during the [place] [address] public health emergency)    Information:   Patient Name: Ramin Grider  :   1935  Age:   80 y.o. MRN:   [id number]  Account #:  [de-identified]  Today:  20    Provider: Edson Kauffman M.D. Patient Location: Home  Provider Location: The provider is located at [facility] in [place], [place]  Also Present:  Daughter    Chief Complaint:   Chief Complaint   Patient presents with    Follow-up       Subjective:   Ramin Grider is a 80 y.o. woman with a history of small vessel cerebrovascular disease, strokes, vascular dementia, and intractable seizures who is following up. She had a vagal episode in January after her hysterectomy and oophorectomy. She has had these in the past as well. Today she had another one while sitting on toilet recently. She has no lightheadedness when she stands. She had been doing better. Her memory is worsening. She takes Aricept. She had side effects to Namenda in the past.  She has had no seizures or stroke symptoms.         Objective:     Past Medical History:  Past Medical History:   Diagnosis Date    Age-related macular degeneration, wet, right eye ([address]     Anemia     Back pain     Chronic bilateral low back pain with left-sided sciatica 2019    Colitis, ischemic ([address]     Dementia ([address]     Diverticulosis     Dry age-related macular degeneration of left eye     Hyperlipidemia     Hypertension     Osteoarthritis     Palliative care patient 2019    Risk for falls 2019    Seizures (HCC)     Spastic colon     Stroke syndrome     Urinary incontinence     Uterine cancer [facility])        Past Surgical History:   Procedure Laterality Date    BREAST SURGERY (MACRODANTIN) 50 MG capsule TAKE 1 CAPSULE AT BEDTIME 30 capsule 3    simethicone (GAS-X EXTRA STRENGTH) 125 MG chewable tablet Take 125 mg by mouth 2 PO 5:30 AM,1- 2 10:30 AM, 1 4:00 PM and PRN      butalbital-acetaminophen-caffeine (FIORICET, ESGIC) -40 MG per tablet Take 1 tablet by mouth every 4 hours as needed for Headaches      amLODIPine (NORVASC) 5 MG tablet Take 5 mg by mouth 2 times daily       diclofenac sodium 1 % GEL Apply 2 g topically 4 times daily as needed for Pain      sodium chloride 1 g tablet Take 1 g by mouth 3 times daily      lidocaine 4 % external patch Place 1 patch onto the skin daily as needed (LEFT LOWER BACK PAIN)      NONFORMULARY Indications: dic 3%, lidocaine 2%, cyclo 2%, prilo 2% baclo 2% 1 to 2 pumps up to TID PRN       desonide (DESOWEN) 0.05 % cream Apply topically as needed Apply topically 2 times daily.  mupirocin (BACTROBAN) 2 % cream Apply topically as needed Apply topically 3 times daily.  valACYclovir (VALTREX) 1 g tablet as needed       Psyllium (METAMUCIL FIBER PO) Take by mouth nightly       azelastine (ASTELIN) 0.1 % nasal spray 2 sprays by Nasal route 2 times daily       Cholecalciferol (VITAMIN D3) 5000 units TABS Take 5,000 Units by mouth daily Indications: AT 2:30 PM       polyvinyl alcohol-povidone (HYPOTEARS) 1.4-0.6 % ophthalmic solution Place 1-2 drops into both eyes as needed      sodium chloride (OCEAN, BABY AYR) 0.65 % nasal spray 1 spray by Nasal route as needed for Congestion      tobramycin-dexamethasone (TOBRADEX) 0.3-0.1 % ophthalmic ointment Place into the left eye as needed 3 times daily.  Lutein 20 MG TABS Take 20 mg by mouth daily Indications: 2:30 PM DAILY       fluticasone (VERAMYST) 27.5 MCG/SPRAY nasal spray 2 sprays by Nasal route 2 times daily       camphor-menthol (SARNA) 0.5-0.5 % lotion Apply 0.5 mLs topically 2 times daily as needed for Itching Apply topically as needed.       acetaminophen (TYLENOL) 500 MG tablet Take 2 tablets by mouth every 6 hours as needed for Pain Do not exceed 4 g of acetaminophen daily (patient is also on Fioricet PRN) 100 tablet 0    cloNIDine (CATAPRES) 0.1 MG tablet Take 1 tablet by mouth 2 times daily (Patient taking differently: Take 0.1 mg by mouth as needed for High Blood Pressure (for BP greater than 160/100) ) 60 tablet 0    donepezil (ARICEPT) 10 MG tablet Take 1 tablet by mouth nightly 30 tablet 0    Calcium Carb-Cholecalciferol (CALCIUM 600+D) 600-800 MG-UNIT TABS Take 1 tablet by mouth daily Indications: AT 2:30 PM       promethazine (PHENERGAN) 25 MG tablet Take 25 mg by mouth 3 times daily as needed for Nausea      docusate sodium (COLACE) 100 MG capsule Take 100 mg by mouth nightly as needed for Constipation       Omega 3-6-9 Fatty Acids (OMEGA 3-6-9 COMPLEX PO) Take 2 tablets by mouth daily Indications: AT 2:30 PM       alendronate (FOSAMAX) 70 MG tablet Take 70 mg by mouth every 7 days Wednesday at [facility] 5 MG tablet Take 5 mg by mouth nightly Indications: nightly       pravastatin (PRAVACHOL) 20 MG tablet Take 20 mg by mouth every morning       ramipril (ALTACE) 10 MG capsule Take 10 mg by mouth 2 times daily       b complex vitamins capsule Take 1 capsule by mouth daily Indications: AT 2:30 PM B 100      Multiple Vitamins-Minerals (THERAPEUTIC MULTIVITAMIN-MINERALS) tablet Take 1 tablet by mouth daily Indications: AT 2:30 PM       gabapentin (NEURONTIN) 100 MG capsule Take 1 capsule by mouth 3 times daily for 180 days. Intended supply: 30 days (Patient not taking: Reported on 9/2/2020) 90 capsule 5     No current facility-administered medications for this visit. Allergies:   Allergies as of 09/24/2020 - Review Complete 09/24/2020   Allergen Reaction Noted    Aspirin Shortness Of Breath 03/17/2016    Dilaudid [hydromorphone hcl]  03/17/2016    Ondansetron  03/17/2016    Quinolones  09/14/2019    Spironolactone  09/25/2019    Sulfa antibiotics  03/17/2016    Codeine Rash and Other (See Comments) 08/11/2016    Demerol hcl [meperidine] Other (See Comments) 03/17/2016    Levaquin [levofloxacin in d5w] Other (See Comments) 02/21/2018    Myrbetriq [mirabegron] Other (See Comments) 04/12/2018    Namenda [memantine hcl] Other (See Comments) 03/17/2016    Norco [hydrocodone-acetaminophen] Rash and Other (See Comments) 03/17/2016    Pneumococcal vaccines Swelling 03/17/2016       Review of Systems:  Constitutional: negative for - chills and fever  Eyes:  negative for - visual disturbance and photophobia  HENMT: negative for - headaches and sinus pain  Respiratory: negative for - cough, hemoptysis, and shortness of breath  Cardiovascular: negative for - chest pain and palpitations  Gastrointestinal: negative for - blood in stools, constipation, diarrhea, nausea, and vomiting  Genito-Urinary: negative for - hematuria, urinary frequency, urinary urgency, and urinary retention  Musculoskeletal: positive for - joint pain, joint stiffness  Hematological and Lymphatic: negative for - bleeding problems, abnormal bruising, and swollen lymph nodes  Endocrine:  negative for - polydipsia and polyphagia  Allergy/Immunology:  negative for - rhinorrhea, sinus congestion, hives  Integument:  negative for - negative for - rash, change in moles, new or changing lesions  Psychological: negative for - anxiety and depression  Neurological: positive for - memory loss and weakness     Physical Examination:  Vitals:  (As obtained by patient/caregiver or practitioner observation). Not taken/available if data not entered.    BP -  P -  R -  T -  PO -    General appearance:  Alert, well developed, well nourished, in no distress  HEENT:  normocephalic, atraumatic, sclera appear normal, no observable or audible rhinorrhea, Ears appear normal, oral mucous membranes are moist without erythema, trachea appears midline, no observable anterior neck masses  Cardiovascular:  No observable peripheral edema, No observable cyanosis or clubbing. Pulmonary:  Breathing appears normal, good expansion, normal effort without use of accessory muscles  Musculoskeletal:  Joints - appear normal.  No splints, slings, or casts. Spine appears normal with normal posture and range of motion. Integument:  No rash, erythema, or pallor on visible skin  Psychiatric:  Mood, affect, and behavior appear normal    Neurologic:  Mental Status:  alert, oriented to person, place, and time. Speech:  Clear without dysarthria or dysphonia  Language:  Fluent without aphasia  Cranial Nerves:   III,IV, VI Extraocular movements are full   VII Facial movements are symmetrical without weakness   VIII Hearing is intact   IX,X Shoulder shrug and head rotation strength are intact   XII No tongue atrophy or fasciculations. Normal tongue protrusion. No tongue weakness  Motor:  No evident muscle atrophy. No gross muscle weakness noted. No tremor noted. Coordination:  Rapid alternating movements are normal in both upper and lower extremities. Extension nose testing is unimpaired bilaterally. Gait:  Normal station and gait. Assessment:       ICD-10-CM    1. Partial symptomatic epilepsy with complex partial seizures, intractable, without status epilepticus ([address]  G40.219    2. Small vessel cerebrovascular accident (CVA) ([address]  I63.9    3. Vascular dementia without behavioral disturbance (HCC)  F01.50    4. Other headache syndrome  G44.89    5. Vasovagal syncope  R55    Fairly stable but she is elderly and fragile. Plan:   1.  Continue same medications and care  2. FU in 6 months    Pursuant to the emergency declaration under the [facility] waiver authority and the [employer] Act, this Virtual  Visit was conducted, with patient's consent, to reduce the patient's risk of exposure to COVID-19 and provide continuity of care for

## 2020-09-24 NOTE — TELEPHONE ENCOUNTER
Called to change /cancel appointment as the Doctor is on call and currently out of the office . Patients daughter stated that the patient had a vaso response this A. M. when having a BM and that she is there with the paramedics now. States the patient is okay now just nauseated and really needs/wants to speak with the Doctor today . The appointment was changed to a VV . The Process of  A VV was explained and [facility] was given should and problems arise.

## 2020-09-28 ENCOUNTER — NURSE ONLY (OUTPATIENT)
Dept: UROLOGY | Age: 85
End: 2020-09-28
Payer: MEDICARE

## 2020-09-28 PROBLEM — N32.81 OVERACTIVE BLADDER: Status: ACTIVE | Noted: 2020-09-28

## 2020-09-28 PROCEDURE — 64566 NEUROELTRD STIM POST TIBIAL: CPT | Performed by: PHYSICIAN ASSISTANT

## 2020-09-28 ASSESSMENT — ENCOUNTER SYMPTOMS
WHEEZING: 0
ABDOMINAL PAIN: 0
COUGH: 0
BACK PAIN: 0
SHORTNESS OF BREATH: 0
DIARRHEA: 0
CONSTIPATION: 0
EYE REDNESS: 0
EYE DISCHARGE: 0

## 2020-09-28 NOTE — PROGRESS NOTES
Maricel Moeller is a 80 y.o. female who presents today   Chief Complaint   Patient presents with    Follow-up     I am here for my uroplasty        Maintenance treatment neuroplasty:  Patient with history of overactive bladder who is responded well to uro-plasty treatment. She gets maintenance treatments approximately every 23 to 24 days due to the fact that if she waits longer. Symptoms started to recur. Patient is now living at home. Overall she has good control of symptoms since starting treatment. Past Medical History:   Diagnosis Date    Age-related macular degeneration, wet, right eye ([address]     Anemia     Back pain     Chronic bilateral low back pain with left-sided sciatica 12/19/2019    Colitis, ischemic ([address]     Dementia ([address]     Diverticulosis     Dry age-related macular degeneration of left eye     Hyperlipidemia     Hypertension     Osteoarthritis     Palliative care patient 09/17/2019    Risk for falls 9/13/2019    Seizures (HCC)     Spastic colon     Stroke syndrome     Urinary incontinence     Uterine cancer [facility])        Past Surgical History:   Procedure Laterality Date    BREAST SURGERY Right     FNA Right Breast \"oh heavens, maybe 20 years ago\"    CHOLECYSTECTOMY      COLONOSCOPY  9/25/03    Dr Marlene Canela ischemic colitis, incomplete exam    COLONOSCOPY  08/29/2003    Dr Levi Ball:  limited colon-ischemic colitis    DILATION AND CURETTAGE OF UTERUS      x2, in [facility] tears, laser suregry, adn cataract with IOL b/l    HYSTERECTOMY      ovaries and tubes    LUMBAR FUSION      2012 90 days after the spine surgeries    OTHER SURGICAL HISTORY      inner lymph nodes    SPINE SURGERY      L3,4, and 5   done in 2012   [address]      as a child at age 9    [address]  08/28/2003    DR Tasha Stephens:  Duodenal scarring but no evidence of active ulcer disease.        Current Outpatient Medications   Medication Sig Dispense Refill    gabapentin (NEURONTIN) 100 MG capsule Take 1 capsule by mouth 3 times daily for 180 days. Intended supply: 30 days (Patient taking differently: Take 100 mg by mouth daily. Intended supply: 30 days) 90 capsule 5    donepezil (ARICEPT) 10 MG tablet Take 1 tablet by mouth nightly 90 tablet 3    butalbital-acetaminophen-caffeine (FIORICET, ESGIC) -40 MG per tablet Take 1 tablet by mouth every 4 hours as needed for Headaches 40 tablet 3    Docosanol (ABREVA EX) Apply topically      levETIRAcetam (KEPPRA) 500 MG tablet Take 1 tablet by mouth 2 times daily 60 tablet 11    dipyridamole (PERSANTINE) 50 MG tablet Take 2 tablets by mouth 2 times daily 120 tablet 11    hydrALAZINE (APRESOLINE) 25 MG tablet Take 25 mg by mouth 3 times daily       nitrofurantoin (MACRODANTIN) 50 MG capsule TAKE 1 CAPSULE AT BEDTIME 30 capsule 3    simethicone (GAS-X EXTRA STRENGTH) 125 MG chewable tablet Take 125 mg by mouth 2 PO 5:30 AM,1- 2 10:30 AM, 1 4:00 PM and PRN      butalbital-acetaminophen-caffeine (FIORICET, ESGIC) -40 MG per tablet Take 1 tablet by mouth every 4 hours as needed for Headaches      amLODIPine (NORVASC) 5 MG tablet Take 5 mg by mouth 2 times daily       diclofenac sodium 1 % GEL Apply 2 g topically 4 times daily as needed for Pain      sodium chloride 1 g tablet Take 1 g by mouth 3 times daily      lidocaine 4 % external patch Place 1 patch onto the skin daily as needed (LEFT LOWER BACK PAIN)      NONFORMULARY Indications: dic 3%, lidocaine 2%, cyclo 2%, prilo 2% baclo 2% 1 to 2 pumps up to TID PRN       desonide (DESOWEN) 0.05 % cream Apply topically as needed Apply topically 2 times daily.  mupirocin (BACTROBAN) 2 % cream Apply topically as needed Apply topically 3 times daily.       valACYclovir (VALTREX) 1 g tablet as needed       Psyllium (METAMUCIL FIBER PO) Take by mouth nightly       azelastine (ASTELIN) 0.1 % nasal spray 2 sprays complex vitamins capsule Take 1 capsule by mouth daily Indications: AT 2:30 PM B 100      Multiple Vitamins-Minerals (THERAPEUTIC MULTIVITAMIN-MINERALS) tablet Take 1 tablet by mouth daily Indications: AT 2:30 PM        No current facility-administered medications for this visit.         Allergies   Allergen Reactions    Aspirin Shortness Of Breath    Dilaudid [Hydromorphone Hcl]     Ondansetron     Quinolones     Spironolactone      Low sodium    Sulfa Antibiotics     Codeine Rash and Other (See Comments)     Makes her \"crazy and mean\" and gives her a rash    Demerol Hcl [Meperidine] Other (See Comments)     Made her \"crazy and mean\", and \"loopy\"    Levaquin [Levofloxacin In D5w] Other (See Comments)     Seizure like activity      Myrbetriq [Mirabegron] Other (See Comments)     Unable to micturate    Namenda [Memantine Hcl] Other (See Comments)     made her \"loopy\"    Norco [Hydrocodone-Acetaminophen] Rash and Other (See Comments)     \"crazy and mean\"    Pneumococcal Vaccines Swelling       Social History     Socioeconomic History    Marital status:      Spouse name: None    Number of children: 3    Years of education: None    Highest education level: None   Occupational History    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mployee of [employer]    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,mployee of [employer]   Social Needs    Financial resource strain: None    Food insecurity     Worry: None     Inability: None    Transportation needs     Medical: None     Non-medical: None   Tobacco Use    Smoking status: Never Smoker    Smokeless tobacco: Never Used   Substance and Sexual Activity    Alcohol use: Never     Frequency: Never    Drug use: Never    Sexual activity: None     Comment: has 3 kids   Lifestyle    Physical activity     Days per week: None     Minutes per session: None    Stress: None   Relationships    Social connections     Talks on phone: None     Gets together: None     Attends Baptism service: None     Active member of club or organization: None     Attends meetings of clubs or organizations: None     Relationship status: None    Intimate partner violence     Fear of current or ex partner: None     Emotionally abused: None     Physically abused: None     Forced sexual activity: None   Other Topics Concern    None   Social History Narrative    CODE STATUS: DNR-CCA    HEALTH CARE PROXY / [employer] and Healthcare: her daughter, Mrs. Sam Vasquez, [phone]    AMBULATES: with walker during day, wheelchair at night    DOMICILED: lives in the [facility] assisted living facility, has no stairs or steps, has a cat, her daughter is there periodically       Family History   Problem Relation Age of Onset    Heart Defect Mother     Hypertension Mother     Heart Attack Father         x3 within 24 h and then , abused ciggarettes    No Known Problems Daughter     No Known Problems Son     No Known Problems Son     Colon Cancer Neg Hx     Colon Polyps Neg Hx     Esophageal Cancer Neg Hx     Liver Cancer Neg Hx     Liver Disease Neg Hx     Rectal Cancer Neg Hx     Stomach Cancer Neg Hx        REVIEW OF SYSTEMS:  Review of Systems   Constitutional: Negative for chills and fever. HENT: Negative for congestion and hearing loss. Eyes: Negative for discharge and redness. Respiratory: Negative for cough, shortness of breath and wheezing. Cardiovascular: Negative for leg swelling. Gastrointestinal: Negative for abdominal pain, constipation and diarrhea. Endocrine: Negative for cold intolerance and heat intolerance. Genitourinary: Negative for decreased urine volume, difficulty urinating, dysuria, enuresis, flank pain, frequency, genital sores, hematuria and urgency. Musculoskeletal: Negative for back pain and gait problem. Skin: Negative for rash. Allergic/Immunologic: Negative for environmental allergies.    Neurological: Negative for dizziness, weakness and headaches. Hematological: Does not bruise/bleed easily. Psychiatric/Behavioral: Negative for behavioral problems, confusion and sleep disturbance. PHYSICAL EXAM:  There were no vitals taken for this visit. Physical Exam  Constitutional:       Appearance: Normal appearance. HENT:      Head: Normocephalic and atraumatic. Right Ear: External ear normal.      Left Ear: External ear normal.      Nose: No congestion. Eyes:      General:         Right eye: No discharge. Left eye: No discharge. Neck:      Comments: No obvious neck masses observed  Neurological:      General: No focal deficit present. Mental Status: She is alert and oriented to person, place, and time. Psychiatric:         Mood and Affect: Mood normal.         Behavior: Behavior normal.            Uro-plasty treatment:   1. Patient is seated with the right treatment leg elevated. 2. 34 gauge fine needle electrode is inserted into lower inner aspect of the leg. Slightly cephalad to the medial malleolus. 3. Surface electrode pad is placed over the medial aspect of the calcaneus on the same leg. 4.Needle electrode is connected to the external pulse generator. 5.The pulse generator is turned on and the millivolts used are 5. 6.Patient is treated for 30 minutes. 7.The needle and pad are removed. 1. Overactive bladder  Patient here for her maintenance uro-plasty treatments. Overall response has been excellent she has more control and her symptoms are fairly well-controlled on treatment. She had failed on oral medications previously  - VT POST TIBIAL NEUROSTIMULATION,PERC NEEDLE ELECTRODE      Orders Placed This Encounter   Procedures    VT POST TIBIAL [address]     No orders of the defined types were placed in this encounter.     Plan:  Follow-up for next treatment is scheduled

## 2020-10-15 ENCOUNTER — OFFICE VISIT (OUTPATIENT)
Dept: CARDIOLOGY | Age: 85
End: 2020-10-15
Payer: MEDICARE

## 2020-10-15 VITALS
SYSTOLIC BLOOD PRESSURE: 126 MMHG | HEART RATE: 48 BPM | BODY MASS INDEX: 24.54 KG/M2 | DIASTOLIC BLOOD PRESSURE: 74 MMHG | HEIGHT: 60 IN | OXYGEN SATURATION: 97 % | WEIGHT: 125 LBS

## 2020-10-15 PROBLEM — R00.1 BRADYCARDIA: Status: ACTIVE | Noted: 2020-10-15

## 2020-10-15 PROBLEM — R55 VASOVAGAL SYNCOPE: Status: ACTIVE | Noted: 2020-10-15

## 2020-10-15 PROCEDURE — 1036F TOBACCO NON-USER: CPT | Performed by: NURSE PRACTITIONER

## 2020-10-15 PROCEDURE — G8400 PT W/DXA NO RESULTS DOC: HCPCS | Performed by: NURSE PRACTITIONER

## 2020-10-15 PROCEDURE — 1123F ACP DISCUSS/DSCN MKR DOCD: CPT | Performed by: NURSE PRACTITIONER

## 2020-10-15 PROCEDURE — 4040F PNEUMOC VAC/ADMIN/RCVD: CPT | Performed by: NURSE PRACTITIONER

## 2020-10-15 PROCEDURE — G8484 FLU IMMUNIZE NO ADMIN: HCPCS | Performed by: NURSE PRACTITIONER

## 2020-10-15 PROCEDURE — 99214 OFFICE O/P EST MOD 30 MIN: CPT | Performed by: NURSE PRACTITIONER

## 2020-10-15 PROCEDURE — G8420 CALC BMI NORM PARAMETERS: HCPCS | Performed by: NURSE PRACTITIONER

## 2020-10-15 PROCEDURE — G8427 DOCREV CUR MEDS BY ELIG CLIN: HCPCS | Performed by: NURSE PRACTITIONER

## 2020-10-15 PROCEDURE — 1090F PRES/ABSN URINE INCON ASSESS: CPT | Performed by: NURSE PRACTITIONER

## 2020-10-15 RX ORDER — MELOXICAM 7.5 MG/1
15 TABLET ORAL DAILY
COMMUNITY
End: 2022-05-25

## 2020-10-15 RX ORDER — ACETAMINOPHEN 500 MG
1000 TABLET ORAL 3 TIMES DAILY
COMMUNITY

## 2020-10-15 NOTE — PATIENT INSTRUCTIONS
New instructions for today:    You will need to have COVID 19 test prior to the procedure-- 10/23/2020- between 8-10AM.    Date/Time: 10/27/2020 arrive at 11AM for 130PM   You can have this test at [facility] by [facility]). You will need to quarantine from [place] 19 testing until your procedure. Loop recorder implant:  [place] at the [address] [facility] [place] and [address] [facility] located on the first floor of [place] [address] through hospital main entrance and turn immediately to your left. Date and Time:        Please do not eat or drink anything 4-6 hours prior. You will need someone to drive you home after the procedure. Battery will last approximately three years. If for any reason you are unable to keep this appointment, please contact [facility], [phone], to reschedule. Implantable Loop Recorder        An implantable loop recorder is a small device that is implanted under the skin to help identify the causes of fainting. Syncope (or fainting) is a temporary loss of consciousness. Certain heart disorders can cause fainting, such as abnormal heartbeats called arrhythmias. An implantable loop recorder is a small device that is inserted under the skin below the collar bone (usually on the patients left side). The procedure to implant the device is simple. Local anesthetic is injected into the area. A small incision is made and device is inserted. The skin is then sutured closed. The device continuously records heart activity similar to an ECG for up to 2 years. If the patient experiences an episode of fainting the device is activated to save the recording before, during, and after the episode. The recordings can then be evaluated by a physician to help determine the cause of fainting. Patient Instructions:  Continue current medications as prescribed.    Always keep a current medication list. Bring your medications to every office visit. Continue to follow up with primary care provider for non cardiac medical problems. Call the office with any problems, questions or concerns at [phone]. If you have been asked to keep a blood pressure log, do so for 2 weeks. Call the office to report readings to the triage nurse at [phone]. Follow up with cardiologist as scheduled. The following educational material has been included in this after visit summary for your review: Life simple 7. Heart health. Life simple 7  1) Manage blood pressure - high blood pressure is a major risk factor for heart disease and stroke. Keeping blood pressure in health range reduces strain on your heart, arteries and kidneys. Blood pressure goal is less than 130/80. 2) Control cholesterol - contributes to plaque, which can clog arteries and lead to heart disease and stroke. When you control your cholesterol you are giving your arteries their best chance to remain clear. It is recommended that you get cholesterol lab work done once a year. 3) Reduce blood sugar - most of the food we eat is turning into glucose or blood sugar that our body uses for energy. Over time, high levels of blood sugar can damage your heart, kidneys, eyes and nerves. 4) Get active - living an active life is one of the most rewarding gifts you can give yourself and those you love. Simply put, daily physical activity increases your length and quality of life. Strive to exercise 15 minutes most days of the week. 5)  Eat better - A healthy diet is one of your best weapons for fighting cardiovascular disease. When you eat a heart healthy diet, you improve your chances for feeling good and staying healthy for life. 6)  Lose weight - when you shed extra fat an unnecessary pounds, you reduce the burden on your hear, lungs, blood vessels and skeleton. You give yourself the gift of active living, you lower your blood pressure and help yourself feel better.   7) Stop smoking - cigarette smokers have a higher risk of developing cardiovascular disease. If  You smoke, quitting is the best thing you can do for your health. Check American Heart Association on line for more information on Life's Simple 7 and tips for healthy living. A Healthy Heart: Care Instructions  Your Care Instructions     Coronary artery disease, also called heart disease, occurs when a substance called plaque builds up in the vessels that supply oxygen-rich blood to your heart muscle. This can narrow the blood vessels and reduce blood flow. A heart attack happens when blood flow is completely blocked. A high-fat diet, smoking, and other factors increase the risk of heart disease. Your doctor has found that you have a chance of having heart disease. You can do lots of things to keep your heart healthy. It may not be easy, but you can change your diet, exercise more, and quit smoking. These steps really work to lower your chance of heart disease. Follow-up care is a key part of your treatment and safety. Be sure to make and go to all appointments, and call your doctor if you are having problems. It's also a good idea to know your test results and keep a list of the medicines you take. How can you care for yourself at home? Diet  · Use less salt when you cook and eat. This helps lower your blood pressure. Taste food before salting. Add only a little salt when you think you need it. With time, your taste buds will adjust to less salt. · Eat fewer snack items, fast foods, canned soups, and other high-salt, high-fat, processed foods. · Read food labels and try to avoid saturated and trans fats. They increase your risk of heart disease by raising cholesterol levels. · Limit the amount of solid fat-butter, margarine, and shortening-you eat. Use olive, peanut, or canola oil when you cook. Bake, broil, and steam foods instead of frying them. · Eat a variety of fruit and vegetables every day.  Dark green, deep orange, red, or yellow fruits and vegetables are especially good for you. Examples include spinach, carrots, peaches, and berries. · Foods high in fiber can reduce your cholesterol and provide important vitamins and minerals. High-fiber foods include whole-grain cereals and breads, oatmeal, beans, brown rice, citrus fruits, and apples. · Eat lean proteins. Heart-healthy proteins include seafood, lean meats and poultry, eggs, beans, peas, nuts, seeds, and soy products. · Limit drinks and foods with added sugar. These include candy, desserts, and soda pop. Lifestyle changes  · If your doctor recommends it, get more exercise. Walking is a good choice. Bit by bit, increase the amount you walk every day. Try for at least 30 minutes on most days of the week. You also may want to swim, bike, or do other activities. · Do not smoke. If you need help quitting, talk to your doctor about stop-smoking programs and medicines. These can increase your chances of quitting for good. Quitting smoking may be the most important step you can take to protect your heart. It is never too late to quit. · Limit alcohol to 2 drinks a day for men and 1 drink a day for women. Too much alcohol can cause health problems. · Manage other health problems such as diabetes, high blood pressure, and high cholesterol. If you think you may have a problem with alcohol or drug use, talk to your doctor. Medicines  · Take your medicines exactly as prescribed. Call your doctor if you think you are having a problem with your medicine. · If your doctor recommends aspirin, take the amount directed each day. Make sure you take aspirin and not another kind of pain reliever, such as acetaminophen (Tylenol). When should you call for help? [id number] if you have symptoms of a heart attack. These may include:  · Chest pain or pressure, or a strange feeling in the chest.  · Sweating. · Shortness of breath.   · Pain, pressure, or a strange feeling in the back, neck, jaw, or upper belly or in one or both shoulders or arms. · Lightheadedness or sudden weakness. · A fast or irregular heartbeat. After you call 911, the  may tell you to chew 1 adult-strength or 2 to 4 low-dose aspirin. Wait for an ambulance. Do not try to drive yourself. Watch closely for changes in your health, and be sure to contact your doctor if you have any problems. Where can you learn more? Go to https://[employer].everyArt. org and sign in to your [employer] account. Enter E063 in the [employer] box to learn more about \"A Healthy Heart: Care Instructions. \"     If you do not have an account, please click on the \"Sign Up Now\" link. Current as of: December 16, 2019               Content Version: 12.5  © 3762-6281 [employer]. Care instructions adapted under license by [facility] ([facility]). If you have questions about a medical condition or this instruction, always ask your healthcare professional. [place] [address] any warranty or liability for your use of this information. Patient Education        Learning About Implantable Heart Monitors  What is an implantable heart monitor? An implantable heart monitor is a small device placed under the skin of your chest. It records the electrical signals from your heart. A monitor is used to look for irregular heartbeats. It can help your doctor find out what is causing your fainting, lightheadedness, or other symptoms. It also can help your doctor check to see if treatment for an irregular heartbeat is working. The monitor may be placed near your collarbone or near the middle of your chest. Where it's placed depends on the size and type of device. The monitor may be about the size of a small pack of chewing gum or a paper clip. These monitors are used if an irregular heart rhythm or your symptoms don't happen very often. They also help your doctor monitor your heart for a long time.   How is the monitor put in place? The monitor is put in during a short surgery. You will get medicine to numb the area of your chest where the monitor will be put in. You will be awake during the surgery, but you shouldn't feel any pain. Your doctor will make a small cut and place the monitor under your skin. Then he or she will close the cut with special tape or glue or with stitches that will dissolve. Then the doctor will place a bandage over the cut. The procedure will take about half an hour. You probably will be able to go home soon after it's done. You may have some minor pain where the cut was made. You will get instructions from your doctor on how to care for it at home. When your doctor says it's safe, you should be able to get back to your normal activities. How is the monitor used? Your monitor may start recording on its own when it detects an abnormal heartbeat. Or you might use a handheld device to start the monitor when you have symptoms. Your doctor will explain which type of monitor you have and what you need to do. Your doctor will tell you how often he or she will need to check your monitor. The information from your monitor may be sent to your doctor automatically. Or you may have to do something to send it. It may be sent over a phone line or online. You will get instructions from your doctor. Your information will stay private and secure. You will also have checkups in person. You may need to keep a symptom diary while you have the monitor. This means that you will write down the times you have symptoms and what you were doing when they started. Your doctor will let you know how to do this. He or she can then look at your heart monitor records to see if your heart rhythms changed when you had symptoms. After your doctor gets the information he or she needs, the monitor will be removed from your chest.  What else should you know about living with a heart monitor?   You will get a medical ID card with Enter through hospital main entrance and turn immediately to your left. Patient's contact number:  [phone] (home)      Date and Time:    10/21/2020 arrive at 1130AM for 130PM procedure  MUST HAVE COVID TESTING DONE ON 10/16 between 8-10AM only. Please do not eat or drink anything 4-6 hours prior. You will need someone to drive you home after the procedure. Battery will last approximately three years. If for any reason you are unable to keep this appointment, please contact [facility], [phone], to reschedule. Implantable Loop Recorder        An implantable loop recorder is a small device that is implanted under the skin to help identify the causes of fainting. Syncope (or fainting) is a temporary loss of consciousness. Certain heart disorders can cause fainting, such as abnormal heartbeats called arrhythmias. An implantable loop recorder is a small device that is inserted under the skin below the collar bone (usually on the patients left side). The procedure to implant the device is simple. Local anesthetic is injected into the area. A small incision is made and device is inserted. The skin is then sutured closed. The device continuously records heart activity similar to an ECG for up to 2 years. If the patient experiences an episode of fainting the device is activated to save the recording before, during, and after the episode. The recordings can then be evaluated by a physician to help determine the cause of fainting.

## 2020-10-15 NOTE — PROGRESS NOTES
[facility], [place]. [address], [place] Samir [address] [facility], later [facility]  [phone] office  [phone] fax      OFFICE VISIT:  10/15/2020    Kilo Cabral - : 1935  Reason For Visit:  Dae Gaitan is a 80 y.o. female who is here for 1 Month Follow-Up (no cardiac symptoms) and Chest Pain    History:   Diagnosis Orders   1. Chest pain, unspecified type     2. Moderate mitral regurgitation     3. Essential hypertension     4. Abnormal EKG     5. Vasovagal syncope     6. Bradycardia       The patient presents today for follow up after cardiac testing. She presented as a new patient referral for chest pain. The subsequent nuclear stress test was negative for ischemia with normal EF. A 2D echo showed normal EF with moderate MR stable in comparison to previous EKG. The patient's daughter reports that her mother has experienced vasovagal syncope with BM since the 's. She reports \"the incidence has increased since  with most recent episode this past September. The patient follows with Dr. Cecille Cotton. The patient had surgery at [address] [place] [address] this past January for contained uterine cancer. No treatments were warranted. The patient reports no angina today. BP is well controlled on current regimen. PCP follows cholesterol. Sheilda Aschoff denies exertional chest pain, shortness of breath, orthopnea, paroxysmal nocturnal dyspnea, arrhythmia, edema and fatigue. The patient denies numbness or weakness to suggest cerebrovascular accident or transient ischemic attack. + history of vasovagal syncope.     Kilo Cabral has the following history as recorded in [facility]:  Patient Active Problem List   Diagnosis Code    Irritable bowel syndrome with diarrhea K58.0    S/P laparoscopic cholecystectomy Z90.49    Hyponatremia E87.1    Late onset Alzheimer's disease without behavioral disturbance (HCC) G30.1, F02.80    Altered mental status R41.82    Seizure as late effect of cerebrovascular accident (CVA) ([facility] I69.398, A58.5    Metabolic encephalopathy M84.76    Weakness R53.1    Chronic bilateral lower abdominal pain R10.31, G89.29, R10.32    Gas pain R14.1    History of ischemic colitis Z87.19    Partial symptomatic epilepsy with complex partial seizures, intractable, without status epilepticus ([facility] G40.219    Cerebrovascular small vessel disease I67.9    Vascular dementia without behavioral disturbance (HCC) F01.50    Risk for falls Z91.81    Dry age-related macular degeneration of left eye H35.3120    Age-related macular degeneration, wet, right eye (HCC) H35.3210    Gait abnormality R26.9    Palliative care patient Z51.5    Bloating R14.0    Chronic bilateral low back pain with left-sided sciatica M54.42, G89.29    Arthralgia of multiple joints M25.50    Abnormal EKG R94.31    Moderate mitral regurgitation I34.0    Chest pain R07.9    Essential hypertension I10    Overactive bladder N32.81     Past Medical History:   Diagnosis Date    Age-related macular degeneration, wet, right eye (HCC)     Anemia     Back pain     Chronic bilateral low back pain with left-sided sciatica 12/19/2019    Colitis, ischemic (HCC)     Dementia ([facility]     Diverticulosis     Dry age-related macular degeneration of left eye     Hyperlipidemia     Hypertension     Osteoarthritis     Palliative care patient 09/17/2019    Risk for falls 9/13/2019    Seizures (HCC)     Spastic colon     Stroke syndrome     Urinary incontinence     Uterine cancer [facility])      Past Surgical History:   Procedure Laterality Date    BREAST SURGERY Right     FNA Right Breast \"oh heavens, maybe 20 years ago\"    CHOLECYSTECTOMY      COLONOSCOPY  9/25/03    Dr Flakita Burks ischemic colitis, incomplete exam    COLONOSCOPY  08/29/2003    Dr Susy Alba:  limited colon-ischemic colitis    DILATION AND CURETTAGE OF UTERUS      x2, in 1980s    EYE SURGERY      retine tears, laser suregry, adn cataract with IOL b/l    HYSTERECTOMY      ovaries and tubes    LUMBAR FUSION       90 days after the spine surgeries    OTHER SURGICAL HISTORY      inner lymph nodes    SPINE SURGERY      L3,4, and 5   done in    [address]      as a child at age 9    [facility] ENDOSCOPY  2003    DR Marley Acuna:  Duodenal scarring but no evidence of active ulcer disease.      Family History   Problem Relation Age of Onset    Heart Defect Mother     Hypertension Mother     Heart Attack Father         x3 within 24 h and then , abused ciggarettes    No Known Problems Daughter     No Known Problems Son     No Known Problems Son     Colon Cancer Neg Hx     Colon Polyps Neg Hx     Esophageal Cancer Neg Hx     Liver Cancer Neg Hx     Liver Disease Neg Hx     Rectal Cancer Neg Hx     Stomach Cancer Neg Hx      Social History     Tobacco Use    Smoking status: Never Smoker    Smokeless tobacco: Never Used   Substance Use Topics    Alcohol use: Never     Frequency: Never      Current Outpatient Medications   Medication Sig Dispense Refill    acetaminophen (TYLENOL) 500 MG tablet Take 1,000 mg by mouth Indications: 2 tablets at 6:00am but not more than 3,000mg per day      meloxicam (MOBIC) 7.5 MG tablet Take 7.5 mg by mouth daily as needed for Pain      donepezil (ARICEPT) 10 MG tablet Take 1 tablet by mouth nightly 90 tablet 3    Docosanol (ABREVA EX) Apply topically      levETIRAcetam (KEPPRA) 500 MG tablet Take 1 tablet by mouth 2 times daily 60 tablet 11    dipyridamole (PERSANTINE) 50 MG tablet Take 2 tablets by mouth 2 times daily 120 tablet 11    hydrALAZINE (APRESOLINE) 25 MG tablet Take 25 mg by mouth 3 times daily       nitrofurantoin (MACRODANTIN) 50 MG capsule TAKE 1 CAPSULE AT BEDTIME 30 capsule 3    simethicone (GAS-X EXTRA STRENGTH) 125 MG chewable tablet Take 125 mg by mouth 2 PO 5:30 AM,1- 2 10:30 AM, 1 4:00 PM and PRN      butalbital-acetaminophen-caffeine (FIORICET, ESGIC) -40 MG per tablet Take 1 tablet by mouth every 4 hours as needed for Headaches      amLODIPine (NORVASC) 5 MG tablet Take 5 mg by mouth 2 times daily       diclofenac sodium 1 % GEL Apply 2 g topically 4 times daily as needed for Pain      sodium chloride 1 g tablet Take 1 g by mouth 3 times daily      lidocaine 4 % external patch Place 1 patch onto the skin daily as needed (LEFT LOWER BACK PAIN)      NONFORMULARY Indications: dic 3%, lidocaine 2%, cyclo 2%, prilo 2% baclo 2% 1 to 2 pumps up to TID PRN       desonide (DESOWEN) 0.05 % cream Apply topically as needed Apply topically 2 times daily.  mupirocin (BACTROBAN) 2 % cream Apply topically as needed Apply topically 3 times daily.  valACYclovir (VALTREX) 1 g tablet as needed       Psyllium (METAMUCIL FIBER PO) Take by mouth nightly       azelastine (ASTELIN) 0.1 % nasal spray 2 sprays by Nasal route 2 times daily       Cholecalciferol (VITAMIN D3) 5000 units TABS Take 5,000 Units by mouth daily Indications: AT 2:30 PM       polyvinyl alcohol-povidone (HYPOTEARS) 1.4-0.6 % ophthalmic solution Place 1-2 drops into both eyes as needed      sodium chloride (OCEAN, BABY AYR) 0.65 % nasal spray 1 spray by Nasal route as needed for Congestion      tobramycin-dexamethasone (TOBRADEX) 0.3-0.1 % ophthalmic ointment Place into the left eye as needed 3 times daily.  Lutein 20 MG TABS Take 20 mg by mouth daily Indications: 2:30 PM DAILY       fluticasone (VERAMYST) 27.5 MCG/SPRAY nasal spray 2 sprays by Nasal route 2 times daily       camphor-menthol (SARNA) 0.5-0.5 % lotion Apply 0.5 mLs topically 2 times daily as needed for Itching Apply topically as needed.       cloNIDine (CATAPRES) 0.1 MG tablet Take 1 tablet by mouth 2 times daily (Patient taking differently: Take 0.1 mg by mouth as needed for High Blood Pressure (for BP greater than 160/100) ) 60 tablet 0    Calcium Carb-Cholecalciferol (CALCIUM 600+D) 600-800 MG-UNIT TABS Take 1 tablet by mouth daily Indications: AT 2:30 PM       promethazine (PHENERGAN) 25 MG tablet Take 25 mg by mouth 3 times daily as needed for Nausea      docusate sodium (COLACE) 100 MG capsule Take 100 mg by mouth nightly as needed for Constipation       Omega 3-6-9 Fatty Acids (OMEGA 3-6-9 COMPLEX PO) Take 2 tablets by mouth daily Indications: AT 2:30 PM       alendronate (FOSAMAX) 70 MG tablet Take 70 mg by mouth every 7 days Wednesday at [facility] 5 MG tablet Take 5 mg by mouth nightly Indications: nightly       pravastatin (PRAVACHOL) 20 MG tablet Take 20 mg by mouth every morning       ramipril (ALTACE) 10 MG capsule Take 10 mg by mouth 2 times daily       b complex vitamins capsule Take 1 capsule by mouth daily Indications: AT 2:30 PM B 100      Multiple Vitamins-Minerals (THERAPEUTIC MULTIVITAMIN-MINERALS) tablet Take 1 tablet by mouth daily Indications: AT 2:30 PM       gabapentin (NEURONTIN) 100 MG capsule Take 1 capsule by mouth 3 times daily for 180 days. Intended supply: 30 days (Patient not taking: Reported on 10/15/2020) 90 capsule 5    butalbital-acetaminophen-caffeine (FIORICET, ESGIC) -40 MG per tablet Take 1 tablet by mouth every 4 hours as needed for Headaches (Patient not taking: Reported on 10/15/2020) 40 tablet 3    acetaminophen (TYLENOL) 500 MG tablet Take 2 tablets by mouth every 6 hours as needed for Pain Do not exceed 4 g of acetaminophen daily (patient is also on Fioricet PRN) (Patient not taking: Reported on 10/15/2020) 100 tablet 0     No current facility-administered medications for this visit. Allergies: Aspirin; Dilaudid [hydromorphone hcl]; Ondansetron; Quinolones; Spironolactone; Sulfa antibiotics; Codeine; Demerol hcl [meperidine]; Levaquin [levofloxacin in d5w]; Hannah Primer; Namenda [memantine hcl];  Norco [hydrocodone-acetaminophen]; and Pneumococcal vaccines    Review of Systems  Constitutional - no appetite change, or unexpected weight change. No fever, chills or diaphoresis. No significant change in activity level or new onset of fatigue. HEENT - no significant rhinorrhea or epistaxis. No tinnitus or significant hearing loss. Eyes - no sudden vision change or amaurosis. No corneal arcus, xantholasma, subconjunctival hemorrhage or discharge. Respiratory - no significant wheezing, stridor, apnea or cough. No dyspnea on exertion or shortness of air. Cardiovascular - no exertional chest pain to suggest myocardial ischemia. No orthopnea or PND. No sensation of sustained arrythmia. No palpitations. No claudication. + slow heart rates on occasion. Gastrointestinal - no abdominal swelling or pain. No blood in stool. No severe constipation, diarrhea, nausea, or vomiting. Genitourinary - no dysuria, frequency, or urgency. No flank pain or hematuria. Musculoskeletal - no back pain or myalgia. No problems with gait. Extremities - no clubbing, cyanosis or extremity edema. Skin - no color change or rash. No pallor. No new surgical incision. Neurologic - no speech difficulty, facial asymmetry or lateralizing weakness. No significant dizziness. + seizure disorder. + vasovagal syncope. Hematologic - no easy bruising or excessive bleeding. Psychiatric - no severe anxiety or insomnia. No confusion. All other review of systems are negative. Objective  Vital Signs - /74   Pulse (!) 48   Ht 5' (1.524 m)   Wt 125 lb (56.7 kg)   SpO2 97%   BMI 24.41 kg/m²   General - Morenita Prado is alert, cooperative, and pleasant. Well groomed. No acute distress. Body habitus - Body mass index is 24.41 kg/m². HEENT - Head is normocephalic. No circumoral cyanosis. Dentition is normal.  EYES -   Lids normal without ptosis. No discharge, edema or subconjunctival hemorrhage. Neck - Symmetrical without apparent mass or lymphadenopathy. Respiratory - Normal respiratory effort without use of accessory muscles. Ausculatation reveals vesicular breath sounds without crackles, wheezes, rub or rhonchi. Cardiovascular - No jugular venous distention. Auscultation reveals regular rate and rhythm. No audible clicks, gallop or rub. No murmur. No lower extremity varicosities. No carotid bruits. Abdominal -  No visible distention, mass or pulsations. Extremities - No clubbing or cyanosis. No statis dermatitis or ulcers. No edema. Musculoskeletal -   No Osler's nodes. No kyphosis or scoliosis. Gait is even and regular without limp or shuffle. Ambulates without assistance. Skin -  Warm and dry; no rash or pallor. No new surgical wound. Neurological - No focal neurological deficits. Thought processes coherent. No apparent tremor. Oriented to person, place and time. Psychiatric -  Appropriate affect and mood. Assessment:     Diagnosis Orders   1. Chest pain, unspecified type     2. Moderate mitral regurgitation     3. Essential hypertension     4. Abnormal EKG     5. Vasovagal syncope     6.  Bradycardia       Data reviewed:    Lab Results   Component Value Date    WBC 7.1 02/23/2020    HGB 13.1 02/23/2020    HCT 41.1 02/23/2020    MCV 99.3 (H) 02/23/2020     02/23/2020     Lab Results   Component Value Date     02/23/2020    K 3.8 02/23/2020     02/23/2020    CO2 24 02/23/2020    BUN 30 02/23/2020    CREATININE 0.9 02/23/2020    GLUCOSE 123 02/23/2020    CALCIUM 10.0 02/23/2020      Lab Results   Component Value Date    CHOL 190 09/03/2019    CHOL 171 03/21/2019    CHOL 190 10/08/2018     Lab Results   Component Value Date    TRIG 142 09/03/2019    TRIG 145 03/21/2019    TRIG 138 10/08/2018     Lab Results   Component Value Date    HDL 66 09/03/2019    HDL 68 03/21/2019    HDL 80 10/08/2018     Lab Results   Component Value Date    LDLCALC 96 09/03/2019    LDLCALC 74 03/21/2019    LDLCALC 82 10/08/2018 9/17/20 Lexiscan  Impression    Impression:    There is no obvious infarct or ischemia, with a calculated ejection    fraction of 84 %. Suggest: Clinical correlation and consideration for Medical    management. Signed by Dr Cristina Bustillo on 9/17/2020 10:03 PM      9/17/20 echo   Summary   Normal left ventricular size and systolic function EF 76-61%   Mild concentric left ventricular hypertrophy with normal diastolic  function   Mild left atrial enlargement   Mild calcification of the somewhat thickened tricuspid aortic valve with  adequate cusp separation and no significant stenosis or insufficiency   Mild thickening of a normally mobile mitral valve with moderate  regurgitation   Pulmonic valve not visualized   Mild right atrial enlargement with normal right ventricular size and  systolic function   Mild to moderate tricuspid regurgitation with RVSP estimate 28 mmHg   Normal IVC consistent with normal right atrial filling pressures   Significant pericardial effusion and aortic root dimensions are within  normal limits    Signature    ----------------------------------------------------------------   Electronically signed by Sarah Guillaume MD(Interpreting physician)   on 09/17/2020 01:10 PM    6/12/20 CT head  Impression    Mild cerebral and cerebellar volume loss with chronic microvascular    disease but no evidence of acute intracranial process.     Signed by Dr Brianna Ventura on 6/12/2020 2:03 PM      1/25/19 carotid ultrasound   Impression         Chel Seller is heterogeneous plaque visualized in the bilateral internal carotid     artery(ies).     There is less than 50% stenosis in the right internal carotid artery.    Chel Seller is less than 50% stenosis of the left internal carotid artery.    Chel Seller is normal antegrade flow in the bilateral vertebral artery(ies).     Compared to the previous study dated 7-10-16 , there is no significant     change in stenosis of the bilateral internal carotid artery(ies).       Signature          ----------------------------------------------------------------     Electronically signed by Rekha Kunz MD(Interpreting     [id number]) on 01/26/2019 07:03 AM      9/27/19 holter monitor  Date Interpreted: 10/10/2019     1.   Nearly 6 days and 20 hours of rhythm are processed after   artifact was removed     2.  The underlying rhythm is normal sinus rhythm at a mean heart rate of 63 bpm, with a range of 47 to 107 bpm.   3.  The were rare extra supraventricular beats.  The majority of these were single isolated PAC's; there was there were several runs of SVT, the longest of which was a 5 beat run at a rate of 99 bpm which spontaneously terminated. Dana Chatterjee 4.  The were rare extra ventricular beats.  The majority of these were single isolated PVC's, there was no VT. 5.  Prolonged pauses or high degree AV block were not noted. 6.  Triggered Events or Diary Enteries were not reported for   rhythm - symptom correlation. Impression:  This ZIO Patch shows no evidence of significant   supraventricular or ventricular ectopy and is without prolonged   pauses. Electronically signed by Alicja Mederos MD on 10/10/19     Hx chest pain - recent Lexiscan negative for ischemia. No additional chest pain. HTN - normotensive on current regimen. Moderate MR- stable on recent echo. Good BP control. Follow with echo. Hx vasovagal syncope/bradycardia- discussed placing 2 week Zio. Patient declines due to problems with skin irritation. Discussed option of implanted loop recorder. Procedure discussed with patient and daughter. Patient opted to proceed. Procedure scheduled with Dr. Nestor Jarrett. COVID 19 testing and quarantine discussed. Patient is compliant with medication regimen.     BP Readings from Last 3 Encounters:   10/15/20 126/74   09/02/20 122/60   06/11/20 (!) 112/58    Pulse Readings from Last 3 Encounters:   10/15/20 (!) 48   09/02/20 56   06/11/20 64        Wt Readings from Last 3 Encounters:   10/15/20 125 lb (56.7 kg)   09/02/20 126 lb (57.2 kg)   03/09/20 122 lb (55.3 kg)       Recent Results (from the past 1008 hour(s))   ECHO Complete 2D W Doppler W Color    Collection Time: 09/17/20 11:57 AM   Result Value Ref Range    Left Ventricular Ejection Fraction 58     LVEF MODALITY ECHO    NM MYOCARDIAL SPECT REST EXERCISE OR RX    Collection Time: 09/17/20  3:06 PM   Result Value Ref Range    Left Ventricular Ejection Fraction 84     LVEF MODALITY Nuclear      Plan  Previous cardiac history and records reviewed. Continue current medical management. Loop recorder implant scheduled with Dr. Aicha Horne -10/27/2020 arrive at 11AM for 130PM.   COVID 19 test prior to the procedure-- 10/23/2020- between 8-10AM.  Continue other current medications as directed. Continue to follow up with primary care provider for non cardiac medical problems. Call the office with any problems, questions or concerns at [phone]. Cardiology follow up: as scheduled after loop recorder implant. Educational included in patient instructions. Heart health. Loop recorder implant.      LAYTON Mota

## 2020-10-21 ENCOUNTER — NURSE ONLY (OUTPATIENT)
Dept: UROLOGY | Age: 85
End: 2020-10-21
Payer: MEDICARE

## 2020-10-21 VITALS — WEIGHT: 125 LBS | BODY MASS INDEX: 24.54 KG/M2 | TEMPERATURE: 98 F | HEIGHT: 60 IN

## 2020-10-21 LAB
BILIRUBIN, POC: NORMAL
BLOOD URINE, POC: NORMAL
CLARITY, POC: CLEAR
COLOR, POC: YELLOW
GLUCOSE URINE, POC: NORMAL
KETONES, POC: NORMAL
LEUKOCYTE EST, POC: NORMAL
NITRITE, POC: NORMAL
PH, POC: 5.5
PROTEIN, POC: NORMAL
SPECIFIC GRAVITY, POC: 1.02
UROBILINOGEN, POC: 0.2

## 2020-10-21 PROCEDURE — 64566 NEUROELTRD STIM POST TIBIAL: CPT | Performed by: PHYSICIAN ASSISTANT

## 2020-10-21 PROCEDURE — 81003 URINALYSIS AUTO W/O SCOPE: CPT | Performed by: PHYSICIAN ASSISTANT

## 2020-10-21 ASSESSMENT — ENCOUNTER SYMPTOMS
CONSTIPATION: 0
DIARRHEA: 0
WHEEZING: 0
ABDOMINAL PAIN: 0
BACK PAIN: 0
COUGH: 0
SHORTNESS OF BREATH: 0
EYE DISCHARGE: 0
EYE REDNESS: 0

## 2020-10-21 NOTE — PROGRESS NOTES
Ar Yusuf is a 80 y.o. female who presents today   Chief Complaint   Patient presents with    Follow-up     I am here for my uroplasty today. Maintenance treatment neuroplasty:  Patient with history of overactive bladder who is responded well to uro-plasty treatment. She gets maintenance treatments approximately every 23 to 24 days due to the fact that if she waits longer. Overall patient has had good treatment response to the uroplasty. She has had some urgency recently however her urine is clear there is no infection. Past Medical History:   Diagnosis Date    Age-related macular degeneration, wet, right eye ([address]     Anemia     Back pain     Chronic bilateral low back pain with left-sided sciatica 12/19/2019    Colitis, ischemic ([address]     Dementia ([address]     Diverticulosis     Dry age-related macular degeneration of left eye     Hyperlipidemia     Hypertension     Osteoarthritis     Palliative care patient 09/17/2019    Risk for falls 9/13/2019    Seizures (HCC)     Spastic colon     Stroke syndrome     Urinary incontinence     Uterine cancer [facility])        Past Surgical History:   Procedure Laterality Date    BREAST SURGERY Right     FNA Right Breast \"oh heavens, maybe 20 years ago\"    CHOLECYSTECTOMY      COLONOSCOPY  9/25/03    Dr Svetlana Aquino ischemic colitis, incomplete exam    COLONOSCOPY  08/29/2003    Dr Mila Nicole:  limited colon-ischemic colitis    DILATION AND CURETTAGE OF UTERUS      x2, in [facility] tears, laser suregry, adn cataract with IOL b/l    HYSTERECTOMY      ovaries and tubes    LUMBAR FUSION      2012 90 days after the spine surgeries    OTHER SURGICAL HISTORY      inner lymph nodes    SPINE SURGERY      L3,4, and 5   done in 2012   Nay [address] 76      as a child at age 9    [address]  08/28/2003    DR Marley Acuna:  Duodenal scarring but no evidence of active ulcer disease. Current Outpatient Medications   Medication Sig Dispense Refill    acetaminophen (TYLENOL) 500 MG tablet Take 1,000 mg by mouth Indications: 2 tablets at 6:00am but not more than 3,000mg per day      meloxicam (MOBIC) 7.5 MG tablet Take 7.5 mg by mouth daily as needed for Pain      donepezil (ARICEPT) 10 MG tablet Take 1 tablet by mouth nightly 90 tablet 3    Docosanol (ABREVA EX) Apply topically      levETIRAcetam (KEPPRA) 500 MG tablet Take 1 tablet by mouth 2 times daily 60 tablet 11    dipyridamole (PERSANTINE) 50 MG tablet Take 2 tablets by mouth 2 times daily 120 tablet 11    hydrALAZINE (APRESOLINE) 25 MG tablet Take 25 mg by mouth 3 times daily       nitrofurantoin (MACRODANTIN) 50 MG capsule TAKE 1 CAPSULE AT BEDTIME 30 capsule 3    simethicone (GAS-X EXTRA STRENGTH) 125 MG chewable tablet Take 125 mg by mouth 2 PO 5:30 AM,1- 2 10:30 AM, 1 4:00 PM and PRN      butalbital-acetaminophen-caffeine (FIORICET, ESGIC) -40 MG per tablet Take 1 tablet by mouth every 4 hours as needed for Headaches      amLODIPine (NORVASC) 5 MG tablet Take 5 mg by mouth 2 times daily       diclofenac sodium 1 % GEL Apply 2 g topically 4 times daily as needed for Pain      sodium chloride 1 g tablet Take 1 g by mouth 3 times daily      lidocaine 4 % external patch Place 1 patch onto the skin daily as needed (LEFT LOWER BACK PAIN)      NONFORMULARY Indications: dic 3%, lidocaine 2%, cyclo 2%, prilo 2% baclo 2% 1 to 2 pumps up to TID PRN       desonide (DESOWEN) 0.05 % cream Apply topically as needed Apply topically 2 times daily.  mupirocin (BACTROBAN) 2 % cream Apply topically as needed Apply topically 3 times daily.       valACYclovir (VALTREX) 1 g tablet as needed       Psyllium (METAMUCIL FIBER PO) Take by mouth nightly       azelastine (ASTELIN) 0.1 % nasal spray 2 sprays by Nasal route 2 times daily       Cholecalciferol (VITAMIN D3) 5000 units TABS Take 5,000 Units by mouth daily Allergen Reactions    Aspirin Shortness Of Breath    Dilaudid [Hydromorphone Hcl]     Ondansetron     Quinolones     Spironolactone      Low sodium    Sulfa Antibiotics     Codeine Rash and Other (See Comments)     Makes her \"crazy and mean\" and gives her a rash    Demerol Hcl [Meperidine] Other (See Comments)     Made her \"crazy and mean\", and \"loopy\"    Levaquin [Levofloxacin In D5w] Other (See Comments)     Seizure like activity      Myrbetriq [Mirabegron] Other (See Comments)     Unable to micturate    Namenda [Memantine Hcl] Other (See Comments)     made her \"loopy\"    Norco [Hydrocodone-Acetaminophen] Rash and Other (See Comments)     \"crazy and mean\"    Pneumococcal Vaccines Swelling       Social History     Socioeconomic History    Marital status:      Spouse name: Not on file    Number of children: 3    Years of education: Not on file    Highest education level: Not on file   Occupational History    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mployee of [employer]    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,mployee of [employer]   Social Needs    Financial resource strain: Not on file    Food insecurity     Worry: Not on file     Inability: Not on file   [place] needs     Medical: Not on file     Non-medical: Not on file   Tobacco Use    Smoking status: Never Smoker    Smokeless tobacco: Never Used   Substance and Sexual Activity    Alcohol use: Never     Frequency: Never    Drug use: Never    Sexual activity: Not on file     Comment: has 3 kids   Lifestyle    Physical activity     Days per week: Not on file     Minutes per session: Not on file    Stress: Not on file   Relationships    Social connections     Talks on phone: Not on file     Gets together: Not on file     Attends [employer] service: Not on file     Active member of club or organization: Not on file     Attends meetings of clubs or organizations: Not on file     Relationship status: Not on file    Intimate partner violence     Fear of current or ex partner: Not on file     Emotionally abused: Not on file     Physically abused: Not on file     Forced sexual activity: Not on file   Other Topics Concern    Not on file   Social History Narrative    CODE STATUS: DNR-CCA    HEALTH CARE [facility]OX[facility] / [facility]: her daughter, Mrs. Judge Last, [phone]    AMBULATES: with walker during day, wheelchair at night    DOMICILED: lives in the [facility] assisted living facility, has no stairs or steps, has a cat, her daughter is there periodically       Family History   Problem Relation Age of Onset    Heart Defect Mother     Hypertension Mother     Heart Attack Father         x3 within 24 h and then , abused ciggarettes    No Known Problems Daughter     No Known Problems Son     No Known Problems Son     Colon Cancer Neg Hx     Colon Polyps Neg Hx     Esophageal Cancer Neg Hx     Liver Cancer Neg Hx     Liver Disease Neg Hx     Rectal Cancer Neg Hx     Stomach Cancer Neg Hx        REVIEW OF SYSTEMS:  Review of Systems   Constitutional: Negative for chills and fever. HENT: Negative for congestion and hearing loss. Eyes: Negative for discharge and redness. Respiratory: Negative for cough, shortness of breath and wheezing. Cardiovascular: Negative for leg swelling. Gastrointestinal: Negative for abdominal pain, constipation and diarrhea. Endocrine: Negative for cold intolerance and heat intolerance. Genitourinary: Negative for decreased urine volume, difficulty urinating, dysuria, enuresis, flank pain, frequency, genital sores, hematuria and urgency. Musculoskeletal: Negative for back pain and gait problem. Skin: Negative for rash. Allergic/Immunologic: Negative for environmental allergies. Neurological: Negative for dizziness, weakness and headaches. Hematological: Does not bruise/bleed easily.    Psychiatric/Behavioral: Negative for behavioral problems, confusion and sleep disturbance. PHYSICAL EXAM:  Temp 98 °F (36.7 °C)   Ht 5' (1.524 m)   Wt 125 lb (56.7 kg)   Breastfeeding No   BMI 24.41 kg/m²   Physical Exam  Constitutional:       Appearance: Normal appearance. HENT:      Head: Normocephalic and atraumatic. Right Ear: External ear normal.      Left Ear: External ear normal.      Nose: No congestion. Eyes:      General:         Right eye: No discharge. Left eye: No discharge. Conjunctiva/sclera: Conjunctivae normal.   Neck:      Comments: No obvious neck masses observed  Pulmonary:      Effort: Pulmonary effort is normal.   Skin:     Coloration: Skin is pale. Neurological:      General: No focal deficit present. Mental Status: She is alert and oriented to person, place, and time. Psychiatric:         Mood and Affect: Mood normal.         Behavior: Behavior normal.            Uro-plasty treatment:   1. Patient is seated with the left treatment leg elevated. 2. 34 gauge fine needle electrode is inserted into lower inner aspect of the leg. Slightly cephalad to the medial malleolus. 3. Surface electrode pad is placed over the medial aspect of the calcaneus on the same leg. 4.Needle electrode is connected to the external pulse generator. 5.The pulse generator is turned on and the millivolts used are 11. 6.Patient is treated for 30 minutes. 7.The needle and pad are removed. 1. Overactive bladder  Patient here for her maintenance uro-plasty treatments. Overall response has been excellent she has more control and her symptoms are fairly well-controlled on treatment. She had failed on oral medications previously  - DC POST TIBIAL NEUROSTIMULATION,PERC NEEDLE ELECTRODE      Orders Placed This Encounter   Procedures    POCT Urinalysis No Micro (Auto)     No orders of the defined types were placed in this encounter.     Plan:  Follow-up for next treatment is scheduled

## 2020-10-23 ENCOUNTER — OFFICE VISIT (OUTPATIENT)
Age: 85
End: 2020-10-23

## 2020-10-23 VITALS — HEART RATE: 59 BPM | OXYGEN SATURATION: 94 % | TEMPERATURE: 96.8 F

## 2020-10-23 LAB — SARS-COV-2, PCR: NOT DETECTED

## 2020-10-23 PROCEDURE — 99999 PR OFFICE/OUTPT VISIT,PROCEDURE ONLY: CPT | Performed by: NURSE PRACTITIONER

## 2020-10-27 ENCOUNTER — APPOINTMENT (OUTPATIENT)
Dept: GENERAL RADIOLOGY | Age: 85
End: 2020-10-27
Payer: MEDICARE

## 2020-10-27 ENCOUNTER — HOSPITAL ENCOUNTER (OUTPATIENT)
Dept: CARDIAC CATH/INVASIVE PROCEDURES | Age: 85
Discharge: HOME | End: 2020-10-27
Attending: INTERNAL MEDICINE | Admitting: INTERNAL MEDICINE
Payer: MEDICARE

## 2020-10-27 ENCOUNTER — HOSPITAL ENCOUNTER (EMERGENCY)
Age: 85
Discharge: HOME | End: 2020-10-27
Attending: EMERGENCY MEDICINE
Payer: MEDICARE

## 2020-10-27 VITALS
HEART RATE: 84 BPM | TEMPERATURE: 98.4 F | OXYGEN SATURATION: 96 % | RESPIRATION RATE: 18 BRPM | BODY MASS INDEX: 24.22 KG/M2 | SYSTOLIC BLOOD PRESSURE: 140 MMHG | WEIGHT: 124 LBS | DIASTOLIC BLOOD PRESSURE: 69 MMHG

## 2020-10-27 VITALS
TEMPERATURE: 96.7 F | WEIGHT: 124 LBS | DIASTOLIC BLOOD PRESSURE: 53 MMHG | SYSTOLIC BLOOD PRESSURE: 143 MMHG | HEIGHT: 60 IN | HEART RATE: 75 BPM | RESPIRATION RATE: 21 BRPM | BODY MASS INDEX: 24.35 KG/M2 | OXYGEN SATURATION: 95 %

## 2020-10-27 LAB
ALBUMIN SERPL-MCNC: 4.2 G/DL (ref 3.5–5.2)
ALP BLD-CCNC: 56 U/L (ref 35–104)
ALT SERPL-CCNC: 14 U/L (ref 5–33)
ANION GAP SERPL CALCULATED.3IONS-SCNC: 14 MMOL/L (ref 7–19)
AST SERPL-CCNC: 21 U/L (ref 5–32)
BASOPHILS ABSOLUTE: 0 K/UL (ref 0–0.2)
BASOPHILS RELATIVE PERCENT: 0.5 % (ref 0–1)
BILIRUB SERPL-MCNC: 0.4 MG/DL (ref 0.2–1.2)
BUN BLDV-MCNC: 23 MG/DL (ref 8–23)
CALCIUM SERPL-MCNC: 9.4 MG/DL (ref 8.8–10.2)
CHLORIDE BLD-SCNC: 102 MMOL/L (ref 98–111)
CO2: 22 MMOL/L (ref 22–29)
CREAT SERPL-MCNC: 0.8 MG/DL (ref 0.5–0.9)
EKG P AXIS: 68 DEGREES
EKG P-R INTERVAL: 174 MS
EKG Q-T INTERVAL: 448 MS
EKG QRS DURATION: 90 MS
EKG QTC CALCULATION (BAZETT): 441 MS
EKG T AXIS: 5 DEGREES
EOSINOPHILS ABSOLUTE: 0.1 K/UL (ref 0–0.6)
EOSINOPHILS RELATIVE PERCENT: 1.1 % (ref 0–5)
GFR AFRICAN AMERICAN: >59
GFR NON-AFRICAN AMERICAN: >60
GLUCOSE BLD-MCNC: 116 MG/DL (ref 74–109)
HCT VFR BLD CALC: 36.7 % (ref 37–47)
HCT VFR BLD CALC: 38.3 % (ref 37–47)
HEMOGLOBIN: 12.5 G/DL (ref 12–16)
HEMOGLOBIN: 13.1 G/DL (ref 12–16)
IMMATURE GRANULOCYTES #: 0 K/UL
LYMPHOCYTES ABSOLUTE: 1 K/UL (ref 1.1–4.5)
LYMPHOCYTES RELATIVE PERCENT: 19 % (ref 20–40)
MAGNESIUM: 2.1 MG/DL (ref 1.6–2.4)
MCH RBC QN AUTO: 32.7 PG (ref 27–31)
MCH RBC QN AUTO: 33.1 PG (ref 27–31)
MCHC RBC AUTO-ENTMCNC: 34.1 G/DL (ref 33–37)
MCHC RBC AUTO-ENTMCNC: 34.2 G/DL (ref 33–37)
MCV RBC AUTO: 95.5 FL (ref 81–99)
MCV RBC AUTO: 97.1 FL (ref 81–99)
MONOCYTES ABSOLUTE: 0.5 K/UL (ref 0–0.9)
MONOCYTES RELATIVE PERCENT: 8.4 % (ref 0–10)
NEUTROPHILS ABSOLUTE: 3.9 K/UL (ref 1.5–7.5)
NEUTROPHILS RELATIVE PERCENT: 70.8 % (ref 50–65)
PDW BLD-RTO: 11.5 % (ref 11.5–14.5)
PDW BLD-RTO: 11.7 % (ref 11.5–14.5)
PLATELET # BLD: 171 K/UL (ref 130–400)
PLATELET # BLD: 182 K/UL (ref 130–400)
PMV BLD AUTO: 10.9 FL (ref 9.4–12.3)
PMV BLD AUTO: 11 FL (ref 9.4–12.3)
POTASSIUM REFLEX MAGNESIUM: 3.1 MMOL/L (ref 3.5–5)
RBC # BLD: 3.78 M/UL (ref 4.2–5.4)
RBC # BLD: 4.01 M/UL (ref 4.2–5.4)
SODIUM BLD-SCNC: 138 MMOL/L (ref 136–145)
TOTAL PROTEIN: 6.3 G/DL (ref 6.6–8.7)
WBC # BLD: 3.8 K/UL (ref 4.8–10.8)
WBC # BLD: 5.5 K/UL (ref 4.8–10.8)

## 2020-10-27 PROCEDURE — 80053 COMPREHEN METABOLIC PANEL: CPT

## 2020-10-27 PROCEDURE — 36415 COLL VENOUS BLD VENIPUNCTURE: CPT

## 2020-10-27 PROCEDURE — 6360000002 HC RX W HCPCS

## 2020-10-27 PROCEDURE — 33285 INSJ SUBQ CAR RHYTHM MNTR: CPT | Performed by: INTERNAL MEDICINE

## 2020-10-27 PROCEDURE — 99024 POSTOP FOLLOW-UP VISIT: CPT | Performed by: INTERNAL MEDICINE

## 2020-10-27 PROCEDURE — 93005 ELECTROCARDIOGRAM TRACING: CPT | Performed by: EMERGENCY MEDICINE

## 2020-10-27 PROCEDURE — 33285 INSJ SUBQ CAR RHYTHM MNTR: CPT

## 2020-10-27 PROCEDURE — 85025 COMPLETE CBC W/AUTO DIFF WBC: CPT

## 2020-10-27 PROCEDURE — 93010 ELECTROCARDIOGRAM REPORT: CPT | Performed by: INTERNAL MEDICINE

## 2020-10-27 PROCEDURE — 93005 ELECTROCARDIOGRAM TRACING: CPT | Performed by: INTERNAL MEDICINE

## 2020-10-27 PROCEDURE — 2500000003 HC RX 250 WO HCPCS

## 2020-10-27 PROCEDURE — 6370000000 HC RX 637 (ALT 250 FOR IP): Performed by: EMERGENCY MEDICINE

## 2020-10-27 PROCEDURE — 83735 ASSAY OF MAGNESIUM: CPT

## 2020-10-27 PROCEDURE — 2709999900 HC NON-CHARGEABLE SUPPLY

## 2020-10-27 PROCEDURE — 99283 EMERGENCY DEPT VISIT LOW MDM: CPT

## 2020-10-27 PROCEDURE — 71045 X-RAY EXAM CHEST 1 VIEW: CPT

## 2020-10-27 PROCEDURE — 99999 PR OFFICE/OUTPT VISIT,PROCEDURE ONLY: CPT | Performed by: EMERGENCY MEDICINE

## 2020-10-27 PROCEDURE — 85027 COMPLETE CBC AUTOMATED: CPT

## 2020-10-27 PROCEDURE — 2580000003 HC RX 258: Performed by: INTERNAL MEDICINE

## 2020-10-27 PROCEDURE — 6370000000 HC RX 637 (ALT 250 FOR IP): Performed by: INTERNAL MEDICINE

## 2020-10-27 PROCEDURE — C1764 EVENT RECORDER, CARDIAC: HCPCS

## 2020-10-27 RX ORDER — PANTOPRAZOLE SODIUM 40 MG/1
40 TABLET, DELAYED RELEASE ORAL 2 TIMES DAILY
COMMUNITY

## 2020-10-27 RX ORDER — CHLORHEXIDINE GLUCONATE 4 G/100ML
SOLUTION TOPICAL ONCE
Status: COMPLETED | OUTPATIENT
Start: 2020-10-27 | End: 2020-10-27

## 2020-10-27 RX ORDER — POTASSIUM CHLORIDE 20 MEQ/1
40 TABLET, EXTENDED RELEASE ORAL ONCE
Status: COMPLETED | OUTPATIENT
Start: 2020-10-27 | End: 2020-10-27

## 2020-10-27 RX ORDER — SODIUM CHLORIDE 9 MG/ML
INJECTION, SOLUTION INTRAVENOUS CONTINUOUS
Status: DISCONTINUED | OUTPATIENT
Start: 2020-10-27 | End: 2020-10-27 | Stop reason: HOSPADM

## 2020-10-27 RX ADMIN — POTASSIUM CHLORIDE 40 MEQ: 1500 TABLET, EXTENDED RELEASE ORAL at 19:37

## 2020-10-27 RX ADMIN — CHLORHEXIDINE GLUCONATE: 213 SOLUTION TOPICAL at 13:30

## 2020-10-27 RX ADMIN — SODIUM CHLORIDE: 9 INJECTION, SOLUTION INTRAVENOUS at 13:30

## 2020-10-27 ASSESSMENT — ENCOUNTER SYMPTOMS
SHORTNESS OF BREATH: 0
EYE REDNESS: 0
RHINORRHEA: 0
COUGH: 0
VOMITING: 0
VOICE CHANGE: 0
DIARRHEA: 0
EYE PAIN: 0

## 2020-10-27 NOTE — PROGRESS NOTES
Patient ambulated in hallway with walker to bathroom without any new difficulties. Discharge instructions given. Patient and daughter voiced understanding.

## 2020-10-27 NOTE — H&P
Office Visit     10/15/2020  [facility] [place], APRN   Cardiology   Chest pain, unspecified type +5 more   Dx   1 Month Follow-Up   , Chest Pain   Reason for Visit    Progress Notes     Expand All Collapse All    [facility] [place], [place]. [address], [address], [address] [facility]  [phone] office  [phone] fax        OFFICE VISIT:  10/15/2020     Margeblanche Cheo - : 1935  Reason For Visit:  Magda Smart is a 80 y.o. female who is here for 1 Month Follow-Up (no cardiac symptoms) and Chest Pain     History:    Diagnosis Orders   1. Chest pain, unspecified type      2. Moderate mitral regurgitation      3. Essential hypertension      4. Abnormal EKG      5. Vasovagal syncope      6. Bradycardia         The patient presents today for follow up after cardiac testing. She presented as a new patient referral for chest pain. The subsequent nuclear stress test was negative for ischemia with normal EF. A 2D echo showed normal EF with moderate MR stable in comparison to previous EKG. The patient's daughter reports that her mother has experienced vasovagal syncope with BM since the 's. She reports \"the incidence has increased since 2016 with most recent episode this past September. The patient follows with Dr. Airam Cotton. The patient had surgery at [facility] this past January for contained uterine cancer. No treatments were warranted. The patient reports no angina today. BP is well controlled on current regimen. PCP follows cholesterol.     Subjective  Magda Smart denies exertional chest pain, shortness of breath, orthopnea, paroxysmal nocturnal dyspnea, arrhythmia, edema and fatigue. The patient denies numbness or weakness to suggest cerebrovascular accident or transient ischemic attack.   + history of vasovagal syncope.     Margeblanche Cheo has the following history as recorded in EpicCare:       Patient Active Problem List   Diagnosis Code    Irritable bowel syndrome with diarrhea K58.0    S/P laparoscopic cholecystectomy Z90.49    Hyponatremia E87.1    Late onset Alzheimer's disease without behavioral disturbance (HCC) G30.1, F02.80    Altered mental status R41.82    Seizure as late effect of cerebrovascular accident (CVA) ([facility] [address] I69.398, P63.6    Metabolic encephalopathy V09.93    Weakness R53.1    Chronic bilateral lower abdominal pain R10.31, G89.29, R10.32    Gas pain R14.1    History of ischemic colitis Z87.19    Partial symptomatic epilepsy with complex partial seizures, intractable, without status epilepticus ([facility] [address] G40.219    Cerebrovascular small vessel disease I67.9    Vascular dementia without behavioral disturbance (HCC) F01.50    Risk for falls Z91.81    Dry age-related macular degeneration of left eye H35.3120    Age-related macular degeneration, wet, right eye (HCC) H35.3210    Gait abnormality R26.9    Palliative care patient Z51.5    Bloating R14.0    Chronic bilateral low back pain with left-sided sciatica M54.42, G89.29    Arthralgia of multiple joints M25.50    Abnormal EKG R94.31    Moderate mitral regurgitation I34.0    Chest pain R07.9    Essential hypertension I10    Overactive bladder N32.81      Past Medical History        Past Medical History:   Diagnosis Date    Age-related macular degeneration, wet, right eye (HCC)      Anemia      Back pain      Chronic bilateral low back pain with left-sided sciatica 12/19/2019    Colitis, ischemic ([facility] [place] [address]      Dementia ([address]      Diverticulosis      Dry age-related macular degeneration of left eye      Hyperlipidemia      Hypertension      Osteoarthritis      Palliative care patient 09/17/2019    Risk for falls 9/13/2019    Seizures (HCC)      Spastic colon      Stroke syndrome      Urinary incontinence      Uterine cancer [facility])          Past Surgical History         Past Surgical History:   Procedure Laterality Date    BREAST SURGERY Right       FNA Right Breast \"oh carlos, maybe 20 years ago\"    CHOLECYSTECTOMY        COLONOSCOPY   03     Dr Emmanuel Wong ischemic colitis, incomplete exam    COLONOSCOPY   2003     Dr Ruddy Mclean:  limited colon-ischemic colitis    DILATION AND CURETTAGE OF UTERUS         x2, in [address] tears, laser suregry, adn cataract with IOL b/l    HYSTERECTOMY         ovaries and tubes    LUMBAR FUSION          90 days after the spine surgeries    OTHER SURGICAL HISTORY         inner lymph nodes    SPINE SURGERY         L3,4, and 5   done in    [address]         as a child at age 9    [facility]AL ENDOSCOPY   2003     DR Crawford Pro:  Duodenal scarring but no evidence of active ulcer disease.         Family History         Family History   Problem Relation Age of Onset    Heart Defect Mother      Hypertension Mother      Heart Attack Father           x3 within 24 h and then , abused ciggarettes    No Known Problems Daughter      No Known Problems Son      No Known Problems Son      Colon Cancer Neg Hx      Colon Polyps Neg Hx      Esophageal Cancer Neg Hx      Liver Cancer Neg Hx      Liver Disease Neg Hx      Rectal Cancer Neg Hx      Stomach Cancer Neg Hx          Social History            Tobacco Use    Smoking status: Never Smoker    Smokeless tobacco: Never Used   Substance Use Topics    Alcohol use: Never       Frequency: Never      Current Facility-Administered Medications          Current Outpatient Medications   Medication Sig Dispense Refill    acetaminophen (TYLENOL) 500 MG tablet Take 1,000 mg by mouth Indications: 2 tablets at 6:00am but not more than 3,000mg per day        meloxicam (MOBIC) 7.5 MG tablet Take 7.5 mg by mouth daily as needed for Pain        donepezil (ARICEPT) 10 MG tablet Take 1 tablet by mouth nightly 90 tablet 3    Docosanol (ABREVA EX) Apply topically        levETIRAcetam (KEPPRA) 500 MG tablet Take 1 tablet by mouth 2 times daily 60 tablet 11    dipyridamole (PERSANTINE) 50 MG tablet Take 2 tablets by mouth 2 times daily 120 tablet 11    hydrALAZINE (APRESOLINE) 25 MG tablet Take 25 mg by mouth 3 times daily         nitrofurantoin (MACRODANTIN) 50 MG capsule TAKE 1 CAPSULE AT BEDTIME 30 capsule 3    simethicone (GAS-X EXTRA STRENGTH) 125 MG chewable tablet Take 125 mg by mouth 2 PO 5:30 AM,1- 2 10:30 AM, 1 4:00 PM and PRN        butalbital-acetaminophen-caffeine (FIORICET, ESGIC) -40 MG per tablet Take 1 tablet by mouth every 4 hours as needed for Headaches        amLODIPine (NORVASC) 5 MG tablet Take 5 mg by mouth 2 times daily         diclofenac sodium 1 % GEL Apply 2 g topically 4 times daily as needed for Pain        sodium chloride 1 g tablet Take 1 g by mouth 3 times daily        lidocaine 4 % external patch Place 1 patch onto the skin daily as needed (LEFT LOWER BACK PAIN)        NONFORMULARY Indications: dic 3%, lidocaine 2%, cyclo 2%, prilo 2% baclo 2% 1 to 2 pumps up to TID PRN         desonide (DESOWEN) 0.05 % cream Apply topically as needed Apply topically 2 times daily.         mupirocin (BACTROBAN) 2 % cream Apply topically as needed Apply topically 3 times daily.        valACYclovir (VALTREX) 1 g tablet as needed         Psyllium (METAMUCIL FIBER PO) Take by mouth nightly         azelastine (ASTELIN) 0.1 % nasal spray 2 sprays by Nasal route 2 times daily         Cholecalciferol (VITAMIN D3) 5000 units TABS Take 5,000 Units by mouth daily Indications: AT 2:30 PM         polyvinyl alcohol-povidone (HYPOTEARS) 1.4-0.6 % ophthalmic solution Place 1-2 drops into both eyes as needed        sodium chloride (OCEAN, BABY AYR) 0.65 % nasal spray 1 spray by Nasal route as needed for Congestion        tobramycin-dexamethasone (TOBRADEX) 0.3-0.1 % ophthalmic ointment Place into the left eye as needed 3 times daily.        Lutein 20 MG TABS Take 20 mg by mouth daily Indications: 2:30 PM DAILY         fluticasone (VERAMYST) 27.5 MCG/SPRAY nasal spray 2 sprays by Nasal route 2 times daily         camphor-menthol (SARNA) 0.5-0.5 % lotion Apply 0.5 mLs topically 2 times daily as needed for Itching Apply topically as needed.        cloNIDine (CATAPRES) 0.1 MG tablet Take 1 tablet by mouth 2 times daily (Patient taking differently: Take 0.1 mg by mouth as needed for High Blood Pressure (for BP greater than 160/100) ) 60 tablet 0    Calcium Carb-Cholecalciferol (CALCIUM 600+D) 600-800 MG-UNIT TABS Take 1 tablet by mouth daily Indications: AT 2:30 PM         promethazine (PHENERGAN) 25 MG tablet Take 25 mg by mouth 3 times daily as needed for Nausea        docusate sodium (COLACE) 100 MG capsule Take 100 mg by mouth nightly as needed for Constipation         Omega 3-6-9 Fatty Acids (OMEGA 3-6-9 COMPLEX PO) Take 2 tablets by mouth daily Indications: AT 2:30 PM         alendronate (FOSAMAX) 70 MG tablet Take 70 mg by mouth every 7 days Wednesday at 6635        BYSTOLIC 5 MG tablet Take 5 mg by mouth nightly Indications: nightly         pravastatin (PRAVACHOL) 20 MG tablet Take 20 mg by mouth every morning         ramipril (ALTACE) 10 MG capsule Take 10 mg by mouth 2 times daily         b complex vitamins capsule Take 1 capsule by mouth daily Indications: AT 2:30 PM B 100        Multiple Vitamins-Minerals (THERAPEUTIC MULTIVITAMIN-MINERALS) tablet Take 1 tablet by mouth daily Indications: AT 2:30 PM         gabapentin (NEURONTIN) 100 MG capsule Take 1 capsule by mouth 3 times daily for 180 days.  Intended supply: 30 days (Patient not taking: Reported on 10/15/2020) 90 capsule 5    butalbital-acetaminophen-caffeine (FIORICET, ESGIC) -40 MG per tablet Take 1 tablet by mouth every 4 hours as needed for Headaches (Patient not taking: Reported on 10/15/2020) 40 tablet 3    acetaminophen (TYLENOL) 500 MG tablet Take 2 tablets by mouth every 6 hours as needed for Pain Do not exceed 4 g of acetaminophen daily (patient is also on Fioricet PRN) (Patient not taking: Reported on 10/15/2020) 100 tablet 0      No current facility-administered medications for this visit.            Allergies: Aspirin; Dilaudid [hydromorphone hcl]; Ondansetron; Quinolones; Spironolactone; Sulfa antibiotics; Codeine; Demerol hcl [meperidine]; Levaquin [levofloxacin in d5w]; Lizzette Kil; Namenda [memantine hcl]; Norco [hydrocodone-acetaminophen]; and Pneumococcal vaccines     Review of Systems  Constitutional - no appetite change, or unexpected weight change. No fever, chills or diaphoresis. No significant change in activity level or new onset of fatigue. HEENT - no significant rhinorrhea or epistaxis. No tinnitus or significant hearing loss. Eyes - no sudden vision change or amaurosis. No corneal arcus, xantholasma, subconjunctival hemorrhage or discharge. Respiratory - no significant wheezing, stridor, apnea or cough. No dyspnea on exertion or shortness of air. Cardiovascular - no exertional chest pain to suggest myocardial ischemia. No orthopnea or PND. No sensation of sustained arrythmia. No palpitations. No claudication. + slow heart rates on occasion. Gastrointestinal - no abdominal swelling or pain. No blood in stool. No severe constipation, diarrhea, nausea, or vomiting. Genitourinary - no dysuria, frequency, or urgency. No flank pain or hematuria. Musculoskeletal - no back pain or myalgia. No problems with gait. Extremities - no clubbing, cyanosis or extremity edema. Skin - no color change or rash. No pallor. No new surgical incision. Neurologic - no speech difficulty, facial asymmetry or lateralizing weakness. No significant dizziness. + seizure disorder. + vasovagal syncope. Hematologic - no easy bruising or excessive bleeding. Psychiatric - no severe anxiety or insomnia. No confusion. All other review of systems are negative.     Objective  Vital Signs - /74   Pulse (!) 48   Ht 5' (1.524 m)   Wt 125 lb (56.7 kg)   SpO2 97%   BMI 24.41 kg/m²   [facility] is alert, cooperative, and pleasant. Well groomed. No acute distress. Body habitus - Body mass index is 24.41 kg/m². HEENT - Head is normocephalic. No circumoral cyanosis. Dentition is normal.  EYES -   Lids normal without ptosis. No discharge, edema or subconjunctival hemorrhage. Neck - Symmetrical without apparent mass or lymphadenopathy. Respiratory - Normal respiratory effort without use of accessory muscles. Ausculatation reveals vesicular breath sounds without crackles, wheezes, rub or rhonchi. Cardiovascular - No jugular venous distention. Auscultation reveals regular rate and rhythm. No audible clicks, gallop or rub. No murmur. No lower extremity varicosities. No carotid bruits. Abdominal -  No visible distention, mass or pulsations. Extremities - No clubbing or cyanosis. No statis dermatitis or ulcers. No edema. Musculoskeletal -   No Osler's nodes. No kyphosis or scoliosis. Gait is even and regular without limp or shuffle. Ambulates without assistance. Skin -  Warm and dry; no rash or pallor. No new surgical wound. Neurological - No focal neurological deficits. Thought processes coherent. No apparent tremor. Oriented to person, place and time. Psychiatric -  Appropriate affect and mood.      Assessment:      Diagnosis Orders   1. Chest pain, unspecified type      2. Moderate mitral regurgitation      3. Essential hypertension      4. Abnormal EKG      5. Vasovagal syncope      6.  Bradycardia         Data reviewed:           Lab Results   Component Value Date     WBC 7.1 02/23/2020     HGB 13.1 02/23/2020     HCT 41.1 02/23/2020     MCV 99.3 (H) 02/23/2020      02/23/2020            Lab Results   Component Value Date      02/23/2020     K 3.8 02/23/2020      02/23/2020     CO2 24 02/23/2020     BUN 30 02/23/2020     CREATININE 0.9 02/23/2020     GLUCOSE 123 02/23/2020     CALCIUM 10.0 02/23/2020            Lab Results   Component Value Date     CHOL 190 09/03/2019     CHOL 171 03/21/2019     CHOL 190 10/08/2018            Lab Results   Component Value Date     TRIG 142 09/03/2019     TRIG 145 03/21/2019     TRIG 138 10/08/2018            Lab Results   Component Value Date     HDL 66 09/03/2019     HDL 68 03/21/2019     HDL 80 10/08/2018            Lab Results   Component Value Date     LDLCALC 96 09/03/2019     LDLCALC 74 03/21/2019     LDLCALC 82 10/08/2018      9/17/20 Lexiscan  Impression    Impression:    There is no obvious infarct or ischemia, with a calculated ejection    fraction of 84 %. Suggest: Clinical correlation and consideration for Medical    management.     Signed by Dr Debbie Rutledge on 9/17/2020 10:03 PM       9/17/20 echo   Summary   Normal left ventricular size and systolic function EF 99-42%   Mild concentric left ventricular hypertrophy with normal diastolic  function   Mild left atrial enlargement   Mild calcification of the somewhat thickened tricuspid aortic valve with  adequate cusp separation and no significant stenosis or insufficiency   Mild thickening of a normally mobile mitral valve with moderate  regurgitation   Pulmonic valve not visualized   Mild right atrial enlargement with normal right ventricular size and  systolic function   Mild to moderate tricuspid regurgitation with RVSP estimate 28 mmHg   Normal IVC consistent with normal right atrial filling pressures   Significant pericardial effusion and aortic root dimensions are within  normal limits    Signature    ----------------------------------------------------------------   Electronically signed by Fabio Alicia MD(Interpreting physician)   on 09/17/2020 01:10 PM     6/12/20 CT head  Impression    Mild cerebral and cerebellar volume loss with chronic microvascular    disease but no evidence of acute intracranial process. Signed by Dr Romie Lewis on 6/12/2020 2:03 PM       1/25/19 carotid ultrasound   Impression          There is heterogeneous plaque visualized in the bilateral internal carotid     artery(ies).     There is less than 50% stenosis in the right internal carotid artery.    Neptali Nilam is less than 50% stenosis of the left internal carotid artery.     There is normal antegrade flow in the bilateral vertebral artery(ies).     Compared to the previous study dated 7-10-16 , there is no significant     change in stenosis of the bilateral internal carotid artery(ies).          Signature          ----------------------------------------------------------------     Electronically signed by Michelle Lorenz MD(Interpreting     physician) on 01/26/2019 07:03 AM       9/27/19 holter monitor  Date Interpreted: 10/10/2019     1.   Nearly 6 days and 20 hours of rhythm are processed after   artifact was removed     2.  The underlying rhythm is normal sinus rhythm at a mean heart rate of 63 bpm, with a range of 47 to 107 bpm.   3.  The were rare extra supraventricular beats.  The majority of these were single isolated PAC's; there was there were several runs of SVT, the longest of which was a 5 beat run at a rate of 99 bpm which spontaneously terminated. [place] 4.  The were rare extra ventricular beats.  The majority of these were single isolated PVC's, there was no VT. 5.  Prolonged pauses or high degree AV block were not noted. 6.  Triggered Events or Diary Enteries were not reported for   rhythm - symptom correlation. Impression:  This ZIO Patch shows no evidence of significant   supraventricular or ventricular ectopy and is without prolonged   pauses. Electronically signed by Roger Kirby MD on 10/10/19      Hx chest pain - recent Lexiscan negative for ischemia. No additional chest pain.     HTN - normotensive on current regimen.     Moderate MR- stable on recent echo.   Good BP control. Follow with echo.     Hx vasovagal syncope/bradycardia- discussed placing 2 week Zio. Patient declines due to problems with skin irritation. Discussed option of implanted loop recorder. Procedure discussed with patient and daughter. Patient opted to proceed. Procedure scheduled with Dr. Lonnie Mendoza. COVID 19 testing and quarantine discussed.     Patient is compliant with medication regimen.         BP Readings from Last 3 Encounters:   10/15/20 126/74   09/02/20 122/60   06/11/20 (!) 112/58         Pulse Readings from Last 3 Encounters:   10/15/20 (!) 48   09/02/20 56   06/11/20 64              Wt Readings from Last 3 Encounters:   10/15/20 125 lb (56.7 kg)   09/02/20 126 lb (57.2 kg)   03/09/20 122 lb (55.3 kg)         Recent Results         Recent Results (from the past 1008 hour(s))   ECHO Complete 2D W Doppler W Color     Collection Time: 09/17/20 11:57 AM   Result Value Ref Range     Left Ventricular Ejection Fraction 58       LVEF MODALITY ECHO     NM MYOCARDIAL SPECT REST EXERCISE OR RX     Collection Time: 09/17/20  3:06 PM   Result Value Ref Range     Left Ventricular Ejection Fraction 84       LVEF MODALITY Nuclear          Plan  Previous cardiac history and records reviewed. Continue current medical management. Loop recorder implant scheduled with Dr. Lonnie Mendoza -10/27/2020 arrive at 11AM for 130PM.   COVID 19 test prior to the procedure-- 10/23/2020- between 8-10AM.  Continue other current medications as directed. Continue to follow up with primary care provider for non cardiac medical problems. Call the office with any problems, questions or concerns at [phone]. Cardiology follow up: as scheduled after loop recorder implant. Educational included in patient instructions. Heart [employer].   Loop recorder implant.      Colin Ritchie, [place]        No significant interval change since above visit here for loop recorder implantation discussed with patient advised indications

## 2020-10-27 NOTE — PROCEDURES
Patient Name: Sarah Arroyo  Attending Provider: Jordan Cuba MD  Admit Date: [unfilled]  MRN: [id number]  Account: [de-identified]  : 1935    Procedure: Loop recorder implant    Indication: Cryptogenic stroke    Anesthesia: 1% Xylocaine without epinephrine, IV sedation    Complications: None    Blood loss: Minimal    Manufacture device implanted:   MedTreato model number:    LINQ  Serial number:   [id number]    Report: After informed consent was obtained the patient was brought to the catheterization area where the chest was prepped and draped using sterile Betadine solution. After adequate intravenous sedation was achieved 1% Xylocaine with epinephrine was used to achieve local anesthesia. A stab incision was made over the left fourth intercostal space and using the tunneling device provided a tract was created from the incision and directed towards the apex. The loop recorder device was then injected subcutaneously and positioned appropriately. Tolerated uneventfully. The incision was then closed using Dermabond and Steri-Strips. Return to their room in stable condition. An independent trained observer administered medications under my direction. The patient was continuously monitored with respect to level of consciousness, and vital signs/physiologic status throughout the case. For further details regarding specific medications and doses please refer to Cath Lab procedural notes.       Anesthesia start time:  Anesthesia stop time:      Monet Moon MD

## 2020-10-27 NOTE — ED PROVIDER NOTES
above in the HPI. PAST MEDICAL HISTORY     Past Medical History:   Diagnosis Date    Age-related macular degeneration, wet, right eye ([address]     Anemia     Back pain     Chronic bilateral low back pain with left-sided sciatica 12/19/2019    Colitis, ischemic ([address]     Dementia ([address]     Diverticulosis     Dry age-related macular degeneration of left eye     Hyperlipidemia     Hypertension     Osteoarthritis     Palliative care patient 09/17/2019    Risk for falls 9/13/2019    Seizures (HCC)     Spastic colon     Stroke syndrome     Urinary incontinence     Uterine cancer ([address]          SURGICAL HISTORY       Past Surgical History:   Procedure Laterality Date    BREAST SURGERY Right     FNA Right Breast \"oh heavens, maybe 20 years ago\"    CHOLECYSTECTOMY      COLONOSCOPY  9/25/03    Dr Emmanuel Wong ischemic colitis, incomplete exam    COLONOSCOPY  08/29/2003    Dr Fox Brightly:  limited colon-ischemic colitis    DILATION AND CURETTAGE OF UTERUS      x2, in [facility] tears, laser suregry, adn cataract with IOL b/l    HYSTERECTOMY      ovaries and tubes    LUMBAR FUSION      2012 90 days after the spine surgeries    OTHER SURGICAL HISTORY      inner lymph nodes    SPINE SURGERY      L3,4, and 5   done in 2012   Nay [address] 76      as a child at age 9    [address]  08/28/2003    DR Crawford Pro:  Duodenal scarring but no evidence of active ulcer disease.          CURRENT MEDICATIONS       Discharge Medication List as of 10/27/2020  7:45 PM      CONTINUE these medications which have NOT CHANGED    Details   pantoprazole (PROTONIX) 40 MG tablet Take 40 mg by mouth dailyHistorical Med      acetaminophen (TYLENOL) 500 MG tablet Take 1,000 mg by mouth Indications: 2 tablets at 6:00am but not more than 3,000mg per dayHistorical Med      meloxicam (MOBIC) 7.5 MG tablet Take 7.5 mg by mouth daily as needed for PainHistorical Med donepezil (ARICEPT) 10 MG tablet Take 1 tablet by mouth nightly, Disp-90 tablet,R-3Normal      Docosanol (ABREVA EX) Apply topicallyHistorical Med      levETIRAcetam (KEPPRA) 500 MG tablet Take 1 tablet by mouth 2 times daily, Disp-60 tablet, R-11Normal      dipyridamole (PERSANTINE) 50 MG tablet Take 2 tablets by mouth 2 times daily, Disp-120 tablet, R-11Normal      hydrALAZINE (APRESOLINE) 25 MG tablet Take 25 mg by mouth 3 times daily Historical Med      nitrofurantoin (MACRODANTIN) 50 MG capsule TAKE 1 CAPSULE AT BEDTIME, Disp-30 capsule, R-3Normal      simethicone (GAS-X EXTRA STRENGTH) 125 MG chewable tablet Take 125 mg by mouth 2 PO 5:30 AM,1- 2 10:30 AM, 1 4:00 PM and PRNHistorical Med      butalbital-acetaminophen-caffeine (FIORICET, ESGIC) -40 MG per tablet Take 1 tablet by mouth every 4 hours as needed for HeadachesHistorical Med      amLODIPine (NORVASC) 5 MG tablet Take 5 mg by mouth 2 times daily Historical Med      diclofenac sodium 1 % GEL Apply 2 g topically 4 times daily as needed for Pain, Topical, 4 TIMES DAILY PRN, Historical Med      sodium chloride 1 g tablet Take 1 g by mouth 3 times dailyHistorical Med      lidocaine 4 % external patch Place 1 patch onto the skin daily as needed (LEFT LOWER BACK PAIN), Transdermal, DAILY PRN, Historical Med      NONFORMULARY Indications: dic 3%, lidocaine 2%, cyclo 2%, prilo 2% baclo 2% 1 to 2 pumps up to TID PRN Historical Med      desonide (DESOWEN) 0.05 % cream Apply topically as needed Apply topically 2 times daily. , Topical, PRN, Historical Med      mupirocin (BACTROBAN) 2 % cream Apply topically as needed Apply topically 3 times daily. , Topical, PRN, Historical Med      valACYclovir (VALTREX) 1 g tablet as needed Historical Med      Psyllium (METAMUCIL FIBER PO) Take by mouth nightly Historical Med      azelastine (ASTELIN) 0.1 % nasal spray 2 sprays by Nasal route 2 times daily Historical Med      Cholecalciferol (VITAMIN D3) 5000 units TABS Take 5,000 Units by mouth daily Indications: AT 2:30 PM Historical Med      polyvinyl alcohol-povidone (HYPOTEARS) 1.4-0.6 % ophthalmic solution Place 1-2 drops into both eyes as neededHistorical Med      sodium chloride (OCEAN, BABY AYR) 0.65 % nasal spray 1 spray by Nasal route as needed for CongestionHistorical Med      tobramycin-dexamethasone (TOBRADEX) 0.3-0.1 % ophthalmic ointment Place into the left eye as needed 3 times daily. , Left Eye, PRN, Historical Med      Lutein 20 MG TABS Take 20 mg by mouth daily Indications: 2:30 PM DAILY Historical Med      fluticasone (VERAMYST) 27.5 MCG/SPRAY nasal spray 2 sprays by Nasal route 2 times daily Historical Med      camphor-menthol (SARNA) 0.5-0.5 % lotion Apply 0.5 mLs topically 2 times daily as needed for Itching Apply topically as needed., Topical, 2 TIMES DAILY PRN, Until Discontinued, Historical Med      cloNIDine (CATAPRES) 0.1 MG tablet Take 1 tablet by mouth 2 times daily, Disp-60 tablet, R-0      Calcium Carb-Cholecalciferol (CALCIUM 600+D) 600-800 MG-UNIT TABS Take 1 tablet by mouth daily Indications: AT 2:30 PM Historical Med      promethazine (PHENERGAN) 25 MG tablet Take 25 mg by mouth 3 times daily as needed for Nausea      docusate sodium (COLACE) 100 MG capsule Take 100 mg by mouth nightly as needed for Constipation Historical Med      Omega 3-6-9 Fatty Acids (OMEGA 3-6-9 COMPLEX PO) Take 2 tablets by mouth daily Indications: AT 2:30 PM Historical Med      alendronate (FOSAMAX) 70 MG tablet Take 70 mg by mouth every 7 days Wednesday at [id number] Med      BYSTOLIC 5 MG tablet Take 2.5 mg by mouth nightly Indications: nightly Takes 1/2- 5mg tablet, DAWHistorical Med      pravastatin (PRAVACHOL) 20 MG tablet Take 20 mg by mouth every morning       ramipril (ALTACE) 10 MG capsule Take 10 mg by mouth 2 times daily Historical Med      b complex vitamins capsule Take 1 capsule by mouth daily Indications: AT 2:30 PM B 100Historical Med      Multiple Vitamins-Minerals (THERAPEUTIC MULTIVITAMIN-MINERALS) tablet Take 1 tablet by mouth daily Indications: AT 2:30 PM Historical Med             ALLERGIES     Aspirin; Dilaudid [hydromorphone hcl]; Ondansetron; Quinolones; Spironolactone; Sulfa antibiotics; Codeine; Demerol hcl [meperidine]; Levaquin [levofloxacin in d5w]; Jesenia Duong; Namenda [memantine hcl];  Norco [hydrocodone-acetaminophen]; and Pneumococcal vaccines    FAMILY HISTORY       Family History   Problem Relation Age of Onset    Heart Defect Mother     Hypertension Mother     Heart Attack Father         x3 within 24 h and then , abused ciggarettes    No Known Problems Daughter     No Known Problems Son     No Known Problems Son     Colon Cancer Neg Hx     Colon Polyps Neg Hx     Esophageal Cancer Neg Hx     Liver Cancer Neg Hx     Liver Disease Neg Hx     Rectal Cancer Neg Hx     Stomach Cancer Neg Hx           SOCIAL HISTORY       Social History     Socioeconomic History    Marital status:      Spouse name: Not on file    Number of children: 3    Years of education: Not on file    Highest education level: Not on file   Occupational History    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mployee of [address] [place] [address] 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,mployee of [employer]   Social Needs    Financial resource strain: Not on file    Food insecurity     Worry: Not on file     Inability: Not on file   BRAINREPUBLIC needs     Medical: Not on file     Non-medical: Not on file   Tobacco Use    Smoking status: Never Smoker    Smokeless tobacco: Never Used   Substance and Sexual Activity    Alcohol use: Never     Frequency: Never    Drug use: Never    Sexual activity: Not on file     Comment: has 3 kids   Lifestyle    Physical activity     Days per week: Not on file     Minutes per session: Not on file    Stress: Not on file   Relationships    Social connections     Talks on phone: Not on file     Gets together: Not on file Attends [employer] service: Not on file     Active member of club or organization: Not on file     Attends meetings of clubs or organizations: Not on file     Relationship status: Not on file    Intimate partner violence     Fear of current or ex partner: Not on file     Emotionally abused: Not on file     Physically abused: Not on file     Forced sexual activity: Not on file   Other Topics Concern    Not on file   Social History Narrative    CODE STATUS: DNR-CCA    HEALTH CARE PROXY / [employer] and Healthcare: her daughter, Mrs. Varsha Madrigal, [phone]    AMBULATES: with walker during day, wheelchair at night    DOMICILED: lives in the [facility] assisted living [facility], has no stairs or steps, has a cat, her daughter is there periodically       SCREENINGS    Radha Coma Scale  Eye Opening: Spontaneous  Best Verbal Response: Oriented  Best Motor Response: Obeys commands  [place] Coma Scale Score: 15        PHYSICAL EXAM    (up to 7 for level 4, 8 or more for level 5)     ED Triage Vitals [10/27/20 1720]   BP Temp Temp Source Pulse Resp SpO2 Height Weight   126/68 98.4 °F (36.9 °C) Oral 63 18 96 % -- 124 lb (56.2 kg)       Physical Exam  Vitals signs and nursing note reviewed. Constitutional:       General: She is not in acute distress. Appearance: She is well-developed. She is not diaphoretic. HENT:      Head: Normocephalic and atraumatic. Eyes:      General: No scleral icterus. Neck:      Vascular: No JVD. Cardiovascular:      Rate and Rhythm: Normal rate and regular rhythm. Pulses:           Radial pulses are 2+ on the right side and 2+ on the left side. Dorsalis pedis pulses are 2+ on the right side and 2+ on the left side. Heart sounds: Normal heart sounds. No murmur. No friction rub. No gallop. Pulmonary:      Effort: Pulmonary effort is normal. No accessory muscle usage or respiratory distress. Breath sounds: Normal breath sounds. No stridor.  No decreased breath sounds, wheezing, rhonchi or rales. Chest:      Chest wall: No tenderness. Abdominal:      General: There is no distension. Palpations: Abdomen is soft. Tenderness: There is no abdominal tenderness. There is no guarding or rebound. Musculoskeletal: Normal range of motion. General: No deformity. Right lower leg: No edema. Left lower leg: No edema. Skin:     General: Skin is warm and dry. Coloration: Skin is not pale. Findings: No erythema. Neurological:      Mental Status: She is alert and oriented to person, place, and time. GCS: GCS eye subscore is 4. GCS verbal subscore is 5. GCS motor subscore is 6. Cranial Nerves: No cranial nerve deficit. Motor: No abnormal muscle tone. Coordination: Coordination normal.   Psychiatric:         Behavior: Behavior normal.         Judgment: Judgment normal.         DIAGNOSTIC RESULTS     EKG: All EKG's are interpreted by the Emergency Department Physician who either signs or Co-signs this chart in the absence of a cardiologist.  Normal sinus rhythm with no findings of acute ischemia or infarction. Normal QT interval with no signs of Arcenio-Parkinson-White, Brugada, or other syndromic EKG changes that would predispose to cardiac etiology of syncope. RADIOLOGY:   Non-plain film images such as CT, Ultrasound and MRI are read by the radiologist. Isamar Marker images are visualized and preliminarily interpreted by the emergency physician with the below findings:        Interpretation per the Radiologist below, if available at the time of this note:    XR CHEST PORTABLE   Final Result   1. Stable chest exam without acute process. No visualized infiltrate   or pulmonary edema.    Signed by Dr Denisse Almanzar on 10/27/2020 6:57 PM            ED BEDSIDE ULTRASOUND:   Performed by ED Physician - none    LABS:  Labs Reviewed   CBC WITH AUTO DIFFERENTIAL - Abnormal; Notable for the following components: Result Value    RBC 3.78 (*)     Hematocrit 36.7 (*)     MCH 33.1 (*)     Neutrophils % 70.8 (*)     Lymphocytes % 19.0 (*)     Lymphocytes Absolute 1.0 (*)     All other components within normal limits   COMPREHENSIVE METABOLIC PANEL W/ REFLEX TO MG FOR LOW K - Abnormal; Notable for the following components:    Potassium reflex Magnesium 3.1 (*)     Glucose 116 (*)     Total Protein 6.3 (*)     All other components within normal limits   MAGNESIUM       All other labs were within normal range or not returned as of this dictation. Medications   potassium chloride (KLOR-CON M) extended release tablet 40 mEq (40 mEq Oral Given 10/27/20 1937)       EMERGENCY DEPARTMENT COURSE and DIFFERENTIALDIAGNOSIS/MDM:   Vitals:    Vitals:    10/27/20 1720 10/27/20 1900 10/27/20 1952   BP: 126/68 (!) 140/69 (!) 140/69   Pulse: 63 84 84   Resp: 18 18 18   Temp: 98.4 °F (36.9 °C)  98.4 °F (36.9 °C)   TempSrc: Oral     SpO2: 96% 96%    Weight: 124 lb (56.2 kg)         MDM  Loop recorder interrogation shows an episode of sinus bradycardia correlated with the syncopal episode consistent with vasovagal episode. Evaluation and work-up here revealed no signs of emergent or life-threatening pathology that would necessitate admission for further work-up or management at this time. Patient is felt to be stable for discharge home with return precautions for worsening of the condition or development of new concerning symptoms. Patient was encouraged to follow-up with their primary care doctor in the appropriate timeframe. Necessary prescriptions and information have been provided for treatment at home. Patient voices understanding and agreement with the plan. CONSULTS:  None    PROCEDURES:  Unless otherwise notedbelow, none     Procedures      FINAL IMPRESSION     1. Vasovagal syncope    2. Hypokalemia    3.  Status post placement of implantable loop recorder          DISPOSITION/PLAN   DISPOSITION Decision To Discharge 10/27/2020 06:49:32 PM      PATIENT REFERRED TO:  [facility] [facility]ENCY [facility]  [place]  [phone]    If symptoms worsen    Rema Colon, [address] [place] [address]  [phone]      As needed      DISCHARGE MEDICATIONS:  Discharge Medication List as of 10/27/2020  7:45 PM             (Please note that portions of this note were completed with a voice recognition program.  Efforts were made to edit the dictations butoccasionally words are mis-transcribed.)    Allyn Austin MD (electronically signed)  AttendingEmergency Physician    Alaina Pérez MD  10/27/20 [id number]

## 2020-10-28 ENCOUNTER — CARE COORDINATION (OUTPATIENT)
Dept: CARE COORDINATION | Age: 85
End: 2020-10-28

## 2020-10-28 LAB
EKG P AXIS: 59 DEGREES
EKG P-R INTERVAL: 226 MS
EKG Q-T INTERVAL: 458 MS
EKG QRS DURATION: 104 MS
EKG QTC CALCULATION (BAZETT): 465 MS
EKG T AXIS: 62 DEGREES

## 2020-10-28 PROCEDURE — 93010 ELECTROCARDIOGRAM REPORT: CPT | Performed by: INTERNAL MEDICINE

## 2020-10-29 ENCOUNTER — TELEPHONE (OUTPATIENT)
Dept: CARDIOLOGY | Age: 85
End: 2020-10-29

## 2020-11-02 ENCOUNTER — TELEPHONE (OUTPATIENT)
Dept: NEUROLOGY | Age: 85
End: 2020-11-02

## 2020-11-02 ENCOUNTER — TELEPHONE (OUTPATIENT)
Dept: CARDIOLOGY | Age: 85
End: 2020-11-02

## 2020-11-02 NOTE — TELEPHONE ENCOUNTER
Ok, that explains why I could not see an incision. I would advise on avoiding Mobic (meloxicam)  and omega 3 supplement for now as these can increase bleeding and bruising. She is also on persantine, but I don't advise that she stop that.   Thanks, Glenis Minor

## 2020-11-02 NOTE — TELEPHONE ENCOUNTER
Patient daughter called in to discuss patient's bruising that she has developed over the weekend. She spoke with Dr. Kristina Louis on call last night and he instructed her to call the office today so that we could obtain a picture for Dr. Kristina Louis and Eli Levi to look at. Daughter was [facility].

## 2020-11-02 NOTE — TELEPHONE ENCOUNTER
Karina Merritt,  It is difficult to tell a lot from the picture. Does she have any swelling directly over pacer implant site? Any redness or purulent drainage. I reviewed her medication list.   Advise her not to take omega 3 supplement or meloxicam, as these can cause increase risk of bruising and bleeding. Make sure she is not on Vitamin E. If swelling and bruising directly over implant site worsen, she will need to come in for an office visit.   Thanks, [facility] [place] [place] - CAROL WARD

## 2020-11-03 PROBLEM — R55 SYNCOPE AND COLLAPSE: Status: RESOLVED | Noted: 2020-11-03 | Resolved: 2020-11-03

## 2020-11-11 ENCOUNTER — OFFICE VISIT (OUTPATIENT)
Dept: CARDIOLOGY | Age: 85
End: 2020-11-11
Payer: MEDICARE

## 2020-11-11 VITALS
WEIGHT: 125 LBS | DIASTOLIC BLOOD PRESSURE: 70 MMHG | BODY MASS INDEX: 24.54 KG/M2 | SYSTOLIC BLOOD PRESSURE: 134 MMHG | HEART RATE: 62 BPM | HEIGHT: 60 IN

## 2020-11-11 PROBLEM — Z95.818 STATUS POST PLACEMENT OF IMPLANTABLE LOOP RECORDER: Status: ACTIVE | Noted: 2020-11-11

## 2020-11-11 PROBLEM — R55 SYNCOPE AND COLLAPSE: Status: ACTIVE | Noted: 2020-11-03

## 2020-11-11 PROCEDURE — 1090F PRES/ABSN URINE INCON ASSESS: CPT | Performed by: NURSE PRACTITIONER

## 2020-11-11 PROCEDURE — G8400 PT W/DXA NO RESULTS DOC: HCPCS | Performed by: NURSE PRACTITIONER

## 2020-11-11 PROCEDURE — 4040F PNEUMOC VAC/ADMIN/RCVD: CPT | Performed by: NURSE PRACTITIONER

## 2020-11-11 PROCEDURE — G8427 DOCREV CUR MEDS BY ELIG CLIN: HCPCS | Performed by: NURSE PRACTITIONER

## 2020-11-11 PROCEDURE — 1036F TOBACCO NON-USER: CPT | Performed by: NURSE PRACTITIONER

## 2020-11-11 PROCEDURE — 99214 OFFICE O/P EST MOD 30 MIN: CPT | Performed by: NURSE PRACTITIONER

## 2020-11-11 PROCEDURE — 93291 INTERROG DEV EVAL SCRMS IP: CPT | Performed by: NURSE PRACTITIONER

## 2020-11-11 PROCEDURE — G8484 FLU IMMUNIZE NO ADMIN: HCPCS | Performed by: NURSE PRACTITIONER

## 2020-11-11 PROCEDURE — G8420 CALC BMI NORM PARAMETERS: HCPCS | Performed by: NURSE PRACTITIONER

## 2020-11-11 PROCEDURE — 1123F ACP DISCUSS/DSCN MKR DOCD: CPT | Performed by: NURSE PRACTITIONER

## 2020-11-11 RX ORDER — CLONIDINE HYDROCHLORIDE 0.1 MG/1
0.1 TABLET ORAL 2 TIMES DAILY PRN
Qty: 30 TABLET | Refills: 2 | Status: SHIPPED | OUTPATIENT
Start: 2020-11-11

## 2020-11-11 NOTE — PATIENT INSTRUCTIONS
New instructions for today:    Patient Instructions:  Continue current medications as prescribed. Always keep a current medication list. Bring your medications to every office visit. Continue to follow up with primary care provider for non cardiac medical problems. Call the office with any problems, questions or concerns at [phone]. If you have been asked to keep a blood pressure log, do so for 2 weeks. Call the office to report readings to the triage nurse at [phone]. Follow up with cardiologist as scheduled. The following educational material has been included in this after visit summary for your review: Life simple 7. Heart health. Life simple 7  1) Manage blood pressure - high blood pressure is a major risk factor for heart disease and stroke. Keeping blood pressure in health range reduces strain on your heart, arteries and kidneys. Blood pressure goal is less than 130/80. 2) Control cholesterol - contributes to plaque, which can clog arteries and lead to heart disease and stroke. When you control your cholesterol you are giving your arteries their best chance to remain clear. It is recommended that you get cholesterol lab work done once a year. 3) Reduce blood sugar - most of the food we eat is turning into glucose or blood sugar that our body uses for energy. Over time, high levels of blood sugar can damage your heart, kidneys, eyes and nerves. 4) Get active - living an active life is one of the most rewarding gifts you can give yourself and those you love. Simply put, daily physical activity increases your length and quality of life. Strive to exercise 15 minutes most days of the week. 5)  Eat better - A healthy diet is one of your best weapons for fighting cardiovascular disease. When you eat a heart healthy diet, you improve your chances for feeling good and staying healthy for life.   6)  Lose weight - when you shed extra fat an unnecessary pounds, you reduce the burden on your hear, lungs, blood vessels and skeleton. You give yourself the gift of active living, you lower your blood pressure and help yourself feel better. 7) Stop smoking - cigarette smokers have a higher risk of developing cardiovascular disease. If  You smoke, quitting is the best thing you can do for your health. Check American Heart Association on line for more information on Life's Simple 7 and tips for healthy living. A Healthy Heart: Care Instructions  Your Care Instructions     Coronary artery disease, also called heart disease, occurs when a substance called plaque builds up in the vessels that supply oxygen-rich blood to your heart muscle. This can narrow the blood vessels and reduce blood flow. A heart attack happens when blood flow is completely blocked. A high-fat diet, smoking, and other factors increase the risk of heart disease. Your doctor has found that you have a chance of having heart disease. You can do lots of things to keep your heart healthy. It may not be easy, but you can change your diet, exercise more, and quit smoking. These steps really work to lower your chance of heart disease. Follow-up care is a key part of your treatment and safety. Be sure to make and go to all appointments, and call your doctor if you are having problems. It's also a good idea to know your test results and keep a list of the medicines you take. How can you care for yourself at home? Diet  · Use less salt when you cook and eat. This helps lower your blood pressure. Taste food before salting. Add only a little salt when you think you need it. With time, your taste buds will adjust to less salt. · Eat fewer snack items, fast foods, canned soups, and other high-salt, high-fat, processed foods. · Read food labels and try to avoid saturated and trans fats. They increase your risk of heart disease by raising cholesterol levels. · Limit the amount of solid fat-butter, margarine, and shortening-you eat.  Use olive, peanut, or canola oil when you cook. Bake, broil, and steam foods instead of frying them. · Eat a variety of fruit and vegetables every day. Dark green, deep orange, red, or yellow fruits and vegetables are especially good for you. Examples include spinach, carrots, peaches, and berries. · Foods high in fiber can reduce your cholesterol and provide important vitamins and minerals. High-fiber foods include whole-grain cereals and breads, oatmeal, beans, brown rice, citrus fruits, and apples. · Eat lean proteins. Heart-healthy proteins include seafood, lean meats and poultry, eggs, beans, peas, nuts, seeds, and soy products. · Limit drinks and foods with added sugar. These include candy, desserts, and soda pop. Lifestyle changes  · If your doctor recommends it, get more exercise. Walking is a good choice. Bit by bit, increase the amount you walk every day. Try for at least 30 minutes on most days of the week. You also may want to swim, bike, or do other activities. · Do not smoke. If you need help quitting, talk to your doctor about stop-smoking programs and medicines. These can increase your chances of quitting for good. Quitting smoking may be the most important step you can take to protect your heart. It is never too late to quit. · Limit alcohol to 2 drinks a day for men and 1 drink a day for women. Too much alcohol can cause health problems. · Manage other health problems such as diabetes, high blood pressure, and high cholesterol. If you think you may have a problem with alcohol or drug use, talk to your doctor. Medicines  · Take your medicines exactly as prescribed. Call your doctor if you think you are having a problem with your medicine. · If your doctor recommends aspirin, take the amount directed each day. Make sure you take aspirin and not another kind of pain reliever, such as acetaminophen (Tylenol). When should you call for help? [id number] if you have symptoms of a heart attack.  These may include:  · Chest pain or pressure, or a strange feeling in the chest.  · Sweating. · Shortness of breath. · Pain, pressure, or a strange feeling in the back, neck, jaw, or upper belly or in one or both shoulders or arms. · Lightheadedness or sudden weakness. · A fast or irregular heartbeat. After you call 911, the  may tell you to chew 1 adult-strength or 2 to 4 low-dose aspirin. Wait for an ambulance. Do not try to drive yourself. Watch closely for changes in your health, and be sure to contact your doctor if you have any problems. Where can you learn more? Go to https://[employer]. org and sign in to your [employer] account. Enter Q650 in the [employer] box to learn more about \"A Healthy Heart: Care Instructions. \"     If you do not have an account, please click on the \"Sign Up Now\" link. Current as of: December 16, 2019               Content Version: 12.5  © 7644-7674 Healthwise, Incorporated. Care instructions adapted under license by [facility] ([facility]). If you have questions about a medical condition or this instruction, always ask your healthcare professional. [place] [address] any warranty or liability for your use of this information.

## 2020-11-11 NOTE — PROGRESS NOTES
[facility], [place]. [address], [facility] [address] [address] [facility]  [phone] office  [phone] fax      OFFICE VISIT:  2020    Ollie Doe - : 1935  Reason For Visit:  Francis Wilhelm is a 80 y.o. female who is here for Follow-up (HFU LOOP  no cardiac symptoms)    History:   Diagnosis Orders   1. Vasovagal syncope     2. Status post placement of implantable loop recorder     3. Cryptogenic stroke ([facility] [address]     4. Moderate mitral regurgitation     5. Essential hypertension     6. Bradycardia       The patient presents today for cardiology follow up after loop recorder implantation for hx of vasovagal syncope. The patient usually has vasovagal syncope in association with bowel movement. Her last syncopal episode occurred on way to car after loop recorder implant. Loop showed associated luciano episode on 10/27/20 at 4:30 pm duration 6 seconds with minimum heart rate 41 and mean 50 bpm.  Patient was weaned off Bystolic and stopped on . No report of additional syncope or near syncope. The patient denies symptoms to suggest myocardial ischemia, heart failure or arrhythmia. BP is well controlled on current regimen. Home BP log reviewed. The patient's PCP monitors cholesterol. Rivka Head denies exertional chest pain, shortness of breath, orthopnea, paroxysmal nocturnal dyspnea, sensed arrhythmia and edema. The patient denies numbness or weakness to suggest cerebrovascular accident or transient ischemic attack. + fatigue. + hx vasovagal syncope. No recurrent syncope or near syncope since 10/27/20 episode.     Ollie Doe has the following history as recorded in [facility]:  Patient Active Problem List   Diagnosis Code    Irritable bowel syndrome with diarrhea K58.0    S/P laparoscopic cholecystectomy Z90.49    Hyponatremia E87.1    Late onset Alzheimer's disease without behavioral disturbance (HCC) G30.1, F02.80    Altered mental status R41.82    Seizure as late effect of cerebrovascular accident (CVA) ([address] I69.398, K89.2    Metabolic encephalopathy V62.35    Weakness R53.1    Chronic bilateral lower abdominal pain R10.31, G89.29, R10.32    Gas pain R14.1    History of ischemic colitis Z87.19    Partial symptomatic epilepsy with complex partial seizures, intractable, without status epilepticus ([address] G40.219    Cerebrovascular small vessel disease I67.9    Vascular dementia without behavioral disturbance (HCC) F01.50    Risk for falls Z91.81    Dry age-related macular degeneration of left eye H35.3120    Age-related macular degeneration, wet, right eye (HCC) H35.3210    Gait abnormality R26.9    Palliative care patient Z51.5    Bloating R14.0    Chronic bilateral low back pain with left-sided sciatica M54.42, G89.29    Arthralgia of multiple joints M25.50    Abnormal EKG R94.31    Moderate mitral regurgitation I34.0    Chest pain R07.9    Essential hypertension I10    Overactive bladder N32.81    Vasovagal syncope R55    Bradycardia R00.1    Cryptogenic stroke ([facility] I63.9    Syncope and collapse R55    Status post placement of implantable loop recorder Z95.818     Past Medical History:   Diagnosis Date    Age-related macular degeneration, wet, right eye ([address]     Anemia     Back pain     Chronic bilateral low back pain with left-sided sciatica 12/19/2019    Colitis, ischemic ([address]     Dementia ([address]     Diverticulosis     Dry age-related macular degeneration of left eye     Hyperlipidemia     Hypertension     Osteoarthritis     Palliative care patient 09/17/2019    Risk for falls 9/13/2019    Seizures ([address]     Spastic colon     Stroke syndrome     Urinary incontinence     Uterine cancer [facility])      Past Surgical History:   Procedure Laterality Date    BREAST SURGERY Right     FNA Right Breast \"oh hemarie, maybe 20 years ago\"    CHOLECYSTECTOMY      COLONOSCOPY  9/25/03 times daily 120 tablet 11    hydrALAZINE (APRESOLINE) 25 MG tablet Take 25 mg by mouth 3 times daily       nitrofurantoin (MACRODANTIN) 50 MG capsule TAKE 1 CAPSULE AT BEDTIME 30 capsule 3    simethicone (GAS-X EXTRA STRENGTH) 125 MG chewable tablet Take 125 mg by mouth 2 PO 5:30 AM,1- 2 10:30 AM, 1 4:00 PM and PRN      amLODIPine (NORVASC) 5 MG tablet Take 5 mg by mouth 2 times daily       diclofenac sodium 1 % GEL Apply 2 g topically 4 times daily as needed for Pain      sodium chloride 1 g tablet Take 1 g by mouth 3 times daily      lidocaine 4 % external patch Place 1 patch onto the skin daily as needed (LEFT LOWER BACK PAIN)      NONFORMULARY Indications: dic 3%, lidocaine 2%, cyclo 2%, prilo 2% baclo 2% 1 to 2 pumps up to TID PRN       desonide (DESOWEN) 0.05 % cream Apply topically as needed Apply topically 2 times daily.  mupirocin (BACTROBAN) 2 % cream Apply topically as needed Apply topically 3 times daily.  valACYclovir (VALTREX) 1 g tablet as needed       Psyllium (METAMUCIL FIBER PO) Take by mouth nightly       azelastine (ASTELIN) 0.1 % nasal spray 2 sprays by Nasal route 2 times daily       Cholecalciferol (VITAMIN D3) 5000 units TABS Take 5,000 Units by mouth daily Indications: AT 2:30 PM       polyvinyl alcohol-povidone (HYPOTEARS) 1.4-0.6 % ophthalmic solution Place 1-2 drops into both eyes as needed      sodium chloride (OCEAN, BABY AYR) 0.65 % nasal spray 1 spray by Nasal route as needed for Congestion      tobramycin-dexamethasone (TOBRADEX) 0.3-0.1 % ophthalmic ointment Place into the left eye as needed 3 times daily.  Lutein 20 MG TABS Take 20 mg by mouth daily Indications: 2:30 PM DAILY       fluticasone (VERAMYST) 27.5 MCG/SPRAY nasal spray 2 sprays by Nasal route 2 times daily       camphor-menthol (SARNA) 0.5-0.5 % lotion Apply 0.5 mLs topically 2 times daily as needed for Itching Apply topically as needed.       cloNIDine (CATAPRES) 0.1 MG tablet Take 1 arcus, xantholasma, subconjunctival hemorrhage or discharge. Respiratory - no significant wheezing, stridor, apnea or cough. No dyspnea on exertion or shortness of air. Cardiovascular - no exertional chest pain to suggest myocardial ischemia. No orthopnea or PND. No sensation of sustained arrythmia. No occurrence of slow heart rate. No palpitations. No claudication. Gastrointestinal - no abdominal swelling or pain. No blood in stool. No severe constipation, diarrhea, nausea, or vomiting. Genitourinary - no dysuria, frequency, or urgency. No flank pain or hematuria. Musculoskeletal - no back pain or myalgia. Transported via wheelchair. Extremities - no clubbing, cyanosis or extremity edema. Skin - no color change or rash. No pallor. Loop recorder implant left chest.  Neurologic - no speech difficulty, facial asymmetry or lateralizing weakness. No seizures. No syncope or near syncope since 10/27/20 with hx of vasovagal syncope. Hematologic - no easy bruising or excessive bleeding. Psychiatric - no severe anxiety or insomnia. No confusion. All other review of systems are negative. Objective  Vital Signs - /70   Pulse 62   Ht 5' (1.524 m)   Wt 125 lb (56.7 kg)   BMI 24.41 kg/m²   General - Magda Able is alert, cooperative, and pleasant. Well groomed. No acute distress. Body habitus - Body mass index is 24.41 kg/m². HEENT - Head is normocephalic. No circumoral cyanosis. Dentition is normal.  EYES -   Lids normal without ptosis. No discharge, edema or subconjunctival hemorrhage. Neck - Symmetrical without apparent mass or lymphadenopathy. Respiratory - Normal respiratory effort without use of accessory muscles. Ausculatation reveals vesicular breath sounds without crackles, wheezes, rub or rhonchi. Cardiovascular - No jugular venous distention. Auscultation reveals regular rate and rhythm. No audible clicks, gallop or rub. No murmur.   No lower extremity varicosities. No carotid bruits. Abdominal -  No visible distention, mass or pulsations. Extremities - No clubbing or cyanosis. No statis dermatitis or ulcers. No edema. Musculoskeletal -   No Osler's nodes. No kyphosis or scoliosis. Transported via wheelchair. Skin -  Warm and dry; no rash or pallor. Small loop recorder incision per left chest.  Incision well approximated. Mild bruising at implant site and lower left breast.  No erythema or drainage noted. Neurological - No focal neurological deficits. Thought processes coherent. No apparent tremor. Oriented to person, place and time. Psychiatric -  Appropriate affect and mood. Assessment:     Diagnosis Orders   1. Vasovagal syncope     2. Status post placement of implantable loop recorder     3. Cryptogenic stroke ([facility] [address]     4. Moderate mitral regurgitation     5. Essential hypertension     6. Bradycardia       Loop recorder interrogation:  Battery status good. No symptom episodes. No tachy, AT/AF or pauses. 1 luciano episode 11/7/20 11:10 AM - 7 seconds with minimum heart rate 51 bpm.  Not marked as symptom. No associated report of syncope or near syncope. Patient could have been napping. Data reviewed:  9/17/20 Lexiscan  Impression    Impression:    There is no obvious infarct or ischemia, with a calculated ejection    fraction of 84 %. Suggest: Clinical correlation and consideration for Medical    management.     Signed by Dr Bismark Grant on 9/17/2020 10:03 PM      9/17/20 echo  Summary   Normal left ventricular size and systolic function EF 29-02%   Mild concentric left ventricular hypertrophy with normal diastolic   function   Mild left atrial enlargement   Mild calcification of the somewhat thickened tricuspid aortic valve with   adequate cusp separation and no significant stenosis or insufficiency   Mild thickening of a normally mobile mitral valve with moderate   regurgitation   Pulmonic valve not visualized   Mild right atrial enlargement with normal right ventricular size and   systolic function   Mild to moderate tricuspid regurgitation with RVSP estimate 28 mmHg   Normal IVC consistent with normal right atrial filling pressures   Significant pericardial effusion and aortic root dimensions are within   normal limits    Signature    ----------------------------------------------------------------   Electronically signed by Donte Power MD(Interpreting physician)   on 09/17/2020 01:10 PM   ----------------------------------------------------------------     1/25/19 carotid ultrasound   Impression          There is heterogeneous plaque visualized in the bilateral internal carotid     artery(ies).     There is less than 50% stenosis in the right internal carotid artery.     There is less than 50% stenosis of the left internal carotid artery.     There is normal antegrade flow in the bilateral vertebral artery(ies).     Compared to the previous study dated 7-10-16 , there is no significant     change in stenosis of the bilateral internal carotid artery(ies).          Signature          ----------------------------------------------------------------     Electronically signed by Justine Matt MD(Interpreting     physician) on 01/26/2019 07:03 AM      Lab Results   Component Value Date    WBC 5.5 10/27/2020    HGB 12.5 10/27/2020    HCT 36.7 (L) 10/27/2020    MCV 97.1 10/27/2020     10/27/2020     Lab Results   Component Value Date     10/27/2020    K 3.1 (L) 10/27/2020     10/27/2020    CO2 22 10/27/2020    BUN 23 10/27/2020    CREATININE 0.8 10/27/2020    GLUCOSE 116 (H) 10/27/2020    CALCIUM 9.4 10/27/2020    PROT 6.3 (L) 10/27/2020    LABALBU 4.2 10/27/2020    BILITOT 0.4 10/27/2020    ALKPHOS 56 10/27/2020    AST 21 10/27/2020    ALT 14 10/27/2020    LABGLOM >60 10/27/2020    GFRAA >59 10/27/2020    GLOB 2.0 07/16/2016       Lab Results   Component Value Date    CHOL 190 09/03/2019    CHOL 171 03/21/2019    CHOL 190 10/08/2018     Lab Results   Component Value Date    TRIG 142 09/03/2019    TRIG 145 03/21/2019    TRIG 138 10/08/2018     Lab Results   Component Value Date    HDL 66 09/03/2019    HDL 68 03/21/2019    HDL 80 10/08/2018     Lab Results   Component Value Date    LDLCALC 96 09/03/2019    LDLCALC 74 03/21/2019    LDLCALC 82 10/08/2018     Hx vasovagal syncope - loop recorder implanted 10/27/20.  1 luciano episode of 7 seconds mean heart rate 51 bpm.  Asymptomatic. No syncope since 10/27/20. Continue monitoring. HTN - normotensive on current regimen. Patient is compliant with medication regimen. BP Readings from Last 3 Encounters:   11/11/20 134/70   10/27/20 (!) 140/69   10/27/20 (!) 143/53    Pulse Readings from Last 3 Encounters:   11/11/20 62   10/27/20 84   10/27/20 75        Wt Readings from Last 3 Encounters:   11/11/20 125 lb (56.7 kg)   10/27/20 124 lb (56.2 kg)   10/27/20 124 lb (56.2 kg)     Plan  Previous cardiac history and records reviewed. Continue current medical management. Continue other current medications as directed. Continue to follow up with primary care provider for non cardiac medical problems. Call the office with any problems, questions or concerns at [phone]. Cardiology follow up: 2 months Dr. Sommer Thibodeaux. Educational included in patient instructions. Heart health.      LAYTON Mota

## 2020-11-11 NOTE — CARE COORDINATION
Multiple failed attempts to reach pt via phone call for ED f/u. It is noted in pt's chart that she did have a f/u with Cardiology today. Will close episode at this time and reopen if pt's emergency contact returns my call.

## 2020-11-12 ENCOUNTER — NURSE ONLY (OUTPATIENT)
Dept: UROLOGY | Age: 85
End: 2020-11-12
Payer: MEDICARE

## 2020-11-12 VITALS — HEART RATE: 62 BPM | SYSTOLIC BLOOD PRESSURE: 163 MMHG | DIASTOLIC BLOOD PRESSURE: 80 MMHG

## 2020-11-12 PROCEDURE — 64566 NEUROELTRD STIM POST TIBIAL: CPT | Performed by: PHYSICIAN ASSISTANT

## 2020-11-12 ASSESSMENT — ENCOUNTER SYMPTOMS
ALLERGIC/IMMUNOLOGIC NEGATIVE: 1
EYES NEGATIVE: 1
RESPIRATORY NEGATIVE: 1
GASTROINTESTINAL NEGATIVE: 1

## 2020-11-12 NOTE — PROGRESS NOTES
Adriane Turner is a 80 y.o. female who presents today   Chief Complaint   Patient presents with    Follow-up     I am here today for my uroplasty treatment. Uroplasty treatment:  Is an 80-year-old female is for her maintenance uroplasty treatment she gets it approximately every 23 to 24 days. If she goes beyond that timeframe her symptoms worsen. Overall she has responded well to treatment she has improvement in overall symptoms and incontinence. She does not take overactive bladder medication.     Past Medical History:   Diagnosis Date    Age-related macular degeneration, wet, right eye ([address]     Anemia     Back pain     Chronic bilateral low back pain with left-sided sciatica 12/19/2019    Colitis, ischemic ([address]     Dementia ([address]     Diverticulosis     Dry age-related macular degeneration of left eye     Hyperlipidemia     Hypertension     Osteoarthritis     Palliative care patient 09/17/2019    Risk for falls 9/13/2019    Seizures ([address]     Spastic colon     Status post placement of implantable loop recorder 10/27/2020    Stroke syndrome     Urinary incontinence     Uterine cancer [facility])        Past Surgical History:   Procedure Laterality Date    BREAST SURGERY Right     FNA Right Breast \"oh heavens, maybe 20 years ago\"    CHOLECYSTECTOMY      COLONOSCOPY  9/25/03    Dr Fabiola Stephens ischemic colitis, incomplete exam    COLONOSCOPY  08/29/2003    Dr Patel Arrow:  limited colon-ischemic colitis    DILATION AND CURETTAGE OF UTERUS      [facility] [place] [facility] su[facility], adn cataract with IOL b/l    HYSTERECTOMY      ovaries and tubes    INSERTABLE CARDIAC MONITOR      LUMBAR FUSION      2012 90 days after the spine surgeries    OTHER SURGICAL HISTORY      inner lymph nodes    SPINE SURGERY      L3,4, and 5   done in 2012   [address]      as a child at age 9    [address]  08/28/2003     STEVE:  Duodenal scarring but no evidence of active ulcer disease. Current Outpatient Medications   Medication Sig Dispense Refill    cloNIDine (CATAPRES) 0.1 MG tablet Take 1 tablet by mouth 2 times daily as needed for High Blood Pressure (for BP greater than 160/100) 30 tablet 2    pantoprazole (PROTONIX) 40 MG tablet Take 40 mg by mouth daily      acetaminophen (TYLENOL) 500 MG tablet Take 1,000 mg by mouth Indications: 2 tablets at 6:00am but not more than 3,000mg per day      meloxicam (MOBIC) 7.5 MG tablet Take 7.5 mg by mouth daily as needed for Pain      donepezil (ARICEPT) 10 MG tablet Take 1 tablet by mouth nightly 90 tablet 3    Docosanol (ABREVA EX) Apply topically      levETIRAcetam (KEPPRA) 500 MG tablet Take 1 tablet by mouth 2 times daily 60 tablet 11    dipyridamole (PERSANTINE) 50 MG tablet Take 2 tablets by mouth 2 times daily 120 tablet 11    hydrALAZINE (APRESOLINE) 25 MG tablet Take 25 mg by mouth 3 times daily       nitrofurantoin (MACRODANTIN) 50 MG capsule TAKE 1 CAPSULE AT BEDTIME 30 capsule 3    simethicone (GAS-X EXTRA STRENGTH) 125 MG chewable tablet Take 125 mg by mouth 2 PO 5:30 AM,1- 2 10:30 AM, 1 4:00 PM and PRN      butalbital-acetaminophen-caffeine (FIORICET, ESGIC) -40 MG per tablet Take 1 tablet by mouth every 6 hours as needed for Headaches       amLODIPine (NORVASC) 5 MG tablet Take 5 mg by mouth 2 times daily       diclofenac sodium 1 % GEL Apply 2 g topically 4 times daily as needed for Pain      sodium chloride 1 g tablet Take 1 g by mouth 3 times daily      lidocaine 4 % external patch Place 1 patch onto the skin daily as needed (LEFT LOWER BACK PAIN)      NONFORMULARY Indications: dic 3%, lidocaine 2%, cyclo 2%, prilo 2% baclo 2% 1 to 2 pumps up to TID PRN       desonide (DESOWEN) 0.05 % cream Apply topically as needed Apply topically 2 times daily.        mupirocin (BACTROBAN) 2 % cream Apply topically as needed Apply topically 3 times Allergen Reactions    Aspirin Shortness Of Breath    Dilaudid [Hydromorphone Hcl]     Ondansetron     Quinolones     Spironolactone      Low sodium    Sulfa Antibiotics     Codeine Rash and Other (See Comments)     Makes her \"crazy and mean\" and gives her a rash    Demerol Hcl [Meperidine] Other (See Comments)     Made her \"crazy and mean\", and \"loopy\"    Levaquin [Levofloxacin In D5w] Other (See Comments)     Seizure like activity      Myrbetriq [Mirabegron] Other (See Comments)     Unable to micturate    Namenda [Memantine Hcl] Other (See Comments)     made her like a vegetable for hours      Norco [Hydrocodone-Acetaminophen] Rash and Other (See Comments)     \"crazy and mean\"    Pneumococcal Vaccines Swelling       Social History     Socioeconomic History    Marital status:      Spouse name: None    Number of children: 3    Years of education: None    Highest education level: None   Occupational History    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mployee of [employer]    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,mployee of [employer]   Social Needs    Financial resource strain: None    Food insecurity     Worry: None     Inability: None    Transportation needs     Medical: None     Non-medical: None   Tobacco Use    Smoking status: Never Smoker    Smokeless tobacco: Never Used   Substance and Sexual Activity    Alcohol use: Never     Frequency: Never    Drug use: Never    Sexual activity: None     Comment: has 3 kids   Lifestyle    Physical activity     Days per week: None     Minutes per session: None    Stress: None   Relationships    Social connections     Talks on phone: None     Gets together: None     Attends Cheondoism service: None     Active member of club or organization: None     Attends meetings of clubs or organizations: None     Relationship status: None    Intimate partner violence     Fear of current or ex partner: None     Emotionally abused: None     Physically abused: None Forced sexual activity: None   Other Topics Concern    None   Social History Narrative    CODE STATUS: DNR-CCA    HEALTH CARE PROXY / [employer] and Healthcare: her daughter, Mrs. Marii Santos, [phone]    AMBULATES: with walker during day, wheelchair at night    DOMICILED: lives [facility] [place] [facility], has no stairs or steps, has a cat, her daughter is there periodically       Family History   Problem Relation Age of Onset    Heart Defect Mother     Hypertension Mother     Heart Attack Father         x3 within 24 h and then , abused ciggarettes    No Known Problems Daughter     No Known Problems Son     No Known Problems Son     Colon Cancer Neg Hx     Colon Polyps Neg Hx     Esophageal Cancer Neg Hx     Liver Cancer Neg Hx     Liver Disease Neg Hx     Rectal Cancer Neg Hx     Stomach Cancer Neg Hx        REVIEW OF SYSTEMS:  Review of Systems   Constitutional: Negative. HENT: Negative. Eyes: Negative. Respiratory: Negative. Cardiovascular: Negative. Gastrointestinal: Negative. Endocrine: Negative. Genitourinary: Negative. Musculoskeletal: Negative. Skin: Negative. Allergic/Immunologic: Negative. Neurological: Negative. Hematological: Negative. Psychiatric/Behavioral: Negative. PHYSICAL EXAM:  BP (!) 163/80   Pulse 62   Physical Exam  Vitals signs reviewed. Constitutional:       Appearance: Normal appearance. HENT:      Head: Normocephalic and atraumatic. Right Ear: External ear normal.      Left Ear: External ear normal.      Nose: No congestion. Eyes:      Conjunctiva/sclera: Conjunctivae normal.   Neck:      Comments: I observed no obvious neck masses  Pulmonary:      Effort: Pulmonary effort is normal.   Neurological:      General: No focal deficit present. Mental Status: She is alert and oriented to person, place, and time.    Psychiatric:         Mood and Affect: Mood normal.         Behavior: Behavior normal.       Uroplasty treatment  1. Patient is seated with the right treatment leg elevated. 2. 34 gauge fine needle electrode is inserted into lower inner aspect of the leg. Slightly cephalad to the medial malleolus. 3. Surface electrode pad is placed over the medial aspect of the calcaneus on the same leg. 4.Needle electrode is connected to the external pulse generator. 5.The pulse generator is turned on and the millivolts used are 7. 6.Patient is treated for 30 minutes. 7.The needle and pad are removed. 1. Overactive bladder  Patient continues to have good response with uroplasty treatment she does every 23 to 24 days. Her symptoms are well controlled she is not on any urologic medications follow-up for next treatment is scheduled. No orders of the defined types were placed in this encounter. No orders of the defined types were placed in this encounter.

## 2020-12-03 ENCOUNTER — OFFICE VISIT (OUTPATIENT)
Dept: UROLOGY | Age: 85
End: 2020-12-03
Payer: MEDICARE

## 2020-12-03 VITALS — SYSTOLIC BLOOD PRESSURE: 162 MMHG | DIASTOLIC BLOOD PRESSURE: 80 MMHG | HEART RATE: 63 BPM

## 2020-12-03 PROCEDURE — 64566 NEUROELTRD STIM POST TIBIAL: CPT | Performed by: NURSE PRACTITIONER

## 2020-12-03 PROCEDURE — 99999 PR OFFICE/OUTPT VISIT,PROCEDURE ONLY: CPT | Performed by: NURSE PRACTITIONER

## 2020-12-03 ASSESSMENT — ENCOUNTER SYMPTOMS
ALLERGIC/IMMUNOLOGIC NEGATIVE: 1
GASTROINTESTINAL NEGATIVE: 1
RESPIRATORY NEGATIVE: 1
EYES NEGATIVE: 1

## 2020-12-03 NOTE — PROGRESS NOTES
Ashley Ha is a 80 y.o. female who presents today   Chief Complaint   Patient presents with    Injections     uroplasty       Uroplasty treatment:  Patient presents for her maintenance uroplasty therapy. She received this treatment approximately every 23-24 days. If she goes beyond this time, her symptoms began to worsen. Overall, she has responded well to treatment having an improvement in her incontinence. She is no longer taking overactive bladder medication.   She has been needing to utilize Phenergan prior to this procedure as she begins getting nauseated prior to her appointment      Past Medical History:   Diagnosis Date    Age-related macular degeneration, wet, right eye ([address]     Anemia     Back pain     Chronic bilateral low back pain with left-sided sciatica 12/19/2019    Colitis, ischemic ([address]     Dementia ([address]     Diverticulosis     Dry age-related macular degeneration of left eye     Hyperlipidemia     Hypertension     Osteoarthritis     Palliative care patient 09/17/2019    Risk for falls 9/13/2019    Seizures ([address]     Spastic colon     Status post placement of implantable loop recorder 10/27/2020    Stroke syndrome     Urinary incontinence     Uterine cancer [facility])        Past Surgical History:   Procedure Laterality Date    BREAST SURGERY Right     FNA Right Breast \"oh heavens, maybe 20 years ago\"    CHOLECYSTECTOMY      COLONOSCOPY  9/25/03    Dr Raad Clemons ischemic colitis, incomplete exam    COLONOSCOPY  08/29/2003    Dr Jenniffer Sweet:  limited colon-ischemic colitis    DILATION AND CURETTAGE OF UTERUS      x2, in [facility] tears, laser suregry, adn cataract with IOL b/l    HYSTERECTOMY      ovaries and tubes    INSERTABLE CARDIAC MONITOR      LUMBAR FUSION      2012 90 days after the spine surgeries    OTHER SURGICAL HISTORY      inner lymph nodes    SPINE SURGERY      L3,4, and 5   done in 2012   [id number]  ADENOIDECTOMY      as a child at age 9    [facility] ENDOSCOPY  08/28/2003    DR Quiros Don:  Duodenal scarring but no evidence of active ulcer disease. Current Outpatient Medications   Medication Sig Dispense Refill    cloNIDine (CATAPRES) 0.1 MG tablet Take 1 tablet by mouth 2 times daily as needed for High Blood Pressure (for BP greater than 160/100) 30 tablet 2    pantoprazole (PROTONIX) 40 MG tablet Take 40 mg by mouth daily      acetaminophen (TYLENOL) 500 MG tablet Take 1,000 mg by mouth Indications: 2 tablets at 6:00am but not more than 3,000mg per day      meloxicam (MOBIC) 7.5 MG tablet Take 15 mg by mouth daily      donepezil (ARICEPT) 10 MG tablet Take 1 tablet by mouth nightly 90 tablet 3    Docosanol (ABREVA EX) Apply topically      levETIRAcetam (KEPPRA) 500 MG tablet Take 1 tablet by mouth 2 times daily 60 tablet 11    dipyridamole (PERSANTINE) 50 MG tablet Take 2 tablets by mouth 2 times daily 120 tablet 11    hydrALAZINE (APRESOLINE) 25 MG tablet Take 25 mg by mouth 3 times daily       nitrofurantoin (MACRODANTIN) 50 MG capsule TAKE 1 CAPSULE AT BEDTIME 30 capsule 3    simethicone (GAS-X EXTRA STRENGTH) 125 MG chewable tablet Take 125 mg by mouth 2 PO 5:30 AM,1- 2 10:30 AM, 1 4:00 PM and PRN      butalbital-acetaminophen-caffeine (FIORICET, ESGIC) -40 MG per tablet Take 1 tablet by mouth every 6 hours as needed for Headaches       amLODIPine (NORVASC) 5 MG tablet Take 5 mg by mouth 2 times daily       diclofenac sodium 1 % GEL Apply 2 g topically 4 times daily as needed for Pain      sodium chloride 1 g tablet Take 1 g by mouth 3 times daily      lidocaine 4 % external patch Place 1 patch onto the skin daily as needed (LEFT LOWER BACK PAIN)      NONFORMULARY Indications: dic 3%, lidocaine 2%, cyclo 2%, prilo 2% baclo 2% 1 to 2 pumps up to TID PRN       desonide (DESOWEN) 0.05 % cream Apply topically as needed Apply topically 2 times daily.        No current facility-administered medications for this visit.         Allergies   Allergen Reactions    Aspirin Shortness Of Breath    Dilaudid [Hydromorphone Hcl]     Ondansetron     Quinolones     Spironolactone      Low sodium    Sulfa Antibiotics     Codeine Rash and Other (See Comments)     Makes her \"crazy and mean\" and gives her a rash    Demerol Hcl [Meperidine] Other (See Comments)     Made her \"crazy and mean\", and \"loopy\"    Levaquin [Levofloxacin In D5w] Other (See Comments)     Seizure like activity      Myrbetriq [Mirabegron] Other (See Comments)     Unable to micturate    Namenda [Memantine Hcl] Other (See Comments)     made her like a vegetable for hours      Norco [Hydrocodone-Acetaminophen] Rash and Other (See Comments)     \"crazy and mean\"    Pneumococcal Vaccines Swelling       Social History     Socioeconomic History    Marital status:      Spouse name: None    Number of children: 3    Years of education: None    Highest education level: None   Occupational History    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mployee of [employer]    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,mployee of [employer]   Social Needs    Financial resource strain: None    Food insecurity     Worry: None     Inability: None    Transportation needs     Medical: None     Non-medical: None   Tobacco Use    Smoking status: Never Smoker    Smokeless tobacco: Never Used   Substance and Sexual Activity    Alcohol use: Never     Frequency: Never    Drug use: Never    Sexual activity: None     Comment: has 3 kids   Lifestyle    Physical activity     Days per week: None     Minutes per session: None    Stress: None   Relationships    Social connections     Talks on phone: None     Gets together: None     Attends [employer] service: None     Active member of club or organization: None     Attends meetings of clubs or organizations: None     Relationship status: None    Intimate partner violence     Fear of current or ex partner: None     Emotionally abused: None     Physically abused: None     Forced sexual activity: None   Other Topics Concern    None   Social History Narrative    CODE STATUS: DNR-CCA    HEALTH CARE PROXY / [employer] and Healthcare: her daughter, Mrs. Henry Schrader, [phone]    AMBULATES: with walker during day, wheelchair at night    DOMICILED: lives in the [facility] assisted living facility, has no stairs or steps, has a cat, her daughter is there periodically       Family History   Problem Relation Age of Onset    Heart Defect Mother     Hypertension Mother     Heart Attack Father         x3 within 24 h and then , abused ciggarettes    No Known Problems Daughter     No Known Problems Son     No Known Problems Son     Colon Cancer Neg Hx     Colon Polyps Neg Hx     Esophageal Cancer Neg Hx     Liver Cancer Neg Hx     Liver Disease Neg Hx     Rectal Cancer Neg Hx     Stomach Cancer Neg Hx        REVIEW OF SYSTEMS:  Review of Systems   Constitutional: Negative. HENT: Negative. Eyes: Negative. Respiratory: Negative. Negative for cough and shortness of breath. Cardiovascular: Negative. Negative for leg swelling. Gastrointestinal: Negative. Endocrine: Negative. Genitourinary: Positive for frequency and urgency. Negative for decreased urine volume, difficulty urinating, dyspareunia, dysuria, enuresis, flank pain, genital sores, hematuria, menstrual problem, pelvic pain, vaginal bleeding, vaginal discharge and vaginal pain. Symptoms better doing Uroplasty   Musculoskeletal: Positive for arthralgias, back pain, myalgias and neck pain. Skin: Negative. Allergic/Immunologic: Negative. Neurological: Positive for weakness. Hematological: Negative. Psychiatric/Behavioral: The patient is nervous/anxious. PHYSICAL EXAM:  BP (!) 162/80   Pulse 63   Physical Exam  Vitals signs reviewed. Constitutional:       Appearance: Normal appearance. HENT:      Head: Normocephalic and atraumatic. Right Ear: External ear normal.      Left Ear: External ear normal.      Nose: No congestion. Eyes:      Conjunctiva/sclera: Conjunctivae normal.   Neck:      Comments: I observed no obvious neck masses  Pulmonary:      Effort: Pulmonary effort is normal.   Abdominal:      General: There is no distension. Tenderness: There is no abdominal tenderness. Musculoskeletal:         General: No swelling. Right lower leg: No edema. Left lower leg: No edema. Skin:     Coloration: Skin is pale. Neurological:      General: No focal deficit present. Mental Status: She is alert and oriented to person, place, and time. Psychiatric:         Mood and Affect: Mood normal.         Behavior: Behavior normal.       1. Patient is seated with the right treatment leg elevated. 2. 34 gauge fine needle electrode is inserted into lower inner aspect of the leg. Slightly cephalad to the medial malleolus. 3. Surface electrode pad is placed over the medial aspect of the calcaneus on the same leg. 4.Needle electrode is connected to the external pulse generator. 5.The pulse generator is turned on and the millivolts used are 15. 6.Patient is treated for 30 minutes. 7.The needle and pad are removed. *During today's procedure. The patient began to complain of shin pain that was unbearable in her right leg. Her daughter requested I move the needle. I reinserted the needle into her right leg in a slightly different spot which produced the same response. The patient became very uncomfortable and requested to change positions. We switched to her left foot and finally got her adjusted to 13 mV. Patient will follow up in 23 to 24 days for next treatment. 1. Overactive bladder  Patient has good response with uroplasty treatment.   She reports that she does not receive her treatment at least every 23 to 24 days she experiences a severe worsening of her symptoms. Orders Placed This Encounter   Procedures    CA POST TIBIAL NEUROSTIMULATION,PERC NEEDLE ELECTRODE     No orders of the defined types were placed in this encounter.

## 2020-12-04 ENCOUNTER — TELEPHONE (OUTPATIENT)
Dept: CARDIOLOGY | Age: 85
End: 2020-12-04

## 2020-12-04 ASSESSMENT — ENCOUNTER SYMPTOMS
BACK PAIN: 1
COUGH: 0
SHORTNESS OF BREATH: 0

## 2020-12-04 NOTE — TELEPHONE ENCOUNTER
----- Message from LAYTON Peraza sent at 12/2/2020 12:27 PM CST -----  Reviewed with Dr. Kimberly Escamilla. 4-second pause during awake hours but was asymptomatic. She would qualify for pacemaker. Can give patient and daughter option of coming in for pacemaker or if she is feeling okay we continue to monitor the loop recorder.   I do not believe she drives but if she does should not be driving at this time

## 2020-12-04 NOTE — TELEPHONE ENCOUNTER
Patients daughter returned call. She stated they would like to schedule a video visit to discuss carelink loop recorder results and options in more detail. Gloriagriselda Zuleta was at work and could not schedule the appointment at this time, but she will call back on Monday to schedule.

## 2020-12-14 ENCOUNTER — TELEPHONE (OUTPATIENT)
Dept: CARDIOLOGY | Age: 85
End: 2020-12-14

## 2020-12-14 ENCOUNTER — NURSE TRIAGE (OUTPATIENT)
Dept: OTHER | Facility: CLINIC | Age: 85
End: 2020-12-14

## 2020-12-14 ENCOUNTER — HOSPITAL ENCOUNTER (EMERGENCY)
Age: 85
Discharge: HOME | End: 2020-12-15
Attending: EMERGENCY MEDICINE
Payer: MEDICARE

## 2020-12-14 PROCEDURE — 99284 EMERGENCY DEPT VISIT MOD MDM: CPT

## 2020-12-14 PROCEDURE — 99999 PR OFFICE/OUTPT VISIT,PROCEDURE ONLY: CPT | Performed by: EMERGENCY MEDICINE

## 2020-12-14 RX ORDER — GABAPENTIN 100 MG/1
100 CAPSULE ORAL 2 TIMES DAILY PRN
COMMUNITY
End: 2021-04-23

## 2020-12-14 ASSESSMENT — PAIN DESCRIPTION - LOCATION: LOCATION: BACK

## 2020-12-14 ASSESSMENT — PAIN DESCRIPTION - PAIN TYPE: TYPE: ACUTE PAIN

## 2020-12-14 ASSESSMENT — PAIN SCALES - GENERAL: PAINLEVEL_OUTOF10: 3

## 2020-12-14 NOTE — TELEPHONE ENCOUNTER
Pt's daughter states pt is paler than normal and is laying in floor in fetal position. Pt is able to move arm and leg on the side that is up. Pt's daughter states this is the first time this has happened when she wasn't around to see exactly what happened. The pt has a hx of seizures and vagal response. Reason for Disposition   Patient sounds very sick or weak to the triager    Answer Assessment - Initial Assessment Questions  1. SYMPTOM: \"What is the main symptom you are concerned about? \" (e.g., weakness, numbness)      Hx of seizures and vagal responses    2. ONSET: \"When did this start? \" (minutes, hours, days; while sleeping)      Daughter left her mom's home for 1 hour and came back, her mom had something happened. Mom had woke up in shower, thought she was in the bed. 3. LAST NORMAL: \"When was the last time you were normal (no symptoms)? \"      Earlier today    4. PATTERN \"Does this come and go, or has it been constant since it started? \"  \"Is it present now? \"      Come and go. 5. CARDIAC SYMPTOMS: \"Have you had any of the following symptoms: chest pain, difficulty breathing, palpitations? \"      Loop recorder present now    6. NEUROLOGIC SYMPTOMS: \"Have you had any of the following symptoms: headache, dizziness, vision loss, double vision, changes in speech, unsteady on your feet? \"      Pt oriented x 3 right now. Pt doesn't recall the episode    7. OTHER SYMPTOMS: \"Do you have any other symptoms? \"      N0    8. PREGNANCY: \"Is there any chance you are pregnant? \" \"When was your last menstrual period? \"      No    Protocols used: NEUROLOGIC DEFICIT-ADULT-[place]  Patient called pre-service [facility]) [address] with red flag complaint. Warm transfer from [facility] description of triage: Rochelle Jay. Seizure or vagal response.       Triage indicates for patient to ED    Care advice provided, patient verbalizes understanding; denies any other questions or concerns; instructed to call back for any new or worsening symptoms. Writer provided warm transfer to Pradeep at [facility] for appointment scheduling. Pt's daughter insisted on speaking with someone at the cardiologist's office. Called Dr. Renan Thompson office at [phone]. Attention Provider: Thank you for allowing me to participate in the care of your patient. The patient was connected to triage in response to information provided to the [facility]. Please do not respond through this encounter as the response is not directed to a shared pool.       Tried to get daughter to go ahead and call EMS, but she wanted to speak with cardiology first.

## 2020-12-14 NOTE — TELEPHONE ENCOUNTER
Patient's daughter called stating her mother either had a seizure or vagal response today between 1220 and 1320. She states that she spoke with RN nurse triage for 30 minutes about this. She said, \"Mother does not remember what happened. \" She did say she had severe abdominal pain and was sitting on the toilet. Patient's daughter believes she could have had a bowel movement but found no evidence of that. She said she woke up in the shower and the toilet lid was shut. She said they never shut the toilet lid. She wanted to know if the loop recorder showed anything. Stas to [employer] and she stated no transmission had come through since last week and they would need to send manual. She had questions on how to complete that and Regino Romero said she would speak with her about how to do transmission. Patients daughter transferred to [employer].

## 2020-12-14 NOTE — TELEPHONE ENCOUNTER
Dr. Daisy Marcus returned call to discuss patient. Patient had syncope today in association with abdominal pain and BM. Vasovagal syncope is a chronic issue for this patient. Zio showed associated SB 39 bpm. No pauses. The daughter was advised to take her mother to the ED due to low back pain and some disorientation after the syncope with fall in the bathroom. Their was no report of head trauma by the daughter. Upon arrival to her mother's house, BP was normal and heart rate reported in 60's. Dr. Daisy Marcus was provided report. Based upon symptomatic bradycardia with hx of 4 second pause on loop recorder on 12/4/20, patient meets criteria for pacer therapy. This was also discussed with patient's daughter today. Dr. Daisy Marcus is agreeable to proceed with pacer implant with instructions to assure patient and daughter that the vasovagal syncope can be both from low BP and heart rate. The pacemaker will not address syncope secondary to hypotension. Plan to contact the daughter in the AM and schedule pacer implant with Dr. Daisy Marcus.     LAYTON Mayes

## 2020-12-15 ENCOUNTER — APPOINTMENT (OUTPATIENT)
Dept: CT IMAGING | Age: 85
End: 2020-12-15
Payer: MEDICARE

## 2020-12-15 ENCOUNTER — TELEPHONE (OUTPATIENT)
Dept: CARDIOLOGY | Age: 85
End: 2020-12-15

## 2020-12-15 ENCOUNTER — APPOINTMENT (OUTPATIENT)
Dept: GENERAL RADIOLOGY | Age: 85
End: 2020-12-15
Payer: MEDICARE

## 2020-12-15 VITALS
WEIGHT: 122 LBS | OXYGEN SATURATION: 96 % | HEART RATE: 60 BPM | BODY MASS INDEX: 23.95 KG/M2 | SYSTOLIC BLOOD PRESSURE: 152 MMHG | DIASTOLIC BLOOD PRESSURE: 66 MMHG | HEIGHT: 60 IN | TEMPERATURE: 97.9 F | RESPIRATION RATE: 18 BRPM

## 2020-12-15 LAB
ALBUMIN SERPL-MCNC: 4.3 G/DL (ref 3.5–5.2)
ALP BLD-CCNC: 63 U/L (ref 35–104)
ALT SERPL-CCNC: 19 U/L (ref 5–33)
ANION GAP SERPL CALCULATED.3IONS-SCNC: 10 MMOL/L (ref 7–19)
AST SERPL-CCNC: 21 U/L (ref 5–32)
BACTERIA: NEGATIVE /HPF
BILIRUB SERPL-MCNC: 0.6 MG/DL (ref 0.2–1.2)
BILIRUBIN URINE: NEGATIVE
BLOOD, URINE: NEGATIVE
BUN BLDV-MCNC: 19 MG/DL (ref 8–23)
CALCIUM SERPL-MCNC: 9.6 MG/DL (ref 8.8–10.2)
CHLORIDE BLD-SCNC: 101 MMOL/L (ref 98–111)
CLARITY: CLEAR
CO2: 24 MMOL/L (ref 22–29)
COLOR: YELLOW
CREAT SERPL-MCNC: 0.8 MG/DL (ref 0.5–0.9)
CRYSTALS, UA: ABNORMAL /HPF
EPITHELIAL CELLS, UA: 2 /HPF (ref 0–5)
GFR AFRICAN AMERICAN: >59
GFR NON-AFRICAN AMERICAN: >60
GLUCOSE BLD-MCNC: 89 MG/DL (ref 74–109)
GLUCOSE URINE: NEGATIVE MG/DL
HCT VFR BLD CALC: 35.4 % (ref 37–47)
HEMOGLOBIN: 12.1 G/DL (ref 12–16)
HYALINE CASTS: 5 /HPF (ref 0–8)
KETONES, URINE: 15 MG/DL
LEUKOCYTE ESTERASE, URINE: ABNORMAL
LIPASE: 33 U/L (ref 13–60)
MAGNESIUM: 2.1 MG/DL (ref 1.6–2.4)
MCH RBC QN AUTO: 33.2 PG (ref 27–31)
MCHC RBC AUTO-ENTMCNC: 34.2 G/DL (ref 33–37)
MCV RBC AUTO: 97 FL (ref 81–99)
NITRITE, URINE: NEGATIVE
PDW BLD-RTO: 11.9 % (ref 11.5–14.5)
PH UA: 5 (ref 5–8)
PLATELET # BLD: 186 K/UL (ref 130–400)
PMV BLD AUTO: 10.6 FL (ref 9.4–12.3)
POTASSIUM REFLEX MAGNESIUM: 3.4 MMOL/L (ref 3.5–5)
PROTEIN UA: NEGATIVE MG/DL
RBC # BLD: 3.65 M/UL (ref 4.2–5.4)
RBC UA: 2 /HPF (ref 0–4)
SODIUM BLD-SCNC: 135 MMOL/L (ref 136–145)
SPECIFIC GRAVITY UA: 1.02 (ref 1–1.03)
TOTAL PROTEIN: 6.3 G/DL (ref 6.6–8.7)
TROPONIN: <0.01 NG/ML (ref 0–0.03)
UROBILINOGEN, URINE: 0.2 E.U./DL
WBC # BLD: 6.5 K/UL (ref 4.8–10.8)
WBC UA: 9 /HPF (ref 0–5)

## 2020-12-15 PROCEDURE — 70450 CT HEAD/BRAIN W/O DYE: CPT

## 2020-12-15 PROCEDURE — 83735 ASSAY OF MAGNESIUM: CPT

## 2020-12-15 PROCEDURE — 87086 URINE CULTURE/COLONY COUNT: CPT

## 2020-12-15 PROCEDURE — 72131 CT LUMBAR SPINE W/O DYE: CPT

## 2020-12-15 PROCEDURE — 6360000004 HC RX CONTRAST MEDICATION: Performed by: EMERGENCY MEDICINE

## 2020-12-15 PROCEDURE — 71045 X-RAY EXAM CHEST 1 VIEW: CPT

## 2020-12-15 PROCEDURE — 83690 ASSAY OF LIPASE: CPT

## 2020-12-15 PROCEDURE — 72125 CT NECK SPINE W/O DYE: CPT

## 2020-12-15 PROCEDURE — 74177 CT ABD & PELVIS W/CONTRAST: CPT

## 2020-12-15 PROCEDURE — 80053 COMPREHEN METABOLIC PANEL: CPT

## 2020-12-15 PROCEDURE — 81001 URINALYSIS AUTO W/SCOPE: CPT

## 2020-12-15 PROCEDURE — 85027 COMPLETE CBC AUTOMATED: CPT

## 2020-12-15 PROCEDURE — 36415 COLL VENOUS BLD VENIPUNCTURE: CPT

## 2020-12-15 PROCEDURE — 84484 ASSAY OF TROPONIN QUANT: CPT

## 2020-12-15 RX ADMIN — IOPAMIDOL 90 ML: 755 INJECTION, SOLUTION INTRAVENOUS at 00:53

## 2020-12-15 ASSESSMENT — ENCOUNTER SYMPTOMS
SHORTNESS OF BREATH: 0
ABDOMINAL PAIN: 0
DIARRHEA: 0
VOMITING: 0
BACK PAIN: 1
EYE PAIN: 0

## 2020-12-15 NOTE — TELEPHONE ENCOUNTER
I spoke with pts daughter and let her know that when I get my January schedule book that I can get her put on the books she needs an 11am appt. SM

## 2020-12-15 NOTE — ED PROVIDER NOTES
[facility] EMERGENCY DEPT  eMERGENCY dEPARTMENT eNCOUnter      Pt Name: Elsa Diaz  MRN: [id number]  [place] 1935  Date of evaluation: 12/14/2020  Provider: Peña Nagel MD    CHIEF COMPLAINT       Chief Complaint   Patient presents with    Fall     pt sitting on the toilet with abd pain; pt states that she remembers slipping off of the toilet and being in the shower floor; pt has loop recorder that reported her heart rate dropped to 39         HISTORY OF PRESENT ILLNESS   (Location/Symptom, Timing/Onset,Context/Setting, Quality, Duration, Modifying Factors, Severity)  Note limiting factors. Elsa Diaz is a 80 y.o. female who presents to the emergency department with multiple complaints. Patient was sitting on the toilet. She was trying to have bowel movement and had some abdominal discomfort. She said she slipped and fell off the toilet and fell into the shower. Think she lost consciousness after this. Has had multiple episodes of vasovagal syncope in the past and has a loop recorder. Daughter called her to check on her. Daughter said that she initially talked her on the phone and she was whispering and seemed confused. Thinks this was soon after patient initially fell. When she went to check on her patient had gotten herself to her bed. She was lying in bed. Patient said she does not know how she got from the shower to the bed. Tells me that her lower abdominal pain she had earlier is gone now. Since her fall she has had pain across her low back. Thinks this is from falling. Denies any other injury. No complaint of chest pain or shortness of breath. No cough or fever. Does not know if she hit her head. Has some mild neck pain which she says is her chronic neck pain. No numbness. No weakness. Confusion has since resolved. Reviewed note from Cindy Dubois who spoke with patient and family over the phone. Said that Pantera showed heart rate of 39 tonight.  She also said that loop recorder showed 4-second pause on 12/4/2020. She spoke with Dr. Kilo Guadarrama who is planning to have patient follow-up to discuss possible pacemaker because of the above. Patient's only complaint at this time is low back pain and neck pain but tells me this is her chronic neck pain. No numbness or weakness. No vision problems. Has chronic decreased vision in right eye but this is baseline. HPI    NursingNotes were reviewed. REVIEW OF SYSTEMS    (2-9 systems for level 4, 10 or more for level 5)     Review of Systems   Constitutional: Negative for fever. Eyes: Negative for pain. Respiratory: Negative for shortness of breath. Cardiovascular: Negative for chest pain and palpitations. Gastrointestinal: Negative for abdominal pain, diarrhea and vomiting. Genitourinary: Negative for dysuria. Musculoskeletal: Positive for back pain. Skin: Negative for rash. Neurological: Positive for syncope. Negative for weakness, numbness and headaches. Psychiatric/Behavioral: Positive for confusion (resolved). All other systems reviewed and are negative. A complete review of systems was performed and is negative except as noted above in the HPI.        PAST MEDICAL HISTORY     Past Medical History:   Diagnosis Date    Age-related macular degeneration, wet, right eye ([address]     Anemia     Back pain     Chronic bilateral low back pain with left-sided sciatica 12/19/2019    Colitis, ischemic ([address]     Dementia ([address]     Diverticulosis     Dry age-related macular degeneration of left eye     Hyperlipidemia     Hypertension     Osteoarthritis     Palliative care patient 09/17/2019    Risk for falls 9/13/2019    Seizures ([address]     Spastic colon     Status post placement of implantable loop recorder 10/27/2020    Stroke syndrome     Urinary incontinence     Uterine cancer [facility])          SURGICAL HISTORY       Past Surgical History:   Procedure Laterality Date    BREAST SURGERY Right     FNA Right Breast \"oh heavens, maybe 20 years ago\"    CHOLECYSTECTOMY      COLONOSCOPY  9/25/03    Dr Flakita Burks ischemic colitis, incomplete exam    COLONOSCOPY  08/29/2003    Dr Susy Alba:  limited colon-ischemic colitis    DILATION AND CURETTAGE OF UTERUS      x2, in [facility] tears, laser suregry, adn cataract with IOL b/l    HYSTERECTOMY      ovaries and tubes    INSERTABLE CARDIAC MONITOR      LUMBAR FUSION      2012 90 days after the spine surgeries    OTHER SURGICAL HISTORY      inner lymph nodes    SPINE SURGERY      L3,4, and 5   done in 2012   Nay Haskins      as a child at age 9    Wadie Jeff  08/28/2003    DR Austen Humphrey:  Duodenal scarring but no evidence of active ulcer disease. CURRENT MEDICATIONS       Discharge Medication List as of 12/15/2020  4:36 AM      CONTINUE these medications which have NOT CHANGED    Details   gabapentin (NEURONTIN) 100 MG capsule Take 100 mg by mouth 2 times daily as needed (sciatic pain and burning sensation).  Can only tolerate 100 mg;Historical Med      acetaminophen (TYLENOL) 500 MG tablet Take 1,000 mg by mouth Indications: 2 tablets at 6:00am but not more than 3,000mg per dayHistorical Med      cloNIDine (CATAPRES) 0.1 MG tablet Take 1 tablet by mouth 2 times daily as needed for High Blood Pressure (for BP greater than 160/100), Disp-30 tablet,R-2Normal      pantoprazole (PROTONIX) 40 MG tablet Take 40 mg by mouth dailyHistorical Med      meloxicam (MOBIC) 7.5 MG tablet Take 15 mg by mouth dailyHistorical Med      donepezil (ARICEPT) 10 MG tablet Take 1 tablet by mouth nightly, Disp-90 tablet,R-3Normal      Docosanol (ABREVA EX) Apply topicallyHistorical Med      levETIRAcetam (KEPPRA) 500 MG tablet Take 1 tablet by mouth 2 times daily, Disp-60 tablet, R-11Normal      dipyridamole (PERSANTINE) 50 MG tablet Take 2 tablets by mouth 2 times daily, Disp-120 tablet, R-11Normal hydrALAZINE (APRESOLINE) 25 MG tablet Take 25 mg by mouth 3 times daily Historical Med      nitrofurantoin (MACRODANTIN) 50 MG capsule TAKE 1 CAPSULE AT BEDTIME, Disp-30 capsule, R-3Normal      simethicone (GAS-X EXTRA STRENGTH) 125 MG chewable tablet Take 125 mg by mouth 2 PO 5:30 AM,1- 2 10:30 AM, 1 4:00 PM and PRNHistorical Med      butalbital-acetaminophen-caffeine (FIORICET, ESGIC) -40 MG per tablet Take 1 tablet by mouth every 6 hours as needed for Headaches Historical Med      amLODIPine (NORVASC) 5 MG tablet Take 5 mg by mouth 2 times daily Historical Med      diclofenac sodium 1 % GEL Apply 2 g topically 4 times daily as needed for Pain, Topical, 4 TIMES DAILY PRN, Historical Med      sodium chloride 1 g tablet Take 1 g by mouth 3 times dailyHistorical Med      lidocaine 4 % external patch Place 1 patch onto the skin daily as needed (LEFT LOWER BACK PAIN), Transdermal, DAILY PRN, Historical Med      NONFORMULARY Indications: dic 3%, lidocaine 2%, cyclo 2%, prilo 2% baclo 2% 1 to 2 pumps up to TID PRN Historical Med      desonide (DESOWEN) 0.05 % cream Apply topically as needed Apply topically 2 times daily. , Topical, PRN, Historical Med      mupirocin (BACTROBAN) 2 % cream Apply topically as needed Apply topically 3 times daily. , Topical, PRN, Historical Med      valACYclovir (VALTREX) 1 g tablet as needed Historical Med      Psyllium (METAMUCIL FIBER PO) Take by mouth nightly Historical Med      azelastine (ASTELIN) 0.1 % nasal spray 2 sprays by Nasal route 2 times daily Historical Med      Cholecalciferol (VITAMIN D3) 5000 units TABS Take 5,000 Units by mouth daily Indications: AT 2:30 PM Historical Med      polyvinyl alcohol-povidone (HYPOTEARS) 1.4-0.6 % ophthalmic solution Place 1-2 drops into both eyes as neededHistorical Med      sodium chloride (OCEAN, BABY AYR) 0.65 % nasal spray 1 spray by Nasal route as needed for CongestionHistorical Med      tobramycin-dexamethasone (TOBRADEX) 0.3-0.1 % ophthalmic ointment Place into the left eye as needed 3 times daily. , Left Eye, PRN, Historical Med      Lutein 20 MG TABS Take 20 mg by mouth daily Indications: 2:30 PM DAILY Historical Med      fluticasone (VERAMYST) 27.5 MCG/SPRAY nasal spray 2 sprays by Nasal route 2 times daily Historical Med      camphor-menthol (SARNA) 0.5-0.5 % lotion Apply 0.5 mLs topically 2 times daily as needed for Itching Apply topically as needed., Topical, 2 TIMES DAILY PRN, Until Discontinued, Historical Med      Calcium Carb-Cholecalciferol (CALCIUM 600+D) 600-800 MG-UNIT TABS Take 1 tablet by mouth daily Indications: AT 2:30 PM Historical Med      promethazine (PHENERGAN) 25 MG tablet Take 25 mg by mouth 3 times daily as needed for Nausea      docusate sodium (COLACE) 100 MG capsule Take 100 mg by mouth nightly as needed for Constipation Historical Med      Omega 3-6-9 Fatty Acids (OMEGA 3-6-9 COMPLEX PO) Take 2 tablets by mouth daily Indications: AT 2:30 PM Historical Med      alendronate (FOSAMAX) 70 MG tablet Take 70 mg by mouth every 7 days Wednesday at 1830Historical Med      pravastatin (PRAVACHOL) 20 MG tablet Take 20 mg by mouth every morning       ramipril (ALTACE) 10 MG capsule Take 10 mg by mouth 2 times daily Historical Med      b complex vitamins capsule Take 1 capsule by mouth daily Indications: AT 2:30 PM B 100Historical Med      Multiple Vitamins-Minerals (THERAPEUTIC MULTIVITAMIN-MINERALS) tablet Take 1 tablet by mouth daily Indications: AT 2:30 PM Historical Med             ALLERGIES     Aspirin, Dilaudid [hydromorphone hcl], Ondansetron, Quinolones, Spironolactone, Sulfa antibiotics, Codeine, Demerol hcl [meperidine], Levaquin [levofloxacin in d5w], Myrbetriq [mirabegron], Namenda [memantine hcl], Norco [hydrocodone-acetaminophen], and Pneumococcal vaccines    FAMILY HISTORY       Family History   Problem Relation Age of Onset    Heart Defect Mother     Hypertension Mother     Heart Attack Father         x3 with walker during day, wheelchair at night    DOMICILED: lives in the [facility] assisted living facility, has no stairs or steps, has a cat, her daughter is there periodically       SCREENINGS    Dunbar Coma Scale  Eye Opening: Spontaneous  Best Verbal Response: Oriented  Best Motor Response: Obeys commands  Dunbar Coma Scale Score: 15        PHYSICAL EXAM    (up to 7 for level 4, 8 or more for level 5)     ED Triage Vitals [12/14/20 1743]   BP Temp Temp src Pulse Resp SpO2 Height Weight   125/63 97.9 °F (36.6 °C) -- 70 15 95 % 5' (1.524 m) 122 lb (55.3 kg)       Physical Exam  Vitals signs reviewed. Constitutional:       General: She is not in acute distress. Appearance: She is well-developed. HENT:      Head: Normocephalic and atraumatic. Ears:      Comments: No hemotympanum     Mouth/Throat:      Mouth: Mucous membranes are moist.      Pharynx: Oropharynx is clear. Eyes:      General: No scleral icterus. Extraocular Movements: Extraocular movements intact. Pupils: Pupils are equal, round, and reactive to light. Visual Fields: Left eye visual fields normal.      Comments: No hyphema. 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 No muscular tenderness. Vascular: No JVD. Cardiovascular:      Rate and Rhythm: Normal rate and regular rhythm. Pulses: Normal pulses. Heart sounds: Normal heart sounds. No murmur. Pulmonary:      Effort: Pulmonary effort is normal. No respiratory distress. Breath sounds: Normal breath sounds. Abdominal:      General: There is no distension. Palpations: Abdomen is soft. There is no pulsatile mass. Tenderness: There is no abdominal tenderness. There is no right CVA tenderness, left CVA tenderness, guarding or rebound. Musculoskeletal:         General: No swelling, tenderness or deformity. Comments: No C, T or L-spine tenderness or step-off. Pelvis stable. Some pain throughout the lower back without any focal tenderness anywhere. No CVA tenderness. Skin:     General: Skin is warm and dry. Neurological:      General: No focal deficit present. Mental Status: She is alert and oriented to person, place, and time. GCS: GCS eye subscore is 4. GCS verbal subscore is 5. GCS motor subscore is 6. Cranial Nerves: Cranial nerves are intact. Sensory: Sensation is intact. Motor: Motor function is intact. Coordination: Coordination is intact. Psychiatric:         Mood and Affect: Mood normal.         Behavior: Behavior normal.         DIAGNOSTIC RESULTS     EKG: All EKG's are interpreted by the Emergency Department Physician who either signs or Co-signs this chart in the absence of a cardiologist.    Normal sinus rhythm. Normal QT. Artifact. Do not see signs of acute ischemia compared to prior EKG. RADIOLOGY:   Non-plain film images such as CT, Ultrasound and MRI are read by the radiologist. Ronal Sack images are visualized and preliminarily interpreted by the emergency physician with the below findings:    Interpretation per the Radiologist below, if available at the time of this note:    CT LUMBAR SPINE WO CONTRAST   Final Result   A limited diagnostic study due to severe chronic   demineralization and artifacts from the hardware. The suspected insufficiency/stress fractures of the sacral ala   bilaterally. Lumbar spondylosis and multilevel neural foraminal stenosis. No   significant spinal stenosis. The hardware fusion of L3, L4 and L5. No hardware complication. Laminectomy at L3 and L4. The above study was initially reviewed and reported by stat [facility]. The   discrepancy mentioned above is reported to ER by a priority report.    Signed by Dr Juan Finley on 12/15/2020 7:25 AM      CT ABDOMEN PELVIS W IV CONTRAST Additional Contrast? None   Final Result   The stable insufficiency fractures of the sacrum   bilaterally, similar to the previous study in February 2020. No acute abnormality of the abdomen are pelvis, particularly, no   evidence of traumatic involvement of the abdominal pelvic organs. The diverticulosis of the colon. No evidence for diverticulitis. Hardware fusion of the lumbar spine. The severe demineralization the   bony structures. The above study was initially reviewed and reported by [facility]. I do   not find any discrepancies. Signed by Dr Mervin Savage on 12/15/2020 8:07 AM      CT CERVICAL SPINE WO CONTRAST   Final Result   No acute fracture or malalignment. The severe cervical spondylosis and multilevel neural foraminal   stenosis. No significant spinal stenosis. Bilateral thyroid nodules. Above study was initially reviewed and reported by [facility]. I do not   find any discrepancies. Signed by Dr Mervin Savage on 12/15/2020 7:10 AM      CT Head WO Contrast   Final Result   No acute intracranial abnormality. No skull fracture. The above study was initially reviewed and reported by stat rad[facility]. I do   not find any discrepancies. Signed by Dr Mervin Savage on 12/15/2020 6:38 AM      XR CHEST PORTABLE   Final Result   No acute findings.    Signed by Dr Cheryle Rivers on 12/15/2020 7:49 AM            LABS:  Labs Reviewed   URINE RT REFLEX TO CULTURE - Abnormal; Notable for the following components:       Result Value    Ketones, Urine 15 (*)     Leukocyte Esterase, Urine SMALL (*)     All other components within normal limits   CBC - Abnormal; Notable for the following components:    RBC 3.65 (*)     Hematocrit 35.4 (*)     MCH 33.2 (*)     All other components within normal limits   COMPREHENSIVE METABOLIC PANEL W/ REFLEX TO MG FOR LOW K - Abnormal; Notable for the following components:    Sodium 135 (*)     Potassium reflex Magnesium 3.4 (*)     Total Protein 6.3 (*)     All other components within normal limits   MICROSCOPIC URINALYSIS - Abnormal; Notable for the following components:

## 2020-12-17 LAB — URINE CULTURE, ROUTINE: NORMAL

## 2020-12-21 NOTE — TELEPHONE ENCOUNTER
Called and spoke with patient, have PACER scheduled for 01/04/2020 at 1100am with arrival of 0900. Patient is to be NPO after midnight. Patient instructed to arrive through front entrance of hospital and make immediate left. Patient advised they can have one person with them but they both must wear a mask. Patient advised may take morning medications with sip of water. Also advised patient must have COVID testing completed on 01/03/2020 anywhere from 11am-4pm at [facility]. Advised patient that they will be able to proceed with procedure as long as test results are negative. Patient made aware that if testing is not resulted evening prior to procedure that they may have to be rescheduled and possibly retested. Given instructions on where to go and to self quarantine between testing and procedure. Patient does not have IV dye allergy. Patient verbally understood. Called and spoke to Cassie Cortez in cath lab on 12/21/2020 and scheduled procedure.

## 2020-12-29 ENCOUNTER — NURSE ONLY (OUTPATIENT)
Dept: UROLOGY | Age: 85
End: 2020-12-29
Payer: MEDICARE

## 2020-12-29 PROCEDURE — 64566 NEUROELTRD STIM POST TIBIAL: CPT | Performed by: NURSE PRACTITIONER

## 2020-12-29 ASSESSMENT — ENCOUNTER SYMPTOMS
ALLERGIC/IMMUNOLOGIC NEGATIVE: 1
BACK PAIN: 1
RESPIRATORY NEGATIVE: 1
COUGH: 0
SHORTNESS OF BREATH: 0
EYES NEGATIVE: 1
GASTROINTESTINAL NEGATIVE: 1

## 2020-12-29 NOTE — TELEPHONE ENCOUNTER
Pacer implant has been scheduled. Devendra Lee         11:10 AM  Note     Called and spoke with patient, have PACER scheduled for 01/04/2020 at 1100am with arrival of 0900. Patient is to be NPO after midnight. Patient instructed to arrive through front entrance of hospital and make immediate left. Patient advised they can have one person with them but they both must wear a mask. Patient advised may take morning medications with sip of water. Also advised patient must have COVID testing completed on 01/03/2020 anywhere from 11am-4pm at [facility]. Advised patient that they will be able to proceed with procedure as long as test results are negative. Patient made aware that if testing is not resulted evening prior to procedure that they may have to be rescheduled and possibly retested. Given instructions on where to go and to self quarantine between testing and procedure. Patient does not have IV dye allergy. Patient verbally understood.      Called and spoke to Eileen in cath lab on 12/21/2020 and scheduled procedure.

## 2020-12-29 NOTE — PROGRESS NOTES
Kelsey Donahue is a 80 y.o. female who presents today   Chief Complaint   Patient presents with    Follow-up     I am here today for my uroplasty treatment. Uroplasty treatment:  Patient presents for maintenance uroplasty therapy. She typically receives her treatments approximately every 23 to days. If she goes beyond this time, her symptoms significantly worsen. Overall, she has responded well to uroplasty and is having improvement in her incontinence. She is no longer taking overactive bladder medication.     Past Medical History:   Diagnosis Date    Age-related macular degeneration, wet, right eye ([address]     Anemia     Back pain     Chronic bilateral low back pain with left-sided sciatica 12/19/2019    Colitis, ischemic ([address]     Dementia ([address]     Diverticulosis     Dry age-related macular degeneration of left eye     Hyperlipidemia     Hypertension     Osteoarthritis     Palliative care patient 09/17/2019    Risk for falls 9/13/2019    Seizures ([address]     Spastic colon     Status post placement of implantable loop recorder 10/27/2020    Stroke syndrome     Urinary incontinence     Uterine cancer [facility])        Past Surgical History:   Procedure Laterality Date    BREAST SURGERY Right     FNA Right Breast \"oh heavens, maybe 20 years ago\"    CHOLECYSTECTOMY      COLONOSCOPY  9/25/03    Dr Toyin Toure ischemic colitis, incomplete exam    COLONOSCOPY  08/29/2003    Dr Abhijit Fragoso:  limited colon-ischemic colitis    DILATION AND CURETTAGE OF UTERUS      [facility], [facility], laser suregry, adn cataract with IOL b/l    HYSTERECTOMY      ovaries and tubes    INSERTABLE CARDIAC MONITOR      LUMBAR FUSION      2012 90 days after the spine surgeries    OTHER SURGICAL HISTORY      inner lymph nodes    SPINE SURGERY      L3,4, and 5   done in 2012   [address]      as a child at age 9    Margy Cuellar  08/28/2003 DR Lambert Irby:  Duodenal scarring but no evidence of active ulcer disease. Current Outpatient Medications   Medication Sig Dispense Refill    gabapentin (NEURONTIN) 100 MG capsule Take 100 mg by mouth 2 times daily as needed (sciatic pain and burning sensation).  Can only tolerate 100 mg;      cloNIDine (CATAPRES) 0.1 MG tablet Take 1 tablet by mouth 2 times daily as needed for High Blood Pressure (for BP greater than 160/100) 30 tablet 2    pantoprazole (PROTONIX) 40 MG tablet Take 40 mg by mouth daily      acetaminophen (TYLENOL) 500 MG tablet Take 1,000 mg by mouth Indications: 2 tablets at 6:00am but not more than 3,000mg per day      meloxicam (MOBIC) 7.5 MG tablet Take 15 mg by mouth daily      donepezil (ARICEPT) 10 MG tablet Take 1 tablet by mouth nightly 90 tablet 3    Docosanol (ABREVA EX) Apply topically      levETIRAcetam (KEPPRA) 500 MG tablet Take 1 tablet by mouth 2 times daily 60 tablet 11    dipyridamole (PERSANTINE) 50 MG tablet Take 2 tablets by mouth 2 times daily 120 tablet 11    hydrALAZINE (APRESOLINE) 25 MG tablet Take 25 mg by mouth 3 times daily       nitrofurantoin (MACRODANTIN) 50 MG capsule TAKE 1 CAPSULE AT BEDTIME 30 capsule 3    simethicone (GAS-X EXTRA STRENGTH) 125 MG chewable tablet Take 125 mg by mouth 2 PO 5:30 AM,1- 2 10:30 AM, 1 4:00 PM and PRN      butalbital-acetaminophen-caffeine (FIORICET, ESGIC) -40 MG per tablet Take 1 tablet by mouth every 6 hours as needed for Headaches       amLODIPine (NORVASC) 5 MG tablet Take 5 mg by mouth 2 times daily       diclofenac sodium 1 % GEL Apply 2 g topically 4 times daily as needed for Pain      sodium chloride 1 g tablet Take 1 g by mouth 3 times daily      lidocaine 4 % external patch Place 1 patch onto the skin daily as needed (LEFT LOWER BACK PAIN)      NONFORMULARY Indications: dic 3%, lidocaine 2%, cyclo 2%, prilo 2% baclo 2% 1 to 2 pumps up to TID PRN       desonide (DESOWEN) 0.05 % cream Apply topically as needed Apply topically 2 times daily.  mupirocin (BACTROBAN) 2 % cream Apply topically as needed Apply topically 3 times daily.  valACYclovir (VALTREX) 1 g tablet as needed       Psyllium (METAMUCIL FIBER PO) Take by mouth nightly       azelastine (ASTELIN) 0.1 % nasal spray 2 sprays by Nasal route 2 times daily       Cholecalciferol (VITAMIN D3) 5000 units TABS Take 5,000 Units by mouth daily Indications: AT 2:30 PM       polyvinyl alcohol-povidone (HYPOTEARS) 1.4-0.6 % ophthalmic solution Place 1-2 drops into both eyes as needed      sodium chloride (OCEAN, BABY AYR) 0.65 % nasal spray 1 spray by Nasal route as needed for Congestion      tobramycin-dexamethasone (TOBRADEX) 0.3-0.1 % ophthalmic ointment Place into the left eye as needed 3 times daily.  Lutein 20 MG TABS Take 20 mg by mouth daily Indications: 2:30 PM DAILY       fluticasone (VERAMYST) 27.5 MCG/SPRAY nasal spray 2 sprays by Nasal route 2 times daily       camphor-menthol (SARNA) 0.5-0.5 % lotion Apply 0.5 mLs topically 2 times daily as needed for Itching Apply topically as needed.       Calcium Carb-Cholecalciferol (CALCIUM 600+D) 600-800 MG-UNIT TABS Take 1 tablet by mouth daily Indications: AT 2:30 PM       promethazine (PHENERGAN) 25 MG tablet Take 25 mg by mouth 3 times daily as needed for Nausea      docusate sodium (COLACE) 100 MG capsule Take 100 mg by mouth nightly as needed for Constipation       Omega 3-6-9 Fatty Acids (OMEGA 3-6-9 COMPLEX PO) Take 2 tablets by mouth daily Indications: AT 2:30 PM       alendronate (FOSAMAX) 70 MG tablet Take 70 mg by mouth every 7 days Wednesday at 1830      pravastatin (PRAVACHOL) 20 MG tablet Take 20 mg by mouth every morning       ramipril (ALTACE) 10 MG capsule Take 10 mg by mouth 2 times daily       b complex vitamins capsule Take 1 capsule by mouth daily Indications: AT 2:30 PM B 100      Multiple Vitamins-Minerals (THERAPEUTIC MULTIVITAMIN-MINERALS) tablet Take 1 tablet by mouth daily Indications: AT 2:30 PM        No current facility-administered medications for this visit.         Allergies   Allergen Reactions    Aspirin Shortness Of Breath    Dilaudid [Hydromorphone Hcl]     Ondansetron     Quinolones     Spironolactone      Low sodium    Sulfa Antibiotics     Codeine Rash and Other (See Comments)     Makes her \"crazy and mean\" and gives her a rash    Demerol Hcl [Meperidine] Other (See Comments)     Made her \"crazy and mean\", and \"loopy\"    Levaquin [Levofloxacin In D5w] Other (See Comments)     Seizure like activity      Myrbetriq [Mirabegron] Other (See Comments)     Unable to micturate    Namenda [Memantine Hcl] Other (See Comments)     made her like a vegetable for hours      Norco [Hydrocodone-Acetaminophen] Rash and Other (See Comments)     \"crazy and mean\"    Pneumococcal Vaccines Swelling       Social History     Socioeconomic History    Marital status:      Spouse name: None    Number of children: 3    Years of education: None    Highest education level: None   Occupational History    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mployee of [email or web address]    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,mployee of [employer]   Social Needs    Financial resource strain: None    Food insecurity     Worry: None     Inability: None    Transportation needs     Medical: None     Non-medical: None   Tobacco Use    Smoking status: Never Smoker    Smokeless tobacco: Never Used   Substance and Sexual Activity    Alcohol use: Never     Frequency: Never    Drug use: Never    Sexual activity: None     Comment: has 3 kids   Lifestyle    Physical activity     Days per week: None     Minutes per session: None    Stress: None   Relationships    Social connections     Talks on phone: None     Gets together: None     Attends [employer] service: None     Active member of club or organization: None     Attends meetings of clubs or organizations: None     Relationship status: were no vitals taken for this visit. Physical Exam  Vitals signs reviewed. Constitutional:       Appearance: Normal appearance. HENT:      Head: Normocephalic and atraumatic. Right Ear: External ear normal.      Left Ear: External ear normal.      Nose: No congestion. Eyes:      Conjunctiva/sclera: Conjunctivae normal.   Neck:      Comments: I observed no obvious neck masses  Pulmonary:      Effort: Pulmonary effort is normal.   Abdominal:      General: There is no distension. Tenderness: There is no abdominal tenderness. Musculoskeletal:         General: No swelling. Right lower leg: No edema. Left lower leg: No edema. Comments: Overall pain from fall   Skin:     Coloration: Skin is pale. Neurological:      General: No focal deficit present. Mental Status: She is alert and oriented to person, place, and time. Psychiatric:         Mood and Affect: Mood normal.         Behavior: Behavior normal.       1. Patient is seated with the right treatment leg elevated. 2. 34 gauge fine needle electrode is inserted into lower inner aspect of the leg. Slightly cephalad to the medial malleolus. 3. Surface electrode pad is placed over the medial aspect of the calcaneus on the same leg. 4.Needle electrode is connected to the external pulse generator. 5.The pulse generator is turned on and the millivolts used are 4. 6.Patient is treated for 30 minutes. 7.The needle and pad are removed. Patient will follow up in 23-24 days for next treatment. 1. Overactive bladder  Patient with decent response to uroplasty treatments. We will continue to use these every 23 to 24 days. We have also found she is more comfortable in cystoscopy be than the typical procedure/duraplasty room. We will do our best to accommodate this during her appointments.     Orders Placed This Encounter   Procedures    VT POST TIBIAL NEUROSTIMULATION,PERC NEEDLE ELECTRODE     No orders of the defined types were placed in this encounter.

## 2021-01-03 ENCOUNTER — OFFICE VISIT (OUTPATIENT)
Age: 86
End: 2021-01-03

## 2021-01-03 VITALS — TEMPERATURE: 97.7 F | OXYGEN SATURATION: 97 % | HEART RATE: 74 BPM

## 2021-01-03 DIAGNOSIS — Z11.59 SCREENING FOR VIRAL DISEASE: Primary | ICD-10-CM

## 2021-01-03 LAB — SARS-COV-2, PCR: NOT DETECTED

## 2021-01-03 PROCEDURE — 99999 PR OFFICE/OUTPT VISIT,PROCEDURE ONLY: CPT | Performed by: PHYSICIAN ASSISTANT

## 2021-01-03 ASSESSMENT — ENCOUNTER SYMPTOMS
ABDOMINAL DISTENTION: 0
WHEEZING: 0
DIARRHEA: 0
EYE DISCHARGE: 0
CONSTIPATION: 0
BACK PAIN: 0
SHORTNESS OF BREATH: 0
BLOOD IN STOOL: 0
COUGH: 0
VOMITING: 0

## 2021-01-04 ENCOUNTER — APPOINTMENT (OUTPATIENT)
Dept: GENERAL RADIOLOGY | Age: 86
End: 2021-01-04
Attending: INTERNAL MEDICINE
Payer: MEDICARE

## 2021-01-04 ENCOUNTER — HOSPITAL ENCOUNTER (OUTPATIENT)
Dept: CARDIAC CATH/INVASIVE PROCEDURES | Age: 86
Setting detail: OBSERVATION
Discharge: HOME | End: 2021-01-05
Attending: INTERNAL MEDICINE | Admitting: INTERNAL MEDICINE
Payer: MEDICARE

## 2021-01-04 VITALS
OXYGEN SATURATION: 93 % | TEMPERATURE: 98.6 F | BODY MASS INDEX: 23.56 KG/M2 | WEIGHT: 120 LBS | HEIGHT: 60 IN | DIASTOLIC BLOOD PRESSURE: 69 MMHG | SYSTOLIC BLOOD PRESSURE: 147 MMHG | HEART RATE: 87 BPM | RESPIRATION RATE: 16 BRPM

## 2021-01-04 PROBLEM — R00.1 SYMPTOMATIC BRADYCARDIA: Status: ACTIVE | Noted: 2021-01-04

## 2021-01-04 LAB
ALBUMIN SERPL-MCNC: 4.3 G/DL (ref 3.5–5.2)
ALP BLD-CCNC: 108 U/L (ref 35–104)
ALT SERPL-CCNC: 19 U/L (ref 5–33)
ANION GAP SERPL CALCULATED.3IONS-SCNC: 11 MMOL/L (ref 7–19)
AST SERPL-CCNC: 23 U/L (ref 5–32)
BASOPHILS ABSOLUTE: 0 K/UL (ref 0–0.2)
BASOPHILS RELATIVE PERCENT: 1.1 % (ref 0–1)
BILIRUB SERPL-MCNC: 0.6 MG/DL (ref 0.2–1.2)
BUN BLDV-MCNC: 19 MG/DL (ref 8–23)
CALCIUM SERPL-MCNC: 10 MG/DL (ref 8.8–10.2)
CHLORIDE BLD-SCNC: 103 MMOL/L (ref 98–111)
CO2: 26 MMOL/L (ref 22–29)
CREAT SERPL-MCNC: 0.8 MG/DL (ref 0.5–0.9)
EKG P AXIS: 67 DEGREES
EKG P-R INTERVAL: 128 MS
EKG Q-T INTERVAL: 414 MS
EKG QRS DURATION: 92 MS
EKG QTC CALCULATION (BAZETT): 431 MS
EKG T AXIS: 32 DEGREES
EOSINOPHILS ABSOLUTE: 0.1 K/UL (ref 0–0.6)
EOSINOPHILS RELATIVE PERCENT: 3.2 % (ref 0–5)
GFR AFRICAN AMERICAN: >59
GFR NON-AFRICAN AMERICAN: >60
GLUCOSE BLD-MCNC: 100 MG/DL (ref 74–109)
HCT VFR BLD CALC: 37 % (ref 37–47)
HEMOGLOBIN: 12.6 G/DL (ref 12–16)
IMMATURE GRANULOCYTES #: 0 K/UL
LYMPHOCYTES ABSOLUTE: 1.2 K/UL (ref 1.1–4.5)
LYMPHOCYTES RELATIVE PERCENT: 31.4 % (ref 20–40)
MCH RBC QN AUTO: 33.2 PG (ref 27–31)
MCHC RBC AUTO-ENTMCNC: 34.1 G/DL (ref 33–37)
MCV RBC AUTO: 97.4 FL (ref 81–99)
MONOCYTES ABSOLUTE: 0.4 K/UL (ref 0–0.9)
MONOCYTES RELATIVE PERCENT: 11.4 % (ref 0–10)
NEUTROPHILS ABSOLUTE: 2 K/UL (ref 1.5–7.5)
NEUTROPHILS RELATIVE PERCENT: 52.6 % (ref 50–65)
PDW BLD-RTO: 11.9 % (ref 11.5–14.5)
PLATELET # BLD: 227 K/UL (ref 130–400)
PMV BLD AUTO: 9.9 FL (ref 9.4–12.3)
POTASSIUM REFLEX MAGNESIUM: 3.7 MMOL/L (ref 3.5–5)
RBC # BLD: 3.8 M/UL (ref 4.2–5.4)
SODIUM BLD-SCNC: 140 MMOL/L (ref 136–145)
TOTAL PROTEIN: 7.1 G/DL (ref 6.6–8.7)
WBC # BLD: 3.7 K/UL (ref 4.8–10.8)

## 2021-01-04 PROCEDURE — C1769 GUIDE WIRE: HCPCS

## 2021-01-04 PROCEDURE — G0378 HOSPITAL OBSERVATION PER HR: HCPCS

## 2021-01-04 PROCEDURE — 93010 ELECTROCARDIOGRAM REPORT: CPT | Performed by: INTERNAL MEDICINE

## 2021-01-04 PROCEDURE — 93005 ELECTROCARDIOGRAM TRACING: CPT | Performed by: INTERNAL MEDICINE

## 2021-01-04 PROCEDURE — 6370000000 HC RX 637 (ALT 250 FOR IP): Performed by: INTERNAL MEDICINE

## 2021-01-04 PROCEDURE — 2580000003 HC RX 258: Performed by: INTERNAL MEDICINE

## 2021-01-04 PROCEDURE — 2709999900 HC NON-CHARGEABLE SUPPLY

## 2021-01-04 PROCEDURE — 99152 MOD SED SAME PHYS/QHP 5/>YRS: CPT | Performed by: INTERNAL MEDICINE

## 2021-01-04 PROCEDURE — 80053 COMPREHEN METABOLIC PANEL: CPT

## 2021-01-04 PROCEDURE — 6360000002 HC RX W HCPCS

## 2021-01-04 PROCEDURE — C1785 PMKR, DUAL, RATE-RESP: HCPCS

## 2021-01-04 PROCEDURE — 6360000004 HC RX CONTRAST MEDICATION: Performed by: INTERNAL MEDICINE

## 2021-01-04 PROCEDURE — 99153 MOD SED SAME PHYS/QHP EA: CPT

## 2021-01-04 PROCEDURE — 33286 RMVL SUBQ CAR RHYTHM MNTR: CPT

## 2021-01-04 PROCEDURE — 33286 RMVL SUBQ CAR RHYTHM MNTR: CPT | Performed by: INTERNAL MEDICINE

## 2021-01-04 PROCEDURE — 36415 COLL VENOUS BLD VENIPUNCTURE: CPT

## 2021-01-04 PROCEDURE — 6360000002 HC RX W HCPCS: Performed by: INTERNAL MEDICINE

## 2021-01-04 PROCEDURE — 33208 INSRT HEART PM ATRIAL & VENT: CPT | Performed by: INTERNAL MEDICINE

## 2021-01-04 PROCEDURE — C1898 LEAD, PMKR, OTHER THAN TRANS: HCPCS

## 2021-01-04 PROCEDURE — 99152 MOD SED SAME PHYS/QHP 5/>YRS: CPT

## 2021-01-04 PROCEDURE — 85025 COMPLETE CBC W/AUTO DIFF WBC: CPT

## 2021-01-04 PROCEDURE — 33208 INSRT HEART PM ATRIAL & VENT: CPT

## 2021-01-04 PROCEDURE — 71045 X-RAY EXAM CHEST 1 VIEW: CPT

## 2021-01-04 RX ORDER — SODIUM CHLORIDE 0.9 % (FLUSH) 0.9 %
10 SYRINGE (ML) INJECTION PRN
Status: DISCONTINUED | OUTPATIENT
Start: 2021-01-04 | End: 2021-01-05 | Stop reason: HOSPADM

## 2021-01-04 RX ORDER — HYDRALAZINE HYDROCHLORIDE 25 MG/1
25 TABLET, FILM COATED ORAL 3 TIMES DAILY
Status: DISCONTINUED | OUTPATIENT
Start: 2021-01-04 | End: 2021-01-05 | Stop reason: HOSPADM

## 2021-01-04 RX ORDER — SODIUM CHLORIDE 0.9 % (FLUSH) 0.9 %
10 SYRINGE (ML) INJECTION EVERY 12 HOURS SCHEDULED
Status: DISCONTINUED | OUTPATIENT
Start: 2021-01-04 | End: 2021-01-05 | Stop reason: SDUPTHER

## 2021-01-04 RX ORDER — RAMIPRIL 10 MG/1
10 CAPSULE ORAL 2 TIMES DAILY
Status: DISCONTINUED | OUTPATIENT
Start: 2021-01-04 | End: 2021-01-05 | Stop reason: HOSPADM

## 2021-01-04 RX ORDER — IODIXANOL 320 MG/ML
100 INJECTION, SOLUTION INTRAVASCULAR
Status: COMPLETED | OUTPATIENT
Start: 2021-01-04 | End: 2021-01-04

## 2021-01-04 RX ORDER — PRAVASTATIN SODIUM 20 MG
20 TABLET ORAL EVERY MORNING
Status: DISCONTINUED | OUTPATIENT
Start: 2021-01-05 | End: 2021-01-05 | Stop reason: HOSPADM

## 2021-01-04 RX ORDER — DOCUSATE SODIUM 100 MG/1
100 CAPSULE, LIQUID FILLED ORAL NIGHTLY PRN
Status: DISCONTINUED | OUTPATIENT
Start: 2021-01-04 | End: 2021-01-05 | Stop reason: HOSPADM

## 2021-01-04 RX ORDER — SODIUM CHLORIDE 0.9 % (FLUSH) 0.9 %
10 SYRINGE (ML) INJECTION EVERY 12 HOURS SCHEDULED
Status: DISCONTINUED | OUTPATIENT
Start: 2021-01-04 | End: 2021-01-05 | Stop reason: HOSPADM

## 2021-01-04 RX ORDER — DIPYRIDAMOLE 25 MG
100 TABLET ORAL 2 TIMES DAILY
Status: DISCONTINUED | OUTPATIENT
Start: 2021-01-04 | End: 2021-01-05 | Stop reason: HOSPADM

## 2021-01-04 RX ORDER — LEVETIRACETAM 500 MG/1
500 TABLET ORAL 2 TIMES DAILY
Status: DISCONTINUED | OUTPATIENT
Start: 2021-01-04 | End: 2021-01-05 | Stop reason: HOSPADM

## 2021-01-04 RX ORDER — ACETAMINOPHEN 500 MG
500 TABLET ORAL EVERY 6 HOURS PRN
Status: DISCONTINUED | OUTPATIENT
Start: 2021-01-04 | End: 2021-01-05 | Stop reason: HOSPADM

## 2021-01-04 RX ORDER — MELOXICAM 7.5 MG/1
15 TABLET ORAL DAILY
Status: DISCONTINUED | OUTPATIENT
Start: 2021-01-04 | End: 2021-01-05 | Stop reason: HOSPADM

## 2021-01-04 RX ORDER — FLUTICASONE PROPIONATE 50 MCG
2 SPRAY, SUSPENSION (ML) NASAL DAILY
Status: DISCONTINUED | OUTPATIENT
Start: 2021-01-04 | End: 2021-01-05 | Stop reason: HOSPADM

## 2021-01-04 RX ORDER — PROMETHAZINE HYDROCHLORIDE 25 MG/1
25 TABLET ORAL 3 TIMES DAILY PRN
Status: DISCONTINUED | OUTPATIENT
Start: 2021-01-04 | End: 2021-01-05 | Stop reason: HOSPADM

## 2021-01-04 RX ORDER — LIDOCAINE 4 G/G
1 PATCH TOPICAL DAILY PRN
Status: DISCONTINUED | OUTPATIENT
Start: 2021-01-04 | End: 2021-01-05 | Stop reason: HOSPADM

## 2021-01-04 RX ORDER — GABAPENTIN 100 MG/1
100 CAPSULE ORAL 2 TIMES DAILY PRN
Status: DISCONTINUED | OUTPATIENT
Start: 2021-01-04 | End: 2021-01-05 | Stop reason: HOSPADM

## 2021-01-04 RX ORDER — CHLORHEXIDINE GLUCONATE 4 G/100ML
SOLUTION TOPICAL ONCE
Status: COMPLETED | OUTPATIENT
Start: 2021-01-04 | End: 2021-01-04

## 2021-01-04 RX ORDER — SODIUM CHLORIDE 0.9 % (FLUSH) 0.9 %
10 SYRINGE (ML) INJECTION PRN
Status: DISCONTINUED | OUTPATIENT
Start: 2021-01-04 | End: 2021-01-05 | Stop reason: SDUPTHER

## 2021-01-04 RX ORDER — AMLODIPINE BESYLATE 5 MG/1
5 TABLET ORAL 2 TIMES DAILY
Status: DISCONTINUED | OUTPATIENT
Start: 2021-01-04 | End: 2021-01-05 | Stop reason: HOSPADM

## 2021-01-04 RX ORDER — PANTOPRAZOLE SODIUM 40 MG/1
40 TABLET, DELAYED RELEASE ORAL DAILY
Status: DISCONTINUED | OUTPATIENT
Start: 2021-01-04 | End: 2021-01-05 | Stop reason: HOSPADM

## 2021-01-04 RX ORDER — SODIUM CHLORIDE 1000 MG
1 TABLET, SOLUBLE MISCELLANEOUS 3 TIMES DAILY
Status: DISCONTINUED | OUTPATIENT
Start: 2021-01-04 | End: 2021-01-05 | Stop reason: HOSPADM

## 2021-01-04 RX ORDER — LUTEIN 6 MG
20 TABLET ORAL DAILY
Status: DISCONTINUED | OUTPATIENT
Start: 2021-01-04 | End: 2021-01-04

## 2021-01-04 RX ORDER — NITROFURANTOIN MACROCRYSTALS 50 MG/1
50 CAPSULE ORAL NIGHTLY
Status: DISCONTINUED | OUTPATIENT
Start: 2021-01-04 | End: 2021-01-05 | Stop reason: HOSPADM

## 2021-01-04 RX ORDER — DONEPEZIL HYDROCHLORIDE 5 MG/1
10 TABLET, FILM COATED ORAL NIGHTLY
Status: DISCONTINUED | OUTPATIENT
Start: 2021-01-04 | End: 2021-01-05 | Stop reason: HOSPADM

## 2021-01-04 RX ORDER — SODIUM CHLORIDE 9 MG/ML
INJECTION, SOLUTION INTRAVENOUS CONTINUOUS
Status: DISCONTINUED | OUTPATIENT
Start: 2021-01-04 | End: 2021-01-05 | Stop reason: HOSPADM

## 2021-01-04 RX ORDER — AZELASTINE 1 MG/ML
2 SPRAY, METERED NASAL 2 TIMES DAILY
Status: DISCONTINUED | OUTPATIENT
Start: 2021-01-04 | End: 2021-01-04

## 2021-01-04 RX ADMIN — SODIUM CHLORIDE, PRESERVATIVE FREE 10 ML: 5 INJECTION INTRAVENOUS at 22:18

## 2021-01-04 RX ADMIN — Medication 2 G: at 18:18

## 2021-01-04 RX ADMIN — IODIXANOL 20 ML: 320 INJECTION, SOLUTION INTRAVASCULAR at 15:08

## 2021-01-04 RX ADMIN — CHLORHEXIDINE GLUCONATE: 213 SOLUTION TOPICAL at 11:11

## 2021-01-04 RX ADMIN — AMLODIPINE BESYLATE 5 MG: 5 TABLET ORAL at 22:17

## 2021-01-04 RX ADMIN — PSYLLIUM HUSK 1 PACKET: 3.4 POWDER ORAL at 23:42

## 2021-01-04 RX ADMIN — DONEPEZIL HYDROCHLORIDE 10 MG: 5 TABLET, FILM COATED ORAL at 22:17

## 2021-01-04 RX ADMIN — HYDRALAZINE HYDROCHLORIDE 25 MG: 25 TABLET, FILM COATED ORAL at 18:18

## 2021-01-04 RX ADMIN — DIPYRIDAMOLE 100 MG: 25 TABLET, FILM COATED ORAL at 22:17

## 2021-01-04 RX ADMIN — SODIUM CHLORIDE: 9 INJECTION, SOLUTION INTRAVENOUS at 11:11

## 2021-01-04 RX ADMIN — Medication 1 G: at 18:19

## 2021-01-04 RX ADMIN — HYDRALAZINE HYDROCHLORIDE 25 MG: 25 TABLET, FILM COATED ORAL at 22:17

## 2021-01-04 RX ADMIN — LEVETIRACETAM 500 MG: 500 TABLET ORAL at 22:17

## 2021-01-04 RX ADMIN — Medication 2 G: at 10:31

## 2021-01-04 RX ADMIN — NITROFURANTOIN MACROCRYSTALS 50 MG: 50 CAPSULE ORAL at 23:42

## 2021-01-04 RX ADMIN — ACETAMINOPHEN 500 MG: 500 TABLET, FILM COATED ORAL at 18:18

## 2021-01-04 RX ADMIN — Medication 1 G: at 22:48

## 2021-01-04 RX ADMIN — PANTOPRAZOLE SODIUM 40 MG: 40 TABLET, DELAYED RELEASE ORAL at 18:18

## 2021-01-04 RX ADMIN — RAMIPRIL 10 MG: 10 CAPSULE ORAL at 22:18

## 2021-01-04 ASSESSMENT — PAIN SCALES - GENERAL: PAINLEVEL_OUTOF10: 10

## 2021-01-04 ASSESSMENT — PAIN DESCRIPTION - LOCATION: LOCATION: BACK;LEG

## 2021-01-04 ASSESSMENT — PAIN DESCRIPTION - ORIENTATION: ORIENTATION: LOWER;RIGHT;LEFT

## 2021-01-04 NOTE — H&P
Patient:  Any Wiley                  1935  MRN: [id number]    PROBLEM LIST:    Patient Active Problem List    Diagnosis Date Noted    Syncope and collapse 11/03/2020     Priority: High    Cryptogenic stroke [facility])      Priority: High    Status post placement of implantable loop recorder 11/11/2020     Priority: Low    Vasovagal syncope 10/15/2020     Priority: Low    Bradycardia 10/15/2020     Priority: Low    Overactive bladder 09/28/2020     Priority: Low    Abnormal EKG 09/02/2020     Priority: Low    Moderate mitral regurgitation 09/02/2020     Priority: Low    Chest pain 09/02/2020     Priority: Low    Essential hypertension 09/02/2020     Priority: Low    Chronic bilateral low back pain with left-sided sciatica 12/19/2019     Priority: Low    Arthralgia of multiple joints 12/19/2019     Priority: Low    Bloating 11/20/2019     Priority: Low    Palliative care patient 09/17/2019     Priority: Low    Gait abnormality 09/15/2019     Priority: Low    Risk for falls 09/13/2019     Priority: Low    Dry age-related macular degeneration of left eye      Priority: Low    Age-related macular degeneration, wet, right eye ([facility]      Priority: Low    Partial symptomatic epilepsy with complex partial seizures, intractable, without status epilepticus ([facility] 09/03/2019     Priority: Low    Cerebrovascular small vessel disease 09/03/2019     Priority: Low    Vascular dementia without behavioral disturbance ([facility] 09/03/2019     Priority: Low    Chronic bilateral lower abdominal pain 05/03/2019     Priority: Low    Gas pain 05/03/2019     Priority: Low    History of ischemic colitis 05/03/2019     Priority: Low    Weakness      Priority: Low    Metabolic encephalopathy 89/62/2570     Priority: Low    Seizure as late effect of cerebrovascular accident (CVA) ([facility] 07/21/2016     Priority: Low    Hyponatremia 07/16/2016     Priority: Low    Late onset Alzheimer's disease without behavioral disturbance ([facility] 07/16/2016     Priority: Low    Altered mental status      Priority: Low    Irritable bowel syndrome with diarrhea 03/17/2016     Priority: Low    S/P laparoscopic cholecystectomy 03/17/2016     Priority: Low       PRESENTATION: Crescencio Ceron is a 80y.o. year old female who presents with recurrent syncopal episodes that usually occur during bowel movement consistent with possible vasovagal syncope with recurrent episodes of September 2020, 10/27/2020 as well as most recently 12/14/2020 with ILR placement 10/27/2020 with noted bradycardic episodes with heart rates down to 39, 4-second pause during awake hours 12/4/2020 suggestive of possible strong cardioinhibitory response during syncope being referred for pacemaker placement. Patient has a history of hypertension, early dementia. She is off all AV steven agents. Most recent syncopal episode occurred also during bowel movement when she slipped off the floor and fell into the shower with possible loss of consciousness and noted bradycardia on ILR. Patient and daughter understand pacemaker will not address the issue of vasodepressive component of vasovagal syncope. REVIEW OF SYSTEMS:  Review of Systems   Constitutional: Negative for activity change, fatigue and fever. HENT: Negative for ear pain, hearing loss and tinnitus. Eyes: Negative for discharge and visual disturbance. Respiratory: Negative for cough, shortness of breath and wheezing. Cardiovascular: Negative for chest pain, palpitations and leg swelling. Gastrointestinal: Negative for abdominal distention, blood in stool, constipation, diarrhea and vomiting. Endocrine: Negative for cold intolerance, heat intolerance, polydipsia and polyuria. Genitourinary: Negative for dysuria and hematuria. Musculoskeletal: Negative for arthralgias, back pain and myalgias. Skin: Negative for pallor and rash. Neurological: Positive for syncope.  Negative for seizures, weakness and headaches. Psychiatric/Behavioral: Negative for behavioral problems and dysphoric mood. Past Medical History:      Diagnosis Date    Age-related macular degeneration, wet, right eye ([address]     Anemia     Back pain     Chronic bilateral low back pain with left-sided sciatica 12/19/2019    Colitis, ischemic ([address]     Dementia ([facility]     Diverticulosis     Dry age-related macular degeneration of left eye     Hyperlipidemia     Hypertension     Osteoarthritis     Palliative care patient 09/17/2019    Risk for falls 9/13/2019    Seizures ([address]     Spastic colon     Status post placement of implantable loop recorder 10/27/2020    Stroke syndrome     Urinary incontinence     Uterine cancer [facility])        Past Surgical History:      Procedure Laterality Date    BREAST SURGERY Right     FNA Right Breast \"oh heavens, maybe 20 years ago\"    CHOLECYSTECTOMY      COLONOSCOPY  9/25/03    Dr Shelly Olvera ischemic colitis, incomplete exam    COLONOSCOPY  08/29/2003    Dr Bria Summers:  limited colon-ischemic colitis    DILATION AND CURETTAGE OF UTERUS      x2, in [facility] tears, laser suregry, adn cataract with IOL b/l    HYSTERECTOMY      ovaries and tubes    INSERTABLE CARDIAC MONITOR      LUMBAR FUSION      2012 90 days after the spine surgeries    OTHER SURGICAL HISTORY      inner lymph nodes    SPINE SURGERY      L3,4, and 5   done in 2012   [address]      as a child at age 9    [address]  08/28/2003    DR Varsha Miller:  Duodenal scarring but no evidence of active ulcer disease. Medications Prior to Admission:    Prior to Admission medications    Medication Sig Start Date End Date Taking? Authorizing Provider   gabapentin (NEURONTIN) 100 MG capsule Take 100 mg by mouth 2 times daily as needed (sciatic pain and burning sensation).  Can only tolerate 100 mg;    Historical Provider, MD   cloNIDine (CATAPRES) 0.1 MG tablet Take 1 tablet by mouth 2 times daily as needed for High Blood Pressure (for BP greater than 160/100) 11/11/20   LAYTON Weeks   pantoprazole (PROTONIX) 40 MG tablet Take 40 mg by mouth daily    Historical Provider, MD   acetaminophen (TYLENOL) 500 MG tablet Take 1,000 mg by mouth Indications: 2 tablets at 6:00am but not more than 3,000mg per day    Historical Provider, MD   meloxicam (MOBIC) 7.5 MG tablet Take 15 mg by mouth daily    Historical Provider, MD   donepezil (ARICEPT) 10 MG tablet Take 1 tablet by mouth nightly 9/24/20   [facility], MD   Docosanol (ABREVA EX) Apply topically    Historical Provider, MD   levETIRAcetam (KEPPRA) 500 MG tablet Take 1 tablet by mouth 2 times daily 6/11/20   [facility], MD   dipyridamole (PERSANTINE) 50 MG tablet Take 2 tablets by mouth 2 times daily 6/11/20   [facility], MD   hydrALAZINE (APRESOLINE) 25 MG tablet Take 25 mg by mouth 3 times daily     Historical Provider, MD   nitrofurantoin (MACRODANTIN) 50 MG capsule TAKE 1 CAPSULE AT BEDTIME 5/8/20   Paola Loco PA-C   simethicone (GAS-X EXTRA STRENGTH) 125 MG chewable tablet Take 125 mg by mouth 2 PO 5:30 AM,1- 2 10:30 AM, 1 4:00 PM and PRN    Historical Provider, MD   butalbital-acetaminophen-caffeine (FIORICET, ESGIC) -40 MG per tablet Take 1 tablet by mouth every 6 hours as needed for Headaches     Historical Provider, MD   amLODIPine (NORVASC) 5 MG tablet Take 5 mg by mouth 2 times daily     Historical Provider, MD   diclofenac sodium 1 % GEL Apply 2 g topically 4 times daily as needed for Pain    Historical Provider, MD   sodium chloride 1 g tablet Take 1 g by mouth 3 times daily    Historical Provider, MD   lidocaine 4 % external patch Place 1 patch onto the skin daily as needed (LEFT LOWER BACK PAIN)    Historical Provider, MD   NONFORMULARY Indications: dic 3%, lidocaine 2%, cyclo 2%, prilo 2% baclo 2% 1 to 2 pumps up to TID PRN     Historical Provider, MD desonide (DESOWEN) 0.05 % cream Apply topically as needed Apply topically 2 times daily. Historical Provider, MD   mupirocin (BACTROBAN) 2 % cream Apply topically as needed Apply topically 3 times daily. Historical Provider, MD   valACYclovir (VALTREX) 1 g tablet as needed  6/6/19   Historical Provider, MD   Psyllium (METAMUCIL FIBER PO) Take by mouth nightly     Historical Provider, MD   azelastine (ASTELIN) 0.1 % nasal spray 2 sprays by Nasal route 2 times daily  5/3/18   Historical Provider, MD   Cholecalciferol (VITAMIN D3) 5000 units TABS Take 5,000 Units by mouth daily Indications: AT 2:30 PM     Historical Provider, MD   polyvinyl alcohol-povidone (HYPOTEARS) 1.4-0.6 % ophthalmic solution Place 1-2 drops into both eyes as needed    Historical Provider, MD   sodium chloride (OCEAN, BABY AYR) 0.65 % nasal spray 1 spray by Nasal route as needed for Congestion    Historical Provider, MD   tobramycin-dexamethasone (TOBRADEX) 0.3-0.1 % ophthalmic ointment Place into the left eye as needed 3 times daily. Historical Provider, MD   Lutein 20 MG TABS Take 20 mg by mouth daily Indications: 2:30 PM DAILY     Historical Provider, MD   fluticasone (VERAMYST) 27.5 MCG/SPRAY nasal spray 2 sprays by Nasal route 2 times daily     Historical Provider, MD   camphor-menthol (SARNA) 0.5-0.5 % lotion Apply 0.5 mLs topically 2 times daily as needed for Itching Apply topically as needed.     Historical Provider, MD   Calcium Carb-Cholecalciferol (CALCIUM 600+D) 600-800 MG-UNIT TABS Take 1 tablet by mouth daily Indications: AT 2:30 PM     Historical Provider, MD   promethazine (PHENERGAN) 25 MG tablet Take 25 mg by mouth 3 times daily as needed for Nausea    Historical Provider, MD   docusate sodium (COLACE) 100 MG capsule Take 100 mg by mouth nightly as needed for Constipation     Historical Provider, MD   Omega 3-6-9 Fatty Acids (OMEGA 3-6-9 COMPLEX PO) Take 2 tablets by mouth daily Indications: AT 2:30 PM     Historical Provider, MD   alendronate (FOSAMAX) 70 MG tablet Take 70 mg by mouth every 7 days Wednesday at [address] [facility] Provider, MD   pravastatin (PRAVACHOL) 20 MG tablet Take 20 mg by mouth every morning  3/1/16   Historical Provider, MD   ramipril (ALTACE) 10 MG capsule Take 10 mg by mouth 2 times daily  2/26/16   Historical Provider, MD   b complex vitamins capsule Take 1 capsule by mouth daily Indications: AT 2:30 PM B 100    Historical Provider, MD   Multiple Vitamins-Minerals (THERAPEUTIC MULTIVITAMIN-MINERALS) tablet Take 1 tablet by mouth daily Indications: AT 2:30 PM     Historical Provider, MD       Allergies:  Aspirin, Dilaudid [hydromorphone hcl], Ondansetron, Quinolones, Spironolactone, Sulfa antibiotics, Codeine, Demerol hcl [meperidine], Levaquin [levofloxacin in d5w], Myrbetriq [mirabegron], Namenda [memantine hcl], Norco [hydrocodone-acetaminophen], and Pneumococcal vaccines    Past Social History:  Social History     Socioeconomic History    Marital status:      Spouse name: Not on file    Number of children: 3    Years of education: Not on file    Highest education level: Not on file   Occupational History    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mployee of [employer]    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,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   Social Needs    Financial resource strain: Not on file    Food insecurity     Worry: Not on file     Inability: Not on file   [employer] needs     Medical: Not on file     Non-medical: Not on file   Tobacco Use    Smoking status: Never Smoker    Smokeless tobacco: Never Used   Substance and Sexual Activity    Alcohol use: Never     Frequency: Never    Drug use: Never    Sexual activity: Not on file     Comment: has 3 kids   Lifestyle    Physical activity     Days per week: Not on file     Minutes per session: Not on file    Stress: Not on file   Relationships    Social connections     Talks on phone: Not on file     Gets together: Not on file     Attends Voodoo service: Not on file     Active member of club or organization: Not on file     Attends meetings of clubs or organizations: Not on file     Relationship status: Not on file    Intimate partner violence     Fear of current or ex partner: Not on file     Emotionally abused: Not on file     Physically abused: Not on file     Forced sexual activity: Not on file   Other Topics Concern    Not on file   Social History Narrative    CODE STATUS: DNR-CCA    HEALTH CARE PROXY / Legal [employer] Financial and Healthcare: her daughter, Mrs. Nella López, [phone]    AMBULATES: with walker during day, wheelchair at night    DOMICILED: lives in the [facility] [place], operated by [facility] assisted living [facility], has no stairs or steps, has a cat, her daughter is there periodically       Family History:       Problem Relation Age of Onset    Heart Defect Mother     Hypertension Mother     Heart Attack Father         x3 within 24 h and then , abused ciggarettes    No Known Problems Daughter     No Known Problems Son     No Known Problems Son     Colon Cancer Neg Hx     Colon Polyps Neg Hx     Esophageal Cancer Neg Hx     Liver Cancer Neg Hx     Liver Disease Neg Hx     Rectal Cancer Neg Hx     Stomach Cancer Neg Hx      Physical Exam:    Vitals: There were no vitals taken for this visit. 24HR INTAKE/OUTPUT:  No intake or output data in the 24 hours ending 21 [id number]    Physical Exam  Constitutional:       General: She is not in acute distress. Appearance: She is not diaphoretic. HENT:      Mouth/Throat:      Pharynx: No oropharyngeal exudate. Eyes:      General: No scleral icterus. Right eye: No discharge. Left eye: No discharge. Neck:      Thyroid: No thyromegaly. Vascular: No JVD. Cardiovascular:      Rate and Rhythm: Normal rate and regular rhythm. No extrasystoles are present. Heart sounds: Normal heart sounds, S1 normal and S2 normal. No murmur. No systolic murmur. No diastolic murmur.  No friction rub. No gallop. No S3 or S4 sounds. Pulmonary:      Effort: Pulmonary effort is normal. No respiratory distress. Breath sounds: Normal breath sounds. No wheezing or rales. Chest:      Chest wall: No tenderness. Abdominal:      General: Bowel sounds are normal. There is no distension. Palpations: Abdomen is soft. There is no mass. Tenderness: There is no abdominal tenderness. There is no guarding or rebound. Hernia: No hernia is present. Musculoskeletal: Normal range of motion. Skin:     General: Skin is warm. Coloration: Skin is not pale. Findings: No rash. Neurological:      Mental Status: She is alert and oriented to person, place, and time. Cranial Nerves: No cranial nerve deficit. Deep Tendon Reflexes: Reflexes normal.         LAB DATA:  CBC: No results for input(s): WBC, HGB, PLT in the last 72 hours. BMP:  No results for input(s): NA, K, CL, CO2, BUN, CREATININE, GLUCOSE in the last 72 hours. Hepatic: No results for input(s): AST, ALT, ALB, BILITOT, ALKPHOS in the last 72 hours. CK, CKMB, Troponin: @LABRCNT (CKTOTAL:3, CKMB:3, TROPONINI:3)@  Pro-BNP: No results for input(s): BNP in the last 72 hours. Lipids: No results for input(s): CHOL, HDL in the last 72 hours. Invalid input(s): LDL  ABGs: No results for input(s): PHART, OFA0VZO, PO2ART, MMH5MDK, BEART, HGBAE, R6HXRQAC, CARBOXHGBART, 02THERAPY in the last 72 hours. INR: No results for input(s): INR in the last 72 hours. A1c:Invalid input(s): HEMOGLOBIN A1C  URINALYSIS:   Lab Results   Component Value Date    NITRU Negative 12/15/2020    WBCUA 9 12/15/2020    BACTERIA NEGATIVE 12/15/2020    RBCUA 2 12/15/2020    BLOODU Negative 12/15/2020    SPECGRAV 1.022 12/15/2020    GLUCOSEU Negative 12/15/2020     -----------------------------------------------------------------  IMAGING:  No orders to display         Assessment and Recommendations:     This is a 80y.o. year old female with past medical history of hypertension, recurrent syncopal episodes usually occurring during bowel movement with implantable loop recorder placement 10/27/2020 noted to have bradycardia with heart rates down to 39 bpm with 4-second pauses and strong cardioinhibitory episodes during syncope being referred for pacemaker placement. Risks, benefits, alternatives of dual-chamber pacemaker placement discussed with the patient and full informed consent obtained.   Acceptable Mallampati score  Consent for moderate conscious sedation  ASA 3            Electronically signed by Isma Roland MD on 1/3/2021 at 6:39 PM

## 2021-01-04 NOTE — PROCEDURES
Indications for Reveal LINQ removal-placement of dual-chamber pacemaker      Conscious Sedation Protocol Used During this Procedure -          Anesthesia: Moderate   Sedation:  3.5mg Midazolam (Versed)   150mcg Fentanyl   Start time:  1:06 PM   Stop time:  2:36 PM   ASA Class:  3    EBL   less than 2 mL        After obtaining informed written consent, the patient was brought to the catheterization laboratory where the left chest area was prepared in the usual sterile fashion. The patient was monitored continuously with ECG, pulse oximetry, blood pressure monitoring and direct observation. After obtaining informed consent, the patient was brought to the catheterization laboratory where the left chest was prepared in the usual sterile fashion. The 's hands were scrubbed in a betadine solution for 5 minutes. Utilizing local anesthesia and a percutaneous technique, a single puncture was made in the left chest.    The Implantable Cardiac Monitor was then removed and a sterile dressing applied. The patient was taken to her room in stable and satisfactory condition. No apparent complications. Technical Information:    The generator is a Medtronic Implantable Cardiac Monitor (Reveal LINQ):  Model #:  [id number], Serial #:  [id number]. IMPRESSION:  Successful removal of a Implantable Cardiac Monitor (Reveal LINQ) for syncope with symptomatic bradycardia and placement of dual-chamber pacemaker.     Electronically signed by Beto Perrin MD on 1/4/21

## 2021-01-04 NOTE — PROGRESS NOTES
PHARMACY NOTE  Carolina Nguyễn was ordered lutein. Per the [address], this medication is non-formulary and not stocked by pharmacy. It has been discontinued. The medication can be reordered at discharge.      Electronically signed by Merlyn Allred [address] [place] [address] on 1/4/2021 at 4:57 PM

## 2021-01-04 NOTE — OP NOTE
Operative Note      Patient: El Good  YOB: 1935  MRN: [id number]    Date of Procedure: 1/4/2021    Pre-Op Diagnosis: Recurrent syncope with symptomatic bradycardia    Post-Op Diagnosis: Same       Procedure: Dual-chamber pacemaker placement    : Sakina Flores MD    Anesthesia: Moderate conscious sedation    Anesthesia: Lidocaine LA  Sedation: Versed   3.5mg; Fentanyl  150mg  Start time: 1:06 PM  Stop time: 2:36 PM  ASA Class: 3    An independent trained observer pushed medications at my direction. The patient was monitored continuously with the ECG, pulse oximetry, blood pressure monitoring, and direct observation for level of consciousness. Estimated Blood Loss (mL): less than 85OT    Complications: None      Detailed Description of Procedure:     After obtaining informed written consent, the patient was brought to the catheterization laboratory where the left chest area was prepared in the usual sterile fashion. The patient was monitored continuously with ECG, pulse oximetry, blood pressure monitoring and direct observation. Additionally, please review the \"Brandy Hemodynamic Procedure Report\", which is generated electronically through the [employer]. This report includes additional details regarding this procedure including, but not limited to:     1. Patient Data,   2. Admission,   3. Procedure,   4. Hemodynamics,   5. Vital Signs,   6. Medications, including conscious sedation medications given during the procedure (as note above),   7. Procedure Log,   8. Device Usage,   9. Signature Audit Mackay, and,   10. Signatures. It should be noted, that I sign the \"Signatures\" line electronically through the \"HPF Def Portals\" tab on my computer. The 's hands were scrubbed in a betadine solution for 5 minutes. Moderate conscious sedation was administered at my direction.   Utilizing local anesthesia and a percutaneous technique, a single puncture was made in the left subclavian vein. Utilizing a combination of sharp and blunt dissection, a pacemaker pocket was created. A second puncture was then made in the left subclavian vein. The right ventricular and right atrial leads were inserted without difficulty and appropriate thresholds were obtained. The lead anchors were secured to the floor of the pacemaker pocket. The pacemaker pocket was copiously irrigated with antibiotic solution. The leads were attached to the generator. The generator and leads and placed in the pocket. The subcutaneous and cutaneous tissues were approximated, and a sterile dressing applied. She was then taken to her room in stable and satisfactory condition. Complications:  none    Technical Information:       Model # Serial #        Right Atrial Lead (Medtronic) 5076 - 45 PJN [id number]   Right Ventricular Lead (Medtronic) 5076 - 52 PJN [id number]          Pulse Width (ms) Voltage (V) Current (mA) Impedance (Ohms) P / R Wave (mV)            Right Atrial Lead 0.4  1.5   864  1.125   Right Ventricular Lead 0.4  1.5   1104  8.5         Generator Product Name Model #: Serial #:        RASTA XT DR JENSEN  [id number]             IMPRESSION:    1. Successful insertion of a dual chamber rate response pacemaker for recurrent syncope with symptomatic bradycardia   2. Successful insertion of a right atrial lead  3. Successful insertion of a right ventricular lead  4. Successful pacemaker pocket creation  5.   Supervision of the administration of moderate conscious sedation      Electronically signed by America Rodriguez MD on 1/4/21      Electronically signed by America Rodriguez MD on 1/4/2021 at 2:51 PM

## 2021-01-05 PROCEDURE — C1898 LEAD, PMKR, OTHER THAN TRANS: HCPCS

## 2021-01-05 PROCEDURE — 6370000000 HC RX 637 (ALT 250 FOR IP): Performed by: INTERNAL MEDICINE

## 2021-01-05 PROCEDURE — 6360000002 HC RX W HCPCS: Performed by: INTERNAL MEDICINE

## 2021-01-05 PROCEDURE — 2580000003 HC RX 258: Performed by: INTERNAL MEDICINE

## 2021-01-05 PROCEDURE — G0378 HOSPITAL OBSERVATION PER HR: HCPCS

## 2021-01-05 RX ADMIN — RAMIPRIL 10 MG: 10 CAPSULE ORAL at 09:32

## 2021-01-05 RX ADMIN — SODIUM CHLORIDE, PRESERVATIVE FREE 10 ML: 5 INJECTION INTRAVENOUS at 09:33

## 2021-01-05 RX ADMIN — HYDRALAZINE HYDROCHLORIDE 25 MG: 25 TABLET, FILM COATED ORAL at 09:33

## 2021-01-05 RX ADMIN — Medication 1 G: at 09:32

## 2021-01-05 RX ADMIN — DIPYRIDAMOLE 100 MG: 25 TABLET, FILM COATED ORAL at 09:32

## 2021-01-05 RX ADMIN — Medication 2 G: at 02:37

## 2021-01-05 RX ADMIN — PRAVASTATIN SODIUM 20 MG: 20 TABLET ORAL at 09:33

## 2021-01-05 RX ADMIN — ACETAMINOPHEN 500 MG: 500 TABLET, FILM COATED ORAL at 13:55

## 2021-01-05 RX ADMIN — LEVETIRACETAM 500 MG: 500 TABLET ORAL at 09:33

## 2021-01-05 RX ADMIN — Medication 1 G: at 13:55

## 2021-01-05 RX ADMIN — ACETAMINOPHEN 500 MG: 500 TABLET, FILM COATED ORAL at 00:39

## 2021-01-05 RX ADMIN — AMLODIPINE BESYLATE 5 MG: 5 TABLET ORAL at 09:33

## 2021-01-05 RX ADMIN — PANTOPRAZOLE SODIUM 40 MG: 40 TABLET, DELAYED RELEASE ORAL at 07:25

## 2021-01-05 RX ADMIN — ACETAMINOPHEN 500 MG: 500 TABLET, FILM COATED ORAL at 07:25

## 2021-01-05 RX ADMIN — MELOXICAM 15 MG: 7.5 TABLET ORAL at 09:32

## 2021-01-05 RX ADMIN — HYDRALAZINE HYDROCHLORIDE 25 MG: 25 TABLET, FILM COATED ORAL at 13:55

## 2021-01-05 RX ADMIN — FLUTICASONE PROPIONATE 2 SPRAY: 50 SPRAY, METERED NASAL at 09:32

## 2021-01-05 ASSESSMENT — PAIN SCALES - GENERAL
PAINLEVEL_OUTOF10: 6
PAINLEVEL_OUTOF10: 10
PAINLEVEL_OUTOF10: 10

## 2021-01-05 NOTE — DISCHARGE SUMMARY
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) ([facility] [address] [facility] 07/21/2016     Priority: Low    Hyponatremia 07/16/2016     Priority: Low    Late onset Alzheimer's disease without behavioral disturbance ([facility] 07/16/2016     Priority: Low    Altered mental status      Priority: Low    Irritable bowel syndrome with diarrhea 03/17/2016     Priority: Low    S/P laparoscopic cholecystectomy 03/17/2016     Priority: Low       Cardiology Specific Data:  Specialty Problems        Cardiology Problems    Cryptogenic stroke [facility])        Syncope and collapse        Symptomatic bradycardia        Cerebrovascular small vessel disease        Chest pain        Essential hypertension        Moderate mitral regurgitation        Bradycardia        Vasovagal syncope              Significant Diagnostic Studies:   Xr Chest Portable    Result Date: 1/4/2021  EXAMINATION: XR CHEST PORTABLE 1/4/2021 4:09 PM HISTORY: XR CHEST PORTABLE 1/4/2021 2:51 PM HISTORY: Pacemaker placement COMPARISON: December 15, 2020. FINDINGS: The lungs are clear. Specifically, the left lung is expanded to the chest wall. . The cardiac silhouette is normal. Pacing device is present in the soft tissues of the left chest.. The osseous structures and surrounding soft tissues demonstrate no acute abnormality. Degenerative arthrosis is noted in the shoulders greater on the right than the left. 1. No radiographic evidence of acute cardiopulmonary process. Signed by Dr Nicole Rios on 1/4/2021 4:10 PM      Pertinent Labs:   CBC:   Recent Labs     01/04/21  1018   WBC 3.7*   HGB 12.6        BMP:    Recent Labs     01/04/21  1018      K 3.7      CO2 26   BUN 19   CREATININE 0.8   GLUCOSE 100     INR: No results for input(s): INR in the last 72 hours.   Lipids: No results for input(s): CHOL, HDL in the last 72 hours. Invalid input(s): LDLCALCU  ABGs:No results for input(s): PHART, WBY8QSA, PO2ART, YVA1MNM, BEART, HGBAE, N4XEMMXU, CARBOXHGBART, 02THERAPY in the last 72 hours. HgBA1c:  No results for input(s): LABA1C in the last 72 hours. Procedures: Dual-chamber pacemaker placement and implantable loop recorder removal  Operative Note        Patient: Sukhjinder Carter  YOB: 1935  MRN: [id number]     Date of Procedure: 1/4/2021     Pre-Op Diagnosis: Recurrent syncope with symptomatic bradycardia     Post-Op Diagnosis: Same       Procedure: Dual-chamber pacemaker placement     : Hezzie Ahumada, MD     Anesthesia: Moderate conscious sedation     Anesthesia: Lidocaine LA  Sedation: Versed   3.5mg; Fentanyl  150mg  Start time: 1:06 PM  Stop time: 2:36 PM  ASA Class: 3    An independent trained observer pushed medications at my direction. The patient was monitored continuously with the ECG, pulse oximetry, blood pressure monitoring, and direct observation for level of consciousness. Estimated Blood Loss (mL): less than 18PY     Complications: None        Detailed Description of Procedure:      After obtaining informed written consent, the patient was brought to the catheterization laboratory where the left chest area was prepared in the usual sterile fashion. The patient was monitored continuously with ECG, pulse oximetry, blood pressure monitoring and direct observation.       Additionally, please review the \"[facility] Hemodynamic Procedure Report\", which is generated electronically through the [employer].   This report includes additional details regarding this procedure including, but not limited to:                   1.          Patient Data,                2.          Admission,                3.          Procedure,                4.          Hemodynamics,                5.          Vital Signs,                6.          Medications, including conscious sedation of a right atrial lead  3. Successful insertion of a right ventricular lead  4. Successful pacemaker pocket creation  5. Supervision of the administration of moderate conscious sedation        Electronically signed by Yash Ho MD on 1/4/21        Electronically signed by Yash Ho MD on 1/4/2021 at 2:51 PM    Hospital Course: Uneventful    Physical Exam: No discharge or significant hematoma noted at pacer site. Chest x-ray unremarkable    Vital Signs: BP (!) 147/69   Pulse 87   Temp 98.6 °F (37 °C)   Resp 16   Ht 5' (1.524 m)   Wt 120 lb (54.4 kg)   SpO2 93%   BMI 23.44 kg/m²     Physical Exam      Discharge Medications:       Rachel Star   Home Medication Instructions [id number]:[id number]    Printed on:01/05/21 5829   Medication Information                      acetaminophen (TYLENOL) 500 MG tablet  Take 1,000 mg by mouth Indications: 2 tablets at 6:00am but not more than 3,000mg per day             alendronate (FOSAMAX) 70 MG tablet  Take 70 mg by mouth every 7 days Wednesday at 1830             amLODIPine (NORVASC) 5 MG tablet  Take 5 mg by mouth 2 times daily Indications: takes at 9am and 9pm              azelastine (ASTELIN) 0.1 % nasal spray  2 sprays by Nasal route 2 times daily Indications: takes 9am and 9pm              b complex vitamins capsule  Take 1 capsule by mouth daily Indications: AT 2:30 PM B 100             butalbital-acetaminophen-caffeine (FIORICET, ESGIC) -40 MG per tablet  Take 1 tablet by mouth every 6 hours as needed for Headaches              Calcium Carb-Cholecalciferol (CALCIUM 600+D) 600-800 MG-UNIT TABS  Take 1 tablet by mouth daily Indications: AT 2:30 PM              camphor-menthol (SARNA) 0.5-0.5 % lotion  Apply 0.5 mLs topically 2 times daily as needed for Itching Apply topically as needed.              Cholecalciferol (VITAMIN D3) 5000 units TABS  Take 5,000 Units by mouth daily Indications: AT 2:30 PM              cloNIDine (CATAPRES) 0.1 MG tablet  Take 1 tablet by mouth 2 times daily as needed for High Blood Pressure (for BP greater than 160/100)             desonide (DESOWEN) 0.05 % cream  Apply topically as needed Apply topically 2 times daily. diclofenac sodium 1 % GEL  Apply 2 g topically 4 times daily as needed for Pain             dipyridamole (PERSANTINE) 50 MG tablet  Take 2 tablets by mouth 2 times daily             Docosanol (ABREVA EX)  Apply topically             docusate sodium (COLACE) 100 MG capsule  Take 100 mg by mouth nightly as needed for Constipation              donepezil (ARICEPT) 10 MG tablet  Take 1 tablet by mouth nightly             fluticasone (VERAMYST) 27.5 MCG/SPRAY nasal spray  2 sprays by Nasal route 2 times daily Indications: takes 9am and 9pm              gabapentin (NEURONTIN) 100 MG capsule  Take 100 mg by mouth 2 times daily as needed (sciatic pain and burning sensation). Can only tolerate 100 mg;             hydrALAZINE (APRESOLINE) 25 MG tablet  Take 25 mg by mouth 3 times daily Indications: takes at 9am, 3pm, and 9pm              levETIRAcetam (KEPPRA) 500 MG tablet  Take 1 tablet by mouth 2 times daily             lidocaine 4 % external patch  Place 1 patch onto the skin daily as needed (LEFT LOWER BACK PAIN)             Lutein 20 MG TABS  Take 20 mg by mouth daily Indications: 3 PM DAILY              meloxicam (MOBIC) 7.5 MG tablet  Take 15 mg by mouth daily Indications: takes at 9am              Multiple Vitamins-Minerals (THERAPEUTIC MULTIVITAMIN-MINERALS) tablet  Take 1 tablet by mouth daily Indications: AT 2:30 PM              mupirocin (BACTROBAN) 2 % cream  Apply topically as needed Apply topically 3 times daily.              nitrofurantoin (MACRODANTIN) 50 MG capsule  TAKE 1 CAPSULE AT BEDTIME             NONFORMULARY  Indications: dic 3%, lidocaine 2%, cyclo 2%, prilo 2% baclo 2% 1 to 2 pumps up to TID PRN              Omega 3-6-9 Fatty Acids (OMEGA 3-6-9 COMPLEX PO)  Take 2 tablets by mouth daily Indications: AT 2:30 PM              pantoprazole (PROTONIX) 40 MG tablet  Take 40 mg by mouth daily Indications: 9am              polyvinyl alcohol-povidone (HYPOTEARS) 1.4-0.6 % ophthalmic solution  Place 1-2 drops into both eyes as needed             pravastatin (PRAVACHOL) 20 MG tablet  Take 20 mg by mouth every morning Indications: takes at 9am              promethazine (PHENERGAN) 25 MG tablet  Take 25 mg by mouth 3 times daily as needed for Nausea             Psyllium (METAMUCIL FIBER PO)  Take by mouth nightly Indications: takes 9pm              ramipril (ALTACE) 10 MG capsule  Take 10 mg by mouth 2 times daily Indications: takes at 9am and 9pm              simethicone (GAS-X EXTRA STRENGTH) 125 MG chewable tablet  Take 125 mg by mouth 2 PO 5:30 AM,1- 2 10:30 AM, 1 4:00 PM and PRN             sodium chloride (OCEAN, BABY AYR) 0.65 % nasal spray  1 spray by Nasal route as needed for Congestion             sodium chloride 1 g tablet  Take 1 g by mouth 3 times daily Indications: takes 9am, 3pm, and 9pm              tobramycin-dexamethasone (TOBRADEX) 0.3-0.1 % ophthalmic ointment  Place into the left eye as needed 3 times daily. valACYclovir (VALTREX) 1 g tablet  as needed                  Discharge Instructions: Follow-up in pacemaker clinic in 7 to 10 days. Nancy Swift MD  [address] [place] [address] [facility] [facility]  [phone]    On 1/13/2021  2:15 PM and wound check         Take medications as directed. Resume activity as tolerated. Diet: DIET CARDIAC;      Disposition: Patient is medically stable and will be discharged *    Electronically signed by Nancy Swift MD on 1/5/2021 at 1:16 PM

## 2021-01-05 NOTE — CARE COORDINATION
Spoke with patient and daughter regarding MD orders for [place] services. Patient agreeable and has chosen [address] [facility]. Referral Faxed. [address] [place] [address] [phone]. [phone]. Please notify [facility] when patient discharges and fax DC Summary,  DC med list and any new [place] orders. The Patient was provided with a choice of provider and agrees   with the discharge plan. [x] Yes [] No    Freedom of choice list was provided with basic dialogue that supports the patient's individualized plan of care/goals, treatment preferences and shares the quality data associated with the providers.  [x] Yes [] No  Electronically signed by Doris Dean on 1/5/2021 at 12:24 PM

## 2021-01-06 ENCOUNTER — CARE COORDINATION (OUTPATIENT)
Dept: CASE MANAGEMENT | Age: 86
End: 2021-01-06

## 2021-01-06 NOTE — CARE COORDINATION
[facility] Transitions Initial Follow Up Call    Call within 2 business days of discharge: Yes    Patient: Dionicia Harada Patient : 1935   MRN: [id number]  Reason for Admission:   Discharge Date: 21 RARS: No data recorded    Last Discharge [facility]       Complaint Diagnosis Description Type Department Provider    21   Admission (Discharged) from Keanu Connor MD           Spoke with: N/A    Facility: [place] [address]    Non-face-to-face services provided:  Reviewed encounter information for continuity of care prior to follow up phone call - chart notes, consults, progress notes, test results, med list, appointments, AVS, other information. Care Transitions 24 Hour Call    Care Transitions Interventions         Follow Up : Attempted to make contact with Abad Silverio for CT/COVID initial call post discharge from the hospital without success. Unable to leave a message regarding intent of call and call back information. Will try again my next business day.     Future Appointments   Date Time Provider Linn Ruelas   2021  2:15 PM Marina Daigle MD N [facility]   2021  2:30 PM LAYTON Dahl [facility]   2021  2:15 PM LAYTON Austin - CNP N [facility]   3/25/2021 10:30 AM Sarah Rangel MD N [facility]       Conchita Chi RN

## 2021-01-07 ENCOUNTER — TELEPHONE (OUTPATIENT)
Dept: CARDIOLOGY CLINIC | Age: 86
End: 2021-01-07

## 2021-01-07 ENCOUNTER — CARE COORDINATION (OUTPATIENT)
Dept: CASE MANAGEMENT | Age: 86
End: 2021-01-07

## 2021-01-07 NOTE — CONSULTS
Pacemaker educational packet and cover letter sent to the patient's mailing address on record. Information included; incision care, affected arm ROM and weight bearing limitations, present and future precautionary measures, sign & symptom awareness with reasons to call the cardiologist, keeping of appointments, carrying identification card and transmitter operation. Patient instructed to contact cardiologist's office with questions or concerns.

## 2021-01-07 NOTE — TELEPHONE ENCOUNTER
Returned call to patient's daughter letting her know that it was okay to proceed with the injection at this time.

## 2021-01-07 NOTE — CARE COORDINATION
[facility] Transitions Initial Follow Up Call    Call within 2 business days of discharge: Yes    Patient: Tamiko Issa Patient : 1935   MRN: [id number]  Reason for Admission:   Discharge Date: 21 RARS: No data recorded    Last Discharge [facility]       Complaint Diagnosis Description Type Department Provider    21   Admission (Discharged) from Dougie Bond MD           Spoke with: N/A    Facility: [place] [address]    Non-face-to-face services provided:  Reviewed encounter information for continuity of care prior to follow up phone call - chart notes, consults, progress notes, test results, med list, appointments, AVS, other information. Care Transitions 24 Hour Call    Care Transitions Interventions         Follow Up : Attempt #2 to make contact with Charlette Soler for CTC initial follow up call post discharge from the hospital without success. Unable to leave a message regarding intent of call and call back information. Will try again my next business day.      Future Appointments   Date Time Provider Linn Ruelas   2021  2:15 PM MD MACIE Andersen [facility]   2021  2:30 PM LAYTON Bonner [facility]   2021  2:15 PM LAYTON Hedrick CNP N [facility]   3/25/2021 10:30 AM MD MACIE Valentin [facility]       Sebastian Andrew RN

## 2021-01-07 NOTE — TELEPHONE ENCOUNTER
Tiffanie Webber requests that Elsy Rene return their call. Patient is currently at [place] [address] waiting to see if she can get a cortisone injection for back pain, due to the fact that it can cause palpitations. Please call back as soon as able. Thank you.

## 2021-01-08 ENCOUNTER — CARE COORDINATION (OUTPATIENT)
Dept: CASE MANAGEMENT | Age: 86
End: 2021-01-08

## 2021-01-08 NOTE — CARE COORDINATION
[facility] Transitions Initial Follow Up Call    Call within 2 business days of discharge: Yes    Patient: Adriane Fonseca Patient : 1935   MRN: [id number]  Reason for Admission: Pacemaker placement  Discharge Date: 21 RARS: No data recorded    Last Discharge  [place] [address]       Complaint Diagnosis Description Type Department Provider    21   Admission (Discharged) from Ani Navarrete MD           Spoke with: N/A    Facility: [address] [place] [address]    Non-face-to-face services provided:  Reviewed encounter information for continuity of care prior to follow up Care Transitions phone call - chart notes, consults, progress notes, test results, med list, appointments, AVS, other information. Care Transitions 24 Hour Call    Care Transitions Interventions       Attempted to make contact with patient/caregiver for an initial follow up call post discharge without success. Unable to leave a message regarding intent of call and call back information. Call went to an unidentifiable voice mail. As this repeated attempt to make contact was unsuccessful, will disengage at this time.       Follow Up  Future Appointments   Date Time Provider Linn Ruelas   2021  2:15 PM Radha Madden MD N [facility]   2021  2:30 PM LAYTON Aguila [facility]   2021  2:15 PM LAYTON Eckert CNP N [facility]   3/25/2021 10:30 AM Urban Mckinnon MD N [facility]       Alex Neal RN

## 2021-01-13 ENCOUNTER — OFFICE VISIT (OUTPATIENT)
Dept: CARDIOLOGY CLINIC | Age: 86
End: 2021-01-13

## 2021-01-13 VITALS
WEIGHT: 120 LBS | HEIGHT: 60 IN | DIASTOLIC BLOOD PRESSURE: 72 MMHG | HEART RATE: 72 BPM | OXYGEN SATURATION: 98 % | BODY MASS INDEX: 23.56 KG/M2 | SYSTOLIC BLOOD PRESSURE: 124 MMHG

## 2021-01-13 DIAGNOSIS — R55 SYNCOPE AND COLLAPSE: Primary | ICD-10-CM

## 2021-01-13 PROCEDURE — 99024 POSTOP FOLLOW-UP VISIT: CPT | Performed by: INTERNAL MEDICINE

## 2021-01-13 ASSESSMENT — ENCOUNTER SYMPTOMS
VOMITING: 0
BLOOD IN STOOL: 0
CONSTIPATION: 0
ABDOMINAL DISTENTION: 0
BACK PAIN: 1
WHEEZING: 0
EYE DISCHARGE: 0
DIARRHEA: 0
SHORTNESS OF BREATH: 0
COUGH: 0

## 2021-01-13 NOTE — PATIENT INSTRUCTIONS
Please call [employer] Get connected at [phone] to discuss home monitor options. Wash incisions with antibacterial soap. Keep sites clean and dry. Monitor sites daily for any redness, swelling, or drainage. Call the office if any concerns.

## 2021-01-13 NOTE — PROGRESS NOTES
[facility]  Cardiology Office Note  [facility] [place]  [address]  Phone: [phone]  Fax: [phone]                            Date:  1/13/2021  Patient: Tiffanie Oleary  Age:  80 y.o., 1935    Referral: Slick Henderson DO      PROBLEM LIST:    Patient Active Problem List    Diagnosis Date Noted    Symptomatic bradycardia 01/04/2021     Priority: High    Syncope and collapse 11/03/2020     Priority: High    Cryptogenic stroke [facility]      Priority: High    Status post placement of implantable loop recorder 11/11/2020     Priority: Low    Vasovagal syncope 10/15/2020     Priority: Low    Bradycardia 10/15/2020     Priority: Low    Overactive bladder 09/28/2020     Priority: Low    Abnormal EKG 09/02/2020     Priority: Low    Moderate mitral regurgitation 09/02/2020     Priority: Low    Chest pain 09/02/2020     Priority: Low    Essential hypertension 09/02/2020     Priority: Low    Chronic bilateral low back pain with left-sided sciatica 12/19/2019     Priority: Low    Arthralgia of multiple joints 12/19/2019     Priority: Low    Bloating 11/20/2019     Priority: Low    Palliative care patient 09/17/2019     Priority: Low    Gait abnormality 09/15/2019     Priority: Low    Risk for falls 09/13/2019     Priority: Low    Dry age-related macular degeneration of left eye      Priority: Low    Age-related macular degeneration, wet, right eye (HCC)      Priority: Low    Partial symptomatic epilepsy with complex partial seizures, intractable, without status epilepticus ([facility] 09/03/2019     Priority: Low    Cerebrovascular small vessel disease 09/03/2019     Priority: Low    Vascular dementia without behavioral disturbance [facility] 09/03/2019     Priority: Low    Chronic bilateral lower abdominal pain 05/03/2019     Priority: Low    Gas pain 05/03/2019     Priority: Low    History of ischemic colitis 05/03/2019     Priority: Low    Weakness Priority: Low    Metabolic encephalopathy 80/40/0469     Priority: Low    Seizure as late effect of cerebrovascular accident (CVA) ([facility] 07/21/2016     Priority: Low    Hyponatremia 07/16/2016     Priority: Low    Late onset Alzheimer's disease without behavioral disturbance ([facility] 07/16/2016     Priority: Low    Altered mental status      Priority: Low    Irritable bowel syndrome with diarrhea 03/17/2016     Priority: Low    S/P laparoscopic cholecystectomy 03/17/2016     Priority: Low     1. Recurrent syncopal episodes, possibly vasovagal with previous implantable loop recorder 10/27/2020 with significant bradycardia during daytime hours with 4-second pauses, status post pacemaker placement 1/4/2021.  2.  Negative Lexiscan study 9/2020, normal LV ejection fraction. 3.  Early dementia. 4.  Hypertension. PRESENTATION: Isaac Roa is a 80y.o. year old female presents for follow-up evaluation. She has been doing well with no significant issues since pacemaker placement. No syncopal episodes. Blood pressure appears better controlled. Has received lower back injections for pain management. REVIEW OF SYSTEMS:  Review of Systems   Constitutional: Negative for activity change, fatigue and fever. HENT: Negative for ear pain, hearing loss and tinnitus. Eyes: Negative for discharge and visual disturbance. Respiratory: Negative for cough, shortness of breath and wheezing. Cardiovascular: Negative for chest pain, palpitations and leg swelling. Gastrointestinal: Negative for abdominal distention, blood in stool, constipation, diarrhea and vomiting. Endocrine: Negative for cold intolerance, heat intolerance, polydipsia and polyuria. Genitourinary: Negative for dysuria and hematuria. Musculoskeletal: Positive for back pain and gait problem. Negative for arthralgias and myalgias. Skin: Negative for pallor and rash. Neurological: Positive for syncope.  Negative for seizures, capsule Take 10 mg by mouth 2 times daily Indications: takes at 9am and 9pm       b complex vitamins capsule Take 1 capsule by mouth daily Indications: AT 2:30 PM B 100      Multiple Vitamins-Minerals (THERAPEUTIC MULTIVITAMIN-MINERALS) tablet Take 1 tablet by mouth daily Indications: AT 2:30 PM        No current facility-administered medications for this visit.         Allergies:  Aspirin, Dilaudid [hydromorphone hcl], Fd&c yellow #5 [place] [tartrazine], Ondansetron, Quinolones, Spironolactone, Sulfa antibiotics, Codeine, Demerol hcl [meperidine], Levaquin [levofloxacin in d5w], Myrbetriq [mirabegron], Namenda [memantine hcl], Norco [hydrocodone-acetaminophen], and Pneumococcal vaccines    Past Social History:  Social History     Socioeconomic History    Marital status:      Spouse name: Not on file    Number of children: 3    Years of education: Not on file    Highest education level: Not on file   Occupational History    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mployee of [employer]    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,mployee of [employer]   Social Needs    Financial resource strain: Not on file    Food insecurity     Worry: Not on file     Inability: Not on file   [employer] needs     Medical: Not on file     Non-medical: Not on file   Tobacco Use    Smoking status: Never Smoker    Smokeless tobacco: Never Used   Substance and Sexual Activity    Alcohol use: Never     Frequency: Never    Drug use: Never    Sexual activity: Not on file     Comment: has 3 kids   Lifestyle    Physical activity     Days per week: Not on file     Minutes per session: Not on file    Stress: Not on file   Relationships    Social connections     Talks on phone: Not on file     Gets together: Not on file     Attends [employer] service: Not on file     Active member of club or organization: Not on file     Attends meetings of clubs or organizations: Not on file     Relationship status: Not on file    Intimate partner violence     Fear of current or ex partner: Not on file     Emotionally abused: Not on file     Physically abused: Not on file     Forced sexual activity: Not on file   Other Topics Concern    Not on file   Social History Narrative    CODE STATUS: DNR-CCA    HEALTH CARE PROXY / [facility] and Healthcare: her daughter, Mrs. Louis Valentine, [phone]    AMBULATES: with walker during day, wheelchair at night    DOMICILED: lives in the [facility] assisted living facility, has no stairs or steps, has a cat, her daughter is there periodically       Family History:       Problem Relation Age of Onset    Heart Defect Mother     Hypertension Mother     Heart Attack Father         x3 within 24 h and then , abused ciggarettes    No Known Problems Daughter     No Known Problems Son     No Known Problems Son     Colon Cancer Neg Hx     Colon Polyps Neg Hx     Esophageal Cancer Neg Hx     Liver Cancer Neg Hx     Liver Disease Neg Hx     Rectal Cancer Neg Hx     Stomach Cancer Neg Hx          Physical Examination:  /72   Pulse 72   Ht 5' (1.524 m)   Wt 120 lb (54.4 kg)   SpO2 98%   BMI 23.44 kg/m²   Physical Exam  Constitutional:       General: She is not in acute distress. Appearance: She is not diaphoretic. Comments: Normal build   HENT:      Mouth/Throat:      Pharynx: No oropharyngeal exudate. Eyes:      General: No scleral icterus. Right eye: No discharge. Left eye: No discharge. Neck:      Thyroid: No thyromegaly. Vascular: No JVD. Cardiovascular:      Rate and Rhythm: Normal rate and regular rhythm. No extrasystoles are present. Heart sounds: Normal heart sounds, S1 normal and S2 normal. No murmur. No systolic murmur. No diastolic murmur. No friction rub. No gallop. No S3 or S4 sounds. Comments: Pacemaker site with no hematoma or drainage and clean wound site  ILR removal site also appears clean.   No JVD  No edema    Pulmonary: Effort: Pulmonary effort is normal. No respiratory distress. Breath sounds: Normal breath sounds. No wheezing or rales. Chest:      Chest wall: No tenderness. Abdominal:      General: Bowel sounds are normal. There is no distension. Palpations: Abdomen is soft. There is no mass. Tenderness: There is no abdominal tenderness. There is no guarding or rebound. Hernia: No hernia is present. Comments: No palpable organomegaly   Musculoskeletal: Normal range of motion. Skin:     General: Skin is warm. Coloration: Skin is not pale. Findings: No rash. Neurological:      Mental Status: She is alert and oriented to person, place, and time. Cranial Nerves: No cranial nerve deficit. Deep Tendon Reflexes: Reflexes normal.           Labs:   CBC: No results for input(s): WBC, HGB, HCT, PLT in the last 72 hours. BMP:No results for input(s): NA, K, CO2, BUN, CREATININE, LABGLOM, GLUCOSE in the last 72 hours. BNP: No results for input(s): BNP in the last 72 hours. PT/INR: No results for input(s): PROTIME, INR in the last 72 hours. APTT:No results for input(s): APTT in the last 72 hours. CARDIAC ENZYMES:No results for input(s): CKTOTAL, CKMB, CKMBINDEX, TROPONINI in the last 72 hours. FASTING LIPID PANEL:  Lab Results   Component Value Date    HDL 66 09/03/2019    LDLCALC 96 09/03/2019    TRIG 142 09/03/2019     LIVER PROFILE:No results for input(s): AST, ALT, LABALBU in the last 72 hours.         Imaging:          ASSESSMENT and PLAN:  This is a 80y.o. year old female with past medical history of hypertension, normal LV ejection fraction with negative Lexiscan study 9/2020, recurrent multiple syncopal episodes usually occurring during bowel movement with implantable loop recorder placement 10/27/2020 noted to have bradycardia with heart rates down to 39 bpm with 4-second pauses and strong cardioinhibitory episodes during syncope, status post pacemaker placement 1/4/2021 here for follow-up evaluation. 1.  No significant issues since pacemaker placement. Wound sites are clean. Pacemaker appears to be functioning well with no significant issues. Normal impedances and thresholds. 2.  Follow-up in 7 months. Orders:  No orders of the defined types were placed in this encounter. No orders of the defined types were placed in this encounter. Return in about 7 months (around 8/13/2021). Electronically signed by Jazlyn Xie MD on 1/13/2021 at 3:36 PM    [facility]      Thisdictation was generated by voice recognition computer software. Although all attempts are made to edit the dictation for accuracy, there may be errors in the transcription that are not intended.

## 2021-01-15 ENCOUNTER — TELEPHONE (OUTPATIENT)
Dept: CARDIOLOGY CLINIC | Age: 86
End: 2021-01-15

## 2021-01-15 NOTE — TELEPHONE ENCOUNTER
Called to reschedule patients appointment for Monday 1/18/21 to a 4 to 6 weeks hospital follow up post pacemaker. She just saw Dr. Ce Munoz on 1/13/21 for a wound check so he next OV needs to be in February. Spoke to Michael Burr. She stated she was not feeling well and wanted to just schedule the appointment. She stated she was about to throw up. Offered to call her back and she stated she will call back and hung up.

## 2021-01-18 ENCOUNTER — TELEPHONE (OUTPATIENT)
Dept: CARDIOLOGY CLINIC | Age: 86
End: 2021-01-18

## 2021-01-18 NOTE — TELEPHONE ENCOUNTER
Called and spoke to daughter Fleming Runner, the apt for today 1/18 with fabienne shirley did not get cancelled on our end, was calling trying to reach the pt, to get the 7 month follow up scheduled for the pt as the check out notes from 1/13/2021 with Patricio Bumpers told us to do. The pt daughter Fleming Runner is under the weather due to an outpatient procedure for herself. She does not wish to schedule the 7 month follow up at this time, she said that is too far out. She said she will call us when she feels better.

## 2021-01-20 ENCOUNTER — TELEPHONE (OUTPATIENT)
Dept: UROLOGY | Age: 86
End: 2021-01-20

## 2021-01-25 DIAGNOSIS — Z95.818 STATUS POST PLACEMENT OF IMPLANTABLE LOOP RECORDER: Primary | ICD-10-CM

## 2021-01-25 DIAGNOSIS — R55 SYNCOPE AND COLLAPSE: ICD-10-CM

## 2021-01-25 DIAGNOSIS — I10 ESSENTIAL HYPERTENSION: ICD-10-CM

## 2021-01-26 ASSESSMENT — ENCOUNTER SYMPTOMS
EYES NEGATIVE: 1
SHORTNESS OF BREATH: 0
BACK PAIN: 1
COUGH: 0
ALLERGIC/IMMUNOLOGIC NEGATIVE: 1
GASTROINTESTINAL NEGATIVE: 1
RESPIRATORY NEGATIVE: 1

## 2021-01-27 ENCOUNTER — NURSE ONLY (OUTPATIENT)
Dept: UROLOGY | Age: 86
End: 2021-01-27
Payer: MEDICARE

## 2021-01-27 DIAGNOSIS — N39.46 MIXED STRESS AND URGE URINARY INCONTINENCE: ICD-10-CM

## 2021-01-27 DIAGNOSIS — N32.81 OAB (OVERACTIVE BLADDER): Primary | ICD-10-CM

## 2021-01-27 PROCEDURE — 64566 NEUROELTRD STIM POST TIBIAL: CPT | Performed by: NURSE PRACTITIONER

## 2021-01-27 NOTE — PROGRESS NOTES
Eli Cadena is a 80 y.o. female who presents today   Chief Complaint   Patient presents with    Injections     I am here for my Uroplasty       Uroplasty treatment:  Patient presents for maintenance uroplasty therapy. She typically receives her treatments approximately every 23 to 24 days. When she goes beyond this time, her symptoms significantly worsen. Overall, she has responded well to duraplasty and is having improvement in her incontinence. She is no longer taking overactive bladder medication. REVIEW OF SYSTEMS:  Review of Systems   Constitutional: Negative. Negative for chills and fever. HENT: Negative. Eyes: Negative. Respiratory: Negative. Negative for cough and shortness of breath. Cardiovascular: Negative. Negative for leg swelling. Pacemaker placed since last visit   Gastrointestinal: Negative. Endocrine: Negative. Genitourinary: Positive for frequency and urgency. Negative for decreased urine volume, difficulty urinating, dyspareunia, dysuria, enuresis, flank pain, genital sores, hematuria, menstrual problem, pelvic pain, vaginal bleeding, vaginal discharge and vaginal pain. Increase in symptoms since her last visit   Musculoskeletal: Positive for arthralgias, back pain, myalgias and neck pain. Skin: Negative. Allergic/Immunologic: Negative. Neurological: Positive for weakness. Hematological: Negative. Psychiatric/Behavioral: The patient is nervous/anxious. PHYSICAL EXAM:  There were no vitals taken for this visit. Physical Exam  Vitals signs reviewed. Constitutional:       Appearance: Normal appearance. HENT:      Head: Normocephalic and atraumatic. Right Ear: External ear normal.      Left Ear: External ear normal.      Nose: No congestion.    Eyes:      Conjunctiva/sclera: Conjunctivae normal.   Neck:      Comments: I observed no obvious neck masses  Pulmonary:      Effort: Pulmonary effort is normal.   Abdominal: General: There is no distension. Tenderness: There is no abdominal tenderness. Musculoskeletal:         General: No swelling. Right lower leg: No edema. Left lower leg: No edema. Comments: Overall pain from fall   Skin:     Coloration: Skin is pale. Neurological:      General: No focal deficit present. Mental Status: She is alert and oriented to person, place, and time. Psychiatric:         Mood and Affect: Mood normal.         Behavior: Behavior normal.       1. Patient is seated with the right treatment leg elevated. 2. 34 gauge fine needle electrode is inserted into lower inner aspect of the leg. Slightly cephalad to the medial malleolus. 3. Surface electrode pad is placed over the medial aspect of the calcaneus on the same leg. 4.Needle electrode is connected to the external pulse generator. 5.The pulse generator is turned on and the millivolts used are 4. 6.Patient is treated for 30 minutes. 7.The needle and pad are removed. Patient will follow up in 23-24 days for next treatment. 1. Overactive bladder  Patient presents moderate relief of symptoms with duraplasty treatments. We will continue to utilize these every 23 to 24 days. The patient also feels more comfortable in the cystoscopy room. We will continue to accommodate her in this room. Orders Placed This Encounter   Procedures    [place] POST TIBIAL NEUROSTIMULATION,PERC NEEDLE ELECTRODE     No orders of the defined types were placed in this encounter.

## 2021-02-09 ENCOUNTER — TELEPHONE (OUTPATIENT)
Dept: VASCULAR SURGERY | Age: 86
End: 2021-02-09

## 2021-02-09 NOTE — TELEPHONE ENCOUNTER
Called and left voicemail to let patient know we rescheduled new patient appt due to possible bad weather coming up. New appt is 2/16/2021 @ 2:30 in office with [facility].

## 2021-02-15 DIAGNOSIS — Z95.818 STATUS POST PLACEMENT OF IMPLANTABLE LOOP RECORDER: Primary | ICD-10-CM

## 2021-02-15 DIAGNOSIS — R55 SYNCOPE AND COLLAPSE: ICD-10-CM

## 2021-02-15 DIAGNOSIS — R00.1 BRADYCARDIA: ICD-10-CM

## 2021-02-15 PROCEDURE — 93296 REM INTERROG EVL PM/IDS: CPT | Performed by: NURSE PRACTITIONER

## 2021-02-15 PROCEDURE — 93294 REM INTERROG EVL PM/LDLS PM: CPT | Performed by: NURSE PRACTITIONER

## 2021-02-18 ENCOUNTER — NURSE ONLY (OUTPATIENT)
Dept: UROLOGY | Age: 86
End: 2021-02-18
Payer: MEDICARE

## 2021-02-18 DIAGNOSIS — N39.46 MIXED STRESS AND URGE URINARY INCONTINENCE: ICD-10-CM

## 2021-02-18 DIAGNOSIS — N32.81 OAB (OVERACTIVE BLADDER): Primary | ICD-10-CM

## 2021-02-18 PROCEDURE — 64566 NEUROELTRD STIM POST TIBIAL: CPT | Performed by: NURSE PRACTITIONER

## 2021-02-18 ASSESSMENT — ENCOUNTER SYMPTOMS
ALLERGIC/IMMUNOLOGIC NEGATIVE: 1
RESPIRATORY NEGATIVE: 1
BACK PAIN: 1
COUGH: 0
SHORTNESS OF BREATH: 0
EYES NEGATIVE: 1
GASTROINTESTINAL NEGATIVE: 1

## 2021-02-18 NOTE — PROGRESS NOTES
Adriane Fonseca is a 80 y.o. female who presents today   Chief Complaint   Patient presents with    Urinary Frequency       Uroplasty treatment:  Patient presents for maintenance uroplasty therapy. She receives her treatments approximately every 23 to 24 days. When she has been on this time, her symptoms significantly worsen. Overall, she has responded well to uroplasty and is having improvement in her incontinence. REVIEW OF SYSTEMS:  Review of Systems   Constitutional: Negative. Negative for chills and fever. HENT: Negative. Eyes: Negative. Respiratory: Negative. Negative for cough and shortness of breath. Cardiovascular: Negative. Negative for leg swelling. Pacemaker placed since last visit   Gastrointestinal: Negative. Endocrine: Negative. Genitourinary: Positive for frequency and urgency. Negative for decreased urine volume, difficulty urinating, dyspareunia, dysuria, enuresis, flank pain, genital sores, hematuria, menstrual problem, pelvic pain, vaginal bleeding, vaginal discharge and vaginal pain. Increase in symptoms since her last visit   Musculoskeletal: Positive for arthralgias, back pain, myalgias and neck pain. Skin: Negative. Allergic/Immunologic: Negative. Neurological: Positive for weakness. Hematological: Negative. Psychiatric/Behavioral: The patient is nervous/anxious. PHYSICAL EXAM:  There were no vitals taken for this visit. Physical Exam  Vitals signs reviewed. Constitutional:       Appearance: Normal appearance. HENT:      Head: Normocephalic and atraumatic. Right Ear: External ear normal.      Left Ear: External ear normal.      Nose: No congestion. Eyes:      Conjunctiva/sclera: Conjunctivae normal.   Neck:      Comments: I observed no obvious neck masses  Pulmonary:      Effort: Pulmonary effort is normal.   Abdominal:      General: There is no distension. Tenderness: There is no abdominal tenderness. Musculoskeletal:         General: No swelling. Right lower leg: No edema. Left lower leg: No edema. Comments: Having pain in her left leg today   Skin:     Coloration: Skin is pale. Neurological:      General: No focal deficit present. Mental Status: She is alert and oriented to person, place, and time. Psychiatric:         Mood and Affect: Mood normal.         Behavior: Behavior normal.     1. Patient is seated with the right treatment leg elevated. 2. 34 gauge fine needle electrode is inserted into lower inner aspect of the leg. Slightly cephalad to the medial malleolus. 3. Surface electrode pad is placed over the medial aspect of the calcaneus on the same leg. 4.Needle electrode is connected to the external pulse generator. 5.The pulse generator is turned on and the millivolts used are 2. 6.Patient is treated for 30 minutes. 7.The needle and pad are removed. Patient will follow up in 23-24 days for next treatment. 1. Overactive bladder  Patient receives uroplasty treatment every 23 to 24 days. We will continue. She did express some interest in pursuing a less frequent management options such as Botox or InterStim. I explained that we did not provide those services at this office. I was unsure if she would be a candidate for either 1 of those therapies given her significant pain as well as prior aortic and other comorbidities, but I did explain that I would provide a referral should they request that. We will continue to accommodate her in cystoscopy B. Orders Placed This Encounter   Procedures    [place] POST TIBIAL NEUROSTIMULATION,PERC NEEDLE ELECTRODE     No orders of the defined types were placed in this encounter.

## 2021-02-22 ENCOUNTER — OFFICE VISIT (OUTPATIENT)
Dept: VASCULAR SURGERY | Age: 86
End: 2021-02-22
Payer: MEDICARE

## 2021-02-22 ENCOUNTER — TELEPHONE (OUTPATIENT)
Dept: NEUROSURGERY | Age: 86
End: 2021-02-22

## 2021-02-22 ENCOUNTER — HOSPITAL ENCOUNTER (OUTPATIENT)
Dept: VASCULAR LAB | Age: 86
Discharge: HOME | End: 2021-02-22
Payer: MEDICARE

## 2021-02-22 VITALS
DIASTOLIC BLOOD PRESSURE: 73 MMHG | HEIGHT: 63 IN | OXYGEN SATURATION: 93 % | HEART RATE: 73 BPM | SYSTOLIC BLOOD PRESSURE: 134 MMHG | WEIGHT: 120 LBS | TEMPERATURE: 98.6 F | BODY MASS INDEX: 21.26 KG/M2 | RESPIRATION RATE: 16 BRPM

## 2021-02-22 DIAGNOSIS — I65.23 BILATERAL CAROTID ARTERY STENOSIS: Primary | ICD-10-CM

## 2021-02-22 DIAGNOSIS — I65.23 BILATERAL CAROTID ARTERY STENOSIS: ICD-10-CM

## 2021-02-22 PROCEDURE — 1036F TOBACCO NON-USER: CPT | Performed by: PHYSICIAN ASSISTANT

## 2021-02-22 PROCEDURE — G8400 PT W/DXA NO RESULTS DOC: HCPCS | Performed by: PHYSICIAN ASSISTANT

## 2021-02-22 PROCEDURE — G8484 FLU IMMUNIZE NO ADMIN: HCPCS | Performed by: PHYSICIAN ASSISTANT

## 2021-02-22 PROCEDURE — 1090F PRES/ABSN URINE INCON ASSESS: CPT | Performed by: PHYSICIAN ASSISTANT

## 2021-02-22 PROCEDURE — 93880 EXTRACRANIAL BILAT STUDY: CPT

## 2021-02-22 PROCEDURE — 4040F PNEUMOC VAC/ADMIN/RCVD: CPT | Performed by: PHYSICIAN ASSISTANT

## 2021-02-22 PROCEDURE — G8420 CALC BMI NORM PARAMETERS: HCPCS | Performed by: PHYSICIAN ASSISTANT

## 2021-02-22 PROCEDURE — G8427 DOCREV CUR MEDS BY ELIG CLIN: HCPCS | Performed by: PHYSICIAN ASSISTANT

## 2021-02-22 PROCEDURE — 1123F ACP DISCUSS/DSCN MKR DOCD: CPT | Performed by: PHYSICIAN ASSISTANT

## 2021-02-22 PROCEDURE — 99203 OFFICE O/P NEW LOW 30 MIN: CPT | Performed by: PHYSICIAN ASSISTANT

## 2021-02-23 DIAGNOSIS — I65.23 BILATERAL CAROTID ARTERY STENOSIS: Primary | ICD-10-CM

## 2021-02-23 NOTE — PROGRESS NOTES
Patient Care Team:  Radames Homans, DO as PCP - General (Internal Medicine)  Noemi Bran MD as Referring Physician (Family Medicine)  Junior Ramirez MD as Consulting Physician (Neurology)  LAYTON Love as Advanced Practice Nurse (Gastroenterology)  LAYTON Oreilly as Nurse Practitioner (Family Nurse Practitioner)  Arnoldo Tee MD as Consulting Physician (Vascular Surgery)       Meliza Jj (:  1935) is a 80 y.o. female,New patient, here for evaluation of the following chief complaint(s):  Carotid Artery Stenosis. SUBJECTIVE/OBJECTIVE:  Mrs. Karen Bran is a 81 yo female who presents today as a referral from Dr. Stefani Conway for Carotid Artery Stenosis. She had imaging done after a fall. The CT cervical spine showed severe  atheromatous changes noted in bilateral ICA's. She also had a CT lumbar spine and CT A/P that showed severe atheromatous changes to the abdominal aorta and iliac arteries. She is not on antiplatelet and statin therapy. She reports a history of CVA. She denies any new onset of partial or complete loss of vision affecting only one eye, speech difficulty or lateralizing weakness, numbness/tingling. No severe dizziness or syncopal episodes. She denies any lifestyle limiting claudication, IRP nor nonhealing wounds.      Meliza Анна is a 80 y.o. female with the following history as recorded in [facility]:  Patient Active Problem List    Diagnosis Date Noted    Symptomatic bradycardia 2021     Priority: High    Syncope and collapse 2020     Priority: High    Cryptogenic stroke [facility])      Priority: High    Status post placement of implantable loop recorder 2020    Vasovagal syncope 10/15/2020    Bradycardia 10/15/2020    Overactive bladder 2020    Abnormal EKG 2020    Moderate mitral regurgitation 2020    Chest pain 2020    Essential hypertension 2020    Chronic bilateral low back pain with left-sided sciatica 12/19/2019    Arthralgia of multiple joints 12/19/2019    Bloating 11/20/2019    Palliative care patient 09/17/2019    Gait abnormality 09/15/2019    Risk for falls 09/13/2019    Dry age-related macular degeneration of left eye     Age-related macular degeneration, wet, right eye (HCC)     Partial symptomatic epilepsy with complex partial seizures, intractable, without status epilepticus ([facility] 09/03/2019    Cerebrovascular small vessel disease 09/03/2019    Vascular dementia without behavioral disturbance ([facility] 09/03/2019    Chronic bilateral lower abdominal pain 05/03/2019    Gas pain 05/03/2019    History of ischemic colitis 05/03/2019    Weakness     Metabolic encephalopathy 55/54/7425    Seizure as late effect of cerebrovascular accident (CVA) ([facility] 07/21/2016    Hyponatremia 07/16/2016    Late onset Alzheimer's disease without behavioral disturbance ([facility] 07/16/2016    Altered mental status     Irritable bowel syndrome with diarrhea 03/17/2016    S/P laparoscopic cholecystectomy 03/17/2016     Current Outpatient Medications   Medication Sig Dispense Refill    gabapentin (NEURONTIN) 100 MG capsule Take 100 mg by mouth 2 times daily as needed (sciatic pain and burning sensation).  Can only tolerate 100 mg;      cloNIDine (CATAPRES) 0.1 MG tablet Take 1 tablet by mouth 2 times daily as needed for High Blood Pressure (for BP greater than 160/100) 30 tablet 2    pantoprazole (PROTONIX) 40 MG tablet Take 40 mg by mouth daily Indications: 9am       acetaminophen (TYLENOL) 500 MG tablet Take 1,000 mg by mouth Indications: 2 tablets at 6:00am but not more than 3,000mg per day      meloxicam (MOBIC) 7.5 MG tablet Take 15 mg by mouth daily Indications: takes at 9am       donepezil (ARICEPT) 10 MG tablet Take 1 tablet by mouth nightly (Patient taking differently: Take 10 mg by mouth nightly Indications: takes 9pm ) 90 tablet 3    Docosanol (ABREVA EX) Apply topically      levETIRAcetam (KEPPRA) 500 MG tablet Take 1 tablet by mouth 2 times daily (Patient taking differently: Take 500 mg by mouth 2 times daily Indications: takes 9am and 9pm ) 60 tablet 11    dipyridamole (PERSANTINE) 50 MG tablet Take 2 tablets by mouth 2 times daily (Patient taking differently: Take 100 mg by mouth 2 times daily Indications: 9am and 9pm ) 120 tablet 11    hydrALAZINE (APRESOLINE) 25 MG tablet Take 25 mg by mouth 4 times daily Indications: takes at 9am, 3pm, and 9pm       nitrofurantoin (MACRODANTIN) 50 MG capsule TAKE 1 CAPSULE AT BEDTIME (Patient taking differently: Indications: 9pm TAKE 1 CAPSULE AT BEDTIME) 30 capsule 3    simethicone (GAS-X EXTRA STRENGTH) 125 MG chewable tablet Take 125 mg by mouth 2 PO 5:30 AM,1- 2 10:30 AM, 1 4:00 PM and PRN      butalbital-acetaminophen-caffeine (FIORICET, ESGIC) -40 MG per tablet Take 1 tablet by mouth every 6 hours as needed for Headaches       amLODIPine (NORVASC) 5 MG tablet Take 5 mg by mouth 2 times daily Indications: takes at 9am and 9pm       diclofenac sodium 1 % GEL Apply 2 g topically 4 times daily as needed for Pain      sodium chloride 1 g tablet Take 1 g by mouth 3 times daily Indications: takes 9am, 3pm, and 9pm       lidocaine 4 % external patch Place 1 patch onto the skin daily as needed (LEFT LOWER BACK PAIN)      NONFORMULARY Indications: dic 3%, lidocaine 2%, cyclo 2%, prilo 2% baclo 2% 1 to 2 pumps up to TID PRN       desonide (DESOWEN) 0.05 % cream Apply topically as needed Apply topically 2 times daily.  mupirocin (BACTROBAN) 2 % cream Apply topically as needed Apply topically 3 times daily.       valACYclovir (VALTREX) 1 g tablet as needed       Psyllium (METAMUCIL FIBER PO) Take by mouth nightly Indications: takes 9pm       azelastine (ASTELIN) 0.1 % nasal spray 2 sprays by Nasal route 2 times daily Indications: takes 9am and 9pm       Cholecalciferol (VITAMIN D3) 5000 units TABS Take 5,000 Units by mouth daily Indications: Hx     Liver Cancer Neg Hx     Liver Disease Neg Hx     Rectal Cancer Neg Hx     Stomach Cancer Neg Hx      Social History     Tobacco Use    Smoking status: Never Smoker    Smokeless tobacco: Never Used   Substance Use Topics    Alcohol use: Never     Frequency: Never       Eyes - no sudden vision change or amaurosis. Respiratory - no significant shortness of breath,  Cardiovascular - no chest pain or syncope. No  significant leg swelling.  (see HPI)  Musculoskeletal - no gait disturbance  Skin - no new wound. Neurologic -  No speech difficulty or lateralizing weakness. (see HPI)  All other review of systems are negative. Physical Exam    /73 (Site: Left Upper Arm)   Pulse 73   Temp 98.6 °F (37 °C)   Resp 16   Ht 5' 3\" (1.6 m)   Wt 120 lb (54.4 kg)   SpO2 93%   BMI 21.26 kg/m²       Neck- carotid pulses 2+ to palpation with no bruit  Cardiovascular - Regular rate and rhythm. Pulmonary - effort appears normal.  No respiratory distress. Lungs - Breath sounds normal. No wheezes or rales. Extremities - without edema   Neurologic - alert and oriented X 3. Physiologic. Face symmetric. Skin - warm, dry, and intact. no wounds. Psychiatric - mood, affect, and behavior appear normal.  Judgment and thought processes appear normal.    Risk factors for atherosclerosis of all vascular beds have been reviewed with the patient including:  Family history, tobacco abuse in all forms, elevated cholesterol, hyperlipidemia, and diabetes. ASSESSMENT/PLAN:      1. Carotid Artery Stenosis  2.  Athersclerosis of bilateral LE native arteries with claudication      Recommend consider initiation of statin therapy  Recommend no smoking  Recommend good BP control  Recommend low fat, low cholesterol diet  Recommend daily exercise in moderation  We will obtain a CDU and call her with the results and further recommendations      Addendum:  CDU -      Impression        There is smooth plaque visualized in the bilateral internal carotid    artery(ies).   Vita Martin is no stenosis in the right internal carotid artery.   Vita Martin is no stenosis of the left internal carotid artery.    There is normal antegrade flow in the bilateral vertebral artery(ies).        Signature        ----------------------------------------------------------------    Electronically signed by Terry Guadalupe MD(Interpreting    physician) on 02/22/2021 07:40 PM    ----------------------------------------------------------------       Blood Pressure:Right arm 170/90 mmHg. Left arm 178/90 mmHg.       Velocities are measured in cm/s ; Diameters are measured in mm       Carotid Right Measurements   +------------+-------+-------+--------+-------+------------+---------------+   ! Location    !PSV    !EDV    ! Angle   !RI     !%Stenosis   ! Tortuosity     !   +------------+-------+-------+--------+-------+------------+---------------+   ! Prox CCA    !81.5   !11.7   !60      !0.86   !            !               !   +------------+-------+-------+--------+-------+------------+---------------+   ! Mid CCA     !90.9   !12.9   !60      !0.86   !            !               !   +------------+-------+-------+--------+-------+------------+---------------+   ! Prox ICA    !55     !12.6   !60      !0.77   !            !               !   +------------+-------+-------+--------+-------+------------+---------------+   ! Mid ICA     !75     !16.4   !60      !0.78   !            !               !   +------------+-------+-------+--------+-------+------------+---------------+   ! Dist ICA    !72.7   !17.6   !60      !0.76   !            !               !   +------------+-------+-------+--------+-------+------------+---------------+   ! Prox ECA    !81.7   !       !60      !       !            !               !   +------------+-------+-------+--------+-------+------------+---------------+   ! Vertebral   !50.7   !9.82   !60      !0.81   !            !               ! +------------+-------+-------+--------+-------+------------+---------------+         - There is antegrade vertebral flow noted on the right side.         - Additional Measurements:ICAPSV/CCAPSV 0.92. ICAEDV/CCAEDV 1.5.       Carotid Left Measurements   +------------+-------+-------+--------+-------+------------+---------------+   ! Location    !PSV    !EDV    ! Angle   !RI     !%Stenosis   ! Tortuosity     !   +------------+-------+-------+--------+-------+------------+---------------+   ! Prox CCA    !75.4   !7.86   !60      !0.9    !            !               !   +------------+-------+-------+--------+-------+------------+---------------+   ! Mid CCA     !80.3   !13.5   !60      !0.83   !            !               !   +------------+-------+-------+--------+-------+------------+---------------+   ! Prox ICA    !60.5   !10.2   !60      !0.83   !            !               !   +------------+-------+-------+--------+-------+------------+---------------+   ! Mid ICA     !62.5   !15.3   !60      !0.76   !            !               !   +------------+-------+-------+--------+-------+------------+---------------+   ! Dist ICA    !78.6   !18.9   !60      !0.76   !            !               !   +------------+-------+-------+--------+-------+------------+---------------+   ! Prox ECA    !115    !11.1   !60      !0.9    !            !               !   +------------+-------+-------+--------+-------+------------+---------------+   ! Vertebral   !37.3   !8.64   !60      !0.77   !            !               !   +------------+-------+-------+--------+-------+------------+---------------+         - There is antegrade vertebral flow noted on the left side.         - Additional Measurements:ICAPSV/CCAPSV 1.04. ICAEDV/CCAEDV 2.4.           We will call her with the results and recommendations for follow up     for An electronic signature was used to authenticate this note.     --Luz Willson PA-C

## 2021-02-24 ENCOUNTER — TELEPHONE (OUTPATIENT)
Dept: VASCULAR SURGERY | Age: 86
End: 2021-02-24

## 2021-02-24 NOTE — TELEPHONE ENCOUNTER
----- Message from Samantha Spann PA-C sent at 2/23/2021  4:01 PM CST -----  Mary,can you call and let her know the carotid us showed no significant blocking in her carotid arteries. We will check thi again in 12 months. Continue statin daily. Patient was instructed to go to ER or call office immediately with any new onset of partial or complete loss of vision affecting only one eye, speech difficulty or lateralizing weakness, numbness/tingling. I have placed order for CDU.

## 2021-02-24 NOTE — TELEPHONE ENCOUNTER
I called patient and spoke to daughter and gave study results. Daughter voiced her understanding and agreed.

## 2021-03-01 ENCOUNTER — TELEPHONE (OUTPATIENT)
Dept: VASCULAR SURGERY | Age: 86
End: 2021-03-01

## 2021-03-01 DIAGNOSIS — I73.9 PVD (PERIPHERAL VASCULAR DISEASE) WITH CLAUDICATION (HCC): ICD-10-CM

## 2021-03-01 DIAGNOSIS — I70.203 ATHEROSCLEROSIS OF NATIVE ARTERY OF BOTH LOWER EXTREMITIES, WITH UNSPECIFIED PRESENCE OF CLINICAL MANIFESTATION (HCC): ICD-10-CM

## 2021-03-01 DIAGNOSIS — M79.89 LEFT LEG SWELLING: ICD-10-CM

## 2021-03-01 DIAGNOSIS — M79.605 LEFT LEG PAIN: Primary | ICD-10-CM

## 2021-03-01 NOTE — TELEPHONE ENCOUNTER
Daughter Chelsea Habermann called and stated that patient is having extreme pain in left foot. When she gets up to walk to bathroom her left leg almost evaristo due to pain in foot. The pain is on top of foot (like she is tied to tight), she has no shoe or sock on foot. Daughter isn't she if it might be vascular related or something else. Please call with recommendation.

## 2021-03-01 NOTE — TELEPHONE ENCOUNTER
I called and scheduled a TIBURCIO for patient for 03/12/21 at 1:30pm. I c alled daughter Kat Jenkins and she voiced her understanding and agreed.

## 2021-03-03 ENCOUNTER — HOSPITAL ENCOUNTER (OUTPATIENT)
Dept: VASCULAR LAB | Age: 86
Discharge: HOME | End: 2021-03-03
Payer: MEDICARE

## 2021-03-03 DIAGNOSIS — M79.605 LEFT LEG PAIN: ICD-10-CM

## 2021-03-03 DIAGNOSIS — M79.89 LEFT LEG SWELLING: ICD-10-CM

## 2021-03-03 DIAGNOSIS — I73.9 PVD (PERIPHERAL VASCULAR DISEASE) WITH CLAUDICATION (HCC): ICD-10-CM

## 2021-03-03 DIAGNOSIS — I70.203 ATHEROSCLEROSIS OF NATIVE ARTERY OF BOTH LOWER EXTREMITIES, WITH UNSPECIFIED PRESENCE OF CLINICAL MANIFESTATION (HCC): ICD-10-CM

## 2021-03-03 PROCEDURE — 93923 UPR/LXTR ART STDY 3+ LVLS: CPT

## 2021-03-04 ENCOUNTER — TELEPHONE (OUTPATIENT)
Dept: VASCULAR SURGERY | Age: 86
End: 2021-03-04

## 2021-03-04 NOTE — TELEPHONE ENCOUNTER
Daughter called Ab Meléndez) her mother is having pain in her legs since she had her TIBURCIO done. Per Meeta Preciado they could go to ER if the pain is severe, or they can come in first thing in the morning to be evaluated. They opted to come into the office to be evaluated. Patient is scheduled for 11:00am on 3/5/21.

## 2021-03-05 ENCOUNTER — OFFICE VISIT (OUTPATIENT)
Dept: VASCULAR SURGERY | Age: 86
End: 2021-03-05
Payer: MEDICARE

## 2021-03-05 VITALS
WEIGHT: 120 LBS | RESPIRATION RATE: 16 BRPM | BODY MASS INDEX: 21.26 KG/M2 | SYSTOLIC BLOOD PRESSURE: 140 MMHG | TEMPERATURE: 98.4 F | DIASTOLIC BLOOD PRESSURE: 74 MMHG | HEART RATE: 74 BPM

## 2021-03-05 DIAGNOSIS — I70.213 ATHEROSCLEROSIS OF NATIVE ARTERY OF BOTH LOWER EXTREMITIES WITH INTERMITTENT CLAUDICATION (HCC): Primary | ICD-10-CM

## 2021-03-05 DIAGNOSIS — I73.9 PVD (PERIPHERAL VASCULAR DISEASE) (HCC): Primary | ICD-10-CM

## 2021-03-05 PROCEDURE — G8400 PT W/DXA NO RESULTS DOC: HCPCS | Performed by: PHYSICIAN ASSISTANT

## 2021-03-05 PROCEDURE — G8484 FLU IMMUNIZE NO ADMIN: HCPCS | Performed by: PHYSICIAN ASSISTANT

## 2021-03-05 PROCEDURE — G8427 DOCREV CUR MEDS BY ELIG CLIN: HCPCS | Performed by: PHYSICIAN ASSISTANT

## 2021-03-05 PROCEDURE — 99213 OFFICE O/P EST LOW 20 MIN: CPT | Performed by: PHYSICIAN ASSISTANT

## 2021-03-05 PROCEDURE — G8420 CALC BMI NORM PARAMETERS: HCPCS | Performed by: PHYSICIAN ASSISTANT

## 2021-03-05 PROCEDURE — 4040F PNEUMOC VAC/ADMIN/RCVD: CPT | Performed by: PHYSICIAN ASSISTANT

## 2021-03-05 PROCEDURE — 1123F ACP DISCUSS/DSCN MKR DOCD: CPT | Performed by: PHYSICIAN ASSISTANT

## 2021-03-05 PROCEDURE — 1036F TOBACCO NON-USER: CPT | Performed by: PHYSICIAN ASSISTANT

## 2021-03-05 PROCEDURE — 1090F PRES/ABSN URINE INCON ASSESS: CPT | Performed by: PHYSICIAN ASSISTANT

## 2021-03-08 ENCOUNTER — HOSPITAL ENCOUNTER (OUTPATIENT)
Dept: CT IMAGING | Age: 86
Discharge: HOME | End: 2021-03-08
Payer: MEDICARE

## 2021-03-08 ENCOUNTER — HOSPITAL ENCOUNTER (OUTPATIENT)
Dept: ULTRASOUND IMAGING | Age: 86
Discharge: HOME | End: 2021-03-08
Payer: MEDICARE

## 2021-03-08 DIAGNOSIS — E04.1 THYROID NODULE: ICD-10-CM

## 2021-03-08 DIAGNOSIS — I70.213 ATHEROSCLEROSIS OF NATIVE ARTERY OF BOTH LOWER EXTREMITIES WITH INTERMITTENT CLAUDICATION (HCC): ICD-10-CM

## 2021-03-08 PROCEDURE — 6360000004 HC RX CONTRAST MEDICATION: Performed by: PHYSICIAN ASSISTANT

## 2021-03-08 PROCEDURE — 2500000003 HC RX 250 WO HCPCS: Performed by: PHYSICIAN ASSISTANT

## 2021-03-08 PROCEDURE — 75635 CT ANGIO ABDOMINAL ARTERIES: CPT

## 2021-03-08 PROCEDURE — 2580000003 HC RX 258: Performed by: PHYSICIAN ASSISTANT

## 2021-03-08 PROCEDURE — 76536 US EXAM OF HEAD AND NECK: CPT

## 2021-03-08 RX ADMIN — IOPAMIDOL 90 ML: 755 INJECTION, SOLUTION INTRAVENOUS at 11:25

## 2021-03-08 RX ADMIN — SODIUM BICARBONATE: 84 INJECTION, SOLUTION INTRAVENOUS at 09:00

## 2021-03-08 NOTE — PROGRESS NOTES
Patient iv fluids complete. Thyroid ultrasound complete. Iv removed and patient discharged with daughter is stable condition. Patient assisted to car in wheel chair. Discharge papers signed by daughter Lakeisha Taylor.

## 2021-03-10 DIAGNOSIS — N28.9 RENAL INSUFFICIENCY: Primary | ICD-10-CM

## 2021-03-11 ENCOUNTER — NURSE ONLY (OUTPATIENT)
Dept: UROLOGY | Age: 86
End: 2021-03-11
Payer: MEDICARE

## 2021-03-11 DIAGNOSIS — N32.81 OAB (OVERACTIVE BLADDER): Primary | ICD-10-CM

## 2021-03-11 LAB
ANION GAP SERPL CALCULATED.3IONS-SCNC: 14 MMOL/L (ref 7–19)
BUN BLDV-MCNC: 17 MG/DL (ref 8–23)
CALCIUM SERPL-MCNC: 10 MG/DL (ref 8.8–10.2)
CHLORIDE BLD-SCNC: 97 MMOL/L (ref 98–111)
CO2: 26 MMOL/L (ref 22–29)
CREAT SERPL-MCNC: 0.8 MG/DL (ref 0.5–0.9)
GFR AFRICAN AMERICAN: >59
GFR NON-AFRICAN AMERICAN: >60
GLUCOSE BLD-MCNC: 83 MG/DL (ref 74–109)
POTASSIUM SERPL-SCNC: 3.6 MMOL/L (ref 3.5–5)
SODIUM BLD-SCNC: 137 MMOL/L (ref 136–145)

## 2021-03-11 PROCEDURE — 64566 NEUROELTRD STIM POST TIBIAL: CPT | Performed by: NURSE PRACTITIONER

## 2021-03-11 NOTE — PROGRESS NOTES
Treatment: maintenance    Improvement of Symptoms? Mild    OAB/Urologic Medications? no      Uroplasty Procedure:    1. Patient is seated with the right treatment leg elevated. 2. 34 gauge fine needle electrode is inserted into lower inner aspect of the leg. Slightly cephalad to the medial malleolus. 3. Surface electrode pad is placed over the medial aspect of the calcaneus on the same leg. 4.Needle electrode is connected to the external pulse generator. 5.The pulse generator is turned on and the millivolts used are 5. 6.Patient is treated for 30 minutes. 7.The needle and pad are removed. Patient will follow up in 23-24 days for next treatment.

## 2021-03-12 ENCOUNTER — TELEPHONE (OUTPATIENT)
Dept: VASCULAR SURGERY | Age: 86
End: 2021-03-12

## 2021-03-12 NOTE — TELEPHONE ENCOUNTER
----- Message from Sina Powell PA-C sent at 3/12/2021  8:22 AM CST -----  Please call Mrs. Sid Law daughter Tc Servin and let her know that her kidney function is normal. SHANTAL

## 2021-03-12 NOTE — TELEPHONE ENCOUNTER
I called Elodia Jake (daughter) and gave her the results of study. Elodia Joseph voiced her understanding.

## 2021-03-18 RX ORDER — DIPYRIDAMOLE 50 MG
100 TABLET ORAL 2 TIMES DAILY
Qty: 60 TABLET | Refills: 11 | Status: SHIPPED | OUTPATIENT
Start: 2021-03-18 | End: 2021-09-16 | Stop reason: SDUPTHER

## 2021-03-18 RX ORDER — LEVETIRACETAM 500 MG/1
500 TABLET ORAL 2 TIMES DAILY
Qty: 60 TABLET | Refills: 11 | Status: SHIPPED | OUTPATIENT
Start: 2021-03-18 | End: 2022-03-11 | Stop reason: SDUPTHER

## 2021-03-20 PROBLEM — I73.9 PVD (PERIPHERAL VASCULAR DISEASE) (HCC): Status: ACTIVE | Noted: 2021-03-20

## 2021-03-20 NOTE — PROGRESS NOTES
Patient Care Team:  Hamilton Lopez DO as PCP - General (Internal Medicine)  King Mansi MD as Referring Physician (Family Medicine)  Bambi Prajapati MD as Consulting Physician (Neurology)  LAYTON Ríos as Advanced Practice Nurse (Gastroenterology)  LAYTON Bautista as Nurse Practitioner (Family Nurse Practitioner)  Shawn Mckeon MD as Consulting Physician (Vascular Surgery)       Ronit Chiang (:  1935) is a 80 y.o. female,New patient, here for evaluation of the following chief complaint(s):  Carotid Artery Stenosis. SUBJECTIVE/OBJECTIVE:  Mrs. Benjamin Sharma is a 81 yo female who has a history of Carotid Artery Stenosis and PVD. She presents today for evaluation of left dorsal  foot pain. She reports that when she tries to walk her left leg wants to buckle.      Ronit Chiang is a 80 y.o. female with the following history as recorded in EpicCare:  Patient Active Problem List    Diagnosis Date Noted    Symptomatic bradycardia 2021     Priority: High    Syncope and collapse 2020     Priority: High    Cryptogenic stroke [facility])      Priority: High    Bilateral carotid artery stenosis 2021    Status post placement of implantable loop recorder 2020    Vasovagal syncope 10/15/2020    Bradycardia 10/15/2020    Overactive bladder 2020    Abnormal EKG 2020    Moderate mitral regurgitation 2020    Chest pain 2020    Essential hypertension 2020    Chronic bilateral low back pain with left-sided sciatica 2019    Arthralgia of multiple joints 2019    Bloating 2019    Palliative care patient 2019    Gait abnormality 09/15/2019    Risk for falls 2019    Dry age-related macular degeneration of left eye     Age-related macular degeneration, wet, right eye ([address]     Partial symptomatic epilepsy with complex partial seizures, intractable, without status epilepticus ([address] 2019    (FIORICET, ESGIC) -40 MG per tablet Take 1 tablet by mouth every 6 hours as needed for Headaches       amLODIPine (NORVASC) 5 MG tablet Take 5 mg by mouth 2 times daily Indications: takes at 9am and 9pm       diclofenac sodium 1 % GEL Apply 2 g topically 4 times daily as needed for Pain      sodium chloride 1 g tablet Take 1 g by mouth 3 times daily Indications: takes 9am, 3pm, and 9pm       lidocaine 4 % external patch Place 1 patch onto the skin daily as needed (LEFT LOWER BACK PAIN)      NONFORMULARY Indications: dic 3%, lidocaine 2%, cyclo 2%, prilo 2% baclo 2% 1 to 2 pumps up to TID PRN       desonide (DESOWEN) 0.05 % cream Apply topically as needed Apply topically 2 times daily.  mupirocin (BACTROBAN) 2 % cream Apply topically as needed Apply topically 3 times daily.  valACYclovir (VALTREX) 1 g tablet as needed       Psyllium (METAMUCIL FIBER PO) Take by mouth nightly Indications: takes 9pm       azelastine (ASTELIN) 0.1 % nasal spray 2 sprays by Nasal route 2 times daily Indications: takes 9am and 9pm       Cholecalciferol (VITAMIN D3) 5000 units TABS Take 5,000 Units by mouth daily Indications: AT 2:30 PM       polyvinyl alcohol-povidone (HYPOTEARS) 1.4-0.6 % ophthalmic solution Place 1-2 drops into both eyes as needed      sodium chloride (OCEAN, BABY AYR) 0.65 % nasal spray 1 spray by Nasal route as needed for Congestion      tobramycin-dexamethasone (TOBRADEX) 0.3-0.1 % ophthalmic ointment Place into the left eye as needed 3 times daily.  Lutein 20 MG TABS Take 20 mg by mouth daily Indications: 3 PM DAILY       fluticasone (VERAMYST) 27.5 MCG/SPRAY nasal spray 2 sprays by Nasal route 2 times daily Indications: takes 9am and 9pm       camphor-menthol (SARNA) 0.5-0.5 % lotion Apply 0.5 mLs topically 2 times daily as needed for Itching Apply topically as needed.       Calcium Carb-Cholecalciferol (CALCIUM 600+D) 600-800 MG-UNIT TABS Take 1 tablet by mouth daily care patient 2019    Risk for falls 2019    Seizures ([address]     Spastic colon     Status post placement of implantable loop recorder 10/27/2020    Stroke syndrome     Urinary incontinence     Uterine cancer [facility])      Past Surgical History:   Procedure Laterality Date    BREAST SURGERY Right     FNA Right Breast \"oh heavens, maybe 20 years ago\"    CHOLECYSTECTOMY      COLONOSCOPY  03    Dr Kai Oconnor ischemic colitis, incomplete exam    COLONOSCOPY  2003    Dr Nova Oroscohal:  limited colon-ischemic colitis    DILATION AND CURETTAGE OF UTERUS      x2, in [facility] tears, laser suregry, adn cataract with IOL b/l    HYSTERECTOMY      ovaries and tubes    INSERTABLE CARDIAC MONITOR      LUMBAR FUSION       90 days after the spine surgeries    OTHER SURGICAL HISTORY      inner lymph nodes    SPINE SURGERY      L3,4, and 5   done in    [place] [address] 76      as a child at age 9    [address]  2003    DR Ace Forward:  Duodenal scarring but no evidence of active ulcer disease. Family History   Problem Relation Age of Onset    Heart Defect Mother     Hypertension Mother     Heart Attack Father         x3 within 24 h and then , abused ciggarettes    No Known Problems Daughter     No Known Problems Son     No Known Problems Son     Colon Cancer Neg Hx     Colon Polyps Neg Hx     Esophageal Cancer Neg Hx     Liver Cancer Neg Hx     Liver Disease Neg Hx     Rectal Cancer Neg Hx     Stomach Cancer Neg Hx      Social History     Tobacco Use    Smoking status: Never Smoker    Smokeless tobacco: Never Used   Substance Use Topics    Alcohol use: Never     Frequency: Never       Eyes - no sudden vision change or amaurosis. Respiratory - no significant shortness of breath,  Cardiovascular - no chest pain or syncope.   No  significant leg swelling.  (see HPI)  Musculoskeletal - no gait disturbance  Skin - no new wound. Neurologic -  No speech difficulty or lateralizing weakness. All other review of systems are negative. Physical Exam    BP (!) 140/74 (Site: Right Upper Arm, Position: Sitting, Cuff Size: Medium Adult)   Pulse 74   Temp 98.4 °F (36.9 °C) (Temporal)   Resp 16   Wt 120 lb (54.4 kg)   BMI 21.26 kg/m²       Neck- carotid pulses 2+ to palpation with no bruit  Cardiovascular - Regular rate and rhythm. Pulmonary - effort appears normal.  No respiratory distress. Lungs - Breath sounds normal. No wheezes or rales. Extremities - without edema. DP and PT puleses are palpable. No wounds noted. Neurologic - alert and oriented X 3. Physiologic. Face symmetric. Skin - warm, dry, and intact. no wounds. Psychiatric - mood, affect, and behavior appear normal.  Judgment and thought processes appear normal.    Risk factors for atherosclerosis of all vascular beds have been reviewed with the patient including:  Family history, tobacco abuse in all forms, elevated cholesterol, hyperlipidemia, and diabetes. TIBURCIO - 3/3/21      Impression        Based on ankle-brachial indices and doppler waveforms, the patient has    relatively normal flow to the bilateral lower extremity arterial system at   [facility]   [place] is a small drop in ankle-brachial indices in the bilateral lower    extremity(ies) after exercise which would be consistent with claudication.        Signature        ----------------------------------------------------------------    Electronically signed by Vincent Bowman(Interpreting    physician) on 03/03/2021 06:23 PM    ----------------------------------------------------------------       Velocities are measured in cm/s ; Diameters are measured in mm       Pressures   +--------------------------------------++--------+-----+----+--------+-----+   !                                      ! ! Right   !     !Left!        !     ! +--------------------------------------++--------+-----+----+--------+-----+   ! Location                              ! ! Pressure! Ratio!    !Pressure! Ratio! +--------------------------------------++--------+-----+----+--------+-----+   ! Upper Thigh                           !!219     !1.51 !    !255     !1.76 ! +--------------------------------------++--------+-----+----+--------+-----+   ! Lower Thigh                           !!200     !1.38 !    !192     !1.32 ! +--------------------------------------++--------+-----+----+--------+-----+   ! Calf                                  !!209     !1.44 !    !204     !1.41 ! +--------------------------------------++--------+-----+----+--------+-----+   ! Ankle PT                              !!168     !1.16 !    !190     !1.31 !   +--------------------------------------++--------+-----+----+--------+-----+   ! DP                                    !!168     !1.16 !    !179     !1.23 !   +--------------------------------------++--------+-----+----+--------+-----+   ! Great Toe                             !!120     !0.83 !    !139     !0.96 !   +--------------------------------------++--------+-----+----+--------+-----+         - Brachial Pressure:Right: 145. Left:132.         - JAIME:Right: 1.16. Left: 1.31.       Plethysmographic Digit Evaluation   +---------++--------+-----+---------------++--------+-----+----------------+   !         ! ! Right   !     ! Left           !!        !     !                !   +---------++--------+-----+---------------++--------+-----+----------------+   ! Location ! !Pressure! Ratio! PPG Wave Form  !!Pressure! Ratio! PPG Wave Form   !   +---------++--------+-----+---------------++--------+-----+----------------+   ! Great Toe!!120     !0.83 !               !!139     !0.96 !                !   +---------++--------+-----+---------------++--------+-----+----------------+       Post Exercise     +------------+----+------------+---------+--------+------------+-----------+   !            !    ! Right       !         ! Left    !            !           !   +------------+----+------------+---------+--------+------------+-----------+   ! Location    !    !Pressure    !Ratio    !        !Pressure    !Ratio      !   +------------+----+------------+---------+--------+------------+-----------+   ! PTA         !    !163         !1.04     !        !189         !1.2        !   +------------+----+------------+---------+--------+------------+-----------+     - Brachial Pressure:Right: 157.         - JAIME:Right: 1.04. Left: 1.2. ASSESSMENT/PLAN:      1. Athersclerosis of bilateral LE native arteries with claudication      Recommend she continue Persantine  Recommend consider initiation of statin therapy  Recommend no smoking  Recommend good BP control  Recommend low fat, low cholesterol diet  Recommend daily exercise in moderation  We discussed a A-gram.  Her daughter reports that they have an upcoming appointment with her PCP and wish to speak with him before making a decision. Addendum: We will obtain a CTA aorta with runoff. I have spoke amita Jade with Nephrology regarding her RI. We will pre treat and proceed. Addendum:  CTA aorta with runoff - (revewed by Dr. Giovanny Farley)  CTA ABDOMINAL AORTA W BILAT RUNOFF W CONTRAST    3/8/2021 12:09 PM   History: Atherosclerotic disease. Claudication. In order to have a CT radiation dose as low as reasonably achievable   Automated Exposure Control was utilized for adjustment of the mA   and/or KV according to patient size. DLP in mGycm= 3132. CT angiography protocol. CT imaging with bolus IV contrast injection. Under concurrent supervision axial, sagittal, coronal, and   three-dimensional data sets were constructed. Comparison is made with 15 December 2020. Noncontrast:   Extensive aortic calcification.    L3-L5 postsurgical change with metal hardware and streak artifact. Cardiac pacer leads. Pelvic phleboliths. Postcontrast:   Chronic interstitial disease at the lung bases. Heart size is at the upper limits of normal.   Hiatal hernia noted. Normal appearance of the liver, pancreas, spleen, adrenal glands, and   kidneys. Upper aorta = 23 x 24 mm. Mid aorta = 19 x 17 mm. Distal aorta = 16 x 15 mm. Normally patent and nonaneurysmal iliac arteries. Symmetric and normally patent common femoral arteries. The deep femoral arteries are normal and symmetric. Motion artifact limits a few of the lower extremity images. The superficial femoral arteries are symmetric and normally patent. Symmetric and normally patent popliteal arteries. Anterior and posterior tibial branches are patent on the right. The   peroneal artery is occluded at its origin. The peroneal artery provides the dominant runoff to the left ankle. Moderate atherosclerotic calcification with at least moderate stenosis   of the right renal artery origin. Mild narrowing of the SMA and celiac origins.       Impression   1. Extensive calcification of the abdominal aorta with no aneurysm or   dissection. 2. Normally patent iliac, femoral, and popliteal arteries. 3. Occlusive disease distally with 2 vessel runoff on the right and   single vessel runoff on the left. Signed by Dr Nathalie Zamarripa on 3/8/2021 12:19 PM           Addendum:  I spoke with Josefina Krishnan the patient's daughter. We discussed the results of the CTA and Dr. Burt Garcia recommendations. We recommends follow up in 12 months with a repeat TIBURCIO or sooner if claudication worsens/develops, develops new wound or IRP. for An electronic signature was used to authenticate this note.     --Caleb Willson PA-C

## 2021-03-25 ENCOUNTER — OFFICE VISIT (OUTPATIENT)
Dept: NEUROLOGY | Age: 86
End: 2021-03-25
Payer: MEDICARE

## 2021-03-25 VITALS
RESPIRATION RATE: 14 BRPM | WEIGHT: 118 LBS | BODY MASS INDEX: 23.16 KG/M2 | SYSTOLIC BLOOD PRESSURE: 131 MMHG | HEIGHT: 60 IN | DIASTOLIC BLOOD PRESSURE: 67 MMHG | HEART RATE: 78 BPM

## 2021-03-25 DIAGNOSIS — M79.604 PAIN IN BOTH LOWER EXTREMITIES: ICD-10-CM

## 2021-03-25 DIAGNOSIS — I67.9 CEREBROVASCULAR SMALL VESSEL DISEASE: ICD-10-CM

## 2021-03-25 DIAGNOSIS — F01.50 VASCULAR DEMENTIA WITHOUT BEHAVIORAL DISTURBANCE (HCC): ICD-10-CM

## 2021-03-25 DIAGNOSIS — G44.89 OTHER HEADACHE SYNDROME: ICD-10-CM

## 2021-03-25 DIAGNOSIS — G40.219 PARTIAL SYMPTOMATIC EPILEPSY WITH COMPLEX PARTIAL SEIZURES, INTRACTABLE, WITHOUT STATUS EPILEPTICUS (HCC): Primary | ICD-10-CM

## 2021-03-25 DIAGNOSIS — M79.605 PAIN IN BOTH LOWER EXTREMITIES: ICD-10-CM

## 2021-03-25 DIAGNOSIS — R55 VASOVAGAL SYNCOPE: ICD-10-CM

## 2021-03-25 PROCEDURE — 1036F TOBACCO NON-USER: CPT | Performed by: PSYCHIATRY & NEUROLOGY

## 2021-03-25 PROCEDURE — G8420 CALC BMI NORM PARAMETERS: HCPCS | Performed by: PSYCHIATRY & NEUROLOGY

## 2021-03-25 PROCEDURE — G8400 PT W/DXA NO RESULTS DOC: HCPCS | Performed by: PSYCHIATRY & NEUROLOGY

## 2021-03-25 PROCEDURE — 4040F PNEUMOC VAC/ADMIN/RCVD: CPT | Performed by: PSYCHIATRY & NEUROLOGY

## 2021-03-25 PROCEDURE — 1123F ACP DISCUSS/DSCN MKR DOCD: CPT | Performed by: PSYCHIATRY & NEUROLOGY

## 2021-03-25 PROCEDURE — G8484 FLU IMMUNIZE NO ADMIN: HCPCS | Performed by: PSYCHIATRY & NEUROLOGY

## 2021-03-25 PROCEDURE — G8427 DOCREV CUR MEDS BY ELIG CLIN: HCPCS | Performed by: PSYCHIATRY & NEUROLOGY

## 2021-03-25 PROCEDURE — 1090F PRES/ABSN URINE INCON ASSESS: CPT | Performed by: PSYCHIATRY & NEUROLOGY

## 2021-03-25 PROCEDURE — 99214 OFFICE O/P EST MOD 30 MIN: CPT | Performed by: PSYCHIATRY & NEUROLOGY

## 2021-03-25 RX ORDER — DICYCLOMINE HYDROCHLORIDE 10 MG/1
CAPSULE ORAL
COMMUNITY
Start: 2021-02-02 | End: 2021-07-01

## 2021-03-25 NOTE — PROGRESS NOTES
[facility]  [address] [place] [address] Drive, [address] [facility] [address]  Wilmer Barton  Phone [phone]  Fax [phone]     [facility] Follow Up Encounter  3/25/21 10:54 AM CDT    Information:   Patient Name: Michelle Shah  :   1935  Age:   80 y.o. MRN:   [id number]  Account #:  [de-identified]  Today:  3/25/21    Provider: Eagle Quinonez M.D. Chief Complaint:   Chief Complaint   Patient presents with    6 Month Follow-Up       Subjective:   Michelle Shah is a 80 y.o. woman with a history of seizures, vaso vagal syncope, small vessel cerebrovascular disease, and vascular dementia who is following up. She had a loop recorder placed in 10/2020. She has several other vagal episodes with the lowest heart rate of 39. She did have an asymptomatic 4 second pause. She had a pacemaker placed. She has had some light headed episodes but has not had syncope since. She is complaining of back pain and right hip pain. She does have some left hip pain as well. She has seen orthopedics. She has had X-rays there. She complains of pain in her feet and ankles. She says it feels as if her shoes are too tight but they are not. She is taking gabapentin 100 mg BID but it is not helping and she cannot tolerate a higher dose. She has numerous drug allergies. She has tenderness in her legs. She had the pacemaker placed 2 1/2 months ago and is  there.         Objective:     Past Medical History:  Past Medical History:   Diagnosis Date    Age-related macular degeneration, wet, right eye ([address]     Anemia     Back pain     Bilateral carotid artery stenosis 2021    Chronic bilateral low back pain with left-sided sciatica 2019    Colitis, ischemic ([address]     Dementia ([address]     Diverticulosis     Dry age-related macular degeneration of left eye     Hyperlipidemia     Hypertension     Osteoarthritis     Palliative care patient 2019    Risk for falls 2019    Seizures ([address]  Spastic colon     Status post placement of implantable loop recorder 10/27/2020    Stroke syndrome     Urinary incontinence     Uterine cancer [facility])        Past Surgical History:   Procedure Laterality Date    BREAST SURGERY Right     FNA Right Breast \"oh heavens, maybe 20 years ago\"    CHOLECYSTECTOMY      COLONOSCOPY  03    Dr Jeffry Young ischemic colitis, incomplete exam    COLONOSCOPY  2003    Dr Anjali Carmona:  limited colon-ischemic colitis    DILATION AND CURETTAGE OF U[facility]      [facility], laser suregry, adn cataract with IOL b/l    HYSTERECTOMY      ovaries and tubes    INSERTABLE CARDIAC MONITOR      LUMBAR FUSION       90 days after the spine surgeries    OTHER SURGICAL HISTORY      inner lymph nodes    SPINE SURGERY      L3,4, and 5   done in    [address] 76      as a child at age 9    [address] [facility] [address]  2003    DR Harriett Candelaria:  Duodenal scarring but no evidence of active ulcer disease. Recent Hospitalizations  · None    Significant Injuries  · None    Habits  Pelon Yanez reports that she has never smoked. She has never used smokeless tobacco. She reports that she does not drink alcohol or use drugs. Family History   Problem Relation Age of Onset    Heart Defect Mother     Hypertension Mother     Heart Attack Father         x3 within 24 h and then , abused ciggarettes    No Known Problems Daughter     No Known Problems Son     No Known Problems Son     Colon Cancer Neg Hx     Colon Polyps Neg Hx     Esophageal Cancer Neg Hx     Liver Cancer Neg Hx     Liver Disease Neg Hx     Rectal Cancer Neg Hx     Stomach Cancer Neg Hx        Social History  Pelon Yanez is , lives in [place], [place], and is retired.     Medications:  Current Outpatient Medications   Medication Sig Dispense Refill    dicyclomine (BENTYL) 10 MG capsule TAKE 1 CAPSULE BY MOUTH FOUR TIMES DAILY AS NEEDED FOR STOMACH CRAMPING      dipyridamole (PERSANTINE) 50 MG tablet Take 2 tablets by mouth 2 times daily Indications: 9am and 9pm 60 tablet 11    levETIRAcetam (KEPPRA) 500 MG tablet Take 1 tablet by mouth 2 times daily Indications: takes 9am and 9pm 60 tablet 11    gabapentin (NEURONTIN) 100 MG capsule Take 100 mg by mouth 2 times daily as needed (sciatic pain and burning sensation).  Can only tolerate 100 mg;      cloNIDine (CATAPRES) 0.1 MG tablet Take 1 tablet by mouth 2 times daily as needed for High Blood Pressure (for BP greater than 160/100) 30 tablet 2    pantoprazole (PROTONIX) 40 MG tablet Take 40 mg by mouth daily Indications: 9am       acetaminophen (TYLENOL) 500 MG tablet Take 1,000 mg by mouth Indications: 2 tablets at 6:00am but not more than 3,000mg per day      meloxicam (MOBIC) 7.5 MG tablet Take 15 mg by mouth daily Indications: takes at 9am       donepezil (ARICEPT) 10 MG tablet Take 1 tablet by mouth nightly (Patient taking differently: Take 10 mg by mouth nightly Indications: takes 9pm ) 90 tablet 3    Docosanol (ABREVA EX) Apply topically      hydrALAZINE (APRESOLINE) 25 MG tablet Take 50 mg by mouth 3 times daily Indications: takes at 9am, 3pm, and 9pm       nitrofurantoin (MACRODANTIN) 50 MG capsule TAKE 1 CAPSULE AT BEDTIME (Patient taking differently: Indications: 9pm TAKE 1 CAPSULE AT BEDTIME) 30 capsule 3    simethicone (GAS-X EXTRA STRENGTH) 125 MG chewable tablet Take 125 mg by mouth 2 PO 5:30 AM,1- 2 10:30 AM, 1 4:00 PM and PRN      butalbital-acetaminophen-caffeine (FIORICET, ESGIC) -40 MG per tablet Take 1 tablet by mouth every 6 hours as needed for Headaches       amLODIPine (NORVASC) 5 MG tablet Take 5 mg by mouth 2 times daily Indications: takes at 9am and 9pm       diclofenac sodium 1 % GEL Apply 2 g topically 4 times daily as needed for Pain      sodium chloride 1 g tablet Take 1 g by mouth 3 times daily Indications: takes 9am, mouth every 7 days Wednesday at 1830      pravastatin (PRAVACHOL) 20 MG tablet Take 20 mg by mouth every morning Indications: takes at 9am       ramipril (ALTACE) 10 MG capsule Take 10 mg by mouth 2 times daily Indications: takes at 9am and 9pm       b complex vitamins capsule Take 1 capsule by mouth daily Indications: AT 2:30 PM B 100      Multiple Vitamins-Minerals (THERAPEUTIC MULTIVITAMIN-MINERALS) tablet Take 1 tablet by mouth daily Indications: AT 2:30 PM        No current facility-administered medications for this visit. Allergies:   Allergies as of 03/25/2021 - Review Complete 03/25/2021   Allergen Reaction Noted    Aspirin Shortness Of Breath 03/17/2016    Dilaudid [hydromorphone hcl]  03/17/2016    Fd&c yellow #5 aluminum lake [tartrazine]  01/05/2021    Ondansetron  03/17/2016    Quinolones  09/14/2019    Spironolactone  09/25/2019    Sulfa antibiotics  03/17/2016    Codeine Rash and Other (See Comments) 08/11/2016    Demerol hcl [meperidine] Other (See Comments) 03/17/2016    Levaquin [levofloxacin in d5w] Other (See Comments) 02/21/2018    Myrbetriq [mirabegron] Other (See Comments) 04/12/2018    Namenda [memantine hcl] Other (See Comments) 03/17/2016    Norco [hydrocodone-acetaminophen] Rash and Other (See Comments) 03/17/2016    Pneumococcal vaccines Swelling 03/17/2016       Review of Systems:  Constitutional: negative for - chills and fever  Eyes:  negative for - visual disturbance and photophobia  HENMT: negative for - headaches and sinus pain  Respiratory: negative for - cough, hemoptysis, and shortness of breath  Cardiovascular: negative for - chest pain and palpitations  Gastrointestinal: negative for - blood in stools, constipation, diarrhea, nausea, and vomiting  Genito-Urinary: negative for - hematuria, urinary frequency, urinary urgency, and urinary retention  Musculoskeletal: positive for - joint pain, joint stiffness, and joint swelling  Hematological and Lymphatic: negative for - bleeding problems, abnormal bruising, and swollen lymph nodes  Endocrine:  negative for - polydipsia and polyphagia  Allergy/Immunology:  negative for - rhinorrhea, sinus congestion, hives  Integument:  negative for - negative for - rash, change in moles, new or changing lesions  Psychological: negative for - anxiety and depression  Neurological: positive for - memory loss, numbness/tingling, and weakness     PHYSICAL EXAMINATION:  Vitals:  /67   Pulse 78   Resp 14   Ht 5' (1.524 m)   Wt 118 lb (53.5 kg)   BMI 23.05 kg/m²   General appearance:  Alert, well developed, well nourished, in no distress  HEENT:  normocephalic, atraumatic, sclera appear normal, no nasal abnormalities, no rhinorrhea, Ears appear normal, oral mucous membranes are moist without erythema, trachea midline, thyroid is normal, no lymphadenopathy or neck mass. Cardiovascular:  Regular rate and rhythm without murmer. No peripheral edema, No cyanosis or clubbing. No carotid bruits. Pulmonary:  Lungs are clear to auscultation. Breathing appears normal, good expansion, normal effort without use of accessory muscles  Musculoskeletal:  Joints are osteoarthritic. Integument:  No rash, erythema, or pallor  Psychiatric:  Mood, affect, and behavior appear normal      NEUROLOGIC EXAMINATION:  Mental Status:  alert, oriented to person, place, and time. Speech:  Clear without dysarthria or dysphonia  Language:  Fluent without aphasia  Cranial Nerves:   II Visual fields are full to confrontation   III,IV, VI Extraocular movements are full   VII Facial movements are symmetrical without weakness   VIII Hearing is intact   IX,X Shoulder shrug and head rotation strength are intact   XII No tongue atrophy or fasciculations. Normal tongue protrusion. No tongue weakness  Motor:  Normal strength in both upper and lower extremities. Normal muscle tone and bulk.   Deep tendon reflexes are intact and symmetrical in both upper and lower extremities. Vazquez's signs are absent bilaterally. There is no ankle clonus on either side. Sensation:  Sensation is intact to light touch, temperature, and vibration in all extremities. Coordination:  Rapid alternating movements are normal in both upper and lower extremities. Finger to nose testing is unimpaired bilaterally. Gait:  unsteady      Pertinent Diagnostic Studies:  Imaging studies, multiple    Assessment:       ICD-10-CM    1. Partial symptomatic epilepsy with complex partial seizures, intractable, without status epilepticus ([facility]  G40.219    2. Cerebrovascular small vessel disease  I67.9    3. Vascular dementia without behavioral disturbance (HCC)  F01.50    4. Vasovagal syncope  R55    5. Other headache syndrome  G44.89    6. Pain in both lower extremities  M79.604     M79.605    Long discussion with her and her daughter regarding the above    Plan:   1. Try to increase the gabapentin to 100 mg TID. If that does not help or is not tolerated, the only other option is to use tramadol PRN pain. I discussed the risk of tramadol including seizures. She is allergic to opioids.     2. Continue other medications  3. FU 3 months    Electronically signed by Jaime Su MD on 3/25/21

## 2021-04-01 ENCOUNTER — NURSE ONLY (OUTPATIENT)
Dept: UROLOGY | Age: 86
End: 2021-04-01
Payer: MEDICARE

## 2021-04-01 DIAGNOSIS — N39.46 MIXED STRESS AND URGE URINARY INCONTINENCE: ICD-10-CM

## 2021-04-01 DIAGNOSIS — N32.81 OAB (OVERACTIVE BLADDER): Primary | ICD-10-CM

## 2021-04-01 PROCEDURE — 64566 NEUROELTRD STIM POST TIBIAL: CPT | Performed by: NURSE PRACTITIONER

## 2021-04-01 NOTE — PROGRESS NOTES
Treatment # maintenance treatment    Improvement of Symptoms? Not anymore. Daughter reports over the last year, she and the patient have noticed a significant difference in the efficacy of these treatments. They believe it may be partly related to pain. OAB/Urologic Medications? no      Uroplasty Procedure:    1. Patient is seated with the right treatment leg elevated. 2. 34 gauge fine needle electrode is inserted into lower inner aspect of the leg. Slightly cephalad to the medial malleolus. 3. Surface electrode pad is placed over the medial aspect of the calcaneus on the same leg. 4.Needle electrode is connected to the external pulse generator. 5.The pulse generator is turned on and the millivolts used are 4. 6.Patient is treated for 30 minutes. 7.The needle and pad are removed. Patient will follow up in 2 weeks for next treatment. We will decrease the time between treatments to see if this may be helpful for the patient. She and her daughter are considering Botox therapy. They are aware they will have to be transferred to Dr. Randol Lesch care if they wish to pursue this option. They are also aware it will be his decision whether or not to initiate therapy given the patient's history and current state of health.

## 2021-04-11 ENCOUNTER — HOSPITAL ENCOUNTER (OUTPATIENT)
Age: 86
Setting detail: OBSERVATION
Discharge: HOME | End: 2021-04-12
Attending: EMERGENCY MEDICINE | Admitting: FAMILY MEDICINE
Payer: MEDICARE

## 2021-04-11 ENCOUNTER — APPOINTMENT (OUTPATIENT)
Dept: GENERAL RADIOLOGY | Age: 86
End: 2021-04-11
Payer: MEDICARE

## 2021-04-11 DIAGNOSIS — J90 PLEURAL EFFUSION: ICD-10-CM

## 2021-04-11 DIAGNOSIS — S22.31XA CLOSED FRACTURE OF ONE RIB OF RIGHT SIDE, INITIAL ENCOUNTER: ICD-10-CM

## 2021-04-11 DIAGNOSIS — R55 SYNCOPE AND COLLAPSE: Primary | ICD-10-CM

## 2021-04-11 PROBLEM — D53.9 MACROCYTIC ANEMIA: Status: ACTIVE | Noted: 2021-04-11

## 2021-04-11 LAB
ALBUMIN SERPL-MCNC: 4.3 G/DL (ref 3.5–5.2)
ALP BLD-CCNC: 72 U/L (ref 35–104)
ALT SERPL-CCNC: 16 U/L (ref 5–33)
ANION GAP SERPL CALCULATED.3IONS-SCNC: 11 MMOL/L (ref 7–19)
AST SERPL-CCNC: 23 U/L (ref 5–32)
BACTERIA: NEGATIVE /HPF
BASOPHILS ABSOLUTE: 0 K/UL (ref 0–0.2)
BASOPHILS RELATIVE PERCENT: 0.9 % (ref 0–1)
BILIRUB SERPL-MCNC: 0.5 MG/DL (ref 0.2–1.2)
BILIRUBIN URINE: NEGATIVE
BLOOD, URINE: NEGATIVE
BUN BLDV-MCNC: 14 MG/DL (ref 8–23)
CALCIUM SERPL-MCNC: 9.3 MG/DL (ref 8.8–10.2)
CHLORIDE BLD-SCNC: 97 MMOL/L (ref 98–111)
CLARITY: CLEAR
CO2: 26 MMOL/L (ref 22–29)
COLOR: YELLOW
CREAT SERPL-MCNC: 0.9 MG/DL (ref 0.5–0.9)
CRYSTALS, UA: ABNORMAL /HPF
EOSINOPHILS ABSOLUTE: 0.2 K/UL (ref 0–0.6)
EOSINOPHILS RELATIVE PERCENT: 5.3 % (ref 0–5)
EPITHELIAL CELLS, UA: 1 /HPF (ref 0–5)
GFR AFRICAN AMERICAN: >59
GFR NON-AFRICAN AMERICAN: 59
GLUCOSE BLD-MCNC: 129 MG/DL (ref 74–109)
GLUCOSE URINE: NEGATIVE MG/DL
HCT VFR BLD CALC: 35.4 % (ref 37–47)
HEMOGLOBIN: 11.6 G/DL (ref 12–16)
HYALINE CASTS: 0 /HPF (ref 0–8)
IMMATURE GRANULOCYTES #: 0 K/UL
KETONES, URINE: NEGATIVE MG/DL
LEUKOCYTE ESTERASE, URINE: ABNORMAL
LYMPHOCYTES ABSOLUTE: 2.2 K/UL (ref 1.1–4.5)
LYMPHOCYTES RELATIVE PERCENT: 49.8 % (ref 20–40)
MCH RBC QN AUTO: 32.8 PG (ref 27–31)
MCHC RBC AUTO-ENTMCNC: 32.8 G/DL (ref 33–37)
MCV RBC AUTO: 100 FL (ref 81–99)
MONOCYTES ABSOLUTE: 0.3 K/UL (ref 0–0.9)
MONOCYTES RELATIVE PERCENT: 7.6 % (ref 0–10)
NEUTROPHILS ABSOLUTE: 1.6 K/UL (ref 1.5–7.5)
NEUTROPHILS RELATIVE PERCENT: 36.2 % (ref 50–65)
NITRITE, URINE: NEGATIVE
PDW BLD-RTO: 11.9 % (ref 11.5–14.5)
PH UA: 7.5 (ref 5–8)
PLATELET # BLD: 176 K/UL (ref 130–400)
PMV BLD AUTO: 10.4 FL (ref 9.4–12.3)
POTASSIUM SERPL-SCNC: 3.4 MMOL/L (ref 3.5–5)
PRO-BNP: 235 PG/ML (ref 0–1800)
PROTEIN UA: NEGATIVE MG/DL
RBC # BLD: 3.54 M/UL (ref 4.2–5.4)
RBC UA: 2 /HPF (ref 0–4)
SARS-COV-2, NAAT: NOT DETECTED
SODIUM BLD-SCNC: 134 MMOL/L (ref 136–145)
SPECIFIC GRAVITY UA: 1.01 (ref 1–1.03)
TOTAL PROTEIN: 6.1 G/DL (ref 6.6–8.7)
TROPONIN: <0.01 NG/ML (ref 0–0.03)
UROBILINOGEN, URINE: 0.2 E.U./DL
WBC # BLD: 4.3 K/UL (ref 4.8–10.8)
WBC UA: 0 /HPF (ref 0–5)

## 2021-04-11 PROCEDURE — G0378 HOSPITAL OBSERVATION PER HR: HCPCS

## 2021-04-11 PROCEDURE — 6370000000 HC RX 637 (ALT 250 FOR IP): Performed by: FAMILY MEDICINE

## 2021-04-11 PROCEDURE — 84484 ASSAY OF TROPONIN QUANT: CPT

## 2021-04-11 PROCEDURE — 6370000000 HC RX 637 (ALT 250 FOR IP): Performed by: EMERGENCY MEDICINE

## 2021-04-11 PROCEDURE — 36415 COLL VENOUS BLD VENIPUNCTURE: CPT

## 2021-04-11 PROCEDURE — 71045 X-RAY EXAM CHEST 1 VIEW: CPT

## 2021-04-11 PROCEDURE — 83880 ASSAY OF NATRIURETIC PEPTIDE: CPT

## 2021-04-11 PROCEDURE — 80053 COMPREHEN METABOLIC PANEL: CPT

## 2021-04-11 PROCEDURE — 2580000003 HC RX 258: Performed by: FAMILY MEDICINE

## 2021-04-11 PROCEDURE — 85025 COMPLETE CBC W/AUTO DIFF WBC: CPT

## 2021-04-11 PROCEDURE — 87635 SARS-COV-2 COVID-19 AMP PRB: CPT

## 2021-04-11 PROCEDURE — 81001 URINALYSIS AUTO W/SCOPE: CPT

## 2021-04-11 PROCEDURE — 93005 ELECTROCARDIOGRAM TRACING: CPT | Performed by: EMERGENCY MEDICINE

## 2021-04-11 PROCEDURE — 99283 EMERGENCY DEPT VISIT LOW MDM: CPT

## 2021-04-11 RX ORDER — POLYETHYLENE GLYCOL 3350 17 G/17G
17 POWDER, FOR SOLUTION ORAL DAILY PRN
Status: DISCONTINUED | OUTPATIENT
Start: 2021-04-11 | End: 2021-04-12 | Stop reason: HOSPADM

## 2021-04-11 RX ORDER — SODIUM CHLORIDE 0.9 % (FLUSH) 0.9 %
5-40 SYRINGE (ML) INJECTION EVERY 12 HOURS SCHEDULED
Status: DISCONTINUED | OUTPATIENT
Start: 2021-04-11 | End: 2021-04-12 | Stop reason: HOSPADM

## 2021-04-11 RX ORDER — DICYCLOMINE HYDROCHLORIDE 10 MG/1
10 CAPSULE ORAL 4 TIMES DAILY PRN
Status: DISCONTINUED | OUTPATIENT
Start: 2021-04-11 | End: 2021-04-12 | Stop reason: HOSPADM

## 2021-04-11 RX ORDER — M-VIT,TX,IRON,MINS/CALC/FOLIC 27MG-0.4MG
1 TABLET ORAL EVERY 24 HOURS
Status: DISCONTINUED | OUTPATIENT
Start: 2021-04-12 | End: 2021-04-12 | Stop reason: HOSPADM

## 2021-04-11 RX ORDER — ACETAMINOPHEN 500 MG
1000 TABLET ORAL EVERY 6 HOURS PRN
Status: DISCONTINUED | OUTPATIENT
Start: 2021-04-11 | End: 2021-04-12 | Stop reason: HOSPADM

## 2021-04-11 RX ORDER — VITAMIN C
1 TAB ORAL EVERY 24 HOURS
Status: DISCONTINUED | OUTPATIENT
Start: 2021-04-12 | End: 2021-04-12 | Stop reason: HOSPADM

## 2021-04-11 RX ORDER — GABAPENTIN 100 MG/1
100 CAPSULE ORAL 2 TIMES DAILY PRN
Status: DISCONTINUED | OUTPATIENT
Start: 2021-04-11 | End: 2021-04-12 | Stop reason: HOSPADM

## 2021-04-11 RX ORDER — LIDOCAINE 4 G/G
1 PATCH TOPICAL DAILY PRN
Status: DISCONTINUED | OUTPATIENT
Start: 2021-04-11 | End: 2021-04-12 | Stop reason: HOSPADM

## 2021-04-11 RX ORDER — LUTEIN 6 MG
20 TABLET ORAL EVERY 24 HOURS
Status: DISCONTINUED | OUTPATIENT
Start: 2021-04-12 | End: 2021-04-12 | Stop reason: HOSPADM

## 2021-04-11 RX ORDER — SODIUM CHLORIDE 1000 MG
1 TABLET, SOLUBLE MISCELLANEOUS
Status: DISCONTINUED | OUTPATIENT
Start: 2021-04-11 | End: 2021-04-12 | Stop reason: HOSPADM

## 2021-04-11 RX ORDER — FLUTICASONE PROPIONATE 50 MCG
1 SPRAY, SUSPENSION (ML) NASAL 2 TIMES DAILY
COMMUNITY

## 2021-04-11 RX ORDER — DONEPEZIL HYDROCHLORIDE 5 MG/1
10 TABLET, FILM COATED ORAL NIGHTLY
Status: DISCONTINUED | OUTPATIENT
Start: 2021-04-11 | End: 2021-04-12 | Stop reason: HOSPADM

## 2021-04-11 RX ORDER — PRAVASTATIN SODIUM 20 MG
20 TABLET ORAL EVERY MORNING
Status: DISCONTINUED | OUTPATIENT
Start: 2021-04-12 | End: 2021-04-12 | Stop reason: HOSPADM

## 2021-04-11 RX ORDER — PANTOPRAZOLE SODIUM 40 MG/1
40 TABLET, DELAYED RELEASE ORAL DAILY
Status: DISCONTINUED | OUTPATIENT
Start: 2021-04-12 | End: 2021-04-12 | Stop reason: HOSPADM

## 2021-04-11 RX ORDER — LEVETIRACETAM 500 MG/1
500 TABLET ORAL 2 TIMES DAILY
Status: DISCONTINUED | OUTPATIENT
Start: 2021-04-11 | End: 2021-04-12 | Stop reason: HOSPADM

## 2021-04-11 RX ORDER — AMLODIPINE BESYLATE 5 MG/1
5 TABLET ORAL 2 TIMES DAILY
Status: DISCONTINUED | OUTPATIENT
Start: 2021-04-11 | End: 2021-04-12 | Stop reason: HOSPADM

## 2021-04-11 RX ORDER — HYDRALAZINE HYDROCHLORIDE 25 MG/1
50 TABLET, FILM COATED ORAL
Status: DISCONTINUED | OUTPATIENT
Start: 2021-04-11 | End: 2021-04-12 | Stop reason: HOSPADM

## 2021-04-11 RX ORDER — ACETAMINOPHEN 325 MG/1
650 TABLET ORAL EVERY 6 HOURS PRN
Status: DISCONTINUED | OUTPATIENT
Start: 2021-04-11 | End: 2021-04-12 | Stop reason: HOSPADM

## 2021-04-11 RX ORDER — RAMIPRIL 10 MG/1
10 CAPSULE ORAL 2 TIMES DAILY
Status: DISCONTINUED | OUTPATIENT
Start: 2021-04-11 | End: 2021-04-12 | Stop reason: HOSPADM

## 2021-04-11 RX ORDER — BUTALBITAL, ACETAMINOPHEN AND CAFFEINE 50; 325; 40 MG/1; MG/1; MG/1
1 TABLET ORAL EVERY 6 HOURS PRN
Status: DISCONTINUED | OUTPATIENT
Start: 2021-04-11 | End: 2021-04-12 | Stop reason: HOSPADM

## 2021-04-11 RX ORDER — SODIUM CHLORIDE 0.9 % (FLUSH) 0.9 %
5-40 SYRINGE (ML) INJECTION PRN
Status: DISCONTINUED | OUTPATIENT
Start: 2021-04-11 | End: 2021-04-12 | Stop reason: HOSPADM

## 2021-04-11 RX ORDER — DOCUSATE SODIUM 100 MG/1
100 CAPSULE, LIQUID FILLED ORAL NIGHTLY PRN
Status: DISCONTINUED | OUTPATIENT
Start: 2021-04-11 | End: 2021-04-12 | Stop reason: HOSPADM

## 2021-04-11 RX ORDER — POLYVINYL ALCOHOL 14 MG/ML
2 SOLUTION/ DROPS OPHTHALMIC PRN
Status: DISCONTINUED | OUTPATIENT
Start: 2021-04-11 | End: 2021-04-12 | Stop reason: HOSPADM

## 2021-04-11 RX ORDER — AZELASTINE 1 MG/ML
2 SPRAY, METERED NASAL 2 TIMES DAILY
Status: DISCONTINUED | OUTPATIENT
Start: 2021-04-11 | End: 2021-04-12 | Stop reason: HOSPADM

## 2021-04-11 RX ORDER — PROMETHAZINE HYDROCHLORIDE 12.5 MG/1
12.5 TABLET ORAL EVERY 6 HOURS PRN
Status: DISCONTINUED | OUTPATIENT
Start: 2021-04-11 | End: 2021-04-12 | Stop reason: HOSPADM

## 2021-04-11 RX ORDER — SIMETHICONE 80 MG
125 TABLET,CHEWABLE ORAL 4 TIMES DAILY PRN
Status: DISCONTINUED | OUTPATIENT
Start: 2021-04-11 | End: 2021-04-12 | Stop reason: HOSPADM

## 2021-04-11 RX ORDER — ALENDRONATE SODIUM 70 MG/1
70 TABLET ORAL
Status: DISCONTINUED | OUTPATIENT
Start: 2021-04-11 | End: 2021-04-11

## 2021-04-11 RX ORDER — DIPYRIDAMOLE 25 MG
100 TABLET ORAL 2 TIMES DAILY
Status: DISCONTINUED | OUTPATIENT
Start: 2021-04-11 | End: 2021-04-12 | Stop reason: HOSPADM

## 2021-04-11 RX ORDER — ACETAMINOPHEN 650 MG/1
650 SUPPOSITORY RECTAL EVERY 6 HOURS PRN
Status: DISCONTINUED | OUTPATIENT
Start: 2021-04-11 | End: 2021-04-12 | Stop reason: HOSPADM

## 2021-04-11 RX ORDER — SODIUM CHLORIDE 9 MG/ML
25 INJECTION, SOLUTION INTRAVENOUS PRN
Status: DISCONTINUED | OUTPATIENT
Start: 2021-04-11 | End: 2021-04-12 | Stop reason: HOSPADM

## 2021-04-11 RX ORDER — FLUTICASONE PROPIONATE 50 MCG
2 SPRAY, SUSPENSION (ML) NASAL DAILY
Status: DISCONTINUED | OUTPATIENT
Start: 2021-04-11 | End: 2021-04-12 | Stop reason: HOSPADM

## 2021-04-11 RX ADMIN — RAMIPRIL 10 MG: 10 CAPSULE ORAL at 21:44

## 2021-04-11 RX ADMIN — AMLODIPINE BESYLATE 5 MG: 5 TABLET ORAL at 21:44

## 2021-04-11 RX ADMIN — FLUTICASONE PROPIONATE 2 SPRAY: 50 SPRAY, METERED NASAL at 21:43

## 2021-04-11 RX ADMIN — POTASSIUM BICARBONATE 40 MEQ: 782 TABLET, EFFERVESCENT ORAL at 15:53

## 2021-04-11 RX ADMIN — DONEPEZIL HYDROCHLORIDE 10 MG: 5 TABLET, FILM COATED ORAL at 21:44

## 2021-04-11 RX ADMIN — Medication 1 G: at 21:43

## 2021-04-11 RX ADMIN — LEVETIRACETAM 500 MG: 500 TABLET ORAL at 21:44

## 2021-04-11 RX ADMIN — HYDRALAZINE HYDROCHLORIDE 50 MG: 25 TABLET, FILM COATED ORAL at 21:43

## 2021-04-11 RX ADMIN — DIPYRIDAMOLE 100 MG: 25 TABLET, FILM COATED ORAL at 21:44

## 2021-04-11 RX ADMIN — SODIUM CHLORIDE, PRESERVATIVE FREE 10 ML: 5 INJECTION INTRAVENOUS at 21:44

## 2021-04-11 ASSESSMENT — ENCOUNTER SYMPTOMS
SHORTNESS OF BREATH: 0
NAUSEA: 0
BACK PAIN: 0
VOMITING: 0
RHINORRHEA: 0
ABDOMINAL PAIN: 1
SORE THROAT: 0
DIARRHEA: 0

## 2021-04-11 ASSESSMENT — PAIN SCALES - GENERAL
PAINLEVEL_OUTOF10: 5
PAINLEVEL_OUTOF10: 0

## 2021-04-11 ASSESSMENT — PAIN DESCRIPTION - FREQUENCY: FREQUENCY: CONTINUOUS

## 2021-04-11 ASSESSMENT — PAIN DESCRIPTION - ORIENTATION: ORIENTATION: LOWER

## 2021-04-11 ASSESSMENT — PAIN DESCRIPTION - DESCRIPTORS: DESCRIPTORS: ACHING

## 2021-04-11 ASSESSMENT — PAIN DESCRIPTION - ONSET: ONSET: ON-GOING

## 2021-04-11 NOTE — H&P
Hospital Medicine  History and Physical    Patient:  Anais Pimentel  MRN: [id number]    CHIEF COMPLAINT:  syncope    History Obtained From: daughter, patient, chart    PCP: Shelly Skaggs DO    HISTORY OF PRESENT ILLNESS:  80year old white female brought to the emergency department after syncopal event while on the toilet this morning, followed by a slightly longer period of unresponsiveness than usual. Patient's daughter gives history of another syncopal event five months ago before being redirected to current event. No loss of bowel or bladder control, some slight tremor without true tonic/clonic activity. No treatment. No exacerbating or relieving factors. Has a pacemaker, interrogated and shows it did not fire and is functioning well. REVIEW OF SYSTEMS:  14 systems reviewed and negative except as noted above.     Past Medical History:      Diagnosis Date    Age-related macular degeneration, wet, right eye ([address]     Anemia     Back pain     Bilateral carotid artery stenosis 2/23/2021    Chronic bilateral low back pain with left-sided sciatica 12/19/2019    Colitis, ischemic ([address]     Dementia ([address]     Diverticulosis     Dry age-related macular degeneration of left eye     Hyperlipidemia     Hypertension     Osteoarthritis     Palliative care patient 09/17/2019    Risk for falls 9/13/2019    Seizures ([address]     Spastic colon     Status post placement of implantable loop recorder 10/27/2020    Stroke syndrome     Urinary incontinence     Uterine cancer [facility])        Past Surgical History:      Procedure Laterality Date    BREAST SURGERY Right     FNA Right Breast \"oh heavens, maybe 20 years ago\"    CHOLECYSTECTOMY      COLONOSCOPY  9/25/03    Dr Forrest Tillman ischemic colitis, incomplete exam    COLONOSCOPY  08/29/2003    Dr Raina Mendoza:  limited colon-ischemic colitis    DILATION AND CURETTAGE OF UTERUS      x2, in [facility] tears, laser suregry, adn cataract with IOL b/l    HYSTERECTOMY      ovaries and tubes    INSERTABLE CARDIAC MONITOR      LUMBAR FUSION      2012 90 days after the spine surgeries    OTHER SURGICAL HISTORY      inner lymph nodes    SPINE SURGERY      L3,4, and 5   done in 2012   [facility]      as a child at age 9    [facility] ENDOSCOPY  08/28/2003    DR Alanna Ocampo:  Duodenal scarring but no evidence of active ulcer disease. Medications Prior to Admission:    Prior to Admission medications    Medication Sig Start Date End Date Taking? Authorizing Provider   dicyclomine (BENTYL) 10 MG capsule TAKE 1 CAPSULE BY MOUTH FOUR TIMES DAILY AS NEEDED FOR STOMACH CRAMPING 2/2/21   Historical Provider, MD   dipyridamole (PERSANTINE) 50 MG tablet Take 2 tablets by mouth 2 times daily Indications: 9am and 9pm 3/18/21 4/17/21  Hayde Argueta MD   levETIRAcetam (KEPPRA) 500 MG tablet Take 1 tablet by mouth 2 times daily Indications: takes 9am and 9pm 3/18/21 5/17/21  Hayde Argueta MD   gabapentin (NEURONTIN) 100 MG capsule Take 100 mg by mouth 2 times daily as needed (sciatic pain and burning sensation).  Can only tolerate 100 mg;    Historical Provider, MD   cloNIDine (CATAPRES) 0.1 MG tablet Take 1 tablet by mouth 2 times daily as needed for High Blood Pressure (for BP greater than 160/100) 11/11/20   LAYTON Dias   pantoprazole (PROTONIX) 40 MG tablet Take 40 mg by mouth daily Indications: 9am     Historical Provider, MD   acetaminophen (TYLENOL) 500 MG tablet Take 1,000 mg by mouth Indications: 2 tablets at 6:00am but not more than 3,000mg per day    Historical Provider, MD   meloxicam (MOBIC) 7.5 MG tablet Take 15 mg by mouth daily Indications: takes at [address]  Provider, MD   donepezil (ARICEPT) 10 MG tablet Take 1 tablet by mouth nightly  Patient taking differently: Take 10 mg by mouth nightly Indications: takes 9pm  9/24/20   Hayde Argueta MD   Docosanol (ABREVA EX) Apply topically    Historical Provider, MD   hydrALAZINE (APRESOLINE) 25 MG tablet Take 50 mg by mouth 3 times daily Indications: takes at 9am, 3pm, and 9pm     Historical Provider, MD   nitrofurantoin (MACRODANTIN) 50 MG capsule TAKE 1 CAPSULE AT BEDTIME  Patient taking differently: Indications: 9pm TAKE 1 CAPSULE AT BEDTIME 5/8/20   Ana Hay PA-C   simethicone (GAS-X EXTRA STRENGTH) 125 MG chewable tablet Take 125 mg by mouth 2 PO 5:30 AM,1- 2 10:30 AM, 1 4:00 PM and PRN    Historical Provider, MD   butalbital-acetaminophen-caffeine (FIORICET, ESGIC) -40 MG per tablet Take 1 tablet by mouth every 6 hours as needed for Headaches     Historical Provider, MD   amLODIPine (NORVASC) 5 MG tablet Take 5 mg by mouth 2 times daily Indications: takes at 9am and 9pm     Historical Provider, MD   diclofenac sodium 1 % GEL Apply 2 g topically 4 times daily as needed for Pain    Historical Provider, MD   sodium chloride 1 g tablet Take 1 g by mouth 3 times daily Indications: takes 9am, 3pm, and 9pm     Historical Provider, MD   lidocaine 4 % external patch Place 1 patch onto the skin daily as needed (LEFT LOWER BACK PAIN)    Historical Provider, MD   NONFORMULARY Indications: dic 3%, lidocaine 2%, cyclo 2%, prilo 2% baclo 2% 1 to 2 pumps up to TID PRN     Historical Provider, MD   desonide (DESOWEN) 0.05 % cream Apply topically as needed Apply topically 2 times daily. Historical Provider, MD   mupirocin (BACTROBAN) 2 % cream Apply topically as needed Apply topically 3 times daily.     Historical Provider, MD   valACYclovir (VALTREX) 1 g tablet as needed  6/6/19   Historical Provider, MD   Psyllium (METAMUCIL FIBER PO) Take by mouth nightly Indications: takes 9pm     Historical Provider, MD   azelastine (ASTELIN) 0.1 % nasal spray 2 sprays by Nasal route 2 times daily Indications: takes 9am and 9pm  5/3/18   Historical Provider, MD   Cholecalciferol (VITAMIN D3) 5000 units TABS Take 5,000 Units by mouth daily Indications: AT 2:30 PM     Historical Provider, MD   polyvinyl alcohol-povidone (HYPOTEARS) 1.4-0.6 % ophthalmic solution Place 1-2 drops into both eyes as needed    Historical Provider, MD   sodium chloride (OCEAN, BABY AYR) 0.65 % nasal spray 1 spray by Nasal route as needed for Congestion    Historical Provider, MD   tobramycin-dexamethasone (TOBRADEX) 0.3-0.1 % ophthalmic ointment Place into the left eye as needed 3 times daily. Historical Provider, MD   Lutein 20 MG TABS Take 20 mg by mouth daily Indications: 3 PM DAILY     Historical Provider, MD   fluticasone (VERAMYST) 27.5 MCG/SPRAY nasal spray 2 sprays by Nasal route 2 times daily Indications: takes 9am and 9pm     Historical Provider, MD   camphor-menthol (SARNA) 0.5-0.5 % lotion Apply 0.5 mLs topically 2 times daily as needed for Itching Apply topically as needed.     Historical Provider, MD   Calcium Carb-Cholecalciferol (CALCIUM 600+D) 600-800 MG-UNIT TABS Take 1 tablet by mouth daily Indications: AT 2:30 PM     Historical Provider, MD   promethazine (PHENERGAN) 25 MG tablet Take 25 mg by mouth 3 times daily as needed for Nausea    Historical Provider, MD   docusate sodium (COLACE) 100 MG capsule Take 100 mg by mouth nightly as needed for Constipation     Historical Provider, MD   Omega 3-6-9 Fatty Acids (OMEGA 3-6-9 COMPLEX PO) Take 2 tablets by mouth daily Indications: AT 2:30 PM     Historical Provider, MD   alendronate (FOSAMAX) 70 MG tablet Take 70 mg by mouth every 7 days Wednesday at [address] [facility] Provider, MD   pravastatin (PRAVACHOL) 20 MG tablet Take 20 mg by mouth every morning Indications: takes at 9am  3/1/16   Historical Provider, MD   ramipril (ALTACE) 10 MG capsule Take 10 mg by mouth 2 times daily Indications: takes at 9am and 9pm  2/26/16   Historical Provider, MD   b complex vitamins capsule Take 1 capsule by mouth daily Indications: AT 2:30 PM B 100    Historical Provider, MD   Multiple Vitamins-Minerals (THERAPEUTIC MULTIVITAMIN-MINERALS) tablet Take 1 tablet by mouth daily Indications: AT 2:30 PM     Historical Provider, MD       Allergies:  Aspirin, Dilaudid [hydromorphone hcl], Fd&c yellow #5 aluminum lake [tartrazine], Ondansetron, Quinolones, Spironolactone, Sulfa antibiotics, Codeine, Demerol hcl [meperidine], Levaquin [levofloxacin in d5w], Myrbetriq [mirabegron], Namenda [memantine hcl], Norco [hydrocodone-acetaminophen], and Pneumococcal vaccines    Social History:   TOBACCO:   reports that she has never smoked. She has never used smokeless tobacco.  ETOH:   reports no history of alcohol use.     Family History:       Problem Relation Age of Onset    Heart Defect Mother     Hypertension Mother     Heart Attack Father         x3 within 24 h and then , abused ciggarettes    No Known Problems Daughter     No Known Problems Son     No Known Problems Son     Colon Cancer Neg Hx     Colon Polyps Neg Hx     Esophageal Cancer Neg Hx     Liver Cancer Neg Hx     Liver Disease Neg Hx     Rectal Cancer Neg Hx     Stomach Cancer Neg Hx            Physical Exam:    Vitals: /67   Pulse 84   Temp 98.7 °F (37.1 °C) (Temporal)   Resp 20   Wt 150 lb (68 kg)   SpO2 96%   BMI 29.29 kg/m²   24HR INTAKE/OUTPUT:  No intake or output data in the 24 hours ending 21 1653  General appearance: alert and cooperative with exam  HEENT: atraumatic, eyes with clear conjunctiva and normal lids, pupils and irises normal, external ears and nose are normal, lips normal. Neck without masses, lympadenopathy, bruit, thyroid normal  Lungs: no increased work of breathing, clear to auscultation bilaterally without rales, rhonchi or wheezes  Heart: regular rate and rhythm, S1, S2 normal, no murmur, click, rub or gallop  Abdomen: soft, non-tender; bowel sounds normal; no masses,  no organomegaly  Extremities: extremities normal without clubbing, atraumatic, no cyanosis or edema  Neurologic: No focal neurologic deficits, normal sensation, alert and oriented, affect and mood appropriate. Skin: no rashes, nodules. CBC:   Recent Labs     04/11/21  1330   WBC 4.3*   HGB 11.6*        BMP:    Recent Labs     04/11/21  1330   *   K 3.4*   CL 97*   CO2 26   BUN 14   CREATININE 0.9   GLUCOSE 129*     Hepatic:   Recent Labs     04/11/21  1330   AST 23   ALT 16   BILITOT 0.5   ALKPHOS 72     Troponin T:   Recent Labs     04/11/21  1330   TROPONINI <0.01     ABGs: No results for input(s): PH, PCO2, PO2, HCO3, BE, O2SAT in the last 72 hours. INR: No results for input(s): INR in the last 72 hours. Lactic Acid: No results for input(s): LACTA in the last 72 hours. -----------------------------------------------------------------  PA/lat CXR: blunting of costophrenic angle, minimal, appears artifactual    EKG: NSR, no acute ischemia or infarction    Assessment and Plan   Principal Problem:    Vasovagal syncope  Active Problems:    Irritable bowel syndrome with diarrhea    Hyponatremia    Late onset Alzheimer's disease without behavioral disturbance (HCC)    Hypokalemia    Macrocytic anemia  Resolved Problems:    * No resolved hospital problems. *      Admit for observation to med/surg. Patient's daughter wants her to be admitted as inpatient but I explained that her workup cannot justify this. Strongly suspect this was simply vasovagal syncope. She has undergone extensive workup by neurology and cardiology in the past.    Renal diet with fluid restriction per daughter's wishes. Home medications reconciled. I am very concerned that she is experiencing polypharmacy which could contribute to her symptoms.     Argenis Fearing, DO  Admitting Hospitalist

## 2021-04-11 NOTE — ED NOTES
Pt is yelling at nurse. I started and IV and richa blood work off due to pt condition and chief complaint. Pt tolerated the IV well. Pt states \"This is bullshit and I want you out of my room\". Maricel Chiu RN charge nurse notified of this.       Nakul Hagen RN  04/11/21 2720

## 2021-04-11 NOTE — ED PROVIDER NOTES
A complete review of systems was performed and is negative except as noted above in the HPI. PAST MEDICAL HISTORY     Past Medical History:   Diagnosis Date    Age-related macular degeneration, wet, right eye ([address]     Anemia     Back pain     Bilateral carotid artery stenosis 2/23/2021    Chronic bilateral low back pain with left-sided sciatica 12/19/2019    Colitis, ischemic ([address]     Dementia ([address]     Diverticulosis     Dry age-related macular degeneration of left eye     Hyperlipidemia     Hypertension     Osteoarthritis     Palliative care patient 09/17/2019    Risk for falls 9/13/2019    Seizures ([address]     Spastic colon     Status post placement of implantable loop recorder 10/27/2020    Stroke syndrome     Urinary incontinence     Uterine cancer ([address]          SURGICAL HISTORY       Past Surgical History:   Procedure Laterality Date    BREAST SURGERY Right     FNA Right Breast \"oh heavens, maybe 20 years ago\"    CHOLECYSTECTOMY      COLONOSCOPY  9/25/03    Dr Yimi Bray ischemic colitis, incomplete exam    COLONOSCOPY  08/29/2003    Dr Levi Wynn:  limited colon-ischemic colitis    DILATION AND CURETTAGE OF UTERUS      x2, in [facility] tears, laser suregry, adn cataract with IOL b/l    HYSTERECTOMY      ovaries and tubes    INSERTABLE CARDIAC MONITOR      LUMBAR FUSION      2012 90 days after the spine surgeries    OTHER SURGICAL HISTORY      inner lymph nodes    SPINE SURGERY      L3,4, and 5   done in 2012   [address] 76      as a child at age 9    [address]  08/28/2003    DR Julienne Downey:  Duodenal scarring but no evidence of active ulcer disease.          CURRENT MEDICATIONS       Previous Medications    ACETAMINOPHEN (TYLENOL) 500 MG TABLET    Take 1,000 mg by mouth Indications: 2 tablets at 6:00am but not more than 3,000mg per day    ALENDRONATE (FOSAMAX) 70 MG TABLET    Take 70 mg by mouth % OPHTHALMIC OINTMENT    Place into the left eye as needed 3 times daily.     VALACYCLOVIR (VALTREX) 1 G TABLET    as needed        ALLERGIES     Aspirin, Dilaudid [hydromorphone hcl], Fd&c yellow #5 [place] [tartrazine], Ondansetron, Quinolones, Spironolactone, Sulfa antibiotics, Codeine, Demerol hcl [meperidine], Levaquin [levofloxacin in d5w], Myrbetriq [mirabegron], Namenda [memantine hcl], Norco [hydrocodone-acetaminophen], and Pneumococcal vaccines    FAMILY HISTORY       Family History   Problem Relation Age of Onset    Heart Defect Mother     Hypertension Mother     Heart Attack Father         x3 within 24 h and then , abused ciggarettes    No Known Problems Daughter     No Known Problems Son     No Known Problems Son     Colon Cancer Neg Hx     Colon Polyps Neg Hx     Esophageal Cancer Neg Hx     Liver Cancer Neg Hx     Liver Disease Neg Hx     Rectal Cancer Neg Hx     Stomach Cancer Neg Hx           SOCIAL HISTORY       Social History     Socioeconomic History    Marital status:      Spouse name: Not on file    Number of children: 3    Years of education: Not on file    Highest education level: Not on file   Occupational History    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mployee of [employer] 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,mployee of [employer]   Social Needs    Financial resource strain: Not on file    Food insecurity     Worry: Not on file     Inability: Not on file   Voxli needs     Medical: Not on file     Non-medical: Not on file   Tobacco Use    Smoking status: Never Smoker    Smokeless tobacco: Never Used   Substance and Sexual Activity    Alcohol use: Never     Frequency: Never    Drug use: Never    Sexual activity: Not on file     Comment: has 3 kids   Lifestyle    Physical activity     Days per week: Not on file     Minutes per session: Not on file    Stress: Not on file   Relationships    Social connections     Talks on phone: Not on file     Gets together: Not on file     Attends Orthodoxy service: Not on file     Active member of club or organization: Not on file     Attends meetings of clubs or organizations: Not on file     Relationship status: Not on file    Intimate partner violence     Fear of current or ex partner: Not on file     Emotionally abused: Not on file     Physically abused: Not on file     Forced sexual activity: Not on file   Other Topics Concern    Not on file   Social History Narrative    CODE STATUS: DNR-CCA    HEALTH CARE PROXY / [employer]: her daughter, Mrs. Vince Santiago, [phone]    AMBULATES: with walker during day, wheelchair at night    DOMICILED: lives in the [facility] assisted living [facility], has no stairs or steps, has a cat, her daughter is there periodically       SCREENINGS    Radha Coma Scale  Eye Opening: Spontaneous  Best Verbal Response: Oriented  Best Motor Response: Obeys commands  Radha Coma Scale Score: 15        PHYSICAL EXAM    (up to 7 for level 4, 8 or more for level 5)     ED Triage Vitals [04/11/21 1318]   BP Temp Temp Source Pulse Resp SpO2 Height Weight   (!) 158/79 98.7 °F (37.1 °C) Temporal 77 20 98 % -- 150 lb (68 kg)       Physical Exam  Vitals signs and nursing note reviewed. Constitutional:       General: She is not in acute distress. Appearance: She is well-developed. She is not diaphoretic. HENT:      Head: Normocephalic and atraumatic. Eyes:      Pupils: Pupils are equal, round, and reactive to light. Neck:      Musculoskeletal: Normal range of motion and neck supple. Cardiovascular:      Rate and Rhythm: Normal rate and regular rhythm. Heart sounds: Normal heart sounds. Pulmonary:      Effort: Pulmonary effort is normal. No respiratory distress. Breath sounds: Normal breath sounds. Abdominal:      General: Bowel sounds are normal. There is no distension. Palpations: Abdomen is soft. Tenderness:  There is no abdominal tenderness. Hernia: A hernia is present. Comments: Reducible flank hernia. No abdominal ttp   Musculoskeletal: Normal range of motion. Skin:     General: Skin is warm and dry. Coloration: Skin is pale. Findings: No rash. Neurological:      Mental Status: She is alert and oriented to person, place, and time. Cranial Nerves: No cranial nerve deficit. Motor: No abnormal muscle tone. Coordination: Coordination normal.   Psychiatric:         Behavior: Behavior normal.         DIAGNOSTIC RESULTS     EKG: All EKG's are interpreted by the Emergency Department Physician who either signs or Co-signs this chart in the absence of a cardiologist.    Normal sinus rhythm rate 66 no acute ST wave abnormality    RADIOLOGY:   Non-plain film images such as CT, Ultrasound and MRI are read by the radiologist. Lamount Morgan images are visualized and preliminarily interpreted by the emergency physician with the below findings:      Interpretation per the Radiologist below, if available at the time of this note:    XR CHEST PORTABLE   Final Result   Impression:   1. Blunting of the costophrenic angles, left greater than right,   suggestive of trace effusions. 2.  Right ninth rib fracture of uncertain age.    Signed by Dr Irais Borja on 4/11/2021 2:31 PM            ED BEDSIDE ULTRASOUND:   Performed by ED Physician - none    LABS:  Labs Reviewed   CBC WITH AUTO DIFFERENTIAL - Abnormal; Notable for the following components:       Result Value    WBC 4.3 (*)     RBC 3.54 (*)     Hemoglobin 11.6 (*)     Hematocrit 35.4 (*)     .0 (*)     MCH 32.8 (*)     MCHC 32.8 (*)     Neutrophils % 36.2 (*)     Lymphocytes % 49.8 (*)     Eosinophils % 5.3 (*)     All other components within normal limits   COMPREHENSIVE METABOLIC PANEL - Abnormal; Notable for the following components:    Sodium 134 (*)     Potassium 3.4 (*)     Chloride 97 (*)     Glucose 129 (*)     GFR Non- 59 (*) Total Protein 6.1 (*)     All other components within normal limits   COVID-19, RAPID   TROPONIN   BRAIN NATRIURETIC PEPTIDE   URINE RT REFLEX TO CULTURE       All other labs were within normal range or not returned as of this dictation. EMERGENCY DEPARTMENT COURSE and DIFFERENTIALDIAGNOSIS/MDM:   Vitals:    Vitals:    04/11/21 1318   BP: (!) 158/79   Pulse: 77   Resp: 20   Temp: 98.7 °F (37.1 °C)   TempSrc: Temporal   SpO2: 98%   Weight: 150 lb (68 kg)       MDM  Pacer was interrogated and the device is functioning properly and there have been no events/arrythmia    Pt's daughter concerned she possibly stopped breathing today during the event, which is not typical of her usual vasovagal syncope. Seemed to take longer for her to recover. Has some new small pleural effusions bilaterally. Would like pt observed overnight. dw Dr Kellie Cee for admission    CONSULTS:  None    PROCEDURES:  Unless otherwise notedbelow, none     Procedures    FINAL IMPRESSION     1. Syncope and collapse    2. Closed fracture of one rib of right side, initial encounter    3.  Pleural effusion          DISPOSITION/PLAN   DISPOSITION        PATIENT REFERRED TO:  @FUP@    DISCHARGE MEDICATIONS:  New Prescriptions    No medications on file          (Please note that portions of this note were completed with a voice recognition program.  Efforts were made to edit the dictations butoccasionally words are mis-transcribed.)    Neptali Mari MD (electronically signed)  AttendingEmergency Physician         Neptali Mari MD  04/11/21 3869

## 2021-04-11 NOTE — PROGRESS NOTES
PHARMACY NOTE    Michelle Shah was ordered Fosamax. Per the [address], this medication is non-formulary and not stocked by pharmacy. It has been discontinued. The medication can be reordered at discharge.      Electronically signed by Coy Nicolas [facility] on 4/11/2021 at 5:33 PM

## 2021-04-11 NOTE — PROGRESS NOTES
4 Eyes Skin Assessment    Yany Okeefe is being assessed upon: Admission    I agree that I, Brittny Ha, along with Michael Suarez (either 2 RN's or 1 LPN and 1 RN) have performed a thorough Head to Toe Skin Assessment on the patient. ALL assessment sites listed below have been assessed. Areas assessed by both nurses:     [x]   Head, Face, and Ears   [x]   Shoulders, Back, and Chest  [x]   Arms, Elbows, and Hands   [x]   Coccyx, Sacrum, and Ischium  [x]   Legs, Feet, and Heels    Does the Patient have Skin Breakdown?  No    Mino Prevention initiated: NA  Wound Care Orders initiated: NA    [facility] nurse consulted for Pressure Injury (Stage 3,4, Unstageable, DTI, NWPT, and Complex wounds) and New or Established Ostomies: NA        Primary Nurse eSignature: Brittny Ha RN on 4/11/2021 at 6:13 PM      Co-Signer eSignature: {Esignature:[id number]}

## 2021-04-11 NOTE — PROGRESS NOTES
Benjamin Almazan arrived to room # 328. Presented with: Syncope  Mental Status: Patient is oriented and alert. Vitals:    04/11/21 1730   BP: (!) 143/70   Pulse: 76   Resp: 18   Temp: 97.5 °F (36.4 °C)   SpO2: 96%     Patient safety contract and falls prevention contract reviewed with patient Yes. Oriented Patient and Family to room. Call light within reach. Yes.   Needs, issues or concerns expressed at this time: no.      Electronically signed by Olvin Goldstein RN on 4/11/2021 at 6:13 PM

## 2021-04-12 ENCOUNTER — TELEPHONE (OUTPATIENT)
Dept: CARDIOLOGY CLINIC | Age: 86
End: 2021-04-12

## 2021-04-12 VITALS
TEMPERATURE: 97.9 F | OXYGEN SATURATION: 96 % | HEART RATE: 70 BPM | WEIGHT: 150 LBS | DIASTOLIC BLOOD PRESSURE: 64 MMHG | RESPIRATION RATE: 16 BRPM | BODY MASS INDEX: 29.29 KG/M2 | SYSTOLIC BLOOD PRESSURE: 131 MMHG

## 2021-04-12 PROBLEM — Z91.89 AT RISK FOR POLYPHARMACY: Status: ACTIVE | Noted: 2021-04-12

## 2021-04-12 PROBLEM — R19.7 DIARRHEA: Status: ACTIVE | Noted: 2021-04-12

## 2021-04-12 LAB
ANION GAP SERPL CALCULATED.3IONS-SCNC: 12 MMOL/L (ref 7–19)
BASOPHILS ABSOLUTE: 0.1 K/UL (ref 0–0.2)
BASOPHILS RELATIVE PERCENT: 0.9 % (ref 0–1)
BUN BLDV-MCNC: 16 MG/DL (ref 8–23)
C DIFF TOXIN/ANTIGEN: NORMAL
CALCIUM SERPL-MCNC: 9.6 MG/DL (ref 8.8–10.2)
CHLORIDE BLD-SCNC: 100 MMOL/L (ref 98–111)
CO2: 25 MMOL/L (ref 22–29)
CREAT SERPL-MCNC: 0.7 MG/DL (ref 0.5–0.9)
EKG P AXIS: 69 DEGREES
EKG P-R INTERVAL: 188 MS
EKG Q-T INTERVAL: 434 MS
EKG QRS DURATION: 88 MS
EKG QTC CALCULATION (BAZETT): 442 MS
EKG T AXIS: 44 DEGREES
EOSINOPHILS ABSOLUTE: 0.1 K/UL (ref 0–0.6)
EOSINOPHILS RELATIVE PERCENT: 2 % (ref 0–5)
GFR AFRICAN AMERICAN: >59
GFR NON-AFRICAN AMERICAN: >60
GLUCOSE BLD-MCNC: 83 MG/DL (ref 74–109)
HCT VFR BLD CALC: 33.3 % (ref 37–47)
HEMOGLOBIN: 11 G/DL (ref 12–16)
IMMATURE GRANULOCYTES #: 0 K/UL
LYMPHOCYTES ABSOLUTE: 1.7 K/UL (ref 1.1–4.5)
LYMPHOCYTES RELATIVE PERCENT: 25.9 % (ref 20–40)
MCH RBC QN AUTO: 32.6 PG (ref 27–31)
MCHC RBC AUTO-ENTMCNC: 33 G/DL (ref 33–37)
MCV RBC AUTO: 98.8 FL (ref 81–99)
MONOCYTES ABSOLUTE: 0.5 K/UL (ref 0–0.9)
MONOCYTES RELATIVE PERCENT: 7 % (ref 0–10)
NEUTROPHILS ABSOLUTE: 4.2 K/UL (ref 1.5–7.5)
NEUTROPHILS RELATIVE PERCENT: 64 % (ref 50–65)
PDW BLD-RTO: 11.9 % (ref 11.5–14.5)
PLATELET # BLD: 181 K/UL (ref 130–400)
PMV BLD AUTO: 10.6 FL (ref 9.4–12.3)
POTASSIUM REFLEX MAGNESIUM: 4.2 MMOL/L (ref 3.5–5)
RBC # BLD: 3.37 M/UL (ref 4.2–5.4)
SODIUM BLD-SCNC: 137 MMOL/L (ref 136–145)
WBC # BLD: 6.5 K/UL (ref 4.8–10.8)

## 2021-04-12 PROCEDURE — 85025 COMPLETE CBC W/AUTO DIFF WBC: CPT

## 2021-04-12 PROCEDURE — 2580000003 HC RX 258: Performed by: FAMILY MEDICINE

## 2021-04-12 PROCEDURE — 93010 ELECTROCARDIOGRAM REPORT: CPT | Performed by: INTERNAL MEDICINE

## 2021-04-12 PROCEDURE — 36415 COLL VENOUS BLD VENIPUNCTURE: CPT

## 2021-04-12 PROCEDURE — G0378 HOSPITAL OBSERVATION PER HR: HCPCS

## 2021-04-12 PROCEDURE — 80048 BASIC METABOLIC PNL TOTAL CA: CPT

## 2021-04-12 PROCEDURE — 6370000000 HC RX 637 (ALT 250 FOR IP): Performed by: FAMILY MEDICINE

## 2021-04-12 PROCEDURE — 87324 CLOSTRIDIUM AG IA: CPT

## 2021-04-12 PROCEDURE — 87449 NOS EACH ORGANISM AG IA: CPT

## 2021-04-12 RX ORDER — GAUZE BANDAGE 2" X 2"
100 BANDAGE TOPICAL DAILY
Status: DISCONTINUED | OUTPATIENT
Start: 2021-04-12 | End: 2021-04-12 | Stop reason: HOSPADM

## 2021-04-12 RX ADMIN — RAMIPRIL 10 MG: 10 CAPSULE ORAL at 08:17

## 2021-04-12 RX ADMIN — LEVETIRACETAM 500 MG: 500 TABLET ORAL at 08:16

## 2021-04-12 RX ADMIN — Medication 1 G: at 08:17

## 2021-04-12 RX ADMIN — FLUTICASONE PROPIONATE 2 SPRAY: 50 SPRAY, METERED NASAL at 08:28

## 2021-04-12 RX ADMIN — PRAVASTATIN SODIUM 20 MG: 20 TABLET ORAL at 08:16

## 2021-04-12 RX ADMIN — AMLODIPINE BESYLATE 5 MG: 5 TABLET ORAL at 08:17

## 2021-04-12 RX ADMIN — PANTOPRAZOLE SODIUM 40 MG: 40 TABLET, DELAYED RELEASE ORAL at 08:16

## 2021-04-12 RX ADMIN — SODIUM CHLORIDE, PRESERVATIVE FREE 10 ML: 5 INJECTION INTRAVENOUS at 08:17

## 2021-04-12 RX ADMIN — DIPYRIDAMOLE 100 MG: 25 TABLET, FILM COATED ORAL at 08:17

## 2021-04-12 RX ADMIN — HYDRALAZINE HYDROCHLORIDE 50 MG: 25 TABLET, FILM COATED ORAL at 08:16

## 2021-04-12 NOTE — TELEPHONE ENCOUNTER
Patients daughter called to report patient is in room 328 at the hospital and they have put on a portable monitor. She stated her mother had 2 vagal responses yesterday and wanted to know why her pacemaker did not take care of it. She stated the pacemaker was checked in the ER and it did not show anything. She stated her mother was out for over 3 minutes yesterday. Recommended she discuss her concerns with MD.  I am not able to answer her questions she has about her mother and her episodes yesterday. She did want to verify the rate the pacemaker kicks in which she thought was 60. Confirmed with her the pacemaker lower rate is set at 60.

## 2021-04-12 NOTE — CARE COORDINATION
SW attempted visit; Pt's daughter in room with Tu going over d/c paperwork.   Electronically signed by ASHELY Davis on 4/12/2021 at 2:45 PM

## 2021-04-12 NOTE — TELEPHONE ENCOUNTER
Livia's daughter requests that a nurse or dr. Uday Pillai return their call. Pt is admitted to  [facility]. Per daughter was told pacemaker is not working. Had 2 vagal responses, was given temporary heart monitor. Please call [facility] to clarify. The best time to reach her is Anytime. Thank you.

## 2021-04-12 NOTE — PROGRESS NOTES
Patient's daughter stated patient took Bentyl for abdominal cramping 30-45 minutes before her vagal response today

## 2021-04-12 NOTE — PROGRESS NOTES
Pt given discharge papers. Pt and dtr verbalize an understanding. She has a follow up appointment with her PCP tomorrow.

## 2021-04-13 ENCOUNTER — CARE COORDINATION (OUTPATIENT)
Dept: CASE MANAGEMENT | Age: 86
End: 2021-04-13

## 2021-04-13 NOTE — CARE COORDINATION
[address] Transitions Initial Follow Up Call    Call within 2 business days of discharge: Yes    Patient: Liberty Mann Patient : 1935   MRN: [id number]  Reason for Admission: Vasovagal Syncope  Discharge Date: 21 RARS: No data recorded    Last Discharge [facility]       Complaint Diagnosis Description Type Department Provider    21 Loss of Consciousness Syncope and collapse . .. ED to Hosp-Admission (Discharged) (ADMITTED) [facility] Alina Mccray, DO; Migdalia Riley MD           Spoke with: N/A    Facility: [address] [place] [address]    Non-face-to-face services provided:  Reviewed encounter information for continuity of care prior to follow up Care Transitions phone call - chart notes, consults, progress notes, test results, med list, appointments, AVS, other information. Care Transitions 24 Hour Call    Care Transitions Interventions         Follow Up  Future Appointments   Date Time Provider Linn Ruelas   4/15/2021  1:30 PM LAYTON Sheppard - CNP [facility] [place]   2021  1:00 PM Morena Mccall MD [facility] [place] [place]   2021  2:15 PM LAYTON Dobbs [facility] Cardio [place]     Attempted to make contact with patient/caregiver for an initial follow up call post discharge without success. Unable to leave a message regarding intent of call and call back information. Call was forwarded to an unidentifiable voice mail. CTN to try again at a later time.      Makayla Aguiar RN

## 2021-04-13 NOTE — TELEPHONE ENCOUNTER
Pacemaker will only take care of the heart rate but will not address the blood pressure when it drops during a vagal episode.

## 2021-04-14 ENCOUNTER — CARE COORDINATION (OUTPATIENT)
Dept: CASE MANAGEMENT | Age: 86
End: 2021-04-14

## 2021-04-14 NOTE — CARE COORDINATION
[facility] Transitions Initial Follow Up Call    Call within 2 business days of discharge: Yes    Patient: Tito Marx Patient : 1935   MRN: [id number]  Reason for Admission: Vasovagal Syncope  Discharge Date: 21 RARS: No data recorded    Last Discharge [address] [address]       Complaint Diagnosis Description Type Department Provider    21 Loss of Consciousness Syncope and collapse . .. ED to Hosp-Admission (Discharged) (ADMITTED) [facility] Radha Jara DO; Linnea Huitron MD           Spoke with: N/A    Facility: [address] [place] [address]    Non-face-to-face services provided:  Reviewed encounter information for continuity of care prior to follow up Care Transitions phone call - chart notes, consults, progress notes, test results, med list, appointments, AVS, other information. Care Transitions 24 Hour Call    Care Transitions Interventions         Follow Up  Future Appointments   Date Time Provider Linn Ruelas   4/15/2021  1:30 PM LAYTON Dimas - CNP [facility] [place]   2021  1:00 PM Daniel Osuna MD [facility] [place] [place]   2021  2:15 PM LAYTON Molina [facility] Cardio [place]     Attempted to make contact with patient/caregiver for an initial follow up call post discharge without success. Unable to leave a message regarding intent of call and call back information. Call was quickly forwarded to an unidentifiable voice mail. As this repeated attempt to make contact was unsuccessful, will disengage at this time.        Faustino Mckeon RN

## 2021-04-15 ENCOUNTER — NURSE ONLY (OUTPATIENT)
Dept: UROLOGY | Age: 86
End: 2021-04-15
Payer: MEDICARE

## 2021-04-15 DIAGNOSIS — N32.81 OAB (OVERACTIVE BLADDER): Primary | ICD-10-CM

## 2021-04-15 DIAGNOSIS — N39.46 MIXED STRESS AND URGE URINARY INCONTINENCE: ICD-10-CM

## 2021-04-15 PROCEDURE — 64566 NEUROELTRD STIM POST TIBIAL: CPT | Performed by: NURSE PRACTITIONER

## 2021-04-28 ENCOUNTER — NURSE ONLY (OUTPATIENT)
Dept: UROLOGY | Age: 86
End: 2021-04-28
Payer: MEDICARE

## 2021-04-28 DIAGNOSIS — N32.81 OAB (OVERACTIVE BLADDER): Primary | ICD-10-CM

## 2021-04-28 DIAGNOSIS — N39.46 MIXED STRESS AND URGE URINARY INCONTINENCE: ICD-10-CM

## 2021-04-28 PROCEDURE — 64566 NEUROELTRD STIM POST TIBIAL: CPT | Performed by: NURSE PRACTITIONER

## 2021-04-28 NOTE — PROGRESS NOTES
Treatment: 2 week maintenance    Improvement of Symptoms? no    OAB/Urologic Medications? no      Uroplasty Procedure:    1. Patient is seated with the right treatment leg elevated. 2. 34 gauge fine needle electrode is inserted into lower inner aspect of the leg. Slightly cephalad to the medial malleolus. 3. Surface electrode pad is placed over the medial aspect of the calcaneus on the same leg. 4.Needle electrode is connected to the external pulse generator. 5.The pulse generator is turned on and the millivolts used are 2. 6.Patient is treated for 30 minutes. 7.The needle and pad are removed. Patient will follow up in 2 weeks for next treatment.

## 2021-05-07 ENCOUNTER — TELEPHONE (OUTPATIENT)
Dept: UROLOGY | Age: 86
End: 2021-05-07

## 2021-05-13 ENCOUNTER — NURSE ONLY (OUTPATIENT)
Dept: UROLOGY | Age: 86
End: 2021-05-13
Payer: MEDICARE

## 2021-05-13 DIAGNOSIS — N32.81 OAB (OVERACTIVE BLADDER): Primary | ICD-10-CM

## 2021-05-13 DIAGNOSIS — N39.46 MIXED STRESS AND URGE URINARY INCONTINENCE: ICD-10-CM

## 2021-05-13 PROCEDURE — 64566 NEUROELTRD STIM POST TIBIAL: CPT | Performed by: NURSE PRACTITIONER

## 2021-05-19 DIAGNOSIS — I10 ESSENTIAL HYPERTENSION: ICD-10-CM

## 2021-05-19 DIAGNOSIS — R00.1 BRADYCARDIA: ICD-10-CM

## 2021-05-19 DIAGNOSIS — Z95.818 STATUS POST PLACEMENT OF IMPLANTABLE LOOP RECORDER: Primary | ICD-10-CM

## 2021-05-19 DIAGNOSIS — R55 SYNCOPE AND COLLAPSE: ICD-10-CM

## 2021-05-20 PROCEDURE — 93294 REM INTERROG EVL PM/LDLS PM: CPT | Performed by: NURSE PRACTITIONER

## 2021-05-20 PROCEDURE — 93296 REM INTERROG EVL PM/IDS: CPT | Performed by: NURSE PRACTITIONER

## 2021-05-25 ENCOUNTER — APPOINTMENT (OUTPATIENT)
Dept: CT IMAGING | Age: 86
End: 2021-05-25
Payer: MEDICARE

## 2021-05-25 ENCOUNTER — HOSPITAL ENCOUNTER (EMERGENCY)
Age: 86
Discharge: HOME | End: 2021-05-26
Attending: EMERGENCY MEDICINE
Payer: MEDICARE

## 2021-05-25 VITALS
DIASTOLIC BLOOD PRESSURE: 63 MMHG | HEART RATE: 85 BPM | SYSTOLIC BLOOD PRESSURE: 116 MMHG | TEMPERATURE: 97.8 F | OXYGEN SATURATION: 93 % | RESPIRATION RATE: 15 BRPM

## 2021-05-25 DIAGNOSIS — K52.9 ACUTE GASTROENTERITIS: ICD-10-CM

## 2021-05-25 DIAGNOSIS — R74.8 ELEVATED LIPASE: ICD-10-CM

## 2021-05-25 DIAGNOSIS — R55 VASOVAGAL SYNCOPE: Primary | ICD-10-CM

## 2021-05-25 LAB
ALBUMIN SERPL-MCNC: 4.1 G/DL (ref 3.5–5.2)
ALP BLD-CCNC: 76 U/L (ref 35–104)
ALT SERPL-CCNC: 12 U/L (ref 5–33)
AMYLASE: 189 U/L (ref 28–100)
ANION GAP SERPL CALCULATED.3IONS-SCNC: 11 MMOL/L (ref 7–19)
AST SERPL-CCNC: 17 U/L (ref 5–32)
BACTERIA: ABNORMAL /HPF
BASOPHILS ABSOLUTE: 0 K/UL (ref 0–0.2)
BASOPHILS RELATIVE PERCENT: 0.2 % (ref 0–1)
BILIRUB SERPL-MCNC: 0.5 MG/DL (ref 0.2–1.2)
BILIRUBIN URINE: NEGATIVE
BLOOD, URINE: NEGATIVE
BUN BLDV-MCNC: 24 MG/DL (ref 8–23)
CALCIUM SERPL-MCNC: 9.5 MG/DL (ref 8.8–10.2)
CHLORIDE BLD-SCNC: 100 MMOL/L (ref 98–111)
CLARITY: CLEAR
CO2: 23 MMOL/L (ref 22–29)
COLOR: ABNORMAL
CREAT SERPL-MCNC: 0.9 MG/DL (ref 0.5–0.9)
EOSINOPHILS ABSOLUTE: 0.1 K/UL (ref 0–0.6)
EOSINOPHILS RELATIVE PERCENT: 0.9 % (ref 0–5)
EPITHELIAL CELLS, UA: ABNORMAL /HPF
GFR AFRICAN AMERICAN: >59
GFR NON-AFRICAN AMERICAN: 59
GLUCOSE BLD-MCNC: 116 MG/DL (ref 74–109)
GLUCOSE URINE: NEGATIVE MG/DL
HCT VFR BLD CALC: 39.9 % (ref 37–47)
HEMOGLOBIN: 12.8 G/DL (ref 12–16)
IMMATURE GRANULOCYTES #: 0.1 K/UL
KETONES, URINE: ABNORMAL MG/DL
LEUKOCYTE ESTERASE, URINE: ABNORMAL
LIPASE: 381 U/L (ref 13–60)
LYMPHOCYTES ABSOLUTE: 0.3 K/UL (ref 1.1–4.5)
LYMPHOCYTES RELATIVE PERCENT: 3.3 % (ref 20–40)
MCH RBC QN AUTO: 32.4 PG (ref 27–31)
MCHC RBC AUTO-ENTMCNC: 32.1 G/DL (ref 33–37)
MCV RBC AUTO: 101 FL (ref 81–99)
MONOCYTES ABSOLUTE: 0.3 K/UL (ref 0–0.9)
MONOCYTES RELATIVE PERCENT: 3.1 % (ref 0–10)
NEUTROPHILS ABSOLUTE: 8.3 K/UL (ref 1.5–7.5)
NEUTROPHILS RELATIVE PERCENT: 91.9 % (ref 50–65)
NITRITE, URINE: POSITIVE
PDW BLD-RTO: 11.9 % (ref 11.5–14.5)
PH UA: 5 (ref 5–8)
PLATELET # BLD: 202 K/UL (ref 130–400)
PMV BLD AUTO: 10.2 FL (ref 9.4–12.3)
POTASSIUM REFLEX MAGNESIUM: 3.8 MMOL/L (ref 3.5–5)
PROTEIN UA: NEGATIVE MG/DL
RBC # BLD: 3.95 M/UL (ref 4.2–5.4)
SODIUM BLD-SCNC: 134 MMOL/L (ref 136–145)
SPECIFIC GRAVITY UA: 1.03 (ref 1–1.03)
TOTAL PROTEIN: 6.6 G/DL (ref 6.6–8.7)
TROPONIN: <0.01 NG/ML (ref 0–0.03)
UROBILINOGEN, URINE: 0.2 E.U./DL
WBC # BLD: 9.1 K/UL (ref 4.8–10.8)
WBC UA: ABNORMAL /HPF (ref 0–5)

## 2021-05-25 PROCEDURE — 74177 CT ABD & PELVIS W/CONTRAST: CPT

## 2021-05-25 PROCEDURE — 82150 ASSAY OF AMYLASE: CPT

## 2021-05-25 PROCEDURE — 84484 ASSAY OF TROPONIN QUANT: CPT

## 2021-05-25 PROCEDURE — 85025 COMPLETE CBC W/AUTO DIFF WBC: CPT

## 2021-05-25 PROCEDURE — 80053 COMPREHEN METABOLIC PANEL: CPT

## 2021-05-25 PROCEDURE — 99283 EMERGENCY DEPT VISIT LOW MDM: CPT

## 2021-05-25 PROCEDURE — 83690 ASSAY OF LIPASE: CPT

## 2021-05-25 PROCEDURE — 74176 CT ABD & PELVIS W/O CONTRAST: CPT

## 2021-05-25 PROCEDURE — 93005 ELECTROCARDIOGRAM TRACING: CPT | Performed by: EMERGENCY MEDICINE

## 2021-05-25 PROCEDURE — 36415 COLL VENOUS BLD VENIPUNCTURE: CPT

## 2021-05-25 PROCEDURE — 81001 URINALYSIS AUTO W/SCOPE: CPT

## 2021-05-25 PROCEDURE — 6360000004 HC RX CONTRAST MEDICATION: Performed by: EMERGENCY MEDICINE

## 2021-05-25 PROCEDURE — 87186 SC STD MICRODIL/AGAR DIL: CPT

## 2021-05-25 PROCEDURE — 87077 CULTURE AEROBIC IDENTIFY: CPT

## 2021-05-25 PROCEDURE — 87086 URINE CULTURE/COLONY COUNT: CPT

## 2021-05-25 RX ADMIN — IOPAMIDOL 90 ML: 755 INJECTION, SOLUTION INTRAVENOUS at 19:27

## 2021-05-25 ASSESSMENT — ENCOUNTER SYMPTOMS
EYE DISCHARGE: 0
VOICE CHANGE: 0
BLOOD IN STOOL: 0
VOMITING: 1
SHORTNESS OF BREATH: 0
CONSTIPATION: 0
FACIAL SWELLING: 0
CHOKING: 0
NAUSEA: 0
SORE THROAT: 0
DIARRHEA: 1
APNEA: 0
SINUS PRESSURE: 0

## 2021-05-25 NOTE — ED NOTES
Pt up to bedside commode. Extra blankets and socks given for comfort. Daughter at bedside.       Sharon Turpin RN  05/25/21 [id number]

## 2021-05-25 NOTE — ED NOTES
Bed: 13  Expected date:   Expected time:   Means of arrival:   Comments:  Ems near syncope     Madalyn Dean RN  05/25/21 2811

## 2021-05-26 ENCOUNTER — TELEPHONE (OUTPATIENT)
Dept: UROLOGY | Age: 86
End: 2021-05-26

## 2021-05-26 LAB
EKG P AXIS: 67 DEGREES
EKG P-R INTERVAL: 158 MS
EKG Q-T INTERVAL: 376 MS
EKG QRS DURATION: 82 MS
EKG QTC CALCULATION (BAZETT): 421 MS
EKG T AXIS: 60 DEGREES

## 2021-05-26 PROCEDURE — 93010 ELECTROCARDIOGRAM REPORT: CPT | Performed by: INTERNAL MEDICINE

## 2021-05-26 NOTE — ED PROVIDER NOTES
and is negative except as noted above in the HPI. PAST MEDICAL HISTORY     Past Medical History:   Diagnosis Date    Age-related macular degeneration, wet, right eye ([address]     Anemia     Back pain     Bilateral carotid artery stenosis 2/23/2021    Chronic bilateral low back pain with left-sided sciatica 12/19/2019    Colitis, ischemic ([address]     Dementia ([address]     Diverticulosis     Dry age-related macular degeneration of left eye     Hyperlipidemia     Hypertension     Osteoarthritis     Palliative care patient 09/17/2019    Risk for falls 9/13/2019    Seizures ([address]     Spastic colon     Status post placement of implantable loop recorder 10/27/2020    Stroke syndrome     Urinary incontinence     Uterine cancer ([address]          SURGICAL HISTORY       Past Surgical History:   Procedure Laterality Date    BREAST SURGERY Right     FNA Right Breast \"oh heavens, maybe 20 years ago\"    CHOLECYSTECTOMY      COLONOSCOPY  9/25/03    Dr Velma Leslie ischemic colitis, incomplete exam    COLONOSCOPY  08/29/2003    Dr Remington Champion:  limited colon-ischemic colitis    DILATION AND CURETTAGE OF UTERUS      x2, in [facility] tears, laser suregry, adn cataract with IOL b/l    HYSTERECTOMY      ovaries and tubes    INSERTABLE CARDIAC MONITOR      LUMBAR FUSION      2012 90 days after the spine surgeries    OTHER SURGICAL HISTORY      inner lymph nodes    SPINE SURGERY      L3,4, and 5   done in 2012   [address]      as a child at age 9    Blan[place]riselda Mackenzie  08/28/2003    DR Maida Stevenson:  Duodenal scarring but no evidence of active ulcer disease.          CURRENT MEDICATIONS       Previous Medications    ACETAMINOPHEN (TYLENOL) 500 MG TABLET    Take 1,000 mg by mouth 3 times daily Indications: 2 tablets at 6:00am, 2 tablets at 2:00 PM, 2 tablets 8 PM     ALENDRONATE (FOSAMAX) 70 MG TABLET    Take 70 mg by mouth every 7 days Sundays at 1830 AMLODIPINE (NORVASC) 5 MG TABLET    Take 5 mg by mouth 2 times daily Indications: takes at 9am and 9pm     AZELASTINE (ASTELIN) 0.1 % NASAL SPRAY    2 sprays by Nasal route 2 times daily Indications: takes 9am and 9pm     B COMPLEX VITAMINS CAPSULE    Take 1 capsule by mouth daily Indications: AT 2:30 PM B 100    BUTALBITAL-ACETAMINOPHEN-CAFFEINE (FIORICET, ESGIC) -40 MG PER TABLET    Take 1 tablet by mouth every 6 hours as needed for Headaches     CALCIUM CARB-CHOLECALCIFEROL (CALCIUM 600+D) 600-800 MG-UNIT TABS    Take 1 tablet by mouth daily Indications: AT 2:30 PM     CAMPHOR-MENTHOL (SARNA) 0.5-0.5 % LOTION    Apply 0.5 mLs topically 2 times daily as needed for Itching Apply topically as needed. CHOLECALCIFEROL (VITAMIN D3) 5000 UNITS TABS    Take 5,000 Units by mouth daily Indications: AT 2:30 PM     CLONIDINE (CATAPRES) 0.1 MG TABLET    Take 1 tablet by mouth 2 times daily as needed for High Blood Pressure (for BP greater than 160/100)    DESONIDE (DESOWEN) 0.05 % CREAM    Apply topically as needed Apply topically 2 times daily. DICLOFENAC SODIUM 1 % GEL    Apply 2 g topically 4 times daily as needed for Pain    DICYCLOMINE (BENTYL) 10 MG CAPSULE    TAKE 1 CAPSULE BY MOUTH FOUR TIMES DAILY AS NEEDED FOR STOMACH CRAMPING    DIPYRIDAMOLE (PERSANTINE) 50 MG TABLET    Take 2 tablets by mouth 2 times daily Indications: 9am and 9pm    DOCOSANOL (ABREVA EX)    Apply topically    DOCUSATE SODIUM (COLACE) 100 MG CAPSULE    Take 100 mg by mouth nightly as needed for Constipation     DONEPEZIL (ARICEPT) 10 MG TABLET    Take 1 tablet by mouth nightly    FLUTICASONE (FLONASE) 50 MCG/ACT NASAL SPRAY    1 spray by Each Nostril route daily    GABAPENTIN (NEURONTIN) 100 MG CAPSULE    TAKE 1 CAPSULE BY MOUTH 3 TIMES DAILY  DAYS.  INTENDED SUPPLY: 30 DAYS    HYDRALAZINE (APRESOLINE) 25 MG TABLET    Take 50 mg by mouth 3 times daily Indications: takes at 9am, 3pm, and 9pm     LEVETIRACETAM (KEPPRA) 500 MG TABLET    Take 1 tablet by mouth 2 times daily Indications: takes 9am and 9pm    LIDOCAINE 4 % EXTERNAL PATCH    Place 1 patch onto the skin daily as needed (LEFT LOWER BACK PAIN)    LUTEIN 20 MG TABS    Take 20 mg by mouth daily Indications: 3 PM DAILY     MELOXICAM (MOBIC) 7.5 MG TABLET    Take 15 mg by mouth daily Indications: takes at 9am     MULTIPLE VITAMINS-MINERALS (THERAPEUTIC MULTIVITAMIN-MINERALS) TABLET    Take 1 tablet by mouth daily Indications: AT 2:30 PM     MUPIROCIN (BACTROBAN) 2 % CREAM    Apply topically as needed Apply topically 3 times daily. NITROFURANTOIN (MACRODANTIN) 50 MG CAPSULE    TAKE 1 CAPSULE AT BEDTIME    NONFORMULARY    Indications: dic 3%, lidocaine 2%, cyclo 2%, prilo 2% baclo 2% 1 to 2 pumps up to TID PRN     OMEGA 3-6-9 FATTY ACIDS (OMEGA 3-6-9 COMPLEX PO)    Take 2 tablets by mouth daily Indications: AT 2:30 PM     PANTOPRAZOLE (PROTONIX) 40 MG TABLET    Take 40 mg by mouth daily Indications: 9am     POLYVINYL ALCOHOL-POVIDONE (HYPOTEARS) 1.4-0.6 % OPHTHALMIC SOLUTION    Place 1-2 drops into both eyes as needed    PRAVASTATIN (PRAVACHOL) 20 MG TABLET    Take 20 mg by mouth nightly Indications: takes at 9am     PROMETHAZINE (PHENERGAN) 25 MG TABLET    Take 25 mg by mouth 3 times daily as needed for Nausea    RAMIPRIL (ALTACE) 10 MG CAPSULE    Take 10 mg by mouth 2 times daily Indications: takes at 9am and 9pm     SIMETHICONE (GAS-X EXTRA STRENGTH) 125 MG CHEWABLE TABLET    Take 125 mg by mouth 3 times daily 2 PO 5:30 AM, 2 at 10:30 AM, 1 at 5:00 PM    SODIUM CHLORIDE (OCEAN, BABY AYR) 0.65 % NASAL SPRAY    1 spray by Nasal route as needed for Congestion    SODIUM CHLORIDE 1 G TABLET    Take 1 g by mouth 3 times daily Indications: takes 9am, 3pm, and 9pm     THIAMINE HCL (VITAMIN B-1 PO)    Take 100 mg by mouth daily Takes at 2:00 PM    TOBRAMYCIN-DEXAMETHASONE (TOBRADEX) 0.3-0.1 % OPHTHALMIC OINTMENT    Place into the left eye as needed 3 times daily.     VALACYCLOVIR (VALTREX) 1 G TABLET    as needed        ALLERGIES     Aspirin, Dilaudid [hydromorphone hcl], Fd&c yellow #5 [place] [tartrazine], Ondansetron, Quinolones, Spironolactone, Sulfa antibiotics, Codeine, Demerol hcl [meperidine], Levaquin [levofloxacin in d5w], Myrbetriq [mirabegron], Namenda [memantine hcl], Norco [hydrocodone-acetaminophen], and Pneumococcal vaccines    FAMILY HISTORY       Family History   Problem Relation Age of Onset    Heart Defect Mother     Hypertension Mother     Heart Attack Father         x3 within 24 h and then , abused ciggarettes    No Known Problems Daughter     No Known Problems Son     No Known Problems Son     Colon Cancer Neg Hx     Colon Polyps Neg Hx     Esophageal Cancer Neg Hx     Liver Cancer Neg Hx     Liver Disease Neg Hx     Rectal Cancer Neg Hx     Stomach Cancer Neg Hx           SOCIAL HISTORY       Social History     Socioeconomic History    Marital status:      Spouse name: Not on file    Number of children: 3    Years of education: Not on file    Highest education level: Not on file   Occupational History    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mployee of [address] [place] [address] 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,mployee of [employer]   Tobacco Use    Smoking status: Never Smoker    Smokeless tobacco: Never Used   Vaping Use    Vaping Use: Never used   Substance and Sexual Activity    Alcohol use: Never    Drug use: Never    Sexual activity: Not on file     Comment: has 3 kids   Other Topics Concern    Not on file   Social History Narrative    CODE STATUS: DNR-CCA    HEALTH CARE PROXY / [employer] and Healthcare: her daughter, Mrs. Barth Kussmaul, [phone]    AMBULATES: with walker during day, wheelchair at night    DOMICILED: lives in the [facility] assisted living facility, has no stairs or steps, has a cat, her daughter is there periodically     Social Determinants of Health     Financial Resource Strain:     Difficulty of Paying Living Expenses:    Food Insecurity:     Worried About [facility] in the Last Year:     [address] in the Last Year:    Transportation Needs:     Lack of Transportation (Medical):  Lack of Transportation (Non-Medical):    Physical Activity:     Days of Exercise per Week:     Minutes of Exercise per Session:    Stress:     Feeling of Stress :    Social Connections:     Frequency of Communication with Friends and Family:     Frequency of Social Gatherings with Friends and Family:     Attends Zoroastrianism Services:     Active Member of Clubs or Organizations:     Attends Club or Organization Meetings:     Marital Status:    Intimate Partner Violence:     Fear of Current or Ex-Partner:     Emotionally Abused:     Physically Abused:     Sexually Abused:        SCREENINGS             PHYSICAL EXAM    (up to 7 for level 4, 8 or more for level 5)     ED Triage Vitals   BP Temp Temp Source Pulse Resp SpO2 Height Weight   05/25/21 1700 05/25/21 1645 05/25/21 1645 05/25/21 1645 05/25/21 1645 05/25/21 1645 -- --   116/63 97.8 °F (36.6 °C) Oral 85 15 93 %         Physical Exam  Vitals and nursing note reviewed. Constitutional:       General: She is not in acute distress. Appearance: She is well-developed. HENT:      Head: Normocephalic and atraumatic. Right Ear: External ear normal.      Left Ear: External ear normal.      Nose: Nose normal.      Mouth/Throat:      Mouth: Mucous membranes are moist.      Pharynx: Oropharynx is clear. Eyes:      General: No scleral icterus. Conjunctiva/sclera: Conjunctivae normal.      Pupils: Pupils are equal, round, and reactive to light. Cardiovascular:      Rate and Rhythm: Normal rate and regular rhythm. Pulses: Normal pulses. Heart sounds: Normal heart sounds. Pulmonary:      Effort: Pulmonary effort is normal. No respiratory distress. Breath sounds: Normal breath sounds.    Abdominal:      General: Bowel sounds are normal. Palpations: Abdomen is soft. Tenderness: There is abdominal tenderness (Patient had a pain response palpation of the epigastric region). Musculoskeletal:         General: No signs of injury. Normal range of motion. Cervical back: Normal range of motion and neck supple. Skin:     General: Skin is warm and dry. Coloration: Skin is not jaundiced. Neurological:      General: No focal deficit present. Mental Status: She is alert and oriented to person, place, and time. Psychiatric:         Behavior: Behavior normal.         DIAGNOSTIC RESULTS     EKG: All EKG's are interpreted by the Emergency Department Physician who either signs or Co-signs this chart in the absence of a cardiologist.    Sinus rhythm rate 86    RADIOLOGY:   Non-plain film images such as CT, Ultrasound and MRI are read by the radiologist. Plainradiographic images are visualized and preliminarily interpreted by the emergency physician with the below findings:    I have reviewed the results. Interpretation per the Radiologist below, if available at the time of this note:    CT ABDOMEN PELVIS WO CONTRAST Additional Contrast? None   Final Result   1. This study is of limited diagnostic quality. This is secondary to   the lack of intra-abdominal fat, lack of IV and oral contrast as well   as the patient's inability to raise her arms over her head with   extensive streaking artifact related to both upper extremities as well   as postoperative changes within the mid and lower lumbar spine also   contributing to streaking. If symptoms are persistent I would suggest   that the study be repeated with both IV and oral contrast   administration. 2. Cardiomegaly with a transvenous pacer in place. There is heavy   atheromatous calcification of the distal descending thoracic aorta   without evidence of aneurysm. 3. Multiple fluid-filled bowel loops. I do not see evidence of a   discrete transition point.  The appendix is not METABOLIC PANEL W/ REFLEX TO MG FOR LOW K       All other labs were within normal range or not returned as of this dictation. EMERGENCY DEPARTMENT COURSE and DIFFERENTIALDIAGNOSIS/MDM:   Vitals:    Vitals:    05/25/21 1645 05/25/21 1700   BP:  116/63   Pulse: 85    Resp: 15    Temp: 97.8 °F (36.6 °C)    TempSrc: Oral    SpO2: 93%        MDM  Number of Diagnoses or Management Options  Acute gastroenteritis  Elevated lipase  Vasovagal syncope  Diagnosis management comments: Patient had a vasovagal response after she vomited. This is not acute. But the vomiting is. Her lipase and amylase were slightly elevated. CT scan the abdomen was performed but apparently no contrast got in. There was concern it may have extravasated in the arm. But there is no evidence of injury some discomfort in the biceps region. Nursing staff says he is very functional at draws blood and is free-flowing. I initially discussed the case with Dr. Jerson Dumont the hospitalist on-call. She would like me to try IV fluids a p.o. challenge and if successful the patient may be discharged. If she fails I will call her back. Urine was positive for nitrates and leukocyte esterase. I will order a urine culture. There was only 2-3 of WBCs in the urine it may be contaminated. Make a decision whether we will give her 3 days of Levaquin or wait till the culture results. She takes Macrobid daily for suppression. If the culture is positive we will stop it and treat her with a different antibiotic she knows to expect results in 2 days. I asked her to follow-up with her clinician she may see GI if she wishes she will should have repeat blood test.  She tolerated her p.o. challenge very well. But she did get some diarrhea which reinforces my thought that this is probably a viral gastroenteritis. CONSULTS:  None    PROCEDURES:  Unless otherwise notedbelow, none     Procedures    FINAL IMPRESSION     1. Vasovagal syncope    2.  Elevated lipase

## 2021-05-26 NOTE — TELEPHONE ENCOUNTER
Since ER ordered urine culture they will need to treat. We can recheck her urine (UA) at her next appt is she would like.

## 2021-05-27 ENCOUNTER — TELEPHONE (OUTPATIENT)
Dept: UROLOGY | Age: 86
End: 2021-05-27

## 2021-05-28 LAB
ORGANISM: ABNORMAL
URINE CULTURE, ROUTINE: ABNORMAL
URINE CULTURE, ROUTINE: ABNORMAL

## 2021-05-29 RX ORDER — CEPHALEXIN 250 MG/1
250 CAPSULE ORAL 2 TIMES DAILY
Qty: 10 CAPSULE | Refills: 0 | Status: SHIPPED | OUTPATIENT
Start: 2021-05-29 | End: 2021-06-03

## 2021-05-29 NOTE — ED PROVIDER NOTES
Discussed urine culture report with patient's daughter who wants rx prescribed to [facility]. Rx'd keflex 250mg bid x5 days. She hold macrobid and follow up with her pcp next week.       Jacky Lemus, APRN  05/29/21 1111

## 2021-06-08 ENCOUNTER — NURSE ONLY (OUTPATIENT)
Dept: UROLOGY | Age: 86
End: 2021-06-08
Payer: MEDICARE

## 2021-06-08 DIAGNOSIS — N32.81 OAB (OVERACTIVE BLADDER): Primary | ICD-10-CM

## 2021-06-08 DIAGNOSIS — N39.46 MIXED STRESS AND URGE URINARY INCONTINENCE: ICD-10-CM

## 2021-06-08 PROCEDURE — 64566 NEUROELTRD STIM POST TIBIAL: CPT | Performed by: NURSE PRACTITIONER

## 2021-06-08 RX ORDER — CEFDINIR 300 MG/1
300 CAPSULE ORAL 2 TIMES DAILY
Qty: 14 CAPSULE | Refills: 0 | Status: SHIPPED | OUTPATIENT
Start: 2021-06-08 | End: 2021-06-15

## 2021-06-08 NOTE — PROGRESS NOTES
Treatment 2 week maintenance    Improvement of Symptoms? Not particularly    OAB/Urologic Medications? none      Uroplasty Procedure:    1. Patient is seated with the right treatment leg elevated. 2. 34 gauge fine needle electrode is inserted into lower inner aspect of the leg. Slightly cephalad to the medial malleolus. 3. Surface electrode pad is placed over the medial aspect of the calcaneus on the same leg. 4.Needle electrode is connected to the external pulse generator. 5.The pulse generator is turned on and the millivolts used are 3. 6.Patient is treated for 30 minutes. 7.The needle and pad are removed. Patient will follow up in  2 weeks for next treatment.

## 2021-06-21 ENCOUNTER — NURSE ONLY (OUTPATIENT)
Dept: UROLOGY | Age: 86
End: 2021-06-21
Payer: MEDICARE

## 2021-06-21 DIAGNOSIS — N32.81 OVERACTIVE BLADDER: Primary | ICD-10-CM

## 2021-06-21 LAB
APPEARANCE FLUID: CLEAR
BILIRUBIN, POC: NEGATIVE
BLOOD URINE, POC: NEGATIVE
CLARITY, POC: CLEAR
COLOR, POC: YELLOW
GLUCOSE URINE, POC: NEGATIVE
KETONES, POC: NEGATIVE
LEUKOCYTE EST, POC: NEGATIVE
NITRITE, POC: NEGATIVE
PH, POC: 5
PROTEIN, POC: NEGATIVE
SPECIFIC GRAVITY, POC: 1.01
UROBILINOGEN, POC: 0.2

## 2021-06-21 PROCEDURE — 81002 URINALYSIS NONAUTO W/O SCOPE: CPT | Performed by: NURSE PRACTITIONER

## 2021-06-21 PROCEDURE — 64566 NEUROELTRD STIM POST TIBIAL: CPT | Performed by: NURSE PRACTITIONER

## 2021-06-21 NOTE — PROGRESS NOTES
Treatment # 2 week maintenance (#4)    Improvement of Symptoms? Not really    OAB/Urologic Medications? no      Uroplasty Procedure:    1. Patient is seated with the right treatment leg elevated. 2. 34 gauge fine needle electrode is inserted into lower inner aspect of the leg. Slightly cephalad to the medial malleolus. 3. Surface electrode pad is placed over the medial aspect of the calcaneus on the same leg. 4.Needle electrode is connected to the external pulse generator. 5.The pulse generator is turned on and the millivolts used are 3. 6.Patient is treated for 30 minutes. 7.The needle and pad are removed. Patient will follow up in 2 weeks for next treatment.

## 2021-06-22 ENCOUNTER — OFFICE VISIT (OUTPATIENT)
Dept: GASTROENTEROLOGY | Age: 86
End: 2021-06-22
Payer: MEDICARE

## 2021-06-22 ENCOUNTER — TELEPHONE (OUTPATIENT)
Dept: GASTROENTEROLOGY | Age: 86
End: 2021-06-22

## 2021-06-22 VITALS
HEART RATE: 71 BPM | HEIGHT: 60 IN | BODY MASS INDEX: 23.32 KG/M2 | WEIGHT: 118.8 LBS | OXYGEN SATURATION: 98 % | SYSTOLIC BLOOD PRESSURE: 132 MMHG | DIASTOLIC BLOOD PRESSURE: 72 MMHG

## 2021-06-22 DIAGNOSIS — R14.0 ABDOMINAL BLOATING: ICD-10-CM

## 2021-06-22 DIAGNOSIS — R74.8 ELEVATED LIPASE: Primary | ICD-10-CM

## 2021-06-22 DIAGNOSIS — R74.8 ELEVATED LIPASE: ICD-10-CM

## 2021-06-22 PROBLEM — E44.1 MILD PROTEIN-CALORIE MALNUTRITION (HCC): Status: ACTIVE | Noted: 2021-06-22

## 2021-06-22 LAB — LIPASE: 48 U/L (ref 13–60)

## 2021-06-22 PROCEDURE — G8420 CALC BMI NORM PARAMETERS: HCPCS | Performed by: NURSE PRACTITIONER

## 2021-06-22 PROCEDURE — G8427 DOCREV CUR MEDS BY ELIG CLIN: HCPCS | Performed by: NURSE PRACTITIONER

## 2021-06-22 PROCEDURE — 99215 OFFICE O/P EST HI 40 MIN: CPT | Performed by: NURSE PRACTITIONER

## 2021-06-22 PROCEDURE — 1123F ACP DISCUSS/DSCN MKR DOCD: CPT | Performed by: NURSE PRACTITIONER

## 2021-06-22 PROCEDURE — 1090F PRES/ABSN URINE INCON ASSESS: CPT | Performed by: NURSE PRACTITIONER

## 2021-06-22 PROCEDURE — 4040F PNEUMOC VAC/ADMIN/RCVD: CPT | Performed by: NURSE PRACTITIONER

## 2021-06-22 PROCEDURE — G8400 PT W/DXA NO RESULTS DOC: HCPCS | Performed by: NURSE PRACTITIONER

## 2021-06-22 PROCEDURE — 1036F TOBACCO NON-USER: CPT | Performed by: NURSE PRACTITIONER

## 2021-06-22 ASSESSMENT — ENCOUNTER SYMPTOMS
ABDOMINAL PAIN: 1
PHOTOPHOBIA: 0
SHORTNESS OF BREATH: 1
ABDOMINAL DISTENTION: 0
TROUBLE SWALLOWING: 0
BACK PAIN: 1
DIARRHEA: 0
SORE THROAT: 0
VOMITING: 0
BLOOD IN STOOL: 0
VOICE CHANGE: 0
ANAL BLEEDING: 0
CONSTIPATION: 0
RECTAL PAIN: 0
COUGH: 0
NAUSEA: 0

## 2021-06-22 NOTE — PROGRESS NOTES
NA++ levels. Pt has been evaluated here on multiple occasions for these complaints, nothing medicinal ever helps. Reports a 10 pound loss of weight over the past year. Also wants her lipase rechecked, it was elevated in ED at 381. Family HX:    Pt denies family hx of colon polyps, colon CA, inflammatory bowel dx, gastric CA and esophageal CA.     Past Medical History:   Diagnosis Date    Age-related macular degeneration, wet, right eye ([address]     Anemia     Back pain     Bilateral carotid artery stenosis 2/23/2021    Chronic bilateral low back pain with left-sided sciatica 12/19/2019    Colitis, ischemic ([address]     Dementia ([address]     Diverticulosis     Dry age-related macular degeneration of left eye     Hyperlipidemia     Hypertension     Osteoarthritis     Palliative care patient 09/17/2019    Risk for falls 9/13/2019    Seizures ([address]     Spastic colon     Status post placement of implantable loop recorder 10/27/2020    Stroke syndrome     Urinary incontinence     Uterine cancer ([address]     Uterine cancer ([address]           Past Surgical History:   Procedure Laterality Date    BREAST SURGERY Right     FNA Right Breast \"oh heavens, maybe 20 years ago\"    CHOLECYSTECTOMY      COLONOSCOPY  9/25/03    Dr Polo Lowe ischemic colitis, incomplete exam    COLONOSCOPY  08/29/2003    Dr Ruiz Curet:  limited colon-ischemic colitis    DILATION AND CURETTAGE OF UTERUS      x2, in [facility] tears, laser suregry, adn cataract with IOL b/l    HYSTERECTOMY  01/2020    ovaries and tubes    INSERTABLE CARDIAC MONITOR      LUMBAR FUSION      2012 90 days after the spine surgeries    OTHER SURGICAL HISTORY      inner lymph nodes    OTHER SURGICAL HISTORY  09/2020    Loop recorder     PACEMAKER PLACEMENT  01/2021    SPINE SURGERY      L3,4, and 5   done in 2012   [address] 76      as a child at age 9    [address]  08/28/2003     STEVE:  Duodenal scarring but no evidence of active ulcer disease. Social History     Socioeconomic History    Marital status:      Spouse name: None    Number of children: 3    Years of education: None    Highest education level: None   Occupational History    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mployee of [employer] 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,mployee of [address] [employer]   Tobacco Use    Smoking status: Never Smoker    Smokeless tobacco: Never Used   Vaping Use    Vaping Use: Never used   Substance and Sexual Activity    Alcohol use: Never    Drug use: Never    Sexual activity: None     Comment: has 3 kids   Other Topics Concern    None   Social History Narrative    CODE STATUS: DNR-CCA    HEALTH CARE PROXY / [employer] and Healthcare: her daughter, Mrs. Tanisha Rome, [phone]    AMBULATES: with walker during day, wheelchair at night    DOMICILED: lives in the [facility] assisted living facility, has no stairs or steps, has a cat, her daughter is there periodically     Social Determinants of Health     Financial Resource Strain:     Difficulty of Paying Living Expenses:    Food Insecurity:     Worried About [address] [employer] in the Last Year:     [address] [email or web address] in the Last Year:    Transportation Needs:     Lack of Transportation (Medical):  Lack of Transportation (Non-Medical):    Physical Activity:     Days of Exercise per Week:     Minutes of Exercise per Session:    Stress:     Feeling of Stress :    Social Connections:     Frequency of Communication with Friends and Family:     Frequency of Social Gatherings with Friends and Family:     Attends Protestant Services:     Active Member of Clubs or Organizations:     Attends Club or Organization Meetings:     Marital Status:    Intimate Partner Violence:     Fear of Current or Ex-Partner:     Emotionally Abused:     Physically Abused:     Sexually Abused:         Allergies   Allergen Reactions    Aspirin Shortness Of Breath    Dilaudid [Hydromorphone Hcl]     Fd&C Yellow #[employer] [place] [Tartrazine]     Ondansetron     Quinolones     Spironolactone      Low sodium    Sulfa Antibiotics     Codeine Rash and Other (See Comments)     Makes her \"crazy and mean\" and gives her a rash    Demerol Hcl [Meperidine] Other (See Comments)     Made her \"crazy and mean\", and \"loopy\"    Levaquin [Levofloxacin In D5w] Other (See Comments)     Seizure like activity      Myrbetriq [Mirabegron] Other (See Comments)     Unable to micturate    Namenda [Memantine Hcl] Other (See Comments)     made her like a vegetable for hours      Norco [Hydrocodone-Acetaminophen] Rash and Other (See Comments)     \"crazy and mean\"    Pneumococcal Vaccines Swelling       Current Outpatient Medications   Medication Sig Dispense Refill    gabapentin (NEURONTIN) 100 MG capsule TAKE 1 CAPSULE BY MOUTH 3 TIMES DAILY  DAYS.  INTENDED SUPPLY: 30 DAYS (Patient taking differently: 2 times daily. ) 90 capsule 1    Thiamine HCl (VITAMIN B-1 PO) Take 100 mg by mouth daily Takes at 2:00 PM      fluticasone (FLONASE) 50 MCG/ACT nasal spray 1 spray by Each Nostril route daily      dipyridamole (PERSANTINE) 50 MG tablet Take 2 tablets by mouth 2 times daily Indications: 9am and 9pm 60 tablet 11    levETIRAcetam (KEPPRA) 500 MG tablet Take 1 tablet by mouth 2 times daily Indications: takes 9am and 9pm 60 tablet 11    pantoprazole (PROTONIX) 40 MG tablet Take 40 mg by mouth daily Indications: 9am       acetaminophen (TYLENOL) 500 MG tablet Take 1,000 mg by mouth 3 times daily Indications: 2 tablets at 6:00am, 2 tablets at 2:00 PM, 2 tablets 8 PM       meloxicam (MOBIC) 7.5 MG tablet Take 15 mg by mouth daily Indications: takes at 9am       donepezil (ARICEPT) 10 MG tablet Take 1 tablet by mouth nightly (Patient taking differently: Take 10 mg by mouth nightly Indications: takes 9pm ) 90 tablet 3    Docosanol (ABREVA EX) Apply topically as needed       hydrALAZINE (APRESOLINE) 25 MG tablet Take 50 mg by mouth 3 times daily Indications: takes at 9am, 3pm, and 9pm If sys is 120 to 150 take 1/2 tablet other wise full dose, below 120 no med      nitrofurantoin (MACRODANTIN) 50 MG capsule TAKE 1 CAPSULE AT BEDTIME (Patient taking differently: Indications: 9pm TAKE 1 CAPSULE AT BEDTIME) 30 capsule 3    simethicone (GAS-X EXTRA STRENGTH) 125 MG chewable tablet Take 125 mg by mouth 3 times daily 2 PO 6:30 AM, 2 at 10:00 AM, 1 at 5:30 PM otherwise prn      butalbital-acetaminophen-caffeine (FIORICET, ESGIC) -40 MG per tablet Take 1 tablet by mouth every 6 hours as needed for Headaches       amLODIPine (NORVASC) 5 MG tablet Take 5 mg by mouth 2 times daily Indications: takes at 9am and 9pm If BP sys 120 or below then 2.5mg bid otherwise 5mg bid      diclofenac sodium 1 % GEL Apply 2 g topically 3 times daily as needed for Pain       sodium chloride 1 g tablet Take 1 g by mouth 3 times daily Indications: takes 9am, 3pm, and 9pm       lidocaine 4 % external patch Place 1 patch onto the skin daily as needed (LEFT LOWER BACK PAIN)      valACYclovir (VALTREX) 1 g tablet as needed       azelastine (ASTELIN) 0.1 % nasal spray 2 sprays by Nasal route 2 times daily Indications: takes 9am and 9pm       Cholecalciferol (VITAMIN D3) 5000 units TABS Take 5,000 Units by mouth daily Indications: AT 2:30 PM       polyvinyl alcohol-povidone (HYPOTEARS) 1.4-0.6 % ophthalmic solution Place 1-2 drops into both eyes as needed      sodium chloride (OCEAN, BABY AYR) 0.65 % nasal spray 1 spray by Nasal route as needed for Congestion      Lutein 20 MG TABS Take 20 mg by mouth daily Indications: 3 PM DAILY       Calcium Carb-Cholecalciferol (CALCIUM 600+D) 600-800 MG-UNIT TABS Take 1 tablet by mouth daily Indications: AT 2:30 PM       promethazine (PHENERGAN) 25 MG tablet Take 12.5 mg by mouth 3 times daily as needed for Nausea       Omega 3-6-9 Fatty Acids (OMEGA 3-6-9 COMPLEX PO) Take 2 tablets by mouth daily Indications: AT 2:30 PM       alendronate (FOSAMAX) 70 MG tablet Take 70 mg by mouth every 7 days Sundays at 1830      pravastatin (PRAVACHOL) 20 MG tablet Take 20 mg by mouth nightly Indications: takes at 9am       ramipril (ALTACE) 10 MG capsule Take 10 mg by mouth 2 times daily Indications: takes at 9am and 9pm       b complex vitamins capsule Take 1 capsule by mouth daily Indications: AT 2:30 PM B 100      Multiple Vitamins-Minerals (THERAPEUTIC MULTIVITAMIN-MINERALS) tablet Take 1 tablet by mouth daily Indications: AT 2:30 PM       dicyclomine (BENTYL) 10 MG capsule TAKE 1 CAPSULE BY MOUTH FOUR TIMES DAILY AS NEEDED FOR STOMACH CRAMPING (Patient not taking: Reported on 6/22/2021)      cloNIDine (CATAPRES) 0.1 MG tablet Take 1 tablet by mouth 2 times daily as needed for High Blood Pressure (for BP greater than 160/100) (Patient not taking: Reported on 6/22/2021) 30 tablet 2    NONFORMULARY Indications: dic 3%, lidocaine 2%, cyclo 2%, prilo 2% baclo 2% 1 to 2 pumps up to TID PRN  (Patient not taking: No sig reported)      desonide (DESOWEN) 0.05 % cream Apply topically as needed Apply topically 2 times daily. (Patient not taking: Reported on 6/22/2021)      mupirocin (BACTROBAN) 2 % cream Apply topically as needed Apply topically 3 times daily. (Patient not taking: Reported on 6/22/2021)      camphor-menthol (SARNA) 0.5-0.5 % lotion Apply 0.5 mLs topically 2 times daily as needed for Itching Apply topically as needed. (Patient not taking: Reported on 6/22/2021)      docusate sodium (COLACE) 100 MG capsule Take 100 mg by mouth nightly as needed for Constipation  (Patient not taking: Reported on 6/22/2021)       No current facility-administered medications for this visit. Review of Systems   Constitutional: Positive for fatigue and unexpected weight change. Negative for appetite change and fever.    HENT: Negative for sore throat, trouble swallowing and voice change. Eyes: Negative for photophobia and visual disturbance. Respiratory: Positive for shortness of breath. Negative for cough. Cardiovascular: Negative for chest pain, palpitations and leg swelling. Gastrointestinal: Positive for abdominal pain (generalized with bloating, since 1960's). Negative for abdominal distention, anal bleeding, blood in stool, constipation, diarrhea, nausea, rectal pain and vomiting. Genitourinary: Negative for hematuria. Musculoskeletal: Positive for arthralgias, back pain and neck pain. Allergic/Immunologic: Negative for immunocompromised state. Neurological: Positive for weakness. Negative for syncope. Hematological: Negative for adenopathy. Does not bruise/bleed easily. Psychiatric/Behavioral: Positive for dysphoric mood. Negative for sleep disturbance. The patient is nervous/anxious. All other systems reviewed and are negative. Objective:     Physical Exam  Vitals and nursing note reviewed. Constitutional:       General: She is not in acute distress. Appearance: She is well-developed. Comments: /72   Pulse 71   Ht 5' (1.524 m)   Wt 118 lb 12.8 oz (53.9 kg)   SpO2 98%   BMI 23.20 kg/m²    HENT:      Head: Normocephalic and atraumatic. Right Ear: External ear normal.      Left Ear: External ear normal.   Eyes:      General: No scleral icterus. Conjunctiva/sclera: Conjunctivae normal.      Pupils: Pupils are equal, round, and reactive to light. Neck:      Thyroid: No thyromegaly. Cardiovascular:      Rate and Rhythm: Normal rate and regular rhythm. Heart sounds: Normal heart sounds. No murmur heard. No friction rub. No gallop. Comments: No edema noted to ralph lower extremities   Pulmonary:      Effort: Pulmonary effort is normal. No respiratory distress. Breath sounds: Normal breath sounds. Abdominal:      General: Bowel sounds are normal. There is no distension.       Palpations: Abdomen is soft. Tenderness: There is no abdominal tenderness. There is no rebound. Comments: Hepatosplenomegaly not appreciated   Musculoskeletal:         General: No deformity. Normal range of motion. Cervical back: Normal range of motion and neck supple. Comments: Normal gait on exam   Neurological:      Mental Status: She is alert and oriented to person, place, and time. Cranial Nerves: No cranial nerve deficit. Psychiatric:         Judgment: Judgment normal.           Assessment:       Diagnosis Orders   1. Elevated lipase  Lipase   2. Abdominal bloating           Plan:   1. Will check her lipase. She can f/u here prn. She defers any endoscopic procedures at this time.   2. Time spent- 45 minutes

## 2021-06-23 NOTE — TELEPHONE ENCOUNTER
6-23-21    Ligia, I received a call from Pre-service that this patient's daughter was asking you to call her regarding her Mother's recent results, [phone]. I will forward to [facility].  [facility]

## 2021-06-25 NOTE — TELEPHONE ENCOUNTER
Pt's daughter, Jessica, calls stating pt is having heavy, bright red vaginal bleeding, that it drips into toilet.   Added note:  Also states pt had a bad fall last week and half ago from toilet into the shower hitting R side of body.  ER @ [facility] showed no breaks but a lot of bruising.  Jessica is requesting pt be seen today.  Scheduling notified.  Pt to come in at 1pm today to see ALECIA Coleman.    Jessica notified.  
Lay

## 2021-07-01 ENCOUNTER — OFFICE VISIT (OUTPATIENT)
Dept: NEUROLOGY | Age: 86
End: 2021-07-01

## 2021-07-01 VITALS
WEIGHT: 118 LBS | SYSTOLIC BLOOD PRESSURE: 126 MMHG | RESPIRATION RATE: 14 BRPM | HEIGHT: 60 IN | DIASTOLIC BLOOD PRESSURE: 70 MMHG | HEART RATE: 65 BPM | BODY MASS INDEX: 23.16 KG/M2

## 2021-07-01 DIAGNOSIS — M79.604 PAIN IN BOTH LOWER EXTREMITIES: ICD-10-CM

## 2021-07-01 DIAGNOSIS — I67.9 CEREBROVASCULAR SMALL VESSEL DISEASE: ICD-10-CM

## 2021-07-01 DIAGNOSIS — G44.89 OTHER HEADACHE SYNDROME: ICD-10-CM

## 2021-07-01 DIAGNOSIS — F01.50 VASCULAR DEMENTIA WITHOUT BEHAVIORAL DISTURBANCE (HCC): ICD-10-CM

## 2021-07-01 DIAGNOSIS — R55 VASOVAGAL SYNCOPE: ICD-10-CM

## 2021-07-01 DIAGNOSIS — G40.219 PARTIAL SYMPTOMATIC EPILEPSY WITH COMPLEX PARTIAL SEIZURES, INTRACTABLE, WITHOUT STATUS EPILEPTICUS (HCC): Primary | ICD-10-CM

## 2021-07-01 DIAGNOSIS — M79.605 PAIN IN BOTH LOWER EXTREMITIES: ICD-10-CM

## 2021-07-01 PROCEDURE — 1090F PRES/ABSN URINE INCON ASSESS: CPT | Performed by: PSYCHIATRY & NEUROLOGY

## 2021-07-01 PROCEDURE — 1036F TOBACCO NON-USER: CPT | Performed by: PSYCHIATRY & NEUROLOGY

## 2021-07-01 PROCEDURE — G8427 DOCREV CUR MEDS BY ELIG CLIN: HCPCS | Performed by: PSYCHIATRY & NEUROLOGY

## 2021-07-01 PROCEDURE — G8420 CALC BMI NORM PARAMETERS: HCPCS | Performed by: PSYCHIATRY & NEUROLOGY

## 2021-07-01 PROCEDURE — 1123F ACP DISCUSS/DSCN MKR DOCD: CPT | Performed by: PSYCHIATRY & NEUROLOGY

## 2021-07-01 PROCEDURE — 99214 OFFICE O/P EST MOD 30 MIN: CPT | Performed by: PSYCHIATRY & NEUROLOGY

## 2021-07-01 PROCEDURE — 4040F PNEUMOC VAC/ADMIN/RCVD: CPT | Performed by: PSYCHIATRY & NEUROLOGY

## 2021-07-01 PROCEDURE — G8400 PT W/DXA NO RESULTS DOC: HCPCS | Performed by: PSYCHIATRY & NEUROLOGY

## 2021-07-01 NOTE — PROGRESS NOTES
[facility] Neu[facility]  [address] [place] [address] Drive, [address]  [place], Wilmer Horowitz  Phone [phone]  Fax [phone]     [facility] Neurology Follow Up Encounter  21 1:12 PM CDT    Information:   Patient Name: Yi Barker  :   1935  Age:   80 y.o. MRN:   [id number]  Account #:  [de-identified]  Today:  21    Provider: Ben Alexander M.D. Chief Complaint:   Chief Complaint   Patient presents with    Follow-up       Subjective:   Yi Barker is a 80 y.o. woman with a history of seizures, small vessel cerebrovascular disease, dementia, headaches, and recurrent syncope who is following. She has had several episodes of syncope during bowel movements. She has some convulsions during the events. She takes Altace 10 mg daily, amlodipine 5 mg daily, Hydralazine 25 mg BID, and PRN clonidine. She has many GI problems from belching to stomach cramps and multiple BMs a day. She has urinary incontinence. Her daughter has her on a strict protocol of BP monitoring and diet. All her syncope and convulsions occur during bowel movements.         Objective:     Past Medical History:  Past Medical History:   Diagnosis Date    Age-related macular degeneration, wet, right eye ([address]     Anemia     Back pain     Bilateral carotid artery stenosis 2021    Chronic bilateral low back pain with left-sided sciatica 2019    Colitis, ischemic ([address]     Dementia ([address]     Diverticulosis     Dry age-related macular degeneration of left eye     Hyperlipidemia     Hypertension     Osteoarthritis     Palliative care patient 2019    Risk for falls 2019    Seizures ([address]     Spastic colon     Status post placement of implantable loop recorder 10/27/2020    Stroke syndrome     Urinary incontinence     Uterine cancer ([address]     Uterine cancer ([address]        Past Surgical History:   Procedure Laterality Date    BREAST SURGERY Right     FNA Right Breast \"oh heavens, maybe 20 years ago\"    CHOLECYSTECTOMY      COLONOSCOPY  03    Dr Toyin Toure ischemic colitis, incomplete exam    COLONOSCOPY  2003    Dr Abhijit Fragoso:  limited colon-ischemic colitis    DILATION AND CURETTAGE OF UTERUS      x2, in [facility] tears, laser suregry, adn cataract with IOL b/l    HYSTERECTOMY  2020    ovaries and tubes    INSERTABLE CARDIAC MONITOR      LUMBAR FUSION       90 days after the spine surgeries    OTHER SURGICAL HISTORY      inner lymph nodes    OTHER SURGICAL HISTORY  2020    Loop recorder     PACEMAKER PLACEMENT  2021    SPINE SURGERY      L3,4, and 5   done in    [address]      as a child at age 9    [address]  2003    DR Michele Guillen:  Duodenal scarring but no evidence of active ulcer disease. Recent Hospitalizations  · None    Significant Injuries  · None    Habits  Kelsey Donahue reports that she has never smoked. She has never used smokeless tobacco. She reports that she does not drink alcohol and does not use drugs. Family History   Problem Relation Age of Onset    Heart Defect Mother     Hypertension Mother     Heart Attack Father         x3 within 24 h and then , abused ciggarettes    No Known Problems Daughter     No Known Problems Son     No Known Problems Son     Colon Cancer Neg Hx     Colon Polyps Neg Hx     Esophageal Cancer Neg Hx     Liver Cancer Neg Hx     Liver Disease Neg Hx     Rectal Cancer Neg Hx     Stomach Cancer Neg Hx        Social History  Kelsey Donahue is , lives in [place], [place], and is retired    Medications:  Current Outpatient Medications   Medication Sig Dispense Refill    gabapentin (NEURONTIN) 100 MG capsule TAKE 1 CAPSULE BY MOUTH 3 TIMES DAILY  DAYS.  INTENDED SUPPLY: 30 DAYS (Patient taking differently: 2 times daily. ) 90 capsule 1    Thiamine HCl (VITAMIN B-1 PO) Take 100 mg by mouth daily Takes at 2:00 PM  fluticasone (FLONASE) 50 MCG/ACT nasal spray 1 spray by Each Nostril route daily      dipyridamole (PERSANTINE) 50 MG tablet Take 2 tablets by mouth 2 times daily Indications: 9am and 9pm 60 tablet 11    levETIRAcetam (KEPPRA) 500 MG tablet Take 1 tablet by mouth 2 times daily Indications: takes 9am and 9pm 60 tablet 11    cloNIDine (CATAPRES) 0.1 MG tablet Take 1 tablet by mouth 2 times daily as needed for High Blood Pressure (for BP greater than 160/100) 30 tablet 2    pantoprazole (PROTONIX) 40 MG tablet Take 40 mg by mouth daily Indications: 9am       acetaminophen (TYLENOL) 500 MG tablet Take 1,000 mg by mouth 3 times daily Indications: 2 tablets at 6:00am, 2 tablets at 2:00 PM, 2 tablets 8 PM       meloxicam (MOBIC) 7.5 MG tablet Take 15 mg by mouth daily Indications: takes at 9am       donepezil (ARICEPT) 10 MG tablet Take 1 tablet by mouth nightly (Patient taking differently: Take 10 mg by mouth nightly Indications: takes 9pm ) 90 tablet 3    Docosanol (ABREVA EX) Apply topically as needed       hydrALAZINE (APRESOLINE) 25 MG tablet Take 50 mg by mouth 3 times daily Indications: takes at 9am, 3pm, and 9pm If sys is 120 to 150 take 1/2 tablet other wise full dose, below 120 no med      nitrofurantoin (MACRODANTIN) 50 MG capsule TAKE 1 CAPSULE AT BEDTIME (Patient taking differently: Indications: 9pm TAKE 1 CAPSULE AT BEDTIME) 30 capsule 3    simethicone (GAS-X EXTRA STRENGTH) 125 MG chewable tablet Take 125 mg by mouth 3 times daily 2 PO 6:30 AM, 2 at 10:00 AM, 1 at 5:30 PM otherwise prn      butalbital-acetaminophen-caffeine (FIORICET, ESGIC) -40 MG per tablet Take 1 tablet by mouth every 6 hours as needed for Headaches       amLODIPine (NORVASC) 5 MG tablet Take 5 mg by mouth 2 times daily Indications: takes at 9am and 9pm If BP sys 120 or below then 2.5mg bid otherwise 5mg bid      diclofenac sodium 1 % GEL Apply 2 g topically 3 times daily as needed for Pain       sodium chloride 1 g tablet Take 1 g by mouth 3 times daily Indications: takes 9am, 3pm, and 9pm       lidocaine 4 % external patch Place 1 patch onto the skin daily as needed (LEFT LOWER BACK PAIN)      NONFORMULARY Indications: dic 3%, lidocaine 2%, cyclo 2%, prilo 2% baclo 2% 1 to 2 pumps up to TID PRN       desonide (DESOWEN) 0.05 % cream Apply topically as needed Apply topically 2 times daily.  mupirocin (BACTROBAN) 2 % cream Apply topically as needed Apply topically 3 times daily.  valACYclovir (VALTREX) 1 g tablet as needed       azelastine (ASTELIN) 0.1 % nasal spray 2 sprays by Nasal route 2 times daily Indications: takes 9am and 9pm       Cholecalciferol (VITAMIN D3) 5000 units TABS Take 5,000 Units by mouth daily Indications: AT 2:30 PM       polyvinyl alcohol-povidone (HYPOTEARS) 1.4-0.6 % ophthalmic solution Place 1-2 drops into both eyes as needed      sodium chloride (OCEAN, BABY AYR) 0.65 % nasal spray 1 spray by Nasal route as needed for Congestion      Lutein 20 MG TABS Take 20 mg by mouth daily Indications: 3 PM DAILY       camphor-menthol (SARNA) 0.5-0.5 % lotion Apply 0.5 mLs topically 2 times daily as needed for Itching Apply topically as needed.        Calcium Carb-Cholecalciferol (CALCIUM 600+D) 600-800 MG-UNIT TABS Take 1 tablet by mouth daily Indications: AT 2:30 PM       promethazine (PHENERGAN) 25 MG tablet Take 12.5 mg by mouth 3 times daily as needed for Nausea       docusate sodium (COLACE) 100 MG capsule Take 100 mg by mouth nightly as needed for Constipation       Omega 3-6-9 Fatty Acids (OMEGA 3-6-9 COMPLEX PO) Take 2 tablets by mouth daily Indications: AT 2:30 PM       alendronate (FOSAMAX) 70 MG tablet Take 70 mg by mouth every 7 days Sundays at 1830      pravastatin (PRAVACHOL) 20 MG tablet Take 20 mg by mouth nightly Indications: takes at 9am       ramipril (ALTACE) 10 MG capsule Take 10 mg by mouth 2 times daily Indications: takes at 9am and 9pm       b complex vitamins capsule Take 1 capsule by mouth daily Indications: AT 2:30 PM B 100      Multiple Vitamins-Minerals (THERAPEUTIC MULTIVITAMIN-MINERALS) tablet Take 1 tablet by mouth daily Indications: AT 2:30 PM        No current facility-administered medications for this visit. Allergies:   Allergies as of 07/01/2021 - Fully Reviewed 07/01/2021   Allergen Reaction Noted    Aspirin Shortness Of Breath 03/17/2016    Dilaudid [hydromorphone hcl]  03/17/2016    Fd&c yellow #5 [place] [tartrazine]  01/05/2021    Ondansetron  03/17/2016    Quinolones  09/14/2019    Spironolactone  09/25/2019    Sulfa antibiotics  03/17/2016    Codeine Rash and Other (See Comments) 08/11/2016    Demerol hcl [meperidine] Other (See Comments) 03/17/2016    Levaquin [levofloxacin in d5w] Other (See Comments) 02/21/2018    Myrbetriq [mirabegron] Other (See Comments) 04/12/2018    Namenda [memantine hcl] Other (See Comments) 03/17/2016    Norco [hydrocodone-acetaminophen] Rash and Other (See Comments) 03/17/2016    Pneumococcal vaccines Swelling 03/17/2016       Review of Systems:  Constitutional: negative for - chills and fever  Eyes:  negative for - visual disturbance and photophobia  HENMT: negative for - headaches and sinus pain  Respiratory: negative for - cough, hemoptysis, and shortness of breath  Cardiovascular: negative for - chest pain and palpitations  Gastrointestinal: negative for - blood in stools, constipation, diarrhea, nausea, and vomiting  Genito-Urinary: negative for - hematuria, urinary frequency, urinary urgency, and urinary retention  Musculoskeletal: positive for - joint pain, joint stiffness, and joint swelling  Hematological and Lymphatic: negative for - bleeding problems, abnormal bruising, and swollen lymph nodes  Endocrine:  negative for - polydipsia and polyphagia  Allergy/Immunology:  negative for - rhinorrhea, sinus congestion, hives  Integument:  negative for - negative for - rash, change in moles, new or changing lesions  Psychological: negative for - anxiety and depression  Neurological: positive for - memory loss, numbness/tingling, and weakness     PHYSICAL EXAMINATION:  Vitals:  /70   Pulse 65   Resp 14   Ht 5' (1.524 m)   Wt 118 lb (53.5 kg)   BMI 23.05 kg/m²   General appearance:  Alert, well developed, well nourished, in no distress  HEENT:  normocephalic, atraumatic, sclera appear normal, no nasal abnormalities, no rhinorrhea, Ears appear normal, oral mucous membranes are moist without erythema, trachea midline, thyroid is normal, no lymphadenopathy or neck mass. Cardiovascular:  Regular rate and rhythm without murmer. No peripheral edema, No cyanosis or clubbing. No carotid bruits. Pulmonary:  Lungs are clear to auscultation. Breathing appears normal, good expansion, normal effort without use of accessory muscles  Musculoskeletal:  Joints are osteoarthritic. Integument:  No rash, erythema, or pallor  Psychiatric:  Mood, affect, and behavior appear normal      NEUROLOGIC EXAMINATION:  Mental Status:  alert, oriented to person, place, and time. Speech:  Clear without dysarthria or dysphonia  Language:  Fluent without aphasia  Cranial Nerves:   II Visual fields are full to confrontation   III,IV, VI Extraocular movements are full   VII Facial movements are symmetrical without weakness   VIII Hearing is intact   IX,X Shoulder shrug and head rotation strength are intact   XII No tongue atrophy or fasciculations. Normal tongue protrusion. No tongue weakness  Motor:  Normal strength in both upper and lower extremities. Normal muscle tone and bulk. Deep tendon reflexes are intact and symmetrical in both upper and lower extremities. Vazquez's signs are absent bilaterally. There is no ankle clonus on either side. Coordination:  Rapid alternating movements are normal in both upper and lower extremities. Finger to nose testing is unimpaired bilaterally.   Gait:  Mildly unsteady    Pertinent Diagnostic Studies:  Hospital notes and ER visits    Assessment:       ICD-10-CM    1. Partial symptomatic epilepsy with complex partial seizures, intractable, without status epilepticus ([facility]  G40.219    2. Cerebrovascular small vessel disease  I67.9    3. Vascular dementia without behavioral disturbance (HCC)  F01.50    4. Vasovagal syncope  R55    5. Pain in both lower extremities  M79.604     M79.605    6. Other headache syndrome  G44.89    Consider letting her BP run higher. Her symptoms are those of vasovagal syncope. There is little remedy but to avoid precipitants. Could consider midodrine but as she is on three antihypertensive medications that is not proper. Plan:   1. Discuss BP medications with PCP. Note to Dr. Lindsay Mann.   2. Continue other medications  3. FU in 6 months    Electronically signed by Mukesh Jim MD on 7/1/21

## 2021-07-06 ENCOUNTER — NURSE ONLY (OUTPATIENT)
Dept: UROLOGY | Age: 86
End: 2021-07-06

## 2021-07-06 DIAGNOSIS — N32.81 OVERACTIVE BLADDER: Primary | ICD-10-CM

## 2021-07-06 DIAGNOSIS — N39.46 MIXED STRESS AND URGE URINARY INCONTINENCE: ICD-10-CM

## 2021-07-06 PROCEDURE — 64566 NEUROELTRD STIM POST TIBIAL: CPT | Performed by: NURSE PRACTITIONER

## 2021-07-06 NOTE — PROGRESS NOTES
Treatment # 2 week maintenenance    Improvement of Symptoms? Moderate since infection was treated    OAB/Urologic Medications? no      Uroplasty Procedure:    1. Patient is seated with the right treatment leg elevated. 2. 34 gauge fine needle electrode is inserted into lower inner aspect of the leg. Slightly cephalad to the medial malleolus. 3. Surface electrode pad is placed over the medial aspect of the calcaneus on the same leg. 4.Needle electrode is connected to the external pulse generator. 5.The pulse generator is turned on and the millivolts used are 4. 6.Patient is treated for 30 minutes. 7.The needle and pad are removed. Patient will follow up in 2 weeks for next treatment.

## 2021-07-16 ENCOUNTER — OFFICE VISIT (OUTPATIENT)
Dept: URGENT CARE | Age: 86
End: 2021-07-16
Payer: MEDICARE

## 2021-07-16 ENCOUNTER — TELEPHONE (OUTPATIENT)
Dept: UROLOGY | Age: 86
End: 2021-07-16

## 2021-07-16 VITALS
HEART RATE: 66 BPM | DIASTOLIC BLOOD PRESSURE: 69 MMHG | RESPIRATION RATE: 16 BRPM | SYSTOLIC BLOOD PRESSURE: 130 MMHG | TEMPERATURE: 98 F | WEIGHT: 112.6 LBS | BODY MASS INDEX: 21.99 KG/M2 | OXYGEN SATURATION: 96 %

## 2021-07-16 DIAGNOSIS — R34 DECREASED URINE OUTPUT: Primary | ICD-10-CM

## 2021-07-16 LAB
APPEARANCE FLUID: NORMAL
BILIRUBIN, POC: NEGATIVE
BLOOD URINE, POC: NEGATIVE
CLARITY, POC: CLEAR
COLOR, POC: YELLOW
GLUCOSE URINE, POC: NEGATIVE
KETONES, POC: NEGATIVE
LEUKOCYTE EST, POC: NEGATIVE
NITRITE, POC: NEGATIVE
PH, POC: 5
PROTEIN, POC: NEGATIVE
SPECIFIC GRAVITY, POC: 1.02
UROBILINOGEN, POC: 0.2

## 2021-07-16 PROCEDURE — G8420 CALC BMI NORM PARAMETERS: HCPCS | Performed by: NURSE PRACTITIONER

## 2021-07-16 PROCEDURE — 1036F TOBACCO NON-USER: CPT | Performed by: NURSE PRACTITIONER

## 2021-07-16 PROCEDURE — 81002 URINALYSIS NONAUTO W/O SCOPE: CPT | Performed by: NURSE PRACTITIONER

## 2021-07-16 PROCEDURE — 4040F PNEUMOC VAC/ADMIN/RCVD: CPT | Performed by: NURSE PRACTITIONER

## 2021-07-16 PROCEDURE — 99213 OFFICE O/P EST LOW 20 MIN: CPT | Performed by: NURSE PRACTITIONER

## 2021-07-16 PROCEDURE — 1090F PRES/ABSN URINE INCON ASSESS: CPT | Performed by: NURSE PRACTITIONER

## 2021-07-16 PROCEDURE — 1123F ACP DISCUSS/DSCN MKR DOCD: CPT | Performed by: NURSE PRACTITIONER

## 2021-07-16 PROCEDURE — G8427 DOCREV CUR MEDS BY ELIG CLIN: HCPCS | Performed by: NURSE PRACTITIONER

## 2021-07-16 ASSESSMENT — ENCOUNTER SYMPTOMS
NAUSEA: 1
CONSTIPATION: 0
ABDOMINAL PAIN: 0
VOMITING: 0
DIARRHEA: 0
RESPIRATORY NEGATIVE: 1

## 2021-07-16 NOTE — PATIENT INSTRUCTIONS
Urinalysis was clear in the office    We have sent your urine for a culture    If no urination in greater than 8 hours, go to ER    Follow up with urology as soon as possible    Follow up with renal specialist as scheduled, sooner if urinary retention continues without infection

## 2021-07-16 NOTE — PROGRESS NOTES
[address] [place] [address]   [address], [id number]     Phone:  [phone]  Fax:  [phone]      Stephanie Dixon is a 80 y.o. female who presents today for her medical conditions/complaints as noted below. Stephanie Dixon is c/o of Urinary Retention (Family states patient is not putting out urine. Less than an ounce today. )      Chief Complaint   Patient presents with    Urinary Retention     Family states patient is not putting out urine. Less than an ounce today. HPI:     HPI    Stephanie Dixon presents today with her daughter for decreased urine output. She was sent to urgent care by urology office for urine sample. Patient is not complaining of any symptoms. Daughter is with patient and is primary historian as patient has dementia and is primary caregiver of patient. She is incontinent of urine per daughter. Daughter is concerned she has a UTI. She has not had as much output today. She has no diarrhea, vomiting, or fevers. Daughter states her BP was 104/70s at home and she feels this was too low. She has drank 30oz of fluids today. This is typical for this patient. She has no pain. She did have nausea this morning. She was given phenergan and this helped.      Past Medical History:   Diagnosis Date    Age-related macular degeneration, wet, right eye ([address]     Anemia     Back pain     Bilateral carotid artery stenosis 2/23/2021    Chronic bilateral low back pain with left-sided sciatica 12/19/2019    Colitis, ischemic ([address]     Dementia ([address]     Diverticulosis     Dry age-related macular degeneration of left eye     Hyperlipidemia     Hypertension     Osteoarthritis     Palliative care patient 09/17/2019    Risk for falls 9/13/2019    Seizures (HCC)     Spastic colon     Status post placement of implantable loop recorder 10/27/2020    Stroke syndrome     Urinary incontinence     Uterine cancer (HCC)     Uterine cancer [facility])         Past Surgical History:   Procedure Laterality Date    BREAST SURGERY Right     FNA Right Breast \"oh carlos, maybe 20 years ago\"    CHOLECYSTECTOMY      COLONOSCOPY  9/25/03    Dr Saqib Franco ischemic colitis, incomplete exam    COLONOSCOPY  08/29/2003    Dr Bryon Call:  limited colon-ischemic colitis    DILATION AND CURETTAGE OF UTERUS      x2, in [facility] tears, laser suregry, adn cataract with IOL b/l    HYSTERECTOMY  01/2020    ovaries and tubes    INSERTABLE CARDIAC MONITOR      LUMBAR FUSION      2012 90 days after the spine surgeries    OTHER SURGICAL HISTORY      inner lymph nodes    OTHER SURGICAL HISTORY  09/2020    Loop recorder     PACEMAKER PLACEMENT  01/2021    SPINE SURGERY      L3,4, and 5   done in 2012   Nay Haskins      as a child at age 9    [place]  08/28/2003    DR Aline Mann:  Duodenal scarring but no evidence of active ulcer disease. Social History     Tobacco Use    Smoking status: Never Smoker    Smokeless tobacco: Never Used   Substance Use Topics    Alcohol use: Never        Current Outpatient Medications   Medication Sig Dispense Refill    gabapentin (NEURONTIN) 100 MG capsule TAKE 1 CAPSULE BY MOUTH 3 TIMES DAILY  DAYS.  INTENDED SUPPLY: 30 DAYS (Patient taking differently: 2 times daily. ) 90 capsule 1    Thiamine HCl (VITAMIN B-1 PO) Take 100 mg by mouth daily Takes at 2:00 PM      fluticasone (FLONASE) 50 MCG/ACT nasal spray 1 spray by Each Nostril route daily      dipyridamole (PERSANTINE) 50 MG tablet Take 2 tablets by mouth 2 times daily Indications: 9am and 9pm 60 tablet 11    levETIRAcetam (KEPPRA) 500 MG tablet Take 1 tablet by mouth 2 times daily Indications: takes 9am and 9pm 60 tablet 11    cloNIDine (CATAPRES) 0.1 MG tablet Take 1 tablet by mouth 2 times daily as needed for High Blood Pressure (for BP greater than 160/100) 30 tablet 2    pantoprazole (PROTONIX) 40 MG tablet Take 40 mg by mouth daily Indications: 9am       acetaminophen (TYLENOL) 500 MG tablet Take 1,000 mg by mouth 3 times daily Indications: 2 tablets at 6:00am, 2 tablets at 2:00 PM, 2 tablets 8 PM       meloxicam (MOBIC) 7.5 MG tablet Take 15 mg by mouth daily Indications: takes at 9am       donepezil (ARICEPT) 10 MG tablet Take 1 tablet by mouth nightly (Patient taking differently: Take 10 mg by mouth nightly Indications: takes 9pm ) 90 tablet 3    Docosanol (ABREVA EX) Apply topically as needed       hydrALAZINE (APRESOLINE) 25 MG tablet Take 50 mg by mouth 3 times daily Indications: takes at 9am, 3pm, and 9pm If sys is 120 to 150 take 1/2 tablet other wise full dose, below 120 no med      nitrofurantoin (MACRODANTIN) 50 MG capsule TAKE 1 CAPSULE AT BEDTIME (Patient taking differently: Indications: 9pm TAKE 1 CAPSULE AT BEDTIME) 30 capsule 3    simethicone (GAS-X EXTRA STRENGTH) 125 MG chewable tablet Take 125 mg by mouth 3 times daily 2 PO 6:30 AM, 2 at 10:00 AM, 1 at 5:30 PM otherwise prn      butalbital-acetaminophen-caffeine (FIORICET, ESGIC) -40 MG per tablet Take 1 tablet by mouth every 6 hours as needed for Headaches       amLODIPine (NORVASC) 5 MG tablet Take 5 mg by mouth 2 times daily Indications: takes at 9am and 9pm If BP sys 120 or below then 2.5mg bid otherwise 5mg bid      diclofenac sodium 1 % GEL Apply 2 g topically 3 times daily as needed for Pain       sodium chloride 1 g tablet Take 1 g by mouth 3 times daily Indications: takes 9am, 3pm, and 9pm       lidocaine 4 % external patch Place 1 patch onto the skin daily as needed (LEFT LOWER BACK PAIN)      NONFORMULARY Indications: dic 3%, lidocaine 2%, cyclo 2%, prilo 2% baclo 2% 1 to 2 pumps up to TID PRN       desonide (DESOWEN) 0.05 % cream Apply topically as needed Apply topically 2 times daily.  mupirocin (BACTROBAN) 2 % cream Apply topically as needed Apply topically 3 times daily.        valACYclovir (VALTREX) 1 g tablet as needed       azelastine (ASTELIN) 0.1 % nasal spray 2 sprays by Nasal route 2 times daily Indications: takes 9am and 9pm       Cholecalciferol (VITAMIN D3) 5000 units TABS Take 5,000 Units by mouth daily Indications: AT 2:30 PM       polyvinyl alcohol-povidone (HYPOTEARS) 1.4-0.6 % ophthalmic solution Place 1-2 drops into both eyes as needed      sodium chloride (OCEAN, BABY AYR) 0.65 % nasal spray 1 spray by Nasal route as needed for Congestion      Lutein 20 MG TABS Take 20 mg by mouth daily Indications: 3 PM DAILY       camphor-menthol (SARNA) 0.5-0.5 % lotion Apply 0.5 mLs topically 2 times daily as needed for Itching Apply topically as needed.  Calcium Carb-Cholecalciferol (CALCIUM 600+D) 600-800 MG-UNIT TABS Take 1 tablet by mouth daily Indications: AT 2:30 PM       promethazine (PHENERGAN) 25 MG tablet Take 12.5 mg by mouth 3 times daily as needed for Nausea       docusate sodium (COLACE) 100 MG capsule Take 100 mg by mouth nightly as needed for Constipation       Omega 3-6-9 Fatty Acids (OMEGA 3-6-9 COMPLEX PO) Take 2 tablets by mouth daily Indications: AT 2:30 PM       alendronate (FOSAMAX) 70 MG tablet Take 70 mg by mouth every 7 days Sundays at 1830      pravastatin (PRAVACHOL) 20 MG tablet Take 20 mg by mouth nightly Indications: takes at 9am       ramipril (ALTACE) 10 MG capsule Take 10 mg by mouth 2 times daily Indications: takes at 9am and 9pm       b complex vitamins capsule Take 1 capsule by mouth daily Indications: AT 2:30 PM B 100      Multiple Vitamins-Minerals (THERAPEUTIC MULTIVITAMIN-MINERALS) tablet Take 1 tablet by mouth daily Indications: AT 2:30 PM        No current facility-administered medications for this visit.        Allergies   Allergen Reactions    Aspirin Shortness Of Breath    Spironolactone      Low sodium    Dilaudid [Hydromorphone Hcl]     Fd&C Yellow #5 [place] [Tartrazine]     Ondansetron     Quinolones     Sulfa Antibiotics     Codeine Rash and Other (See Comments)     Makes her \"crazy and mean\" and gives her a rash    Demerol Hcl [Meperidine] Other (See Comments)     Made her \"crazy and mean\", and \"loopy\"    Levaquin [Levofloxacin In D5w] Other (See Comments)     Seizure like activity      Myrbetriq [Mirabegron] Other (See Comments)     Unable to micturate    Namenda [Memantine Hcl] Other (See Comments)     made her like a vegetable for hours      Norco [Hydrocodone-Acetaminophen] Rash and Other (See Comments)     \"crazy and mean\"    Pneumococcal Vaccines Swelling       Family History   Problem Relation Age of Onset    Heart Defect Mother     Hypertension Mother     Heart Attack Father         x3 within 24 h and then , abused ciggarettes    No Known Problems Daughter     No Known Problems Son     No Known Problems Son     Colon Cancer Neg Hx     Colon Polyps Neg Hx     Esophageal Cancer Neg Hx     Liver Cancer Neg Hx     Liver Disease Neg Hx     Rectal Cancer Neg Hx     Stomach Cancer Neg Hx                Review of Systems   Constitutional: Negative for fatigue and fever. Respiratory: Negative. Cardiovascular: Negative. Gastrointestinal: Positive for nausea. Negative for abdominal pain, constipation, diarrhea and vomiting. Genitourinary: Positive for decreased urine volume. Negative for pelvic pain and urgency. Objective:     Physical Exam  Vitals and nursing note reviewed. Constitutional:       General: She is not in acute distress. Appearance: She is well-developed. She is not ill-appearing, toxic-appearing or diaphoretic. HENT:      Head: Normocephalic and atraumatic. Right Ear: External ear normal.      Left Ear: External ear normal.      Mouth/Throat:      Dentition: Normal dentition. Eyes:      General:         Right eye: No discharge. Left eye: No discharge. Cardiovascular:      Rate and Rhythm: Normal rate.    Pulmonary:      Effort: Pulmonary effort is normal. No respiratory distress. Breath sounds: Normal breath sounds. No stridor. No wheezing, rhonchi or rales. Abdominal:      General: Bowel sounds are normal.      Palpations: Abdomen is soft. Comments: Patient had BM in office   Genitourinary:     Comments: She was able to produce about 30ml of clear, yellow urine for a sample in office  Musculoskeletal:      Cervical back: Normal range of motion and neck supple. Lumbar back: Normal range of motion. Right lower leg: No edema. Left lower leg: No edema. Comments: Walking with walker   Skin:     General: Skin is warm and dry. Capillary Refill: Capillary refill takes less than 2 seconds. Findings: No rash. Neurological:      General: No focal deficit present. Mental Status: She is alert and oriented to person, place, and time. Mental status is at baseline. Motor: Weakness present. Psychiatric:         Mood and Affect: Mood normal.         /69   Pulse 66   Temp 98 °F (36.7 °C)   Resp 16   Wt 112 lb 9.6 oz (51.1 kg)   SpO2 96%   BMI 21.99 kg/m²     Assessment:      Diagnosis Orders   1. Decreased urine output  POCT Urinalysis no Micro    Culture, Urine       Results for orders placed or performed in visit on 07/16/21   POCT Urinalysis no Micro   Result Value Ref Range    Color, UA Yellow     Clarity, UA Clear     Glucose, UA POC Negative     Bilirubin, UA Negative     Ketones, UA Negative     Spec Grav, UA 1.020     Blood, UA POC Negative     pH, UA 5.0     Protein, UA POC Negative     Urobilinogen, UA 0.2     Leukocytes, UA Negative     Nitrite, UA Negative     Appearance, Fluid         Plan:     VSS    UA negative in office    Send for urine culture    Daughter was advised to take patient to ER with no urination for greater than 8 hours with normal fluid consumption. She is to follow up with urology. I am sending my note to her urology provider. Return if symptoms worsen or fail to improve.     Orders Placed This Encounter   Procedures    Culture, Urine     Order Specific Question:   Specify (ex-cath, midstream, cysto, etc)? Answer:   midstream    POCT Urinalysis no Micro       No orders of the defined types were placed in this encounter. Patient offered educational materials - see patient instructions for any instruction needed. Discussed use, benefit, and side effects of prescribed medications. All patient questions answered. Instructed to continue current medications, diet and exercise. Patient agreed with treatment plan. Follow up as directed. Patient was advised to go to the ED if condition ever becomes emergent.        Electronically signed by Lu Escoto on 7/16/2021 at 4:50 PM

## 2021-07-16 NOTE — TELEPHONE ENCOUNTER
Daughter called office through [facility] who communicated with [facility] MA via TEAMS  . .. when daughter understood she would not be seen in clinic today, call (at patient's request) was transferred to speak with office mgr by name. Daughter advised office mgr that patient has . Heddy  Heddy  Heddy  Low /65, weak, exhausted but not lethargic. Daughter feels mother has UTI. Recommended in past by Maria Del Rosario Jackson that clean catch and cultures must be acquired in the future to medicate properly. Office mgr discussed with daughter that she would like for her mother to be seen TODAY in clinic but, providers do not see patients in office on Friday PM. Office mgr spoke with APRN & again advised to go to ED for low blood pressure and being unable to urinate. After lengthy discussion with patient's daughter, plan was made to do the following (1) take mother to Urgent Care for UTI testing/cultures for possible UTI (2) if Dr Mitch Mesa ([place] on call) has not contacted her by end of day, call the office and access our after hours call service for Dr Mitch Mesa to call daughter (3) Dr Mitch Mesa can review Epic notes and determine if Urgent Care plan of care is appropriate for this [facility] patient. Daughter was appreciative and complimentary of lengthy discussion and time taken.

## 2021-07-18 LAB — URINE CULTURE, ROUTINE: NORMAL

## 2021-07-20 DIAGNOSIS — M79.604 PAIN IN BOTH LOWER EXTREMITIES: ICD-10-CM

## 2021-07-20 DIAGNOSIS — M79.605 PAIN IN BOTH LOWER EXTREMITIES: ICD-10-CM

## 2021-07-20 NOTE — TELEPHONE ENCOUNTER
Patient's daughter called requesting a refill on the following prescription. She was under the impression this was already taken care of at her mother's last office visit on 7/1/2021. The daughter is sick and cannot be out of the house for very long. She has an appointment this afternoon and would like to  her mother's medication at that time. She requested it be sent as an urgent request.       Requested Prescriptions     Pending Prescriptions Disp Refills    gabapentin (NEURONTIN) 100 MG capsule 90 capsule 1     Sig: TAKE 1 CAPSULE BY MOUTH 3 TIMES DAILY.  INTENDED SUPPLY: 30 DAYS       Last Office Visit:  7/1/2021  Next Office Visit:  1/6/2022  Last Medication Refill:  4/23/2021 1 refill   Thor Rust up to date:  7/20/2021    *RX updated to reflect   7/20/2021  fill date*

## 2021-07-21 ENCOUNTER — OFFICE VISIT (OUTPATIENT)
Dept: CARDIOLOGY CLINIC | Age: 86
End: 2021-07-21

## 2021-07-21 VITALS
HEIGHT: 60 IN | WEIGHT: 120 LBS | HEART RATE: 68 BPM | BODY MASS INDEX: 23.56 KG/M2 | SYSTOLIC BLOOD PRESSURE: 120 MMHG | DIASTOLIC BLOOD PRESSURE: 60 MMHG

## 2021-07-21 DIAGNOSIS — Z95.0 CARDIAC PACEMAKER: ICD-10-CM

## 2021-07-21 DIAGNOSIS — I10 ESSENTIAL HYPERTENSION: ICD-10-CM

## 2021-07-21 DIAGNOSIS — R00.1 SYMPTOMATIC BRADYCARDIA: Primary | ICD-10-CM

## 2021-07-21 DIAGNOSIS — I34.0 MODERATE MITRAL REGURGITATION: ICD-10-CM

## 2021-07-21 DIAGNOSIS — I63.9 CRYPTOGENIC STROKE (HCC): ICD-10-CM

## 2021-07-21 PROCEDURE — 93280 PM DEVICE PROGR EVAL DUAL: CPT | Performed by: NURSE PRACTITIONER

## 2021-07-21 PROCEDURE — G8427 DOCREV CUR MEDS BY ELIG CLIN: HCPCS | Performed by: NURSE PRACTITIONER

## 2021-07-21 PROCEDURE — 4040F PNEUMOC VAC/ADMIN/RCVD: CPT | Performed by: NURSE PRACTITIONER

## 2021-07-21 PROCEDURE — G8420 CALC BMI NORM PARAMETERS: HCPCS | Performed by: NURSE PRACTITIONER

## 2021-07-21 PROCEDURE — 1090F PRES/ABSN URINE INCON ASSESS: CPT | Performed by: NURSE PRACTITIONER

## 2021-07-21 PROCEDURE — 1036F TOBACCO NON-USER: CPT | Performed by: NURSE PRACTITIONER

## 2021-07-21 PROCEDURE — 99214 OFFICE O/P EST MOD 30 MIN: CPT | Performed by: NURSE PRACTITIONER

## 2021-07-21 PROCEDURE — 1123F ACP DISCUSS/DSCN MKR DOCD: CPT | Performed by: NURSE PRACTITIONER

## 2021-07-21 RX ORDER — DICYCLOMINE HYDROCHLORIDE 10 MG/1
10 CAPSULE ORAL PRN
COMMUNITY

## 2021-07-21 RX ORDER — GABAPENTIN 100 MG/1
CAPSULE ORAL
Qty: 90 CAPSULE | Refills: 5 | Status: SHIPPED | OUTPATIENT
Start: 2021-07-21 | End: 2022-03-01

## 2021-07-21 NOTE — PROGRESS NOTES
[facility], [place]. [address], [facility] 815, 976 On license of [facility]  [phone] office  [phone] fax      OFFICE VISIT:  2021    Yi Barker - : 1935  Reason For Visit:  Tamika Olmos is a 80 y.o. female who is here for Follow-up (and pacemaker check, pacemaker site is uncomfortable, SOA, )    History:   Diagnosis Orders   1. Symptomatic bradycardia     2. Cryptogenic stroke ([facility]     3. Essential hypertension     4. Moderate mitral regurgitation     5. Cardiac pacemaker       The patient presents today for cardiology follow up and pacer check. Overall, Tamika Olmos seems to be doing well. She reports no angina, arrhythmia or dyspnea. The patient has a hx of vasovagal syncope and was hospitalized in May for this. The episode occurred after vomiting. The patient was diagnosed with acute gastritis with elevated lipase. The patient denies symptoms to suggest myocardial ischemia, heart failure or arrhythmia. BP is well controlled on current regimen. The patient's PCP monitors cholesterol. Adi Alves denies exertional chest pain, shortness of breath, orthopnea, paroxysmal nocturnal dyspnea, syncope, presyncope, sensed arrhythmia, edema and fatigue. The patient denies numbness or weakness to suggest cerebrovascular accident or transient ischemic attack.     Yi Barker has the following history as recorded in [facility]:  Patient Active Problem List   Diagnosis Code    Irritable bowel syndrome with diarrhea K58.0    S/P laparoscopic cholecystectomy Z90.49    Late onset Alzheimer's disease without behavioral disturbance (HCC) G30.1, F02.80    Altered mental status R41.82    Seizure as late effect of cerebrovascular accident (CVA) ([facility] I69.398, W54.1    Metabolic encephalopathy Z96.31    Weakness R53.1    Chronic bilateral lower abdominal pain R10.31, G89.29, R10.32    Gas pain R14.1    History of ischemic colitis Z87.19    Partial symptomatic epilepsy with complex partial seizures, intractable, without status epilepticus ([address] G40.219    Cerebrovascular small vessel disease I67.9    Vascular dementia without behavioral disturbance ([facility]) F01.50    Risk for falls Z91.81    Dry age-related macular degeneration of left eye H35.3120    Age-related macular degeneration, wet, right eye ([facility]) H35.3210    Gait abnormality R26.9    Palliative care patient Z51.5    Bloating R14.0    Chronic bilateral low back pain with left-sided sciatica M54.42, G89.29    Arthralgia of multiple joints M25.50    Abnormal EKG R94.31    Moderate mitral regurgitation I34.0    Chest pain R07.9    Essential hypertension I10    Overactive bladder N32.81    Vasovagal syncope R55    Bradycardia R00.1    Cryptogenic stroke ([facility]) I63.9    Syncope and collapse R55    Status post placement of implantable loop recorder Z95.818    Symptomatic bradycardia R00.1    Bilateral carotid artery stenosis I65.23    PVD (peripheral vascular disease) ([facility]) I73.9    Macrocytic anemia D53.9    Diarrhea R19.7    At risk for polypharmacy Z91.89    Mild protein-calorie malnutrition ([facility]) E44.1    Abdominal bloating R14.0    Elevated lipase R74.8     Past Medical History:   Diagnosis Date    Age-related macular degeneration, wet, right eye ([address]     Anemia     Back pain     Bilateral carotid artery stenosis 2/23/2021    Chronic bilateral low back pain with left-sided sciatica 12/19/2019    Colitis, ischemic ([address]     Dementia ([address]     Diverticulosis     Dry age-related macular degeneration of left eye     Hyperlipidemia     Hypertension     Osteoarthritis     Palliative care patient 09/17/2019    Risk for falls 9/13/2019    Seizures ([address]     Spastic colon     Status post placement of implantable loop recorder 10/27/2020    Stroke syndrome     Urinary incontinence     Uterine cancer ([address]     Uterine cancer ([facility])      Past Surgical History:   Procedure Laterality Date    BREAST SURGERY Right     FNA Right Breast \"oh hemarie, maybe 20 years ago\"    CHOLECYSTECTOMY      COLONOSCOPY  03    Dr Shook Res ischemic colitis, incomplete exam    COLONOSCOPY  2003    Dr Zuñiga Kind:  limited colon-ischemic colitis    DILATION AND CURETTAGE OF UTERUS      x2, in [facility] tears, laser suregry, adn cataract with IOL b/l    HYSTERECTOMY  2020    ovaries and tubes    INSERTABLE CARDIAC MONITOR      LUMBAR FUSION       90 days after the spine surgeries    OTHER SURGICAL HISTORY      inner lymph nodes    OTHER SURGICAL HISTORY  2020    Loop recorder     PACEMAKER PLACEMENT  2021    SPINE SURGERY      L3,4, and 5   done in    [place]      as a child at age 9    [address]  2003    DR Willow Hyde:  Duodenal scarring but no evidence of active ulcer disease. Family History   Problem Relation Age of Onset    Heart Defect Mother     Hypertension Mother     Heart Attack Father         x3 within 24 h and then , abused ciggarettes    No Known Problems Daughter     No Known Problems Son     No Known Problems Son     Colon Cancer Neg Hx     Colon Polyps Neg Hx     Esophageal Cancer Neg Hx     Liver Cancer Neg Hx     Liver Disease Neg Hx     Rectal Cancer Neg Hx     Stomach Cancer Neg Hx      Social History     Tobacco Use    Smoking status: Never Smoker    Smokeless tobacco: Never Used   Substance Use Topics    Alcohol use: Never      Current Outpatient Medications   Medication Sig Dispense Refill    gabapentin (NEURONTIN) 100 MG capsule TAKE 1 CAPSULE BY MOUTH 3 TIMES DAILY.  INTENDED SUPPLY: 30 DAYS (Patient taking differently: Take 100 mg by mouth 2 times daily. ) 90 capsule 5    dicyclomine (BENTYL) 10 MG capsule Take 10 mg by mouth 4 times daily (before meals and nightly)      Thiamine HCl (VITAMIN B-1 PO) Take 100 mg by mouth daily Takes at 2:00 PM      fluticasone (FLONASE) 50 MCG/ACT nasal spray 1 spray by Each Nostril route daily      levETIRAcetam (KEPPRA) 500 MG tablet Take 1 tablet by mouth 2 times daily Indications: takes 9am and 9pm 60 tablet 11    cloNIDine (CATAPRES) 0.1 MG tablet Take 1 tablet by mouth 2 times daily as needed for High Blood Pressure (for BP greater than 160/100) 30 tablet 2    pantoprazole (PROTONIX) 40 MG tablet Take 40 mg by mouth daily Indications: 9am       acetaminophen (TYLENOL) 500 MG tablet Take 1,000 mg by mouth 3 times daily       meloxicam (MOBIC) 7.5 MG tablet Take 15 mg by mouth daily Indications: takes at 9am       donepezil (ARICEPT) 10 MG tablet Take 1 tablet by mouth nightly (Patient taking differently: Take 10 mg by mouth nightly Indications: takes 9pm ) 90 tablet 3    Docosanol (ABREVA EX) Apply topically as needed       hydrALAZINE (APRESOLINE) 25 MG tablet Take 50 mg by mouth nightly Indications: takes at 9am, 3pm, and 9pm If sys is 120 to 150 take 1/2 tablet other wise full dose, below 120 no med      nitrofurantoin (MACRODANTIN) 50 MG capsule TAKE 1 CAPSULE AT BEDTIME (Patient taking differently: Indications: 9pm TAKE 1 CAPSULE AT BEDTIME) 30 capsule 3    simethicone (GAS-X EXTRA STRENGTH) 125 MG chewable tablet Take 125 mg by mouth 3 times daily 2 PO 6:30 AM, 2 at 10:00 AM, 1 at 5:30 PM otherwise prn      butalbital-acetaminophen-caffeine (FIORICET, ESGIC) -40 MG per tablet Take 1 tablet by mouth every 6 hours as needed for Headaches       amLODIPine (NORVASC) 5 MG tablet Take 5 mg by mouth 2 times daily Indications: takes at 9am and 9pm If BP sys 120 or below then 2.5mg bid otherwise 5mg bid      diclofenac sodium 1 % GEL Apply 2 g topically 3 times daily as needed for Pain       sodium chloride 1 g tablet Take 1 g by mouth 3 times daily Indications: takes 9am, 3pm, and 9pm       lidocaine 4 % external patch Place 1 patch onto the skin daily as needed (LEFT LOWER BACK PAIN)      desonide (DESOWEN) 0.05 % cream Apply topically as needed Apply topically 2 times daily.  valACYclovir (VALTREX) 1 g tablet as needed       azelastine (ASTELIN) 0.1 % nasal spray 2 sprays by Nasal route 2 times daily Indications: takes 9am and 9pm       Cholecalciferol (VITAMIN D3) 5000 units TABS Take 5,000 Units by mouth daily Indications: AT 2:30 PM       sodium chloride (OCEAN, BABY AYR) 0.65 % nasal spray 1 spray by Nasal route as needed for Congestion      Lutein 20 MG TABS Take 20 mg by mouth daily Indications: 3 PM DAILY       camphor-menthol (SARNA) 0.5-0.5 % lotion Apply 0.5 mLs topically 2 times daily as needed for Itching Apply topically as needed.  Calcium Carb-Cholecalciferol (CALCIUM 600+D) 600-800 MG-UNIT TABS Take 1 tablet by mouth daily Indications: AT 2:30 PM       promethazine (PHENERGAN) 25 MG tablet Take 12.5 mg by mouth 3 times daily as needed for Nausea       Omega 3-6-9 Fatty Acids (OMEGA 3-6-9 COMPLEX PO) Take 2 tablets by mouth daily Indications: AT 2:30 PM       alendronate (FOSAMAX) 70 MG tablet Take 70 mg by mouth every 7 days Sundays at 1830      pravastatin (PRAVACHOL) 20 MG tablet Take 20 mg by mouth nightly Indications: takes at 9am       ramipril (ALTACE) 10 MG capsule Take 10 mg by mouth 2 times daily Indications: takes at 9am and 9pm       b complex vitamins capsule Take 1 capsule by mouth daily Indications: AT 2:30 PM B 100      Multiple Vitamins-Minerals (THERAPEUTIC MULTIVITAMIN-MINERALS) tablet Take 1 tablet by mouth daily Indications: AT 2:30 PM       dipyridamole (PERSANTINE) 50 MG tablet Take 2 tablets by mouth 2 times daily Indications: 9am and 9pm 60 tablet 11     No current facility-administered medications for this visit.      Allergies: Aspirin, Spironolactone, Dilaudid [hydromorphone hcl], Fd&c yellow #5 [place] [tartrazine], Ondansetron, Quinolones, Sulfa antibiotics, Codeine, Demerol hcl [meperidine], Levaquin [levofloxacin in d5w], Myrbetriq [mirabegron], Namenda [memantine hcl], Norco [hydrocodone-acetaminophen], and Pneumococcal vaccines    Review of Systems  Constitutional - no appetite change, or unexpected weight change. No fever, chills or diaphoresis. No significant change in activity level or new onset of fatigue. HEENT - no significant rhinorrhea or epistaxis. No tinnitus or significant hearing loss. Eyes - no sudden vision change or amaurosis. No corneal arcus, xantholasma, subconjunctival hemorrhage or discharge. Respiratory - no significant wheezing, stridor, apnea or cough. No dyspnea on exertion or shortness of air. Cardiovascular - no exertional chest pain to suggest myocardial ischemia. No orthopnea or PND. No sensation of sustained arrythmia. No occurrence of slow heart rate. No palpitations. No claudication. Pacemaker generator implanted at left upper chest.   Gastrointestinal - no abdominal swelling or pain. No blood in stool. No severe constipation, diarrhea, nausea, or vomiting. Genitourinary - no dysuria, frequency, or urgency. No flank pain or hematuria. Musculoskeletal - no back pain or myalgia. Ambulates with walker. Extremities - no clubbing, cyanosis or extremity edema. Skin - no color change or rash. No pallor. No new surgical incision. Neurologic - no speech difficulty, facial asymmetry or lateralizing weakness. No seizures, presyncope or syncope. No significant dizziness. Hematologic - no easy bruising or excessive bleeding. Psychiatric - no severe anxiety or insomnia. No confusion. All other review of systems are negative. Objective  Vital Signs - /60   Pulse 68   Ht 5' (1.524 m)   Wt 120 lb (54.4 kg)   BMI 23.44 kg/m²   General - Keegan Johnson is alert, cooperative, and pleasant. Well groomed. No acute distress. Body habitus - Body mass index is 23.44 kg/m². HEENT - Head is normocephalic. No circumoral cyanosis. Dentition is normal.  EYES -   Lids normal without ptosis. No discharge, edema or subconjunctival hemorrhage. Neck - Symmetrical without apparent mass or lymphadenopathy. Respiratory - Normal respiratory effort without use of accessory muscles. Ausculatation reveals vesicular breath sounds without crackles, wheezes, rub or rhonchi. Cardiovascular - No jugular venous distention. Auscultation reveals regular rate and rhythm. No audible clicks, gallop or rub. No murmur. No lower extremity varicosities. No carotid bruits. Pacemaker generator implanted per left upper chest.  Patient reports left chest wall tenderness since pacer implant. No overlying erythema, edema or drainage. Abdominal -  No visible distention, mass or pulsations. Extremities - No clubbing or cyanosis. No statis dermatitis or ulcers. No edema. Musculoskeletal -   No Osler's nodes. No scoliosis. Ambulates with walker. Kyphosis noted. Skin -  Warm and dry; no rash or pallor. No new surgical wound. Neurological - No focal neurological deficits. Thought processes coherent. No apparent tremor. Oriented to person, place and time. Psychiatric -  Appropriate affect and mood. Data reviewed:  9/17/20 Lexiscan  Impression   Impression:   There is no obvious infarct or ischemia, with a calculated ejection   fraction of 84 %. Suggest: Clinical correlation and consideration for Medical   management.    Signed by Dr Mark Velazquez on 9/17/2020 10:03 PM     9/17/20 echo   Normal left ventricular size and systolic function EF 91-99%   Mild concentric left ventricular hypertrophy with normal diastolic   function   Mild left atrial enlargement   Mild calcification of the somewhat thickened tricuspid aortic valve with  adequate cusp separation and no significant stenosis or insufficiency   Mild thickening of a normally mobile mitral valve with moderate   regurgitation   Pulmonic valve not visualized   Mild right atrial enlargement with normal right ventricular size and   systolic function   Mild to moderate tricuspid regurgitation with RVSP estimate 28 mmHg   Normal IVC consistent with normal right atrial filling pressures   Significant pericardial effusion and aortic root dimensions are within   normal limits    Signature   Electronically signed by Ravinder Calhoun MD(Interpreting physician)   on 09/17/2020 01:10 PM    Lab Results   Component Value Date    WBC 9.1 05/25/2021    HGB 12.8 05/25/2021    HCT 39.9 05/25/2021    .0 (H) 05/25/2021     05/25/2021     Lab Results   Component Value Date     (L) 05/25/2021    K 3.8 05/25/2021     05/25/2021    CO2 23 05/25/2021    BUN 24 (H) 05/25/2021    CREATININE 0.9 05/25/2021    GLUCOSE 116 (H) 05/25/2021    CALCIUM 9.5 05/25/2021    PROT 6.6 05/25/2021    LABALBU 4.1 05/25/2021    BILITOT 0.5 05/25/2021    ALKPHOS 76 05/25/2021    AST 17 05/25/2021    ALT 12 05/25/2021    LABGLOM 59 (A) 05/25/2021    GFRAA >59 05/25/2021    GLOB 2.0 07/16/2016       Lab Results   Component Value Date    CHOL 190 09/03/2019    CHOL 171 03/21/2019    CHOL 190 10/08/2018     Lab Results   Component Value Date    TRIG 142 09/03/2019    TRIG 145 03/21/2019    TRIG 138 10/08/2018     Lab Results   Component Value Date    HDL 66 09/03/2019    HDL 68 03/21/2019    HDL 80 10/08/2018     Lab Results   Component Value Date    LDLCALC 96 09/03/2019    LDLCALC 74 03/21/2019    LDLCALC 82 10/08/2018     BP Readings from Last 3 Encounters:   07/21/21 120/60   07/16/21 130/69   07/01/21 126/70    Pulse Readings from Last 3 Encounters:   07/21/21 68   07/16/21 66   07/01/21 65        Wt Readings from Last 3 Encounters:   07/21/21 120 lb (54.4 kg)   07/16/21 112 lb 9.6 oz (51.1 kg)   07/01/21 118 lb (53.5 kg)     Assessment/Plan:   Diagnosis Orders   1. Symptomatic bradycardia     2. Cryptogenic stroke ([address]     3. Essential hypertension     4. Moderate mitral regurgitation     5.  Cardiac pacemaker       Pacer check:  Pacemaker check showed adequate battery status. 14.2 years longevity. Mode: AAIR-DDDR. Lead impedances are stable. Pacing:  AP 34.5%;  <0.1%. Reprogramming for sensitivity and threshold testing. Appropriate diagnostics and safety margins noted. A output 0.75 V at 0.40 ms; P wave 2.0 mV. V output 0.75 V at 0.40 ms, R wave 13.3 mV. Sustained arrythmia: none  Next [employer] remote transmission:  3 months. Stable CV status without overt heart failure, sensed arrhythmia or angina. Hx symptomatic luciano s/p pacer - interrogation today shows adequate battery status, diagnostics and safety margins. HTN - normotensive on current regimen. Moderate MR - no symptoms to suggest worsening MR. Patient is compliant with medication regimen. Previous cardiac history and records reviewed. Continue current medical management for cardiac related condition. Continue other current medications as directed. Continue to follow up with primary care provider for non cardiac medical problems. If your primary care provider is outside of the [facility], please request that your labs be faxed to this office at [phone]. BP goal 130/80 or less. Call the office with any problems, questions or concerns at [phone]. Cardiology follow up as scheduled in [facility] appointments. 6 months Dr. Olivia Alfaro and pacer check. Educational included in patient instructions. Heart health.       LATYON Lynn

## 2021-07-21 NOTE — PATIENT INSTRUCTIONS
New instructions for today:    Patient Instructions:  Continue current medications as prescribed. Always keep a current medication list. Bring your medications to every office visit. Continue to follow up with primary care provider for non cardiac medical problems. Call the office with any problems, questions or concerns at [phone]. If you have been asked to keep a blood pressure log, do so for 2 weeks. Call the office to report readings to the triage nurse at [phone]. Follow up with cardiologist as scheduled. The following educational material has been included in this after visit summary for your review: Life simple 7. Heart health. Life simple 7  1) Manage blood pressure - high blood pressure is a major risk factor for heart disease and stroke. Keeping blood pressure in health range reduces strain on your heart, arteries and kidneys. Blood pressure goal is less than 130/80. 2) Control cholesterol - contributes to plaque, which can clog arteries and lead to heart disease and stroke. When you control your cholesterol you are giving your arteries their best chance to remain clear. It is recommended that you get cholesterol lab work done once a year. 3) Reduce blood sugar - most of the food we eat is turning into glucose or blood sugar that our body uses for energy. Over time, high levels of blood sugar can damage your heart, kidneys, eyes and nerves. 4) Get active - living an active life is one of the most rewarding gifts you can give yourself and those you love. Simply put, daily physical activity increases your length and quality of life. Strive to exercise 15 minutes most days of the week. 5)  Eat better - A healthy diet is one of your best weapons for fighting cardiovascular disease. When you eat a heart healthy diet, you improve your chances for feeling good and staying healthy for life.   6)  Lose weight - when you shed extra fat an unnecessary pounds, you reduce the burden on your hear, lungs, blood vessels and skeleton. You give yourself the gift of active living, you lower your blood pressure and help yourself feel better. 7) Stop smoking - cigarette smokers have a higher risk of developing cardiovascular disease. If  You smoke, quitting is the best thing you can do for your health. Check American Heart Association on line for more information on Life's Simple 7 and tips for healthy living. A Healthy Heart: Care Instructions  Your Care Instructions     Coronary artery disease, also called heart disease, occurs when a substance called plaque builds up in the vessels that supply oxygen-rich blood to your heart muscle. This can narrow the blood vessels and reduce blood flow. A heart attack happens when blood flow is completely blocked. A high-fat diet, smoking, and other factors increase the risk of heart disease. Your doctor has found that you have a chance of having heart disease. You can do lots of things to keep your heart healthy. It may not be easy, but you can change your diet, exercise more, and quit smoking. These steps really work to lower your chance of heart disease. Follow-up care is a key part of your treatment and safety. Be sure to make and go to all appointments, and call your doctor if you are having problems. It's also a good idea to know your test results and keep a list of the medicines you take. How can you care for yourself at home? Diet  · Use less salt when you cook and eat. This helps lower your blood pressure. Taste food before salting. Add only a little salt when you think you need it. With time, your taste buds will adjust to less salt. · Eat fewer snack items, fast foods, canned soups, and other high-salt, high-fat, processed foods. · Read food labels and try to avoid saturated and trans fats. They increase your risk of heart disease by raising cholesterol levels. · Limit the amount of solid fat-butter, margarine, and shortening-you eat.  Use olive, peanut, or canola oil when you cook. Bake, broil, and steam foods instead of frying them. · Eat a variety of fruit and vegetables every day. Dark green, deep orange, red, or yellow fruits and vegetables are especially good for you. Examples include spinach, carrots, peaches, and berries. · Foods high in fiber can reduce your cholesterol and provide important vitamins and minerals. High-fiber foods include whole-grain cereals and breads, oatmeal, beans, brown rice, citrus fruits, and apples. · Eat lean proteins. Heart-healthy proteins include seafood, lean meats and poultry, eggs, beans, peas, nuts, seeds, and soy products. · Limit drinks and foods with added sugar. These include candy, desserts, and soda pop. Lifestyle changes  · If your doctor recommends it, get more exercise. Walking is a good choice. Bit by bit, increase the amount you walk every day. Try for at least 30 minutes on most days of the week. You also may want to swim, bike, or do other activities. · Do not smoke. If you need help quitting, talk to your doctor about stop-smoking programs and medicines. These can increase your chances of quitting for good. Quitting smoking may be the most important step you can take to protect your heart. It is never too late to quit. · Limit alcohol to 2 drinks a day for men and 1 drink a day for women. Too much alcohol can cause health problems. · Manage other health problems such as diabetes, high blood pressure, and high cholesterol. If you think you may have a problem with alcohol or drug use, talk to your doctor. Medicines  · Take your medicines exactly as prescribed. Call your doctor if you think you are having a problem with your medicine. · If your doctor recommends aspirin, take the amount directed each day. Make sure you take aspirin and not another kind of pain reliever, such as acetaminophen (Tylenol). When should you call for help? [id number] if you have symptoms of a heart attack.  These may include:  · Chest pain or pressure, or a strange feeling in the chest.  · Sweating. · Shortness of breath. · Pain, pressure, or a strange feeling in the back, neck, jaw, or upper belly or in one or both shoulders or arms. · Lightheadedness or sudden weakness. · A fast or irregular heartbeat. After you call 911, the  may tell you to chew 1 adult-strength or 2 to 4 low-dose aspirin. Wait for an ambulance. Do not try to drive yourself. Watch closely for changes in your health, and be sure to contact your doctor if you have any problems. Where can you learn more? Go to https://Audience.fm.TRAFFIQ. org and sign in to your ASSET[employer] account. Enter S659 in the [employer] box to learn more about \"A Healthy Heart: Care Instructions. \"     If you do not have an account, please click on the \"Sign Up Now\" link. Current as of: December 16, 2019               Content Version: 12.5  © 8478-6729 Healthwise, Incorporated. Care instructions adapted under license by [facility] ([facility]). If you have questions about a medical condition or this instruction, always ask your healthcare professional. [address] any warranty or liability for your use of this information.

## 2021-07-22 ENCOUNTER — NURSE ONLY (OUTPATIENT)
Dept: UROLOGY | Age: 86
End: 2021-07-22
Payer: MEDICARE

## 2021-07-22 DIAGNOSIS — N39.46 MIXED STRESS AND URGE URINARY INCONTINENCE: ICD-10-CM

## 2021-07-22 DIAGNOSIS — N32.81 OVERACTIVE BLADDER: Primary | ICD-10-CM

## 2021-07-22 PROCEDURE — 64566 NEUROELTRD STIM POST TIBIAL: CPT | Performed by: NURSE PRACTITIONER

## 2021-07-22 NOTE — PROGRESS NOTES
Treatment # 2 week maintenance    Improvement of Symptoms? moderate    OAB/Urologic Medications? none      Uroplasty Procedure:    1. Patient is seated with the left treatment leg elevated. 2. 34 gauge fine needle electrode is inserted into lower inner aspect of the leg. Slightly cephalad to the medial malleolus. 3. Surface electrode pad is placed over the medial aspect of the calcaneus on the same leg. 4.Needle electrode is connected to the external pulse generator. 5.The pulse generator is turned on and the millivolts used are 5. 6.Patient is treated for 30 minutes. 7.The needle and pad are removed. Patient will follow up in 2 weeks for next treatment.

## 2021-07-24 ENCOUNTER — HOSPITAL ENCOUNTER (EMERGENCY)
Age: 86
Discharge: HOME | End: 2021-07-24
Attending: EMERGENCY MEDICINE
Payer: MEDICARE

## 2021-07-24 ENCOUNTER — APPOINTMENT (OUTPATIENT)
Dept: CT IMAGING | Age: 86
End: 2021-07-24
Payer: MEDICARE

## 2021-07-24 VITALS
SYSTOLIC BLOOD PRESSURE: 147 MMHG | HEIGHT: 60 IN | DIASTOLIC BLOOD PRESSURE: 66 MMHG | TEMPERATURE: 99.2 F | RESPIRATION RATE: 17 BRPM | HEART RATE: 70 BPM | WEIGHT: 120 LBS | BODY MASS INDEX: 23.56 KG/M2 | OXYGEN SATURATION: 93 %

## 2021-07-24 DIAGNOSIS — R10.84 GENERALIZED ABDOMINAL PAIN: Primary | ICD-10-CM

## 2021-07-24 DIAGNOSIS — R14.0 ABDOMINAL DISTENSION: ICD-10-CM

## 2021-07-24 LAB
ALBUMIN SERPL-MCNC: 4.2 G/DL (ref 3.5–5.2)
ALP BLD-CCNC: 78 U/L (ref 35–104)
ALT SERPL-CCNC: 14 U/L (ref 5–33)
ANION GAP SERPL CALCULATED.3IONS-SCNC: 10 MMOL/L (ref 7–19)
AST SERPL-CCNC: 19 U/L (ref 5–32)
BASOPHILS ABSOLUTE: 0 K/UL (ref 0–0.2)
BASOPHILS RELATIVE PERCENT: 0.4 % (ref 0–1)
BILIRUB SERPL-MCNC: 0.3 MG/DL (ref 0.2–1.2)
BILIRUBIN URINE: NEGATIVE
BLOOD, URINE: NEGATIVE
BUN BLDV-MCNC: 25 MG/DL (ref 8–23)
CALCIUM SERPL-MCNC: 9.6 MG/DL (ref 8.8–10.2)
CHLORIDE BLD-SCNC: 101 MMOL/L (ref 98–111)
CLARITY: CLEAR
CO2: 24 MMOL/L (ref 22–29)
COLOR: YELLOW
CREAT SERPL-MCNC: 0.8 MG/DL (ref 0.5–0.9)
EOSINOPHILS ABSOLUTE: 0.1 K/UL (ref 0–0.6)
EOSINOPHILS RELATIVE PERCENT: 1.9 % (ref 0–5)
EPITHELIAL CELLS, UA: 1 /HPF (ref 0–5)
GFR AFRICAN AMERICAN: >59
GFR NON-AFRICAN AMERICAN: >60
GLUCOSE BLD-MCNC: 109 MG/DL (ref 74–109)
GLUCOSE URINE: NEGATIVE MG/DL
HCT VFR BLD CALC: 28.2 % (ref 37–47)
HEMOGLOBIN: 9.3 G/DL (ref 12–16)
HYALINE CASTS: 1 /HPF (ref 0–8)
IMMATURE GRANULOCYTES #: 0 K/UL
KETONES, URINE: NEGATIVE MG/DL
LEUKOCYTE ESTERASE, URINE: ABNORMAL
LIPASE: 210 U/L (ref 13–60)
LYMPHOCYTES ABSOLUTE: 0.5 K/UL (ref 1.1–4.5)
LYMPHOCYTES RELATIVE PERCENT: 10.5 % (ref 20–40)
MCH RBC QN AUTO: 32.9 PG (ref 27–31)
MCHC RBC AUTO-ENTMCNC: 33 G/DL (ref 33–37)
MCV RBC AUTO: 99.6 FL (ref 81–99)
MONOCYTES ABSOLUTE: 0.2 K/UL (ref 0–0.9)
MONOCYTES RELATIVE PERCENT: 3.8 % (ref 0–10)
NEUTROPHILS ABSOLUTE: 4 K/UL (ref 1.5–7.5)
NEUTROPHILS RELATIVE PERCENT: 83.2 % (ref 50–65)
NITRITE, URINE: NEGATIVE
PDW BLD-RTO: 11.9 % (ref 11.5–14.5)
PH UA: 5.5 (ref 5–8)
PLATELET # BLD: 137 K/UL (ref 130–400)
PMV BLD AUTO: 10.2 FL (ref 9.4–12.3)
POTASSIUM REFLEX MAGNESIUM: 4.1 MMOL/L (ref 3.5–5)
PROTEIN UA: NEGATIVE MG/DL
RBC # BLD: 2.83 M/UL (ref 4.2–5.4)
RBC UA: 1 /HPF (ref 0–4)
SODIUM BLD-SCNC: 135 MMOL/L (ref 136–145)
SPECIFIC GRAVITY UA: 1.03 (ref 1–1.03)
TOTAL PROTEIN: 6.5 G/DL (ref 6.6–8.7)
UROBILINOGEN, URINE: 0.2 E.U./DL
WBC # BLD: 4.8 K/UL (ref 4.8–10.8)
WBC UA: 2 /HPF (ref 0–5)

## 2021-07-24 PROCEDURE — 83690 ASSAY OF LIPASE: CPT

## 2021-07-24 PROCEDURE — 80053 COMPREHEN METABOLIC PANEL: CPT

## 2021-07-24 PROCEDURE — 85025 COMPLETE CBC W/AUTO DIFF WBC: CPT

## 2021-07-24 PROCEDURE — 99282 EMERGENCY DEPT VISIT SF MDM: CPT

## 2021-07-24 PROCEDURE — 6360000004 HC RX CONTRAST MEDICATION: Performed by: PHYSICIAN ASSISTANT

## 2021-07-24 PROCEDURE — 81001 URINALYSIS AUTO W/SCOPE: CPT

## 2021-07-24 PROCEDURE — 74177 CT ABD & PELVIS W/CONTRAST: CPT

## 2021-07-24 PROCEDURE — 36415 COLL VENOUS BLD VENIPUNCTURE: CPT

## 2021-07-24 RX ADMIN — IOPAMIDOL 90 ML: 755 INJECTION, SOLUTION INTRAVENOUS at 19:32

## 2021-07-24 ASSESSMENT — ENCOUNTER SYMPTOMS
ABDOMINAL PAIN: 1
NAUSEA: 0
PHOTOPHOBIA: 0
ABDOMINAL DISTENTION: 1
SHORTNESS OF BREATH: 0
APNEA: 0
COLOR CHANGE: 0
RHINORRHEA: 0
EYE DISCHARGE: 0
EYE PAIN: 0
SORE THROAT: 0
BACK PAIN: 0
COUGH: 0

## 2021-07-24 ASSESSMENT — PAIN SCALES - GENERAL: PAINLEVEL_OUTOF10: 3

## 2021-07-24 ASSESSMENT — PAIN DESCRIPTION - DESCRIPTORS: DESCRIPTORS: PATIENT UNABLE TO DESCRIBE

## 2021-07-24 ASSESSMENT — PAIN DESCRIPTION - LOCATION: LOCATION: ABDOMEN

## 2021-07-24 NOTE — ED NOTES
Bed: 10  Expected date:   Expected time:   Means of arrival:   Comments:  [address], [facility]0 [facility]  07/24/21 8400

## 2021-07-25 NOTE — ED PROVIDER NOTES
[facility]ENCY DEPT  eMERGENCYdEPARTMENT eNCOUnter      Pt Name: Devika Venegas  MRN: [id number]  [place] 1935  Date of evaluation: 7/24/2021  Provider:DINESH Stark    CHIEF COMPLAINT       Chief Complaint   Patient presents with    Abdominal Pain     generalized, firm ab         HISTORY OF PRESENT ILLNESS  (Location/Symptom, Timing/Onset, Context/Setting, Quality, Duration, Modifying Factors, Severity.)   Devika Venegas is a 80 y.o. female who presents to the emergency department with complaints of abdominal distention acute onset pain has a very extensive history with IBS vasovagal during BMs and recent pacemaker placed by Dr. Elizabeth Ashraf. She sees Dr. Jessica Rebollar with neurology is followed by Amy Hernandez with gastro recent episode of elevated lipase no pancreatic cancer history of pancreatitis. Her daughter was concerned because today while trying to attending a BM her abdomen got acutely distended and very firm that has since been resolving and going down no history of ascites or cirrhosis or liver issue baseline watery stool and loose stool baseline more prone to constipation daughter tells me no recent antibiotics concern for C. difficile. Ambulatory with assistant baseline alert and oriented. Patient states when this sharp pain happens which has happened intermittently Profore multiple times it can be as high as a 10 out of 10 currently a 3 out of 10 made worse with palpation. HPI    Nursing Notes were reviewed and I agree. REVIEW OF SYSTEMS    (2-9 systems for level 4, 10 or more for level 5)     Review of Systems   Constitutional: Negative for activity change, appetite change, chills and fever. HENT: Negative for congestion, postnasal drip, rhinorrhea and sore throat. Eyes: Negative for photophobia, pain, discharge and visual disturbance. Respiratory: Negative for apnea, cough and shortness of breath. Cardiovascular: Negative for chest pain and leg swelling. spine surgeries    OTHER SURGICAL HISTORY      inner lymph nodes    OTHER SURGICAL HISTORY  09/2020    Loop recorder     PACEMAKER PLACEMENT  01/2021    SPINE SURGERY      L3,4, and 5   done in 2012   Nay Haskins      as a child at age 9    [facility] ENDOSCOPY  08/28/2003    DR Tisha Haines:  Duodenal scarring but no evidence of active ulcer disease. CURRENT MEDICATIONS       Discharge Medication List as of 7/24/2021  9:43 PM      CONTINUE these medications which have NOT CHANGED    Details   gabapentin (NEURONTIN) 100 MG capsule TAKE 1 CAPSULE BY MOUTH 3 TIMES DAILY.  INTENDED SUPPLY: 30 DAYS, Disp-90 capsule, R-5Normal      dicyclomine (BENTYL) 10 MG capsule Take 10 mg by mouth 4 times daily (before meals and nightly)Historical Med      Thiamine HCl (VITAMIN B-1 PO) Take 100 mg by mouth daily Takes at 2:00 PMHistorical Med      fluticasone (FLONASE) 50 MCG/ACT nasal spray 1 spray by Each Nostril route dailyHistorical Med      dipyridamole (PERSANTINE) 50 MG tablet Take 2 tablets by mouth 2 times daily Indications: 9am and 9pm, Disp-60 tablet, R-11Normal      levETIRAcetam (KEPPRA) 500 MG tablet Take 1 tablet by mouth 2 times daily Indications: takes 9am and 9pm, Disp-60 tablet, R-11Normal      cloNIDine (CATAPRES) 0.1 MG tablet Take 1 tablet by mouth 2 times daily as needed for High Blood Pressure (for BP greater than 160/100), Disp-30 tablet,R-2Normal      pantoprazole (PROTONIX) 40 MG tablet Take 40 mg by mouth daily Indications: 9am Historical Med      acetaminophen (TYLENOL) 500 MG tablet Take 1,000 mg by mouth 3 times daily Historical Med      meloxicam (MOBIC) 7.5 MG tablet Take 15 mg by mouth daily Indications: takes at 9am Historical Med      donepezil (ARICEPT) 10 MG tablet Take 1 tablet by mouth nightly, Disp-90 tablet,R-3Normal      Docosanol (ABREVA EX) Apply topically as needed Historical Med      hydrALAZINE (APRESOLINE) 25 MG tablet Take 50 mg by mouth nightly Indications: takes at 9am, 3pm, and 9pm If sys is 120 to 150 take 1/2 tablet other wise full dose, below 120 no medHistorical Med      nitrofurantoin (MACRODANTIN) 50 MG capsule TAKE 1 CAPSULE AT BEDTIME, Disp-30 capsule, R-3Normal      simethicone (GAS-X EXTRA STRENGTH) 125 MG chewable tablet Take 125 mg by mouth 3 times daily 2 PO 6:30 AM, 2 at 10:00 AM, 1 at 5:30 PM otherwise prnHistorical Med      butalbital-acetaminophen-caffeine (FIORICET, ESGIC) -40 MG per tablet Take 1 tablet by mouth every 6 hours as needed for Headaches Historical Med      amLODIPine (NORVASC) 5 MG tablet Take 5 mg by mouth 2 times daily Indications: takes at 9am and 9pm If BP sys 120 or below then 2.5mg bid otherwise 5mg bidHistorical Med      diclofenac sodium 1 % GEL Apply 2 g topically 3 times daily as needed for Pain , Topical, 3 TIMES DAILY PRN, Historical Med      sodium chloride 1 g tablet Take 1 g by mouth 3 times daily Indications: takes 9am, 3pm, and 9pm Historical Med      lidocaine 4 % external patch Place 1 patch onto the skin daily as needed (LEFT LOWER BACK PAIN), Transdermal, DAILY PRN, Historical Med      desonide (DESOWEN) 0.05 % cream Apply topically as needed Apply topically 2 times daily.  , Topical, PRN, Historical Med      valACYclovir (VALTREX) 1 g tablet as needed Historical Med      azelastine (ASTELIN) 0.1 % nasal spray 2 sprays by Nasal route 2 times daily Indications: takes 9am and 9pm Historical Med      Cholecalciferol (VITAMIN D3) 5000 units TABS Take 5,000 Units by mouth daily Indications: AT 2:30 PM Historical Med      sodium chloride (OCEAN, BABY AYR) 0.65 % nasal spray 1 spray by Nasal route as needed for CongestionHistorical Med      Lutein 20 MG TABS Take 20 mg by mouth daily Indications: 3 PM DAILY Historical Med      camphor-menthol (SARNA) 0.5-0.5 % lotion Apply 0.5 mLs topically 2 times daily as needed for Itching Apply topically as needed. , Topical, 2 TIMES DAILY PRN, Historical Med Calcium Carb-Cholecalciferol (CALCIUM 600+D) 600-800 MG-UNIT TABS Take 1 tablet by mouth daily Indications: AT 2:30 PM Historical Med      promethazine (PHENERGAN) 25 MG tablet Take 12.5 mg by mouth 3 times daily as needed for Nausea Historical Med      Omega 3-6-9 Fatty Acids (OMEGA 3-6-9 COMPLEX PO) Take 2 tablets by mouth daily Indications: AT 2:30 PM Historical Med      alendronate (FOSAMAX) 70 MG tablet Take 70 mg by mouth every 7 days Sundays at 1830Historical Med      pravastatin (PRAVACHOL) 20 MG tablet Take 20 mg by mouth nightly Indications: takes at 9am Historical Med      ramipril (ALTACE) 10 MG capsule Take 10 mg by mouth 2 times daily Indications: takes at 9am and 9pm Historical Med      b complex vitamins capsule Take 1 capsule by mouth daily Indications: AT 2:30 PM B 100Historical Med      Multiple Vitamins-Minerals (THERAPEUTIC MULTIVITAMIN-MINERALS) tablet Take 1 tablet by mouth daily Indications: AT 2:30 PM Historical Med             ALLERGIES     Aspirin, Spironolactone, Dilaudid [hydromorphone hcl], Fd&c yellow #5 [place] [tartrazine], Ondansetron, Quinolones, Sulfa antibiotics, Codeine, Demerol hcl [meperidine], Levaquin [levofloxacin in d5w], Myrbetriq [mirabegron], Namenda [memantine hcl], Norco [hydrocodone-acetaminophen], and Pneumococcal vaccines    FAMILY HISTORY       Family History   Problem Relation Age of Onset    Heart Defect Mother     Hypertension Mother     Heart Attack Father         x3 within 24 h and then , abused ciggarettes    No Known Problems Daughter     No Known Problems Son     No Known Problems Son     Colon Cancer Neg Hx     Colon Polyps Neg Hx     Esophageal Cancer Neg Hx     Liver Cancer Neg Hx     Liver Disease Neg Hx     Rectal Cancer Neg Hx     Stomach Cancer Neg Hx           SOCIAL HISTORY       Social History     Socioeconomic History    Marital status:      Spouse name: Not on file    Number of children: 3    Years of education: Not on file    Highest education level: Not on file   Occupational History    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mployee of [employer] 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,mployee of [address] [employer]   Tobacco Use    Smoking status: Never Smoker    Smokeless tobacco: Never Used   Vaping Use    Vaping Use: Never used   Substance and Sexual Activity    Alcohol use: Never    Drug use: Never    Sexual activity: Not on file     Comment: has 3 kids   Other Topics Concern    Not on file   Social History Narrative    CODE STATUS: DNR-CCA    HEALTH CARE PROXY / [employer] and Healthcare: her daughter, Mrs. Faina Helm, [phone]    AMBULATES: with walker during day, wheelchair at night    DOMICILED: lives in the [facility] assisted living facility, has no stairs or steps, has a cat, her daughter is there periodically     Social Determinants of Health     Financial Resource Strain:     Difficulty of Paying Living Expenses:    Food Insecurity:     Worried About [address] in the Last Year:     [address] in the Last Year:    Transportation Needs:     Lack of Transportation (Medical):      Lack of Transportation (Non-Medical):    Physical Activity:     Days of Exercise per Week:     Minutes of Exercise per Session:    Stress:     Feeling of Stress :    Social Connections:     Frequency of Communication with Friends and Family:     Frequency of Social Gatherings with Friends and Family:     Attends [employer]ist Services:     Active Member of Clubs or Organizations:     Attends Club or Organization Meetings:     Marital Status:    Intimate Partner Violence:     Fear of Current or Ex-Partner:     Emotionally Abused:     Physically Abused:     Sexually Abused:        SCREENINGS           PHYSICAL EXAM    (up to 7 forlevel 4, 8 or more for level 5)     ED Triage Vitals [07/24/21 1847]   BP Temp Temp Source Pulse Resp SpO2 Height Weight   132/82 99.2 °F (37.3 °C) Oral 70 17 93 % 5' (1.524 m) 120 lb (54.4 kg)       Physical Exam  Vitals and nursing note reviewed. Constitutional:       General: She is not in acute distress. Appearance: She is well-developed. She is not diaphoretic. HENT:      Head: Normocephalic and atraumatic. Right Ear: External ear normal.      Left Ear: External ear normal.      Mouth/Throat:      Pharynx: No oropharyngeal exudate. Eyes:      General:         Right eye: No discharge. Left eye: No discharge. Pupils: Pupils are equal, round, and reactive to light. Neck:      Thyroid: No thyromegaly. Cardiovascular:      Rate and Rhythm: Normal rate and regular rhythm. Heart sounds: Normal heart sounds. No murmur heard. No friction rub. Pulmonary:      Effort: Pulmonary effort is normal. No respiratory distress. Breath sounds: Normal breath sounds. No stridor. No wheezing. Abdominal:      General: Bowel sounds are normal. There is distension. Palpations: Abdomen is soft. Tenderness: There is abdominal tenderness in the epigastric area. Comments: Mild distention but soft abdomen seems mildly tender about epigastrium when palpating various regions no significant pain no   Musculoskeletal:         General: Normal range of motion. Cervical back: Normal range of motion and neck supple. Skin:     General: Skin is warm and dry. Capillary Refill: Capillary refill takes less than 2 seconds. Findings: No rash. Neurological:      Mental Status: She is alert and oriented to person, place, and time. Cranial Nerves: No cranial nerve deficit. Sensory: No sensory deficit. Coordination: Coordination normal.   Psychiatric:         Behavior: Behavior normal.         Thought Content:  Thought content normal.           DIAGNOSTIC RESULTS     RADIOLOGY:   Non-plain film images such as CT, Ultrasound and MRI are read by the radiologist. Plain radiographic images are visualized and preliminarilyinterpreted by No att. providers found with the below findings:      Interpretation per the Radiologist below, if available at the time of this note:    CT ABDOMEN PELVIS W IV CONTRAST Additional Contrast? None   Final Result   1. Large amount of stool in the colon compatible constipation. Mild   diverticulosis. No CT evidence of acute bowel pathology. 2. Uncomplicated Left lumbar hernia containing loop of colon without   evidence of incarceration or obstruction. Signed by Dr Yulia Hester:  Labs Reviewed   CBC WITH AUTO DIFFERENTIAL - Abnormal; Notable for the following components:       Result Value    RBC 2.83 (*)     Hemoglobin 9.3 (*)     Hematocrit 28.2 (*)     MCV 99.6 (*)     MCH 32.9 (*)     Neutrophils % 83.2 (*)     Lymphocytes % 10.5 (*)     Lymphocytes Absolute 0.5 (*)     All other components within normal limits   COMPREHENSIVE METABOLIC PANEL W/ REFLEX TO MG FOR LOW K - Abnormal; Notable for the following components:    Sodium 135 (*)     BUN 25 (*)     Total Protein 6.5 (*)     All other components within normal limits   LIPASE - Abnormal; Notable for the following components:    Lipase 210 (*)     All other components within normal limits   URINE RT REFLEX TO CULTURE - Abnormal; Notable for the following components:    Leukocyte Esterase, Urine TRACE (*)     All other components within normal limits   MICROSCOPIC URINALYSIS       All other labs were within normal range or notreturned as of this dictation. RE-ASSESSMENT        EMERGENCY DEPARTMENT COURSE and DIFFERENTIAL DIAGNOSIS/MDM:   Vitals:    Vitals:    07/24/21 2017 07/24/21 2032 07/24/21 2115 07/24/21 2131   BP: 138/64 (!) 152/74 (!) 149/66 (!) 147/66   Pulse:       Resp:       Temp:       TempSrc:       SpO2: 91% 93% 96% 93%   Weight:       Height:           MDM  Patient has an elevated lipase not significantly so its been in this elevated in fact higher before.   I make daughter aware of that additionally I made her aware of the incidental findings on abdomen no liver lesions or pancreatic cancer or mass findings. She has a 99.2 temperature no respiratory distress no cough and lung sounds are clear I do advise that if anything should persist to return to ED for reassessment urine is clean at this time. It is a clean-catch. We have also interrogated her pacemaker for thoroughness nothing acutely appreciated when this patient experienced this episode. The daughter states that when this happened her lips also turned vaguely pale she has a paler which daughter says is her baseline to me. Plan will be for discharge. I do advise for daughter to follow closely with PCP and monitor temp over the weekend I do advise that she could consider talking to Dr. Katie Soni the neurologist about possible gabapentin with her IBS history may be related to something neurogenic in regards to the stomach additionally discussed some Reglan as needed for peristalsis. Additionally with her kidney dysfunction baseline followed by Dr. Junie Vizcarra I feel that they would need to be the ones to recommend a stool softener something to continue to allow her to have soft BMs so she is not straining and bearing down creating a vagal reaction. PROCEDURES:    Procedures      FINAL IMPRESSION      1. Generalized abdominal pain    2. Abdominal distension          DISPOSITION/PLAN   DISPOSITION Decision To Discharge 07/24/2021 09:19:41 PM      PATIENT REFERRED TO:  No follow-up provider specified.     DISCHARGE MEDICATIONS:  Discharge Medication List as of 7/24/2021  9:43 PM          (Please note that portions of this note were completed with a voice recognition program.  Efforts were made to edit the dictations but occasionallywords are mis-transcribed.)    Obi Rendon [address] [place] [address]  07/24/21 5008

## 2021-07-25 NOTE — ED NOTES
Dani Gillis with [facility] called with report; dual chamber pacemaker last checked 7/21, no arrhythmia or high rate episodes since, all lead and battery measurements WNL, device functioning as programed     Mitchel Subramanian, [facility]PA  07/24/21 3499

## 2021-07-25 NOTE — ED PROVIDER NOTES
Attending Supervisory Note/Shared Visit    I have reviewed the mid-levels findings and agree. We have discussed the case and reviewed the diagnostic data. I am in agreement with the plan and disposition. Patient is under evaluation for elevated lipase no lesions noted on CT. Recommend stool softeners and MiraLAX at this point. And follow-up.   Gabriela Dupree MD  Attending Emergency Physician        Rob Ortega MD  07/24/21 [id number]

## 2021-08-05 ENCOUNTER — NURSE ONLY (OUTPATIENT)
Dept: UROLOGY | Age: 86
End: 2021-08-05
Payer: MEDICARE

## 2021-08-05 DIAGNOSIS — N32.81 OVERACTIVE BLADDER: Primary | ICD-10-CM

## 2021-08-05 DIAGNOSIS — N39.46 MIXED STRESS AND URGE URINARY INCONTINENCE: ICD-10-CM

## 2021-08-05 PROCEDURE — 64566 NEUROELTRD STIM POST TIBIAL: CPT | Performed by: NURSE PRACTITIONER

## 2021-08-05 NOTE — PROGRESS NOTES
Treatment # 2 week maintenance    Improvement of Symptoms? moderate    OAB/Urologic Medications? none      Uroplasty Procedure:    1. Patient is seated with the right treatment leg elevated. 2. 34 gauge fine needle electrode is inserted into lower inner aspect of the leg. Slightly cephalad to the medial malleolus. 3. Surface electrode pad is placed over the medial aspect of the calcaneus on the same leg. 4.Needle electrode is connected to the external pulse generator. 5.The pulse generator is turned on and the millivolts used are 3. 6.Patient is treated for 30 minutes. 7.The needle and pad are removed. Patient will follow up in 2 weeks for next treatment.

## 2021-08-18 ENCOUNTER — NURSE ONLY (OUTPATIENT)
Dept: UROLOGY | Age: 86
End: 2021-08-18
Payer: MEDICARE

## 2021-08-18 DIAGNOSIS — N39.46 MIXED STRESS AND URGE URINARY INCONTINENCE: ICD-10-CM

## 2021-08-18 DIAGNOSIS — N32.81 OVERACTIVE BLADDER: Primary | ICD-10-CM

## 2021-08-18 PROCEDURE — 64566 NEUROELTRD STIM POST TIBIAL: CPT | Performed by: NURSE PRACTITIONER

## 2021-09-01 ENCOUNTER — NURSE ONLY (OUTPATIENT)
Dept: UROLOGY | Age: 86
End: 2021-09-01
Payer: MEDICARE

## 2021-09-01 DIAGNOSIS — N39.46 MIXED STRESS AND URGE URINARY INCONTINENCE: ICD-10-CM

## 2021-09-01 DIAGNOSIS — N32.81 OVERACTIVE BLADDER: Primary | ICD-10-CM

## 2021-09-01 PROCEDURE — 64566 NEUROELTRD STIM POST TIBIAL: CPT | Performed by: NURSE PRACTITIONER

## 2021-09-01 NOTE — PROGRESS NOTES
Treatment #  2 week maintenance    Improvement of Symptoms? Significant    OAB/Urologic Medications?  none      Uroplasty Procedure:    1. Patient is seated with the right treatment leg elevated. 2. 34 gauge fine needle electrode is inserted into lower inner aspect of the leg. Slightly cephalad to the medial malleolus. 3. Surface electrode pad is placed over the medial aspect of the calcaneus on the same leg. 4.Needle electrode is connected to the external pulse generator. 5.The pulse generator is turned on and the millivolts used are 4. 6.Patient is treated for 30 minutes. 7.The needle and pad are removed. Patient will follow up in 2 weeks for next treatment.

## 2021-09-15 ENCOUNTER — NURSE ONLY (OUTPATIENT)
Dept: UROLOGY | Age: 86
End: 2021-09-15
Payer: MEDICARE

## 2021-09-15 DIAGNOSIS — N32.81 OVERACTIVE BLADDER: Primary | ICD-10-CM

## 2021-09-15 DIAGNOSIS — N39.46 MIXED STRESS AND URGE URINARY INCONTINENCE: ICD-10-CM

## 2021-09-15 PROCEDURE — 64566 NEUROELTRD STIM POST TIBIAL: CPT | Performed by: NURSE PRACTITIONER

## 2021-09-15 NOTE — PROGRESS NOTES
Treatment # 2 week maintenance    Improvement of Symptoms? Significant    OAB/Urologic Medications? none      Uroplasty Procedure:    1. Patient is seated with the right treatment leg elevated. 2. 34 gauge fine needle electrode is inserted into lower inner aspect of the leg. Slightly cephalad to the medial malleolus. 3. Surface electrode pad is placed over the medial aspect of the calcaneus on the same leg. 4.Needle electrode is connected to the external pulse generator. 5.The pulse generator is turned on and the millivolts used are 3. 6.Patient is treated for 30 minutes. 7.The needle and pad are removed. Patient will follow up in 2 weeks for next treatment.

## 2021-09-16 RX ORDER — DIPYRIDAMOLE 50 MG
100 TABLET ORAL 2 TIMES DAILY
Qty: 60 TABLET | Refills: 11 | Status: SHIPPED | OUTPATIENT
Start: 2021-09-16 | End: 2022-03-11 | Stop reason: SDUPTHER

## 2021-09-16 RX ORDER — DIPYRIDAMOLE 50 MG
100 TABLET ORAL 2 TIMES DAILY
Qty: 60 TABLET | Refills: 11 | Status: CANCELLED | OUTPATIENT
Start: 2021-09-16 | End: 2021-10-16

## 2021-09-16 NOTE — TELEPHONE ENCOUNTER
Patients Daughter ask for Vaelntine Ladd to return her call. The medication refill did not go through. Please return call. Greyson Nathan

## 2021-09-29 ENCOUNTER — NURSE ONLY (OUTPATIENT)
Dept: UROLOGY | Age: 86
End: 2021-09-29
Payer: MEDICARE

## 2021-09-29 DIAGNOSIS — N39.46 MIXED STRESS AND URGE URINARY INCONTINENCE: ICD-10-CM

## 2021-09-29 DIAGNOSIS — N32.81 OVERACTIVE BLADDER: Primary | ICD-10-CM

## 2021-09-29 PROCEDURE — 64566 NEUROELTRD STIM POST TIBIAL: CPT | Performed by: NURSE PRACTITIONER

## 2021-10-07 ENCOUNTER — HOSPITAL ENCOUNTER (OUTPATIENT)
Dept: LAB | Age: 86
Discharge: HOME | End: 2021-10-07
Payer: MEDICARE

## 2021-10-07 ENCOUNTER — OFFICE VISIT (OUTPATIENT)
Dept: CARDIOLOGY CLINIC | Age: 86
End: 2021-10-07
Payer: MEDICARE

## 2021-10-07 ENCOUNTER — HOSPITAL ENCOUNTER (OUTPATIENT)
Dept: GENERAL RADIOLOGY | Age: 86
Discharge: HOME | End: 2021-10-07
Payer: MEDICARE

## 2021-10-07 VITALS
WEIGHT: 123 LBS | DIASTOLIC BLOOD PRESSURE: 64 MMHG | BODY MASS INDEX: 24.15 KG/M2 | SYSTOLIC BLOOD PRESSURE: 116 MMHG | HEART RATE: 72 BPM | HEIGHT: 60 IN

## 2021-10-07 DIAGNOSIS — I10 ESSENTIAL HYPERTENSION: Primary | ICD-10-CM

## 2021-10-07 DIAGNOSIS — I49.5 SA NODE DYSFUNCTION (HCC): ICD-10-CM

## 2021-10-07 DIAGNOSIS — R06.02 SOB (SHORTNESS OF BREATH): ICD-10-CM

## 2021-10-07 DIAGNOSIS — I34.0 MODERATE MITRAL REGURGITATION: ICD-10-CM

## 2021-10-07 DIAGNOSIS — Z95.0 CARDIAC PACEMAKER: ICD-10-CM

## 2021-10-07 DIAGNOSIS — E78.2 MIXED HYPERLIPIDEMIA: ICD-10-CM

## 2021-10-07 LAB
ANION GAP SERPL CALCULATED.3IONS-SCNC: 8 MMOL/L (ref 7–19)
BASOPHILS ABSOLUTE: 0 K/UL (ref 0–0.2)
BASOPHILS RELATIVE PERCENT: 0.7 % (ref 0–1)
BUN BLDV-MCNC: 21 MG/DL (ref 8–23)
CALCIUM SERPL-MCNC: 9.6 MG/DL (ref 8.8–10.2)
CHLORIDE BLD-SCNC: 99 MMOL/L (ref 98–111)
CO2: 29 MMOL/L (ref 22–29)
CREAT SERPL-MCNC: 0.7 MG/DL (ref 0.5–0.9)
EOSINOPHILS ABSOLUTE: 0.3 K/UL (ref 0–0.6)
EOSINOPHILS RELATIVE PERCENT: 4.6 % (ref 0–5)
GFR AFRICAN AMERICAN: >59
GFR NON-AFRICAN AMERICAN: >60
GLUCOSE BLD-MCNC: 94 MG/DL (ref 74–109)
HCT VFR BLD CALC: 37.3 % (ref 37–47)
HEMOGLOBIN: 12 G/DL (ref 12–16)
IMMATURE GRANULOCYTES #: 0 K/UL
LYMPHOCYTES ABSOLUTE: 1.8 K/UL (ref 1.1–4.5)
LYMPHOCYTES RELATIVE PERCENT: 32 % (ref 20–40)
MCH RBC QN AUTO: 32.4 PG (ref 27–31)
MCHC RBC AUTO-ENTMCNC: 32.2 G/DL (ref 33–37)
MCV RBC AUTO: 100.8 FL (ref 81–99)
MONOCYTES ABSOLUTE: 0.5 K/UL (ref 0–0.9)
MONOCYTES RELATIVE PERCENT: 8.1 % (ref 0–10)
NEUTROPHILS ABSOLUTE: 3.1 K/UL (ref 1.5–7.5)
NEUTROPHILS RELATIVE PERCENT: 54.2 % (ref 50–65)
PDW BLD-RTO: 12.5 % (ref 11.5–14.5)
PLATELET # BLD: 200 K/UL (ref 130–400)
PMV BLD AUTO: 9.7 FL (ref 9.4–12.3)
POTASSIUM SERPL-SCNC: 3.8 MMOL/L (ref 3.5–5)
PRO-BNP: 234 PG/ML (ref 0–1800)
RBC # BLD: 3.7 M/UL (ref 4.2–5.4)
SODIUM BLD-SCNC: 136 MMOL/L (ref 136–145)
WBC # BLD: 5.7 K/UL (ref 4.8–10.8)

## 2021-10-07 PROCEDURE — 1123F ACP DISCUSS/DSCN MKR DOCD: CPT | Performed by: NURSE PRACTITIONER

## 2021-10-07 PROCEDURE — 4040F PNEUMOC VAC/ADMIN/RCVD: CPT | Performed by: NURSE PRACTITIONER

## 2021-10-07 PROCEDURE — 93280 PM DEVICE PROGR EVAL DUAL: CPT | Performed by: NURSE PRACTITIONER

## 2021-10-07 PROCEDURE — 1036F TOBACCO NON-USER: CPT | Performed by: NURSE PRACTITIONER

## 2021-10-07 PROCEDURE — G8427 DOCREV CUR MEDS BY ELIG CLIN: HCPCS | Performed by: NURSE PRACTITIONER

## 2021-10-07 PROCEDURE — G8420 CALC BMI NORM PARAMETERS: HCPCS | Performed by: NURSE PRACTITIONER

## 2021-10-07 PROCEDURE — G8484 FLU IMMUNIZE NO ADMIN: HCPCS | Performed by: NURSE PRACTITIONER

## 2021-10-07 PROCEDURE — 71046 X-RAY EXAM CHEST 2 VIEWS: CPT

## 2021-10-07 PROCEDURE — 99214 OFFICE O/P EST MOD 30 MIN: CPT | Performed by: NURSE PRACTITIONER

## 2021-10-07 PROCEDURE — 1090F PRES/ABSN URINE INCON ASSESS: CPT | Performed by: NURSE PRACTITIONER

## 2021-10-07 NOTE — PATIENT INSTRUCTIONS
New instructions for today:  Stop by outpatient lab today for lab work and CXR. Echocardiogram -  No prep. [facility] at the [address] [facility] and [address] located on the first floor of [place] [address] through hospital main entrance and turn immediately to your left. Date/Time:     Takes approximately 30 min. An echocardiogram uses sound waves to produce images of your heart. This commonly used test allows your doctor to see how your heart is beating and pumping blood. Your doctor can use the images from an echocardiogram to identify various abnormalities in the heart muscle and valves. This test has 2 parts:   Ø You will be asked to disrobe from the waist up and given a gown to wear. The technologist will then hook up an EKG monitor to you for the entire exam.   Ø You will then have an ultrasound of your heart (echocardiogram) to assess the heart muscle, heart valves and heart function. You may eat and take any medicines before the exam.     If you need to change your appointment, please call outpatient scheduling at [phone]. Patient Instructions:  Continue current medications as prescribed. Always keep a current medication list. Bring your medications to every office visit. Continue to follow up with primary care provider for non cardiac medical problems. Call the office with any problems, questions or concerns at [phone]. If you have been asked to keep a blood pressure log, do so for 2 weeks. Call the office to report readings to the triage nurse at [phone]. Follow up with cardiologist as scheduled. The following educational material has been included in this after visit summary for your review: Life simple 7. Heart health. Life simple 7  1) Manage blood pressure - high blood pressure is a major risk factor for heart disease and stroke. Keeping blood pressure in health range reduces strain on your heart, arteries and kidneys.   Blood pressure goal is less than 130/80. 2) Control cholesterol - contributes to plaque, which can clog arteries and lead to heart disease and stroke. When you control your cholesterol you are giving your arteries their best chance to remain clear. It is recommended that you get cholesterol lab work done once a year. 3) Reduce blood sugar - most of the food we eat is turning into glucose or blood sugar that our body uses for energy. Over time, high levels of blood sugar can damage your heart, kidneys, eyes and nerves. 4) Get active - living an active life is one of the most rewarding gifts you can give yourself and those you love. Simply put, daily physical activity increases your length and quality of life. Strive to exercise 15 minutes most days of the week. 5)  Eat better - A healthy diet is one of your best weapons for fighting cardiovascular disease. When you eat a heart healthy diet, you improve your chances for feeling good and staying healthy for life. 6)  Lose weight - when you shed extra fat an unnecessary pounds, you reduce the burden on your hear, lungs, blood vessels and skeleton. You give yourself the gift of active living, you lower your blood pressure and help yourself feel better. 7) Stop smoking - cigarette smokers have a higher risk of developing cardiovascular disease. If  You smoke, quitting is the best thing you can do for your health. Check American Heart Association on line for more information on Life's Simple 7 and tips for healthy living. A Healthy Heart: Care Instructions  Your Care Instructions     Coronary artery disease, also called heart disease, occurs when a substance called plaque builds up in the vessels that supply oxygen-rich blood to your heart muscle. This can narrow the blood vessels and reduce blood flow. A heart attack happens when blood flow is completely blocked. A high-fat diet, smoking, and other factors increase the risk of heart disease.   Your doctor has found that you have a chance of having heart disease. You can do lots of things to keep your heart healthy. It may not be easy, but you can change your diet, exercise more, and quit smoking. These steps really work to lower your chance of heart disease. Follow-up care is a key part of your treatment and safety. Be sure to make and go to all appointments, and call your doctor if you are having problems. It's also a good idea to know your test results and keep a list of the medicines you take. How can you care for yourself at home? Diet  · Use less salt when you cook and eat. This helps lower your blood pressure. Taste food before salting. Add only a little salt when you think you need it. With time, your taste buds will adjust to less salt. · Eat fewer snack items, fast foods, canned soups, and other high-salt, high-fat, processed foods. · Read food labels and try to avoid saturated and trans fats. They increase your risk of heart disease by raising cholesterol levels. · Limit the amount of solid fat-butter, margarine, and shortening-you eat. Use olive, peanut, or canola oil when you cook. Bake, broil, and steam foods instead of frying them. · Eat a variety of fruit and vegetables every day. Dark green, deep orange, red, or yellow fruits and vegetables are especially good for you. Examples include spinach, carrots, peaches, and berries. · Foods high in fiber can reduce your cholesterol and provide important vitamins and minerals. High-fiber foods include whole-grain cereals and breads, oatmeal, beans, brown rice, citrus fruits, and apples. · Eat lean proteins. Heart-healthy proteins include seafood, lean meats and poultry, eggs, beans, peas, nuts, seeds, and soy products. · Limit drinks and foods with added sugar. These include candy, desserts, and soda pop. Lifestyle changes  · If your doctor recommends it, get more exercise. Walking is a good choice. Bit by bit, increase the amount you walk every day. Try for at least 30 minutes on most days of the week. You also may want to swim, bike, or do other activities. · Do not smoke. If you need help quitting, talk to your doctor about stop-smoking programs and medicines. These can increase your chances of quitting for good. Quitting smoking may be the most important step you can take to protect your heart. It is never too late to quit. · Limit alcohol to 2 drinks a day for men and 1 drink a day for women. Too much alcohol can cause health problems. · Manage other health problems such as diabetes, high blood pressure, and high cholesterol. If you think you may have a problem with alcohol or drug use, talk to your doctor. Medicines  · Take your medicines exactly as prescribed. Call your doctor if you think you are having a problem with your medicine. · If your doctor recommends aspirin, take the amount directed each day. Make sure you take aspirin and not another kind of pain reliever, such as acetaminophen (Tylenol). When should you call for help? [id number] if you have symptoms of a heart attack. These may include:  · Chest pain or pressure, or a strange feeling in the chest.  · Sweating. · Shortness of breath. · Pain, pressure, or a strange feeling in the back, neck, jaw, or upper belly or in one or both shoulders or arms. · Lightheadedness or sudden weakness. · A fast or irregular heartbeat. After you call 911, the  may tell you to chew 1 adult-strength or 2 to 4 low-dose aspirin. Wait for an ambulance. Do not try to drive yourself. Watch closely for changes in your health, and be sure to contact your doctor if you have any problems. Where can you learn more? Go to https://StreetHubpe[employer] Bill.[employer]. org and sign in to your [employer] account. Enter K186 in the [employer] box to learn more about \"A Healthy Heart: Care Instructions. \"     If you do not have an account, please click on the \"Sign Up Now\" link.   Current as of: December 16, 2019               Content Version: 12.5  © 6784-4575 Healthwise, Incorporated. Care instructions adapted under license by [facility] ([facility]). If you have questions about a medical condition or this instruction, always ask your healthcare professional. [address] any warranty or liability for your use of this information. Patient Education        Leg and Ankle Edema: Care Instructions  Your Care Instructions  Swelling in the legs, ankles, and feet is called edema. It is common after you sit or stand for a while. Long plane flights or car rides often cause swelling in the legs and feet. You may also have swelling if you have to stand for long periods of time at your job. Problems with the veins in the legs (varicose veins) and changes in hormones can also cause swelling. Sometimes the swelling in the ankles and feet is caused by a more serious problem, such as heart failure, infection, blood clots, or liver or kidney disease. Follow-up care is a key part of your treatment and safety. Be sure to make and go to all appointments, and call your doctor if you are having problems. It's also a good idea to know your test results and keep a list of the medicines you take. How can you care for yourself at home? · If your doctor gave you medicine, take it as prescribed. Call your doctor if you think you are having a problem with your medicine. · Whenever you are resting, raise your legs up. Try to keep the swollen area higher than the level of your heart. · Take breaks from standing or sitting in one position. ? Walk around to increase the blood flow in your lower legs. ? Move your feet and ankles often while you stand, or tighten and relax your leg muscles. · Wear support stockings. Put them on in the morning, before swelling gets worse. · Eat a balanced diet. Lose weight if you need to. · Limit the amount of salt (sodium) in your diet.  Salt holds fluid in the body and may increase swelling. When should you call for help? Call 911 anytime you think you may need emergency care. For example, call if:    · You have symptoms of a blood clot in your lung (called a pulmonary embolism). These may include:  ? Sudden chest pain. ? Trouble breathing. ? Coughing up blood. Call your doctor now or seek immediate medical care if:    · You have signs of a blood clot, such as:  ? Pain in your calf, back of the knee, thigh, or groin. ? Redness and swelling in your leg or groin.     · You have symptoms of infection, such as:  ? Increased pain, swelling, warmth, or redness. ? Red streaks or pus. ? A fever. Watch closely for changes in your health, and be sure to contact your doctor if:    · Your swelling is getting worse.     · You have new or worsening pain in your legs.     · You do not get better as expected. Where can you learn more? Go to https://[employer]es [employer] Kids.[employer]. org and sign in to your [employer] account. Enter S513 in the [employer] box to learn more about \"Leg and Ankle Edema: Care Instructions. \"     If you do not have an account, please click on the \"Sign Up Now\" link. Current as of: July 1, 2021               Content Version: 13.0  © 2766-4818 Healthwise, Incorporated. Care instructions adapted under license by [facility] ([facility]). If you have questions about a medical condition or this instruction, always ask your healthcare professional. [place] [address] any warranty or liability for your use of this information.

## 2021-10-07 NOTE — PROGRESS NOTES
[facility], [place]. [address], [address], [address]  [phone] office  [phone] fax      OFFICE VISIT:  10/7/2021    Jailyn Bridges - : 1935  Reason For Visit:  Silvina Botello is a 80 y.o. female who is here for Edema, Bradycardia, and Loss of Consciousness    History:   Diagnosis Orders   1. Essential hypertension  CBC Auto Differential    proBNP, N-TERMINAL    Basic Metabolic Panel   2. Cardiac pacemaker     3. SA node dysfunction (HCC)  EKG 12 lead   4. Mixed hyperlipidemia     5. SOB (shortness of breath)  ECHO Complete 2D W Doppler W Color    CBC Auto Differential    proBNP, N-TERMINAL    Basic Metabolic Panel    XR CHEST (2 VW)   6. Moderate mitral regurgitation  ECHO Complete 2D W Doppler W Color    CBC Auto Differential    proBNP, N-TERMINAL    Basic Metabolic Panel     The patient presents today for cardiology follow up and pacer check. Silvina Botello has a history of vasovagal syncope after BM for years. She has not had syncope or near syncope with BM since Dr. Tamiko Gonzalez decreased hydralazine AM dose. However, her daughter reports still having some elevated BP readings. Dr. Tamiko Gonzalez has made recent adjustments and the patient has follow up with him in a few weeks. BP in office today is 116/64. The patient's daughter also reports noticing her mother has shortness of breath and some ankle edema no occasion. She has no fever or cough. The patient's PCP monitors cholesterol. The patient has completed coronavirus vaccine series. Maggie Fortune denies exertional chest pain, orthopnea, paroxysmal nocturnal dyspnea, syncope, presyncope and sensed arrhythmia. The patient denies numbness or weakness to suggest cerebrovascular accident or transient ischemic attack. + dyspnea.  + intermittent ankle edema.  + fatigue.     Jailyn Bridges has the following history as recorded in [facility]:  Patient Active Problem List Diagnosis Code    Irritable bowel syndrome with diarrhea K58.0    S/P laparoscopic cholecystectomy Z90.49    Late onset Alzheimer's disease without behavioral disturbance ([facility]) G30.1, F02.80    Altered mental status R41.82    Seizure as late effect of cerebrovascular accident (CVA) ([facility] I69.398, Z95.8    Metabolic encephalopathy M51.53    Weakness R53.1    Chronic bilateral lower abdominal pain R10.31, G89.29, R10.32    Gas pain R14.1    History of ischemic colitis Z87.19    Partial symptomatic epilepsy with complex partial seizures, intractable, without status epilepticus ([facility] G40.219    Cerebrovascular small vessel disease I67.9    Vascular dementia without behavioral disturbance ([facility] F01.50    Risk for falls Z91.81    Dry age-related macular degeneration of left eye H35.3120    Age-related macular degeneration, wet, right eye ([facility]) H35.3210    Gait abnormality R26.9    Palliative care patient Z51.5    Bloating R14.0    Chronic bilateral low back pain with left-sided sciatica M54.42, G89.29    Arthralgia of multiple joints M25.50    Abnormal EKG R94.31    Moderate mitral regurgitation I34.0    Chest pain R07.9    Essential hypertension I10    Overactive bladder N32.81    Vasovagal syncope R55    Bradycardia R00.1    Cryptogenic stroke ([facility]) I63.9    Syncope and collapse R55    Status post placement of implantable loop recorder Z95.818    Symptomatic bradycardia R00.1    Bilateral carotid artery stenosis I65.23    PVD (peripheral vascular disease) ([facility]) I73.9    Macrocytic anemia D53.9    Diarrhea R19.7    At risk for polypharmacy Z91.89    Mild protein-calorie malnutrition ([facility]) E44.1    Abdominal bloating R14.0    Elevated lipase R74.8     Past Medical History:   Diagnosis Date    Age-related macular degeneration, wet, right eye ([facility]     Anemia     Back pain     Bilateral carotid artery stenosis 2/23/2021    Chronic bilateral low back pain with left-sided sciatica 2019    Colitis, ischemic ([address]     Dementia ([facility]     Diverticulosis     Dry age-related macular degeneration of left eye     Hyperlipidemia     Hypertension     Osteoarthritis     Palliative care patient 2019    Risk for falls 2019    Seizures ([address]     Spastic colon     Status post placement of implantable loop recorder 10/27/2020    Stroke syndrome     Urinary incontinence     Uterine cancer ([address]     Uterine cancer ([facility] [facility]      Past Surgical History:   Procedure Laterality Date    BREAST SURGERY Right     FNA Right Breast \"oh heavens, maybe 20 years ago\"    CHOLECYSTECTOMY      COLONOSCOPY  03    Dr Malena Haas ischemic colitis, incomplete exam    COLONOSCOPY  2003    Dr Azar Coburn:  limited colon-ischemic colitis    DILATION AND CURETTAGE OF UTERUS      x2, in [facility] tears, laser suregry, adn cataract with IOL b/l    HYSTERECTOMY  2020    ovaries and tubes    INSERTABLE CARDIAC MONITOR      LUMBAR FUSION       90 days after the spine surgeries    OTHER SURGICAL HISTORY      inner lymph nodes    OTHER SURGICAL HISTORY  2020    Loop recorder     PACEMAKER PLACEMENT  2021    SPINE SURGERY      L3,4, and 5   done in    [address]      as a child at age 9    [address]  2003    DR Laury Palomo:  Duodenal scarring but no evidence of active ulcer disease.      Family History   Problem Relation Age of Onset    Heart Defect Mother     Hypertension Mother     Heart Attack Father         x3 within 24 h and then , abused ciggarettes    No Known Problems Daughter     No Known Problems Son     No Known Problems Son     Colon Cancer Neg Hx     Colon Polyps Neg Hx     Esophageal Cancer Neg Hx     Liver Cancer Neg Hx     Liver Disease Neg Hx     Rectal Cancer Neg Hx     Stomach Cancer Neg Hx      Social History     Tobacco Use    Smoking status: Never sodium 1 % GEL Apply 2 g topically 3 times daily as needed for Pain       sodium chloride 1 g tablet Take 1 g by mouth 3 times daily Indications: takes 9am, 3pm, and 9pm       lidocaine 4 % external patch Place 1 patch onto the skin daily as needed (LEFT LOWER BACK PAIN)      desonide (DESOWEN) 0.05 % cream Apply topically as needed Apply topically 2 times daily.  valACYclovir (VALTREX) 1 g tablet as needed       azelastine (ASTELIN) 0.1 % nasal spray 2 sprays by Nasal route 2 times daily Indications: takes 9am and 9pm       Cholecalciferol (VITAMIN D3) 5000 units TABS Take 5,000 Units by mouth daily Indications: AT 2:30 PM       sodium chloride (OCEAN, BABY AYR) 0.65 % nasal spray 1 spray by Nasal route as needed for Congestion      Lutein 20 MG TABS Take 20 mg by mouth daily Indications: 3 PM DAILY       camphor-menthol (SARNA) 0.5-0.5 % lotion Apply 0.5 mLs topically 2 times daily as needed for Itching Apply topically as needed.  Calcium Carb-Cholecalciferol (CALCIUM 600+D) 600-800 MG-UNIT TABS Take 1 tablet by mouth daily Indications: AT 2:30 PM       promethazine (PHENERGAN) 25 MG tablet Take 12.5 mg by mouth 3 times daily as needed for Nausea       Omega 3-6-9 Fatty Acids (OMEGA 3-6-9 COMPLEX PO) Take 2 tablets by mouth daily Indications: AT 2:30 PM       alendronate (FOSAMAX) 70 MG tablet Take 70 mg by mouth every 7 days Sundays at 1830      pravastatin (PRAVACHOL) 20 MG tablet Take 20 mg by mouth nightly Indications: takes at 9am       ramipril (ALTACE) 10 MG capsule Take 10 mg by mouth 2 times daily Indications: takes at 9am and 9pm       b complex vitamins capsule Take 1 capsule by mouth daily Indications: AT 2:30 PM B 100      Multiple Vitamins-Minerals (THERAPEUTIC MULTIVITAMIN-MINERALS) tablet Take 1 tablet by mouth daily Indications: AT 2:30 PM       gabapentin (NEURONTIN) 100 MG capsule TAKE 1 CAPSULE BY MOUTH 3 TIMES DAILY.  INTENDED SUPPLY: 30 DAYS (Patient taking differently: Take 100 mg by mouth 2 times daily. ) 90 capsule 5    dicyclomine (BENTYL) 10 MG capsule Take 10 mg by mouth 4 times daily (before meals and nightly) (Patient not taking: Reported on 10/7/2021)      levETIRAcetam (KEPPRA) 500 MG tablet Take 1 tablet by mouth 2 times daily Indications: takes 9am and 9pm 60 tablet 11     No current facility-administered medications for this visit. Allergies: Aspirin, Spironolactone, Dilaudid [hydromorphone hcl], Fd&c yellow #5 [place] [tartrazine], Ondansetron, Quinolones, Sulfa antibiotics, Codeine, Demerol hcl [meperidine], Levaquin [levofloxacin in d5w], Myrbetriq [mirabegron], Namenda [memantine hcl], Norco [hydrocodone-acetaminophen], and Pneumococcal vaccines    Review of Systems  Constitutional - no appetite change, or unexpected weight change. No fever, chills or diaphoresis. + fatigue. HEENT - no significant rhinorrhea or epistaxis. No tinnitus or significant hearing loss. Eyes - no sudden vision change or amaurosis. No corneal arcus, xantholasma, subconjunctival hemorrhage or discharge. Respiratory - no significant wheezing, stridor, apnea or cough. +dyspnea. Cardiovascular - no exertional chest pain to suggest myocardial ischemia. No orthopnea or PND. No sensation of sustained arrythmia. No occurrence of slow heart rate. No palpitations. No claudication. Gastrointestinal - no abdominal swelling or pain. No blood in stool. No severe constipation, diarrhea, nausea, or vomiting. Genitourinary - no dysuria, frequency, or urgency. No flank pain or hematuria. Musculoskeletal - no back pain or myalgia. No problems with gait. Extremities - no clubbing or cyanosis. + intermittent ankle edema. Skin - no color change or rash. No pallor. No new surgical incision. Neurologic - no speech difficulty, facial asymmetry or lateralizing weakness. No seizures, presyncope or syncope. No significant dizziness.   Hematologic - no easy bruising or excessive bleeding. Psychiatric - no severe anxiety or insomnia. No confusion. All other review of systems are negative. Objective  Vital Signs - /64   Pulse 72   Ht 5' (1.524 m)   Wt 123 lb (55.8 kg)   BMI 24.02 kg/m²   General - Vivian Loya is alert, cooperative, and pleasant. Well groomed. No acute distress. Body habitus - Body mass index is 24.02 kg/m². HEENT - Head is normocephalic. No circumoral cyanosis. Dentition is normal.  EYES -   Lids normal without ptosis. No discharge, edema or subconjunctival hemorrhage. Neck - Symmetrical without apparent mass or lymphadenopathy. Respiratory - Normal respiratory effort without use of accessory muscles. Ausculatation reveals vesicular breath sounds without crackles, wheezes, rub or rhonchi. Cardiovascular - No jugular venous distention. Auscultation reveals regular rate and rhythm. No audible clicks, gallop or rub. No murmur. No lower extremity varicosities. No carotid bruits. Abdominal -  No visible distention, mass or pulsations. Extremities - No clubbing or cyanosis. No statis dermatitis or ulcers. No edema. Musculoskeletal -   No Osler's nodes. No kyphosis or scoliosis. Gait is even and regular without limp or shuffle. Ambulates without assistance. Skin -  Warm and dry; no rash or pallor. No new surgical wound. Neurological - No focal neurological deficits. Thought processes coherent. No apparent tremor. Oriented to person, place and time. Psychiatric -  Appropriate affect and mood. Data reviewed:  9/17/20 Lexiscan  Impression:   There is no obvious infarct or ischemia, with a calculated ejection   fraction of 84 %. Suggest: Clinical correlation and consideration for Medical   management.    Signed by Dr Dorothy Rivera on 9/17/2020 10:03 PM     9/17/20 echo   Normal left ventricular size and systolic function EF 13-63%   Mild concentric left ventricular hypertrophy with normal diastolic function   Mild left atrial enlargement   Mild calcification of the somewhat thickened tricuspid aortic valve with  adequate cusp separation and no significant stenosis or insufficiency   Mild thickening of a normally mobile mitral valve with moderate   regurgitation   Pulmonic valve not visualized   Mild right atrial enlargement with normal right ventricular size and   systolic function   Mild to moderate tricuspid regurgitation with RVSP estimate 28 mmHg   Normal IVC consistent with normal right atrial filling pressures   Significant pericardial effusion and aortic root dimensions are within  normal limits    Signature    Electronically signed by Arvin Haynes MD(Interpreting physician)   on 09/17/2020 01:10 PM    Lab Results   Component Value Date    WBC 4.8 07/24/2021    HGB 9.3 (L) 07/24/2021    HCT 28.2 (L) 07/24/2021    MCV 99.6 (H) 07/24/2021     07/24/2021     Lab Results   Component Value Date     (L) 07/24/2021    K 4.1 07/24/2021     07/24/2021    CO2 24 07/24/2021    BUN 25 (H) 07/24/2021    CREATININE 0.8 07/24/2021    GLUCOSE 109 07/24/2021    CALCIUM 9.6 07/24/2021    PROT 6.5 (L) 07/24/2021    LABALBU 4.2 07/24/2021    BILITOT 0.3 07/24/2021    ALKPHOS 78 07/24/2021    AST 19 07/24/2021    ALT 14 07/24/2021    LABGLOM >60 07/24/2021    GFRAA >59 07/24/2021    GLOB 2.0 07/16/2016       Lab Results   Component Value Date    CHOL 190 09/03/2019    CHOL 171 03/21/2019    CHOL 190 10/08/2018     Lab Results   Component Value Date    TRIG 142 09/03/2019    TRIG 145 03/21/2019    TRIG 138 10/08/2018     Lab Results   Component Value Date    HDL 66 09/03/2019    HDL 68 03/21/2019    HDL 80 10/08/2018     Lab Results   Component Value Date    LDLCALC 96 09/03/2019    LDLCALC 74 03/21/2019    LDLCALC 82 10/08/2018     EKG today reviewed: NSR 72 BPM; no acute ischemic changes or ectopy.     BP Readings from Last 3 Encounters:   10/07/21 116/64   07/24/21 (!) 147/66   07/21/21 120/60    Pulse Readings from Last 3 Encounters:   10/07/21 72   07/24/21 70   07/21/21 68        Wt Readings from Last 3 Encounters:   10/07/21 123 lb (55.8 kg)   07/24/21 120 lb (54.4 kg)   07/21/21 120 lb (54.4 kg)     Assessment/Plan:   Diagnosis Orders   1. Essential hypertension  CBC Auto Differential    proBNP, N-TERMINAL    Basic Metabolic Panel   2. Cardiac pacemaker     3. SA node dysfunction (HCC)  EKG 12 lead   4. Mixed hyperlipidemia     5. SOB (shortness of breath)  ECHO Complete 2D W Doppler W Color    CBC Auto Differential    proBNP, N-TERMINAL    Basic Metabolic Panel    XR CHEST (2 VW)   6. Moderate mitral regurgitation  ECHO Complete 2D W Doppler W Color    CBC Auto Differential    proBNP, N-TERMINAL    Basic Metabolic Panel     HTN - BP well controlled in office today. However, patient reports some afternoon elevations. Dr. Sharlene Lopez is currently working with patient and has a follow up soon. Pacer - interrogations shows adequate battery status, diagnostics and safety margins. Moderate MR with increased dyspnea and occasional ankle edema - schedule 2D echo to assess cardiac structure. Check labs and CXR today. Hyperlipidemia - LDL 96.  Labs followed by PCP. Pacer check:  Pacemaker check showed adequate battery status. 14 years longevity. Mode: AAIR-DDDR. Lead impedances are stable. Pacing:  AP 21.6%;  <0.1%. Reprogramming for sensitivity and threshold testing. Appropriate diagnostics and safety margins noted. A output 0.50 V at 0.40 ms ; P wave 2.0 mV. V output 0.50 V at 0.40 ms, R wave 14.1 mV. Sustained arrythmia:  none. Next [facility] remote transmission:  3 months. Previous cardiac history and records reviewed. Continue current medical management for cardiac related condition. Continue other current medications as directed. Continue to follow up with primary care provider for non cardiac medical problems.   If your primary care provider is outside of the [facility], please request that your labs be faxed to this office at [phone]. BP goal 130/80 or less. Call the office with any problems, questions or concerns at [phone]. Cardiology follow up as scheduled in [address] appointments. Educational included in patient instructions. Heart health.       Natalie Bridges, APRN

## 2021-10-13 ENCOUNTER — NURSE ONLY (OUTPATIENT)
Dept: UROLOGY | Age: 86
End: 2021-10-13
Payer: MEDICARE

## 2021-10-13 DIAGNOSIS — N39.46 MIXED STRESS AND URGE URINARY INCONTINENCE: ICD-10-CM

## 2021-10-13 DIAGNOSIS — N32.81 OVERACTIVE BLADDER: Primary | ICD-10-CM

## 2021-10-13 PROCEDURE — 64566 NEUROELTRD STIM POST TIBIAL: CPT | Performed by: NURSE PRACTITIONER

## 2021-10-14 ENCOUNTER — HOSPITAL ENCOUNTER (OUTPATIENT)
Dept: NON INVASIVE DIAGNOSTICS | Age: 86
Discharge: HOME | End: 2021-10-14
Payer: MEDICARE

## 2021-10-14 DIAGNOSIS — I34.0 MODERATE MITRAL REGURGITATION: ICD-10-CM

## 2021-10-14 DIAGNOSIS — R06.02 SOB (SHORTNESS OF BREATH): ICD-10-CM

## 2021-10-14 LAB
LV EF: 58 %
LVEF MODALITY: NORMAL

## 2021-10-14 PROCEDURE — 93306 TTE W/DOPPLER COMPLETE: CPT

## 2021-10-14 PROCEDURE — 93356 MYOCRD STRAIN IMG SPCKL TRCK: CPT

## 2021-10-19 ENCOUNTER — TELEPHONE (OUTPATIENT)
Dept: CARDIOLOGY CLINIC | Age: 86
End: 2021-10-19

## 2021-10-20 ENCOUNTER — OFFICE VISIT (OUTPATIENT)
Dept: CARDIOLOGY CLINIC | Age: 86
End: 2021-10-20
Payer: MEDICARE

## 2021-10-20 VITALS
BODY MASS INDEX: 24.35 KG/M2 | DIASTOLIC BLOOD PRESSURE: 60 MMHG | HEART RATE: 72 BPM | SYSTOLIC BLOOD PRESSURE: 130 MMHG | OXYGEN SATURATION: 97 % | HEIGHT: 60 IN | WEIGHT: 124 LBS

## 2021-10-20 DIAGNOSIS — Z95.0 CARDIAC PACEMAKER: ICD-10-CM

## 2021-10-20 DIAGNOSIS — I49.5 SA NODE DYSFUNCTION (HCC): ICD-10-CM

## 2021-10-20 DIAGNOSIS — R06.00 DYSPNEA, UNSPECIFIED TYPE: Primary | ICD-10-CM

## 2021-10-20 DIAGNOSIS — I10 ESSENTIAL HYPERTENSION: ICD-10-CM

## 2021-10-20 DIAGNOSIS — F01.50 VASCULAR DEMENTIA WITHOUT BEHAVIORAL DISTURBANCE (HCC): ICD-10-CM

## 2021-10-20 DIAGNOSIS — R55 SYNCOPE AND COLLAPSE: ICD-10-CM

## 2021-10-20 PROCEDURE — G8427 DOCREV CUR MEDS BY ELIG CLIN: HCPCS | Performed by: NURSE PRACTITIONER

## 2021-10-20 PROCEDURE — 1036F TOBACCO NON-USER: CPT | Performed by: NURSE PRACTITIONER

## 2021-10-20 PROCEDURE — 93280 PM DEVICE PROGR EVAL DUAL: CPT | Performed by: NURSE PRACTITIONER

## 2021-10-20 PROCEDURE — G8484 FLU IMMUNIZE NO ADMIN: HCPCS | Performed by: NURSE PRACTITIONER

## 2021-10-20 PROCEDURE — 1090F PRES/ABSN URINE INCON ASSESS: CPT | Performed by: NURSE PRACTITIONER

## 2021-10-20 PROCEDURE — 99214 OFFICE O/P EST MOD 30 MIN: CPT | Performed by: NURSE PRACTITIONER

## 2021-10-20 PROCEDURE — G8420 CALC BMI NORM PARAMETERS: HCPCS | Performed by: NURSE PRACTITIONER

## 2021-10-20 PROCEDURE — 4040F PNEUMOC VAC/ADMIN/RCVD: CPT | Performed by: NURSE PRACTITIONER

## 2021-10-20 PROCEDURE — 1123F ACP DISCUSS/DSCN MKR DOCD: CPT | Performed by: NURSE PRACTITIONER

## 2021-10-20 RX ORDER — HYDRALAZINE HYDROCHLORIDE 50 MG/1
50 TABLET, FILM COATED ORAL 2 TIMES DAILY
COMMUNITY

## 2021-10-20 NOTE — PROGRESS NOTES
[facility], [place]. [address] [facility]  [phone] office  [phone] fax      OFFICE VISIT:  10/20/2021    Landry Antonio - : 1935  Reason For Visit:  Pantera Faustin is a 80 y.o. female who is here for Follow-up (3 week follow up, pacer check and Echo done on 10/14/2021) and Hypertension    History:   Diagnosis Orders   1. Dyspnea, unspecified type     2. Syncope and collapse     3. Essential hypertension     4. Vascular dementia without behavioral disturbance (HCC)     5. SA node dysfunction (HCC)     6. Cardiac pacemaker       The patient presents today for cardiology follow up after having 2D echo, CXR and labs a few weeks ago. The patient's daughter reported at last visit that her mother is having progressive dyspnea with walking in the house, not of acute onset. Today, Pantera Faustin reports \"I feel just fine. \"  Her pulse oximetry today is 97%. The 2D echo looked excellent with normal EF and diastolic function. Mild MR noted. RVSP 23 mmHg. Pro BNP was normal range at 234. BMP -normal.  CBC showed improvement in hemoglobin to 12.0    The chest x-ray showed mild chronic changes with no evidence of CHF, pneumonia or mass. The patient has been having some issues with pain in her feet. Yesterday, she had a steroid injection which has helped her arthritis pain. The daughter reports some ongoing pain in the left groin and lateral left leg. There is no calf pain or edema. No hx of DVT. No report of chest pain or sensed arrhythmia. Blood pressure is excellent today. The daughter reports BP has been lower the past 3 weeks. Dr. Connor Jiang closely monitors blood pressure control. Cas Flores denies exertional chest pain,  orthopnea, paroxysmal nocturnal dyspnea, syncope, presyncope, sensed arrhythmia, edema and fatigue.   The patient denies numbness or weakness to suggest cerebrovascular accident or transient ischemic attack. + progressive dyspnea.       Owen Gutierrez has the following history as recorded in [facility]:  Patient Active Problem List   Diagnosis Code    Irritable bowel syndrome with diarrhea K58.0    S/P laparoscopic cholecystectomy Z90.49    Late onset Alzheimer's disease without behavioral disturbance ([facility]) G30.1, F02.80    Altered mental status R41.82    Seizure as late effect of cerebrovascular accident (CVA) ([facility] I69.398, N69.9    Metabolic encephalopathy J79.22    Weakness R53.1    Chronic bilateral lower abdominal pain R10.31, G89.29, R10.32    Gas pain R14.1    History of ischemic colitis Z87.19    Partial symptomatic epilepsy with complex partial seizures, intractable, without status epilepticus ([facility] G40.219    Cerebrovascular small vessel disease I67.9    Vascular dementia without behavioral disturbance ([facility] F01.50    Risk for falls Z91.81    Dry age-related macular degeneration of left eye H35.3120    Age-related macular degeneration, wet, right eye ([facility]) H35.3210    Gait abnormality R26.9    Palliative care patient Z51.5    Bloating R14.0    Chronic bilateral low back pain with left-sided sciatica M54.42, G89.29    Arthralgia of multiple joints M25.50    Abnormal EKG R94.31    Moderate mitral regurgitation I34.0    Chest pain R07.9    Essential hypertension I10    Overactive bladder N32.81    Vasovagal syncope R55    Bradycardia R00.1    Cryptogenic stroke ([facility]) I63.9    Syncope and collapse R55    Status post placement of implantable loop recorder Z95.818    Symptomatic bradycardia R00.1    Bilateral carotid artery stenosis I65.23    PVD (peripheral vascular disease) ([facility]) I73.9    Macrocytic anemia D53.9    Diarrhea R19.7    At risk for polypharmacy Z91.89    Mild protein-calorie malnutrition ([facility]) E44.1    Abdominal bloating R14.0    Elevated lipase R74.8     Past Medical History:   Diagnosis Date    Age-related macular degeneration, wet, right eye ([address]     Anemia     Back pain     Bilateral carotid artery stenosis 2021    Chronic bilateral low back pain with left-sided sciatica 2019    Colitis, ischemic ([address]     Dementia ([address]     Diverticulosis     Dry age-related macular degeneration of left eye     Hyperlipidemia     Hypertension     Osteoarthritis     Palliative care patient 2019    Risk for falls 2019    Seizures ([address]     Spastic colon     Status post placement of implantable loop recorder 10/27/2020    Stroke syndrome     Urinary incontinence     Uterine cancer ([address]     Uterine cancer ([address]      Past Surgical History:   Procedure Laterality Date    BREAST SURGERY Right     FNA Right Breast \"oh heavens, maybe 20 years ago\"    CHOLECYSTECTOMY      COLONOSCOPY  03    Dr Charles Arora ischemic colitis, incomplete exam    COLONOSCOPY  2003    Dr Rajiv Nevarez:  limited colon-ischemic colitis    DILATION AND CURETTAGE OF UTERUS      x2, in [facility] tears, laser suregry, adn cataract with IOL b/l    HYSTERECTOMY  2020    ovaries and tubes    INSERTABLE CARDIAC MONITOR      LUMBAR FUSION       90 days after the spine surgeries    OTHER SURGICAL HISTORY      inner lymph nodes    OTHER SURGICAL HISTORY  2020    Loop recorder     PACEMAKER PLACEMENT  2021    SPINE SURGERY      L3,4, and 5   done in    [address] 76      as a child at age 9    [address]  2003    DR Gertrude Garcia:  Duodenal scarring but no evidence of active ulcer disease.      Family History   Problem Relation Age of Onset    Heart Defect Mother     Hypertension Mother     Heart Attack Father         x3 within 24 h and then , abused ciggarettes    No Known Problems Daughter     No Known Problems Son     No Known Problems Son     Colon Cancer Neg Hx     Colon Polyps Neg Hx     Esophageal Cancer Neg Hx     Liver Cancer Neg Hx  Liver Disease Neg Hx     Rectal Cancer Neg Hx     Stomach Cancer Neg Hx      Social History     Tobacco Use    Smoking status: Never Smoker    Smokeless tobacco: Never Used   Substance Use Topics    Alcohol use: Never      Current Outpatient Medications   Medication Sig Dispense Refill    hydrALAZINE (APRESOLINE) 50 MG tablet Take 50 mg by mouth nightly 25 mg during the afternoon if BP is 160/90 or higher. Never in the morning due to vasal vagal response      dipyridamole (PERSANTINE) 50 MG tablet Take 2 tablets by mouth 2 times daily Indications: 9am and 9pm 60 tablet 11    gabapentin (NEURONTIN) 100 MG capsule TAKE 1 CAPSULE BY MOUTH 3 TIMES DAILY.  INTENDED SUPPLY: 30 DAYS (Patient taking differently: Take 100 mg by mouth 2 times daily. ) 90 capsule 5    dicyclomine (BENTYL) 10 MG capsule Take 10 mg by mouth 4 times daily (before meals and nightly)       Thiamine HCl (VITAMIN B-1 PO) Take 100 mg by mouth daily Takes at 2:00 PM      fluticasone (FLONASE) 50 MCG/ACT nasal spray 1 spray by Each Nostril route daily      levETIRAcetam (KEPPRA) 500 MG tablet Take 1 tablet by mouth 2 times daily Indications: takes 9am and 9pm 60 tablet 11    cloNIDine (CATAPRES) 0.1 MG tablet Take 1 tablet by mouth 2 times daily as needed for High Blood Pressure (for BP greater than 160/100) 30 tablet 2    pantoprazole (PROTONIX) 40 MG tablet Take 40 mg by mouth daily Indications: 9am       acetaminophen (TYLENOL) 500 MG tablet Take 1,000 mg by mouth 3 times daily       meloxicam (MOBIC) 7.5 MG tablet Take 15 mg by mouth daily Indications: takes at 9am       donepezil (ARICEPT) 10 MG tablet Take 1 tablet by mouth nightly (Patient taking differently: Take 10 mg by mouth nightly Indications: takes 9pm ) 90 tablet 3    Docosanol (ABREVA EX) Apply topically as needed       nitrofurantoin (MACRODANTIN) 50 MG capsule TAKE 1 CAPSULE AT BEDTIME (Patient taking differently: Indications: 9pm TAKE 1 CAPSULE AT BEDTIME) 30 capsule 3    simethicone (GAS-X EXTRA STRENGTH) 125 MG chewable tablet Take 125 mg by mouth 3 times daily 2 PO 6:30 AM, 2 at 10:00 AM, 1 at 5:30 PM otherwise prn      butalbital-acetaminophen-caffeine (FIORICET, ESGIC) -40 MG per tablet Take 1 tablet by mouth every 6 hours as needed for Headaches       amLODIPine (NORVASC) 5 MG tablet Take 5 mg by mouth 2 times daily Indications: takes at 9am and 9pm If BP sys 120 or below then 2.5mg bid otherwise 5mg bid      diclofenac sodium 1 % GEL Apply 2 g topically 3 times daily as needed for Pain       sodium chloride 1 g tablet Take 1 g by mouth 3 times daily Indications: takes 9am, 3pm, and 9pm       lidocaine 4 % external patch Place 1 patch onto the skin daily as needed (LEFT LOWER BACK PAIN)      valACYclovir (VALTREX) 1 g tablet as needed       azelastine (ASTELIN) 0.1 % nasal spray 2 sprays by Nasal route 2 times daily Indications: takes 9am and 9pm       Cholecalciferol (VITAMIN D3) 5000 units TABS Take 5,000 Units by mouth daily Indications: AT 2:30 PM       sodium chloride (OCEAN, BABY AYR) 0.65 % nasal spray 1 spray by Nasal route as needed for Congestion      Lutein 20 MG TABS Take 20 mg by mouth daily Indications: 3 PM DAILY       camphor-menthol (SARNA) 0.5-0.5 % lotion Apply 0.5 mLs topically 2 times daily as needed for Itching Apply topically as needed.        Calcium Carb-Cholecalciferol (CALCIUM 600+D) 600-800 MG-UNIT TABS Take 1 tablet by mouth daily Indications: AT 2:30 PM       promethazine (PHENERGAN) 25 MG tablet Take 12.5 mg by mouth 3 times daily as needed for Nausea       Omega 3-6-9 Fatty Acids (OMEGA 3-6-9 COMPLEX PO) Take 2 tablets by mouth daily Indications: AT 2:30 PM       alendronate (FOSAMAX) 70 MG tablet Take 70 mg by mouth every 7 days Sundays at 1830      pravastatin (PRAVACHOL) 20 MG tablet Take 20 mg by mouth nightly Indications: takes at 9am       ramipril (ALTACE) 10 MG capsule Take 10 mg by mouth 2 times daily Indications: takes at 9am and 9pm       b complex vitamins capsule Take 1 capsule by mouth daily Indications: AT 2:30 PM B 100      Multiple Vitamins-Minerals (THERAPEUTIC MULTIVITAMIN-MINERALS) tablet Take 1 tablet by mouth daily Indications: AT 2:30 PM        No current facility-administered medications for this visit. Allergies: Aspirin, Spironolactone, Dilaudid [hydromorphone hcl], Fd&c yellow #5 [place] [tartrazine], Ondansetron, Quinolones, Sulfa antibiotics, Codeine, Demerol hcl [meperidine], Levaquin [levofloxacin in d5w], Myrbetriq [mirabegron], Namenda [memantine hcl], Norco [hydrocodone-acetaminophen], and Pneumococcal vaccines    Review of Systems  Constitutional - no appetite change, or unexpected weight change. No fever, chills or diaphoresis. No significant change in activity level or new onset of fatigue. HEENT - no significant rhinorrhea or epistaxis. No tinnitus or significant hearing loss. Eyes - no sudden vision change or amaurosis. No corneal arcus, xantholasma, subconjunctival hemorrhage or discharge. Respiratory - no significant wheezing, stridor, apnea or cough. + progressive SMITH. Cardiovascular - no exertional chest pain to suggest myocardial ischemia. No orthopnea or PND. No sensation of sustained arrythmia. No occurrence of slow heart rate. No palpitations. No claudication. Gastrointestinal - no abdominal swelling or pain. No blood in stool. No severe constipation, diarrhea, nausea, or vomiting. Genitourinary - no dysuria, frequency, or urgency. No flank pain or hematuria. Musculoskeletal - no myalgia. Ambulates with walker. Reports pain in feet, left groin and left lateral leg. + back pain. Extremities - no clubbing, cyanosis or extremity edema. Skin - no color change or rash. No pallor. No new surgical incision. Neurologic - no speech difficulty, facial asymmetry or lateralizing weakness. No seizures, presyncope or syncope.   No significant dizziness. + imbalance. Hematologic - no easy bruising or excessive bleeding. Psychiatric - no severe anxiety or insomnia. No confusion. All other review of systems are negative. Objective  Vital Signs - /60   Pulse 72   Ht 5' (1.524 m)   Wt 124 lb (56.2 kg)   BMI 24.22 kg/m²   General - Fletcher Members is alert, cooperative, and pleasant. Well groomed. No acute distress. Body habitus - Body mass index is 24.22 kg/m². HEENT - Head is normocephalic. No circumoral cyanosis. Dentition is normal.  EYES -   Lids normal without ptosis. No discharge, edema or subconjunctival hemorrhage. Neck - Symmetrical without apparent mass or lymphadenopathy. Respiratory - Normal respiratory effort without use of accessory muscles. Ausculatation reveals vesicular breath sounds without crackles, wheezes, rub or rhonchi. Cardiovascular - No jugular venous distention. Auscultation reveals regular rate and rhythm. No audible clicks, gallop or rub. No murmur. No lower extremity varicosities. No carotid bruits. Pacer implanted per left upper chest. No overlying edema, erythema or erosion. Abdominal -  No visible distention, mass or pulsations. Extremities - No clubbing or cyanosis. No statis dermatitis or ulcers. No edema. Left foot warm and dry to touch with 3+ DP pulse palpated. No pain to calf compression bilaterally. Musculoskeletal -   No Osler's nodes. No scoliosis. Gait is even and regular without limp or shuffle. Ambulates with walker. Assistance. Kyphosis noted. Skin -  Warm and dry; no rash or pallor. No new surgical wound. Neurological - No focal neurological deficits. Thought processes coherent. No apparent tremor. Oriented to person, place and time. Psychiatric -  Appropriate affect and mood. Data reviewed:  Test results reviewed in office with patient and daughter.     10/14/21 echo   Normal left ventricular size with preserved LV function and an estimated  ejection fraction of approximately 55-60%. No regional wall motion  abnormalities. Normal global longitudinal strain pattern (average GLS  -18.5%). Normal diastolic filling pattern for age. Normal right ventricular size with globally preserved RV function (TAPSE  25.0 mm). Moderate sclerosis of the aortic valve leaflets (LCC/NCC). No significant valve regurgitation or stenosis is noted. Aortic root and ascending aorta are within normal limits. No evidence of significant pericardial effusion is noted. Signature   Electronically signed by Linnette Singh(Interpreting physician)   on 10/15/2021 10:08 AM    10/7/21 CXR  XR CHEST (2 VW)    10/7/2021 6:00 PM   History: Shortness of breath. Two-view chest x-ray. Comparison is made with April 11, 2021. Unchanged left cardiac pacer. Heart size is within normal limits. The mediastinum has a normal appearance. The lungs are mildly hyperexpanded with no pneumonia or pneumothorax. There is mild chronic interstitial disease with scattered calcified   granulomas. There is no significant pleural fluid. No congestive failure changes.                                                                       Impression   1. No acute disease. Signed by Dr Lorraine Lisa     3/8/21 CTA abdomen  Impression   1. Extensive calcification of the abdominal aorta with no aneurysm or   dissection. 2. Normally patent iliac, femoral, and popliteal arteries. 3. Occlusive disease distally with 2 vessel runoff on the right and   single vessel runoff on the left. Signed by Dr Lorraine Lisa on 3/8/2021 12:19 PM   \"I spoke with Neville Chamorro the patient's daughter. We discussed the results of the CTA and Dr. Arnulfo Hi recommendations. We recommends follow up in 12 months with a repeat TIBURCIO or sooner if claudication worsens/develops, develops new wound or IRP. \"    for An electronic signature was used to authenticate this note.   --Vance West PA-C     3/3/21 Lower extremity arterial scan   Based on ankle-brachial indices and doppler waveforms, the patient has    relatively normal flow to the bilateral lower extremity arterial system at   [facility]   Rheta Christina is a small drop in ankle-brachial indices in the bilateral lower    extremity(ies) after exercise which would be consistent with claudication.    Signature    Electronically signed by Zenobia Bernheim Victoria(Interpreting    physician) on 03/03/2021 06:23 PM     2/22/21 carotid ultrasound   There is smooth plaque visualized in the bilateral internal carotid    artery(ies).    There is no stenosis in the right internal carotid artery.    There is no stenosis of the left internal carotid artery.    There is normal antegrade flow in the bilateral vertebral artery(ies).    Signature    Electronically signed by Monica Nelson MD(Interpreting    physician) on 02/22/2021 07:40 PM     12/15/20 CT lumbar  Impression   A limited diagnostic study due to severe chronic   demineralization and artifacts from the hardware. The suspected insufficiency/stress fractures of the sacral ala   bilaterally. Lumbar spondylosis and multilevel neural foraminal stenosis. No   significant spinal stenosis. The hardware fusion of L3, L4 and L5. No hardware complication. Laminectomy at L3 and L4. The above study was initially reviewed and reported by [facility]. The   discrepancy mentioned above is reported to ER by a priority report. Signed by Dr Hannah Guthrie on 12/15/2020 7:25 AM     12/15/20 CT abdomen/pelvis  Impression   The stable insufficiency fractures of the sacrum   bilaterally, similar to the previous study in February 2020. No acute abnormality of the abdomen are pelvis, particularly, no   evidence of traumatic involvement of the abdominal pelvic organs. The diverticulosis of the colon. No evidence for diverticulitis. Hardware fusion of the lumbar spine. The severe demineralization the   bony structures.    The above study was initially reviewed and reported by [facility]. I do   not find any discrepancies. Signed by Dr Doris Koo on 12/15/2020 8:07 AM     10/7/21 Pro BNP - 234     Lab Results   Component Value Date    WBC 5.7 10/07/2021    HGB 12.0 10/07/2021    HCT 37.3 10/07/2021    .8 (H) 10/07/2021     10/07/2021     Lab Results   Component Value Date     10/07/2021    K 3.8 10/07/2021    CL 99 10/07/2021    CO2 29 10/07/2021    BUN 21 10/07/2021    CREATININE 0.7 10/07/2021    GLUCOSE 94 10/07/2021    CALCIUM 9.6 10/07/2021    PROT 6.5 (L) 07/24/2021    LABALBU 4.2 07/24/2021    BILITOT 0.3 07/24/2021    ALKPHOS 78 07/24/2021    AST 19 07/24/2021    ALT 14 07/24/2021    LABGLOM >60 10/07/2021    GFRAA >59 10/07/2021    GLOB 2.0 07/16/2016     Lab Results   Component Value Date    CHOL 190 09/03/2019    CHOL 171 03/21/2019    CHOL 190 10/08/2018     Lab Results   Component Value Date    TRIG 142 09/03/2019    TRIG 145 03/21/2019    TRIG 138 10/08/2018     Lab Results   Component Value Date    HDL 66 09/03/2019    HDL 68 03/21/2019    HDL 80 10/08/2018     Lab Results   Component Value Date    LDLCALC 96 09/03/2019    LDLCALC 74 03/21/2019    LDLCALC 82 10/08/2018     BP Readings from Last 3 Encounters:   10/20/21 130/60   10/07/21 116/64   07/24/21 (!) 147/66    Pulse Readings from Last 3 Encounters:   10/20/21 72   10/07/21 72   07/24/21 70        Wt Readings from Last 3 Encounters:   10/20/21 124 lb (56.2 kg)   10/07/21 123 lb (55.8 kg)   07/24/21 120 lb (54.4 kg)     Assessment/Plan:   Diagnosis Orders   1. Dyspnea, unspecified type     2. Syncope and collapse     3. Essential hypertension     4. Vascular dementia without behavioral disturbance (HCC)     5. SA node dysfunction (HCC)     6. Cardiac pacemaker       Pacer check  Pacemaker check showed adequate battery status. 14 years longevity. Mode: AAIR-DDDR. Lead impedances are stable. Pacing:  AP 20.4%;  <0.1%.   Reprogramming for sensitivity and threshold testing. Appropriate diagnostics and safety margins noted. A output 0.50 mV at 0.40 ms; P wave 1.9 mV. V output 1.00 V at 0.40 ms, R wave 14.0 mV. Sustained arrythmia:  none. Next [facility] remote transmission:  3 months. Dyspnea - unlikely to be cardiac origin. Some degree of deconditioning causative factor and age. Recent echo showed excellent cardiac structure for age with normal LV and diastolic function. No significant valve issues. No evidence of pulmonary HTN. Recent Pro BNP normal range at 234. Recent CXR showed no evidence of CHF, mass or pneumonia. Discussed possible referral to pulmonology. Also discussed checking PFTs as well as overnight pulse oximetry. At this point, the daughter will consider above recommendations. She plans on checking pulse oximetry at home. 97% in office today. A Lexiscan in 2020 was negative for ischemia. HTN - normotensive on current regimen. Continue same. BP goal 130/80 or less. Cardiac pacer - interrogation today shows normal battery status, diagnostics and safety margins. No arrhythmia. Left leg and groin pain - felt to be secondary to lumbar degenerative disease with probably neuropathy. 12/15/20 CT abdomen pelvis showed stable fractures of sacrum. 12/15/20 CT lumbar spine showed multi level foraminal stenosis with hardware fusion at L3,L4 and L5. Today, feet warm and dry with no unilateral edema. No calf tenderness. Patient reported improvement in symptoms after steroid injection yesterday. Discussed option of venous scan but I feel highly unlikely pain from DVT. Daughter opted not to have test scheduled. Patient is compliant with medication regimen. Previous cardiac history and records reviewed. Continue current medical management for cardiac related condition. Continue other current medications as directed. Continue to follow up with primary care provider for non cardiac medical problems.   If your primary care provider is outside of the [facility], please request that your labs be faxed to this office at [phone]. BP goal 130/80 or less. Call the office with any problems, questions or concerns at [phone]. Cardiology follow up as scheduled in [address] appointments. Educational included in patient instructions. Heart health.       Sami Hood APRN

## 2021-10-20 NOTE — PATIENT INSTRUCTIONS
your hear, lungs, blood vessels and skeleton. You give yourself the gift of active living, you lower your blood pressure and help yourself feel better. 7) Stop smoking - cigarette smokers have a higher risk of developing cardiovascular disease. If  You smoke, quitting is the best thing you can do for your health. Check American Heart Association on line for more information on Life's Simple 7 and tips for healthy living. A Healthy Heart: Care Instructions  Your Care Instructions     Coronary artery disease, also called heart disease, occurs when a substance called plaque builds up in the vessels that supply oxygen-rich blood to your heart muscle. This can narrow the blood vessels and reduce blood flow. A heart attack happens when blood flow is completely blocked. A high-fat diet, smoking, and other factors increase the risk of heart disease. Your doctor has found that you have a chance of having heart disease. You can do lots of things to keep your heart healthy. It may not be easy, but you can change your diet, exercise more, and quit smoking. These steps really work to lower your chance of heart disease. Follow-up care is a key part of your treatment and safety. Be sure to make and go to all appointments, and call your doctor if you are having problems. It's also a good idea to know your test results and keep a list of the medicines you take. How can you care for yourself at home? Diet  · Use less salt when you cook and eat. This helps lower your blood pressure. Taste food before salting. Add only a little salt when you think you need it. With time, your taste buds will adjust to less salt. · Eat fewer snack items, fast foods, canned soups, and other high-salt, high-fat, processed foods. · Read food labels and try to avoid saturated and trans fats. They increase your risk of heart disease by raising cholesterol levels. · Limit the amount of solid fat-butter, margarine, and shortening-you eat.  Use olive, peanut, or canola oil when you cook. Bake, broil, and steam foods instead of frying them. · Eat a variety of fruit and vegetables every day. Dark green, deep orange, red, or yellow fruits and vegetables are especially good for you. Examples include spinach, carrots, peaches, and berries. · Foods high in fiber can reduce your cholesterol and provide important vitamins and minerals. High-fiber foods include whole-grain cereals and breads, oatmeal, beans, brown rice, citrus fruits, and apples. · Eat lean proteins. Heart-healthy proteins include seafood, lean meats and poultry, eggs, beans, peas, nuts, seeds, and soy products. · Limit drinks and foods with added sugar. These include candy, desserts, and soda pop. Lifestyle changes  · If your doctor recommends it, get more exercise. Walking is a good choice. Bit by bit, increase the amount you walk every day. Try for at least 30 minutes on most days of the week. You also may want to swim, bike, or do other activities. · Do not smoke. If you need help quitting, talk to your doctor about stop-smoking programs and medicines. These can increase your chances of quitting for good. Quitting smoking may be the most important step you can take to protect your heart. It is never too late to quit. · Limit alcohol to 2 drinks a day for men and 1 drink a day for women. Too much alcohol can cause health problems. · Manage other health problems such as diabetes, high blood pressure, and high cholesterol. If you think you may have a problem with alcohol or drug use, talk to your doctor. Medicines  · Take your medicines exactly as prescribed. Call your doctor if you think you are having a problem with your medicine. · If your doctor recommends aspirin, take the amount directed each day. Make sure you take aspirin and not another kind of pain reliever, such as acetaminophen (Tylenol). When should you call for help? [id number] if you have symptoms of a heart attack.  These

## 2021-10-27 ENCOUNTER — NURSE ONLY (OUTPATIENT)
Dept: UROLOGY | Age: 86
End: 2021-10-27
Payer: MEDICARE

## 2021-10-27 DIAGNOSIS — N39.46 MIXED STRESS AND URGE URINARY INCONTINENCE: ICD-10-CM

## 2021-10-27 DIAGNOSIS — N32.81 OVERACTIVE BLADDER: Primary | ICD-10-CM

## 2021-10-27 PROCEDURE — 64566 NEUROELTRD STIM POST TIBIAL: CPT | Performed by: NURSE PRACTITIONER

## 2021-11-10 ENCOUNTER — NURSE ONLY (OUTPATIENT)
Dept: UROLOGY | Age: 86
End: 2021-11-10
Payer: MEDICARE

## 2021-11-10 DIAGNOSIS — N32.81 OVERACTIVE BLADDER: Primary | ICD-10-CM

## 2021-11-10 DIAGNOSIS — N39.46 MIXED STRESS AND URGE URINARY INCONTINENCE: ICD-10-CM

## 2021-11-10 PROCEDURE — 64566 NEUROELTRD STIM POST TIBIAL: CPT | Performed by: NURSE PRACTITIONER

## 2021-11-10 NOTE — PROGRESS NOTES
Treatment # 2 week maintenance     Improvement of Symptoms? Yes    OAB/Urologic Medications? None      Uroplasty Procedure:    1. Patient is seated with the left treatment leg elevated. 2. 34 gauge fine needle electrode is inserted into lower inner aspect of the leg. Slightly cephalad to the medial malleolus. 3. Surface electrode pad is placed over the medial aspect of the calcaneus on the same leg. 4.Needle electrode is connected to the external pulse generator. 5.The pulse generator is turned on and the millivolts used are 5. 6.Patient is treated for 30 minutes. 7.The needle and pad are removed. Patient will follow up in 2 weeks for next treatment.

## 2021-11-22 ENCOUNTER — TELEPHONE (OUTPATIENT)
Dept: PULMONOLOGY | Age: 86
End: 2021-11-22

## 2021-11-24 ENCOUNTER — NURSE ONLY (OUTPATIENT)
Dept: UROLOGY | Age: 86
End: 2021-11-24
Payer: MEDICARE

## 2021-11-24 VITALS — WEIGHT: 124 LBS | BODY MASS INDEX: 24.35 KG/M2 | TEMPERATURE: 97.2 F | HEIGHT: 60 IN

## 2021-11-24 DIAGNOSIS — N32.81 OVERACTIVE BLADDER: Primary | ICD-10-CM

## 2021-11-24 DIAGNOSIS — N39.46 MIXED STRESS AND URGE URINARY INCONTINENCE: ICD-10-CM

## 2021-11-24 PROCEDURE — 64566 NEUROELTRD STIM POST TIBIAL: CPT | Performed by: NURSE PRACTITIONER

## 2021-12-07 NOTE — PROGRESS NOTES
Treatment # 2 week maintenance    Improvement of Symptoms? Yes    OAB/Urologic Medications? None      Uroplasty Procedure:    1. Patient is seated with the left treatment leg elevated. 2. 34 gauge fine needle electrode is inserted into lower inner aspect of the leg. Slightly cephalad to the medial malleolus. 3. Surface electrode pad is placed over the medial aspect of the calcaneus on the same leg. 4.Needle electrode is connected to the external pulse generator. 5.The pulse generator is turned on and the millivolts used are 8. 6.Patient is treated for 30 minutes. 7.The needle and pad are removed. Patient will follow up in 2 weeks for next treatment.

## 2021-12-08 ENCOUNTER — NURSE ONLY (OUTPATIENT)
Dept: UROLOGY | Age: 86
End: 2021-12-08
Payer: MEDICARE

## 2021-12-08 DIAGNOSIS — N32.81 OVERACTIVE BLADDER: Primary | ICD-10-CM

## 2021-12-08 DIAGNOSIS — N39.46 MIXED STRESS AND URGE URINARY INCONTINENCE: ICD-10-CM

## 2021-12-08 PROCEDURE — 64566 NEUROELTRD STIM POST TIBIAL: CPT | Performed by: NURSE PRACTITIONER

## 2021-12-14 ENCOUNTER — TELEPHONE (OUTPATIENT)
Dept: NEUROLOGY | Age: 86
End: 2021-12-14

## 2021-12-14 NOTE — TELEPHONE ENCOUNTER
Pt's daughter, Yudith Flores, called requesting to schedule appt for mother. Pt is experiencing new symptoms; burning/tingling between knuckles on both hands, tremors in left arm, increase in tremors right arm/hand. Pt daughter is asking if pt needs to increase dosage/intake of Dipyridamole. Please contact to discuss.     Thank you

## 2021-12-21 NOTE — PROGRESS NOTES
Treatment # 2 week maintenance    Improvement of Symptoms? Yes    OAB/Urologic Medications? None      Uroplasty Procedure:    1. Patient is seated with the right treatment leg elevated. 2. 34 gauge fine needle electrode is inserted into lower inner aspect of the leg. Slightly cephalad to the medial malleolus. 3. Surface electrode pad is placed over the medial aspect of the calcaneus on the same leg. 4.Needle electrode is connected to the external pulse generator. 5.The pulse generator is turned on and the millivolts used are 2. 6.Patient is treated for 30 minutes. 7.The needle and pad are removed. Patient will follow up in 2 weeks for next treatment.

## 2021-12-22 ENCOUNTER — NURSE ONLY (OUTPATIENT)
Dept: UROLOGY | Age: 86
End: 2021-12-22
Payer: MEDICARE

## 2021-12-22 VITALS — WEIGHT: 124.2 LBS | TEMPERATURE: 98.2 F | BODY MASS INDEX: 24.39 KG/M2 | HEIGHT: 60 IN

## 2021-12-22 DIAGNOSIS — N39.46 MIXED STRESS AND URGE URINARY INCONTINENCE: ICD-10-CM

## 2021-12-22 DIAGNOSIS — N32.81 OVERACTIVE BLADDER: Primary | ICD-10-CM

## 2021-12-22 PROCEDURE — 64566 NEUROELTRD STIM POST TIBIAL: CPT | Performed by: NURSE PRACTITIONER

## 2022-01-04 ENCOUNTER — OFFICE VISIT (OUTPATIENT)
Dept: GASTROENTEROLOGY | Age: 87
End: 2022-01-04
Payer: MEDICARE

## 2022-01-04 VITALS
HEART RATE: 89 BPM | OXYGEN SATURATION: 99 % | HEIGHT: 60 IN | WEIGHT: 122 LBS | DIASTOLIC BLOOD PRESSURE: 65 MMHG | BODY MASS INDEX: 23.95 KG/M2 | SYSTOLIC BLOOD PRESSURE: 120 MMHG

## 2022-01-04 DIAGNOSIS — R13.10 DYSPHAGIA, UNSPECIFIED TYPE: Primary | ICD-10-CM

## 2022-01-04 PROCEDURE — 1036F TOBACCO NON-USER: CPT | Performed by: NURSE PRACTITIONER

## 2022-01-04 PROCEDURE — 1090F PRES/ABSN URINE INCON ASSESS: CPT | Performed by: NURSE PRACTITIONER

## 2022-01-04 PROCEDURE — G8484 FLU IMMUNIZE NO ADMIN: HCPCS | Performed by: NURSE PRACTITIONER

## 2022-01-04 PROCEDURE — 99214 OFFICE O/P EST MOD 30 MIN: CPT | Performed by: NURSE PRACTITIONER

## 2022-01-04 PROCEDURE — G8420 CALC BMI NORM PARAMETERS: HCPCS | Performed by: NURSE PRACTITIONER

## 2022-01-04 PROCEDURE — G8427 DOCREV CUR MEDS BY ELIG CLIN: HCPCS | Performed by: NURSE PRACTITIONER

## 2022-01-04 PROCEDURE — 4040F PNEUMOC VAC/ADMIN/RCVD: CPT | Performed by: NURSE PRACTITIONER

## 2022-01-04 PROCEDURE — 1123F ACP DISCUSS/DSCN MKR DOCD: CPT | Performed by: NURSE PRACTITIONER

## 2022-01-04 ASSESSMENT — ENCOUNTER SYMPTOMS
SHORTNESS OF BREATH: 0
VOMITING: 0
RECTAL PAIN: 0
ABDOMINAL PAIN: 0
SORE THROAT: 1
BACK PAIN: 1
NAUSEA: 0
DIARRHEA: 0
VOICE CHANGE: 0
ABDOMINAL DISTENTION: 0
CONSTIPATION: 0
BLOOD IN STOOL: 0
COUGH: 0
ANAL BLEEDING: 0
TROUBLE SWALLOWING: 1

## 2022-01-04 NOTE — PATIENT INSTRUCTIONS
You are going to have an Endoscopy and here are some basic instructions:    Nothing to eat or drink after midnight EXCEPT:  PLEASE TAKE MEDICATION(S) FOR HIGH BLOOD PRESSURE, SEIZURES, HEART, AND THYROID WITH A SIP OF WATER AT LEAST 2 HOURS PRIOR TO ARRIVAL TIME.   YOU MAY ALSO TAKE ANY INHALERS YOU ARE PRESCRIBED. You will not be able to drive for 24 hours after the procedure due to sedation. Bring a  with you the day of the procedure. No aspirin, ibuprofen, naproxen, fish oil or vitamin E for 5 days before procedure. Continue current medications. If you are on blood thinners, clearance from the prescribing physician will be obtained before your procedure is scheduled. [email or web address] may be associated with discontinuation of your blood thinner and include, but not limited to, stroke, TIA, or cardiac event. If biopsies are taken during the procedure they will be sent to a pathologist for analysis. You will be notified by mail of the pathology results in 2-3 weeks. Your physician may also schedule a follow up appointment with the nurse practitioner to discuss pathology, symptoms or to check if you have had any problems related to your procedure. If you prefer not to return to the office after your procedure please discuss this with your physician on the day of your procedure.

## 2022-01-04 NOTE — PROGRESS NOTES
Subjective:      Lisy Race is a80 y.o. female  Chief Complaint   Patient presents with    Dysphagia       HPI  PCP: Linda Contreras DO  Pt made an appt due to c/o dysphagia  Started abruptly 2 days ago  Noted with foods only. Feels foods get hung. Drinking water after foods get hung makes it worse and then it gets hung on top of the foods. No further GI complaints. Family HX:    Pt denies family hx of colon polyps, colon CA, inflammatory bowel dx, gastric CA and esophageal CA.     Past Medical History:   Diagnosis Date    Age-related macular degeneration, wet, right eye ([address]     Anemia     Back pain     Bilateral carotid artery stenosis 2/23/2021    Chronic bilateral low back pain with left-sided sciatica 12/19/2019    Colitis, ischemic ([address]     Dementia ([address]     Diverticulosis     Dry age-related macular degeneration of left eye     Hyperlipidemia     Hypertension     Osteoarthritis     Palliative care patient 09/17/2019    Risk for falls 9/13/2019    Seizures ([address]     Spastic colon     Status post placement of implantable loop recorder 10/27/2020    Stroke syndrome     Urinary incontinence     Uterine cancer ([address]     Uterine cancer ([address]           Past Surgical History:   Procedure Laterality Date    BREAST SURGERY Right     FNA Right Breast \"oh heavens, maybe 20 years ago\"    CHOLECYSTECTOMY      COLONOSCOPY  9/25/03    Dr Michelle Abarca ischemic colitis, incomplete exam    COLONOSCOPY  08/29/2003    Dr Jessica Gallo:  limited colon-ischemic colitis    DILATION AND CURETTAGE OF UTERUS      x2, in [facility] tears, laser suregry, adn cataract with IOL b/l    HYSTERECTOMY  01/2020    ovaries and tubes    INSERTABLE CARDIAC MONITOR      LUMBAR FUSION      2012 90 days after the spine surgeries    OTHER SURGICAL HISTORY      inner lymph nodes    OTHER SURGICAL HISTORY  09/2020    Loop recorder     PACEMAKER PLACEMENT  01/2021    SPINE SURGERY L3,4, and 5   done in 2012   Nay Haskins      as a child at age 9    [facility] ENDOSCOPY  08/28/2003    DR Vivi Posey:  Duodenal scarring but no evidence of active ulcer disease. Social History     Socioeconomic History    Marital status:      Spouse name: None    Number of children: 3    Years of education: None    Highest education level: None   Occupational History    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mployee of [employer] 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,mployee of [employer]   Tobacco Use    Smoking status: Never Smoker    Smokeless tobacco: Never Used   Vaping Use    Vaping Use: Never used   Substance and Sexual Activity    Alcohol use: Never    Drug use: Never    Sexual activity: None     Comment: has 3 kids   Other Topics Concern    None   Social History Narrative    CODE STATUS: DNR-CCA    HEALTH CARE PROXY / [employer] and Healthcare: her daughter, Mrs. Preethi Gregg, [phone]    AMBULATES: with walker during day, wheelchair at night    DOMICILED: lives in the [facility] assisted living facility, has no stairs or steps, has a cat, her daughter is there periodically     Social Determinants of Health     Financial Resource Strain:     Difficulty of Paying Living Expenses: Not on file   Food Insecurity:     Worried About [address] in the Last Year: Not on file    [address] in the Last Year: Not on file   Transportation Needs:     Lack of Transportation (Medical): Not on file    Lack of Transportation (Non-Medical):  Not on file   Physical Activity:     Days of Exercise per Week: Not on file    Minutes of Exercise per Session: Not on file   Stress:     Feeling of Stress : Not on file   Social Connections:     Frequency of Communication with Friends and Family: Not on file    Frequency of Social Gatherings with Friends and Family: Not on file    Attends Confucianist Services: Not on file   [employer]s or Organizations: Not on file    Attends Club or Organization Meetings: Not on file    Marital Status: Not on file   Intimate Partner Violence:     Fear of Current or Ex-Partner: Not on file    Emotionally Abused: Not on file    Physically Abused: Not on file    Sexually Abused: Not on file   Housing Stability:     Unable to Pay for Housing in the Last Year: Not on file    Number of Jihan in the Last Year: Not on file    Unstable Housing in the Last Year: Not on file       Allergies   Allergen Reactions    Aspirin Shortness Of Breath    Spironolactone      Low sodium    Dilaudid [Hydromorphone Hcl]     Fd&C Yellow #5 [place] [Tartrazine]     Ondansetron     Quinolones     Sulfa Antibiotics     Codeine Rash and Other (See Comments)     Makes her \"crazy and mean\" and gives her a rash    Demerol Hcl [Meperidine] Other (See Comments)     Made her \"crazy and mean\", and \"loopy\"    Levaquin [Levofloxacin In D5w] Other (See Comments)     Seizure like activity      Myrbetriq [Mirabegron] Other (See Comments)     Unable to micturate    Namenda [Memantine Hcl] Other (See Comments)     made her like a vegetable for hours      Norco [Hydrocodone-Acetaminophen] Rash and Other (See Comments)     \"crazy and mean\"    Pneumococcal Vaccines Swelling       Current Outpatient Medications   Medication Sig Dispense Refill    hydrALAZINE (APRESOLINE) 50 MG tablet Take 50 mg by mouth 2 times daily DO NOT TAKE IN THE AM DUE TO VASAL VAGAL RESPONCE      dipyridamole (PERSANTINE) 50 MG tablet Take 2 tablets by mouth 2 times daily Indications: 9am and 9pm 60 tablet 11    gabapentin (NEURONTIN) 100 MG capsule TAKE 1 CAPSULE BY MOUTH 3 TIMES DAILY.  INTENDED SUPPLY: 30 DAYS (Patient taking differently: Take 100 mg by mouth 2 times daily. ) 90 capsule 5    dicyclomine (BENTYL) 10 MG capsule Take 10 mg by mouth as needed       Thiamine HCl (VITAMIN B-1 PO) Take 100 mg by mouth daily Takes at 2:00 PM      fluticasone (FLONASE) 50 MCG/ACT nasal spray 1 spray by Each Nostril route daily      levETIRAcetam (KEPPRA) 500 MG tablet Take 1 tablet by mouth 2 times daily Indications: takes 9am and 9pm 60 tablet 11    cloNIDine (CATAPRES) 0.1 MG tablet Take 1 tablet by mouth 2 times daily as needed for High Blood Pressure (for BP greater than 160/100) 30 tablet 2    pantoprazole (PROTONIX) 40 MG tablet Take 40 mg by mouth daily Indications: 9am       acetaminophen (TYLENOL) 500 MG tablet Take 1,000 mg by mouth 3 times daily       meloxicam (MOBIC) 7.5 MG tablet Take 15 mg by mouth daily Indications: takes at 9am       donepezil (ARICEPT) 10 MG tablet Take 1 tablet by mouth nightly (Patient taking differently: Take 10 mg by mouth nightly Indications: takes 9pm ) 90 tablet 3    Docosanol (ABREVA EX) Apply topically as needed       simethicone (GAS-X EXTRA STRENGTH) 125 MG chewable tablet Take 125 mg by mouth 3 times daily 2 PO 6:30 AM, 2 at 10:00 AM, 1 at 5:30 PM otherwise prn      butalbital-acetaminophen-caffeine (FIORICET, ESGIC) -40 MG per tablet Take 1 tablet by mouth every 6 hours as needed for Headaches       amLODIPine (NORVASC) 5 MG tablet Take 5 mg by mouth 2 times daily Indications: takes at 9am and 9pm If BP sys 120 or below then 2.5mg bid otherwise 5mg bid      diclofenac sodium 1 % GEL Apply 2 g topically 3 times daily as needed for Pain       sodium chloride 1 g tablet Take 1 g by mouth 3 times daily Indications: takes 9am, 3pm, and 9pm       lidocaine 4 % external patch Place 1 patch onto the skin daily as needed (LEFT LOWER BACK PAIN)      valACYclovir (VALTREX) 1 g tablet as needed       azelastine (ASTELIN) 0.1 % nasal spray 2 sprays by Nasal route 2 times daily Indications: takes 9am and 9pm       Cholecalciferol (VITAMIN D3) 5000 units TABS Take 5,000 Units by mouth daily Indications: AT 2:30 PM       sodium chloride (OCEAN, BABY AYR) 0.65 % nasal spray 1 spray by Nasal route as needed for Congestion      Lutein 20 MG TABS Take 20 mg by mouth daily Indications: 3 PM DAILY       camphor-menthol (SARNA) 0.5-0.5 % lotion Apply 0.5 mLs topically 2 times daily as needed for Itching Apply topically as needed.  Calcium Carb-Cholecalciferol (CALCIUM 600+D) 600-800 MG-UNIT TABS Take 1 tablet by mouth daily Indications: AT 2:30 PM       promethazine (PHENERGAN) 25 MG tablet Take 12.5 mg by mouth 3 times daily as needed for Nausea       Omega 3-6-9 Fatty Acids (OMEGA 3-6-9 COMPLEX PO) Take 2 tablets by mouth daily Indications: AT 2:30 PM       alendronate (FOSAMAX) 70 MG tablet Take 70 mg by mouth every 7 days Sundays at 1830      pravastatin (PRAVACHOL) 20 MG tablet Take 20 mg by mouth nightly Indications: takes at 9am       ramipril (ALTACE) 10 MG capsule Take 10 mg by mouth 2 times daily Indications: takes at 9am and 9pm       b complex vitamins capsule Take 1 capsule by mouth daily Indications: AT 2:30 PM B 100      Multiple Vitamins-Minerals (THERAPEUTIC MULTIVITAMIN-MINERALS) tablet Take 1 tablet by mouth daily Indications: AT 2:30 PM        No current facility-administered medications for this visit. Review of Systems   Constitutional: Positive for fatigue. Negative for appetite change, fever and unexpected weight change. HENT: Positive for sore throat and trouble swallowing. Negative for voice change. Respiratory: Negative for cough and shortness of breath. Cardiovascular: Negative for chest pain, palpitations and leg swelling. Gastrointestinal: Negative for abdominal distention, abdominal pain, anal bleeding, blood in stool, constipation, diarrhea, nausea, rectal pain and vomiting. Genitourinary: Negative for hematuria. Musculoskeletal: Positive for arthralgias, back pain and neck pain. Neurological: Negative for dizziness, weakness, light-headedness and headaches. Psychiatric/Behavioral: Positive for dysphoric mood. Negative for sleep disturbance.  The patient is nervous/anxious. All other systems reviewed and are negative. Objective:     Physical Exam  Vitals and nursing note reviewed. Constitutional:       Appearance: She is well-developed. Comments: /65 (Site: Right Upper Arm)   Pulse 89   Ht 5' (1.524 m)   Wt 122 lb (55.3 kg)   SpO2 99%   BMI 23.83 kg/m²    Eyes:      General: No scleral icterus. Conjunctiva/sclera: Conjunctivae normal.      Pupils: Pupils are equal, round, and reactive to light. Neck:      Thyroid: No thyromegaly. Cardiovascular:      Rate and Rhythm: Normal rate and regular rhythm. Heart sounds: Normal heart sounds. No murmur heard. No friction rub. No gallop. Pulmonary:      Effort: Pulmonary effort is normal. No respiratory distress. Breath sounds: Normal breath sounds. Abdominal:      General: Bowel sounds are normal. There is no distension. Palpations: Abdomen is soft. Tenderness: There is no guarding or rebound. Musculoskeletal:         General: No deformity. Normal range of motion. Cervical back: Normal range of motion and neck supple. Neurological:      Mental Status: She is alert and oriented to person, place, and time. Cranial Nerves: No cranial nerve deficit. Psychiatric:         Judgment: Judgment normal.           Assessment:       Diagnosis Orders   1. Dysphagia, unspecified type  ESOPHAGOSCOPY / EGD         Plan:      1. Schedule outpatient endoscopy. Patient advised no Aspirin, Fish Oil, Vit E or NSAIDs 5 (five) days before procedure. Follow-up Visit: per Dr. Mckeon President   Risks, benefits, and alternatives to endoscopy were discussed. Patient voices understanding of risks of, but not limited to, perforation, bleeding, and infection. The risk of perforation is increased with esophageal dilatation. All questions answered to patient's satisfaction. Patient is agreable to proceed.

## 2022-01-05 DIAGNOSIS — Z11.59 SCREENING FOR VIRAL DISEASE: Primary | ICD-10-CM

## 2022-01-06 ENCOUNTER — TELEPHONE (OUTPATIENT)
Dept: GASTROENTEROLOGY | Age: 87
End: 2022-01-06

## 2022-01-06 ENCOUNTER — TELEPHONE (OUTPATIENT)
Dept: NEUROLOGY | Age: 87
End: 2022-01-06

## 2022-01-06 NOTE — TELEPHONE ENCOUNTER
Assessment:        Diagnosis Orders   1. Dysphagia, unspecified type  ESOPHAGOSCOPY / EGD                    Plan:      1. Schedule outpatient endoscopy. Patient advised no Aspirin, Fish Oil, Vit E or NSAIDs 5 (five) days before procedure. Follow-up Visit: per Dr. Manjula Carrasco   Risks, benefits, and alternatives to endoscopy were discussed. Patient voices understanding of risks of, but not limited to, perforation, bleeding, and infection. The risk of perforation is increased with esophageal dilatation. All questions answered to patient's satisfaction. Patient is agreable to proceed.         Last visit [facility] 1-4-22, see above. Egd scheduled with  1-10-22. Patient's daughter Clive Kovacs called from [phone] said she received a call from [facility] that  had signed off the clearance for Dipyridamole 50 mg prior to the procedure and the daughter is concerned that her Mother will start with her Tremors again and is very concerned about this. The daughter said in her VM that 's nurse told her it was at their choice. I spoke to [facility] and she said all the questions she has needs to be directed back to  since he is the one that prescribes the medication per [facility]. The patient has been notified. The daughter said in the past her Mother would be admitted to the Hospital a day prior to be monitored. I again told the daughter to ask these questions of  and his nurse as instructed per [facility], all we can do is obtain the clearance to do the OP Procedure. If any issues arise over the weekend and they feel they have an emergent situation, report to the ED. The daughter voiced understanding and appreciation for the return call.  Naet pat

## 2022-01-06 NOTE — TELEPHONE ENCOUNTER
Patient is calling wanting to switch her appt to virtual. Please return call to switch. Pt stated Gastro doctor was wanting to know about medication Dipyridamole being held. Patient is still having tremors. Pt was admitted in a [facility] for bleeding for 3 days. Wanting to discuss what's happening during uteral cancer. Patient stated that they really need to see Dr. Dennise Chavez. Pt also stated that they are waiting on the clearance to be signed for gastro.  Please call

## 2022-01-06 NOTE — TELEPHONE ENCOUNTER
Spoke to patients daughter after she tried to do a VV, which didn't work well. I instructed the we have faxed the clearance form for her to hold persantine for 4 day prior to her endoscopy.

## 2022-01-07 DIAGNOSIS — Z11.59 SCREENING FOR VIRAL DISEASE: ICD-10-CM

## 2022-01-07 LAB — SARS-COV-2, PCR: NOT DETECTED

## 2022-01-10 ENCOUNTER — ANESTHESIA EVENT (OUTPATIENT)
Dept: ENDOSCOPY | Age: 87
End: 2022-01-10
Payer: MEDICARE

## 2022-01-10 ENCOUNTER — HOSPITAL ENCOUNTER (OUTPATIENT)
Age: 87
Setting detail: OUTPATIENT SURGERY
Discharge: HOME | End: 2022-01-10
Attending: INTERNAL MEDICINE | Admitting: INTERNAL MEDICINE
Payer: MEDICARE

## 2022-01-10 ENCOUNTER — ANESTHESIA (OUTPATIENT)
Dept: ENDOSCOPY | Age: 87
End: 2022-01-10
Payer: MEDICARE

## 2022-01-10 VITALS
TEMPERATURE: 97.8 F | BODY MASS INDEX: 22.78 KG/M2 | HEIGHT: 60 IN | WEIGHT: 116 LBS | SYSTOLIC BLOOD PRESSURE: 159 MMHG | RESPIRATION RATE: 22 BRPM | DIASTOLIC BLOOD PRESSURE: 92 MMHG | HEART RATE: 71 BPM | OXYGEN SATURATION: 99 %

## 2022-01-10 VITALS — DIASTOLIC BLOOD PRESSURE: 76 MMHG | OXYGEN SATURATION: 98 % | SYSTOLIC BLOOD PRESSURE: 150 MMHG

## 2022-01-10 PROCEDURE — 2580000003 HC RX 258: Performed by: INTERNAL MEDICINE

## 2022-01-10 PROCEDURE — 88342 IMHCHEM/IMCYTCHM 1ST ANTB: CPT

## 2022-01-10 PROCEDURE — C1726 CATH, BAL DIL, NON-VASCULAR: HCPCS | Performed by: INTERNAL MEDICINE

## 2022-01-10 PROCEDURE — 6360000002 HC RX W HCPCS: Performed by: NURSE ANESTHETIST, CERTIFIED REGISTERED

## 2022-01-10 PROCEDURE — 2709999900 HC NON-CHARGEABLE SUPPLY: Performed by: INTERNAL MEDICINE

## 2022-01-10 PROCEDURE — 43239 EGD BIOPSY SINGLE/MULTIPLE: CPT | Performed by: INTERNAL MEDICINE

## 2022-01-10 PROCEDURE — 43249 ESOPH EGD DILATION <30 MM: CPT | Performed by: INTERNAL MEDICINE

## 2022-01-10 PROCEDURE — 3700000001 HC ADD 15 MINUTES (ANESTHESIA): Performed by: INTERNAL MEDICINE

## 2022-01-10 PROCEDURE — 3609012400 HC EGD TRANSORAL BIOPSY SINGLE/MULTIPLE: Performed by: INTERNAL MEDICINE

## 2022-01-10 PROCEDURE — 2500000003 HC RX 250 WO HCPCS: Performed by: NURSE ANESTHETIST, CERTIFIED REGISTERED

## 2022-01-10 PROCEDURE — 7100000011 HC PHASE II RECOVERY - ADDTL 15 MIN: Performed by: INTERNAL MEDICINE

## 2022-01-10 PROCEDURE — 88305 TISSUE EXAM BY PATHOLOGIST: CPT

## 2022-01-10 PROCEDURE — 3700000000 HC ANESTHESIA ATTENDED CARE: Performed by: INTERNAL MEDICINE

## 2022-01-10 PROCEDURE — 7100000010 HC PHASE II RECOVERY - FIRST 15 MIN: Performed by: INTERNAL MEDICINE

## 2022-01-10 RX ORDER — SODIUM CHLORIDE, SODIUM LACTATE, POTASSIUM CHLORIDE, CALCIUM CHLORIDE 600; 310; 30; 20 MG/100ML; MG/100ML; MG/100ML; MG/100ML
INJECTION, SOLUTION INTRAVENOUS CONTINUOUS
Status: DISCONTINUED | OUTPATIENT
Start: 2022-01-10 | End: 2022-01-10 | Stop reason: HOSPADM

## 2022-01-10 RX ORDER — LIDOCAINE HYDROCHLORIDE 10 MG/ML
INJECTION, SOLUTION EPIDURAL; INFILTRATION; INTRACAUDAL; PERINEURAL PRN
Status: DISCONTINUED | OUTPATIENT
Start: 2022-01-10 | End: 2022-01-10 | Stop reason: SDUPTHER

## 2022-01-10 RX ORDER — PROPOFOL 10 MG/ML
INJECTION, EMULSION INTRAVENOUS PRN
Status: DISCONTINUED | OUTPATIENT
Start: 2022-01-10 | End: 2022-01-10 | Stop reason: SDUPTHER

## 2022-01-10 RX ADMIN — PROPOFOL 160 MG: 10 INJECTION, EMULSION INTRAVENOUS at 11:26

## 2022-01-10 RX ADMIN — LIDOCAINE HYDROCHLORIDE 50 MG: 10 INJECTION, SOLUTION EPIDURAL; INFILTRATION; INTRACAUDAL; PERINEURAL at 11:26

## 2022-01-10 RX ADMIN — SODIUM CHLORIDE, POTASSIUM CHLORIDE, SODIUM LACTATE AND CALCIUM CHLORIDE: 600; 310; 30; 20 INJECTION, SOLUTION INTRAVENOUS at 10:52

## 2022-01-10 ASSESSMENT — PAIN - FUNCTIONAL ASSESSMENT: PAIN_FUNCTIONAL_ASSESSMENT: 0-10

## 2022-01-10 NOTE — H&P
Patient Name: Rina Moya  : 1935  MRN: [id number]  DATE: 01/10/22    Allergies: Allergies   Allergen Reactions    Aspirin Shortness Of Breath    Spironolactone      Low sodium    Dilaudid [Hydromorphone Hcl]     Fd&C Yellow #5 [place] [Tartrazine]     Ondansetron     Quinolones     Sulfa Antibiotics     Codeine Rash and Other (See Comments)     Makes her \"crazy and mean\" and gives her a rash    Demerol Hcl [Meperidine] Other (See Comments)     Made her \"crazy and mean\", and \"loopy\"    Levaquin [Levofloxacin In D5w] Other (See Comments)     Seizure like activity      Myrbetriq [Mirabegron] Other (See Comments)     Unable to micturate    Namenda [Memantine Hcl] Other (See Comments)     made her like a vegetable for hours      Norco [Hydrocodone-Acetaminophen] Rash and Other (See Comments)     \"crazy and mean\"    Pneumococcal Vaccines Swelling        ENDOSCOPY  History and Physical    Procedure:    [] Diagnostic Colonoscopy       [] Screening Colonoscopy  [x] EGD      [] ERCP      [] EUS       [] Other    [x] Previous office notes/History and Physical reviewed from the patients chart. Please see EMR for further details of HPI.  I have examined the patient's status immediately prior to the procedure and:      Indications/HPI: Dysphagia, unspecified type    []Abdominal Pain   []Cancer- GI/Lung     []Fhx of colon CA/polyps  []History of Polyps  []Barretts            []Melena  []Abnormal Imaging              []Dysphagia              []Persistent Pneumonia   []Anemia                            []Food Impaction        []History of Polyps  [] GI Bleed             []Pulmonary nodule/Mass   []Change in bowel habits []Heartburn/Reflux  []Rectal Bleed (BRBPR)  []Chest Pain - Non Cardiac []Heme (+) Stool []Ulcers  []Constipation  []Hemoptysis  []Varices  []Diarrhea  []Hypoxemia    []Nausea/Vomiting   []Screening   []Crohns/Colitis  []Other:     Anesthesia:   [x] MAC [] Moderate Sedation   [] General   [] None     ROS: 12 pt Review of Symptoms was negative unless mentioned above    Medications:   Prior to Admission medications    Medication Sig Start Date End Date Taking? Authorizing Provider   sodium chloride (OCEAN, BABY AYR) 0.65 % nasal spray 1 spray by Nasal route as needed for Congestion    Yes Historical Provider, MD   hydrALAZINE (APRESOLINE) 50 MG tablet Take 50 mg by mouth 2 times daily DO NOT TAKE IN THE AM DUE TO VASAL VAGAL RESPONCE    Historical Provider, MD   dipyridamole (PERSANTINE) 50 MG tablet Take 2 tablets by mouth 2 times daily Indications: 9am and 9pm 9/16/21 1/4/22  Elan Maurice MD   gabapentin (NEURONTIN) 100 MG capsule TAKE 1 CAPSULE BY MOUTH 3 TIMES DAILY. INTENDED SUPPLY: 30 DAYS  Patient taking differently: Take 100 mg by mouth 2 times daily.   7/21/21 1/4/22  Elan Maurice MD   dicyclomine (BENTYL) 10 MG capsule Take 10 mg by mouth as needed     Historical Provider, MD   Thiamine HCl (VITAMIN B-1 PO) Take 100 mg by mouth daily Takes at 2:00 PM     Historical Provider, MD   fluticasone (FLONASE) 50 MCG/ACT nasal spray 1 spray by Each Nostril route daily     Historical Provider, MD   levETIRAcetam (KEPPRA) 500 MG tablet Take 1 tablet by mouth 2 times daily Indications: takes 9am and 9pm 3/18/21 1/4/22  Elan Maurice MD   cloNIDine (CATAPRES) 0.1 MG tablet Take 1 tablet by mouth 2 times daily as needed for High Blood Pressure (for BP greater than 160/100) 11/11/20   LAYTON Soriano   pantoprazole (PROTONIX) 40 MG tablet Take 40 mg by mouth daily Indications: 9am     Historical Provider, MD   acetaminophen (TYLENOL) 500 MG tablet Take 1,000 mg by mouth 3 times daily     Historical Provider, MD   meloxicam (MOBIC) 7.5 MG tablet Take 15 mg by mouth daily Indications: takes at [address] Provider, MD   donepezil (ARICEPT) 10 MG tablet Take 1 tablet by mouth nightly 9/24/20   Elan Maurice MD   Docosanol (ABREVA EX) Apply topically as needed Historical Provider, MD   simethicone (GAS-X EXTRA STRENGTH) 125 MG chewable tablet Take 125 mg by mouth 3 times daily 2 PO 6:30 AM, 2 at 10:00 AM, 1 at 5:30 PM otherwise prn    Historical Provider, MD   butalbital-acetaminophen-caffeine (FIORICET, ESGIC) -40 MG per tablet Take 1 tablet by mouth every 6 hours as needed for Headaches     Historical Provider, MD   amLODIPine (NORVASC) 5 MG tablet Take 5 mg by mouth 2 times daily Indications: takes at 9am and 9pm If BP sys 120 or below then 2.5mg bid otherwise 5mg bid    Historical Provider, MD   diclofenac sodium 1 % GEL Apply 2 g topically 3 times daily as needed for Pain     Historical Provider, MD   sodium chloride 1 g tablet Take 1 g by mouth 3 times daily Indications: takes 9am, 3pm, and 9pm     Historical Provider, MD   lidocaine 4 % external patch Place 1 patch onto the skin daily as needed (LEFT LOWER BACK PAIN)     Historical Provider, MD   valACYclovir (VALTREX) 1 g tablet as needed  6/6/19   Historical Provider, MD   azelastine (ASTELIN) 0.1 % nasal spray 2 sprays by Nasal route 2 times daily Indications: takes 9am and 9pm  5/3/18   Historical Provider, MD   Cholecalciferol (VITAMIN D3) 5000 units TABS Take 5,000 Units by mouth daily Indications: AT 2:30 PM     Historical Provider, MD   Lutein 20 MG TABS Take 20 mg by mouth daily Indications: 3 PM DAILY     Historical Provider, MD   camphor-menthol (SARNA) 0.5-0.5 % lotion Apply 0.5 mLs topically 2 times daily as needed for Itching Apply topically as needed.     Historical Provider, MD   Calcium Carb-Cholecalciferol (CALCIUM 600+D) 600-800 MG-UNIT TABS Take 1 tablet by mouth daily Indications: AT 2:30 PM     Historical Provider, MD   promethazine (PHENERGAN) 25 MG tablet Take 12.5 mg by mouth 3 times daily as needed for Nausea     Historical Provider, MD   Omega 3-6-9 Fatty Acids (OMEGA 3-6-9 COMPLEX PO) Take 2 tablets by mouth daily Indications: AT 2:30 PM     Historical Provider, MD   alendronate 2012 90 days after the spine surgeries    OTHER SURGICAL HISTORY      inner lymph nodes    OTHER SURGICAL HISTORY  09/2020    Loop recorder     PACEMAKER INSERTION      PACEMAKER PLACEMENT  01/2021    SPINE SURGERY      L3,4, and 5   done in 2012   Nay Haskins      as a child at age 9    [facility] ENDOSCOPY  08/28/2003    DR Carr [place]:  Duodenal scarring but no evidence of active ulcer disease. Social History:  Social History     Tobacco Use    Smoking status: Never Smoker    Smokeless tobacco: Never Used   Vaping Use    Vaping Use: Never used   Substance Use Topics    Alcohol use: Never    Drug use: Never       Vital Signs:   Vitals:    01/10/22 1056   BP: (!) 146/72   Pulse: 73   Resp: 18   Temp: 97.9 °F (36.6 °C)   SpO2: 97%        Physical Exam:  Cardiac:  [x]WNL  []Comments:  Pulmonary:  [x]WNL   []Comments:  Neuro/Mental Status:  [x]WNL  []Comments:  Abdominal:  [x]WNL    []Comments:  Other:   []WNL  []Comments:    Informed Consent:  The risks and benefits of the procedure have been discussed with either the patient or if they cannot consent, their representative. Assessment:  Patient examined and appropriate for planned sedation and procedure. Plan:  Proceed with planned sedation and procedure as above.          Lauren Hernandez MD

## 2022-01-10 NOTE — ANESTHESIA POSTPROCEDURE EVALUATION
Department of Anesthesiology  Postprocedure Note    Patient: Rosetta Panchal  MRN: [id number]  YOB: 1935  Date of evaluation: 1/10/2022  Time:  11:56 AM     Procedure Summary     Date: 01/10/22 Room / Location: [address]    Anesthesia Start: 1120 Anesthesia Stop:     Procedure: EGD BIOPSY (N/A Abdomen) Diagnosis: (DYSPHAGIA)    Surgeons: Rebecca Oliveros MD Responsible Provider: LAYTNO John CRNA    Anesthesia Type: general, TIVA ASA Status: 3          Anesthesia Type: No value filed. Ness Phase I:      Ness Phase II: Ness Score: 10    Last vitals: Reviewed and per EMR flowsheets.        Anesthesia Post Evaluation    Patient location during evaluation: bedside  Patient participation: complete - patient participated  Level of consciousness: awake  Pain score: 0  Airway patency: patent  Nausea & Vomiting: no vomiting and no nausea  Complications: no  Cardiovascular status: blood pressure returned to baseline  Respiratory status: acceptable  Hydration status: stable

## 2022-01-10 NOTE — ANESTHESIA PRE PROCEDURE
Department of Anesthesiology  Preprocedure Note       Name:  Brayden Muhammad   Age:  80 y.o.  :  1935                                          MRN:  [id number]         Date:  1/10/2022      Surgeon: Peterson Knowles):  Lauren Hernandez MD    Procedure: Procedure(s):  EGD BIOPSY    Medications prior to admission:   Prior to Admission medications    Medication Sig Start Date End Date Taking? Authorizing Provider   sodium chloride (OCEAN, BABY AYR) 0.65 % nasal spray 1 spray by Nasal route as needed for Congestion    Yes Historical Provider, MD   hydrALAZINE (APRESOLINE) 50 MG tablet Take 50 mg by mouth 2 times daily DO NOT TAKE IN THE AM DUE TO VASAL VAGAL RESPONCE    Historical Provider, MD   dipyridamole (PERSANTINE) 50 MG tablet Take 2 tablets by mouth 2 times daily Indications: 9am and 9pm 21  Marbin Gu MD   gabapentin (NEURONTIN) 100 MG capsule TAKE 1 CAPSULE BY MOUTH 3 TIMES DAILY. INTENDED SUPPLY: 30 DAYS  Patient taking differently: Take 100 mg by mouth 2 times daily.   21  Marbin Gu MD   dicyclomine (BENTYL) 10 MG capsule Take 10 mg by mouth as needed     Historical Provider, MD   Thiamine HCl (VITAMIN B-1 PO) Take 100 mg by mouth daily Takes at 2:00 PM     Historical Provider, MD   fluticasone (FLONASE) 50 MCG/ACT nasal spray 1 spray by Each Nostril route daily     Historical Provider, MD   levETIRAcetam (KEPPRA) 500 MG tablet Take 1 tablet by mouth 2 times daily Indications: takes 9am and 9pm 3/18/21 1/4/22  Marbin Gu MD   cloNIDine (CATAPRES) 0.1 MG tablet Take 1 tablet by mouth 2 times daily as needed for High Blood Pressure (for BP greater than 160/100) 20   LAYTON Haas   pantoprazole (PROTONIX) 40 MG tablet Take 40 mg by mouth daily Indications: 9am     Historical Provider, MD   acetaminophen (TYLENOL) 500 MG tablet Take 1,000 mg by mouth 3 times daily     Historical Provider, MD   meloxicam (MOBIC) 7.5 MG tablet Take 15 mg by mouth daily Indications: takes at [address] [facility] Provider, MD   donepezil (ARICEPT) 10 MG tablet Take 1 tablet by mouth nightly 9/24/20   Jey Gold MD   Docosanol (ABREVA EX) Apply topically as needed     Historical Provider, MD   simethicone (GAS-X EXTRA STRENGTH) 125 MG chewable tablet Take 125 mg by mouth 3 times daily 2 PO 6:30 AM, 2 at 10:00 AM, 1 at 5:30 PM otherwise prn    Historical Provider, MD   butalbital-acetaminophen-caffeine (FIORICET, ESGIC) -40 MG per tablet Take 1 tablet by mouth every 6 hours as needed for Headaches     Historical Provider, MD   amLODIPine (NORVASC) 5 MG tablet Take 5 mg by mouth 2 times daily Indications: takes at 9am and 9pm If BP sys 120 or below then 2.5mg bid otherwise 5mg bid    Historical Provider, MD   diclofenac sodium 1 % GEL Apply 2 g topically 3 times daily as needed for Pain     Historical Provider, MD   sodium chloride 1 g tablet Take 1 g by mouth 3 times daily Indications: takes 9am, 3pm, and 9pm     Historical Provider, MD   lidocaine 4 % external patch Place 1 patch onto the skin daily as needed (LEFT LOWER BACK PAIN)     Historical Provider, MD   valACYclovir (VALTREX) 1 g tablet as needed  6/6/19   Historical Provider, MD   azelastine (ASTELIN) 0.1 % nasal spray 2 sprays by Nasal route 2 times daily Indications: takes 9am and 9pm  5/3/18   Historical Provider, MD   Cholecalciferol (VITAMIN D3) 5000 units TABS Take 5,000 Units by mouth daily Indications: AT 2:30 PM     Historical Provider, MD   Lutein 20 MG TABS Take 20 mg by mouth daily Indications: 3 PM DAILY     Historical Provider, MD   camphor-menthol (SARNA) 0.5-0.5 % lotion Apply 0.5 mLs topically 2 times daily as needed for Itching Apply topically as needed.     Historical Provider, MD   Calcium Carb-Cholecalciferol (CALCIUM 600+D) 600-800 MG-UNIT TABS Take 1 tablet by mouth daily Indications: AT 2:30 PM     Historical Provider, MD   promethazine (PHENERGAN) 25 MG tablet Take 12.5 mg by mouth 3 times daily as needed for Nausea     Historical Provider, MD   Omega 3-6-9 Fatty Acids (OMEGA 3-6-9 COMPLEX PO) Take 2 tablets by mouth daily Indications: AT 2:30 PM     [place] Provider, MD   alendronate (FOSAMAX) 70 MG tablet Take 70 mg by mouth every 7 days Sundays at [address] [facility] Provider, MD   pravastatin (PRAVACHOL) 20 MG tablet Take 20 mg by mouth nightly Indications: takes at 9am  3/1/16   Holley Provider, MD   ramipril (ALTACE) 10 MG capsule Take 10 mg by mouth 2 times daily Indications: takes at 9am and 9pm  2/26/16   Historical Provider, MD   b complex vitamins capsule Take 1 capsule by mouth daily Indications: AT 2:30 PM B 100    Historical Provider, MD   Multiple Vitamins-Minerals (THERAPEUTIC MULTIVITAMIN-MINERALS) tablet Take 1 tablet by mouth daily Indications: AT 2:30 PM     Historical Provider, MD       Current medications:    Current Facility-Administered Medications   Medication Dose Route Frequency Provider Last Rate Last Admin    lactated ringers infusion   IntraVENous Continuous Arvfrancisco javier Sanderson  mL/hr at 01/10/22 1052 New Bag at 01/10/22 1052       Allergies:     Allergies   Allergen Reactions    Aspirin Shortness Of Breath    Spironolactone      Low sodium    Dilaudid [Hydromorphone Hcl]     Fd&C Yellow #5 [place] [Tartrazine]     Ondansetron     Quinolones     Sulfa Antibiotics     Codeine Rash and Other (See Comments)     Makes her \"crazy and mean\" and gives her a rash    Demerol Hcl [Meperidine] Other (See Comments)     Made her \"crazy and mean\", and \"loopy\"    Levaquin [Levofloxacin In D5w] Other (See Comments)     Seizure like activity      Myrbetriq [Mirabegron] Other (See Comments)     Unable to micturate    Namenda [Memantine Hcl] Other (See Comments)     made her like a vegetable for hours      Norco [Hydrocodone-Acetaminophen] Rash and Other (See Comments)     \"crazy and mean\"    Pneumococcal Vaccines Swelling       Problem List:    Patient Active Problem List   Diagnosis Code    Irritable bowel syndrome with diarrhea K58.0    S/P laparoscopic cholecystectomy Z90.49    Late onset Alzheimer's disease without behavioral disturbance ([facility]) G30.1, F02.80    Altered mental status R41.82    Seizure as late effect of cerebrovascular accident (CVA) ([facility] I69.398, G42.0    Metabolic encephalopathy Z20.86    Weakness R53.1    Chronic bilateral lower abdominal pain R10.31, G89.29, R10.32    Gas pain R14.1    History of ischemic colitis Z87.19    Partial symptomatic epilepsy with complex partial seizures, intractable, without status epilepticus ([facility] G40.219    Cerebrovascular small vessel disease I67.9    Vascular dementia without behavioral disturbance ([facility]) F01.50    Risk for falls Z91.81    Dry age-related macular degeneration of left eye H35.3120    Age-related macular degeneration, wet, right eye ([facility]) H35.3210    Gait abnormality R26.9    Palliative care patient Z51.5    Bloating R14.0    Chronic bilateral low back pain with left-sided sciatica M54.42, G89.29    Arthralgia of multiple joints M25.50    Abnormal EKG R94.31    Moderate mitral regurgitation I34.0    Chest pain R07.9    Essential hypertension I10    Overactive bladder N32.81    Vasovagal syncope R55    Bradycardia R00.1    Cryptogenic stroke ([facility]) I63.9    Syncope and collapse R55    Status post placement of implantable loop recorder Z95.818    Symptomatic bradycardia R00.1    Bilateral carotid artery stenosis I65.23    PVD (peripheral vascular disease) ([facility]) I73.9    Macrocytic anemia D53.9    Diarrhea R19.7    At risk for polypharmacy Z91.89    Mild protein-calorie malnutrition ([facility]) E44.1    Abdominal bloating R14.0    Elevated lipase R74.8    Dysphagia R13.10       Past Medical History:        Diagnosis Date    Age-related macular degeneration, wet, right eye ([facility]     Anemia     Back pain     Bilateral carotid artery stenosis 2/23/2021    Chronic bilateral low back pain with left-sided sciatica 12/19/2019    Colitis, ischemic ([facility]     Dementia ([facility]     Diverticulosis     Dry age-related macular degeneration of left eye     Hyperlipidemia     Hypertension     Osteoarthritis     Palliative care patient 09/17/2019    Risk for falls 9/13/2019    Seizures ([facility]     Spastic colon     Status post placement of implantable loop recorder 10/27/2020    Stroke syndrome     CVA's/ TIA's    Urinary incontinence     Uterine cancer ([facility]     Uterine cancer [facility])        Past Surgical History:        Procedure Laterality Date    BREAST SURGERY Right     FNA Right Breast \"oh heavens, maybe 20 years ago\"    CHOLECYSTECTOMY      COLONOSCOPY  9/25/03    Dr Michelle Abarca ischemic colitis, incomplete exam    COLONOSCOPY  08/29/2003    Dr Jessica Gallo:  limited colon-ischemic colitis    DILATION AND CURETTAGE OF UTERUS      x2, in [facility] tears, laser suregry, adn cataract with IOL b/l    HYSTERECTOMY  01/2020    ovaries and tubes    INSERTABLE CARDIAC MONITOR      LUMBAR FUSION      2012 90 days after the spine surgeries    OTHER SURGICAL HISTORY      inner lymph nodes    OTHER SURGICAL HISTORY  09/2020    Loop recorder     PACEMAKER INSERTION      PACEMAKER PLACEMENT  01/2021    SPINE SURGERY      L3,4, and 5   done in 2012   Nay Lechuga 76      as a child at age 9    [address]  08/28/2003    DR Mayank Herrera:  Duodenal scarring but no evidence of active ulcer disease.        Social History:    Social History     Tobacco Use    Smoking status: Never Smoker    Smokeless tobacco: Never Used   Substance Use Topics    Alcohol use: Never                                Counseling given: Not Answered      Vital Signs (Current):   Vitals:    01/10/22 1056   BP: (!) 146/72   Pulse: 73   Resp: 18   Temp: 97.9 °F (36.6 °C)   SpO2: 97%   Weight: 116 lb (52.6 kg)   Height: 5' (1.524 m)                                              BP Readings from Last 3 Encounters:   01/10/22 (!) 146/72   01/04/22 120/65   10/20/21 130/60       NPO Status: Time of last liquid consumption: 2100                        Time of last solid consumption: 1900                        Date of last liquid consumption: 01/09/22                        Date of last solid food consumption: 01/09/22    BMI:   Wt Readings from Last 3 Encounters:   01/10/22 116 lb (52.6 kg)   01/04/22 122 lb (55.3 kg)   12/22/21 124 lb 3.2 oz (56.3 kg)     Body mass index is 22.65 kg/m². CBC:   Lab Results   Component Value Date    WBC 5.7 10/07/2021    RBC 3.70 10/07/2021    HGB 12.0 10/07/2021    HCT 37.3 10/07/2021    .8 10/07/2021    RDW 12.5 10/07/2021     10/07/2021       CMP:   Lab Results   Component Value Date     10/07/2021    K 3.8 10/07/2021    K 4.1 07/24/2021    CL 99 10/07/2021    CO2 29 10/07/2021    BUN 21 10/07/2021    CREATININE 0.7 10/07/2021    GFRAA >59 10/07/2021    LABGLOM >60 10/07/2021    GLUCOSE 94 10/07/2021    PROT 6.5 07/24/2021    PROT 6.7 08/30/2012    CALCIUM 9.6 10/07/2021    BILITOT 0.3 07/24/2021    ALKPHOS 78 07/24/2021    AST 19 07/24/2021    ALT 14 07/24/2021       POC Tests: No results for input(s): POCGLU, POCNA, POCK, POCCL, POCBUN, POCHEMO, POCHCT in the last 72 hours.     Coags:   Lab Results   Component Value Date    PROTIME 12.5 02/23/2020    INR 0.94 02/23/2020    APTT 29.6 02/23/2020       HCG (If Applicable): No results found for: PREGTESTUR, PREGSERUM, HCG, HCGQUANT     ABGs:   Lab Results   Component Value Date    PHART 7.490 07/26/2016    PO2ART 83.0 07/26/2016    XHQ1ARC 33.0 07/26/2016    SBZ6EBN 25.1 07/26/2016    BEART 2.0 07/26/2016    C3ZXCIAP 98.5 07/26/2016        Type & Screen (If Applicable):  No results found for: LABABO, LABRH    Drug/Infectious Status (If Applicable):  No results found for: HIV, HEPCAB    COVID-19 Screening (If Applicable):   Lab Results   Component Value Date    COVID19 Not Detected 01/07/2022           Anesthesia Evaluation    Airway: Mallampati: Unable to assess / NA  TM distance: >3 FB   Neck ROM: full   Dental:          Pulmonary:Negative Pulmonary ROS and normal exam                               Cardiovascular:    (+) hypertension:,                   Neuro/Psych:   (+) seizures:, CVA:, psychiatric history:            GI/Hepatic/Renal: Neg GI/Hepatic/Renal ROS            Endo/Other: Negative Endo/Other ROS                    Abdominal:             Vascular: Other Findings:           Anesthesia Plan      general and TIVA     ASA 3       Induction: intravenous. Anesthetic plan and risks discussed with patient. Plan discussed with CRNA.                   LAYTON Self - JONATHON   1/10/2022

## 2022-01-10 NOTE — OP NOTE
Endoscopic Procedure Note    Patient: Neli Lesser: 1935  Med Rec#: [id number] Acc#: [id number]     Primary Care Provider Almita Dale DO    Endoscopist: Veronica Polanco MD, MD    Date of Procedure:  1/10/2022    Procedure:   1. EGD with a TTS-CRE balloon dilation of esophagus and cold biopsies    Indications:   Dysphagia, unspecified type     Anesthesia:  Sedation was administered by anesthesia who monitored the patient during the procedure. Estimated Blood Loss: minimal    Procedure:   After reviewing the patient's chart and obtaining informed consent, the patient was placed in the left lateral decubitus position. A forward-viewing Olympus endoscope was lubricated and inserted through the mouth into the oropharynx. Under direct visualization, the upper esophagus was intubated. The scope was advanced to the level of the third portion of duodenum. Scope was slowly withdrawn with careful inspection of the mucosal surfaces. The scope was retroflexed for inspection of the gastric fundus and incisura. Findings and maneuvers are listed in impression below. Next, through-the-scope controlled radial expansion 15 to 18 mm in diameter balloon tipped catheter was gently introduced into the patient's Esophagus and into the proximal stomach without much resistance and then withdrawn to position the balloon appropriately across the distal esophageal Schatzki ring and EG junction. The balloon was sequentially dilated to 15 mm up to a maximum of 18 mm per standard protocol and at the maximum of 8 shalini pressure. After the dilation was completed, the balloon tipped catheter was successfully withdrawn through the scope. NO evidence of perforation or excessive bleeding was noted subsequent to the dilation. The patient tolerated the procedure well. The scope was removed. There were no immediate complications.     Findings/IMPRESSION:  Esophagus: abnormal: Presbyesophagus-like changes noted throughout the esophagus. In the distal esophagus adjacent to the EG junction a widemouthed esophageal diverticulum was noted which is likely due to esophageal dysmotility and may be contributing to some extent to the patient's dysphagia. Also a distal esophageal Schatzki ring was seen near the EG junction at 37 cm and was subjected to a balloon dilation to a maximum of 18 mm as described above. NO erosions or ulcers or nodules or strictures or webs or mass lesions or extrinsic compression or diverticula noted. Random cold biopsies were taken to check for EoE and NERD. There is a 3 to 4 cm in length hiatal hernia present. Stomach:  abnormal: Patchy mucosal changes with erythema and few small 1 to 2 mm erosions in the antrum suggestive of mild chemical gastritis noted -  Gastric biopsies were taken from the antrum and body to rule out Helicobacter pylori infection. NO ulcers or masses or gastric outlet obstruction or retained food or fluid. Rugae were normal and lumen distended well with insufflation. Retroflexed views otherwise revealed a normal GE junction, fundus and cardia as well. Duodenum: normal       RECOMMENDATIONS:    1. Await path results, the patient will be contacted in 7-10 days with biopsy results. 2.  Magic mouthwash 5 ml PO Swish and swallow q3h PRN ONLY IF patient has post-procedural sorethroat or chest pain. 3. Full liquids to soft diet today jm discharge from the surgicenter; may advance  diet starting in AM tomorrow. 4. May resume other meds except any ASA/NSAIDs; may use cough drops or lozenges PRN; also OTC/prescription PPI or H2RA PO qday or BID with anti-GERD measures. 5. NO ASA/NSAIDs x 2 weeks  6. OP f/u in 4-6 weeks; will consider an Esophageal manometry later if the patient's dysphagia persists. The results were discussed with the patient and family. A copy of the images obtained were given to the patient.      Kilo Torres MD, MD  1/10/2022  11:00 AM

## 2022-01-12 DIAGNOSIS — Z95.0 CARDIAC PACEMAKER: Primary | ICD-10-CM

## 2022-01-12 DIAGNOSIS — I49.5 SA NODE DYSFUNCTION (HCC): ICD-10-CM

## 2022-01-12 PROCEDURE — 93296 REM INTERROG EVL PM/IDS: CPT | Performed by: NURSE PRACTITIONER

## 2022-01-12 PROCEDURE — 93294 REM INTERROG EVL PM/LDLS PM: CPT | Performed by: NURSE PRACTITIONER

## 2022-01-13 ENCOUNTER — NURSE ONLY (OUTPATIENT)
Dept: UROLOGY | Age: 87
End: 2022-01-13
Payer: MEDICARE

## 2022-01-13 ENCOUNTER — TELEPHONE (OUTPATIENT)
Dept: GASTROENTEROLOGY | Age: 87
End: 2022-01-13

## 2022-01-13 DIAGNOSIS — N32.81 OVERACTIVE BLADDER: Primary | ICD-10-CM

## 2022-01-13 DIAGNOSIS — N39.46 MIXED STRESS AND URGE URINARY INCONTINENCE: ICD-10-CM

## 2022-01-13 PROCEDURE — 64566 NEUROELTRD STIM POST TIBIAL: CPT | Performed by: NURSE PRACTITIONER

## 2022-01-13 NOTE — PROGRESS NOTES
Treatment # 2 week maintenance     Improvement of Symptoms? Yes    OAB/Urologic Medications? None      Uroplasty Procedure:    1. Patient is seated with the right treatment leg elevated. 2. 34 gauge fine needle electrode is inserted into lower inner aspect of the leg. Slightly cephalad to the medial malleolus. 3. Surface electrode pad is placed over the medial aspect of the calcaneus on the same leg. 4.Needle electrode is connected to the external pulse generator. 5.The pulse generator is turned on and the millivolts used are 8. 6.Patient is treated for 30 minutes. 7.The needle and pad are removed. Patient will follow up in 2 weeks for next treatment.

## 2022-01-13 NOTE — TELEPHONE ENCOUNTER
I reviewed the op note from the EGD on 1/10/2022 as well as path. Instructions are as follows: Full liquids to soft diet today jm discharge from the surgicenter; may advance  diet starting in AM tomorrow. So his advice was to have soft diet day of procedure only. Then she can go back to pre-procedural diet. No restrictions as I can see unless obviously she is eating something that gets hung up, then avoid these types of foods til follow up and we can discuss further testing at that time if the EGD with dilation wasn't helpful.

## 2022-01-13 NOTE — TELEPHONE ENCOUNTER
01-13-22 Per Dr Diandra Emerson against taking the NSAIDS as that there is a risk of bleeding. Called and notified Dashawn Lunch (Daughter)         Dashawn Lunch (Vicente Remy) stated that she understood one thing and her brother understood something different after her procedure with Dr Rangel Rocha the other day. Told her that as per Dr Jordy Bridges note after her procedure the other day. He stated  Full liquids to soft diet today jm discharge from the surgicenter; may advance  diet starting in AM tomorrow. She is questioning as to what to feed her mom? States that she is already having to watch Sodium, fat and cholesterol due to heart and pacemaker issues. Patient is also lactose and gluten free. They meet with the Dietician on Tuesday at [facility]. Advised her to continue with what she has been doing until they meet with the Dietician on Tuesday. She states that they are out of ideas and out of soft food options as that they have ran through them and wonders if she will have to be on these type of foods for life due to her moms path findings?        Told her that I would send a message to [address] [place] [address] as that Dr Rangel Rocha is out of the office       Routed to [address] [place] [address]

## 2022-01-14 NOTE — TELEPHONE ENCOUNTER
01-14-22 Morris Purchase (Daughter) was notified of recommendations as per [facility] APRN     Verbal agreement per Nhi Hansen

## 2022-01-26 NOTE — PROGRESS NOTES
Treatment # 2 wk maintenance     Improvement of Symptoms? Yes     OAB/Urologic Medications? None       Uroplasty Procedure:    1. Patient is seated with the left treatment leg elevated. 2. 34 gauge fine needle electrode is inserted into lower inner aspect of the leg. Slightly cephalad to the medial malleolus. 3. Surface electrode pad is placed over the medial aspect of the calcaneus on the same leg. 4.Needle electrode is connected to the external pulse generator. 5.The pulse generator is turned on and the millivolts used are 3. 6.Patient is treated for 30 minutes. 7.The needle and pad are removed. Patient will follow up in 2 weeks for next treatment.

## 2022-01-27 ENCOUNTER — NURSE ONLY (OUTPATIENT)
Dept: UROLOGY | Age: 87
End: 2022-01-27
Payer: MEDICARE

## 2022-01-27 DIAGNOSIS — N32.81 OVERACTIVE BLADDER: Primary | ICD-10-CM

## 2022-01-27 DIAGNOSIS — N39.46 MIXED STRESS AND URGE URINARY INCONTINENCE: ICD-10-CM

## 2022-01-27 PROCEDURE — 64566 NEUROELTRD STIM POST TIBIAL: CPT | Performed by: NURSE PRACTITIONER

## 2022-02-10 ENCOUNTER — NURSE ONLY (OUTPATIENT)
Dept: UROLOGY | Age: 87
End: 2022-02-10
Payer: MEDICARE

## 2022-02-10 DIAGNOSIS — N32.81 OVERACTIVE BLADDER: Primary | ICD-10-CM

## 2022-02-10 PROCEDURE — 64566 NEUROELTRD STIM POST TIBIAL: CPT | Performed by: NURSE PRACTITIONER

## 2022-02-11 NOTE — PROGRESS NOTES
Treatment # 2 week maintenance    Improvement of Symptoms? yes    OAB/Urologic Medications? none      Uroplasty Procedure:    1. Patient is seated with the left treatment leg elevated. 2. 34 gauge fine needle electrode is inserted into lower inner aspect of the leg. Slightly cephalad to the medial malleolus. 3. Surface electrode pad is placed over the medial aspect of the calcaneus on the same leg. 4.Needle electrode is connected to the external pulse generator. 5.The pulse generator is turned on and the millivolts used are 10. 6.Patient is treated for 30 minutes. 7.The needle and pad are removed. Patient will follow up in 2 weeks for next treatment.

## 2022-02-15 NOTE — ED NOTES
Medical Weight Loss/Bariatric Progress Report      Referral Diagnosis:   Morbid obesity with BMI of 70 and over, adult (CMS/[facility]) [E66.01, Z68.45]   Mixed hyperlipidemia [E78.2]   Impaired fasting glucose [R73.01]   Essential hypertension [I10]     Appointment #14-taking a break from the program- switching over to medical weight loss.      Length of Appointment Time:   25 minutes     Assessment / Food intake related directly to surgery readiness:  Oral fluids: from 24 hour recall:108 oz of water and 36 oz of lower calorie sweetened tea     Amount of food: 24 hour food recall: Skipped breakfast. Lunch: 2 turkey hot dogs and 2 slices of bread with green beans. Supper: 2 cups of pasta with turkey meatballs with sauce. Snack: 3 small candy pieces and 1/4 cup of nuts.      Type of food/meals: Patient is struggling to eat 3 meals, stating mood caused \"bad month\" Stated that he is trying to eat less portions size and started counting carbohydrates as his girlfriend is doing this for her diabetes. Having fruits 3-4x/week, sweetened beverages ( is using a lower carb tea over drinking soda) and daily candy which is a struggle.   Patient was accepted into the [facility] eating disorder program.  He did not feel it was a good fit and left without completing the program.  He is now using the work book and working on it with his therapist weekly.     Meal/Snack pattern: 3-4x/day       Fried Foods: none, but eats pre fried foods in his air frier-discussed again with today's visit  Fast Food: none  Eating Out:over the weekend with traveling did have Greek food- feta and olive salad  Sweets: daily 1-3 small pieces of chocolate and candy fruits  Soda: none  Allergies/Intolerances: High fructose corn syrup, MSG, potassium sorbate  Alcohol: none  Tobacco: none     Medications / Supplements  Medications, specify prescription or OTC: lisinopril-HCTZ, clonazepan, wellbutrin, warfarin  Weight Loss Medication: Saxenda    Motivation   Motivation:  Staff went in to obtain an in and out cath urine sample but family got pt up to [facility] before sample could be obtained.       Lizet Don RN  04/11/21 0390 Pt appears motivated to make nutrition related behavior changes.      Pt is interested in medical weight loss and/or bariatric surgery.     Behavior  Binge eating behavior:  Increased due to stress.  Reports \"I wish I could eat without an emotion attached to it\".  Stated with recent mood skipping more meals. He will be seeing a psychiatrist/counselor routinely- stated they have been working on med adjustments.      Meal duration:  \"varies\"  Ability to build and utilize social network: Patient lives with his partner Mary.     Physical Activity  Type of physical activity: working with PT- pool treatment and lymphedema treatment     Anthropometric Measurements:   Height/length: 70.5\"  Weight: 512.2#  Body mass index: 74.02  Medical  weight loss goal: 486#  Weight change: N/A     Labs reviewed:       Hemoglobin A1C (%)   Date Value   09/22/2021 6.3 (H)      Nutrition-Focused Physical Findings:   Overall appearance: Morbidly obese  Digestive system (mouth to rectum): denied any issues   Client History:   Tobacco use, KENTRELL, morbid obesity, impaired fasting glucose, HTN, HLD, h/o recreational drug use, diverticulosis, anxiety, alcohol abuse      Nutrition Diagnosis:   Overweight/Obesity related to history of excessive calorie intake and physical inactivity as evidenced by BMI.      Nutrition Prescription:    Eat 3 small meals per day, choose foods low in sugar, choose foods low in fat, eat meals slowly, substitute low calorie non-carbonated beverages for carbonated and be physically active.     Intervention:   Modified diet: Liver reduction diet with 30 g carbohydrates per meal or less.     Personal Goals:  1. Continue to set alarms to remind meals- eating 3 meals a day and snack as needed.  2. Continue to avoid 2nd's at meals  3. Eat protein first at meals, then non starchy vegetables  4. Limit carbohydrates to 30 grams per meal or less    Nutrition Progress Checklist:  [] Eat three meals/day  [] Limit sweets  [x] Limit  fried food  [] Lean source of protein with every meal  [] Follows the diet provided by the dietitian  [x] Limit alcohol  [] Chews well and takes approximately 30 minutes for meals  [] Avoids all sweetened beverages  [] Achieve medical weight loss goal of 486#     Print/Written Resources Provided:   AVS     Monitoring and Evaluation:   Self-reported adherence score: The patient will comply with interventions in order to be an appropriate candidate for bariatric surgery.     The instruction was given to the patient.  Areas and level of knowledge: Patient demonstrates an inadequate level of knowledge prior to education and basic level of knowledge after education.  The barriers to self-care and learning limitations were assessed as none  Preferences for learning: No preference    Learning Topics: Rationale for Diet, Guidelines for Diet, Label Reading/Product Information, Food Preparation, Eating Out and Lifestyle changes      Recommended Follow-up:   1 month follow up

## 2022-02-16 ENCOUNTER — OFFICE VISIT (OUTPATIENT)
Dept: GASTROENTEROLOGY | Age: 87
End: 2022-02-16
Payer: MEDICARE

## 2022-02-16 VITALS
HEART RATE: 72 BPM | BODY MASS INDEX: 24.54 KG/M2 | DIASTOLIC BLOOD PRESSURE: 64 MMHG | WEIGHT: 125 LBS | SYSTOLIC BLOOD PRESSURE: 124 MMHG | HEIGHT: 60 IN | OXYGEN SATURATION: 98 %

## 2022-02-16 DIAGNOSIS — Z98.890 S/P BALLOON DILATATION OF ESOPHAGEAL STRICTURE: ICD-10-CM

## 2022-02-16 DIAGNOSIS — K22.89 PRESBYESOPHAGUS: Primary | ICD-10-CM

## 2022-02-16 DIAGNOSIS — K44.9 HIATAL HERNIA: ICD-10-CM

## 2022-02-16 DIAGNOSIS — K22.2 SCHATZKI'S RING OF DISTAL ESOPHAGUS: ICD-10-CM

## 2022-02-16 PROCEDURE — G8420 CALC BMI NORM PARAMETERS: HCPCS | Performed by: NURSE PRACTITIONER

## 2022-02-16 PROCEDURE — 99213 OFFICE O/P EST LOW 20 MIN: CPT | Performed by: NURSE PRACTITIONER

## 2022-02-16 PROCEDURE — 1123F ACP DISCUSS/DSCN MKR DOCD: CPT | Performed by: NURSE PRACTITIONER

## 2022-02-16 PROCEDURE — 4040F PNEUMOC VAC/ADMIN/RCVD: CPT | Performed by: NURSE PRACTITIONER

## 2022-02-16 PROCEDURE — G8427 DOCREV CUR MEDS BY ELIG CLIN: HCPCS | Performed by: NURSE PRACTITIONER

## 2022-02-16 PROCEDURE — 1090F PRES/ABSN URINE INCON ASSESS: CPT | Performed by: NURSE PRACTITIONER

## 2022-02-16 PROCEDURE — 1036F TOBACCO NON-USER: CPT | Performed by: NURSE PRACTITIONER

## 2022-02-16 PROCEDURE — G8484 FLU IMMUNIZE NO ADMIN: HCPCS | Performed by: NURSE PRACTITIONER

## 2022-02-16 ASSESSMENT — ENCOUNTER SYMPTOMS
BLOOD IN STOOL: 0
VOICE CHANGE: 0
DIARRHEA: 0
COUGH: 0
SHORTNESS OF BREATH: 1
CONSTIPATION: 0
TROUBLE SWALLOWING: 0
ABDOMINAL DISTENTION: 0
ANAL BLEEDING: 0
BACK PAIN: 1
RECTAL PAIN: 0
VOMITING: 0
ABDOMINAL PAIN: 0
NAUSEA: 0

## 2022-02-16 NOTE — PROGRESS NOTES
Subjective:      Slick Reddy is a80 y.o. female  Chief Complaint   Patient presents with    Follow-up       HPI  PCP: Pranav Flower DO  Referring Provider: Dr Bethanie Solis MD  Pt made an appt s/p EGD as advised by Dr Selma Galeas  Denies post-procedural complications. Results of EGD in history. We reviewed everything. New dx of presbyesophagus, schatzki ring that was dilated with 18mm balloon, and 3-4cm HH. She has had no further c/o dysphagia since the EGD with dilation. She reports she increased PPI BID, pt daughter states Dr Selma Galeas recommended this to see if it would help with belching sometimes, it hasn't, so she is going back to once daily dosing. Family HX:    Pt denies family hx of colon polyps, colon CA, inflammatory bowel dx, gastric CA and esophageal CA.     Past Medical History:   Diagnosis Date    Age-related macular degeneration, wet, right eye ([address]     Anemia     Back pain     Bilateral carotid artery stenosis 02/23/2021    Chronic bilateral low back pain with left-sided sciatica 12/19/2019    Colitis, ischemic ([address]     Dementia ([address]     Diverticulosis     Dry age-related macular degeneration of left eye     Hyperlipidemia     Hypertension     Osteoarthritis     Palliative care patient 09/17/2019    Risk for falls 09/13/2019    Seizures (HCC)     Spastic colon     Status post placement of implantable loop recorder 10/27/2020    Stroke syndrome     CVA's/ TIA's    Urinary incontinence     Uterine cancer ([address]     Uterine cancer [facility])           Past Surgical History:   Procedure Laterality Date    BREAST SURGERY Right     FNA Right Breast \"oh heavens, maybe 20 years ago\"    CHOLECYSTECTOMY      COLONOSCOPY  09/25/2003    Dr Hermila Khanna ischemic colitis, incomplete exam    COLONOSCOPY  08/29/2003    Dr Felipa Davis:  limited colon-ischemic colitis    DILATION AND CURETTAGE OF UTERUS      x2, in [facility] tears, laser suregry, adn cataract Not on file   Transportation Needs:     Lack of Transportation (Medical): Not on file    Lack of Transportation (Non-Medical):  Not on file   Physical Activity:     Days of Exercise per Week: Not on file    Minutes of Exercise per Session: Not on file   Stress:     Feeling of Stress : Not on file   Social Connections:     Frequency of Communication with Friends and Family: Not on file    Frequency of Social Gatherings with Friends and Family: Not on file    Attends Druze Services: Not on file    Active Member of [address] [place] [address] [employer] Patients or Organizations: Not on file    Attends Club or Organization Meetings: Not on file    Marital Status: Not on file   Intimate Partner Violence:     Fear of Current or Ex-Partner: Not on file    Emotionally Abused: Not on file    Physically Abused: Not on file    Sexually Abused: Not on file   Housing Stability:     Unable to Pay for Housing in the Last Year: Not on file    Number of [place] in the Last Year: Not on file    Unstable Housing in the Last Year: Not on file       Allergies   Allergen Reactions    Aspirin Shortness Of Breath    Spironolactone      Low sodium    Dilaudid [Hydromorphone Hcl]     Fd&C Yellow #5 [place] [Tartrazine]     Ondansetron     Quinolones     Sulfa Antibiotics     Codeine Rash and Other (See Comments)     Makes her \"crazy and mean\" and gives her a rash    Demerol Hcl [Meperidine] Other (See Comments)     Made her \"crazy and mean\", and \"loopy\"    Levaquin [Levofloxacin In D5w] Other (See Comments)     Seizure like activity      Myrbetriq [Mirabegron] Other (See Comments)     Unable to micturate    Namenda [Memantine Hcl] Other (See Comments)     made her like a vegetable for hours      Norco [Hydrocodone-Acetaminophen] Rash and Other (See Comments)     \"crazy and mean\"    Pneumococcal Vaccines Swelling       Current Outpatient Medications   Medication Sig Dispense Refill    hydrALAZINE (APRESOLINE) 50 MG tablet Take 50 mg by mouth 2 times daily DO NOT TAKE IN THE AM DUE TO VASAL VAGAL RESPONCE      dipyridamole (PERSANTINE) 50 MG tablet Take 2 tablets by mouth 2 times daily Indications: 9am and 9pm 60 tablet 11    gabapentin (NEURONTIN) 100 MG capsule TAKE 1 CAPSULE BY MOUTH 3 TIMES DAILY.  INTENDED SUPPLY: 30 DAYS (Patient taking differently: Take 100 mg by mouth 2 times daily. ) 90 capsule 5    Thiamine HCl (VITAMIN B-1 PO) Take 100 mg by mouth daily Takes at 2:00 PM       fluticasone (FLONASE) 50 MCG/ACT nasal spray 1 spray by Each Nostril route 2 times daily       levETIRAcetam (KEPPRA) 500 MG tablet Take 1 tablet by mouth 2 times daily Indications: takes 9am and 9pm 60 tablet 11    cloNIDine (CATAPRES) 0.1 MG tablet Take 1 tablet by mouth 2 times daily as needed for High Blood Pressure (for BP greater than 160/100) 30 tablet 2    pantoprazole (PROTONIX) 40 MG tablet Take 40 mg by mouth 2 times daily Indications: 9am       acetaminophen (TYLENOL) 500 MG tablet Take 1,000 mg by mouth 3 times daily       meloxicam (MOBIC) 7.5 MG tablet Take 15 mg by mouth daily Indications: takes at 9am       donepezil (ARICEPT) 10 MG tablet Take 1 tablet by mouth nightly 90 tablet 3    Docosanol (ABREVA EX) Apply topically as needed       simethicone (GAS-X EXTRA STRENGTH) 125 MG chewable tablet Take 125 mg by mouth 3 times daily 2 PO 6:30 AM, 2 at 10:00 AM, 1 at 5:30 PM otherwise prn      butalbital-acetaminophen-caffeine (FIORICET, ESGIC) -40 MG per tablet Take 1 tablet by mouth every 6 hours as needed for Headaches       amLODIPine (NORVASC) 5 MG tablet Take 5 mg by mouth 2 times daily Indications: takes at 9am and 9pm If BP sys 120 or below then 2.5mg bid otherwise 5mg bid      diclofenac sodium 1 % GEL Apply 2 g topically 3 times daily as needed for Pain       sodium chloride 1 g tablet Take 1 g by mouth 3 times daily Indications: takes 9am, 3pm, and 9pm       lidocaine 4 % external patch Place 1 patch onto the skin daily as needed (LEFT LOWER BACK PAIN)       valACYclovir (VALTREX) 1 g tablet as needed       azelastine (ASTELIN) 0.1 % nasal spray 2 sprays by Nasal route 2 times daily Indications: takes 9am and 9pm       Cholecalciferol (VITAMIN D3) 5000 units TABS Take 5,000 Units by mouth daily Indications: AT 2:30 PM       sodium chloride (OCEAN, BABY AYR) 0.65 % nasal spray 1 spray by Nasal route as needed for Congestion       Lutein 20 MG TABS Take 20 mg by mouth daily Indications: 3 PM DAILY       camphor-menthol (SARNA) 0.5-0.5 % lotion Apply 0.5 mLs topically 2 times daily as needed for Itching Apply topically as needed.  Calcium Carb-Cholecalciferol (CALCIUM 600+D) 600-800 MG-UNIT TABS Take 1 tablet by mouth daily Indications: AT 2:30 PM       promethazine (PHENERGAN) 25 MG tablet Take 12.5 mg by mouth 3 times daily as needed for Nausea       Omega 3-6-9 Fatty Acids (OMEGA 3-6-9 COMPLEX PO) Take 2 tablets by mouth daily Indications: AT 2:30 PM       alendronate (FOSAMAX) 70 MG tablet Take 70 mg by mouth every 7 days Sundays at 1830      pravastatin (PRAVACHOL) 20 MG tablet Take 20 mg by mouth nightly Indications: takes at 9am       ramipril (ALTACE) 10 MG capsule Take 10 mg by mouth 2 times daily Indications: takes at 9am and 9pm       b complex vitamins capsule Take 1 capsule by mouth daily Indications: AT 2:30 PM B 100      Multiple Vitamins-Minerals (THERAPEUTIC MULTIVITAMIN-MINERALS) tablet Take 1 tablet by mouth daily Indications: AT 2:30 PM       dicyclomine (BENTYL) 10 MG capsule Take 10 mg by mouth as needed  (Patient not taking: Reported on 2/16/2022)       No current facility-administered medications for this visit. Review of Systems   Constitutional: Negative for fatigue and unexpected weight change. HENT: Negative for trouble swallowing and voice change. Respiratory: Positive for shortness of breath. Negative for cough. Cardiovascular: Negative for chest pain and palpitations. Gastrointestinal: Negative for abdominal distention, abdominal pain, anal bleeding, blood in stool, constipation, diarrhea, nausea, rectal pain and vomiting. Genitourinary: Negative for hematuria. Musculoskeletal: Positive for arthralgias, back pain and neck pain. Neurological: Negative for weakness and headaches. Psychiatric/Behavioral: Positive for dysphoric mood. The patient is nervous/anxious. Objective:     Physical Exam  Vitals and nursing note reviewed. Constitutional:       Appearance: She is well-developed. Comments: /64   Pulse 72   Ht 5' (1.524 m)   Wt 125 lb (56.7 kg)   SpO2 98%   BMI 24.41 kg/m²    Eyes:      General: No scleral icterus. Conjunctiva/sclera: Conjunctivae normal.      Pupils: Pupils are equal, round, and reactive to light. Cardiovascular:      Rate and Rhythm: Normal rate and regular rhythm. Heart sounds: Normal heart sounds. No murmur heard. No friction rub. No gallop. Pulmonary:      Effort: Pulmonary effort is normal. No respiratory distress. Breath sounds: Normal breath sounds. Abdominal:      General: Bowel sounds are normal. There is no distension. Palpations: Abdomen is soft. Tenderness: There is no abdominal tenderness. There is no rebound. Neurological:      Mental Status: She is alert and oriented to person, place, and time. Cranial Nerves: No cranial nerve deficit. Psychiatric:         Judgment: Judgment normal.           Assessment:       Diagnosis Orders   1. Presbyesophagus     2. Schatzki's ring of distal esophagus     3. S/P balloon dilatation of esophageal stricture     4. Hiatal hernia           Plan:      1. F/u prn  2.  Time spent-25 minutes explaining everything to pt and her daughter

## 2022-02-24 ENCOUNTER — NURSE ONLY (OUTPATIENT)
Dept: UROLOGY | Age: 87
End: 2022-02-24
Payer: MEDICARE

## 2022-02-24 DIAGNOSIS — N39.46 MIXED STRESS AND URGE URINARY INCONTINENCE: ICD-10-CM

## 2022-02-24 DIAGNOSIS — N32.81 OVERACTIVE BLADDER: Primary | ICD-10-CM

## 2022-02-24 PROCEDURE — 64566 NEUROELTRD STIM POST TIBIAL: CPT | Performed by: NURSE PRACTITIONER

## 2022-02-24 NOTE — PROGRESS NOTES
Treatment #2-week maintenance    Improvement of Symptoms? Yes, does have some recurrence of symptoms just before procedure. OAB/Urologic Medications? None      Uroplasty Procedure:    1. Patient is seated with the left treatment leg elevated. 2. 34 gauge fine needle electrode is inserted into lower inner aspect of the leg. Slightly cephalad to the medial malleolus. 3. Surface electrode pad is placed over the medial aspect of the calcaneus on the same leg. 4.Needle electrode is connected to the external pulse generator. 5.The pulse generator is turned on and the millivolts used are 6. 6.Patient is treated for 30 minutes. 7.The needle and pad are removed. Patient will follow up in 2 weeks for next treatment.

## 2022-02-25 ENCOUNTER — TELEPHONE (OUTPATIENT)
Dept: VASCULAR SURGERY | Age: 87
End: 2022-02-25

## 2022-02-25 NOTE — TELEPHONE ENCOUNTER
Daughter requests that nurse return their call. Pt. Is scheduled for carotid and followup on 2/28 and they were unaware appt. Had been made. She is wanting to disucuss, needs to know if this appt. Is needed. States pt. Could pass anytime is the state of her health and can only do afternoon appts. The best time to reach her is Anytime. Thank you.

## 2022-02-28 DIAGNOSIS — M79.605 PAIN IN BOTH LOWER EXTREMITIES: ICD-10-CM

## 2022-02-28 DIAGNOSIS — M79.604 PAIN IN BOTH LOWER EXTREMITIES: ICD-10-CM

## 2022-03-01 RX ORDER — GABAPENTIN 100 MG/1
CAPSULE ORAL
Qty: 90 CAPSULE | Refills: 5 | Status: SHIPPED | OUTPATIENT
Start: 2022-03-01 | End: 2022-09-02

## 2022-03-01 NOTE — TELEPHONE ENCOUNTER
Requested Prescriptions     Pending Prescriptions Disp Refills    gabapentin (NEURONTIN) 100 MG capsule [Pharmacy Med Name: GABAPENTIN 100MG] 90 capsule 4     Sig: TAKE 1 CAPSULE BY MOUTH 3 TIMES DAILY.        Last Office Visit:  7/1/2021  Next Office Visit:  4/11/2022  Last Medication Refill:  7/21/21 with 5 refills   Lamona Glaze up to date: 3/1/22      *RX updated to reflect   3/1/22  fill date*

## 2022-03-04 ENCOUNTER — TELEPHONE (OUTPATIENT)
Dept: VASCULAR SURGERY | Age: 87
End: 2022-03-04

## 2022-03-09 ENCOUNTER — APPOINTMENT (OUTPATIENT)
Dept: GENERAL RADIOLOGY | Age: 87
End: 2022-03-09
Payer: MEDICARE

## 2022-03-09 ENCOUNTER — HOSPITAL ENCOUNTER (EMERGENCY)
Age: 87
Discharge: HOME | End: 2022-03-09
Payer: MEDICARE

## 2022-03-09 ENCOUNTER — NURSE ONLY (OUTPATIENT)
Dept: UROLOGY | Age: 87
End: 2022-03-09
Payer: MEDICARE

## 2022-03-09 VITALS
HEART RATE: 72 BPM | SYSTOLIC BLOOD PRESSURE: 131 MMHG | RESPIRATION RATE: 18 BRPM | OXYGEN SATURATION: 95 % | DIASTOLIC BLOOD PRESSURE: 69 MMHG | TEMPERATURE: 98 F

## 2022-03-09 VITALS — BODY MASS INDEX: 24.54 KG/M2 | WEIGHT: 125 LBS | HEIGHT: 60 IN | TEMPERATURE: 97.5 F

## 2022-03-09 DIAGNOSIS — N32.81 OVERACTIVE BLADDER: Primary | ICD-10-CM

## 2022-03-09 DIAGNOSIS — F03.90 DEMENTIA WITHOUT BEHAVIORAL DISTURBANCE, UNSPECIFIED DEMENTIA TYPE: ICD-10-CM
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
## 2022-03-09 DIAGNOSIS — N39.46 MIXED STRESS AND URGE URINARY INCONTINENCE: ICD-10-CM

## 2022-03-09 LAB
ALBUMIN SERPL-MCNC: 4.3 G/DL (ref 3.5–5.2)
ALP BLD-CCNC: 67 U/L (ref 35–104)
ALT SERPL-CCNC: 16 U/L (ref 5–33)
ANION GAP SERPL CALCULATED.3IONS-SCNC: 13 MMOL/L (ref 7–19)
AST SERPL-CCNC: 24 U/L (ref 5–32)
BASE EXCESS ARTERIAL: 4 MMOL/L (ref -2–2)
BASOPHILS ABSOLUTE: 0 K/UL (ref 0–0.2)
BASOPHILS RELATIVE PERCENT: 0.9 % (ref 0–1)
BILIRUB SERPL-MCNC: 0.3 MG/DL (ref 0.2–1.2)
BUN BLDV-MCNC: 10 MG/DL (ref 8–23)
CALCIUM SERPL-MCNC: 9.6 MG/DL (ref 8.8–10.2)
CARBOXYHEMOGLOBIN ARTERIAL: 1.8 % (ref 0–5)
CHLORIDE BLD-SCNC: 95 MMOL/L (ref 98–111)
CO2: 23 MMOL/L (ref 22–29)
CREAT SERPL-MCNC: 0.7 MG/DL (ref 0.5–0.9)
EOSINOPHILS ABSOLUTE: 0.1 K/UL (ref 0–0.6)
EOSINOPHILS RELATIVE PERCENT: 2.2 % (ref 0–5)
GFR AFRICAN AMERICAN: >59
GFR NON-AFRICAN AMERICAN: >60
GLUCOSE BLD-MCNC: 120 MG/DL (ref 74–109)
HCO3 ARTERIAL: 27.4 MMOL/L (ref 22–26)
HCT VFR BLD CALC: 34.5 % (ref 37–47)
HEMOGLOBIN, ART, EXTENDED: 11.7 G/DL (ref 12–16)
HEMOGLOBIN: 11.2 G/DL (ref 12–16)
IMMATURE GRANULOCYTES #: 0 K/UL
LYMPHOCYTES ABSOLUTE: 1.7 K/UL (ref 1.1–4.5)
LYMPHOCYTES RELATIVE PERCENT: 36.9 % (ref 20–40)
MAGNESIUM: 1.9 MG/DL (ref 1.6–2.4)
MCH RBC QN AUTO: 32.2 PG (ref 27–31)
MCHC RBC AUTO-ENTMCNC: 32.5 G/DL (ref 33–37)
MCV RBC AUTO: 99.1 FL (ref 81–99)
METHEMOGLOBIN ARTERIAL: 0.9 %
MONOCYTES ABSOLUTE: 0.4 K/UL (ref 0–0.9)
MONOCYTES RELATIVE PERCENT: 8.9 % (ref 0–10)
NEUTROPHILS ABSOLUTE: 2.3 K/UL (ref 1.5–7.5)
NEUTROPHILS RELATIVE PERCENT: 50.9 % (ref 50–65)
O2 CONTENT ARTERIAL: 16.1 ML/DL
O2 SAT, ARTERIAL: 96.9 %
O2 THERAPY: ABNORMAL
PCO2 ARTERIAL: 36 MMHG (ref 35–45)
PDW BLD-RTO: 12 % (ref 11.5–14.5)
PH ARTERIAL: 7.49 (ref 7.35–7.45)
PLATELET # BLD: 184 K/UL (ref 130–400)
PMV BLD AUTO: 9.9 FL (ref 9.4–12.3)
PO2 ARTERIAL: 97 MMHG (ref 80–100)
POTASSIUM REFLEX MAGNESIUM: 3.5 MMOL/L (ref 3.5–5)
POTASSIUM, WHOLE BLOOD: 3.1
RBC # BLD: 3.48 M/UL (ref 4.2–5.4)
SODIUM BLD-SCNC: 131 MMOL/L (ref 136–145)
TOTAL PROTEIN: 6.5 G/DL (ref 6.6–8.7)
WBC # BLD: 4.5 K/UL (ref 4.8–10.8)

## 2022-03-09 PROCEDURE — 84132 ASSAY OF SERUM POTASSIUM: CPT

## 2022-03-09 PROCEDURE — 80053 COMPREHEN METABOLIC PANEL: CPT

## 2022-03-09 PROCEDURE — 83735 ASSAY OF MAGNESIUM: CPT

## 2022-03-09 PROCEDURE — 85025 COMPLETE CBC W/AUTO DIFF WBC: CPT

## 2022-03-09 PROCEDURE — 36415 COLL VENOUS BLD VENIPUNCTURE: CPT

## 2022-03-09 PROCEDURE — 80177 DRUG SCRN QUAN LEVETIRACETAM: CPT

## 2022-03-09 PROCEDURE — 82803 BLOOD GASES ANY COMBINATION: CPT

## 2022-03-09 PROCEDURE — 71045 X-RAY EXAM CHEST 1 VIEW: CPT

## 2022-03-09 PROCEDURE — 64566 NEUROELTRD STIM POST TIBIAL: CPT | Performed by: NURSE PRACTITIONER

## 2022-03-09 PROCEDURE — 99283 EMERGENCY DEPT VISIT LOW MDM: CPT

## 2022-03-09 PROCEDURE — 36600 WITHDRAWAL OF ARTERIAL BLOOD: CPT

## 2022-03-09 ASSESSMENT — ENCOUNTER SYMPTOMS
SHORTNESS OF BREATH: 1
VOMITING: 0
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
## 2022-03-09 NOTE — PROGRESS NOTES
Results for Mira Salas (MRN [id number]) as of 3/9/2022 16:56   Ref.  Range 3/9/2022 16:54   Hemoglobin, Art, Extended Latest Ref Range: 12.0 - 16.0 g/dL 11.7 (L)   pH, Arterial Latest Ref Range: 7.350 - 7.450  7.490 (H)   pCO2, Arterial Latest Ref Range: 35.0 - 45.0 mmHg 36.0   pO2, Arterial Latest Ref Range: 80.0 - 100.0 mmHg 97.0   HCO3, Arterial Latest Ref Range: 22.0 - 26.0 mmol/L 27.4 (H)   Base Excess, Arterial Latest Ref Range: -2.0 - 2.0 mmol/L 4.0 (H)   O2 Sat, Arterial Latest Ref Range: >92 % 96.9   O2 Content, Arterial Latest Ref Range: Not Established mL/dL 16.1   Methemoglobin, Arterial Latest Ref Range: <1.5 % 0.9   Carboxyhgb, Arterial Latest Ref Range: 0.0 - 5.0 % 1.8   Pt on room air, rr, AT+

## 2022-03-09 NOTE — PROGRESS NOTES
Treatment # 2 week maintenance    Improvement of Symptoms? Yes    OAB/Urologic Medications? none      Uroplasty Procedure:    1. Patient is seated with the left treatment leg elevated. 2. 34 gauge fine needle electrode is inserted into lower inner aspect of the leg. Slightly cephalad to the medial malleolus. 3. Surface electrode pad is placed over the medial aspect of the calcaneus on the same leg. 4.Needle electrode is connected to the external pulse generator. 5.The pulse generator is turned on and the millivolts used are 4. 6.Patient is treated for 30 minutes. 7.The needle and pad are removed. Patient will follow up in 2 weeks for next treatment.
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
## 2022-03-10 NOTE — TELEPHONE ENCOUNTER
Requested Prescriptions     Pending Prescriptions Disp Refills    levETIRAcetam (KEPPRA) 500 MG tablet 60 tablet 11     Sig: Take 1 tablet by mouth 2 times daily Indications: takes 9am and 9pm    dipyridamole (PERSANTINE) 50 MG tablet 60 tablet 11     Sig: Take 2 tablets by mouth 2 times daily Indications: 9am and 9pm       Last Office Visit: 7/1/2021  Next Office Visit: 4/11/2022  Last Medication Refill: Keppra - 3/18/21 with 11 refills Persantine- 9/16/21 with 11 refills

## 2022-03-11 RX ORDER — LEVETIRACETAM 500 MG/1
500 TABLET ORAL 2 TIMES DAILY
Qty: 60 TABLET | Refills: 11 | Status: SHIPPED | OUTPATIENT
Start: 2022-03-11 | End: 2022-10-31 | Stop reason: SDUPTHER

## 2022-03-11 RX ORDER — DIPYRIDAMOLE 50 MG
100 TABLET ORAL 2 TIMES DAILY
Qty: 60 TABLET | Refills: 11 | Status: SHIPPED | OUTPATIENT
Start: 2022-03-11 | End: 2022-10-31 | Stop reason: SDUPTHER

## 2022-03-14 LAB — KEPPRA: 28 UG/ML (ref 10–40)

## 2022-03-21 ENCOUNTER — TELEPHONE (OUTPATIENT)
Dept: VASCULAR SURGERY | Age: 87
End: 2022-03-21

## 2022-03-21 NOTE — TELEPHONE ENCOUNTER
Pt daughter has called to inform us that her mothers foot pain has been discovered to have been arthritis and she is currently getting steroid injections every 3 months.

## 2022-03-23 ENCOUNTER — NURSE ONLY (OUTPATIENT)
Dept: UROLOGY | Age: 87
End: 2022-03-23
Payer: MEDICARE

## 2022-03-23 VITALS — WEIGHT: 125 LBS | TEMPERATURE: 98 F | BODY MASS INDEX: 24.54 KG/M2 | HEIGHT: 60 IN

## 2022-03-23 DIAGNOSIS — N39.46 MIXED STRESS AND URGE URINARY INCONTINENCE: ICD-10-CM

## 2022-03-23 DIAGNOSIS — N32.81 OVERACTIVE BLADDER: Primary | ICD-10-CM

## 2022-03-23 PROCEDURE — 64566 NEUROELTRD STIM POST TIBIAL: CPT | Performed by: NURSE PRACTITIONER

## 2022-03-23 NOTE — PROGRESS NOTES
Treatment # 2 week maintenance    Improvement of Symptoms? Yes, but increase in symptoms prior to appts    OAB/Urologic Medications? none      Uroplasty Procedure:    1. Patient is seated with the left treatment leg elevated. 2. 34 gauge fine needle electrode is inserted into lower inner aspect of the leg. Slightly cephalad to the medial malleolus. 3. Surface electrode pad is placed over the medial aspect of the calcaneus on the same leg. 4.Needle electrode is connected to the external pulse generator. 5.The pulse generator is turned on and the millivolts used are 13. 6.Patient is treated for 30 minutes. 7.The needle and pad are removed. Patient will follow up in 2 weeks for next treatment.

## 2022-04-06 ENCOUNTER — NURSE ONLY (OUTPATIENT)
Dept: UROLOGY | Age: 87
End: 2022-04-06
Payer: MEDICARE

## 2022-04-06 VITALS — BODY MASS INDEX: 24.54 KG/M2 | TEMPERATURE: 97.2 F | HEIGHT: 60 IN | WEIGHT: 125 LBS

## 2022-04-06 DIAGNOSIS — N39.46 MIXED STRESS AND URGE URINARY INCONTINENCE: ICD-10-CM

## 2022-04-06 DIAGNOSIS — N32.81 OVERACTIVE BLADDER: Primary | ICD-10-CM

## 2022-04-06 PROCEDURE — 64566 NEUROELTRD STIM POST TIBIAL: CPT | Performed by: NURSE PRACTITIONER

## 2022-04-06 NOTE — PROGRESS NOTES
Treatment # 2 week maintenance    Improvement of Symptoms? yes    OAB/Urologic Medications? none      Uroplasty Procedure:    1. Patient is seated with the left treatment leg elevated. 2. 34 gauge fine needle electrode is inserted into lower inner aspect of the leg. Slightly cephalad to the medial malleolus. 3. Surface electrode pad is placed over the medial aspect of the calcaneus on the same leg. 4.Needle electrode is connected to the external pulse generator. 5.The pulse generator is turned on and the millivolts used are 6. 6.Patient is treated for 30 minutes. 7.The needle and pad are removed. Patient will follow up in 2 weeks for next treatment.

## 2022-04-12 ENCOUNTER — OFFICE VISIT (OUTPATIENT)
Dept: NEUROLOGY | Age: 87
End: 2022-04-12
Payer: MEDICARE

## 2022-04-12 VITALS
HEIGHT: 63 IN | DIASTOLIC BLOOD PRESSURE: 72 MMHG | BODY MASS INDEX: 21.79 KG/M2 | RESPIRATION RATE: 16 BRPM | SYSTOLIC BLOOD PRESSURE: 133 MMHG | WEIGHT: 123 LBS | HEART RATE: 69 BPM

## 2022-04-12 DIAGNOSIS — I63.9 SMALL VESSEL CEREBROVASCULAR ACCIDENT (CVA) (HCC): ICD-10-CM

## 2022-04-12 DIAGNOSIS — R55 VASOVAGAL SYNCOPE: ICD-10-CM

## 2022-04-12 DIAGNOSIS — G40.219 PARTIAL SYMPTOMATIC EPILEPSY WITH COMPLEX PARTIAL SEIZURES, INTRACTABLE, WITHOUT STATUS EPILEPTICUS (HCC): ICD-10-CM

## 2022-04-12 DIAGNOSIS — F01.50 VASCULAR DEMENTIA WITHOUT BEHAVIORAL DISTURBANCE (HCC): Primary | ICD-10-CM

## 2022-04-12 PROCEDURE — G8420 CALC BMI NORM PARAMETERS: HCPCS | Performed by: PSYCHIATRY & NEUROLOGY

## 2022-04-12 PROCEDURE — G8428 CUR MEDS NOT DOCUMENT: HCPCS | Performed by: PSYCHIATRY & NEUROLOGY

## 2022-04-12 PROCEDURE — 4040F PNEUMOC VAC/ADMIN/RCVD: CPT | Performed by: PSYCHIATRY & NEUROLOGY

## 2022-04-12 PROCEDURE — 1036F TOBACCO NON-USER: CPT | Performed by: PSYCHIATRY & NEUROLOGY

## 2022-04-12 PROCEDURE — 1123F ACP DISCUSS/DSCN MKR DOCD: CPT | Performed by: PSYCHIATRY & NEUROLOGY

## 2022-04-12 PROCEDURE — 99213 OFFICE O/P EST LOW 20 MIN: CPT | Performed by: PSYCHIATRY & NEUROLOGY

## 2022-04-12 PROCEDURE — 1090F PRES/ABSN URINE INCON ASSESS: CPT | Performed by: PSYCHIATRY & NEUROLOGY

## 2022-04-12 NOTE — PROGRESS NOTES
[facility]  [address] [place] [address] Drive, [address]  [place], Wilmer Horowitz  Phone [phone]  Fax [phone]     [facility] Follow Up Encounter  22 1:26 PM CDT    Information:   Patient Name: Rola Gutierrez  :   1935  Age:   80 y.o. MRN:   [id number]  Account #:  [de-identified]  Today:  22    Provider: Ning Ledbetter M.D. Chief Complaint:   Chief Complaint   Patient presents with    Follow-up    Seizures       Subjective:   Rola Gutierrez is a 80 y.o. woman with a history of small vessel cerebrovascular disease, dementia, seizures, and syncope who is following up. She has had no definite seizures, syncope, or stroke symptoms. She requires some assistance for mobility and for personal care. Her daughter resides with her. Her daughter complains that her mother's memory is worsening. She takes 10 mg Aricept nightly. She previously did not tolerate Namenda. She has had worsening gait and balance. She is generally weak. She has had recurrent UTIs and has incontinence.         Objective:     Past Medical History:  Past Medical History:   Diagnosis Date    Age-related macular degeneration, wet, right eye ([address]     Anemia     Back pain     Bilateral carotid artery stenosis 2021    Chronic bilateral low back pain with left-sided sciatica 2019    Colitis, ischemic ([address]     Dementia ([address]     Diverticulosis     Dry age-related macular degeneration of left eye     Hyperlipidemia     Hypertension     Osteoarthritis     Palliative care patient 2019    Risk for falls 2019    Seizures ([address]     Spastic colon     Status post placement of implantable loop recorder 10/27/2020    Stroke syndrome     CVA's/ TIA's    Urinary incontinence     Uterine cancer ([address]     Uterine cancer [facility])        Past Surgical History:   Procedure Laterality Date    BREAST SURGERY Right     FNA Right Breast \"oh heavens, maybe 20 years ago\"    CHOLECYSTECTOMY      COLONOSCOPY  2003    Dr Toyin Toure ischemic colitis, incomplete exam    COLONOSCOPY  2003    Dr Abhijit Fragoso:  limited colon-ischemic colitis    DILATION AND CURETTAGE OF UTERUS      x2, in [facility] tears, laser suregry, adn cataract with IOL b/l    HYSTERECTOMY  2020    ovaries and tubes    INSERTABLE CARDIAC MONITOR      LUMBAR FUSION       90 days after the spine surgeries    OTHER SURGICAL HISTORY      inner lymph nodes    OTHER SURGICAL HISTORY  2020    Loop recorder     PACEMAKER INSERTION      PACEMAKER PLACEMENT  2021    SPINE SURGERY      L3,4, and 5   done in    [place]      as a child at age 9    [address] [facility] [address]  2003    DR Michele Guillen:  Duodenal scarring but no evidence of active ulcer disease.  UPPER GASTROINTESTINAL ENDOSCOPY N/A 01/10/2022    Dr Lord Prakash ring dilated up to 18mm with balloon, Presbyesophagus-like esophageal changes, 3-4cm HH, NEG Hpylori, NEG Barretts       Recent Hospitalizations  · None    Significant Injuries  · None    Habits  Kelsey Donahue reports that she has never smoked. She has never used smokeless tobacco. She reports that she does not drink alcohol and does not use drugs.     Family History   Problem Relation Age of Onset    Heart Defect Mother     Hypertension Mother     Heart Attack Father         x3 within 24 h and then , abused ciggarettes    No Known Problems Daughter     No Known Problems Son     No Known Problems Son     Colon Cancer Neg Hx     Colon Polyps Neg Hx     Esophageal Cancer Neg Hx     Liver Cancer Neg Hx     Liver Disease Neg Hx     Rectal Cancer Neg Hx     Stomach Cancer Neg Hx        Social History  Kelsey Donahue is , lives in [place], [place], and is retired    Medications:  Current Outpatient Medications   Medication Sig Dispense Refill    levETIRAcetam (KEPPRA) 500 MG tablet Take 1 tablet by mouth 2 times daily Indications: takes 9am and 9pm 60 tablet 11    dipyridamole (PERSANTINE) 50 MG tablet Take 2 tablets by mouth 2 times daily Indications: 9am and 9pm 60 tablet 11    gabapentin (NEURONTIN) 100 MG capsule TAKE 1 CAPSULE BY MOUTH 3 TIMES DAILY.  90 capsule 5    hydrALAZINE (APRESOLINE) 50 MG tablet Take 50 mg by mouth 2 times daily DO NOT TAKE IN THE AM DUE TO VASAL VAGAL RESPONCE      dicyclomine (BENTYL) 10 MG capsule Take 10 mg by mouth as needed       Thiamine HCl (VITAMIN B-1 PO) Take 100 mg by mouth daily Takes at 2:00 PM       fluticasone (FLONASE) 50 MCG/ACT nasal spray 1 spray by Each Nostril route 2 times daily       cloNIDine (CATAPRES) 0.1 MG tablet Take 1 tablet by mouth 2 times daily as needed for High Blood Pressure (for BP greater than 160/100) 30 tablet 2    pantoprazole (PROTONIX) 40 MG tablet Take 40 mg by mouth 2 times daily Indications: 9am       acetaminophen (TYLENOL) 500 MG tablet Take 1,000 mg by mouth 3 times daily       meloxicam (MOBIC) 7.5 MG tablet Take 15 mg by mouth daily Indications: takes at 9am       donepezil (ARICEPT) 10 MG tablet Take 1 tablet by mouth nightly 90 tablet 3    Docosanol (ABREVA EX) Apply topically as needed       simethicone (GAS-X EXTRA STRENGTH) 125 MG chewable tablet Take 125 mg by mouth 3 times daily 2 PO 6:30 AM, 2 at 10:00 AM, 1 at 5:30 PM otherwise prn      butalbital-acetaminophen-caffeine (FIORICET, ESGIC) -40 MG per tablet Take 1 tablet by mouth every 6 hours as needed for Headaches       amLODIPine (NORVASC) 5 MG tablet Take 5 mg by mouth 2 times daily Indications: takes at 9am and 9pm If BP sys 120 or below then 2.5mg bid otherwise 5mg bid      diclofenac sodium 1 % GEL Apply 2 g topically 3 times daily as needed for Pain       sodium chloride 1 g tablet Take 1 g by mouth 3 times daily Indications: takes 9am, 3pm, and 9pm       lidocaine 4 % external patch Place 1 patch onto the skin daily as needed (LEFT LOWER BACK PAIN)       valACYclovir (VALTREX) 1 g tablet as needed       azelastine (ASTELIN) 0.1 % nasal spray 2 sprays by Nasal route 2 times daily Indications: takes 9am and 9pm       Cholecalciferol (VITAMIN D3) 5000 units TABS Take 5,000 Units by mouth daily Indications: AT 2:30 PM       sodium chloride (OCEAN, BABY AYR) 0.65 % nasal spray 1 spray by Nasal route as needed for Congestion       Lutein 20 MG TABS Take 20 mg by mouth daily Indications: 3 PM DAILY       camphor-menthol (SARNA) 0.5-0.5 % lotion Apply 0.5 mLs topically 2 times daily as needed for Itching Apply topically as needed.  Calcium Carb-Cholecalciferol (CALCIUM 600+D) 600-800 MG-UNIT TABS Take 1 tablet by mouth daily Indications: AT 2:30 PM       promethazine (PHENERGAN) 25 MG tablet Take 12.5 mg by mouth 3 times daily as needed for Nausea       Omega 3-6-9 Fatty Acids (OMEGA 3-6-9 COMPLEX PO) Take 2 tablets by mouth daily Indications: AT 2:30 PM       alendronate (FOSAMAX) 70 MG tablet Take 70 mg by mouth every 7 days Sundays at 1830      pravastatin (PRAVACHOL) 20 MG tablet Take 20 mg by mouth nightly Indications: takes at 9am       ramipril (ALTACE) 10 MG capsule Take 10 mg by mouth 2 times daily Indications: takes at 9am and 9pm       b complex vitamins capsule Take 1 capsule by mouth daily Indications: AT 2:30 PM B 100      Multiple Vitamins-Minerals (THERAPEUTIC MULTIVITAMIN-MINERALS) tablet Take 1 tablet by mouth daily Indications: AT 2:30 PM        No current facility-administered medications for this visit. Allergies:   Allergies as of 04/12/2022 - Fully Reviewed 04/12/2022   Allergen Reaction Noted    Aspirin Shortness Of Breath 03/17/2016    Spironolactone  09/25/2019    Dilaudid [hydromorphone hcl]  03/17/2016    Fd&c yellow #5 [place] [tartrazine]  01/05/2021    Ondansetron  03/17/2016    Quinolones  09/14/2019    Sulfa antibiotics  03/17/2016    Codeine Rash and Other (See Comments) 08/11/2016  Demerol hcl [meperidine] Other (See Comments) 03/17/2016    Levaquin [levofloxacin in d5w] Other (See Comments) 02/21/2018    Myrbetriq [mirabegron] Other (See Comments) 04/12/2018    Namenda [memantine hcl] Other (See Comments) 03/17/2016    Norco [hydrocodone-acetaminophen] Rash and Other (See Comments) 03/17/2016    Pneumococcal vaccines Swelling 03/17/2016       Review of Systems:  Constitutional: negative for - chills and fever  Eyes:  negative for - visual disturbance and photophobia  HENMT: negative for - headaches and sinus pain  Respiratory: negative for - cough, hemoptysis, and shortness of breath  Cardiovascular: negative for - chest pain and palpitations  Gastrointestinal: negative for - blood in stools, constipation, diarrhea, nausea, and vomiting  Genito-Urinary: negative for - hematuria, urinary frequency, urinary urgency, and urinary retention  Musculoskeletal: positive for - joint pain, joint stiffness, and joint swelling  Hematological and Lymphatic: negative for - bleeding problems, abnormal bruising, and swollen lymph nodes  Endocrine:  negative for - polydipsia and polyphagia  Allergy/Immunology:  negative for - rhinorrhea, sinus congestion, hives  Integument:  negative for - negative for - rash, change in moles, new or changing lesions  Psychological: negative for - anxiety and depression  Neurological: positive for - memory loss, numbness/tingling, and weakness     PHYSICAL EXAMINATION:  Vitals:  /72   Pulse 69   Resp 16   Ht 5' 3\" (1.6 m)   Wt 123 lb (55.8 kg)   BMI 21.79 kg/m²   General appearance:  Alert, well developed, well nourished, in no distress  HEENT:  normocephalic, atraumatic, sclera appear normal, no nasal abnormalities, no rhinorrhea, Ears appear normal, oral mucous membranes are moist without erythema, trachea midline, thyroid is normal, no lymphadenopathy or neck mass. Cardiovascular:  Regular rate and rhythm without murmer.   No peripheral edema, No cyanosis or clubbing. No carotid bruits. Pulmonary:  Lungs are clear to auscultation. Breathing appears normal, good expansion, normal effort without use of accessory muscles  Musculoskeletal:  Joints are osteoarthritic  Integument:  No rash, erythema, or pallor  Psychiatric:  Mood, affect, and behavior appear normal      NEUROLOGIC EXAMINATION:  Mental Status:  alert, oriented to person, place, and time. Speech:  Clear without dysarthria or dysphonia  Language:  Fluent without aphasia  Cranial Nerves:              II          Visual fields are full to confrontation              III,IV, VI           Extraocular movements are full              VII        Facial movements are symmetrical without weakness              VIII       Hearing is intact              IX,X     Shoulder shrug and head rotation strength are intact              XII        No tongue atrophy or fasciculations. Normal tongue protrusion. No tongue weakness  Motor:  Normal strength in both upper and lower extremities. Normal muscle tone and bulk. Deep tendon reflexes are intact and symmetrical in both upper and lower extremities. Vazquez's signs are absent bilaterally. There is no ankle clonus on either side. Coordination:  Rapid alternating movements are normal in both upper and lower extremities. Finger to nose testing is unimpaired bilaterally. Gait:  Mildly unsteady      Assessment:       ICD-10-CM    1. Vascular dementia without behavioral disturbance (HCC)  F01.50    2. Small vessel cerebrovascular accident (CVA) ([address]  I63.9    3. Partial symptomatic epilepsy with complex partial seizures, intractable, without status epilepticus ([address]  G40.219    4. Vasovagal syncope  R55    Her medical and neurologic problems are fairly stable. She ha shad some memory decline and again. There is little to offer. The one thing that can be offered is increasing the dose of Aricept to the XR form. Plan:   1.  Consider increasing the Aricept to 23 mg XR daily. Daughter wants to check her insurance  2. Continue the other medications - Keppra, gabapetnin, Persantine, Pravachol  3.  FU in 6 months    Electronically signed by Jamison Garza MD on 4/12/22

## 2022-04-13 DIAGNOSIS — Z95.0 CARDIAC PACEMAKER: Primary | ICD-10-CM

## 2022-04-13 DIAGNOSIS — I49.5 SA NODE DYSFUNCTION (HCC): ICD-10-CM

## 2022-04-13 PROCEDURE — 93296 REM INTERROG EVL PM/IDS: CPT | Performed by: CLINICAL NURSE SPECIALIST

## 2022-04-13 PROCEDURE — 93294 REM INTERROG EVL PM/LDLS PM: CPT | Performed by: CLINICAL NURSE SPECIALIST

## 2022-04-20 ENCOUNTER — NURSE ONLY (OUTPATIENT)
Dept: UROLOGY | Age: 87
End: 2022-04-20
Payer: MEDICARE

## 2022-04-20 VITALS — TEMPERATURE: 98 F | WEIGHT: 122 LBS | BODY MASS INDEX: 21.62 KG/M2 | HEIGHT: 63 IN

## 2022-04-20 DIAGNOSIS — N39.46 MIXED STRESS AND URGE URINARY INCONTINENCE: ICD-10-CM

## 2022-04-20 DIAGNOSIS — N32.81 OVERACTIVE BLADDER: Primary | ICD-10-CM

## 2022-04-20 PROCEDURE — 64566 NEUROELTRD STIM POST TIBIAL: CPT | Performed by: NURSE PRACTITIONER

## 2022-04-20 NOTE — PROGRESS NOTES
Treatment # 2 week maintenance    Improvement of Symptoms? yes    OAB/Urologic Medications? none      Uroplasty Procedure:    1. Patient is seated with the left treatment leg elevated. 2. 34 gauge fine needle electrode is inserted into lower inner aspect of the leg. Slightly cephalad to the medial malleolus. 3. Surface electrode pad is placed over the medial aspect of the calcaneus on the same leg. 4.Needle electrode is connected to the external pulse generator. 5.The pulse generator is turned on and the millivolts used are 9. 6.Patient is treated for 30 minutes. 7.The needle and pad are removed. Patient will follow up in 2 weeks for next treatment.

## 2022-05-04 ENCOUNTER — NURSE ONLY (OUTPATIENT)
Dept: UROLOGY | Age: 87
End: 2022-05-04
Payer: MEDICARE

## 2022-05-04 DIAGNOSIS — N39.46 MIXED STRESS AND URGE URINARY INCONTINENCE: ICD-10-CM

## 2022-05-04 DIAGNOSIS — N32.81 OVERACTIVE BLADDER: Primary | ICD-10-CM

## 2022-05-04 PROCEDURE — 64566 NEUROELTRD STIM POST TIBIAL: CPT | Performed by: NURSE PRACTITIONER

## 2022-05-04 NOTE — PROGRESS NOTES
Treatment # 2 week maintenance    Improvement of Symptoms? yes    OAB/Urologic Medications? none      Uroplasty Procedure:    1. Patient is seated with the left treatment leg elevated. 2. 34 gauge fine needle electrode is inserted into lower inner aspect of the leg. Slightly cephalad to the medial malleolus. 3. Surface electrode pad is placed over the medial aspect of the calcaneus on the same leg. 4.Needle electrode is connected to the external pulse generator. 5.The pulse generator is turned on and the millivolts used are 3. 6.Patient is treated for 30 minutes. 7.The needle and pad are removed. Patient will follow up in 2 weeks for next treatment.

## 2022-05-12 ENCOUNTER — TELEPHONE (OUTPATIENT)
Dept: GASTROENTEROLOGY | Age: 87
End: 2022-05-12

## 2022-05-12 NOTE — TELEPHONE ENCOUNTER
05-12-22 Called and notified of recommendations as per [facility] APRN     Verbal agreement per Tj Fields)

## 2022-05-12 NOTE — TELEPHONE ENCOUNTER
05-12-22 Daughter Oma Bloom) Called and questioned patient can take her Protonix 40mg once daily and use the 2nd dose at night is needed. She stated that her mom has Vasovagal issues and a bm every morning. Would this cause any problems with her vasovagal issues?        Routed to [facility] APRN

## 2022-05-12 NOTE — TELEPHONE ENCOUNTER
protonix BID was prescribed by another provider, not me.  If once daily dosing is all she needs this is fine, she doesn't have to take it BID   The use of protonix shouldn't have anything to do with vasovagal responses her mother experiences

## 2022-05-13 ENCOUNTER — TELEPHONE (OUTPATIENT)
Dept: CARDIOLOGY CLINIC | Age: 87
End: 2022-05-13

## 2022-05-13 NOTE — TELEPHONE ENCOUNTER
Called to confirm appt for Marylene Spice for 5/16/22 at 3:00PM. Pt's daughter stated that she did not understand why the Pt was not seeing Dr. Kristina Louis, because at her last appt [facility] stated to have her see Kristina Louis in 6 months. I stated I was not sure, but Dr. Kristina Louis may of not had any opening that could accommodate the time frame so they may have tripp to [facility]. Pt's daughter wanted me to look through [facility] schedule for any spots within the next few months for that 6 mon f/u instead of seeing [facility]. I instructed her that I did not see any open appts for Dr. Kristina Louis, but that I could see if you knew of any open spots or anywhere the Pt could come in. Desire Soni wanted to leave the appt for [facility] just incase, and stated that she would like a call Monday morning if possible.

## 2022-05-16 ENCOUNTER — TELEPHONE (OUTPATIENT)
Dept: CARDIOLOGY CLINIC | Age: 87
End: 2022-05-16

## 2022-05-16 NOTE — TELEPHONE ENCOUNTER
Spoke with Patient's daughter. She is confirmed for Wednesday with Viky Gonzales and from there she can be scheduled with Dr. America Biswas.

## 2022-05-18 ENCOUNTER — NURSE ONLY (OUTPATIENT)
Dept: UROLOGY | Age: 87
End: 2022-05-18
Payer: MEDICARE

## 2022-05-18 DIAGNOSIS — N39.46 MIXED STRESS AND URGE URINARY INCONTINENCE: ICD-10-CM

## 2022-05-18 DIAGNOSIS — N32.81 OVERACTIVE BLADDER: Primary | ICD-10-CM

## 2022-05-18 PROCEDURE — 64566 NEUROELTRD STIM POST TIBIAL: CPT | Performed by: NURSE PRACTITIONER

## 2022-05-18 NOTE — PROGRESS NOTES
Treatment # 2 wk maintenance     Improvement of Symptoms? yes    OAB/Urologic Medications? none      Uroplasty Procedure:    1. Patient is seated with the left treatment leg elevated. 2. 34 gauge fine needle electrode is inserted into lower inner aspect of the leg. Slightly cephalad to the medial malleolus. 3. Surface electrode pad is placed over the medial aspect of the calcaneus on the same leg. 4.Needle electrode is connected to the external pulse generator. 5.The pulse generator is turned on and the millivolts used are 12. 6.Patient is treated for 30 minutes. 7.The needle and pad are removed. Patient will follow up in 2 weeks for next treatment.

## 2022-05-25 ENCOUNTER — OFFICE VISIT (OUTPATIENT)
Dept: CARDIOLOGY CLINIC | Age: 87
End: 2022-05-25
Payer: MEDICARE

## 2022-05-25 VITALS
DIASTOLIC BLOOD PRESSURE: 60 MMHG | WEIGHT: 125 LBS | HEART RATE: 68 BPM | BODY MASS INDEX: 24.54 KG/M2 | HEIGHT: 60 IN | OXYGEN SATURATION: 97 % | SYSTOLIC BLOOD PRESSURE: 118 MMHG

## 2022-05-25 DIAGNOSIS — R00.1 SYMPTOMATIC BRADYCARDIA: ICD-10-CM

## 2022-05-25 DIAGNOSIS — I10 ESSENTIAL HYPERTENSION: Primary | ICD-10-CM

## 2022-05-25 DIAGNOSIS — Z95.0 CARDIAC PACEMAKER: ICD-10-CM

## 2022-05-25 PROCEDURE — G8427 DOCREV CUR MEDS BY ELIG CLIN: HCPCS | Performed by: NURSE PRACTITIONER

## 2022-05-25 PROCEDURE — 1123F ACP DISCUSS/DSCN MKR DOCD: CPT | Performed by: NURSE PRACTITIONER

## 2022-05-25 PROCEDURE — G8420 CALC BMI NORM PARAMETERS: HCPCS | Performed by: NURSE PRACTITIONER

## 2022-05-25 PROCEDURE — 93280 PM DEVICE PROGR EVAL DUAL: CPT | Performed by: NURSE PRACTITIONER

## 2022-05-25 PROCEDURE — 99213 OFFICE O/P EST LOW 20 MIN: CPT | Performed by: NURSE PRACTITIONER

## 2022-05-25 PROCEDURE — 1036F TOBACCO NON-USER: CPT | Performed by: NURSE PRACTITIONER

## 2022-05-25 PROCEDURE — 1090F PRES/ABSN URINE INCON ASSESS: CPT | Performed by: NURSE PRACTITIONER

## 2022-05-25 RX ORDER — MELOXICAM 15 MG/1
15 TABLET ORAL DAILY
COMMUNITY
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
## 2022-06-01 ENCOUNTER — NURSE ONLY (OUTPATIENT)
Dept: UROLOGY | Age: 87
End: 2022-06-01
Payer: MEDICARE

## 2022-06-01 DIAGNOSIS — N39.46 MIXED STRESS AND URGE URINARY INCONTINENCE: ICD-10-CM

## 2022-06-01 DIAGNOSIS — N32.81 OVERACTIVE BLADDER: Primary | ICD-10-CM

## 2022-06-01 PROCEDURE — 64566 NEUROELTRD STIM POST TIBIAL: CPT | Performed by: NURSE PRACTITIONER

## 2022-06-15 ENCOUNTER — NURSE ONLY (OUTPATIENT)
Dept: UROLOGY | Age: 87
End: 2022-06-15
Payer: MEDICARE

## 2022-06-15 DIAGNOSIS — N39.46 MIXED STRESS AND URGE URINARY INCONTINENCE: ICD-10-CM

## 2022-06-15 DIAGNOSIS — N32.81 OVERACTIVE BLADDER: Primary | ICD-10-CM

## 2022-06-15 PROCEDURE — 64566 NEUROELTRD STIM POST TIBIAL: CPT | Performed by: NURSE PRACTITIONER

## 2022-06-15 RX ORDER — MULTIVITAMIN/IRON/FOLIC ACID 18MG-0.4MG
TABLET ORAL
COMMUNITY
Start: 2022-06-09

## 2022-06-29 ENCOUNTER — NURSE ONLY (OUTPATIENT)
Dept: UROLOGY | Age: 87
End: 2022-06-29
Payer: MEDICARE

## 2022-06-29 DIAGNOSIS — N39.46 MIXED STRESS AND URGE URINARY INCONTINENCE: ICD-10-CM

## 2022-06-29 DIAGNOSIS — N32.81 OVERACTIVE BLADDER: Primary | ICD-10-CM

## 2022-06-29 DIAGNOSIS — I73.9 PVD (PERIPHERAL VASCULAR DISEASE) (HCC): Primary | ICD-10-CM

## 2022-06-29 PROCEDURE — 64566 NEUROELTRD STIM POST TIBIAL: CPT | Performed by: NURSE PRACTITIONER

## 2022-06-30 NOTE — PROGRESS NOTES
Treatment #2-week maintenance    Improvement of Symptoms? Yes    OAB/Urologic Medications? None      Uroplasty Procedure:    1. Patient is seated with the left treatment leg elevated. 2. 34 gauge fine needle electrode is inserted into lower inner aspect of the leg. Slightly cephalad to the medial malleolus. 3. Surface electrode pad is placed over the medial aspect of the calcaneus on the same leg. 4.Needle electrode is connected to the external pulse generator. 5.The pulse generator is turned on and the millivolts used are 3. 6.Patient is treated for 30 minutes. 7.The needle and pad are removed. Patient will follow up in 2 weeks for next treatment.

## 2022-07-04 ENCOUNTER — APPOINTMENT (OUTPATIENT)
Dept: CT IMAGING | Age: 87
End: 2022-07-04
Payer: MEDICARE

## 2022-07-04 ENCOUNTER — HOSPITAL ENCOUNTER (EMERGENCY)
Age: 87
Discharge: HOME | End: 2022-07-05
Attending: EMERGENCY MEDICINE
Payer: MEDICARE

## 2022-07-04 DIAGNOSIS — N30.00 ACUTE CYSTITIS WITHOUT HEMATURIA: ICD-10-CM

## 2022-07-04 DIAGNOSIS — R40.20 LOSS OF CONSCIOUSNESS (HCC): Primary | ICD-10-CM

## 2022-07-04 DIAGNOSIS — E87.6 HYPOKALEMIA: ICD-10-CM

## 2022-07-04 DIAGNOSIS — Z87.898 HISTORY OF SEIZURE: ICD-10-CM

## 2022-07-04 LAB
ALBUMIN SERPL-MCNC: 4.5 G/DL (ref 3.5–5.2)
ALP BLD-CCNC: 84 U/L (ref 35–104)
ALT SERPL-CCNC: 9 U/L (ref 5–33)
ANION GAP SERPL CALCULATED.3IONS-SCNC: 13 MMOL/L (ref 7–19)
AST SERPL-CCNC: 21 U/L (ref 5–32)
BASOPHILS ABSOLUTE: 0.1 K/UL (ref 0–0.2)
BASOPHILS RELATIVE PERCENT: 0.8 % (ref 0–1)
BILIRUB SERPL-MCNC: 0.3 MG/DL (ref 0.2–1.2)
BUN BLDV-MCNC: 13 MG/DL (ref 8–23)
CALCIUM SERPL-MCNC: 9.4 MG/DL (ref 8.8–10.2)
CHLORIDE BLD-SCNC: 98 MMOL/L (ref 98–111)
CO2: 24 MMOL/L (ref 22–29)
CREAT SERPL-MCNC: 0.8 MG/DL (ref 0.5–0.9)
EOSINOPHILS ABSOLUTE: 0.2 K/UL (ref 0–0.6)
EOSINOPHILS RELATIVE PERCENT: 2.1 % (ref 0–5)
GFR AFRICAN AMERICAN: >59
GFR NON-AFRICAN AMERICAN: >60
GLUCOSE BLD-MCNC: 104 MG/DL (ref 74–109)
HCT VFR BLD CALC: 35.5 % (ref 37–47)
HEMOGLOBIN: 11.8 G/DL (ref 12–16)
IMMATURE GRANULOCYTES #: 0 K/UL
LIPASE: 38 U/L (ref 13–60)
LYMPHOCYTES ABSOLUTE: 0.9 K/UL (ref 1.1–4.5)
LYMPHOCYTES RELATIVE PERCENT: 13.1 % (ref 20–40)
MAGNESIUM: 2.3 MG/DL (ref 1.6–2.4)
MCH RBC QN AUTO: 32.2 PG (ref 27–31)
MCHC RBC AUTO-ENTMCNC: 33.2 G/DL (ref 33–37)
MCV RBC AUTO: 96.7 FL (ref 81–99)
MONOCYTES ABSOLUTE: 0.4 K/UL (ref 0–0.9)
MONOCYTES RELATIVE PERCENT: 6.2 % (ref 0–10)
NEUTROPHILS ABSOLUTE: 5.5 K/UL (ref 1.5–7.5)
NEUTROPHILS RELATIVE PERCENT: 77.4 % (ref 50–65)
PDW BLD-RTO: 12.5 % (ref 11.5–14.5)
PLATELET # BLD: 212 K/UL (ref 130–400)
PMV BLD AUTO: 10.4 FL (ref 9.4–12.3)
POTASSIUM REFLEX MAGNESIUM: 3.1 MMOL/L (ref 3.5–5)
RBC # BLD: 3.67 M/UL (ref 4.2–5.4)
SODIUM BLD-SCNC: 135 MMOL/L (ref 136–145)
TOTAL PROTEIN: 6.7 G/DL (ref 6.6–8.7)
WBC # BLD: 7.1 K/UL (ref 4.8–10.8)

## 2022-07-04 PROCEDURE — 99285 EMERGENCY DEPT VISIT HI MDM: CPT

## 2022-07-04 PROCEDURE — 70450 CT HEAD/BRAIN W/O DYE: CPT | Performed by: RADIOLOGY

## 2022-07-04 PROCEDURE — 70450 CT HEAD/BRAIN W/O DYE: CPT

## 2022-07-04 PROCEDURE — 93005 ELECTROCARDIOGRAM TRACING: CPT | Performed by: EMERGENCY MEDICINE

## 2022-07-04 RX ORDER — POTASSIUM CHLORIDE 20 MEQ/1
40 TABLET, EXTENDED RELEASE ORAL ONCE
Status: COMPLETED | OUTPATIENT
Start: 2022-07-04 | End: 2022-07-05

## 2022-07-04 RX ORDER — LEVETIRACETAM 10 MG/ML
1000 INJECTION INTRAVASCULAR ONCE
Status: DISCONTINUED | OUTPATIENT
Start: 2022-07-04 | End: 2022-07-05

## 2022-07-05 ENCOUNTER — TELEPHONE (OUTPATIENT)
Dept: UROLOGY | Age: 87
End: 2022-07-05

## 2022-07-05 ENCOUNTER — APPOINTMENT (OUTPATIENT)
Dept: GENERAL RADIOLOGY | Age: 87
End: 2022-07-05
Payer: MEDICARE

## 2022-07-05 VITALS
SYSTOLIC BLOOD PRESSURE: 155 MMHG | DIASTOLIC BLOOD PRESSURE: 77 MMHG | TEMPERATURE: 97.7 F | OXYGEN SATURATION: 98 % | RESPIRATION RATE: 16 BRPM | HEART RATE: 88 BPM

## 2022-07-05 LAB
BACTERIA: ABNORMAL /HPF
BILIRUBIN URINE: NEGATIVE
BLOOD, URINE: NEGATIVE
CLARITY: CLEAR
COLOR: YELLOW
CRYSTALS, UA: ABNORMAL /HPF
EKG P AXIS: 70 DEGREES
EKG P-R INTERVAL: 190 MS
EKG Q-T INTERVAL: 442 MS
EKG QRS DURATION: 96 MS
EKG QTC CALCULATION (BAZETT): 456 MS
EKG T AXIS: 44 DEGREES
EPITHELIAL CELLS, UA: 1 /HPF (ref 0–5)
GLUCOSE URINE: NEGATIVE MG/DL
HYALINE CASTS: 3 /HPF (ref 0–8)
KETONES, URINE: ABNORMAL MG/DL
LEUKOCYTE ESTERASE, URINE: ABNORMAL
NITRITE, URINE: POSITIVE
PH UA: 5.5 (ref 5–8)
PROTEIN UA: NEGATIVE MG/DL
RBC UA: 1 /HPF (ref 0–4)
SPECIFIC GRAVITY UA: 1.01 (ref 1–1.03)
UROBILINOGEN, URINE: 0.2 E.U./DL
WBC UA: 10 /HPF (ref 0–5)

## 2022-07-05 PROCEDURE — 83735 ASSAY OF MAGNESIUM: CPT

## 2022-07-05 PROCEDURE — 6360000002 HC RX W HCPCS: Performed by: EMERGENCY MEDICINE

## 2022-07-05 PROCEDURE — 87086 URINE CULTURE/COLONY COUNT: CPT

## 2022-07-05 PROCEDURE — 71045 X-RAY EXAM CHEST 1 VIEW: CPT | Performed by: RADIOLOGY

## 2022-07-05 PROCEDURE — 83690 ASSAY OF LIPASE: CPT

## 2022-07-05 PROCEDURE — 80053 COMPREHEN METABOLIC PANEL: CPT

## 2022-07-05 PROCEDURE — 93010 ELECTROCARDIOGRAM REPORT: CPT | Performed by: INTERNAL MEDICINE

## 2022-07-05 PROCEDURE — 2580000003 HC RX 258: Performed by: EMERGENCY MEDICINE

## 2022-07-05 PROCEDURE — 36415 COLL VENOUS BLD VENIPUNCTURE: CPT

## 2022-07-05 PROCEDURE — 6370000000 HC RX 637 (ALT 250 FOR IP): Performed by: EMERGENCY MEDICINE

## 2022-07-05 PROCEDURE — 96374 THER/PROPH/DIAG INJ IV PUSH: CPT

## 2022-07-05 PROCEDURE — 81001 URINALYSIS AUTO W/SCOPE: CPT

## 2022-07-05 PROCEDURE — 85025 COMPLETE CBC W/AUTO DIFF WBC: CPT

## 2022-07-05 PROCEDURE — 87186 SC STD MICRODIL/AGAR DIL: CPT

## 2022-07-05 PROCEDURE — 71045 X-RAY EXAM CHEST 1 VIEW: CPT

## 2022-07-05 RX ORDER — CEPHALEXIN 500 MG/1
500 CAPSULE ORAL 3 TIMES DAILY
Qty: 21 CAPSULE | Refills: 0 | Status: SHIPPED | OUTPATIENT
Start: 2022-07-05 | End: 2022-07-12

## 2022-07-05 RX ORDER — POTASSIUM CHLORIDE 20 MEQ/1
40 TABLET, EXTENDED RELEASE ORAL DAILY
Qty: 14 TABLET | Refills: 0 | Status: SHIPPED | OUTPATIENT
Start: 2022-07-05 | End: 2022-10-31

## 2022-07-05 RX ADMIN — POTASSIUM CHLORIDE 40 MEQ: 1500 TABLET, EXTENDED RELEASE ORAL at 00:07

## 2022-07-05 RX ADMIN — WATER 1000 MG: 1 INJECTION INTRAMUSCULAR; INTRAVENOUS; SUBCUTANEOUS at 02:04

## 2022-07-05 ASSESSMENT — ENCOUNTER SYMPTOMS
EYE REDNESS: 0
DIARRHEA: 0
SHORTNESS OF BREATH: 0
VOMITING: 0
VOICE CHANGE: 0
COUGH: 0
EYE PAIN: 0
RHINORRHEA: 0
ABDOMINAL PAIN: 1

## 2022-07-05 NOTE — TELEPHONE ENCOUNTER
Genesis Bell called in on behalf of Renu Maci, she was seen in the ER 07.01.22 for Acute cystitis without hematuria and was advised she has a uti and they were prescribed   cephALEXin (KEFLEX) 500 MG capsule  Genesis Bell was not sure if this was best for Renu Lux to take for these symptoms and wants a second opinion ASAP before she picks up the medication for her UTI.  She was started on Potassium Chloride Barbara ER 20 MEQ in the ER   Please contact Genesis Bell back to advise  Thank you

## 2022-07-05 NOTE — TELEPHONE ENCOUNTER
It does not appear she is symptomatic from a UTI. It seems more from a vasovagal response as she is known to have. I would not recommend treatment unless she is having symptoms of a UTI. Her urinalysis did look suspicious, but could just be contaminated sample.

## 2022-07-05 NOTE — ED PROVIDER NOTES
[facility] EMERGENCY DEPT  EMERGENCY DEPARTMENT ENCOUNTER      Pt Name: Delmi Beckwith  MRN: [id number]  [place] 1935  Date of evaluation: 7/4/2022  Provider: Annemarie Hagen MD    [address]       Chief Complaint   Patient presents with    Loss of Consciousness     Pt had syncopal episode in the back of familys car.  Abdominal Pain         HISTORY OF PRESENT ILLNESS   (Location/Symptom, Timing/Onset,Context/Setting, Quality, Duration, Modifying Factors, Severity)  Note limiting factors. Delmi Beckwith is a 80 y.o. female who presents to the emergency department for evaluation after an episode of loss of consciousness tonight while she was in the backseat of a vehicle with family. Daughter states that patient was in the car and started complaining of abdominal cramping pain. States patient has a history of colon spasms in the past and has had vasovagal episodes following those colon spasms previously. Shortly after patient started complaining of a cramping pain in her abdomen, patient's daughter started driving and then noticed that her mom stopped talking. States she got out of the car and radiate around to the back seat and she and other bystanders lifted patient's legs into the air. Her blood pressure was checked by a  who was in the area and was found to be in the 12F systolic. Daughter states the episode of loss of consciousness lasted around 3 minutes or so and patient has returned to normal mental status by time of arrival here. Patient was incontinent of stool during this episode. Patient has a history of prior stroke with seizure disorder. Patient's daughter does not believe the episode tonight was consistent with previous seizure episodes. Patient takes Keppra and had her last dose this morning. Has not had her evening dose of Keppra. Patient denies any complaints at this time. Denies any continued abdominal pain.   No associated chest pain, shortness of breath, headache, or other symptoms. HPI    NursingNotes were reviewed. REVIEW OF SYSTEMS    (2-9 systems for level 4, 10 or more for level 5)     Review of Systems   Constitutional: Negative for fatigue and fever. HENT: Negative for congestion, rhinorrhea and voice change. Eyes: Negative for pain and redness. Respiratory: Negative for cough and shortness of breath. Cardiovascular: Negative for chest pain. Gastrointestinal: Positive for abdominal pain. Negative for diarrhea and vomiting. Endocrine: Negative. Genitourinary: Negative. Musculoskeletal: Negative for arthralgias and gait problem. Skin: Negative for rash and wound. Neurological: Positive for syncope. Negative for weakness and headaches. Hematological: Negative. Psychiatric/Behavioral: Negative. All other systems reviewed and are negative. A complete review of systems was performed and is negative except as noted above in the HPI.        PAST MEDICAL HISTORY     Past Medical History:   Diagnosis Date    Age-related macular degeneration, wet, right eye ([address]     Anemia     Back pain     Bilateral carotid artery stenosis 02/23/2021    Chronic bilateral low back pain with left-sided sciatica 12/19/2019    Colitis, ischemic ([address]     Dementia ([address]     Diverticulosis     Dry age-related macular degeneration of left eye     Hyperlipidemia     Hypertension     Osteoarthritis     Palliative care patient 09/17/2019    Risk for falls 09/13/2019    Seizures ([address]     Spastic colon     Status post placement of implantable loop recorder 10/27/2020    Stroke syndrome     CVA's/ TIA's    Urinary incontinence     Uterine cancer ([address]     Uterine cancer ([address]          SURGICAL HISTORY       Past Surgical History:   Procedure Laterality Date    BREAST SURGERY Right     FNA Right Breast \"oh carlos, maybe 20 years ago\"    CHOLECYSTECTOMY      COLONOSCOPY  09/25/2003    Dr Kobe Causey ischemic colitis, incomplete exam    COLONOSCOPY  08/29/2003    Dr Cheryle Savant:  limited colon-ischemic colitis    DILATION AND CURETTAGE OF UTERUS      x2, in [facility] tears, laser suregry, adn cataract with IOL b/l    HYSTERECTOMY (CERVIX STATUS UNKNOWN)  01/2020    ovaries and tubes    INSERTABLE CARDIAC MONITOR      LUMBAR FUSION      2012 90 days after the spine surgeries    OTHER SURGICAL HISTORY      inner lymph nodes    OTHER SURGICAL HISTORY  09/2020    Loop recorder     PACEMAKER INSERTION      PACEMAKER PLACEMENT  01/2021    SPINE SURGERY      L3,4, and 5   done in 2012   Nay Haskins      as a child at age 9    [address]  08/28/2003    DR Antonio Ware:  Duodenal scarring but no evidence of active ulcer disease.  UPPER GASTROINTESTINAL ENDOSCOPY N/A 01/10/2022    Dr Winter Puente ring dilated up to 18mm with balloon, Presbyesophagus-like esophageal changes, 3-4cm HH, NEG Hpylori, NEG Barretts         CURRENT MEDICATIONS       Discharge Medication List as of 7/5/2022  2:09 AM      CONTINUE these medications which have NOT CHANGED    Details   B-Complex, Folic Acid, TABS TAKE ONE TABLET BY MOUTH ONCE DAILYHistorical Med      meloxicam (MOBIC) 15 MG tablet Take 15 mg by mouth dailyHistorical Med      levETIRAcetam (KEPPRA) 500 MG tablet Take 1 tablet by mouth 2 times daily Indications: takes 9am and 9pm, Disp-60 tablet, R-11Normal      dipyridamole (PERSANTINE) 50 MG tablet Take 2 tablets by mouth 2 times daily Indications: 9am and 9pm, Disp-60 tablet, R-11Normal      gabapentin (NEURONTIN) 100 MG capsule TAKE 1 CAPSULE BY MOUTH 3 TIMES DAILY. , Disp-90 capsule, R-5Normal      hydrALAZINE (APRESOLINE) 50 MG tablet Take 50 mg by mouth 2 times daily DO NOT TAKE IN THE AM DUE TO VASAL VAGAL RESPONCEHistorical Med      dicyclomine (BENTYL) 10 MG capsule Take 10 mg by mouth as needed Historical Med      Thiamine HCl (VITAMIN B-1 PO) Take 100 mg by mouth daily Takes at 2:00 PM Historical Med      fluticasone (FLONASE) 50 MCG/ACT nasal spray 1 spray by Each Nostril route 2 times daily Historical Med      cloNIDine (CATAPRES) 0.1 MG tablet Take 1 tablet by mouth 2 times daily as needed for High Blood Pressure (for BP greater than 160/100), Disp-30 tablet,R-2Normal      pantoprazole (PROTONIX) 40 MG tablet Take 40 mg by mouth 2 times daily Indications: 9am Historical Med      acetaminophen (TYLENOL) 500 MG tablet Take 1,000 mg by mouth 3 times daily Historical Med      donepezil (ARICEPT) 10 MG tablet Take 1 tablet by mouth nightly, Disp-90 tablet,R-3Normal      Docosanol (ABREVA EX) Apply topically as needed Historical Med      simethicone (GAS-X EXTRA STRENGTH) 125 MG chewable tablet Take 125 mg by mouth 3 times daily 2 PO 6:30 AM, 2 at 10:00 AM, 1 at 5:30 PM otherwise prnHistorical Med      butalbital-acetaminophen-caffeine (FIORICET, ESGIC) -40 MG per tablet Take 1 tablet by mouth every 6 hours as needed for Headaches Historical Med      amLODIPine (NORVASC) 5 MG tablet Take 5 mg by mouth 2 times daily Indications: takes at 9am and 9pm If BP sys 120 or below then 2.5mg bid otherwise 5mg bidHistorical Med      diclofenac sodium 1 % GEL Apply 2 g topically 3 times daily as needed for Pain , Topical, 3 TIMES DAILY PRN, Historical Med      sodium chloride 1 g tablet Take 1 g by mouth 3 times daily Indications: takes 9am, 3pm, and 9pm Historical Med      lidocaine 4 % external patch Place 1 patch onto the skin daily as needed (LEFT LOWER BACK PAIN) , TransDERmal, DAILY PRN, Historical Med      valACYclovir (VALTREX) 1 g tablet as needed Historical Med      azelastine (ASTELIN) 0.1 % nasal spray 2 sprays by Nasal route 2 times daily Indications: takes 9am and 9pm Historical Med      Cholecalciferol (VITAMIN D3) 5000 units TABS Take 5,000 Units by mouth daily Indications: AT 2:30 PM Historical Med      sodium chloride (OCEAN, BABY AYR) 0.65 % nasal spray 1 spray by Nasal route as needed for Congestion Historical Med      Lutein 20 MG TABS Take 20 mg by mouth daily Indications: 3 PM DAILY Historical Med      camphor-menthol (SARNA) 0.5-0.5 % lotion Apply 0.5 mLs topically 2 times daily as needed for Itching Apply topically as needed., Topical, 2 TIMES DAILY PRN, Historical Med      Calcium Carb-Cholecalciferol (CALCIUM 600+D) 600-800 MG-UNIT TABS Take 1 tablet by mouth daily Indications: AT 2:30 PM Historical Med      promethazine (PHENERGAN) 25 MG tablet Take 12.5 mg by mouth 3 times daily as needed for Nausea Historical Med      Omega 3-6-9 Fatty Acids (OMEGA 3-6-9 COMPLEX PO) Take 2 tablets by mouth daily Indications: AT 2:30 PM Historical Med      alendronate (FOSAMAX) 70 MG tablet Take 70 mg by mouth every 7 days Sundays at 1830Historical Med      pravastatin (PRAVACHOL) 20 MG tablet Take 20 mg by mouth nightly Indications: takes at 9am Historical Med      ramipril (ALTACE) 10 MG capsule Take 10 mg by mouth 2 times daily Indications: takes at 9am and 9pm Historical Med      b complex vitamins capsule Take 1 capsule by mouth daily Indications: AT 2:30 PM B 100Historical Med      Multiple Vitamins-Minerals (THERAPEUTIC MULTIVITAMIN-MINERALS) tablet Take 1 tablet by mouth daily Indications: AT 2:30 PM Historical Med             ALLERGIES     Aspirin, Spironolactone, Dilaudid [hydromorphone hcl], Fd&c yellow #5 [place] [tartrazine], Ondansetron, Quinolones, Sulfa antibiotics, Codeine, Demerol hcl [meperidine], Levaquin [levofloxacin in d5w], Myrbetriq [mirabegron], Namenda [memantine hcl], Norco [hydrocodone-acetaminophen], and Pneumococcal vaccines    FAMILY HISTORY       Family History   Problem Relation Age of Onset    Heart Defect Mother     Hypertension Mother     Heart Attack Father         x3 within 24 h and then , abused ciggarettes    No Known Problems Daughter     No Known Problems Son     No Known Problems Son     Colon Cancer Neg Hx     Colon Polyps Neg Hx     Esophageal Cancer Neg Hx     Liver Cancer Neg Hx     Liver Disease Neg Hx     Rectal Cancer Neg Hx     Stomach Cancer Neg Hx           SOCIAL HISTORY       Social History     Socioeconomic History    Marital status:      Spouse name: Not on file    Number of children: 3    Years of education: Not on file    Highest education level: Not on file   Occupational History    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mployee of [employer] 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 Elvi Rouse, [phone]    AMBULATES: with walker during day, wheelchair at night    DOMICILED: lives in the [facility] assisted living facility, has no stairs or steps, has a cat, her daughter is there periodically     Social Determinants of Health     Financial Resource Strain:     Difficulty of Paying Living Expenses: Not on file   Food Insecurity:     Worried About [address] in the Last Year: Not on file    [address] in the Last Year: Not on file   Transportation Needs:     Lack of Transportation (Medical): Not on file    Lack of Transportation (Non-Medical):  Not on file   Physical Activity:     Days of Exercise per Week: Not on file    Minutes of Exercise per Session: Not on file   Stress:     Feeling of Stress : Not on file   Social Connections:     Frequency of Communication with Friends and Family: Not on file    Frequency of Social Gatherings with Friends and Family: Not on file    Attends Voodoo Services: Not on file   [employer]s or Organizations: Not on file    Attends Club or Organization Meetings: Not on file    Marital Status: Not on file   Intimate Partner Violence:     Fear of Current or Ex-Partner: Not on file    Emotionally Abused: Not on file    Physically Abused: Not on file    Sexually Abused: Not on file   Housing Stability:     Unable to Pay for Housing in the Last Year: Not on file    Number of [place] in the Last Year: Not on file    Unstable Housing in the Last Year: Not on file       SCREENINGS             PHYSICAL EXAM    (up to 7 for level 4, 8 or more for level 5)     ED Triage Vitals [07/04/22 2230]   BP Temp Temp src Heart Rate Resp SpO2 Height Weight   (!) 141/64 -- -- 66 18 95 % -- --       Physical Exam  Vitals and nursing note reviewed. Constitutional:       General: She is not in acute distress. Appearance: She is well-developed. She is not diaphoretic. HENT:      Head: Normocephalic and atraumatic. Eyes:      General: No scleral icterus. Neck:      Vascular: No JVD. Cardiovascular:      Rate and Rhythm: Normal rate and regular rhythm. Pulses:           Radial pulses are 2+ on the right side and 2+ on the left side. Dorsalis pedis pulses are 2+ on the right side and 2+ on the left side. Heart sounds: Normal heart sounds. No murmur heard. No friction rub. No gallop. Pulmonary:      Effort: Pulmonary effort is normal. No accessory muscle usage or respiratory distress. Breath sounds: Normal breath sounds. No stridor. No decreased breath sounds, wheezing, rhonchi or rales. Chest:      Chest wall: No tenderness. Abdominal:      General: There is no distension. Palpations: Abdomen is soft. Tenderness: There is no abdominal tenderness. There is no guarding or rebound. Musculoskeletal:         General: No deformity. Normal range of motion. Right lower leg: No edema. Left lower leg: No edema. Skin:     General: Skin is warm and dry. Coloration: Skin is not pale. Findings: No erythema. Neurological:      Mental Status: She is alert and oriented to person, place, and time. GCS: GCS eye subscore is 4. GCS verbal subscore is 5. GCS motor subscore is 6. Cranial Nerves: No cranial nerve deficit. Motor: No abnormal muscle tone. Coordination: Coordination normal.   Psychiatric:         Behavior: Behavior normal.         Judgment: Judgment normal.         DIAGNOSTIC RESULTS     EKG: All EKG's are interpreted by the Emergency Department Physician who either signs or Co-signs this chart in the absence of a cardiologist.      RADIOLOGY:   Non-plain film images such as CT, Ultrasound and MRI are read by the radiologist. Plainradiographic images are visualized and preliminarily interpreted by the emergency physician with the below findings:    *    Interpretation per the Radiologist below, if available at the time of this note:    XR CHEST PORTABLE   Final Result   1. Noevidence of acute infiltrate or pleural effusion. Recommendation: Follow up as clinically indicated. Electronically Signed by Vidhi Calle MD at 05-Jul-2022 02:23:38 AM               CT HEAD WO CONTRAST   Final Result   1. No acute intracranial abnormality is present. 2. Mild diffuse brain parenchymal atrophic changes. 3. Moderate periventricular chronic microangiopathic ischemic changes. 4. Atheromatous changes involving cavernous portion of bilateral internal carotid arteries and vertebral-basilar arteries. Recommendation: Follow up as clinically indicated. All CT scans at this facility utilize dose modulation, iterative reconstruction, and/or weight based dosing when appropriate to reduce radiation dose to as low as reasonably achievable.    Electronically Signed by Beverly Adair MD at 05-Jul-2022 12:39:29 AM                     ED BEDSIDE ULTRASOUND:   Performed by ED Physician - none    LABS:  Labs Reviewed   CBC WITH AUTO DIFFERENTIAL - Abnormal; Notable for the following components:       Result Value    RBC 3.67 (*)     Hemoglobin 11.8 (*)     Hematocrit 35.5 (*)     MCH 32.2 (*)     Neutrophils % 77.4 (*)     Lymphocytes % 13.1 (*)     Lymphocytes Absolute 0.9 (*)     All other components within normal limits   COMPREHENSIVE METABOLIC PANEL W/ REFLEX TO MG FOR LOW K - Abnormal; Notable for the following components:    Sodium 135 (*)     Potassium reflex Magnesium 3.1 (*)     All other components within normal limits   URINALYSIS WITH REFLEX TO CULTURE - Abnormal; Notable for the following components:    Ketones, Urine TRACE (*)     Nitrite, Urine POSITIVE (*)     Leukocyte Esterase, Urine SMALL (*)     All other components within normal limits   MICROSCOPIC URINALYSIS - Abnormal; Notable for the following components:    Bacteria, UA 4+ (*)     Crystals, UA NEG (*)     WBC, UA 10 (*)     All other components within normal limits   CULTURE, URINE   LIPASE   MAGNESIUM       All other labs were within normal range or not returned as of this dictation. Medications   potassium chloride (KLOR-CON M) extended release tablet 40 mEq (40 mEq Oral Given 7/5/22 0007)   cefTRIAXone (ROCEPHIN) 1,000 mg in sterile water 10 mL IV syringe (1,000 mg IntraVENous Given 7/5/22 0204)       EMERGENCY DEPARTMENT COURSE and DIFFERENTIALDIAGNOSIS/MDM:   Vitals:    Vitals:    07/05/22 0145 07/05/22 0200 07/05/22 0201 07/05/22 0212   BP:   (!) 168/80 (!) 155/77   Pulse: 79 85 88    Resp: 23 24 16    Temp:       TempSrc:       SpO2: 96% 97% 98%        MDM    ED Course as of 07/05/22 0657   Mon Jul 04, 2022   2345 EKG shows sinus rhythm with a rate of 68. Nonspecific anterior T wave flattening. Otherwise no findings of acute ischemia or infarction. Normal QRS and QTc intervals.  [VIC]      ED Course User Index  [VIC] Dar Oneill MD       Description of the episode does seem consistent with a vasovagal episode given the abdominal cramping just preceding the episode as well as quick return to normal mental status. Keppra loading dose was ordered here given history of seizure disorder and patient not have had her nighttime dose of Keppra yet but daughter declined it. Laboratory evaluation showed mild hypokalemia but otherwise unremarkable. Does have evidence of urinary tract infection. Given Rocephin here. Plan to treat with Keflex. Throughout period of observation here in the emergency department, patient maintains normal mental status and hemodynamically stable    Evaluation and work-up here revealed no signs of emergent or life-threatening pathology that would necessitate admission for further work-up or management at this time. Patient is felt to be stable for discharge home with return precautions for worsening of the condition or development of new concerning symptoms. Patient was encouraged to follow-up with their primary care doctor in the appropriate timeframe. Necessary prescriptions and information have been provided for treatment at home. Patient voices understanding and agreement with the plan. CONSULTS:  None    PROCEDURES:  Unless otherwise notedbelow, none     Procedures      FINAL IMPRESSION     1. Loss of consciousness ([address]    2. History of seizure    3. Acute cystitis without hematuria    4. Hypokalemia          DISPOSITION/PLAN   DISPOSITION Decision To Discharge 07/05/2022 01:12:12 AM      No notes of EC Admission Criteria type on file. PATIENT REFERRED TO:  [facility] EMERGENCY DEPT  Ki Vásquez  [phone]    If symptoms worsen    Hildy Gilford, DO  [address] [place] [id number]  [phone]      As needed      DISCHARGE MEDICATIONS:  Discharge Medication List as of 7/5/2022  2:09 AM      START taking these medications    Details   cephALEXin (KEFLEX) 500 MG capsule Take 1 capsule by mouth 3 times daily for 7 days, Disp-21 capsule, R-0Normal      potassium chloride (KLOR-CON M) 20 MEQ extended

## 2022-07-05 NOTE — TELEPHONE ENCOUNTER
Called and spoke with pt daughter. Informed her of what Sobeida stated below. She did have a in and out cath sample in ER for culture. She states she is going to hold off on the Keflex until culture comes back. Reports pt remains afebrile. She would like for Sobeida to see what culture comes back as tomorrow and verify she is prescribed correct atb if positive.

## 2022-07-06 LAB
ORGANISM: ABNORMAL
URINE CULTURE, ROUTINE: ABNORMAL
URINE CULTURE, ROUTINE: ABNORMAL

## 2022-07-07 ENCOUNTER — TELEPHONE (OUTPATIENT)
Dept: UROLOGY | Age: 87
End: 2022-07-07

## 2022-07-07 ENCOUNTER — TELEPHONE (OUTPATIENT)
Dept: CARDIOLOGY CLINIC | Age: 87
End: 2022-07-07

## 2022-07-07 NOTE — TELEPHONE ENCOUNTER
PTS DAUGHTER CALLED, WE GAVE HER THE INFO THAT JAMES TOLD US TO GIVE HER ABOUT NOT HAVING A UTI  OR SIGNS OF INFECTION, AND NO REASON FOR ANTIBIOTIC. IF PT NEEDED TO HAVE SOMETHING CALLED IN WE WOULD CALL PYRIDIUM AND OR SHE COULD TAKE OTC AZO. PTS DAUGHTER STATED THAT DR Adilene Qiu SAID HER MOMS WHITE BLOOD COUNT IS HIGH AND THAT THERE WAS BLOOD IN URINE.  PTS DAUGHTER WOULD LIKE NURSE OR JAMES TO CALL HER BACK

## 2022-07-07 NOTE — TELEPHONE ENCOUNTER
Spoke to the patients daughter today and told her we made a mistake and she needs to be taking the keflex that the ER prescribed on 7/4/22.

## 2022-07-07 NOTE — TELEPHONE ENCOUNTER
PTS DAUGHTER CALLED, WE GAVE HER THE INFO THAT JAMES TOLD US TO GIVE HER ABOUT NOT HAVING A UTI  OR SIGNS OF INFECTION, AND NO REASON FOR ANTIBIOTIC. IF PT NEEDED TO HAVE SOMETHING CALLED IN WE WOULD CALL PYRIDIUM AND OR SHE COULD TAKE OTC AZO. PTS DAUGHTER STATED THAT DR Rima Mcelroy SAID HER MOMS WHITE BLOOD COUNT IS HIGH AND THAT THERE WAS BLOOD IN URINE.  PTS DAUGHTER WOULD LIKE NURSE OR JAMES TO CALL HER BACK

## 2022-07-08 DIAGNOSIS — I49.5 SA NODE DYSFUNCTION (HCC): ICD-10-CM

## 2022-07-08 DIAGNOSIS — Z95.0 CARDIAC PACEMAKER: Primary | ICD-10-CM

## 2022-07-13 ENCOUNTER — NURSE ONLY (OUTPATIENT)
Dept: UROLOGY | Age: 87
End: 2022-07-13
Payer: MEDICARE

## 2022-07-13 DIAGNOSIS — N32.81 OVERACTIVE BLADDER: Primary | ICD-10-CM

## 2022-07-13 DIAGNOSIS — N39.46 MIXED STRESS AND URGE URINARY INCONTINENCE: ICD-10-CM

## 2022-07-13 PROCEDURE — 64566 NEUROELTRD STIM POST TIBIAL: CPT | Performed by: NURSE PRACTITIONER

## 2022-07-13 RX ORDER — CEPHALEXIN 250 MG/1
250 CAPSULE ORAL 4 TIMES DAILY
COMMUNITY

## 2022-07-14 NOTE — PROGRESS NOTES
Treatment # 2 week maintenance    Improvement of Symptoms? Yes, currently on omnicef for UTI as well    OAB/Urologic Medications? none      Uroplasty Procedure:    1. Patient is seated with the left treatment leg elevated. 2. 34 gauge fine needle electrode is inserted into lower inner aspect of the leg. Slightly cephalad to the medial malleolus. 3. Surface electrode pad is placed over the medial aspect of the calcaneus on the same leg. 4.Needle electrode is connected to the external pulse generator. 5.The pulse generator is turned on and the millivolts used are 6. 6.Patient is treated for 30 minutes. 7.The needle and pad are removed. Patient will follow up in 2 weeks for next treatment.

## 2022-07-27 ENCOUNTER — NURSE ONLY (OUTPATIENT)
Dept: UROLOGY | Age: 87
End: 2022-07-27
Payer: MEDICARE

## 2022-07-27 DIAGNOSIS — N39.46 MIXED STRESS AND URGE URINARY INCONTINENCE: Primary | ICD-10-CM

## 2022-07-27 DIAGNOSIS — N32.81 OVERACTIVE BLADDER: ICD-10-CM

## 2022-07-27 PROCEDURE — 64566 NEUROELTRD STIM POST TIBIAL: CPT | Performed by: NURSE PRACTITIONER

## 2022-07-27 NOTE — PROGRESS NOTES
Treatment # 2 week maintenance    Improvement of Symptoms? yes    OAB/Urologic Medications? none      Uroplasty Procedure:    1. Patient is seated with the left treatment leg elevated. 2. 34 gauge fine needle electrode is inserted into lower inner aspect of the leg. Slightly cephalad to the medial malleolus. 3. Surface electrode pad is placed over the medial aspect of the calcaneus on the same leg. 4.Needle electrode is connected to the external pulse generator. 5.The pulse generator is turned on and the millivolts used are 1. 6.Patient is treated for 30 minutes. 7.The needle and pad are removed. Patient will follow up in 2 weeks for next treatment.

## 2022-08-10 ENCOUNTER — NURSE ONLY (OUTPATIENT)
Dept: UROLOGY | Age: 87
End: 2022-08-10
Payer: MEDICARE

## 2022-08-10 VITALS — BODY MASS INDEX: 24.54 KG/M2 | WEIGHT: 125 LBS | HEIGHT: 60 IN | TEMPERATURE: 97 F

## 2022-08-10 DIAGNOSIS — N32.81 OAB (OVERACTIVE BLADDER): ICD-10-CM

## 2022-08-10 DIAGNOSIS — N39.46 MIXED STRESS AND URGE URINARY INCONTINENCE: Primary | ICD-10-CM

## 2022-08-10 PROCEDURE — 64566 NEUROELTRD STIM POST TIBIAL: CPT | Performed by: NURSE PRACTITIONER

## 2022-08-10 RX ORDER — CHOLECALCIFEROL (VITAMIN D3) 125 MCG
CAPSULE ORAL
COMMUNITY
Start: 2022-07-26 | End: 2022-08-10

## 2022-08-23 DIAGNOSIS — I65.23 BILATERAL CAROTID ARTERY STENOSIS: Primary | ICD-10-CM

## 2022-08-24 ENCOUNTER — NURSE ONLY (OUTPATIENT)
Dept: UROLOGY | Age: 87
End: 2022-08-24
Payer: MEDICARE

## 2022-08-24 DIAGNOSIS — N32.81 OAB (OVERACTIVE BLADDER): ICD-10-CM

## 2022-08-24 DIAGNOSIS — N39.46 MIXED STRESS AND URGE URINARY INCONTINENCE: Primary | ICD-10-CM

## 2022-08-24 PROCEDURE — 64566 NEUROELTRD STIM POST TIBIAL: CPT | Performed by: NURSE PRACTITIONER

## 2022-08-25 NOTE — PROGRESS NOTES
Treatment # 2 week maintenance    Improvement of Symptoms? yes    OAB/Urologic Medications? none      Uroplasty Procedure:    1. Patient is seated with the left treatment leg elevated. 2. 34 gauge fine needle electrode is inserted into lower inner aspect of the leg. Slightly cephalad to the medial malleolus. 3. Surface electrode pad is placed over the medial aspect of the calcaneus on the same leg. 4.Needle electrode is connected to the external pulse generator. 5.The pulse generator is turned on and the millivolts used are 2. 6.Patient is treated for 30 minutes. 7.The needle and pad are removed. Patient will follow up in 2 weeks for next treatment.

## 2022-08-28 PROCEDURE — 93296 REM INTERROG EVL PM/IDS: CPT | Performed by: NURSE PRACTITIONER

## 2022-08-28 PROCEDURE — 93294 REM INTERROG EVL PM/LDLS PM: CPT | Performed by: NURSE PRACTITIONER

## 2022-08-29 DIAGNOSIS — Z95.0 CARDIAC PACEMAKER: Primary | ICD-10-CM

## 2022-08-29 DIAGNOSIS — I49.5 SA NODE DYSFUNCTION (HCC): ICD-10-CM

## 2022-09-01 DIAGNOSIS — M79.604 PAIN IN BOTH LOWER EXTREMITIES: ICD-10-CM

## 2022-09-01 DIAGNOSIS — M79.605 PAIN IN BOTH LOWER EXTREMITIES: ICD-10-CM

## 2022-09-01 NOTE — TELEPHONE ENCOUNTER
Requested Prescriptions     Pending Prescriptions Disp Refills    gabapentin (NEURONTIN) 100 MG capsule [Pharmacy Med Name: GABAPENTIN 100MG] 90 capsule 4     Sig: TAKE 1 CAPSULE BY MOUTH 3 TIMES DAILY.        Last Office Visit:  4/12/2022  Next Office Visit:  10/31/2022  Last Medication Refill: 3/1/2022 with 5 RF    Doris Glassing up to date:  9/1/2022    *RX updated to reflect   9/1/2022  fill date*

## 2022-09-02 RX ORDER — GABAPENTIN 100 MG/1
CAPSULE ORAL
Qty: 90 CAPSULE | Refills: 5 | Status: SHIPPED | OUTPATIENT
Start: 2022-09-02 | End: 2022-10-31

## 2022-09-06 ENCOUNTER — TELEPHONE (OUTPATIENT)
Dept: UROLOGY | Age: 87
End: 2022-09-06

## 2022-09-06 NOTE — TELEPHONE ENCOUNTER
Patient's daughter called to speak with a nurse regarding mom's appointment. She stated that someone told her that the provider was going on vacation. Please return call.

## 2022-09-07 ENCOUNTER — NURSE ONLY (OUTPATIENT)
Dept: UROLOGY | Age: 87
End: 2022-09-07
Payer: MEDICARE

## 2022-09-07 DIAGNOSIS — N32.81 OVERACTIVE BLADDER: Primary | ICD-10-CM

## 2022-09-07 PROCEDURE — 64566 NEUROELTRD STIM POST TIBIAL: CPT | Performed by: NURSE PRACTITIONER

## 2022-09-21 ENCOUNTER — NURSE ONLY (OUTPATIENT)
Dept: UROLOGY | Age: 87
End: 2022-09-21
Payer: MEDICARE

## 2022-09-21 DIAGNOSIS — N39.46 MIXED STRESS AND URGE URINARY INCONTINENCE: ICD-10-CM

## 2022-09-21 DIAGNOSIS — N32.81 OVERACTIVE BLADDER: Primary | ICD-10-CM

## 2022-09-21 PROCEDURE — 64566 NEUROELTRD STIM POST TIBIAL: CPT | Performed by: NURSE PRACTITIONER

## 2022-09-21 RX ORDER — SPIRONOLACTONE 25 MG
TABLET ORAL
COMMUNITY
Start: 2022-09-08 | End: 2022-09-21

## 2022-09-28 ENCOUNTER — NURSE ONLY (OUTPATIENT)
Dept: UROLOGY | Age: 87
End: 2022-09-28
Payer: MEDICARE

## 2022-09-28 DIAGNOSIS — N39.46 MIXED STRESS AND URGE URINARY INCONTINENCE: ICD-10-CM

## 2022-09-28 DIAGNOSIS — N32.81 OVERACTIVE BLADDER: Primary | ICD-10-CM

## 2022-09-28 PROCEDURE — 64566 NEUROELTRD STIM POST TIBIAL: CPT | Performed by: NURSE PRACTITIONER

## 2022-09-29 NOTE — PROGRESS NOTES
Treatment # maintenance    Improvement of Symptoms? yes    OAB/Urologic Medications? none      Uroplasty Procedure:    1. Patient is seated with the left treatment leg elevated. 2. 34 gauge fine needle electrode is inserted into lower inner aspect of the leg. Slightly cephalad to the medial malleolus. 3. Surface electrode pad is placed over the medial aspect of the calcaneus on the same leg. 4.Needle electrode is connected to the external pulse generator. 5.The pulse generator is turned on and the millivolts used are 1. 6.Patient is treated for 30 minutes. 7.The needle and pad are removed. Patient will follow up in 2 weeks for next treatment.
27-Mar-2021

## 2022-10-12 ENCOUNTER — NURSE ONLY (OUTPATIENT)
Dept: UROLOGY | Age: 87
End: 2022-10-12
Payer: MEDICARE

## 2022-10-12 DIAGNOSIS — N39.46 MIXED STRESS AND URGE URINARY INCONTINENCE: ICD-10-CM

## 2022-10-12 DIAGNOSIS — N32.81 OVERACTIVE BLADDER: Primary | ICD-10-CM

## 2022-10-12 PROCEDURE — 64566 NEUROELTRD STIM POST TIBIAL: CPT | Performed by: NURSE PRACTITIONER

## 2022-10-13 NOTE — PROGRESS NOTES
Treatment # maintenance    Improvement of Symptoms? yes    OAB/Urologic Medications? none      Uroplasty Procedure:    1. Patient is seated with the left treatment leg elevated. 2. 34 gauge fine needle electrode is inserted into lower inner aspect of the leg. Slightly cephalad to the medial malleolus. 3. Surface electrode pad is placed over the medial aspect of the calcaneus on the same leg. 4.Needle electrode is connected to the external pulse generator. 5.The pulse generator is turned on and the millivolts used are 6. 6.Patient is treated for 30 minutes. 7.The needle and pad are removed. Patient will follow up in 2 weeks for next treatment.

## 2022-10-22 ENCOUNTER — HOSPITAL ENCOUNTER (EMERGENCY)
Age: 87
Discharge: HOME | End: 2022-10-23
Attending: PEDIATRICS
Payer: MEDICARE

## 2022-10-22 DIAGNOSIS — K22.2 ESOPHAGEAL STENOSIS: Primary | ICD-10-CM

## 2022-10-22 LAB
ALBUMIN SERPL-MCNC: 4.4 G/DL (ref 3.5–5.2)
ALP BLD-CCNC: 77 U/L (ref 35–104)
ALT SERPL-CCNC: 11 U/L (ref 5–33)
ANION GAP SERPL CALCULATED.3IONS-SCNC: 10 MMOL/L (ref 7–19)
AST SERPL-CCNC: 19 U/L (ref 5–32)
BASOPHILS ABSOLUTE: 0 K/UL (ref 0–0.2)
BASOPHILS RELATIVE PERCENT: 0.8 % (ref 0–1)
BILIRUB SERPL-MCNC: 0.3 MG/DL (ref 0.2–1.2)
BUN BLDV-MCNC: 12 MG/DL (ref 8–23)
CALCIUM SERPL-MCNC: 9.6 MG/DL (ref 8.8–10.2)
CHLORIDE BLD-SCNC: 97 MMOL/L (ref 98–111)
CO2: 26 MMOL/L (ref 22–29)
CREAT SERPL-MCNC: 0.9 MG/DL (ref 0.5–0.9)
EOSINOPHILS ABSOLUTE: 0.2 K/UL (ref 0–0.6)
EOSINOPHILS RELATIVE PERCENT: 3.3 % (ref 0–5)
GFR SERPL CREATININE-BSD FRML MDRD: >60 ML/MIN/{1.73_M2}
GLUCOSE BLD-MCNC: 105 MG/DL (ref 70–99)
GLUCOSE BLD-MCNC: 107 MG/DL (ref 74–109)
HCT VFR BLD CALC: 34.1 % (ref 37–47)
HEMOGLOBIN: 11.2 G/DL (ref 12–16)
IMMATURE GRANULOCYTES #: 0 K/UL
LIPASE: 32 U/L (ref 13–60)
LYMPHOCYTES ABSOLUTE: 1.1 K/UL (ref 1.1–4.5)
LYMPHOCYTES RELATIVE PERCENT: 22.4 % (ref 20–40)
MCH RBC QN AUTO: 31.3 PG (ref 27–31)
MCHC RBC AUTO-ENTMCNC: 32.8 G/DL (ref 33–37)
MCV RBC AUTO: 95.3 FL (ref 81–99)
MONOCYTES ABSOLUTE: 0.4 K/UL (ref 0–0.9)
MONOCYTES RELATIVE PERCENT: 8 % (ref 0–10)
NEUTROPHILS ABSOLUTE: 3.2 K/UL (ref 1.5–7.5)
NEUTROPHILS RELATIVE PERCENT: 65.1 % (ref 50–65)
PDW BLD-RTO: 13.1 % (ref 11.5–14.5)
PERFORMED ON: ABNORMAL
PLATELET # BLD: 229 K/UL (ref 130–400)
PMV BLD AUTO: 10 FL (ref 9.4–12.3)
POTASSIUM SERPL-SCNC: 3.5 MMOL/L (ref 3.5–5)
RBC # BLD: 3.58 M/UL (ref 4.2–5.4)
SARS-COV-2, NAAT: NOT DETECTED
SODIUM BLD-SCNC: 133 MMOL/L (ref 136–145)
TOTAL PROTEIN: 6.5 G/DL (ref 6.6–8.7)
WBC # BLD: 4.9 K/UL (ref 4.8–10.8)

## 2022-10-22 PROCEDURE — 80053 COMPREHEN METABOLIC PANEL: CPT

## 2022-10-22 PROCEDURE — 99284 EMERGENCY DEPT VISIT MOD MDM: CPT

## 2022-10-22 PROCEDURE — 85025 COMPLETE CBC W/AUTO DIFF WBC: CPT

## 2022-10-22 PROCEDURE — 83690 ASSAY OF LIPASE: CPT

## 2022-10-22 PROCEDURE — 2500000003 HC RX 250 WO HCPCS: Performed by: PEDIATRICS

## 2022-10-22 PROCEDURE — 36415 COLL VENOUS BLD VENIPUNCTURE: CPT

## 2022-10-22 PROCEDURE — 87635 SARS-COV-2 COVID-19 AMP PRB: CPT

## 2022-10-22 PROCEDURE — 96374 THER/PROPH/DIAG INJ IV PUSH: CPT

## 2022-10-22 RX ADMIN — GLUCAGON HYDROCHLORIDE 1 MG: KIT at 23:07

## 2022-10-22 ASSESSMENT — ENCOUNTER SYMPTOMS
SHORTNESS OF BREATH: 0
EYE DISCHARGE: 0
DIARRHEA: 0
ABDOMINAL PAIN: 0
RHINORRHEA: 0
BLOOD IN STOOL: 0
COUGH: 1
VOMITING: 0
BACK PAIN: 1
COLOR CHANGE: 0

## 2022-10-23 VITALS
RESPIRATION RATE: 17 BRPM | TEMPERATURE: 97.6 F | BODY MASS INDEX: 23.44 KG/M2 | WEIGHT: 120 LBS | DIASTOLIC BLOOD PRESSURE: 79 MMHG | HEART RATE: 70 BPM | OXYGEN SATURATION: 97 % | SYSTOLIC BLOOD PRESSURE: 154 MMHG

## 2022-10-23 PROCEDURE — 82947 ASSAY GLUCOSE BLOOD QUANT: CPT

## 2022-10-23 NOTE — ED PROVIDER NOTES
[facility] EMERGENCY DEPT  eMERGENCY dEPARTMENT eNCOUnter      Pt Name: Dayton Braun  MRN: [id number]  [place] 1935  Date of evaluation: 10/22/2022  Provider: Sina Olson MD    CHIEF COMPLAINT       Chief Complaint   Patient presents with    Abdominal Pain     Pain in epigastric region after eating this evening; hx of esophageal stretch- pt has abd hernia as well         HISTORY OF PRESENT ILLNESS   (Location/Symptom, Timing/Onset,Context/Setting, Quality, Duration, Modifying Factors, Severity)  Note limiting factors. Dayton Braun is a 80 y.o. female who presents to the emergency department with chest pain and pressure. Patient's daughter and patient give history. Patient's daughter points to the base of her throat and runs her finger down sternum to the base of her chest stating this is where the discomfort is located. Daughter states that about 26 tonight patient was eating shredded pork with her meal \"and when I walked back in the room she was spitting in her napkin and she was not breathing well. \"  Daughter denies choking or cyanosis. Patient has had a history of a similar episode in January 2022 \"when she had her esophagus stretched. \"  Patient was placed on Protonix for gastroesophageal reflux disease and hiatal hernia at that time. Patient has not been able to swallow her secretions and has been spitting them into emesis bag. HPI    NursingNotes were reviewed. REVIEW OF SYSTEMS    (2-9 systems for level 4, 10 or more for level 5)     Review of Systems   Constitutional:  Negative for chills and fever. HENT:  Negative for congestion and rhinorrhea. Eyes:  Negative for discharge. Respiratory:  Positive for cough. Negative for shortness of breath. Cardiovascular:  Positive for chest pain. Negative for palpitations. Gastrointestinal:  Negative for abdominal pain, blood in stool, diarrhea and vomiting. Genitourinary:  Negative for difficulty urinating and dysuria. Musculoskeletal:  Positive for back pain (chronic). Negative for neck pain. Skin:  Positive for pallor (\"always\"). Negative for color change. Neurological:  Positive for syncope (\"vasovagal daily\"). Negative for seizures. Psychiatric/Behavioral:  Negative for agitation and confusion. All other systems reviewed and are negative.          PAST MEDICALHISTORY     Past Medical History:   Diagnosis Date    Age-related macular degeneration, wet, right eye ([address]     Anemia     Back pain     Bilateral carotid artery stenosis 02/23/2021    Chronic bilateral low back pain with left-sided sciatica 12/19/2019    Colitis, ischemic (HCC)     Dementia ([address]     Diverticulosis     Dry age-related macular degeneration of left eye     Hyperlipidemia     Hypertension     Osteoarthritis     Palliative care patient 09/17/2019    Risk for falls 09/13/2019    Seizures ([address]     Spastic colon     Status post placement of implantable loop recorder 10/27/2020    Stroke syndrome     CVA's/ TIA's    Urinary incontinence     Uterine cancer ([address]     Uterine cancer ([address]          SURGICAL HISTORY       Past Surgical History:   Procedure Laterality Date    BREAST SURGERY Right     FNA Right Breast \"oh heavens, maybe 20 years ago\"    CHOLECYSTECTOMY      COLONOSCOPY  09/25/2003    Dr Polly Fulton ischemic colitis, incomplete exam    COLONOSCOPY  08/29/2003    Dr Viki Hammond:  limited colon-ischemic colitis    DILATION AND CURETTAGE OF UTERUS      x2, in [address] tears, laser suregry, adn cataract with IOL b/l    HYSTERECTOMY (CERVIX STATUS UNKNOWN)  01/2020    ovaries and tubes    INSERTABLE CARDIAC MONITOR      LUMBAR FUSION      2012 90 days after the spine surgeries    OTHER SURGICAL HISTORY      inner lymph nodes    OTHER SURGICAL HISTORY  09/2020    Loop recorder     PACEMAKER INSERTION      PACEMAKER PLACEMENT  01/2021    SPINE SURGERY      L3,4, and 5   done in 2012    [employer] as a child at age 9     UPPER GASTROINTESTINAL ENDOSCOPY  08/28/2003    DR Brittaney Lisa:  Duodenal scarring but no evidence of active ulcer disease. UPPER GASTROINTESTINAL ENDOSCOPY N/A 01/10/2022    Dr Ginger Stacy ring dilated up to 18mm with balloon, Presbyesophagus-like esophageal changes, 3-4cm HH, NEG Hpylori, NEG Barretts         CURRENT MEDICATIONS     Discharge Medication List as of 10/23/2022  1:25 AM        CONTINUE these medications which have NOT CHANGED    Details   gabapentin (NEURONTIN) 100 MG capsule TAKE 1 CAPSULE BY MOUTH 3 TIMES DAILY. , Disp-90 capsule, R-5Normal      cephALEXin (KEFLEX) 250 MG capsule Take 250 mg by mouth 4 times dailyHistorical Med      potassium chloride (KLOR-CON M) 20 MEQ extended release tablet Take 2 tablets by mouth daily for 7 days, Disp-14 tablet, R-0Normal      B-Complex, Folic Acid, TABS TAKE ONE TABLET BY MOUTH ONCE DAILYHistorical Med      meloxicam (MOBIC) 15 MG tablet Take 15 mg by mouth dailyHistorical Med      levETIRAcetam (KEPPRA) 500 MG tablet Take 1 tablet by mouth 2 times daily Indications: takes 9am and 9pm, Disp-60 tablet, R-11Normal      dipyridamole (PERSANTINE) 50 MG tablet Take 2 tablets by mouth 2 times daily Indications: 9am and 9pm, Disp-60 tablet, R-11Normal      hydrALAZINE (APRESOLINE) 50 MG tablet Take 50 mg by mouth 2 times daily DO NOT TAKE IN THE AM DUE TO VASAL VAGAL RESPONCEHistorical Med      dicyclomine (BENTYL) 10 MG capsule Take 10 mg by mouth as needed Historical Med      Thiamine HCl (VITAMIN B-1 PO) Take 100 mg by mouth daily Takes at 2:00 PM Historical Med      fluticasone (FLONASE) 50 MCG/ACT nasal spray 1 spray by Each Nostril route 2 times daily Historical Med      cloNIDine (CATAPRES) 0.1 MG tablet Take 1 tablet by mouth 2 times daily as needed for High Blood Pressure (for BP greater than 160/100), Disp-30 tablet,R-2Normal      pantoprazole (PROTONIX) 40 MG tablet Take 40 mg by mouth 2 times daily Indications: 9am Historical Med      acetaminophen (TYLENOL) 500 MG tablet Take 1,000 mg by mouth 3 times daily Historical Med      donepezil (ARICEPT) 10 MG tablet Take 1 tablet by mouth nightly, Disp-90 tablet,R-3Normal      Docosanol (ABREVA EX) Apply topically as needed Historical Med      simethicone (MYLICON) 926 MG chewable tablet Take 125 mg by mouth 3 times daily 2 PO 6:30 AM, 2 at 10:00 AM, 1 at 5:30 PM otherwise prnHistorical Med      butalbital-acetaminophen-caffeine (FIORICET, ESGIC) -40 MG per tablet Take 1 tablet by mouth every 6 hours as needed for Headaches Historical Med      amLODIPine (NORVASC) 5 MG tablet Take 5 mg by mouth 2 times daily Indications: takes at 9am and 9pm If BP sys 120 or below then 2.5mg bid otherwise 5mg bidHistorical Med      diclofenac sodium 1 % GEL Apply 2 g topically 3 times daily as needed for Pain , Topical, 3 TIMES DAILY PRN, Historical Med      sodium chloride 1 g tablet Take 1 g by mouth 3 times daily Indications: takes 9am, 3pm, and 9pm Historical Med      lidocaine 4 % external patch Place 1 patch onto the skin daily as needed (LEFT LOWER BACK PAIN) , TransDERmal, DAILY PRN, Historical Med      valACYclovir (VALTREX) 1 g tablet as needed Historical Med      azelastine (ASTELIN) 0.1 % nasal spray 2 sprays by Nasal route 2 times daily Indications: takes 9am and 9pm Historical Med      Cholecalciferol (VITAMIN D3) 5000 units TABS Take 5,000 Units by mouth daily Indications: AT 2:30 PM Historical Med      sodium chloride (OCEAN, BABY AYR) 0.65 % nasal spray 1 spray by Nasal route as needed for Congestion Historical Med      Lutein 20 MG TABS Take 20 mg by mouth daily Indications: 3 PM DAILY Historical Med      camphor-menthol (SARNA) 0.5-0.5 % lotion Apply 0.5 mLs topically 2 times daily as needed for Itching Apply topically as needed., Topical, 2 TIMES DAILY PRN, Historical Med      Calcium Carb-Cholecalciferol 600-800 MG-UNIT TABS Take 1 tablet by mouth daily Indications: AT 2:30 PM Historical Med      promethazine (PHENERGAN) 25 MG tablet Take 12.5 mg by mouth 3 times daily as needed for Nausea Historical Med      Omega 3-6-9 Fatty Acids (OMEGA 3-6-9 COMPLEX PO) Take 2 tablets by mouth daily Indications: AT 2:30 PM Historical Med      alendronate (FOSAMAX) 70 MG tablet Take 70 mg by mouth every 7 days Sundays at 1830Historical Med      pravastatin (PRAVACHOL) 20 MG tablet Take 20 mg by mouth nightly Indications: takes at 9am Historical Med      ramipril (ALTACE) 10 MG capsule Take 10 mg by mouth 2 times daily Indications: takes at 9am and 9pm Historical Med      b complex vitamins capsule Take 1 capsule by mouth daily Indications: AT 2:30 PM B 100Historical Med      Multiple Vitamins-Minerals (THERAPEUTIC MULTIVITAMIN-MINERALS) tablet Take 1 tablet by mouth daily Indications: AT 2:30 PM Historical Med             ALLERGIES     Aspirin, Spironolactone, Dilaudid [hydromorphone hcl], Fd&c yellow #5 [place] [tartrazine], Ondansetron, Quinolones, Sulfa antibiotics, Codeine, Demerol hcl [meperidine], Levaquin [levofloxacin in d5w], Myrbetriq [mirabegron], Namenda [memantine hcl], Norco [hydrocodone-acetaminophen], and Pneumococcal vaccines    FAMILY HISTORY       Family History   Problem Relation Age of Onset    Heart Defect Mother     Hypertension Mother     Heart Attack Father         x3 within 24 h and then , abused ciggarettes    No Known Problems Daughter     No Known Problems Son     No Known Problems Son     Colon Cancer Neg Hx     Colon Polyps Neg Hx     Esophageal Cancer Neg Hx     Liver Cancer Neg Hx     Liver Disease Neg Hx     Rectal Cancer Neg Hx     Stomach Cancer Neg Hx           SOCIAL HISTORY       Social History     Socioeconomic History    Marital status:     Number of children: 3   Occupational History    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mployee of [employer]    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,mployee of [employer]   Tobacco Use    Smoking status: Never Smokeless tobacco: Never   Vaping Use    Vaping Use: Never used   Substance and Sexual Activity    Alcohol use: Never    Drug use: Never   Social History Narrative    CODE STATUS: DNR-CCA    HEALTH CARE PROXY / [employer] and Healthcare: her daughter, Mrs. Jose Luis Pedersen, [phone]    AMBULATES: with walker during day, wheelchair at night    DOMICILED: lives in the [facility] assisted living facility, has no stairs or steps, has a cat, her daughter is there periodically       SCREENINGS             PHYSICAL EXAM    (up to 7 for level 4, 8 or more for level 5)     ED Triage Vitals [10/22/22 2122]   BP Temp Temp Source Heart Rate Resp SpO2 Height Weight   (!) 152/80 97.6 °F (36.4 °C) Oral 70 17 96 % -- 120 lb (54.4 kg)       Physical Exam  Vitals and nursing note reviewed. Constitutional:       General: She is not in acute distress. Comments: Patient spitting secretions into emesis bag. HENT:      Head: Normocephalic and atraumatic. Right Ear: External ear normal.      Left Ear: External ear normal.      Nose: Nose normal.      Mouth/Throat:      Pharynx: Oropharynx is clear. No oropharyngeal exudate. Comments: Tacky mucous membranes    Eyes:      General: No scleral icterus. Conjunctiva/sclera: Conjunctivae normal.      Pupils: Pupils are equal, round, and reactive to light. Cardiovascular:      Rate and Rhythm: Normal rate and regular rhythm. Pulses: Normal pulses. Heart sounds: Normal heart sounds. Pulmonary:      Effort: Pulmonary effort is normal. No respiratory distress. Breath sounds: Normal breath sounds. No stridor. No wheezing, rhonchi or rales. Abdominal:      General: Bowel sounds are normal. There is no distension. Palpations: Abdomen is soft. Tenderness: There is no abdominal tenderness. There is no guarding or rebound. Musculoskeletal:         General: No tenderness or deformity. Cervical back: Neck supple. No rigidity.       Right 2330 10/23/22 0100   BP: (!) 152/80 (!) 162/78 (!) 154/79   Pulse: 70 69 70   Resp: 17     Temp: 97.6 °F (36.4 °C)     TempSrc: Oral     SpO2: 96% 94% 97%   Weight: 120 lb (54.4 kg)         MDM     Amount and/or Complexity of Data Reviewed  Clinical lab tests: reviewed  Decide to obtain previous medical records or to obtain history from someone other than the patient: yes    27-year-old female presents with \"pressure and pain\" in upper chest.  Patient has a history of esophageal stenosis in the past.  Lab results reviewed. Patient given glucagon 1 mg IV with relief of pain/pressure in chest.  Patient passes p.o. liquid challenge. Patient keeps pills from home down without vomiting. Patient will go home to follow-up with Dr. Cori Comer, GI specialist, and Dr. Isai Dewitt, PCP. Patient will return with increasing or severe pain, persistent vomiting, difficulty swallowing liquids, or other concerns. CONSULTS:  None    PROCEDURES:  Unless otherwise noted below, none     Procedures    FINAL IMPRESSION      1. Esophageal stenosis          DISPOSITION/PLAN   DISPOSITION Decision To Discharge 10/23/2022 01:16:55 AM      PATIENT REFERRED TO:  Westley Walsh DO  [address] [place] [address].   [address] [place] [address] Avenue  [phone]    Schedule an appointment as soon as possible for a visit       Nasreen Morgan MD  [facility] [address]    Schedule an appointment as soon as possible for a visit       DISCHARGE MEDICATIONS:  Discharge Medication List as of 10/23/2022  1:25 AM             (Please note that portions of this note were completed with a voice recognition program.  Efforts were made to edit thedictations but occasionally words are mis-transcribed.)    Tito Hairston MD (electronically signed)  Attending Emergency Physician          Tito Hairston MD  10/23/22 0109

## 2022-10-23 NOTE — DISCHARGE INSTRUCTIONS
Return with increasing or severe pain, difficulty breathing, persistent vomiting, or other concerns. Cut all foods up into very small pieces. Eat slowly and chew thoroughly.

## 2022-10-24 ENCOUNTER — TELEPHONE (OUTPATIENT)
Dept: GASTROENTEROLOGY | Age: 87
End: 2022-10-24

## 2022-10-24 NOTE — TELEPHONE ENCOUNTER
The pt was seen in the ED over the weekend for for esophageal stenosis. The pt daughter wants to know if the pt will need an office visit or can they just schedule to have the procedure done.

## 2022-10-26 ENCOUNTER — NURSE ONLY (OUTPATIENT)
Dept: UROLOGY | Age: 87
End: 2022-10-26
Payer: MEDICARE

## 2022-10-26 DIAGNOSIS — N39.46 MIXED STRESS AND URGE URINARY INCONTINENCE: ICD-10-CM

## 2022-10-26 DIAGNOSIS — N32.81 OVERACTIVE BLADDER: Primary | ICD-10-CM

## 2022-10-26 LAB
EKG P AXIS: 78 DEGREES
EKG P-R INTERVAL: 190 MS
EKG Q-T INTERVAL: 420 MS
EKG QRS DURATION: 88 MS
EKG QTC CALCULATION (BAZETT): 428 MS
EKG T AXIS: 37 DEGREES

## 2022-10-26 PROCEDURE — 64566 NEUROELTRD STIM POST TIBIAL: CPT | Performed by: NURSE PRACTITIONER

## 2022-10-26 NOTE — TELEPHONE ENCOUNTER
10-26-22    Patient's daughter Ova Juan Ramon called the office back again today from [phone] said her Mother Trip Lora who is an established patient of Tomeka Ortega really needs another Egd with hin as soon as possible. Last Egd/Dil with   1-10-22. ( see previous messages attached to this note). Patient has recently been in ER on 10-22-22 but they did not do much for her Mom, said she is losing weight rapidly by not being able to eat anything but small amounts of cream of soups and she is declining quickly per the Daughter Ova Juan Ramon. Patient has FU scheduled with [facility] on 10-31-22 but the Duaghter is asking this message go to Tomeka Ortega and see if he would agree to go ahead and just get her Mother scheduled for Egd/Dil instead of having to come in for a FU. I spoke to [facility] apr and she said ask Tomeka Ortega and wasn't sure if he can use the H&P from the recent ER visit. I will forward to  and [facility] for review. The Daughter Oscar Waltonr is asking for a return call today. I did tell the daughter if she feels she has an emergent situation to proceed forward back to ER with her Mother, she did not like that but did voice understanding.  [facility]

## 2022-10-27 NOTE — TELEPHONE ENCOUNTER
I spoke to this patient's daughter Stacie Lerma, she will keep the FU with [facility] apralfreda as scheduled per recommendation of .  [facility]

## 2022-10-28 ENCOUNTER — TELEMEDICINE (OUTPATIENT)
Dept: GASTROENTEROLOGY | Age: 87
End: 2022-10-28
Payer: MEDICARE

## 2022-10-28 ENCOUNTER — TELEPHONE (OUTPATIENT)
Dept: UROLOGY | Age: 87
End: 2022-10-28

## 2022-10-28 DIAGNOSIS — K22.89 PRESBYESOPHAGUS: ICD-10-CM

## 2022-10-28 DIAGNOSIS — K22.2 SCHATZKI'S RING OF DISTAL ESOPHAGUS: ICD-10-CM

## 2022-10-28 DIAGNOSIS — R13.10 DYSPHAGIA, UNSPECIFIED TYPE: Primary | ICD-10-CM

## 2022-10-28 PROCEDURE — 99214 OFFICE O/P EST MOD 30 MIN: CPT | Performed by: NURSE PRACTITIONER

## 2022-10-28 PROCEDURE — 1036F TOBACCO NON-USER: CPT | Performed by: NURSE PRACTITIONER

## 2022-10-28 PROCEDURE — 1123F ACP DISCUSS/DSCN MKR DOCD: CPT | Performed by: NURSE PRACTITIONER

## 2022-10-28 PROCEDURE — 1090F PRES/ABSN URINE INCON ASSESS: CPT | Performed by: NURSE PRACTITIONER

## 2022-10-28 PROCEDURE — G8428 CUR MEDS NOT DOCUMENT: HCPCS | Performed by: NURSE PRACTITIONER

## 2022-10-28 PROCEDURE — G8484 FLU IMMUNIZE NO ADMIN: HCPCS | Performed by: NURSE PRACTITIONER

## 2022-10-28 PROCEDURE — G8420 CALC BMI NORM PARAMETERS: HCPCS | Performed by: NURSE PRACTITIONER

## 2022-10-28 NOTE — TELEPHONE ENCOUNTER
Betty Hernandez called to schedule a injection. Please be advised that the best time to call her to accommodate their needs is Anytime. Thank you.

## 2022-10-28 NOTE — PROGRESS NOTES
Migdalia Ferrara is a 80 y.o. female evaluated via telephone on 10/28/2022 for No chief complaint on file. Documentation:  I communicated with the patient and/or health care decision maker about her GI complaints  Details of this discussion including any medical advice provided:   Pt reports dysphagia  Noted with foods  Last weekend she had a food impaction that brought her to the emergency room  She was given glucagon and discharged home  She has hx of Schatzki ring with dilation as well as presbyesophagus in Jan 2022  The dilatation worked great and she hasn't had dysphagia til just here recently    PLAN:  EGD with Dr Donna Connor early next week  Pt's daughter wants pt to see a provider every 6 months for continued care, I told this at [facility] for checkout      Total Time: minutes: 11-20 minutes    Migdalia Ferrara was evaluated through a synchronous (real-time) audio encounter. Patient identification was verified at the start of the visit. She (or guardian if applicable) is aware that this is a billable service, which includes applicable co-pays. This visit was conducted with the patient's (and/or legal guardian's) verbal consent. She has not had a related appointment within my department in the past 7 days or scheduled within the next 24 hours. The patient was located at Home: [address] [place] [address]. The provider was located at [place] [address]): [address] [place] [address].     Note: not billable if this call serves to triage the patient into an appointment for the relevant concern    LAYTON Parra

## 2022-10-28 NOTE — PATIENT INSTRUCTIONS
You are going to have an Endoscopy and here are some basic instructions:    Nothing to eat or drink after midnight EXCEPT:  PLEASE TAKE MEDICATION(S) FOR HIGH BLOOD PRESSURE, SEIZURES, HEART, AND THYROID WITH A SIP OF WATER AT LEAST 2 HOURS PRIOR TO ARRIVAL TIME.   YOU MAY ALSO TAKE ANY INHALERS YOU ARE PRESCRIBED. You will not be able to drive for 24 hours after the procedure due to sedation. Bring a  with you the day of the procedure. No aspirin, ibuprofen, naproxen, fish oil or vitamin E for 5 days before procedure. Continue current medications. If you are on blood thinners, clearance from the prescribing physician will be obtained before your procedure is scheduled. [email or web address] may be associated with discontinuation of your blood thinner and include, but not limited to, stroke, TIA, or cardiac event. If biopsies are taken during the procedure they will be sent to a pathologist for analysis. You will be notified by mail of the pathology results in 2-3 weeks. Your physician may also schedule a follow up appointment with the nurse practitioner to discuss pathology, symptoms or to check if you have had any problems related to your procedure. If you prefer not to return to the office after your procedure please discuss this with your physician on the day of your procedure.

## 2022-10-28 NOTE — PROGRESS NOTES
Treatment # maintenance    Improvement of Symptoms? Yes    OAB/Urologic Medications? none      Uroplasty Procedure:    1. Patient is seated with the left treatment leg elevated. 2. 34 gauge fine needle electrode is inserted into lower inner aspect of the leg. Slightly cephalad to the medial malleolus. 3. Surface electrode pad is placed over the medial aspect of the calcaneus on the same leg. 4.Needle electrode is connected to the external pulse generator. 5.The pulse generator is turned on and the millivolts used are 3. 6.Patient is treated for 30 minutes. 7.The needle and pad are removed. Patient will follow up in 2 weeks for next treatment.

## 2022-10-31 ENCOUNTER — TELEPHONE (OUTPATIENT)
Dept: UROLOGY | Age: 87
End: 2022-10-31

## 2022-10-31 ENCOUNTER — ANESTHESIA (OUTPATIENT)
Dept: ENDOSCOPY | Age: 87
End: 2022-10-31
Payer: MEDICARE

## 2022-10-31 ENCOUNTER — HOSPITAL ENCOUNTER (OUTPATIENT)
Age: 87
Setting detail: OUTPATIENT SURGERY
Discharge: HOME | End: 2022-10-31
Attending: INTERNAL MEDICINE | Admitting: INTERNAL MEDICINE
Payer: MEDICARE

## 2022-10-31 ENCOUNTER — ANESTHESIA EVENT (OUTPATIENT)
Dept: ENDOSCOPY | Age: 87
End: 2022-10-31
Payer: MEDICARE

## 2022-10-31 ENCOUNTER — OFFICE VISIT (OUTPATIENT)
Dept: NEUROLOGY | Age: 87
End: 2022-10-31
Payer: MEDICARE

## 2022-10-31 VITALS
BODY MASS INDEX: 22.38 KG/M2 | HEART RATE: 60 BPM | SYSTOLIC BLOOD PRESSURE: 138 MMHG | WEIGHT: 114 LBS | RESPIRATION RATE: 16 BRPM | TEMPERATURE: 97.7 F | OXYGEN SATURATION: 98 % | DIASTOLIC BLOOD PRESSURE: 77 MMHG | HEIGHT: 60 IN

## 2022-10-31 VITALS
HEART RATE: 60 BPM | WEIGHT: 114 LBS | BODY MASS INDEX: 22.38 KG/M2 | HEIGHT: 60 IN | OXYGEN SATURATION: 98 % | SYSTOLIC BLOOD PRESSURE: 138 MMHG | DIASTOLIC BLOOD PRESSURE: 77 MMHG

## 2022-10-31 DIAGNOSIS — G40.219 PARTIAL SYMPTOMATIC EPILEPSY WITH COMPLEX PARTIAL SEIZURES, INTRACTABLE, WITHOUT STATUS EPILEPTICUS (HCC): ICD-10-CM

## 2022-10-31 DIAGNOSIS — F01.50 VASCULAR DEMENTIA WITHOUT BEHAVIORAL DISTURBANCE (HCC): ICD-10-CM

## 2022-10-31 DIAGNOSIS — R55 VASOVAGAL SYNCOPE: Primary | ICD-10-CM

## 2022-10-31 PROCEDURE — G8484 FLU IMMUNIZE NO ADMIN: HCPCS | Performed by: PSYCHIATRY & NEUROLOGY

## 2022-10-31 PROCEDURE — 1123F ACP DISCUSS/DSCN MKR DOCD: CPT | Performed by: PSYCHIATRY & NEUROLOGY

## 2022-10-31 PROCEDURE — 7100000011 HC PHASE II RECOVERY - ADDTL 15 MIN: Performed by: INTERNAL MEDICINE

## 2022-10-31 PROCEDURE — 2709999900 HC NON-CHARGEABLE SUPPLY: Performed by: INTERNAL MEDICINE

## 2022-10-31 PROCEDURE — G8427 DOCREV CUR MEDS BY ELIG CLIN: HCPCS | Performed by: PSYCHIATRY & NEUROLOGY

## 2022-10-31 PROCEDURE — 7100000010 HC PHASE II RECOVERY - FIRST 15 MIN: Performed by: INTERNAL MEDICINE

## 2022-10-31 PROCEDURE — 3609012400 HC EGD TRANSORAL BIOPSY SINGLE/MULTIPLE: Performed by: INTERNAL MEDICINE

## 2022-10-31 PROCEDURE — 6360000002 HC RX W HCPCS

## 2022-10-31 PROCEDURE — 3700000001 HC ADD 15 MINUTES (ANESTHESIA): Performed by: INTERNAL MEDICINE

## 2022-10-31 PROCEDURE — 2720000010 HC SURG SUPPLY STERILE: Performed by: INTERNAL MEDICINE

## 2022-10-31 PROCEDURE — 2500000003 HC RX 250 WO HCPCS: Performed by: NURSE ANESTHETIST, CERTIFIED REGISTERED

## 2022-10-31 PROCEDURE — 43249 ESOPH EGD DILATION <30 MM: CPT | Performed by: INTERNAL MEDICINE

## 2022-10-31 PROCEDURE — 1036F TOBACCO NON-USER: CPT | Performed by: PSYCHIATRY & NEUROLOGY

## 2022-10-31 PROCEDURE — 1090F PRES/ABSN URINE INCON ASSESS: CPT | Performed by: PSYCHIATRY & NEUROLOGY

## 2022-10-31 PROCEDURE — 3700000000 HC ANESTHESIA ATTENDED CARE: Performed by: INTERNAL MEDICINE

## 2022-10-31 PROCEDURE — 3609017700 HC EGD DILATION GASTRIC/DUODENAL STRICTURE: Performed by: INTERNAL MEDICINE

## 2022-10-31 PROCEDURE — 2580000003 HC RX 258: Performed by: INTERNAL MEDICINE

## 2022-10-31 PROCEDURE — G8420 CALC BMI NORM PARAMETERS: HCPCS | Performed by: PSYCHIATRY & NEUROLOGY

## 2022-10-31 PROCEDURE — 99214 OFFICE O/P EST MOD 30 MIN: CPT | Performed by: PSYCHIATRY & NEUROLOGY

## 2022-10-31 RX ORDER — LEVETIRACETAM 500 MG/1
500 TABLET ORAL 2 TIMES DAILY
Qty: 60 TABLET | Refills: 11 | Status: SHIPPED | OUTPATIENT
Start: 2022-10-31 | End: 2022-12-30

## 2022-10-31 RX ORDER — LIDOCAINE HYDROCHLORIDE 10 MG/ML
INJECTION, SOLUTION INFILTRATION; PERINEURAL PRN
Status: DISCONTINUED | OUTPATIENT
Start: 2022-10-31 | End: 2022-10-31 | Stop reason: SDUPTHER

## 2022-10-31 RX ORDER — DIPYRIDAMOLE 50 MG
100 TABLET ORAL 2 TIMES DAILY
Qty: 60 TABLET | Refills: 11 | Status: SHIPPED | OUTPATIENT
Start: 2022-10-31 | End: 2022-11-30

## 2022-10-31 RX ORDER — SODIUM CHLORIDE, SODIUM LACTATE, POTASSIUM CHLORIDE, CALCIUM CHLORIDE 600; 310; 30; 20 MG/100ML; MG/100ML; MG/100ML; MG/100ML
INJECTION, SOLUTION INTRAVENOUS CONTINUOUS
Status: DISCONTINUED | OUTPATIENT
Start: 2022-10-31 | End: 2022-10-31 | Stop reason: HOSPADM

## 2022-10-31 RX ORDER — PROPOFOL 10 MG/ML
INJECTION, EMULSION INTRAVENOUS PRN
Status: DISCONTINUED | OUTPATIENT
Start: 2022-10-31 | End: 2022-10-31 | Stop reason: SDUPTHER

## 2022-10-31 RX ADMIN — SODIUM CHLORIDE, POTASSIUM CHLORIDE, SODIUM LACTATE AND CALCIUM CHLORIDE: 600; 310; 30; 20 INJECTION, SOLUTION INTRAVENOUS at 11:06

## 2022-10-31 RX ADMIN — PROPOFOL 200 MG: 10 INJECTION, EMULSION INTRAVENOUS at 11:52

## 2022-10-31 RX ADMIN — LIDOCAINE HYDROCHLORIDE 40 MG: 10 INJECTION, SOLUTION INFILTRATION; PERINEURAL at 11:52

## 2022-10-31 ASSESSMENT — PAIN SCALES - GENERAL: PAINLEVEL_OUTOF10: 0

## 2022-10-31 ASSESSMENT — PAIN - FUNCTIONAL ASSESSMENT: PAIN_FUNCTIONAL_ASSESSMENT: 0-10

## 2022-10-31 NOTE — ANESTHESIA PRE PROCEDURE
Department of Anesthesiology  Preprocedure Note       Name:  Marisol Reese   Age:  80 y.o.  :  1935                                          MRN:  [id number]         Date:  10/31/2022      Surgeon: Salu Lewis):  Christi Gutierrez MD    Procedure: Procedure(s):  EGD BIOPSY    Medications prior to admission:   Prior to Admission medications    Medication Sig Start Date End Date Taking?  Authorizing Provider   gabapentin (NEURONTIN) 100 MG capsule TAKE 1 CAPSULE BY MOUTH 3 TIMES DAILY. 9/2/22 10/1/22  Ruben Mcmanus MD   cephALEXin (KEFLEX) 250 MG capsule Take 250 mg by mouth 4 times daily    Historical Provider, MD   potassium chloride (KLOR-CON M) 20 MEQ extended release tablet Take 2 tablets by mouth daily for 7 days 22  Rina Osorio MD   B-Complex, Folic Acid, TABS TAKE ONE TABLET BY MOUTH ONCE DAILY 22   Historical Provider, MD   meloxicam (MOBIC) 15 MG tablet Take 15 mg by mouth daily    Historical Provider, MD   levETIRAcetam (KEPPRA) 500 MG tablet Take 1 tablet by mouth 2 times daily Indications: takes 9am and 9pm 3/11/22 5/25/22  Ruben Mcmanus MD   dipyridamole (PERSANTINE) 50 MG tablet Take 2 tablets by mouth 2 times daily Indications: 9am and 9pm 3/11/22 5/25/22  Ruben Mcmanus MD   hydrALAZINE (APRESOLINE) 50 MG tablet Take 50 mg by mouth 2 times daily DO NOT TAKE IN THE AM DUE TO VASAL VAGAL RESPONCE    Historical Provider, MD   dicyclomine (BENTYL) 10 MG capsule Take 10 mg by mouth as needed     Historical Provider, MD   Thiamine HCl (VITAMIN B-1 PO) Take 100 mg by mouth daily Takes at 2:00 PM     Historical Provider, MD   fluticasone (FLONASE) 50 MCG/ACT nasal spray 1 spray by Each Nostril route 2 times daily     Historical Provider, MD   cloNIDine (CATAPRES) 0.1 MG tablet Take 1 tablet by mouth 2 times daily as needed for High Blood Pressure (for BP greater than 160/100) 20   LAYTON Espinoza   pantoprazole (PROTONIX) 40 MG tablet Take 40 mg by mouth 2 times daily Indications: 9am     Historical Provider, MD   acetaminophen (TYLENOL) 500 MG tablet Take 1,000 mg by mouth 3 times daily     Historical Provider, MD   donepezil (ARICEPT) 10 MG tablet Take 1 tablet by mouth nightly 9/24/20   Rosales Diaz MD   Docosanol (ABREVA EX) Apply topically as needed     Historical Provider, MD   simethicone (MYLICON) 809 MG chewable tablet Take 125 mg by mouth 3 times daily 2 PO 6:30 AM, 2 at 10:00 AM, 1 at 5:30 PM otherwise prn    Historical Provider, MD   butalbital-acetaminophen-caffeine (FIORICET, ESGIC) -40 MG per tablet Take 1 tablet by mouth every 6 hours as needed for Headaches     Historical Provider, MD   amLODIPine (NORVASC) 5 MG tablet Take 5 mg by mouth 2 times daily Indications: takes at 9am and 9pm If BP sys 120 or below then 2.5mg bid otherwise 5mg bid    Historical Provider, MD   diclofenac sodium 1 % GEL Apply 2 g topically 3 times daily as needed for Pain     Historical Provider, MD   sodium chloride 1 g tablet Take 1 g by mouth 3 times daily Indications: takes 9am, 3pm, and 9pm     Historical Provider, MD   lidocaine 4 % external patch Place 1 patch onto the skin daily as needed (LEFT LOWER BACK PAIN)     Historical Provider, MD   valACYclovir (VALTREX) 1 g tablet as needed  6/6/19   Historical Provider, MD   azelastine (ASTELIN) 0.1 % nasal spray 2 sprays by Nasal route 2 times daily Indications: takes 9am and 9pm  5/3/18   Historical Provider, MD   Cholecalciferol (VITAMIN D3) 5000 units TABS Take 5,000 Units by mouth daily Indications: AT 2:30 PM     Historical Provider, MD   sodium chloride (OCEAN, BABY AYR) 0.65 % nasal spray 1 spray by Nasal route as needed for Congestion     Historical Provider, MD   Lutein 20 MG TABS Take 20 mg by mouth daily Indications: 3 PM DAILY     Historical Provider, MD   camphor-menthol (SARNA) 0.5-0.5 % lotion Apply 0.5 mLs topically 2 times daily as needed for Itching Apply topically as needed. Historical Provider, MD   Calcium Carb-Cholecalciferol 600-800 MG-UNIT TABS Take 1 tablet by mouth daily Indications: AT 2:30 PM     Historical Provider, MD   promethazine (PHENERGAN) 25 MG tablet Take 12.5 mg by mouth 3 times daily as needed for Nausea     Historical Provider, MD   Omega 3-6-9 Fatty Acids (OMEGA 3-6-9 COMPLEX PO) Take 2 tablets by mouth daily Indications: AT 2:30 PM     Historical Provider, MD   alendronate (FOSAMAX) 70 MG tablet Take 70 mg by mouth every 7 days Sundays at [facility] Provider, MD   pravastatin (PRAVACHOL) 20 MG tablet Take 20 mg by mouth nightly Indications: takes at 9am  3/1/16   Historical Provider, MD   ramipril (ALTACE) 10 MG capsule Take 10 mg by mouth 2 times daily Indications: takes at 9am and 9pm  2/26/16   Historical Provider, MD   b complex vitamins capsule Take 1 capsule by mouth daily Indications: AT 2:30 PM B 100    Historical Provider, MD   Multiple Vitamins-Minerals (THERAPEUTIC MULTIVITAMIN-MINERALS) tablet Take 1 tablet by mouth daily Indications: AT 2:30 PM     Historical Provider, MD       Current medications:    Current Outpatient Medications   Medication Sig Dispense Refill    gabapentin (NEURONTIN) 100 MG capsule TAKE 1 CAPSULE BY MOUTH 3 TIMES DAILY.  90 capsule 5    cephALEXin (KEFLEX) 250 MG capsule Take 250 mg by mouth 4 times daily      potassium chloride (KLOR-CON M) 20 MEQ extended release tablet Take 2 tablets by mouth daily for 7 days 14 tablet 0    B-Complex, Folic Acid, TABS TAKE ONE TABLET BY MOUTH ONCE DAILY      meloxicam (MOBIC) 15 MG tablet Take 15 mg by mouth daily      levETIRAcetam (KEPPRA) 500 MG tablet Take 1 tablet by mouth 2 times daily Indications: takes 9am and 9pm 60 tablet 11    dipyridamole (PERSANTINE) 50 MG tablet Take 2 tablets by mouth 2 times daily Indications: 9am and 9pm 60 tablet 11    hydrALAZINE (APRESOLINE) 50 MG tablet Take 50 mg by mouth 2 times daily DO NOT TAKE IN THE AM DUE TO VASAL VAGAL RESPONCE  dicyclomine (BENTYL) 10 MG capsule Take 10 mg by mouth as needed       Thiamine HCl (VITAMIN B-1 PO) Take 100 mg by mouth daily Takes at 2:00 PM       fluticasone (FLONASE) 50 MCG/ACT nasal spray 1 spray by Each Nostril route 2 times daily       cloNIDine (CATAPRES) 0.1 MG tablet Take 1 tablet by mouth 2 times daily as needed for High Blood Pressure (for BP greater than 160/100) 30 tablet 2    pantoprazole (PROTONIX) 40 MG tablet Take 40 mg by mouth 2 times daily Indications: 9am       acetaminophen (TYLENOL) 500 MG tablet Take 1,000 mg by mouth 3 times daily       donepezil (ARICEPT) 10 MG tablet Take 1 tablet by mouth nightly 90 tablet 3    Docosanol (ABREVA EX) Apply topically as needed       simethicone (MYLICON) 161 MG chewable tablet Take 125 mg by mouth 3 times daily 2 PO 6:30 AM, 2 at 10:00 AM, 1 at 5:30 PM otherwise prn      butalbital-acetaminophen-caffeine (FIORICET, ESGIC) -40 MG per tablet Take 1 tablet by mouth every 6 hours as needed for Headaches       amLODIPine (NORVASC) 5 MG tablet Take 5 mg by mouth 2 times daily Indications: takes at 9am and 9pm If BP sys 120 or below then 2.5mg bid otherwise 5mg bid      diclofenac sodium 1 % GEL Apply 2 g topically 3 times daily as needed for Pain       sodium chloride 1 g tablet Take 1 g by mouth 3 times daily Indications: takes 9am, 3pm, and 9pm       lidocaine 4 % external patch Place 1 patch onto the skin daily as needed (LEFT LOWER BACK PAIN)       valACYclovir (VALTREX) 1 g tablet as needed       azelastine (ASTELIN) 0.1 % nasal spray 2 sprays by Nasal route 2 times daily Indications: takes 9am and 9pm       Cholecalciferol (VITAMIN D3) 5000 units TABS Take 5,000 Units by mouth daily Indications: AT 2:30 PM       sodium chloride (OCEAN, BABY AYR) 0.65 % nasal spray 1 spray by Nasal route as needed for Congestion       Lutein 20 MG TABS Take 20 mg by mouth daily Indications: 3 PM DAILY       camphor-menthol (SARNA) 0.5-0.5 % lotion Apply 0.5 mLs topically 2 times daily as needed for Itching Apply topically as needed.  Calcium Carb-Cholecalciferol 600-800 MG-UNIT TABS Take 1 tablet by mouth daily Indications: AT 2:30 PM       promethazine (PHENERGAN) 25 MG tablet Take 12.5 mg by mouth 3 times daily as needed for Nausea       Omega 3-6-9 Fatty Acids (OMEGA 3-6-9 COMPLEX PO) Take 2 tablets by mouth daily Indications: AT 2:30 PM       alendronate (FOSAMAX) 70 MG tablet Take 70 mg by mouth every 7 days Sundays at 1830      pravastatin (PRAVACHOL) 20 MG tablet Take 20 mg by mouth nightly Indications: takes at 9am       ramipril (ALTACE) 10 MG capsule Take 10 mg by mouth 2 times daily Indications: takes at 9am and 9pm       b complex vitamins capsule Take 1 capsule by mouth daily Indications: AT 2:30 PM B 100      Multiple Vitamins-Minerals (THERAPEUTIC MULTIVITAMIN-MINERALS) tablet Take 1 tablet by mouth daily Indications: AT 2:30 PM        No current facility-administered medications for this visit. Allergies:     Allergies   Allergen Reactions    Aspirin Shortness Of Breath    Spironolactone      Low sodium    Dilaudid [Hydromorphone Hcl]     Fd&C Yellow #5 [place] [Tartrazine]     Ondansetron     Quinolones     Sulfa Antibiotics     Codeine Rash and Other (See Comments)     Makes her \"crazy and mean\" and gives her a rash    Demerol Hcl [Meperidine] Other (See Comments)     Made her \"crazy and mean\", and \"loopy\"    Levaquin [Levofloxacin In D5w] Other (See Comments)     Seizure like activity      Myrbetriq [Mirabegron] Other (See Comments)     Unable to micturate    Namenda [Memantine Hcl] Other (See Comments)     made her like a vegetable for hours      Norco [Hydrocodone-Acetaminophen] Rash and Other (See Comments)     \"crazy and mean\"    Pneumococcal Vaccines Swelling       Problem List:    Patient Active Problem List   Diagnosis Code    Irritable bowel syndrome with diarrhea K58.0    S/P laparoscopic cholecystectomy Z90.49    Late onset Alzheimer's disease without behavioral disturbance ([facility]) G30.1, F02.80    Altered mental status R41.82    Seizure as late effect of cerebrovascular accident (CVA) ([facility] I69.398, I21.4    Metabolic encephalopathy X92.72    Weakness R53.1    Chronic bilateral lower abdominal pain R10.31, G89.29, R10.32    Gas pain R14.1    History of ischemic colitis Z87.19    Partial symptomatic epilepsy with complex partial seizures, intractable, without status epilepticus ([facility] G40.219    Cerebrovascular small vessel disease I67.9    Vascular dementia without [facility] disturbance [facility] F01.50    Risk for falls Z91.81    Dry age-related macular degeneration of left eye H35.3120    Age-related macular degeneration, wet, right eye ([facility]) H35.3210    Gait abnormality R26.9    Palliative care patient Z51.5    Bloating R14.0    Chronic bilateral low back pain with left-sided sciatica M54.42, G89.29    Arthralgia of multiple joints M25.50    Abnormal EKG R94.31    Moderate mitral regurgitation I34.0    Chest pain R07.9    Essential hypertension I10    Overactive bladder N32.81    Vasovagal syncope R55    Bradycardia R00.1    Cryptogenic stroke ([facility]) I63.9    Syncope and collapse R55    Status post placement of implantable loop recorder Z95.818    Symptomatic bradycardia R00.1    Bilateral carotid artery stenosis I65.23    PVD (peripheral vascular disease) ([facility]) I73.9    Macrocytic anemia D53.9    Diarrhea R19.7    At risk for polypharmacy Z91.89    Mild protein-calorie malnutrition (HCC) E44.1    Abdominal bloating R14.0    Elevated lipase R74.8    Dysphagia R13.10    Presbyesophagus K22.89    Schatzki's ring of distal esophagus K22.2    S/P balloon dilatation of esophageal stricture Z98.890    Hiatal hernia K44.9       Past Medical History:        Diagnosis Date    Age-related macular degeneration, wet, right eye ([facility])     Anemia     Back pain     Bilateral carotid artery stenosis 02/23/2021    Chronic bilateral low back pain with left-sided sciatica 12/19/2019    Colitis, ischemic ([address]     Dementia ([address]     Diverticulosis     Dry age-related macular degeneration of left eye     Hyperlipidemia     Hypertension     Osteoarthritis     Palliative care patient 09/17/2019    Risk for falls 09/13/2019    Seizures ([address]     Spastic colon     Status post placement of implantable loop recorder 10/27/2020    Stroke syndrome     CVA's/ TIA's    Urinary incontinence     Uterine cancer ([address]     Uterine cancer ([address]        Past Surgical History:        Procedure Laterality Date    BREAST SURGERY Right     FNA Right Breast \"oh heavens, maybe 20 years ago\"    CHOLECYSTECTOMY      COLONOSCOPY  09/25/2003    Dr Octavia Rojas ischemic colitis, incomplete exam    COLONOSCOPY  08/29/2003    Dr Muse Lunch:  limited colon-ischemic colitis    DILATION AND CURETTAGE OF UTERUS      x2, in [facility] tears, laser suregry, adn cataract with IOL b/l    HYSTERECTOMY (CERVIX STATUS UNKNOWN)  01/2020    ovaries and tubes    INSERTABLE CARDIAC MONITOR      LUMBAR FUSION      2012 90 days after the spine surgeries    OTHER SURGICAL HISTORY      inner lymph nodes    OTHER SURGICAL HISTORY  09/2020    Loop recorder     PACEMAKER INSERTION      PACEMAKER PLACEMENT  01/2021    SPINE SURGERY      L3,4, and 5   done in 2012   [address]      as a child at age 9    [facility]  08/28/2003    DR Maame Rodriguez:  Duodenal scarring but no evidence of active ulcer disease.     UPPER GASTROINTESTINAL ENDOSCOPY N/A 01/10/2022    Dr Hong Edge ring dilated up to 18mm with balloon, Presbyesophagus-like esophageal changes, 3-4cm HH, NEG Hpylori, NEG Barretts       Social History:    Social History     Tobacco Use    Smoking status: Never    Smokeless tobacco: Never   Substance Use Topics  Alcohol use: Never                                Counseling given: Not Answered      Vital Signs (Current): There were no vitals filed for this visit. BP Readings from Last 3 Encounters:   10/23/22 (!) 154/79   07/05/22 (!) 155/77   05/25/22 118/60       NPO Status:                                                                                 BMI:   Wt Readings from Last 3 Encounters:   10/22/22 120 lb (54.4 kg)   08/10/22 125 lb (56.7 kg)   05/25/22 125 lb (56.7 kg)     There is no height or weight on file to calculate BMI.    CBC:   Lab Results   Component Value Date/Time    WBC 4.9 10/22/2022 10:58 PM    RBC 3.58 10/22/2022 10:58 PM    HGB 11.2 10/22/2022 10:58 PM    HCT 34.1 10/22/2022 10:58 PM    MCV 95.3 10/22/2022 10:58 PM    RDW 13.1 10/22/2022 10:58 PM     10/22/2022 10:58 PM       CMP:   Lab Results   Component Value Date/Time     10/22/2022 10:58 PM    K 3.5 10/22/2022 10:58 PM    K 3.1 07/04/2022 11:01 PM    CL 97 10/22/2022 10:58 PM    CO2 26 10/22/2022 10:58 PM    BUN 12 10/22/2022 10:58 PM    CREATININE 0.9 10/22/2022 10:58 PM    GFRAA >59 07/04/2022 11:01 PM    LABGLOM >60 10/22/2022 10:58 PM    GLUCOSE 107 10/22/2022 10:58 PM    PROT 6.5 10/22/2022 10:58 PM    PROT 6.7 08/30/2012 12:21 PM    CALCIUM 9.6 10/22/2022 10:58 PM    BILITOT 0.3 10/22/2022 10:58 PM    ALKPHOS 77 10/22/2022 10:58 PM    AST 19 10/22/2022 10:58 PM    ALT 11 10/22/2022 10:58 PM       POC Tests: No results for input(s): POCGLU, POCNA, POCK, POCCL, POCBUN, POCHEMO, POCHCT in the last 72 hours.     Coags:   Lab Results   Component Value Date/Time    PROTIME 12.5 02/23/2020 11:10 PM    INR 0.94 02/23/2020 11:10 PM    APTT 29.6 02/23/2020 11:10 PM       HCG (If Applicable): No results found for: PREGTESTUR, PREGSERUM, HCG, HCGQUANT     ABGs:   Lab Results   Component Value Date/Time    PHART 7.490 03/09/2022 04:54 PM    PO2ART 97.0 03/09/2022 04:54 PM    RPZ1NKO 36.0 03/09/2022 04:54 PM    FNE7HZL 27.4 03/09/2022 04:54 PM    BEART 4.0 03/09/2022 04:54 PM    C1UKPKGT 96.9 03/09/2022 04:54 PM        Type & Screen (If Applicable):  No results found for: LABABO, LABRH    Drug/Infectious Status (If Applicable):  No results found for: HIV, HEPCAB    COVID-19 Screening (If Applicable):   Lab Results   Component Value Date/Time    COVID19 Not Detected 10/22/2022 10:58 PM    COVID19 Not Detected 01/07/2022 11:14 AM           Anesthesia Evaluation  Patient summary reviewed  Airway: Mallampati: Unable to assess / NA  TM distance: >3 FB   Neck ROM: full     Dental:          Pulmonary:Negative Pulmonary ROS and normal exam                               Cardiovascular:  Exercise tolerance: no interval change,   (+) hypertension:, pacemaker:, dysrhythmias (bradycardia):, hyperlipidemia         Beta Blocker:  Not on Beta Blocker         Neuro/Psych:   (+) seizures:, CVA:, psychiatric history:dementia            GI/Hepatic/Renal:   (+) hiatal hernia,          ROS comment: dysphagia. Endo/Other:    (+) malignancy/cancer (h/o uterine cancer). Abdominal:             Vascular:   + PVD, aortic or cerebral, . Other Findings:             Anesthesia Plan      general and TIVA     ASA 3       Induction: intravenous. Anesthetic plan and risks discussed with patient. Plan discussed with CRNA.                     LAYTON Hernandez - JONATHON   10/31/2022

## 2022-10-31 NOTE — DISCHARGE INSTRUCTIONS
1. Await path results, the patient will be contacted in 7-10 days with biopsy results. 2.  Magic mouthwash 5 ml PO Swish and swallow q3h PRN ONLY IF patient has post-procedural sorethroat or chest pain. 3. Full liquids to soft diet today jm discharge from the surgicenter; may advance  diet starting in AM tomorrow. 4. May resume other meds except any ASA/NSAIDs; may use cough drops or lozenges PRN; also OTC/prescription PPI or H2RA PO qday or BID with anti-GERD measures. 5. NO ASA/NSAIDs x 2 weeks  6. OP f/u in 6-8 weeks with Ms. Tomasa Lynn; will consider an Esophageal manometry later if the patient's dysphagia persists. Upper GI Endoscopy: What to Expect at [facility] had an upper GI endoscopy. Your doctor used a thin, lighted tube that bends to look at the inside of your esophagus, your stomach, and the first part of the small intestine, called the duodenum. After you have an endoscopy, you will stay at the hospital or clinic for 1 to 2 hours. This will allow the medicine to wear off. You will be able to go home after your doctor or nurse checks to make sure that you're not having any problems. You may have to stay overnight if you had treatment during the test. You may have a sore throat for a day or two after the test.  This care sheet gives you a general idea about what to expect after the test.  How can you care for yourself at home? Activity   Rest as much as you need to after you go home. You should be able to go back to your usual activities the day after the test.  Diet   Follow your doctor's directions for eating after the test.  Drink plenty of fluids (unless your doctor has told you not to). Medications   If you have a sore throat the day after the test, use an over-the-counter spray to numb your throat. Follow-up care is a key part of your treatment and safety. Be sure to make and go to all appointments, and call your doctor if you are having problems.  It's also a good idea to know your test results and keep a list of the medicines you take. When should you call for help? Call 911 anytime you think you may need emergency care. For example, call if:    You passed out (lost consciousness). You have trouble breathing. You pass maroon or bloody stools. Call your doctor now or seek immediate medical care if:    You have pain that does not get better after your take pain medicine. You have new or worse belly pain. You have blood in your stools. You are sick to your stomach and cannot keep fluids down. You have a fever. You cannot pass stools or gas. Watch closely for changes in your health, and be sure to contact your doctor if:    Your throat still hurts after a day or two. You do not get better as expected. Where can you learn more? Go to [email or web address]://[email or web address] org and sign in to your [employer] account. Enter R037 in the [employer] box to learn more about \"Upper GI Endoscopy: What to Expect at Home. \"     If you do not have an account, please click on the \"Sign Up Now\" link. Current as of: June 6, 2022               Content Version: 13.4  © 3589-1555 [employer]. Care instructions adapted under license by [facility] ([facility]). If you have questions about a medical condition or this instruction, always ask your healthcare professional. [address] any warranty or liability for your use of this information.

## 2022-10-31 NOTE — TELEPHONE ENCOUNTER
Livia's daughter Chris Romero requests that clinic return their call. The best time to reach her is Anytime. She is requesting appointments lined up to have a uroplasty every two weeks on a Wednesday around 1pm through the end of the year. Stacie Johnson would like to know if the uroplasty will be covered using [facility] or does the uroplasty need to be scheduled back with [facility]? Thank you.

## 2022-10-31 NOTE — PROGRESS NOTES
[facility]  [address] [place] [address] Drive, [address] [place] [address]  Phone [phone]  Fax [phone]     [facility] Follow Up Encounter  10/31/22 1:58 PM CDT    Information:   Patient Name: Priti Cody  :   1935  Age:   80 y.o. MRN:   [id number]  Account #:  [de-identified]  Today:  10/31/22    Provider: Javier Serna M.D. Chief Complaint:   Chief Complaint   Patient presents with    Follow-up       Subjective:   Priti Cody is a 80 y.o. woman with a history of  small vessel cerebrovascular disease, dementia, seizures, and syncope who is following up. She has had several episodes of passing out. These always happen in the am when she is going to the bathroom and sitting on the toilet. She has stomach problems and goes to the bathroom several times every am.  She has had no definite seizures and no stroke symptoms. She requires some assistance with personal care mainly due to the risk of passing out and falling. Her mentation is stable. She takes 10 mg Aricept nightly. She previously did not tolerate Namenda.         Objective:     Past Medical History:  Past Medical History:   Diagnosis Date    Age-related macular degeneration, wet, right eye ([address]     Anemia     Back pain     Bilateral carotid artery stenosis 2021    Chronic bilateral low back pain with left-sided sciatica 2019    Colitis, ischemic ([address]     Dementia ([address]     Diverticulosis     Hyperlipidemia     Hypertension     Osteoarthritis     Palliative care patient 2019    Risk for falls 2019    Seizures ([address]     last one     Spastic colon     Status post placement of implantable loop recorder 10/27/2020    Stroke syndrome     CVA's/ TIA's    Urinary incontinence     Uterine cancer (HCC)     Uterine cancer ([address]     wet age-related macular degeneration of left eye     bilateral       Past Surgical History:   Procedure Laterality Date    BREAST SURGERY Right     FNA Right Breast Eduardo Hamburg carlos, maybe 20 years ago\"    CHOLECYSTECTOMY      COLONOSCOPY  2003    Dr Kike Clark ischemic colitis, incomplete exam    COLONOSCOPY  2003    Dr Pat Espinal:  limited colon-ischemic colitis    DILATION AND CURETTAGE OF UTERUS      x2, in [address] tears, laser suregry, adn cataract with IOL b/l    HYSTERECTOMY (CERVIX STATUS UNKNOWN)  2020    ovaries and tubes    INSERTABLE CARDIAC MONITOR      LUMBAR FUSION       90 days after the spine surgeries    OTHER SURGICAL HISTORY      inner lymph nodes    OTHER SURGICAL HISTORY  2020    Loop recorder     PACEMAKER INSERTION      PACEMAKER PLACEMENT  2021    SPINE SURGERY      L3,4, and 5   done in     [address]      as a child at age 9     [address] ENDOSCOPY  2003    DR Edelmira Connell:  Duodenal scarring but no evidence of active ulcer disease. UPPER GASTROINTESTINAL ENDOSCOPY N/A 01/10/2022    Dr Kody Sanchez ring dilated up to 18mm with balloon, Presbyesophagus-like esophageal changes, 3-4cm HH, NEG Hpylori, NEG Barretts    UPPER GASTROINTESTINAL ENDOSCOPY N/A 10/31/2022    EGD BIOPSY performed by Christie Cordero MD at [facility]    UPPER GASTROINTESTINAL ENDOSCOPY  10/31/2022    EGD DILATION BALLOON performed by Christie Cordero MD at [facility]       Recent Hospitalizations  None    Significant Injuries  None    Habits  Reid Szymanski reports that she has never smoked. She has never used smokeless tobacco. She reports that she does not drink alcohol and does not use drugs.     Family History   Problem Relation Age of Onset    Heart Defect Mother     Hypertension Mother     Heart Attack Father         x3 within 24 h and then , abused ciggarettes    No Known Problems Daughter     No Known Problems Son     No Known Problems Son     Colon Cancer Neg Hx     Colon Polyps Neg Hx     Esophageal Cancer Neg Hx     Liver Cancer Neg Hx Liver Disease Neg Hx     Rectal Cancer Neg Hx     Stomach Cancer Neg Hx        Social History  Avinash Mcfadden is , lives in [facility], and is retired    Medications:  Current Outpatient Medications   Medication Sig Dispense Refill    gabapentin (NEURONTIN) 100 MG capsule TAKE 1 CAPSULE BY MOUTH 3 TIMES DAILY.  90 capsule 5    cephALEXin (KEFLEX) 250 MG capsule Take 250 mg by mouth 4 times daily      B-Complex, Folic Acid, TABS TAKE ONE TABLET BY MOUTH ONCE DAILY      meloxicam (MOBIC) 15 MG tablet Take 15 mg by mouth daily      levETIRAcetam (KEPPRA) 500 MG tablet Take 1 tablet by mouth 2 times daily Indications: takes 9am and 9pm 60 tablet 11    hydrALAZINE (APRESOLINE) 50 MG tablet Take 50 mg by mouth 2 times daily DO NOT TAKE IN THE AM DUE TO VASAL VAGAL RESPONCE      dicyclomine (BENTYL) 10 MG capsule Take 10 mg by mouth as needed      Thiamine HCl (VITAMIN B-1 PO) Take 100 mg by mouth daily Takes at 2:00 PM       fluticasone (FLONASE) 50 MCG/ACT nasal spray 1 spray by Each Nostril route 2 times daily       cloNIDine (CATAPRES) 0.1 MG tablet Take 1 tablet by mouth 2 times daily as needed for High Blood Pressure (for BP greater than 160/100) 30 tablet 2    pantoprazole (PROTONIX) 40 MG tablet Take 40 mg by mouth 2 times daily Indications: 9am       acetaminophen (TYLENOL) 500 MG tablet Take 1,000 mg by mouth 3 times daily       donepezil (ARICEPT) 10 MG tablet Take 1 tablet by mouth nightly 90 tablet 3    Docosanol (ABREVA EX) Apply topically as needed       simethicone (MYLICON) 125 MG chewable tablet Take 125 mg by mouth 3 times daily 2 PO 6:30 AM, 2 at 10:00 AM, 1 at 5:30 PM otherwise prn      butalbital-acetaminophen-caffeine (FIORICET, ESGIC) -40 MG per tablet Take 1 tablet by mouth every 6 hours as needed for Headaches       diclofenac sodium 1 % GEL Apply 2 g topically 3 times daily as needed for Pain       sodium chloride 1 g tablet Take 1 g by mouth 3 times daily Indications: takes 9am, 3pm, and 9pm       lidocaine 4 % external patch Place 1 patch onto the skin daily as needed (LEFT LOWER BACK PAIN)       valACYclovir (VALTREX) 1 g tablet as needed       azelastine (ASTELIN) 0.1 % nasal spray 2 sprays by Nasal route 2 times daily Indications: takes 9am and 9pm       Cholecalciferol (VITAMIN D3) 5000 units TABS Take 5,000 Units by mouth daily Indications: AT 2:30 PM       sodium chloride (OCEAN, BABY AYR) 0.65 % nasal spray 1 spray by Nasal route as needed for Congestion       Lutein 20 MG TABS Take 20 mg by mouth daily Indications: 3 PM DAILY       camphor-menthol (SARNA) 0.5-0.5 % lotion Apply 0.5 mLs topically 2 times daily as needed for Itching Apply topically as needed. Calcium Carb-Cholecalciferol 600-800 MG-UNIT TABS Take 1 tablet by mouth daily Indications: AT 2:30 PM       promethazine (PHENERGAN) 25 MG tablet Take 12.5 mg by mouth 3 times daily as needed for Nausea       Omega 3-6-9 Fatty Acids (OMEGA 3-6-9 COMPLEX PO) Take 2 tablets by mouth daily Indications: AT 2:30 PM       alendronate (FOSAMAX) 70 MG tablet Take 70 mg by mouth every 7 days Sundays at 1830      pravastatin (PRAVACHOL) 20 MG tablet Take 20 mg by mouth nightly Indications: takes at 9am       ramipril (ALTACE) 10 MG capsule Take 10 mg by mouth 2 times daily Indications: takes at 9am and 9pm       b complex vitamins capsule Take 1 capsule by mouth daily Indications: AT 2:30 PM B 100      Multiple Vitamins-Minerals (THERAPEUTIC MULTIVITAMIN-MINERALS) tablet Take 1 tablet by mouth daily Indications: AT 2:30 PM       dipyridamole (PERSANTINE) 50 MG tablet Take 2 tablets by mouth 2 times daily Indications: 9am and 9pm 60 tablet 11     No current facility-administered medications for this visit. Allergies:   Allergies as of 10/31/2022 - Fully Reviewed 10/31/2022   Allergen Reaction Noted    Aspirin Shortness Of Breath 03/17/2016    Spironolactone  09/25/2019    Dilaudid [hydromorphone hcl]  03/17/2016    Fd&c yellow #5 [place] [tartrazine]  01/05/2021    Ondansetron  03/17/2016    Quinolones  09/14/2019    Sulfa antibiotics  03/17/2016    Codeine Rash and Other (See Comments) 08/11/2016    Demerol hcl [meperidine] Other (See Comments) 03/17/2016    Levaquin [levofloxacin in d5w] Other (See Comments) 02/21/2018    Myrbetriq [mirabegron] Other (See Comments) 04/12/2018    Namenda [memantine hcl] Other (See Comments) 03/17/2016    Norco [hydrocodone-acetaminophen] Rash and Other (See Comments) 03/17/2016    Pneumococcal vaccines Swelling 03/17/2016       Review of Systems:  Constitutional: negative for - chills and fever  Eyes:  negative for - visual disturbance and photophobia  HENMT: negative for - headaches and sinus pain  Respiratory: negative for - cough, hemoptysis, and shortness of breath  Cardiovascular: negative for - chest pain and palpitations  Gastrointestinal: negative for - blood in stools, constipation, diarrhea, nausea, and vomiting  Genito-Urinary: negative for - hematuria, urinary frequency, urinary urgency, and urinary retention  Musculoskeletal: positive for - joint pain, joint stiffness, and joint swelling  Hematological and Lymphatic: negative for - bleeding problems, abnormal bruising, and swollen lymph nodes  Endocrine:  negative for - polydipsia and polyphagia  Allergy/Immunology:  negative for - rhinorrhea, sinus congestion, hives  Integument:  negative for - negative for - rash, change in moles, new or changing lesions  Psychological: negative for - anxiety and depression  Neurological: positive for - memory loss, numbness/tingling, and weakness     PHYSICAL EXAMINATION:  Vitals:  /77   Pulse 60   Ht 5' (1.524 m)   Wt 114 lb (51.7 kg)   SpO2 98%   BMI 22.26 kg/m²   General appearance:  Alert, well developed, well nourished, in no distress  HEENT:  normocephalic, atraumatic, sclera appear normal, no nasal abnormalities, no rhinorrhea, Ears appear normal, oral mucous membranes are moist without erythema, trachea midline, thyroid is normal, no lymphadenopathy or neck mass. Cardiovascular:  Regular rate and rhythm without murmer. No peripheral edema, No cyanosis or clubbing. No carotid bruits. Pulmonary:  Lungs are clear to auscultation. Breathing appears normal, good expansion, normal effort without use of accessory muscles  Musculoskeletal:  Joints are osteoarthritic  Integument:  No rash, erythema, or pallor  Psychiatric:  Mood, affect, and behavior appear normal      NEUROLOGIC EXAMINATION:  Mental Status:  alert, oriented to person, place, and time. Speech:  Clear without dysarthria or dysphonia  Language:  Fluent without aphasia  Cranial Nerves:   II Visual fields are full to confrontation   III,IV, VI Extraocular movements are full   VII Facial movements are symmetrical without weakness   VIII Hearing is intact   IX,X Shoulder shrug and head rotation strength are intact   XII No tongue atrophy or fasciculations. Normal tongue protrusion. No tongue weakness  Motor:  Normal strength in both upper and lower extremities. Normal muscle tone and bulk. Deep tendon reflexes are intact and symmetrical in both upper and lower extremities. Vazquez's signs are absent bilaterally. There is no ankle clonus on either side. Sensation:  Sensation is intact to light touch, temperature, and vibration in all extremities. Coordination:  Rapid alternating movements are normal in both upper and lower extremities. Finger to nose testing is unimpaired bilaterally. Gait:  Normal station and gait. Assessment:       ICD-10-CM    1. Vasovagal syncope  R55       2. Partial symptomatic epilepsy with complex partial seizures, intractable, without status epilepticus ([facility]  G40.219       3. Vascular dementia without behavioral disturbance (HCC)  F01.50       Fairly stable. Not much to do about her recurrent episodes of lightheadedness. Plan:   1.  Continue the same medications and care  2.  Follow up in 6 months     Electronically signed by Katharine Thompson MD on 10/31/22

## 2022-10-31 NOTE — H&P
Patient Name: Chris Moreno  : 1935  MRN: [id number]  DATE: 10/31/22    Allergies: Allergies   Allergen Reactions    Aspirin Shortness Of Breath    Spironolactone      Low sodium    Dilaudid [Hydromorphone Hcl]     Fd&C Yellow #5 [place] [Tartrazine]     Ondansetron     Quinolones     Sulfa Antibiotics     Codeine Rash and Other (See Comments)     Makes her \"crazy and mean\" and gives her a rash    Demerol Hcl [Meperidine] Other (See Comments)     Made her \"crazy and mean\", and \"loopy\"    Levaquin [Levofloxacin In D5w] Other (See Comments)     Seizure like activity      Myrbetriq [Mirabegron] Other (See Comments)     Unable to micturate    Namenda [Memantine Hcl] Other (See Comments)     made her like a vegetable for hours      Norco [Hydrocodone-Acetaminophen] Rash and Other (See Comments)     \"crazy and mean\"    Pneumococcal Vaccines Swelling        ENDOSCOPY  History and Physical    Procedure:    [] Diagnostic Colonoscopy       [] Screening Colonoscopy  [x] EGD      [] ERCP      [] EUS       [] Other    [x] Previous office notes/History and Physical reviewed from the patients chart. Please see EMR for further details of HPI.  I have examined the patient's status immediately prior to the procedure and:      Indications/HPI:    Esophageal dysphagia-history of esophageal balloon dilation to 18 mm in January of this year with improvement in dysphagia subsequently  History of Schatzki ring  Recent food bolus impaction requiring emergency department visit-the impaction cleared after administration of IV glucagon  Chronic GERD  History of presbyesophagus    []Abdominal Pain   []Cancer- GI/Lung     []Fhx of colon CA/polyps  []History of Polyps  []Barretts            []Melena  []Abnormal Imaging              []Dysphagia              []Persistent Pneumonia   []Anemia                            []Food Impaction        []History of Polyps  [] GI Bleed             []Pulmonary nodule/Mass   []Change in bowel habits []Heartburn/Reflux  []Rectal Bleed (BRBPR)  []Chest Pain - Non Cardiac []Heme (+) Stool []Ulcers  []Constipation  []Hemoptysis  []Varices  []Diarrhea  []Hypoxemia    []Nausea/Vomiting   []Screening   []Crohns/Colitis  []Other:     Anesthesia:   [x] MAC [] Moderate Sedation   [] General   [] None     ROS: 12 pt Review of Symptoms was negative unless mentioned above    Medications:   Prior to Admission medications    Medication Sig Start Date End Date Taking?  Authorizing Provider   gabapentin (NEURONTIN) 100 MG capsule TAKE 1 CAPSULE BY MOUTH 3 TIMES DAILY. 9/2/22 10/31/22  Trish Crowe MD   cephALEXin (KEFLEX) 250 MG capsule Take 250 mg by mouth 4 times daily    Historical Provider, MD   potassium chloride (KLOR-CON M) 20 MEQ extended release tablet Take 2 tablets by mouth daily for 7 days  Patient not taking: Reported on 10/31/2022 7/5/22 7/12/22  Donna Mendoza MD   B-Complex, Folic Acid, TABS TAKE ONE TABLET BY MOUTH ONCE DAILY 6/9/22   Historical Provider, MD   meloxicam (MOBIC) 15 MG tablet Take 15 mg by mouth daily    Historical Provider, MD   levETIRAcetam (KEPPRA) 500 MG tablet Take 1 tablet by mouth 2 times daily Indications: takes 9am and 9pm 3/11/22 10/31/22  Trish Crowe MD   dipyridamole (PERSANTINE) 50 MG tablet Take 2 tablets by mouth 2 times daily Indications: 9am and 9pm 3/11/22 5/25/22  Trish Crowe MD   hydrALAZINE (APRESOLINE) 50 MG tablet Take 50 mg by mouth 2 times daily DO NOT TAKE IN THE AM DUE TO VASAL VAGAL RESPONCE    Historical Provider, MD   dicyclomine (BENTYL) 10 MG capsule Take 10 mg by mouth as needed   Patient not taking: Reported on 10/31/2022    Historical Provider, MD   Thiamine HCl (VITAMIN B-1 PO) Take 100 mg by mouth daily Takes at 2:00 PM     Historical Provider, MD   fluticasone (FLONASE) 50 MCG/ACT nasal spray 1 spray by Each Nostril route 2 times daily     Historical Provider, MD   cloNIDine (CATAPRES) 0.1 MG tablet Take 1 tablet by mouth 2 times daily as needed for High Blood Pressure (for BP greater than 160/100) 11/11/20   LAYTON Espinoza   pantoprazole (PROTONIX) 40 MG tablet Take 40 mg by mouth 2 times daily Indications: 9am     Historical Provider, MD   acetaminophen (TYLENOL) 500 MG tablet Take 1,000 mg by mouth 3 times daily     Historical Provider, MD   donepezil (ARICEPT) 10 MG tablet Take 1 tablet by mouth nightly 9/24/20   Cecily Brower MD   Docosanol (ABREVA EX) Apply topically as needed     Historical Provider, MD   simethicone (MYLICON) 611 MG chewable tablet Take 125 mg by mouth 3 times daily 2 PO 6:30 AM, 2 at 10:00 AM, 1 at 5:30 PM otherwise prn    Historical Provider, MD   butalbital-acetaminophen-caffeine (FIORICET, ESGIC) -40 MG per tablet Take 1 tablet by mouth every 6 hours as needed for Headaches     Historical Provider, MD   diclofenac sodium 1 % GEL Apply 2 g topically 3 times daily as needed for Pain     Historical Provider, MD   sodium chloride 1 g tablet Take 1 g by mouth 3 times daily Indications: takes 9am, 3pm, and 9pm     Historical Provider, MD   lidocaine 4 % external patch Place 1 patch onto the skin daily as needed (LEFT LOWER BACK PAIN)     Historical Provider, MD   valACYclovir (VALTREX) 1 g tablet as needed  6/6/19   Historical Provider, MD   azelastine (ASTELIN) 0.1 % nasal spray 2 sprays by Nasal route 2 times daily Indications: takes 9am and 9pm  5/3/18   Historical Provider, MD   Cholecalciferol (VITAMIN D3) 5000 units TABS Take 5,000 Units by mouth daily Indications: AT 2:30 PM     Historical Provider, MD   sodium chloride (OCEAN, BABY AYR) 0.65 % nasal spray 1 spray by Nasal route as needed for Congestion     Historical Provider, MD   Lutein 20 MG TABS Take 20 mg by mouth daily Indications: 3 PM DAILY     Historical Provider, MD   camphor-menthol (SARNA) 0.5-0.5 % lotion Apply 0.5 mLs topically 2 times daily as needed for Itching Apply topically as needed.     Historical Provider, MD Calcium Carb-Cholecalciferol 600-800 MG-UNIT TABS Take 1 tablet by mouth daily Indications: AT 2:30 PM     Historical Provider, MD   promethazine (PHENERGAN) 25 MG tablet Take 12.5 mg by mouth 3 times daily as needed for Nausea     Historical Provider, MD   Omega 3-6-9 Fatty Acids (OMEGA 3-6-9 COMPLEX PO) Take 2 tablets by mouth daily Indications: AT 2:30 PM     Historical Provider, MD   alendronate (FOSAMAX) 70 MG tablet Take 70 mg by mouth every 7 days Sundays at [address] [facility] Provider, MD   pravastatin (PRAVACHOL) 20 MG tablet Take 20 mg by mouth nightly Indications: takes at 9am  3/1/16   Historical Provider, MD   ramipril (ALTACE) 10 MG capsule Take 10 mg by mouth 2 times daily Indications: takes at 9am and 9pm  2/26/16   Historical Provider, MD   b complex vitamins capsule Take 1 capsule by mouth daily Indications: AT 2:30 PM B 100    Historical Provider, MD   Multiple Vitamins-Minerals (THERAPEUTIC MULTIVITAMIN-MINERALS) tablet Take 1 tablet by mouth daily Indications: AT 2:30 PM     Historical Provider, MD       Past Medical History:  Past Medical History:   Diagnosis Date    Age-related macular degeneration, wet, right eye ([address]     Anemia     Back pain     Bilateral carotid artery stenosis 02/23/2021    Chronic bilateral low back pain with left-sided sciatica 12/19/2019    Colitis, ischemic ([address]     Dementia ([address]     Diverticulosis     Hyperlipidemia     Hypertension     Osteoarthritis     Palliative care patient 09/17/2019    Risk for falls 09/13/2019    Seizures ([address]     last one 2021    Spastic colon     Status post placement of implantable loop recorder 10/27/2020    Stroke syndrome     CVA's/ TIA's    Urinary incontinence     Uterine cancer ([address]     Uterine cancer ([address]     wet age-related macular degeneration of left eye     bilateral       Past Surgical History:  Past Surgical History:   Procedure Laterality Date    BREAST SURGERY Right     FNA Right Breast \"oh hemarie, maybe 20 years ago\"    CHOLECYSTECTOMY      COLONOSCOPY  09/25/2003    Dr Jigna Bonilla ischemic colitis, incomplete exam    COLONOSCOPY  08/29/2003    Dr Green Room:  limited colon-ischemic colitis    DILATION AND CURETTAGE OF UTERUS      x2, in [address] tears, laser suregry, adn cataract with IOL b/l    HYSTERECTOMY (CERVIX STATUS UNKNOWN)  01/2020    ovaries and tubes    INSERTABLE CARDIAC MONITOR      LUMBAR FUSION      2012 90 days after the spine surgeries    OTHER SURGICAL HISTORY      inner lymph nodes    OTHER SURGICAL HISTORY  09/2020    Loop recorder     PACEMAKER INSERTION      PACEMAKER PLACEMENT  01/2021    SPINE SURGERY      L3,4, and 5   done in 2012    [address]      as a child at age 9     [facility] ENDOSCOPY  08/28/2003    DR Daya Sahu:  Duodenal scarring but no evidence of active ulcer disease. UPPER GASTROINTESTINAL ENDOSCOPY N/A 01/10/2022    Dr Giovana Petty ring dilated up to 18mm with balloon, Presbyesophagus-like esophageal changes, 3-4cm HH, NEG Hpylori, NEG Barretts       Social History:  Social History     Tobacco Use    Smoking status: Never    Smokeless tobacco: Never   Vaping Use    Vaping Use: Never used   Substance Use Topics    Alcohol use: Never    Drug use: Never       Vital Signs:   Vitals:    10/31/22 1047   BP: (!) 146/71   Pulse: 99   Resp: 20   Temp: 99.4 °F (37.4 °C)   SpO2: (!) 69%        Physical Exam:  Cardiac:  [x]WNL  []Comments:  Pulmonary:  [x]WNL   []Comments:  Neuro/Mental Status:  [x]WNL  []Comments:  Abdominal:  [x]WNL    []Comments:  Other:   []WNL  []Comments:    Informed Consent:  The risks and benefits of the procedure have been discussed with either the patient or if they cannot consent, their representative. Assessment:  Patient examined and appropriate for planned sedation and procedure. Plan:  Proceed with planned sedation and procedure as above.          Lemuel Shankar MD

## 2022-10-31 NOTE — TELEPHONE ENCOUNTER
Please get uroplasty's tripp with Sobeida at her normal time through the end of the year. Her treatment with Susana Allen will be covered.

## 2022-10-31 NOTE — ANESTHESIA POSTPROCEDURE EVALUATION
Department of Anesthesiology  Postprocedure Note    Patient: Diana Plascencia  MRN: [id number]  YOB: 1935  Date of evaluation: 10/31/2022      Procedure Summary     Date: 10/31/22 Room / Location: [address]    Anesthesia Start: 9549 Anesthesia Stop:     Procedures:       EGD BIOPSY (Abdomen)      EGD DILATION BALLOON Diagnosis:       Dysphagia, unspecified type      (Dysphagia, unspecified type [R13.10])    Surgeons: Misty Bridges MD Responsible Provider: LAYTON Smith CRNA    Anesthesia Type: general, TIVA ASA Status: 3          Anesthesia Type: No value filed.     Ness Phase I: Ness Score: 10    Ness Phase II:        Anesthesia Post Evaluation    Patient location during evaluation: bedside  Patient participation: waiting for patient participation  Level of consciousness: responsive to physical stimuli  Pain score: 0  Airway patency: patent  Nausea & Vomiting: no nausea and no vomiting  Complications: no  Cardiovascular status: hemodynamically stable  Respiratory status: nasal cannula and spontaneous ventilation  Hydration status: euvolemic

## 2022-11-07 DIAGNOSIS — I49.5 SA NODE DYSFUNCTION (HCC): ICD-10-CM

## 2022-11-07 DIAGNOSIS — Z95.0 CARDIAC PACEMAKER: Primary | ICD-10-CM

## 2022-11-09 ENCOUNTER — NURSE ONLY (OUTPATIENT)
Dept: UROLOGY | Age: 87
End: 2022-11-09
Payer: MEDICARE

## 2022-11-09 DIAGNOSIS — N39.46 MIXED STRESS AND URGE URINARY INCONTINENCE: ICD-10-CM

## 2022-11-09 DIAGNOSIS — N32.81 OVERACTIVE BLADDER: Primary | ICD-10-CM

## 2022-11-09 PROCEDURE — 64566 NEUROELTRD STIM POST TIBIAL: CPT | Performed by: NURSE PRACTITIONER

## 2022-11-09 NOTE — PROGRESS NOTES
Treatment # maintenance     Improvement of Symptoms? Yes    OAB/Urologic Medications? None      Uroplasty Procedure:    1. Patient is seated with the left treatment leg elevated. 2. 34 gauge fine needle electrode is inserted into lower inner aspect of the leg. Slightly cephalad to the medial malleolus. 3. Surface electrode pad is placed over the medial aspect of the calcaneus on the same leg. 4.Needle electrode is connected to the external pulse generator. 5.The pulse generator is turned on and the millivolts used are 1. 6.Patient is treated for 30 minutes. 7.The needle and pad are removed. Patient will follow up in 2 weeks for next treatment.

## 2022-11-23 ENCOUNTER — NURSE ONLY (OUTPATIENT)
Dept: UROLOGY | Age: 87
End: 2022-11-23
Payer: MEDICARE

## 2022-11-23 DIAGNOSIS — N32.81 OVERACTIVE BLADDER: ICD-10-CM

## 2022-11-23 DIAGNOSIS — N39.0 RECURRENT UTI: Primary | ICD-10-CM

## 2022-11-23 DIAGNOSIS — N39.46 MIXED STRESS AND URGE URINARY INCONTINENCE: ICD-10-CM

## 2022-11-23 PROCEDURE — 64566 NEUROELTRD STIM POST TIBIAL: CPT | Performed by: NURSE PRACTITIONER

## 2022-11-23 PROCEDURE — P9612 CATHETERIZE FOR URINE SPEC: HCPCS | Performed by: NURSE PRACTITIONER

## 2022-11-23 RX ORDER — AMLODIPINE BESYLATE 5 MG/1
TABLET ORAL
COMMUNITY
Start: 2022-11-08

## 2022-11-23 RX ORDER — SPIRONOLACTONE 25 MG
TABLET ORAL
COMMUNITY
Start: 2022-11-08 | End: 2022-11-23

## 2022-11-23 ASSESSMENT — ENCOUNTER SYMPTOMS
BACK PAIN: 1
ABDOMINAL DISTENTION: 0

## 2022-11-23 NOTE — PROGRESS NOTES
Treatment # 2 week maintenance    Improvement of Symptoms? Increase in symptoms recently    OAB/Urologic Medications? none      Uroplasty Procedure:    1. Patient is seated with the left treatment leg elevated. 2. 34 gauge fine needle electrode is inserted into lower inner aspect of the leg. Slightly cephalad to the medial malleolus. 3. Surface electrode pad is placed over the medial aspect of the calcaneus on the same leg. 4.Needle electrode is connected to the external pulse generator. 5.The pulse generator is turned on and the millivolts used are 2. 6.Patient is treated for 30 minutes. 7.The needle and pad are removed. Patient will follow up in 2 weeks for next treatment. Erika Jama is a 80 y.o., female, Established patient who presents today   Chief Complaint   Patient presents with    Injections     I am here today for uroplasty inj       HPI   In addition to your plasty procedure today, patient is needing evaluation of a separate urologic issue of possible urinary tract infection. Since her most recent uroplasty, her urinary symptoms have been worsening as well as cognition per her daughter Betty Hernandez who accompanies her to her appointments. Patient does have history of recurrent urinary tract infection and does have episodes of fecal and urinary incontinence. REVIEW OF SYSTEMS:  Review of Systems   Constitutional:  Negative for chills and fever. Gastrointestinal:  Negative for abdominal distention. Genitourinary:  Positive for frequency and urgency. Negative for difficulty urinating, dysuria, flank pain and hematuria. Musculoskeletal:  Positive for back pain and gait problem. Neurological:  Positive for weakness. Psychiatric/Behavioral:  Negative for agitation and confusion. PHYSICAL EXAM:  There were no vitals taken for this visit. Physical Exam  Vitals and nursing note reviewed. Constitutional:       General: She is not in acute distress.      Appearance: Normal appearance. She is not ill-appearing. Pulmonary:      Effort: Pulmonary effort is normal. No respiratory distress. Abdominal:      General: There is no distension. Neurological:      Mental Status: She is alert and oriented to person, place, and time. Mental status is at baseline. Motor: Weakness present. Gait: Gait abnormal.   Psychiatric:         Mood and Affect: Mood normal.         Behavior: Behavior normal.       ASSESSMENT/PLAN  1. Recurrent UTI  Patient with history of recurrent urinary tract infections. Worsening cognition as well as lower urinary tract symptoms. We will collect catheterized specimen to send for culture today. We will treat with antibiotics if needed. 2. Overactive bladder  PTNS completed today    3. Mixed stress and urge urinary incontinence  PTNS completed today    Orders Placed This Encounter   Procedures    Culture, Urine     Order Specific Question:   Specify (ex-cath, midstream, cysto, etc)? Answer:   cath          Return in about 2 weeks (around 12/7/2022) for uroplasty. An electronic signature was used to authenticate this note. LAYTON PROCTOR - CNP    All information inputted into the note by the MA to include chief complaint, past medical history, past surgical history, medications, allergies, social and family history and review of systems has been reviewed and updated as needed by me. EMR Dragon/transcription disclaimer: Much of this document is electronic transcription/translation of spoken language to printed text. The electronic translation of spoken language may be erroneous or, at times, nonsensical words or phrases may be inadvertently transcribed.  Although I have reviewed the document for such errors, some may still exist.

## 2022-11-23 NOTE — PROGRESS NOTES
Patient complained of incontinence and frequency. After discussing with LAYTON Mccullough  I was given verbal orders to obtain cath urine specimen. After obtaining consent from patient. Patient was then placed in the dorsal lithotomy position. Using sterile technique patient is carefully prepped with Betadine around the urethra. A pediatric catheterization kit is used which contains an approximate 5 [place] catheter. Urethral Catheter is introduced into the urinary bladder. Urine specimen is obtained and sent to the lab for culture and sensitivity. Patient tolerated procedure well. LAYTON Mccullough  was in office at time of procedure.

## 2022-11-23 NOTE — PROGRESS NOTES
Testopel Subcutaneous hormone pellet implantation:    The patient was placed on the exam table in the supine position. The upper outer quadrant of the {RIGHT/LEFT:16} hip was identified for insertion. The area was then prepped with Betadine and injected with 1% Lidocaine to anesthetize superficially and then distally along the trocar tract. A 3mm incision was made using #11 blade scalpel. The trocar with a sharp-ended stylet was inserted into the subcutaneous tissue in line with the femur. The sharp stylet was withdrawn and the Testopel pellets were placed into the well of the trocar. Testopel was advanced into the tissue using blunt-ended stylet. A total of *** pellets were inserted. The trocar was removed and the incision was closed using Steri-strips. The wound area was covered with Steri-strips. Careful inspection of the area was done. The patient was informed of the post-procedure instructions.  He will return in *** months to determine scheduling of the next insertion with {DB testosterone labs:75343}

## 2022-11-24 RX ORDER — OXYBUTYNIN CHLORIDE 10 MG/1
10 TABLET, EXTENDED RELEASE ORAL DAILY
Qty: 30 TABLET | Refills: 3 | Status: SHIPPED | OUTPATIENT
Start: 2022-11-24 | End: 2022-11-25 | Stop reason: ALTCHOICE

## 2022-11-24 NOTE — PROGRESS NOTES
History of frequency and urgency. Daughter called. Awaiting urine culture results  Prescribed oxybutynin 10mg XR. Side effects informed.

## 2022-11-25 LAB — URINE CULTURE, ROUTINE: NORMAL

## 2022-11-28 ENCOUNTER — TELEPHONE (OUTPATIENT)
Dept: UROLOGY | Age: 87
End: 2022-11-28

## 2022-11-28 NOTE — TELEPHONE ENCOUNTER
Darren Esquivel called this morning, last treatment didn't help. Got a call from oncall provider stating everything looked ok. Patient is going to the bathroom every 15 to 20 minutes. She has gotten no sleep, she is more confused and she fell last night due to exhaustation. Darren Esquivel is confused because of the phone call over the weekend and symptoms that are happening.

## 2022-11-29 NOTE — TELEPHONE ENCOUNTER
Called and spoke with Sully Shukla (daughter) about the patient's urine culture results. She had multiple questions about why her mother is going to the bathroom every 10 to 15 mins and why she is weak and falling. I explained to the daughter that it is not a UTI causing these issues, that it might be more of a dementia side effect. Then she asked about why she is red down there and what she could use for it. I asked the APRN about this and she thinks she is red from being incontinent and the friction from the pullups they use, she suggested trying A&D and Desitin ointment as a barrier. I did offer them an appt on Dec. 1st at 3pm to discuss the issues with the provider and Sully Shukla declined the appt due to a conflict in her schedule. Patient is coming in on Dec. 7th for her uroplasty and they discuss the issues then. Sully Shukla said she may go ahead and take to the ER.

## 2022-11-29 NOTE — TELEPHONE ENCOUNTER
Anjel Landry called again to speak with someone concerning patient. Anjel Landry stated she waited all day and no one returned her call. Please advise.

## 2022-12-01 DIAGNOSIS — Z95.0 CARDIAC PACEMAKER: Primary | ICD-10-CM

## 2022-12-01 DIAGNOSIS — I49.5 SA NODE DYSFUNCTION (HCC): ICD-10-CM

## 2022-12-07 ENCOUNTER — NURSE ONLY (OUTPATIENT)
Dept: UROLOGY | Age: 87
End: 2022-12-07
Payer: MEDICARE

## 2022-12-07 DIAGNOSIS — N39.46 MIXED STRESS AND URGE URINARY INCONTINENCE: ICD-10-CM

## 2022-12-07 DIAGNOSIS — N32.81 OVERACTIVE BLADDER: Primary | ICD-10-CM

## 2022-12-07 PROCEDURE — 99999 PR OFFICE/OUTPT VISIT,PROCEDURE ONLY: CPT | Performed by: NURSE PRACTITIONER

## 2022-12-07 PROCEDURE — 64566 NEUROELTRD STIM POST TIBIAL: CPT | Performed by: NURSE PRACTITIONER

## 2022-12-07 NOTE — PROGRESS NOTES
Treatment # maintenance    Improvement of Symptoms? Has been having some difficulties with increasing LUTS, vaginal irritation, and even possible hematuria vs vaginal bleeding. She will be following with her onc/gyn doctor tomorrow as she has a history of uterine cancer. OAB/Urologic Medications? none      Uroplasty Procedure:    1. Patient is seated with the left treatment leg elevated. 2. 34 gauge fine needle electrode is inserted into lower inner aspect of the leg. Slightly cephalad to the medial malleolus. 3. Surface electrode pad is placed over the medial aspect of the calcaneus on the same leg. 4.Needle electrode is connected to the external pulse generator. 5.The pulse generator is turned on and the millivolts used are 8. 6.Patient is treated for 30 minutes. 7.The needle and pad are removed. Patient will follow up in   2 weeks for next treatment.

## 2022-12-21 ENCOUNTER — HOSPITAL ENCOUNTER (OUTPATIENT)
Dept: CT IMAGING | Age: 87
Discharge: HOME | End: 2022-12-21
Payer: MEDICARE

## 2022-12-21 ENCOUNTER — NURSE ONLY (OUTPATIENT)
Dept: UROLOGY | Age: 87
End: 2022-12-21
Payer: MEDICARE

## 2022-12-21 DIAGNOSIS — N39.46 MIXED STRESS AND URGE URINARY INCONTINENCE: ICD-10-CM

## 2022-12-21 DIAGNOSIS — C54.1 MALIGNANT NEOPLASM OF ENDOMETRIUM (HCC): ICD-10-CM

## 2022-12-21 DIAGNOSIS — Q64.70 ABNORMALITY OF URETHRAL MEATUS: Primary | ICD-10-CM

## 2022-12-21 DIAGNOSIS — N32.81 OVERACTIVE BLADDER: ICD-10-CM

## 2022-12-21 LAB
GFR SERPL CREATININE-BSD FRML MDRD: >60 ML/MIN/{1.73_M2}
PERFORMED ON: NORMAL
POC CREATININE: 0.9 MG/DL (ref 0.3–1.3)
POC SAMPLE TYPE: NORMAL

## 2022-12-21 PROCEDURE — 74177 CT ABD & PELVIS W/CONTRAST: CPT

## 2022-12-21 PROCEDURE — 71260 CT THORAX DX C+: CPT

## 2022-12-21 PROCEDURE — 99999 PR OFFICE/OUTPT VISIT,PROCEDURE ONLY: CPT | Performed by: NURSE PRACTITIONER

## 2022-12-21 PROCEDURE — 82565 ASSAY OF CREATININE: CPT

## 2022-12-21 PROCEDURE — 64566 NEUROELTRD STIM POST TIBIAL: CPT | Performed by: NURSE PRACTITIONER

## 2022-12-21 PROCEDURE — 6360000004 HC RX CONTRAST MEDICATION: Performed by: NURSE PRACTITIONER

## 2022-12-21 RX ADMIN — IOPAMIDOL 70 ML: 755 INJECTION, SOLUTION INTRAVENOUS at 17:25

## 2022-12-22 ASSESSMENT — ENCOUNTER SYMPTOMS
BACK PAIN: 1
ABDOMINAL DISTENTION: 0

## 2022-12-22 NOTE — PROGRESS NOTES
Treatment # maintenance     Improvement of Symptoms? mild    OAB/Urologic Medications? none      Uroplasty Procedure:    1. Patient is seated with the left treatment leg elevated. 2. 34 gauge fine needle electrode is inserted into lower inner aspect of the leg. Slightly cephalad to the medial malleolus. 3. Surface electrode pad is placed over the medial aspect of the calcaneus on the same leg. 4.Needle electrode is connected to the external pulse generator. 5.The pulse generator is turned on and the millivolts used are 6. 6.Patient is treated for 30 minutes. 7.The needle and pad are removed. Patient will follow up in 2 weeks for next treatment. Eduardo Pickens is a 80 y.o., female, Established patient who presents today   Chief Complaint   Patient presents with    Injections     UROPLASTY INJECTION        HPI   Patient's daughter, Ramona Lopez, explains that she has been recently seen by her [facility] oncologist and diagnosed with vaginal cancer. She states the [facility] office is requesting we evaluate her urethra for possible surgical intervention and \"clearance\". We spent several minutes discussing exactly what the office was needing from me as any evaluation of the urethra should not delay any care for her new diagnosis of cancer. I also explained if they were referring to prolapse of the bladder, patient would not be a candidate for surgical repair secondary to her comorbidities. Unfortunately, because the patient was in the middle of a PTNS treatment, I could not perform pelvic exam while in the room, although I do not really believe a pelvic exam would be helpful since she has recently seen her [facility] who also performed a pelvic exam.     I did ask the MA to set the patient up for a pelvic exam, however, the patient also had an appointment scheduled with another provider for possible retinal detachment and was already running late to that.  Again, both her daughter and I were unsure what information a new pelvic exam could provide. Without records, it is also difficult to determine exactly what was needed. REVIEW OF SYSTEMS:  Review of Systems   Constitutional:  Positive for fatigue. Negative for chills and fever. HENT:          Possible detatched retina- has emergent appointment today   Gastrointestinal:  Negative for abdominal distention. Genitourinary:  Positive for dysuria, frequency, urgency and vaginal bleeding (daughter states recently daignosed with vaginal cancer by Xiang Bhagat at [facility]). Negative for difficulty urinating, flank pain and hematuria. Musculoskeletal:  Positive for arthralgias, back pain, gait problem and myalgias. Skin:  Positive for pallor. Neurological:  Positive for weakness. Psychiatric/Behavioral:  Negative for agitation and confusion. PHYSICAL EXAM:  There were no vitals taken for this visit. Physical Exam  Vitals and nursing note reviewed. Constitutional:       General: She is not in acute distress. Appearance: Normal appearance. She is not ill-appearing. Pulmonary:      Effort: Pulmonary effort is normal. No respiratory distress. Skin:     Coloration: Skin is pale. Neurological:      Mental Status: She is oriented to person, place, and time. Motor: Weakness present. Gait: Gait abnormal.   Psychiatric:         Behavior: Behavior normal.     ASSESSMENT/PLAN  1. Abnormality of urethral meatus  Patient with apparent abnormality of the urethral meatus as explained to her daughter by her obstetrical oncologist at an appointment last week. She requested a pelvic exam today. I did discuss with the daughter that I was uncertain if pelvic exam would provide any useful information. However, I did offer to perform 1. I could not perform 1 at the time I was in the room because the patient was undergoing your plasty treatment and then could not move to the dorsolithotomy position.   Patient also with emergent ophthalmic appointment today regarding a possible detached retina. Patient daughter reports that they cannot miss that appointment and are already running late. Pelvic exam for today was deferred. We will try to obtain some records from her oncologist to determine if there is anything in particular they need from us. At least 20 minutes were spent on the day of the visit reviewing the patient's past medical records/imaging, speaking face to face with the patient, and charting in the post visit period. (NOT INCLUDING THE PROCEDURE TIME)      No orders of the defined types were placed in this encounter. No follow-ups on file. An electronic signature was used to authenticate this note. LAYTON PORCTOR - CNP    All information inputted into the note by the MA to include chief complaint, past medical history, past surgical history, medications, allergies, social and family history and review of systems has been reviewed and updated as needed by me. EMR Dragon/transcription disclaimer: Much of this document is electronic transcription/translation of spoken language to printed text. The electronic translation of spoken language may be erroneous or, at times, nonsensical words or phrases may be inadvertently transcribed.  Although I have reviewed the document for such errors, some may still exist.

## 2022-12-30 NOTE — PROGRESS NOTES
S/p PEA/VT/VF x2, s/p shock CPR x2 rounds in ED, recurrent PEA/VT/VF s/p shock x4, CPR x2 rounds in cath lab  Bolused amiodarone total 300mg.   Intubated in ED.   CT head negative for head bleed    Plan:   Start TTM 33C x 24 hours then rewarm. Avoid fever  Code chills protocol   cEEG  NSE/SSEP  Continue NE and EPI gtt  Continue impella  Monitor electrolytes.   Will not neuro prognosticate for at least 72 hours post cardiac arrest. Unless pt wakes up and purposeful within these hours   Treatment # 2 week maintenance    Improvement of Symptoms? yes    OAB/Urologic Medications? none      Uroplasty Procedure:    1. Patient is seated with the left treatment leg elevated. 2. 34 gauge fine needle electrode is inserted into lower inner aspect of the leg. Slightly cephalad to the medial malleolus. 3. Surface electrode pad is placed over the medial aspect of the calcaneus on the same leg. 4.Needle electrode is connected to the external pulse generator. 5.The pulse generator is turned on and the millivolts used are 6. 6.Patient is treated for 30 minutes. 7.The needle and pad are removed. Patient will follow up in 2 weeks for next treatment.

## 2023-01-04 ENCOUNTER — NURSE ONLY (OUTPATIENT)
Dept: UROLOGY | Age: 88
End: 2023-01-04
Payer: MEDICARE

## 2023-01-04 DIAGNOSIS — N32.81 OAB (OVERACTIVE BLADDER): Primary | ICD-10-CM

## 2023-01-04 DIAGNOSIS — R33.9 INCOMPLETE BLADDER EMPTYING: ICD-10-CM

## 2023-01-04 DIAGNOSIS — N81.10 FEMALE CYSTOCELE: ICD-10-CM

## 2023-01-04 PROCEDURE — 64566 NEUROELTRD STIM POST TIBIAL: CPT | Performed by: NURSE PRACTITIONER

## 2023-01-04 PROCEDURE — 51798 US URINE CAPACITY MEASURE: CPT | Performed by: NURSE PRACTITIONER

## 2023-01-04 NOTE — PROGRESS NOTES
Treatment # 2 week maintenance     Improvement of Symptoms? mild    OAB/Urologic Medications? none      Uroplasty Procedure:    1. Patient is seated with the left treatment leg elevated. 2. 34 gauge fine needle electrode is inserted into lower inner aspect of the leg. Slightly cephalad to the medial malleolus. 3. Surface electrode pad is placed over the medial aspect of the calcaneus on the same leg. 4.Needle electrode is connected to the external pulse generator. 5.The pulse generator is turned on and the millivolts used are 3. 6.Patient is treated for 30 minutes. 7.The needle and pad are removed. Patient will follow up in 2 weeks for next treatment. Recently evaluated by a gynecology oncology physician due to vaginal bleeding. He reports she did have a noted cystocele. Patient's family concerned about incomplete bladder emptying. Bladder scan revealed today 185 mL PVR. She is not completely emptying her bladder. At this time we will hold off on uroplasty. We will also send referral to OB/GYN for possible pessary, pelvic examination. We will have her follow-up in 2 weeks for a follow-up with Julianne

## 2023-01-06 ENCOUNTER — OFFICE VISIT (OUTPATIENT)
Dept: GASTROENTEROLOGY | Age: 88
End: 2023-01-06
Payer: MEDICARE

## 2023-01-06 VITALS
WEIGHT: 125 LBS | SYSTOLIC BLOOD PRESSURE: 139 MMHG | HEIGHT: 60 IN | BODY MASS INDEX: 24.54 KG/M2 | OXYGEN SATURATION: 98 % | DIASTOLIC BLOOD PRESSURE: 70 MMHG | HEART RATE: 72 BPM

## 2023-01-06 DIAGNOSIS — R13.10 DYSPHAGIA, UNSPECIFIED TYPE: Primary | ICD-10-CM

## 2023-01-06 PROCEDURE — G8420 CALC BMI NORM PARAMETERS: HCPCS | Performed by: NURSE PRACTITIONER

## 2023-01-06 PROCEDURE — 1090F PRES/ABSN URINE INCON ASSESS: CPT | Performed by: NURSE PRACTITIONER

## 2023-01-06 PROCEDURE — 99214 OFFICE O/P EST MOD 30 MIN: CPT | Performed by: NURSE PRACTITIONER

## 2023-01-06 PROCEDURE — 1123F ACP DISCUSS/DSCN MKR DOCD: CPT | Performed by: NURSE PRACTITIONER

## 2023-01-06 PROCEDURE — G8484 FLU IMMUNIZE NO ADMIN: HCPCS | Performed by: NURSE PRACTITIONER

## 2023-01-06 PROCEDURE — G8427 DOCREV CUR MEDS BY ELIG CLIN: HCPCS | Performed by: NURSE PRACTITIONER

## 2023-01-06 PROCEDURE — 1036F TOBACCO NON-USER: CPT | Performed by: NURSE PRACTITIONER

## 2023-01-06 RX ORDER — PANTOPRAZOLE SODIUM 40 MG/1
40 TABLET, DELAYED RELEASE ORAL
Qty: 90 TABLET | Refills: 1 | Status: SHIPPED | OUTPATIENT
Start: 2023-01-06

## 2023-01-06 RX ORDER — PANTOPRAZOLE SODIUM 40 MG/1
40 TABLET, DELAYED RELEASE ORAL 2 TIMES DAILY
Qty: 30 TABLET | Refills: 5 | Status: SHIPPED | OUTPATIENT
Start: 2023-01-06 | End: 2023-01-06 | Stop reason: CLARIF

## 2023-01-06 ASSESSMENT — ENCOUNTER SYMPTOMS
NAUSEA: 0
CHOKING: 0
ANAL BLEEDING: 0
VOMITING: 0
COUGH: 0
ABDOMINAL PAIN: 0
DIARRHEA: 0
CONSTIPATION: 0
BLOOD IN STOOL: 0
SHORTNESS OF BREATH: 0
ABDOMINAL DISTENTION: 0
RECTAL PAIN: 0
TROUBLE SWALLOWING: 0

## 2023-01-06 NOTE — PATIENT INSTRUCTIONS
Upper GI Endoscopy: Before Your Procedure    What is an upper GI endoscopy? An upper gastrointestinal (or GI) endoscopy is a test that allows your doctor to look at the inside of your esophagus, stomach, and the first part of your small intestine, called the duodenum. The esophagus is the tube that carries food to your stomach. The doctor uses a thin, lighted tube that bends. It is called an endoscope, or scope. The doctor puts the tip of the scope in your mouth and gently moves it down your throat. The scope is a flexible video camera. The doctor looks at a monitor (like a TV set or a computer screen) as he or she moves the scope. A doctor may do this procedure to look for ulcers, tumors, infection, or bleeding. It also can be used to look for signs of acid backing up into your esophagus. This is called gastroesophageal reflux disease, or GERD. The doctor can use the scope to take a sample of tissue for study (a biopsy). The doctor also can use the scope to take out growths or stop bleeding. How do you prepare for the procedure? Procedures can be stressful. This information will help you understand what you can expect. And it will help you safely prepare for your procedure. Preparing for the procedure    Do not eat or drink anything for 6 to 8 hours before the test. An empty stomach helps your doctor see your stomach clearly during the test. It also reduces your chances of vomiting. If you vomit, there is a small risk that the vomit could enter your lungs. (This is called aspiration.) If the test is done in an emergency, a tube may be inserted through your nose or mouth to empty your stomach. Do not take sucralfate (Carafate) or antacids on the day of the test. These medicines can make it hard for your doctor to see your upper GI tract. If your doctor tells you to, stop taking iron supplements 7 to 14 days before the test.     Be sure you have someone to take you home.  Anesthesia and pain medicine will make it unsafe for you to drive or get home on your own. Understand exactly what procedure is planned, along with the risks, benefits, and other options. Tell your doctor ALL the medicines, vitamins, supplements, and herbal remedies you take. Some may increase the risk of problems during your procedure. Your doctor will tell you if you should stop taking any of them before the procedure and how soon to do it. If you take a medicine that prevents blood clots, your doctor may tell you to stop taking it before your procedure. Or your doctor may tell you to keep taking it. (These medicines include aspirin and other blood thinners.) Make sure that you understand exactly what your doctor wants you to do. Make sure your doctor and the hospital have a copy of your advance directive. If you don't have one, you may want to prepare one. It lets others know your health care wishes. It's a good thing to have before any type of surgery or procedure. What happens on the day of the procedure? Follow the instructions exactly about when to stop eating and drinking. If you don't, your procedure may be canceled. If your doctor told you to take your medicines on the day of the procedure, take them with only a sip of water. Take a bath or shower before you come in for your procedure. Do not apply lotions, perfumes, deodorants, or nail polish. Take off all jewelry and piercings. And take out contact lenses, if you wear them. At the hospital or surgery center   Bring a picture ID. The test may take 15 to 30 minutes. The doctor may spray medicine on the back of your throat to numb it. You also will get medicine to prevent pain and to relax you. You will lie on your left side. The doctor will put the scope in your mouth and toward the back of your throat. The doctor will tell you when to swallow. This helps the scope move down your throat. You will be able to breathe normally.  The doctor will move the scope down your esophagus into your stomach. The doctor also may look at the duodenum. If your doctor wants to take a sample of tissue for a biopsy, he or she may use small surgical tools, which are put into the scope, to cut off some tissue. You will not feel a biopsy, if one is taken. The doctor also can use the tools to stop bleeding or to do other treatments, if needed. You will stay at the hospital or surgery center for 1 to 2 hours until the medicine you were given wears off. What happens after an upper GI endoscopy? After the test, you may belch and feel bloated for a while. You may have a tickling, dry throat or mouth. You may feel a bit hoarse, and you may have a mild sore throat. These symptoms may last several days. Throat lozenges and warm saltwater gargles can help relieve the throat symptoms. Ask your doctor when you can drive again. Your doctor will tell you when you can go back to your usual diet and activities. Don't drink alcohol for 12 to 24 hours after the test.   When should you call your doctor? You have questions or concerns. You don't understand how to prepare for your procedure. You become ill before the procedure (such as fever, flu, or a cold). You need to reschedule or have changed your mind about having the procedure. Where can you learn more? Go to http://www.woods.com/ and enter P790 to learn more about \"Upper GI Endoscopy: Before Your Procedure. \"  Current as of: June 6, 2022               Content Version: 13.5  © 2006-2022 [employer]. Care instructions adapted under license by [facility] ([facility]). If you have questions about a medical condition or this instruction, always ask your healthcare professional. [place] [address] any warranty or liability for your use of this information.

## 2023-01-06 NOTE — PROGRESS NOTES
Subjective:     Patient ID: Roxianne Gottron is a 80 y.o. female  PCP: Ilene Vines DO  Referring Provider: No ref. provider found    HPI  Patient presents to the office today with the following complaints: Follow-up  Patient seen in the office today for follow up after EGD, reports she has been swallowing better since having the dilation done. Reports she is taking protonix daily. Daughter is with patient, patient is having some issues with memory. EGD 10/31/22  Findings/IMPRESSION:  Esophagus: abnormal: Presbyesophagus-like changes noted throughout the esophagus. In the distal esophagus adjacent to the EG junction a widemouthed esophageal diverticulum was noted which is likely due to esophageal dysmotility and may be contributing to some extent to the patient's dysphagia. Also a distal esophageal Schatzki ring was seen near the EG junction at 35 cm and was subjected to a balloon dilation to a maximum of 18 mm as described above. NO erosions or ulcers or nodules or strictures or webs or mass lesions or extrinsic compression or diverticula noted. There is a 5 cm in length hiatal hernia present. Stomach:  normal except for the moderate sized hiatal hernia without any internal erosions. NO ulcers or masses or gastric outlet obstruction or retained food or fluid. Rugae were normal and lumen distended well with insufflation. Retroflexed views otherwise revealed a normal GE junction, fundus and cardia as well. Duodenum: normal        Assessment:     1. Dysphagia, unspecified type         Plan: Will schedule patient for EGD with possible dilation in April or May. Schedule EGD  Nothing to eat or drink after midnight. No driving for 24 hours after procedure. Bring a  to procedure. No aspirin, NSAIDs, fish oil 5 days before procedure.   I have discussed the benefits, alternatives, and risks (including bleeding, perforation and death)  for pursuing Endoscopy (EGD/Colonscopy/EUS/ERCP) with the patient and they are willing to continue. We also discussed the need for anesthesia, IV access, proper dietary changes, medication changes if necessary, and need for bowel prep (if ordered) prior to their Endoscopic procedure. They are aware they must have someone accompany them to their scheduled procedure to drive them home - they agree to the above and are willing to continue. Orders  No orders of the defined types were placed in this encounter. Medications  No orders of the defined types were placed in this encounter.         Patient History:     Past Medical History:   Diagnosis Date    Age-related macular degeneration, wet, right eye ([address]     Anemia     Back pain     Bilateral carotid artery stenosis 02/23/2021    Chronic bilateral low back pain with left-sided sciatica 12/19/2019    Colitis, ischemic ([address]     Dementia ([address]     Diverticulosis     Hyperlipidemia     Hypertension     Osteoarthritis     Palliative care patient 09/17/2019    Risk for falls 09/13/2019    Seizures ([address]     last one 2021    Spastic colon     Status post placement of implantable loop recorder 10/27/2020    Stroke syndrome     CVA's/ TIA's    Urinary incontinence     Uterine cancer ([address]     Uterine cancer ([address]     wet age-related macular degeneration of left eye     bilateral       Past Surgical History:   Procedure Laterality Date    BREAST SURGERY Right     FNA Right Breast \"oh heavens, maybe 20 years ago\"    CHOLECYSTECTOMY      COLONOSCOPY  09/25/2003    Dr Winchester Prom ischemic colitis, incomplete exam    COLONOSCOPY  08/29/2003    Dr Murphy Monk:  limited colon-ischemic colitis    DILATION AND CURETTAGE OF UTERUS      x2, in [address] tears, laser suregry, adn cataract with IOL b/l    HYSTERECTOMY (CERVIX STATUS UNKNOWN)  01/2020    ovaries and tubes    INSERTABLE CARDIAC MONITOR      LUMBAR FUSION      2012 90 days after the spine surgeries    OTHER SURGICAL HISTORY      inner lymph nodes    OTHER SURGICAL HISTORY  2020    Loop recorder     PACEMAKER INSERTION      PACEMAKER PLACEMENT  2021    SPINE SURGERY      L3,4, and 5   done in 2012    [address]      as a child at age 9     UPPER GASTROINTESTINAL ENDOSCOPY  2003    DR Aimee Vila:  Duodenal scarring but no evidence of active ulcer disease.     UPPER GASTROINTESTINAL ENDOSCOPY N/A 01/10/2022    Dr Abhijeet Porter ring dilated up to 18mm with balloon, Presbyesophagus-like esophageal changes, 3-4cm HH, NEG Hpylori, NEG Barretts    UPPER GASTROINTESTINAL ENDOSCOPY N/A 10/31/2022    Dr Paola Thompson W 18 mm dil, Presbyesophagus like changes, widemouthed esoph diverticulum dital esoph Schatzki ring near 35cm, 5 cm hh wo amy erosions    UPPER GASTROINTESTINAL ENDOSCOPY  10/31/2022    Dr Paola Thompson, 18mm balloon Dilation       Family History   Problem Relation Age of Onset    Heart Defect Mother     Hypertension Mother     Heart Attack Father         x3 within 24 h and then , abused ciggarettes    No Known Problems Daughter     No Known Problems Son     No Known Problems Son     Colon Cancer Neg Hx     Colon Polyps Neg Hx     Esophageal Cancer Neg Hx     Liver Cancer Neg Hx     Liver Disease Neg Hx     Rectal Cancer Neg Hx     Stomach Cancer Neg Hx        Social History     Socioeconomic History    Marital status:     Number of children: 3   Occupational History    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mployee of [employer]    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,mployee of [employer]   Tobacco Use    Smoking status: Never    Smokeless tobacco: Never   Vaping Use    Vaping Use: Never used   Substance and Sexual Activity    Alcohol use: Never    Drug use: Never   Social History Narrative    CODE STATUS: DNR-CCA    HEALTH CARE PROXY / [employer] and Healthcare: her daughter, Mrs. Herb Norman, [phone]    AMBULATES: with walker during day, wheelchair at night    DOMICILED: lives in the [facility] assisted living [facility], has no stairs or steps, has a cat, her daughter is there periodically       Current Outpatient Medications   Medication Sig Dispense Refill    amLODIPine (NORVASC) 5 MG tablet TAKE ONE TABLET BY MOUTH TWICE DAILY      dipyridamole (PERSANTINE) 50 MG tablet Take 2 tablets by mouth 2 times daily Indications: 9am and 9pm 60 tablet 11    levETIRAcetam (KEPPRA) 500 MG tablet Take 1 tablet by mouth 2 times daily Indications: takes 9am and 9pm 60 tablet 11    gabapentin (NEURONTIN) 100 MG capsule TAKE 1 CAPSULE BY MOUTH 3 TIMES DAILY. (Patient taking differently: Take 100 mg by mouth in the morning and at bedtime.  TAKE 1 CAPSULE BY MOUTH bid can take 3 TIMES DAILY If needed) 90 capsule 5    cephALEXin (KEFLEX) 250 MG capsule Take 250 mg by mouth as needed      B-Complex, Folic Acid, TABS TAKE ONE TABLET BY MOUTH ONCE DAILY      meloxicam (MOBIC) 15 MG tablet Take 15 mg by mouth daily      hydrALAZINE (APRESOLINE) 50 MG tablet Take 50 mg by mouth 2 times daily DO NOT TAKE IN THE AM DUE TO VASAL VAGAL RESPONSE---25 at 4:30pm and at bed time 50mg      dicyclomine (BENTYL) 10 MG capsule Take 10 mg by mouth as needed      Thiamine HCl (VITAMIN B-1 PO) Take 100 mg by mouth daily Takes at 2:00 PM       fluticasone (FLONASE) 50 MCG/ACT nasal spray 1 spray by Each Nostril route 2 times daily       cloNIDine (CATAPRES) 0.1 MG tablet Take 1 tablet by mouth 2 times daily as needed for High Blood Pressure (for BP greater than 160/100) 30 tablet 2    pantoprazole (PROTONIX) 40 MG tablet Take 40 mg by mouth 2 times daily Indications: 9am       acetaminophen (TYLENOL) 500 MG tablet Take 1,000 mg by mouth 3 times daily       donepezil (ARICEPT) 10 MG tablet Take 1 tablet by mouth nightly 90 tablet 3    Docosanol (ABREVA EX) Apply topically as needed       simethicone (MYLICON) 708 MG chewable tablet Take 125 mg by mouth 3 times daily 2 PO 6:30 AM, 2 at 10:00 AM, 1 at 5:30 PM otherwise prn      butalbital-acetaminophen-caffeine (FIORICET, ESGIC) -40 MG per tablet Take 1 tablet by mouth every 6 hours as needed for Headaches       diclofenac sodium 1 % GEL Apply 2 g topically 3 times daily as needed for Pain       sodium chloride 1 g tablet Take 1 g by mouth 3 times daily Indications: takes 9am, 3pm, and 9pm       lidocaine 4 % external patch Place 1 patch onto the skin daily as needed (LEFT LOWER BACK PAIN)       valACYclovir (VALTREX) 1 g tablet as needed       azelastine (ASTELIN) 0.1 % nasal spray 2 sprays by Nasal route 2 times daily Indications: takes 9am and 9pm       Cholecalciferol (VITAMIN D3) 5000 units TABS Take 5,000 Units by mouth daily Indications: AT 2:30 PM       sodium chloride (OCEAN, BABY AYR) 0.65 % nasal spray 1 spray by Nasal route as needed for Congestion       Lutein 20 MG TABS Take 20 mg by mouth daily Indications: 2 PM DAILY      camphor-menthol (SARNA) 0.5-0.5 % lotion Apply 0.5 mLs topically 2 times daily as needed for Itching Apply topically as needed. Calcium Carb-Cholecalciferol 600-800 MG-UNIT TABS Take 1 tablet by mouth daily Indications: AT 2:30 PM       promethazine (PHENERGAN) 25 MG tablet Take 12.5 mg by mouth 3 times daily as needed for Nausea       Omega 3-6-9 Fatty Acids (OMEGA 3-6-9 COMPLEX PO) Take 2 tablets by mouth daily Indications: AT 2:30 PM       alendronate (FOSAMAX) 70 MG tablet Take 70 mg by mouth every 7 days Thrusday      pravastatin (PRAVACHOL) 20 MG tablet Take 20 mg by mouth nightly Indications: takes at 9am       ramipril (ALTACE) 10 MG capsule Take 10 mg by mouth 2 times daily Indications: takes at 9am and 9pm       b complex vitamins capsule Take 1 capsule by mouth daily Indications: AT 2:30 PM B 100      Multiple Vitamins-Minerals (THERAPEUTIC MULTIVITAMIN-MINERALS) tablet Take 1 tablet by mouth daily Indications: AT 2:30 PM        No current facility-administered medications for this visit.        Allergies   Allergen Reactions    Aspirin Shortness Of Breath    Spironolactone      Low sodium    Dilaudid [Hydromorphone Hcl]     Fd&C Yellow #5 [place] [Tartrazine]     Ondansetron     Quinolones     Sulfa Antibiotics     Codeine Rash and Other (See Comments)     Makes her \"crazy and mean\" and gives her a rash    Demerol Hcl [Meperidine] Other (See Comments)     Made her \"crazy and mean\", and \"loopy\"    Levaquin [Levofloxacin In D5w] Other (See Comments)     Seizure like activity      Myrbetriq [Mirabegron] Other (See Comments)     Unable to micturate    Namenda [Memantine Hcl] Other (See Comments)     made her like a vegetable for hours      Norco [Hydrocodone-Acetaminophen] Rash and Other (See Comments)     \"crazy and mean\"    Pneumococcal Vaccines Swelling       Review of Systems   Constitutional:  Negative for activity change, appetite change, fatigue, fever and unexpected weight change. HENT:  Negative for trouble swallowing. Respiratory:  Negative for cough, choking and shortness of breath. Cardiovascular:  Negative for chest pain. Gastrointestinal:  Negative for abdominal distention, abdominal pain, anal bleeding, blood in stool, constipation, diarrhea, nausea, rectal pain and vomiting. Allergic/Immunologic: Negative for food allergies. All other systems reviewed and are negative. Objective:     /70 (Site: Left Upper Arm)   Pulse 72   Ht 5' (1.524 m)   Wt 125 lb (56.7 kg)   SpO2 98%   BMI 24.41 kg/m²     Physical Exam  Vitals reviewed. Constitutional:       General: She is not in acute distress. Appearance: She is well-developed. HENT:      Head: Normocephalic and atraumatic. Right Ear: External ear normal.      Left Ear: External ear normal.      Nose: Nose normal.   Eyes:      General: No scleral icterus. Right eye: No discharge. Left eye: No discharge. Conjunctiva/sclera: Conjunctivae normal.      Pupils: Pupils are equal, round, and reactive to light. Cardiovascular:      Rate and Rhythm: Normal rate and regular rhythm. Heart sounds: Normal heart sounds. No murmur heard. Pulmonary:      Effort: Pulmonary effort is normal. No respiratory distress. Breath sounds: Normal breath sounds. No wheezing or rales. Abdominal:      General: Bowel sounds are normal. There is no distension. Palpations: Abdomen is soft. There is no mass. Tenderness: There is no abdominal tenderness. There is no guarding or rebound. Musculoskeletal:         General: Normal range of motion. Cervical back: Normal range of motion and neck supple. Skin:     General: Skin is warm and dry. Coloration: Skin is not pale. Neurological:      Mental Status: She is alert and oriented to person, place, and time.    Psychiatric:         Behavior: Behavior normal.

## 2023-01-11 RX ORDER — AMOXICILLIN 250 MG
1 CAPSULE ORAL 2 TIMES DAILY
COMMUNITY

## 2023-01-11 RX ORDER — PANTOPRAZOLE SODIUM 40 MG/1
40 TABLET, DELAYED RELEASE ORAL 2 TIMES DAILY
COMMUNITY
End: 2023-01-11 | Stop reason: ALTCHOICE

## 2023-01-11 RX ORDER — LEVETIRACETAM 500 MG/1
500 TABLET ORAL 2 TIMES DAILY
COMMUNITY

## 2023-01-11 RX ORDER — HYDRALAZINE HYDROCHLORIDE 25 MG/1
25 TABLET, FILM COATED ORAL 3 TIMES DAILY PRN
COMMUNITY

## 2023-01-13 ENCOUNTER — HOSPITAL ENCOUNTER (OUTPATIENT)
Dept: RADIATION ONCOLOGY | Facility: HOSPITAL | Age: 88
Setting detail: RADIATION/ONCOLOGY SERIES
End: 2023-01-13
Payer: MEDICARE

## 2023-01-15 PROBLEM — Z90.710 S/P HYSTERECTOMY WITH OOPHORECTOMY: Status: ACTIVE | Noted: 2023-01-15

## 2023-01-15 PROBLEM — Z78.9 NON-SMOKER: Status: ACTIVE | Noted: 2023-01-15

## 2023-01-15 PROBLEM — Z90.721 S/P HYSTERECTOMY WITH OOPHORECTOMY: Status: ACTIVE | Noted: 2023-01-15

## 2023-01-15 NOTE — PROGRESS NOTES
[facility] Till[facility] MD      hJony Pimentel, APRN  ________________________________________  [facility]  Department of Radiation Oncology  [address] [place] [id number]  Office:  [phone]  Fax: [phone]    DATE:  01/16/2023  PATIENT:  Bel Powell 1935                 MEDICAL RECORD #:  [id number]                                                       REASON FOR VISIT  Chief Complaint   Patient presents with   • Uterine Cancer     Bel Powell is a very pleasant female that has been referred to our office for radiotherapy considerations. Reports abdominal pain and vaginal bleeding. Denies fatigue, unexpected weight change, cough, SOB, nausea/vomiting, diarrhea, frequency/urgency with urination, hematuria, pelvis, extremity weakness, dizziness, and headaches. She follows .     History of Present Illness:  09/13/2019 - CT Abdomen/Pelvis due to right lower quadrant pain:  • Cystic changes centrally within the uterus; a fluid distended endometrial cavity considered, GYN consultation suggested. Uterine fibroids.   • Colonic diverticulosis without CT evidence of acute diverticulitis. Otherwise, No CT evidence of acute bowel pathology   • Uncomplicated left-sided superior lumbar hernia.     09/18/2019 - CT Head without contrast:  • No acute intracranial abnormality.     09/25/2019 - CT Head:  • No acute intracranial abnormality.   • Chronic ischemic and atrophic changes.     10/28/2019 - Endometrium, biopsy:  • Primarily mucus.  • Fragments of squamous mucosa.  • Scant fragments of endocervical mucosa.  • Extremely scant superficial fragments of endometrial glandular epithelium.  • No evidence of atypia or malignancy.    12/23/2019 - Biopsies:  • Endometrium, curettings:  o Endometrioid adenocarcinoma, FIGO grade 1 arising in a background of complex atypical hyperplasia.  • Cervix, endocervical curettings:  o Benign endocervical glands.       01/28/2020 - Robotic Total Hysterectomy/BSO per :   • Right sentinel lymph node biopsy:  o 3 lymph nodes negative for tumor  • Left pelvic sentinel lymph node biopsy:  o Fibroadipose tissue with small fragment of lymph node showing cautery change and no evidence of metastatic tumor  • Uterus, fallopian tubes and ovaries, hysterectomy with bilateral salpingo-oophorectomy:   - Cervix and endocervix with no evidence of glandular and squamous dysplasia  - Endometriod endometrial adenocarcinoma, villoglandular type, grade 1, with less than 1 mm of invasion (of 17 mm myometrial thickness)  - Multiple leiomyomata uteri  - No evidence of adenomyosis  - Fallopian tubes with paratubal remnant cysts, negative for endometriosis and malignancy  - Ovaries with no evidence of endometriosis or malignancy  • Comment: Paraffin immunoperoxidase studies, utilizing appropriate positive and negative controls, are performed on the 2 sentinel lymph node specimens. These fail to demonstrate AE1/AE3 positive tumor cells in the lymph nodes.  TUMOR  • Histologic type - endometrioid carcinoma, villoglandular variant  • Histologic grade - FIGO grade 1  • Myometrial invasion - present  • Depth of invasion - 1 mm  • Myometrial thickness in millimeters: 17  • Percentage of myometrial invasion - 6%  • Uterine serosa involvement - not identified  • Cervical stromal involvement - not identified  • Other tissue/organ involvement - not identified  • Lymphovascular invasion - not identified  LYMPH NODES  • Total number of pelvic nodes examined - 4  • Number of pelvic sentinel lymph nodes -4  • Laterality of pelvic nodes - right, left  • Number of pelvic nodes with macrometastasis - 0  • Number of pelvic nodes with micrometastasis - 0  • Number of pelvic nodes with isolated tumor cells - 0    02/24/2020 - CT Abdomen/Pelvis:  • Fluid-filled nondistended small bowel loops with mucosal enhancement and thickening suggest acute enteritis. Further  follow-up may be obtained.   • The diverticulosis of the distal colon. No evidence of diverticulitis.   • Moderately dilated distal common bile duct and intrahepatic biliary ducts is probably due to a prior cholecystectomy.   • A stable left lumbar hernia containing a loop of colon without obstruction.     12/15/2020 - CT Abdomen/pelvis:  • The stable insufficiency fractures of the sacrum bilaterally, similar to the previous study in February 2020.   • No acute abnormality of the abdomen are pelvis, particularly, no evidence of traumatic involvement of the abdominal pelvic organs.   • The diverticulosis of the colon. No evidence for diverticulitis.   • Hardware fusion of the lumbar spine. The severe demineralization the bony structures.     05/25/2021 - CT Abdomen/Pelvis due to vomiting, pain:  • This study is of limited diagnostic quality. This is secondary to the lack of intra-abdominal fat, lack of IV and oral contrast as well as the patient's inability to raise her arms over her head with extensive streaking artifact related to both upper extremities as well as postoperative changes within the mid and lower lumbar spine also contributing to streaking. If symptoms are persistent I would suggest that the study be repeated with both IV and oral contrast administration.   • Cardiomegaly with a transvenous pacer in place. There is heavy atheromatous calcification of the distal descending thoracic aorta without evidence of aneurysm.   • Multiple fluid-filled bowel loops. I do not see evidence of a discrete transition point. The appendix is not clearly identified and may be surgically absent. There are a few diverticula noted of the sigmoid colon without evidence of diverticulitis. No convincing evidence of bowel obstruction. No evidence of pneumoperitoneum.   • I do not see evidence of nephrolithiasis. The ureters cannot be clearly trace but there is no convincing evidence of obstructive uropathy.   • The patient has  undergone previous cholecystectomy and hysterectomy. The patient has also undergone fusion at the L3-L5 levels.    12/08/2022 - Appointment with :  Ms. Powell is a 86 y/o with Stage IA Grade 1 Endometrial Cancer who presents for an evaluation.   • Exam showed possible granulation tissue. Removed and will sent to path and call with results and plan.   • s/p robotic hyst/bso/staging on 1/29/20   • no additional treatment required   • IHC Negative   • Surveillance Visit: q6months x 1 year (January 2021) then yearly for 5 years total (January 2025)  • Ms. Powell will return in 12 months.    12/08/2022 - Vagina biopsy:  • Low-grade endometrioid pattern adenocarcinoma    12/21/2022 - Appointment with Yolanda Kamara APRN - CNP - Urology:  • Patient with apparent abnormality of the urethral meatus as explained to her daughter by her obstetrical oncologist at an appointment last week. She requested a pelvic exam today. I did discuss with the daughter that I was uncertain if pelvic exam would provide any useful information.   • However, I did offer to perform 1. I could not perform 1 at the time I was in the room because the patient was undergoing your plasty treatment and then could not move to the dorsolithotomy position.   • Patient also with emergent ophthalmic appointment today regarding a possible detached retina. Patient daughter reports that they cannot miss that appointment and are already running late. Pelvic exam for today was deferred.   • We will try to obtain some records from her oncologist to determine if there is anything in particular they need from us.    12/21/2022 - CT Abdomen/Pelvis with contrast:  • Prior hysterectomy without evidence of metastatic disease in the abdomen or pelvis.    12/21/2022 - CT Chest with contrast:  • No evidence of metastatic disease in the chest.   • Hiatal hernia with thickened distal esophagus. Consider follow-up endoscopy.   • Atherosclerotic  disease.    History obtained from  PATIENT, FAMILY and CHART    PAST MEDICAL HISTORY  Past Medical History:   Diagnosis Date   • Arthritis     OA   • Dietary restriction     FLUID 48-52 OZ PER DR HACKETT   • Endometrial thickening on ultrasound    • Hypertension    • Incontinence    • Nocturia    • PMB (postmenopausal bleeding)    • Seizure (HCC)    • Sensory urge incontinence    • Stroke (HCC)     CODED    • TIA (transient ischemic attack)    • Uterine cancer (HCC)    • Uterine polyp    • Vaginal atrophy    • Vaginal bleeding    • Weakness       PAST SURGICAL HISTORY  Past Surgical History:   Procedure Laterality Date   • BACK SURGERY     • BREAST SURGERY      LEFT BREAST CYST   • CATARACT EXTRACTION     • CHOLECYSTECTOMY     • D & C HYSTEROSCOPY N/A 12/23/2019    Procedure: DILATATION AND CURETTAGE HYSTEROSCOPY;  Surgeon: Lynnette Andujar MD;  Location: [facility] OR;  Service: Obstetrics/Gynecology   • D & C HYSTEROSCOPY      x2   • HYSTERECTOMY  01/28/2022    [facility] BSO for uterine cancer   • TONSILLECTOMY     • WISDOM TOOTH EXTRACTION        FAMILY HISTORY  family history includes No Known Problems in her father and mother.     SOCIAL HISTORY  Social History     Tobacco Use   • Smoking status: Never   • Smokeless tobacco: Never   Substance Use Topics   • Alcohol use: No     Comment: rare   • Drug use: Never     ALLERGIES  Hydrocodone-acetaminophen, Levofloxacin, Memantine hcl, Pneumococcal vaccine, Quinolones, Aspirin, Lortab [hydrocodone-acetaminophen], Meperidine, Mirabegron, Ondansetron, Penicillins, Spironolactone, Sulfa antibiotics, Codeine, and Yellow dye #11     MEDICATIONS    Current Outpatient Medications:   •  acetaminophen (TYLENOL) 500 MG tablet, Take 1,000 mg by mouth Every 6 (Six) Hours As Needed for Moderate Pain ., Disp: , Rfl:   •  alendronate (FOSAMAX) 70 MG tablet, 1 tablet Orally once a week for 30 day(s), Disp: , Rfl:   •  amLODIPine (NORVASC) 5 MG tablet, Take 5 mg by mouth Every  Morning., Disp: , Rfl:   •  azelastine (ASTELIN) 0.1 % nasal spray, 2 sprays into the nostril(s) as directed by provider 2 (Two) Times a Day., Disp: , Rfl:   •  B Complex Vitamins (VITAMIN B COMPLEX PO), Take 1 tablet by mouth Daily., Disp: , Rfl:   •  benzonatate (TESSALON) 100 MG capsule, Take 200 mg by mouth 3 (Three) Times a Day As Needed., Disp: , Rfl:   •  butalbital-acetaminophen-caffeine (FIORICET, ESGIC) -40 MG per tablet, Take 1 tablet by mouth Every 6 (Six) Hours As Needed for Headache., Disp: , Rfl:   •  calcium carb-cholecalciferol 600-800 MG-UNIT tablet, Take 1 tablet by mouth Daily., Disp: , Rfl:   •  camphor-menthol (SARNA) 0.5-0.5 % lotion, Apply 1 application topically to the appropriate area as directed 2 (Two) Times a Day., Disp: , Rfl:   •  cholecalciferol (VITAMIN D3) 1000 UNITS tablet, Take 1,000 Units by mouth Daily., Disp: , Rfl:   •  cloNIDine (CATAPRES) 0.1 MG tablet, Take 0.1 mg by mouth 2 (Two) Times a Day As Needed for High Blood Pressure (As needed if BP exceeds 160/100.). Half tablet, Disp: , Rfl:   •  diclofenac (VOLTAREN) 1 % gel gel, Apply 2 g topically to the appropriate area as directed 4 (Four) Times a Day As Needed (PAIN)., Disp: , Rfl:   •  dipyridamole (PERSANTINE) 50 MG tablet, Take 100 mg by mouth 2 (Two) Times a Day., Disp: , Rfl:   •  donepezil (ARICEPT) 10 MG tablet, Take 10 mg by mouth Every Night., Disp: , Rfl:   •  fluticasone (VERAMYST) 27.5 MCG/SPRAY nasal spray, 2 sprays into the nostril(s) as directed by provider Every Night., Disp: , Rfl:   •  gabapentin (NEURONTIN) 100 MG capsule, Take 100 mg by mouth. Up to 3 times a day, Disp: , Rfl:   •  hydrALAZINE (APRESOLINE) 25 MG tablet, Take 25 mg by mouth 3 (Three) Times a Day As Needed. 25mg in afternoon, in the evening 50 mg. Not to be given in the morning due to vagal response, Disp: , Rfl:   •  levETIRAcetam (KEPPRA) 500 MG tablet, Take 500 mg by mouth 2 (Two) Times a Day., Disp: , Rfl:   •  Lutein 20 MG  tablet, Take 1 tablet by mouth Daily., Disp: , Rfl:   •  meloxicam (MOBIC) 15 MG tablet, Take 15 mg by mouth Daily As Needed., Disp: , Rfl:   •  Omega 3-6-9 Fatty Acids (OMEGA 3-6-9 COMPLEX PO), Take 1 capsule by mouth Daily., Disp: , Rfl:   •  pantoprazole (PROTONIX) 40 MG EC tablet, Take 40 mg by mouth 2 (Two) Times a Day., Disp: , Rfl:   •  pravastatin (PRAVACHOL) 20 MG tablet, Take 20 mg by mouth Daily., Disp: , Rfl:   •  promethazine (PHENERGAN) 25 MG tablet, Take 25 mg by mouth Every 8 (Eight) Hours As Needed for Nausea or Vomiting., Disp: , Rfl:   •  ramipril (ALTACE) 10 MG capsule, Take 10 mg by mouth Daily., Disp: , Rfl:   •  saline 0.65 % nasal solution (BABY AYR) 0.65 % solution, 1 spray into the nostril(s) as directed by provider Daily As Needed., Disp: , Rfl:   •  sennosides-docusate (PERICOLACE) 8.6-50 MG per tablet, Take 1 tablet by mouth 2 (Two) Times a Day., Disp: , Rfl:   •  simethicone (MYLICON) 125 MG chewable tablet, Every 8 (Eight) Hours. 6am at 2 9:30am, 1 6pm, Disp: , Rfl:   •  sodium chloride 1 g tablet, Take 1 g by mouth 3 (Three) Times a Day., Disp: , Rfl:   •  therapeutic multivitamin-minerals (THERAGRAN-M) tablet, Take 1 tablet by mouth Daily., Disp: , Rfl:   •  valACYclovir (VALTREX) 1000 MG tablet, Take 1,000 mg by mouth Daily As Needed (FEVER BLISTER)., Disp: , Rfl:   •  amLODIPine (NORVASC) 2.5 MG tablet, Take 2.5 mg by mouth Every Evening. AT 7PM, Disp: , Rfl:   •  Diclofenac Sodium 2 % solution, shoulders, Disp: , Rfl:   •  INV ALLIANCE, AMB, KIT, Pharmacy Label,, Indications: dic 3%, lidocaine 2%, cyclo 2%, prilo 2% baclo 2% 1 to 2 pumps up to TID PRN, Disp: , Rfl:   •  Lidocaine 4 % patch, Place 1 patch on the skin as directed by provider 2 (Two) Times a Day., Disp: , Rfl:   •  promethazine (PHENERGAN) 25 MG tablet, Every 8 (Eight) Hours., Disp: , Rfl:     Current outpatient and discharge medications have been reconciled for the patient.  Reviewed by: Vimal Tillman III,  "MD    The following portions of the patient's history were reviewed and updated as appropriate: allergies, current medications, past family history, past medical history, past social history, past surgical history and problem list.    REVIEW OF SYSTEMS  Review of Systems   Constitutional: Negative.  Negative for fatigue and unexpected weight change.   HENT:  Negative.    Respiratory: Negative.  Negative for cough and shortness of breath.    Cardiovascular: Negative.    Gastrointestinal: Positive for abdominal pain. Negative for nausea.   Genitourinary: Positive for vaginal bleeding. Negative for frequency, hematuria and pelvic pain.    Musculoskeletal: Negative.    Skin: Negative.    Neurological: Negative.  Negative for dizziness and extremity weakness.   Hematological: Negative.  Negative for adenopathy.   Psychiatric/Behavioral: Negative.    All other systems reviewed and are negative.    I have reviewed and confirmed the accuracy of the ROS as documented by the MA/MARIA A/RN Vimal Tillman III, MD     PHYSICAL EXAM  Vital Signs:   Vitals:    01/16/23 1415   BP: 122/58   Pulse: 70   Resp: 18   SpO2: 98%   Weight: 54 kg (119 lb)   Height: 152.4 cm (60\")   PainSc: 10-Worst pain ever   PainLoc: Abdomen  Comment: lower abdomen      Physical Exam  Vitals reviewed.   Constitutional:       Appearance: Normal appearance.   HENT:      Head: Normocephalic.   Eyes:      Pupils: Pupils are equal, round, and reactive to light.   Cardiovascular:      Rate and Rhythm: Normal rate and regular rhythm.      Pulses: Normal pulses.      Heart sounds: Normal heart sounds.   Pulmonary:      Effort: Pulmonary effort is normal. No respiratory distress.      Breath sounds: Normal breath sounds.   Abdominal:      General: Bowel sounds are normal.   Musculoskeletal:         General: Normal range of motion.      Cervical back: Normal range of motion and neck supple.   Skin:     General: Skin is warm and dry.      Capillary Refill: Capillary " refill takes less than 2 seconds.   Neurological:      General: No focal deficit present.      Mental Status: She is alert and oriented to person, place, and time.   Psychiatric:         Mood and Affect: Mood normal.         Performance Status: ECOG (0) Fully active, able to carry on all predisease performance without restriction    Clinical Quality Measures  -Pain Documented by Standardized Tool, FPS  Bel Powell reports a pain score of 10. Given her pain assessment as noted, treatment options were discussed and the following options were decided upon as a follow-up plan to address the patient's pain: continuation of current treatment plan for pain and use of non-medical modalities (ice, heat, stretching and/or behavior modifications).   Pain Medications             acetaminophen (TYLENOL) 500 MG tablet Take 1,000 mg by mouth Every 6 (Six) Hours As Needed for Moderate Pain .    butalbital-acetaminophen-caffeine (FIORICET, ESGIC) -40 MG per tablet Take 1 tablet by mouth Every 6 (Six) Hours As Needed for Headache.    gabapentin (NEURONTIN) 100 MG capsule Take 100 mg by mouth. Up to 3 times a day    levETIRAcetam (KEPPRA) 500 MG tablet Take 500 mg by mouth 2 (Two) Times a Day.    meloxicam (MOBIC) 15 MG tablet Take 15 mg by mouth Daily As Needed.        -Advanced Care Planning Advance Care Planning   ACP discussion was held with the patient during this visit. Patient has an advance directive in EMR which is still valid.      -Body Mass Index Screening and Follow-Up Plan BMI is within normal parameters. No other follow-up for BMI required.    -Tobacco Use: Screening and Cessation Intervention Social History    Tobacco Use      Smoking status: Never      Smokeless tobacco: Never    ASSESSMENT AND PLAN  1. Endometrial cancer (HCC)    2. S/P hysterectomy with oophorectomy    3. Non-smoker      Orders Placed This Encounter   Procedures   • NM PET/CT Skull Base to Mid Thigh   • Ambulatory Referral to ONC  Social Work     RECOMMENDATIONS: Bel Powell has been referred to our office today to discuss radiotherapy recommendations for recurrent endometrial carcinoma.       We have discussed the indications and rationale of radiation therapy according to the NCCN Guidelines. I have extensively reviewed the risks, benefits and alternatives of therapy and progression of disease in spite of therapy with either local or systemic failure. The option of definitive surgery has also been reviewed as well as surveillance.  I have seen, examined and reviewed her medication list, appropriate labs and imaging studies as well as other physician notes. We discussed the goals/plans of care and answered all questions.     After consideration of the diagnostic data and evaluation of the patient, I recommend obtaining a PET scan for further evaluation of disease progression. Should it reveal limited disease, could consider brachytherapy treatments vs. Whole pelvic radiation.     The patient and her family verbalize understanding of this discussion, voice no further questions and wish to proceed with recommendations. Continue ongoing management per primary care physician and other specialists.    Thank you for allowing me to assist in this patients care.    Patient Instructions   1) PET scan ordered, they will call you  2) Return in 2 weeks for further discussion of treatment recommendations    Time Spent: I spent 50 minutes caring for Bel on this date of service. This time includes time spent by me in the following activities: preparing for the visit, reviewing tests, obtaining and/or reviewing a separately obtained history, performing a medically appropriate examination and/or evaluation, counseling and educating the patient/family/caregiver, ordering medications, tests, or procedures, referring and communicating with other health care professionals, documenting information in the medical record, independently interpreting  results and communicating that information with the patient/family/caregiver and care coordination.   Vimal Tillman III, MD   01/16/2023

## 2023-01-16 ENCOUNTER — DOCUMENTATION (OUTPATIENT)
Dept: RADIATION ONCOLOGY | Facility: HOSPITAL | Age: 88
End: 2023-01-16
Payer: MEDICARE

## 2023-01-16 ENCOUNTER — CONSULT (OUTPATIENT)
Dept: RADIATION ONCOLOGY | Facility: HOSPITAL | Age: 88
End: 2023-01-16
Payer: MEDICARE

## 2023-01-16 VITALS
RESPIRATION RATE: 18 BRPM | WEIGHT: 119 LBS | DIASTOLIC BLOOD PRESSURE: 58 MMHG | HEIGHT: 60 IN | OXYGEN SATURATION: 98 % | SYSTOLIC BLOOD PRESSURE: 122 MMHG | HEART RATE: 70 BPM | BODY MASS INDEX: 23.36 KG/M2

## 2023-01-16 DIAGNOSIS — Z78.9 NON-SMOKER: ICD-10-CM

## 2023-01-16 DIAGNOSIS — Z90.710 S/P HYSTERECTOMY WITH OOPHORECTOMY: ICD-10-CM

## 2023-01-16 DIAGNOSIS — C54.1 ENDOMETRIAL CANCER: Primary | ICD-10-CM

## 2023-01-16 DIAGNOSIS — Z90.721 S/P HYSTERECTOMY WITH OOPHORECTOMY: ICD-10-CM

## 2023-01-16 PROCEDURE — G0463 HOSPITAL OUTPT CLINIC VISIT: HCPCS | Performed by: RADIOLOGY

## 2023-01-16 RX ORDER — PANTOPRAZOLE SODIUM 40 MG/1
40 TABLET, DELAYED RELEASE ORAL 2 TIMES DAILY
COMMUNITY
Start: 2023-01-06

## 2023-01-16 RX ORDER — MELOXICAM 15 MG/1
15 TABLET ORAL DAILY PRN
COMMUNITY
Start: 2023-01-06

## 2023-01-16 RX ORDER — FUROSEMIDE 20 MG/1
TABLET ORAL
COMMUNITY
End: 2023-01-17

## 2023-01-16 RX ORDER — PROMETHAZINE HYDROCHLORIDE 25 MG/1
TABLET ORAL EVERY 8 HOURS
COMMUNITY

## 2023-01-16 RX ORDER — SIMETHICONE 125 MG
TABLET,CHEWABLE ORAL EVERY 8 HOURS SCHEDULED
COMMUNITY

## 2023-01-16 RX ORDER — DICLOFENAC SODIUM 20 MG/G
SOLUTION TOPICAL
COMMUNITY

## 2023-01-16 RX ORDER — ALENDRONATE SODIUM 70 MG/1
TABLET ORAL
COMMUNITY

## 2023-01-16 NOTE — PROGRESS NOTES
IGLESIA met with Ms. Powell due to her distress score from her radiation consultation for endometrial cancer. Ms. Powell had her daughter, Jessica, with her during this visit. Ms. Powell lives in her home and her daughter is currently living with her. Jessica is her main caregiver. She moved out of [place] in 2020. Ms. Powell is 87 years old. Her support system is limited she has three children but Jessica is her main support. Ms. Powell’s main stressor is financial. She is on a limited income and does not have a supplemental insurance to Medicare. IGLESIA provided her with a [facility] Financial Assistance Application. Her daughter also expressed needing assistance with utilities and IGLESIA provided information on [facility] [employer], [facility] [employer], [facility] and [facility] [employer]. She also explained that her medications can be expensive. IGLESIA provided her information on [employer]. In the past she did enjoy reading but due to her vision she is unable to do that, however she will listed to books. She has difficulty hearing due to only having one hearing aid. She has trouble sleeping due to bladder incontinence. According to the daughter Ms. Powell gets the best sleep between 6:00A.M. and 9:00A.M. Her previous occupation was with the [employer]. Ms. Powell is nervous with the possibility of needing radiation treatment. Emotional support and reassurance provided. IGLESIA will follow up with Ms. Powell once treatment is started and encouraged her to call as needed.

## 2023-01-16 NOTE — PATIENT INSTRUCTIONS
1) PET scan ordered, they will call you  2) Return in 2 weeks for further discussion of treatment recommendations

## 2023-01-17 ENCOUNTER — OFFICE VISIT (OUTPATIENT)
Dept: OBSTETRICS AND GYNECOLOGY | Facility: CLINIC | Age: 88
End: 2023-01-17
Payer: MEDICARE

## 2023-01-17 VITALS
SYSTOLIC BLOOD PRESSURE: 118 MMHG | BODY MASS INDEX: 21.99 KG/M2 | WEIGHT: 112 LBS | HEIGHT: 60 IN | DIASTOLIC BLOOD PRESSURE: 62 MMHG

## 2023-01-17 DIAGNOSIS — R32 URINARY INCONTINENCE, UNSPECIFIED TYPE: Primary | ICD-10-CM

## 2023-01-17 DIAGNOSIS — N36.8 URETHRAL PROLAPSE: ICD-10-CM

## 2023-01-17 DIAGNOSIS — R33.9 INCOMPLETE EMPTYING OF BLADDER: ICD-10-CM

## 2023-01-17 PROCEDURE — 99204 OFFICE O/P NEW MOD 45 MIN: CPT | Performed by: OBSTETRICS & GYNECOLOGY

## 2023-01-17 NOTE — PROGRESS NOTES
Subjective     Chief Complaint   Patient presents with   • Urinary Frequency     Pt here today with c/o urinary frequency and trouble emptying and also pelvic pressure.  Pt also having vaginal bleeding but is s/p hysterectomy with Dr. Abad for endometrial cancer. Pt voices no other concerns.        Bel Powell is a 87 y.o. year old who presents to be seen, as a referral from Dr. Francisco's office, for urology problems.  Patient has been having external peroneal nerve stimulation treatments at their office every two weeks for the past few years.  The patient's daughter provides her history today.  Patient is still seeing Dr. Abad every 6 months (he did her hysterectomy in 2020 for FIGO grade I endometrial cancer).  Patient just recently had some vaginal bleeding which was biopsied by Dr. Abad, and found to be a recurrence of her endometrial cancer.  They are seeing Dr. Tillman tomorrow about pelvic radiation.      The patient is s/p hysterectomy.  She reports positive urinary incontinence and urinary frequency.  Patient changing Depends about 15 times per day.  According to her daughter, there was 186 ml of post-void residual in the urology office recently.  The patient feels like the problem began many years ago.  She is not currently sexually active, and is not interested in being sexually active in the future.  Bel Powell has not had surgery for this problem in the past.      Past Medical History:   Diagnosis Date   • Arthritis     OA   • Dietary restriction     FLUID 48-52 OZ PER DR HACKETT   • Endometrial thickening on ultrasound    • Hypertension    • Incontinence    • Nocturia    • PMB (postmenopausal bleeding)    • Seizure (HCC)    • Sensory urge incontinence    • Stroke ([facility])     CODED    • TIA (transient ischemic attack)    • Uterine cancer ([facility])    • Uterine polyp    • Vaginal atrophy    • Vaginal bleeding    • Weakness        Current Outpatient Medications:   •  acetaminophen (TYLENOL)  500 MG tablet, Take 1,000 mg by mouth Every 6 (Six) Hours As Needed for Moderate Pain ., Disp: , Rfl:   •  amLODIPine (NORVASC) 5 MG tablet, Take 5 mg by mouth Every Morning., Disp: , Rfl:   •  azelastine (ASTELIN) 0.1 % nasal spray, 2 sprays into the nostril(s) as directed by provider 2 (Two) Times a Day., Disp: , Rfl:   •  B Complex Vitamins (VITAMIN B COMPLEX PO), Take 1 tablet by mouth Daily., Disp: , Rfl:   •  benzonatate (TESSALON) 100 MG capsule, Take 200 mg by mouth 3 (Three) Times a Day As Needed., Disp: , Rfl:   •  butalbital-acetaminophen-caffeine (FIORICET, ESGIC) -40 MG per tablet, Take 1 tablet by mouth Every 6 (Six) Hours As Needed for Headache., Disp: , Rfl:   •  calcium carb-cholecalciferol 600-800 MG-UNIT tablet, Take 1 tablet by mouth Daily., Disp: , Rfl:   •  camphor-menthol (SARNA) 0.5-0.5 % lotion, Apply 1 application topically to the appropriate area as directed 2 (Two) Times a Day., Disp: , Rfl:   •  cholecalciferol (VITAMIN D3) 1000 UNITS tablet, Take 1,000 Units by mouth Daily., Disp: , Rfl:   •  cloNIDine (CATAPRES) 0.1 MG tablet, Take 0.1 mg by mouth 2 (Two) Times a Day As Needed for High Blood Pressure (As needed if BP exceeds 160/100.). Half tablet, Disp: , Rfl:   •  diclofenac (VOLTAREN) 1 % gel gel, Apply 2 g topically to the appropriate area as directed 4 (Four) Times a Day As Needed (PAIN)., Disp: , Rfl:   •  dipyridamole (PERSANTINE) 50 MG tablet, Take 100 mg by mouth 2 (Two) Times a Day., Disp: , Rfl:   •  donepezil (ARICEPT) 10 MG tablet, Take 10 mg by mouth Every Night., Disp: , Rfl:   •  fluticasone (VERAMYST) 27.5 MCG/SPRAY nasal spray, 2 sprays into the nostril(s) as directed by provider Every Night., Disp: , Rfl:   •  gabapentin (NEURONTIN) 100 MG capsule, Take 100 mg by mouth. Up to 3 times a day, Disp: , Rfl:   •  hydrALAZINE (APRESOLINE) 25 MG tablet, Take 25 mg by mouth 3 (Three) Times a Day As Needed. 25mg in afternoon, in the evening 50 mg. Not to be given in the  morning due to vagal response, Disp: , Rfl:   •  INV ALLIANCE, AMB, KIT, Pharmacy Label,, Indications: dic 3%, lidocaine 2%, cyclo 2%, prilo 2% baclo 2% 1 to 2 pumps up to TID PRN, Disp: , Rfl:   •  levETIRAcetam (KEPPRA) 500 MG tablet, Take 500 mg by mouth 2 (Two) Times a Day., Disp: , Rfl:   •  Lidocaine 4 % patch, Place 1 patch on the skin as directed by provider 2 (Two) Times a Day., Disp: , Rfl:   •  Lutein 20 MG tablet, Take 1 tablet by mouth Daily., Disp: , Rfl:   •  meloxicam (MOBIC) 15 MG tablet, Take 15 mg by mouth Daily As Needed., Disp: , Rfl:   •  Omega 3-6-9 Fatty Acids (OMEGA 3-6-9 COMPLEX PO), Take 1 capsule by mouth Daily., Disp: , Rfl:   •  pantoprazole (PROTONIX) 40 MG EC tablet, Take 40 mg by mouth 2 (Two) Times a Day., Disp: , Rfl:   •  pravastatin (PRAVACHOL) 20 MG tablet, Take 20 mg by mouth Daily., Disp: , Rfl:   •  promethazine (PHENERGAN) 25 MG tablet, Every 8 (Eight) Hours., Disp: , Rfl:   •  ramipril (ALTACE) 10 MG capsule, Take 10 mg by mouth Daily., Disp: , Rfl:   •  saline 0.65 % nasal solution (BABY AYR) 0.65 % solution, 1 spray into the nostril(s) as directed by provider Daily As Needed., Disp: , Rfl:   •  sennosides-docusate (PERICOLACE) 8.6-50 MG per tablet, Take 1 tablet by mouth 2 (Two) Times a Day., Disp: , Rfl:   •  simethicone (MYLICON) 125 MG chewable tablet, Every 8 (Eight) Hours. 6am at 2 9:30am, 1 6pm, Disp: , Rfl:   •  sodium chloride 1 g tablet, Take 1 g by mouth 3 (Three) Times a Day., Disp: , Rfl:   •  therapeutic multivitamin-minerals (THERAGRAN-M) tablet, Take 1 tablet by mouth Daily., Disp: , Rfl:   •  valACYclovir (VALTREX) 1000 MG tablet, Take 1,000 mg by mouth Daily As Needed (FEVER BLISTER)., Disp: , Rfl:   •  alendronate (FOSAMAX) 70 MG tablet, 1 tablet Orally once a week for 30 day(s), Disp: , Rfl:   •  amLODIPine (NORVASC) 2.5 MG tablet, Take 2.5 mg by mouth Every Evening. AT 7PM, Disp: , Rfl:   •  Diclofenac Sodium 2 % solution, shoulders, Disp: , Rfl:   •   "promethazine (PHENERGAN) 25 MG tablet, Take 25 mg by mouth Every 8 (Eight) Hours As Needed for Nausea or Vomiting., Disp: , Rfl:   Family History   Problem Relation Age of Onset   • No Known Problems Mother    • No Known Problems Father      Social History     Socioeconomic History   • Marital status:    Tobacco Use   • Smoking status: Never   • Smokeless tobacco: Never   Substance and Sexual Activity   • Alcohol use: No     Comment: rare   • Drug use: Never   • Sexual activity: Defer     Allergies   Allergen Reactions   • Hydrocodone-Acetaminophen Itching   • Levofloxacin Other (See Comments)     Seizure like activity   • Memantine Hcl Other (See Comments)     PUT HER IN A COMA   • Pneumococcal Vaccine Swelling   • Quinolones Other (See Comments), Seizure and Unknown - High Severity     Seizure    • Aspirin Itching and Rash   • Lortab [Hydrocodone-Acetaminophen] Itching and Irritability   • Meperidine Hallucinations and Unknown - High Severity   • Mirabegron Other (See Comments)     INABILITY TO URINATE   • Ondansetron Unknown (See Comments) and Unknown - High Severity     UNKNOWN REACTION  \"Makes her mean\"   • Penicillins Delirium and GI Intolerance   • Spironolactone Other (See Comments)     DR HACKETT  STATES SODIUM SENSITIVE DEHYDRATION   • Sulfa Antibiotics Nausea And Vomiting   • Codeine Irritability and Unknown - High Severity   • Yellow Dye #11 Itching     Causes seizures.        Family History   Problem Relation Age of Onset   • No Known Problems Mother    • No Known Problems Father      Review of Systems   Constitutional: Negative for activity change.   Respiratory: Negative for shortness of breath.    Cardiovascular: Negative for chest pain.   Genitourinary: Positive for enuresis (daytime and nighttime) and vaginal bleeding (recently diagnosed with recurrence of endometrial ca at her cuff).           Objective   /62   Ht 152.4 cm (60\")   Wt 50.8 kg (112 lb)   BMI 21.87 kg/m²     Physical " Exam  Vitals and nursing note reviewed.   Constitutional:       General: She is not in acute distress.     Appearance: She is well-developed.   HENT:      Head: Normocephalic and atraumatic.   Neck:      Thyroid: No thyromegaly.   Pulmonary:      Effort: Pulmonary effort is normal.   Abdominal:      General: There is no distension.      Palpations: Abdomen is soft.      Tenderness: There is no abdominal tenderness.   Genitourinary:     General: Normal vulva.      Comments: Moderate urethral prolapse.  External  exam otherwise unremarkable.  Vagina allows introduction of a small speculum.  Patient has no cystocele or rectocele.  There is good vault support.  Vaginal mucosa pale with loss of rugae  Musculoskeletal:         General: Normal range of motion.      Cervical back: Normal range of motion.   Skin:     General: Skin is warm and dry.   Neurological:      Mental Status: She is alert and oriented to person, place, and time.   Psychiatric:         Behavior: Behavior normal.         Judgment: Judgment normal.         Imaging   No data reviewed       Assessment & Plan    Diagnoses and all orders for this visit:    1. Urinary incontinence, unspecified type (Primary): Patient's daughter speaks for most of the visit.  She reports that the patient is changing depends approximately 15 times in a 24-hour period.  Patient has been getting external peroneal nerve stimulation treatments at the [facility] urology office, every 2 weeks, but these are no longer effective.  The daughter reassures me that they have ruled out a urinary tract infection as the cause of her worsening symptoms, not once but twice.  The patient was sent here for assessment of whether or not a pessary might help.  The patient has no prolapse contributing to her urinary incontinence but finds a speculum exam tremendously uncomfortable.  While I do not think the patient would tolerate a pessary since she has not had anything in her vagina in a very long  time, also feel that a pessary would be a poor choice for this patient since she already has a high postvoid residual and a pessary would cause further obstruction.  In my opinion a pessary would be harmful    2. Urethral prolapse: Mild to moderate, patient not bothered.  No treatment necessary    3. Incomplete emptying of bladder: Patient's daughter reports that her last postvoid residual was 186 mL.  It is ready been discussed that a catheter may be in the patient's future if her postvoid residual becomes over 200 mL.  They will follow back up with the urology office where they have been getting care.        Lynnette Andujar MD  1/17/2023  14:04 CST

## 2023-01-18 ENCOUNTER — NURSE ONLY (OUTPATIENT)
Dept: UROLOGY | Age: 88
End: 2023-01-18

## 2023-01-18 DIAGNOSIS — R33.9 INCOMPLETE BLADDER EMPTYING: ICD-10-CM

## 2023-01-18 DIAGNOSIS — N32.81 OAB (OVERACTIVE BLADDER): Primary | ICD-10-CM

## 2023-01-18 RX ORDER — SPIRONOLACTONE 25 MG
TABLET ORAL
COMMUNITY
Start: 2023-01-03

## 2023-01-18 NOTE — PROGRESS NOTES
Mckenna Velazco is a 80 y.o., female, Established patient who presents today   Chief Complaint   Patient presents with    Follow-up     I am here today for my 2 week follow up      Treatment # maintenance    Improvement of Symptoms? mild    OAB/Urologic Medications? none      Uroplasty Procedure:    1. Patient is seated with the left treatment leg elevated. 2. 34 gauge fine needle electrode is inserted into lower inner aspect of the leg. Slightly cephalad to the medial malleolus. 3. Surface electrode pad is placed over the medial aspect of the calcaneus on the same leg. 4.Needle electrode is connected to the external pulse generator. 5.The pulse generator is turned on and the millivolts used are 3. 6.Patient is treated for 30 minutes. 7.The needle and pad are removed. Patient will follow up in 2-3 weeks for next treatment. HPI   Patient presents for follow-up of lower urinary tract symptoms and after being evaluated by OB/GYN for urethral prolapse. The patient has recently experienced a recurrence of her previous endometrial cancer and is seeing an oncologist in [place] for treatment. During his exam, he did note some prolapse and suggested the patient seek treatment to determine if this may be helpful in alleviating some of her urinary symptoms. At her last PTNS treatment, postvoid residual was checked and noted to be elevated, however, this week, her postvoid residual is much improved to 25 mL. Again, she was evaluated by her OB/GYN at [facility] who does not believe a pessary would be helpful in relieving her symptoms and believes this would actually cause more harm to the patient. Surgery is also not an option. Because of this, the patient, her daughter, and I did discuss the possibility of continuing PTNS even though its effectiveness has decreased since its initiation.   Because her postvoid residual is improved today, we can go ahead and proceed with the uroplasty procedure which is the wish of the patient and her family. This is documented above. They report they would like to continue PTNS therapy until they are certain it is no longer effective as any relief the patient can obtain is appreciated. She plans to begin radiation therapy within the next week or so. She has a PET scan on the 20th and will follow-up Monday the 23rd for results and any changes to her current treatment plan from her radiation oncologist.    REVIEW OF SYSTEMS:  Review of Systems   Constitutional:  Negative for chills and fever. Gastrointestinal:  Negative for abdominal distention. Genitourinary:  Positive for frequency and urgency. Negative for difficulty urinating, dysuria, flank pain and hematuria. Musculoskeletal:  Positive for back pain and gait problem. Neurological:  Positive for weakness. Psychiatric/Behavioral:  Negative for agitation and confusion. PHYSICAL EXAM:  There were no vitals taken for this visit. Physical Exam  Vitals and nursing note reviewed. Constitutional:       General: She is not in acute distress. Appearance: Normal appearance. Pulmonary:      Effort: Pulmonary effort is normal. No respiratory distress. Abdominal:      General: There is no distension. Neurological:      Mental Status: She is alert and oriented to person, place, and time. Mental status is at baseline. Motor: Weakness present. Gait: Gait abnormal.   Psychiatric:         Mood and Affect: Mood normal.         Behavior: Behavior normal.     ASSESSMENT/PLAN  1. OAB (overactive bladder)  2. Incomplete bladder emptying  Patient presents today to discuss continuing her PTNS therapy versus discontinuing treatment and any other options we have for her lower urinary tract symptoms. Again, the patient is not a good candidate for surgery or a pessary.   Although the PTNS has decrease in effectiveness, any relief the patient can gain from the procedure would be beneficial, so for now, they would like to continue the therapy, especially while the patient undergoes radiation in the coming weeks. After completion of treatment, we may decrease treatment frequency or discontinue altogether. Patient and her daughter are aware that radiation, if applied to the whole pelvic area, can also create some problematic urinary symptoms.  - WA Measure, post-void residual, US, non-imaging    At least 35 minutes were spent on the day of the visit reviewing the patient's past medical records/imaging, speaking face to face with the patient, and charting in the post visit period. Orders Placed This Encounter   Procedures    WA Measure, post-void residual, US, non-imaging        No follow-ups on file. An electronic signature was used to authenticate this note. LAYTON PROCTOR - CNP    All information inputted into the note by the MA to include chief complaint, past medical history, past surgical history, medications, allergies, social and family history and review of systems has been reviewed and updated as needed by me. EMR Dragon/transcription disclaimer: Much of this document is electronic transcription/translation of spoken language to printed text. The electronic translation of spoken language may be erroneous or, at times, nonsensical words or phrases may be inadvertently transcribed.  Although I have reviewed the document for such errors, some may still exist.

## 2023-01-20 ASSESSMENT — ENCOUNTER SYMPTOMS
BACK PAIN: 1
ABDOMINAL DISTENTION: 0

## 2023-01-24 ENCOUNTER — HOSPITAL ENCOUNTER (OUTPATIENT)
Dept: CT IMAGING | Facility: HOSPITAL | Age: 88
Discharge: HOME | End: 2023-01-24
Payer: MEDICARE

## 2023-01-24 DIAGNOSIS — Z78.9 NON-SMOKER: ICD-10-CM

## 2023-01-24 DIAGNOSIS — Z90.721 S/P HYSTERECTOMY WITH OOPHORECTOMY: ICD-10-CM

## 2023-01-24 DIAGNOSIS — Z90.710 S/P HYSTERECTOMY WITH OOPHORECTOMY: ICD-10-CM

## 2023-01-24 DIAGNOSIS — C54.1 ENDOMETRIAL CANCER: ICD-10-CM

## 2023-01-24 PROCEDURE — 0 FLUDEOXYGLUCOSE F18 SOLUTION

## 2023-01-24 PROCEDURE — A9552 F18 FDG: HCPCS

## 2023-01-24 PROCEDURE — 78815 PET IMAGE W/CT SKULL-THIGH: CPT

## 2023-01-24 RX ADMIN — FLUDEOXYGLUCOSE F18 1 DOSE: 300 INJECTION INTRAVENOUS at 13:45

## 2023-01-26 ENCOUNTER — OFFICE VISIT (OUTPATIENT)
Dept: RADIATION ONCOLOGY | Facility: HOSPITAL | Age: 88
End: 2023-01-26
Payer: MEDICARE

## 2023-01-26 VITALS
HEIGHT: 60 IN | BODY MASS INDEX: 22.38 KG/M2 | WEIGHT: 114 LBS | DIASTOLIC BLOOD PRESSURE: 56 MMHG | SYSTOLIC BLOOD PRESSURE: 137 MMHG | OXYGEN SATURATION: 97 % | HEART RATE: 77 BPM

## 2023-01-26 DIAGNOSIS — Z90.721 S/P HYSTERECTOMY WITH OOPHORECTOMY: ICD-10-CM

## 2023-01-26 DIAGNOSIS — Z78.9 NON-SMOKER: ICD-10-CM

## 2023-01-26 DIAGNOSIS — C54.1 ENDOMETRIAL CANCER: Primary | ICD-10-CM

## 2023-01-26 DIAGNOSIS — Z90.710 S/P HYSTERECTOMY WITH OOPHORECTOMY: ICD-10-CM

## 2023-01-26 PROCEDURE — G0463 HOSPITAL OUTPT CLINIC VISIT: HCPCS | Performed by: RADIOLOGY

## 2023-02-01 ENCOUNTER — HOSPITAL ENCOUNTER (OUTPATIENT)
Dept: RADIATION ONCOLOGY | Facility: HOSPITAL | Age: 88
Setting detail: RADIATION/ONCOLOGY SERIES
End: 2023-02-01
Payer: MEDICARE

## 2023-02-02 ENCOUNTER — HOSPITAL ENCOUNTER (OUTPATIENT)
Dept: RADIATION ONCOLOGY | Facility: HOSPITAL | Age: 88
Setting detail: RADIATION/ONCOLOGY SERIES
Discharge: HOME | End: 2023-02-02
Payer: MEDICARE

## 2023-02-02 PROCEDURE — 77317 BRACHYTX ISODOSE INTERMED: CPT | Performed by: RADIOLOGY

## 2023-02-02 PROCEDURE — 77290 THER RAD SIMULAJ FIELD CPLX: CPT | Performed by: RADIOLOGY

## 2023-02-02 PROCEDURE — C1717 BRACHYTX, NON-STR,HDR IR-192: HCPCS | Performed by: RADIOLOGY

## 2023-02-02 PROCEDURE — 57155 INSERT UTERI TANDEM/OVOIDS: CPT | Performed by: RADIOLOGY

## 2023-02-02 PROCEDURE — 77771 HDR RDNCL NTRSTL/ICAV BRCHTX: CPT | Performed by: RADIOLOGY

## 2023-02-02 PROCEDURE — 77332 RADIATION TREATMENT AID(S): CPT | Performed by: RADIOLOGY

## 2023-02-03 ENCOUNTER — TELEPHONE (OUTPATIENT)
Dept: NEUROLOGY | Age: 88
End: 2023-02-03

## 2023-02-03 ENCOUNTER — NURSE ONLY (OUTPATIENT)
Dept: UROLOGY | Age: 88
End: 2023-02-03

## 2023-02-03 DIAGNOSIS — N32.81 OAB (OVERACTIVE BLADDER): Primary | ICD-10-CM

## 2023-02-03 DIAGNOSIS — N39.46 MIXED STRESS AND URGE URINARY INCONTINENCE: ICD-10-CM

## 2023-02-03 DIAGNOSIS — G40.219 PARTIAL SYMPTOMATIC EPILEPSY WITH COMPLEX PARTIAL SEIZURES, INTRACTABLE, WITHOUT STATUS EPILEPTICUS (HCC): Primary | ICD-10-CM

## 2023-02-03 NOTE — PROGRESS NOTES
Treatment # maintenance    Improvement of Symptoms? yes    OAB/Urologic Medications? none      Uroplasty Procedure:    1. Patient is seated with the left treatment leg elevated. 2. 34 gauge fine needle electrode is inserted into lower inner aspect of the leg. Slightly cephalad to the medial malleolus. 3. Surface electrode pad is placed over the medial aspect of the calcaneus on the same leg. 4.Needle electrode is connected to the external pulse generator. 5.The pulse generator is turned on and the millivolts used are 5. 6.Patient is treated for 30 minutes. 7.The needle and pad are removed. Patient will follow up in 2-3 weeks for next treatment.

## 2023-02-03 NOTE — TELEPHONE ENCOUNTER
Ramona Lopez called and stated her mother is having a lot of head pain and feels like she is about to have a seizure. I tried to explain to her that Dr. Lemuel Shankar is not in the office on Fridays and she probably needs to bring her to the ED. She stated the ED will do nothing for her and she mainly just wants to have her Keppra level checked. I told her that we could place an order for it but she already took it this morning so she cant have the lab done today. She stated she would bring her to the ED tomorrow morning to have the lab drawn. I told her we would place the order and if she seemed to get any worse she needs to bring her to the ED. She gave a verbal understanding.

## 2023-02-10 ENCOUNTER — HOSPITAL ENCOUNTER (OUTPATIENT)
Dept: RADIATION ONCOLOGY | Facility: HOSPITAL | Age: 88
Setting detail: RADIATION/ONCOLOGY SERIES
Discharge: HOME | End: 2023-02-10
Payer: MEDICARE

## 2023-02-10 PROCEDURE — 77771 HDR RDNCL NTRSTL/ICAV BRCHTX: CPT | Performed by: RADIOLOGY

## 2023-02-10 PROCEDURE — 57155 INSERT UTERI TANDEM/OVOIDS: CPT | Performed by: RADIOLOGY

## 2023-02-10 PROCEDURE — 77317 BRACHYTX ISODOSE INTERMED: CPT | Performed by: RADIOLOGY

## 2023-02-10 PROCEDURE — C1717 BRACHYTX, NON-STR,HDR IR-192: HCPCS | Performed by: RADIOLOGY

## 2023-02-15 ENCOUNTER — NURSE ONLY (OUTPATIENT)
Dept: UROLOGY | Age: 88
End: 2023-02-15
Payer: MEDICARE

## 2023-02-15 DIAGNOSIS — N39.46 MIXED STRESS AND URGE URINARY INCONTINENCE: ICD-10-CM

## 2023-02-15 DIAGNOSIS — N32.81 OAB (OVERACTIVE BLADDER): Primary | ICD-10-CM

## 2023-02-15 PROCEDURE — 64566 NEUROELTRD STIM POST TIBIAL: CPT | Performed by: NURSE PRACTITIONER

## 2023-02-15 NOTE — PROGRESS NOTES
Treatment # maintenance    Improvement of Symptoms? Yes, mild    OAB/Urologic Medications? none      Uroplasty Procedure:    1. Patient is seated with the left treatment leg elevated. 2. 34 gauge fine needle electrode is inserted into lower inner aspect of the leg. Slightly cephalad to the medial malleolus. 3. Surface electrode pad is placed over the medial aspect of the calcaneus on the same leg. 4.Needle electrode is connected to the external pulse generator. 5.The pulse generator is turned on and the millivolts used are 5. 6.Patient is treated for 30 minutes. 7.The needle and pad are removed. Patient will follow up in 2 weeks for next treatment.

## 2023-02-17 ENCOUNTER — HOSPITAL ENCOUNTER (OUTPATIENT)
Dept: RADIATION ONCOLOGY | Facility: HOSPITAL | Age: 88
Setting detail: RADIATION/ONCOLOGY SERIES
Discharge: HOME | End: 2023-02-17
Payer: MEDICARE

## 2023-02-17 PROCEDURE — 77290 THER RAD SIMULAJ FIELD CPLX: CPT | Performed by: RADIOLOGY

## 2023-02-17 PROCEDURE — 57155 INSERT UTERI TANDEM/OVOIDS: CPT | Performed by: RADIOLOGY

## 2023-02-17 PROCEDURE — 77771 HDR RDNCL NTRSTL/ICAV BRCHTX: CPT | Performed by: RADIOLOGY

## 2023-02-17 PROCEDURE — 77317 BRACHYTX ISODOSE INTERMED: CPT | Performed by: RADIOLOGY

## 2023-02-17 PROCEDURE — C1717 BRACHYTX, NON-STR,HDR IR-192: HCPCS | Performed by: RADIOLOGY

## 2023-02-24 ENCOUNTER — HOSPITAL ENCOUNTER (OUTPATIENT)
Dept: RADIATION ONCOLOGY | Facility: HOSPITAL | Age: 88
Setting detail: RADIATION/ONCOLOGY SERIES
Discharge: HOME | End: 2023-02-24
Payer: MEDICARE

## 2023-02-24 PROCEDURE — 57155 INSERT UTERI TANDEM/OVOIDS: CPT | Performed by: RADIOLOGY

## 2023-02-24 PROCEDURE — 77290 THER RAD SIMULAJ FIELD CPLX: CPT | Performed by: RADIOLOGY

## 2023-02-24 PROCEDURE — C1717 BRACHYTX, NON-STR,HDR IR-192: HCPCS | Performed by: RADIOLOGY

## 2023-02-24 PROCEDURE — 77317 BRACHYTX ISODOSE INTERMED: CPT | Performed by: RADIOLOGY

## 2023-02-24 PROCEDURE — 77771 HDR RDNCL NTRSTL/ICAV BRCHTX: CPT | Performed by: RADIOLOGY

## 2023-02-27 ENCOUNTER — TELEPHONE (OUTPATIENT)
Dept: CARDIOLOGY CLINIC | Age: 88
End: 2023-02-27

## 2023-03-01 ENCOUNTER — NURSE ONLY (OUTPATIENT)
Dept: UROLOGY | Age: 88
End: 2023-03-01
Payer: MEDICARE

## 2023-03-01 DIAGNOSIS — N32.81 OAB (OVERACTIVE BLADDER): Primary | ICD-10-CM

## 2023-03-01 DIAGNOSIS — N39.46 MIXED STRESS AND URGE URINARY INCONTINENCE: ICD-10-CM

## 2023-03-01 PROCEDURE — 93296 REM INTERROG EVL PM/IDS: CPT | Performed by: NURSE PRACTITIONER

## 2023-03-01 PROCEDURE — 93294 REM INTERROG EVL PM/LDLS PM: CPT | Performed by: NURSE PRACTITIONER

## 2023-03-01 PROCEDURE — 64566 NEUROELTRD STIM POST TIBIAL: CPT | Performed by: NURSE PRACTITIONER

## 2023-03-01 NOTE — PROGRESS NOTES
Treatment # maintenance    Improvement of Symptoms? Yes, mild    OAB/Urologic Medications? none      Uroplasty Procedure:    1. Patient is seated with the left treatment leg elevated. 2. 34 gauge fine needle electrode is inserted into lower inner aspect of the leg. Slightly cephalad to the medial malleolus. 3. Surface electrode pad is placed over the medial aspect of the calcaneus on the same leg. 4.Needle electrode is connected to the external pulse generator. 5.The pulse generator is turned on and the millivolts used are 8. 6.Patient is treated for 30 minutes. 7.The needle and pad are removed. Patient will follow up in 2 weeks for next treatment.

## 2023-03-02 DIAGNOSIS — Z95.0 CARDIAC PACEMAKER: Primary | ICD-10-CM

## 2023-03-02 DIAGNOSIS — I49.5 SA NODE DYSFUNCTION (HCC): ICD-10-CM

## 2023-03-03 ENCOUNTER — HOSPITAL ENCOUNTER (OUTPATIENT)
Dept: RADIATION ONCOLOGY | Facility: HOSPITAL | Age: 88
Discharge: HOME | End: 2023-03-03

## 2023-03-03 ENCOUNTER — HOSPITAL ENCOUNTER (OUTPATIENT)
Dept: RADIATION ONCOLOGY | Facility: HOSPITAL | Age: 88
Setting detail: RADIATION/ONCOLOGY SERIES
End: 2023-03-03
Payer: MEDICARE

## 2023-03-03 PROCEDURE — 57155 INSERT UTERI TANDEM/OVOIDS: CPT | Performed by: RADIOLOGY

## 2023-03-03 PROCEDURE — C1717 BRACHYTX, NON-STR,HDR IR-192: HCPCS | Performed by: RADIOLOGY

## 2023-03-03 PROCEDURE — 77317 BRACHYTX ISODOSE INTERMED: CPT | Performed by: RADIOLOGY

## 2023-03-03 PROCEDURE — 77771 HDR RDNCL NTRSTL/ICAV BRCHTX: CPT | Performed by: RADIOLOGY

## 2023-03-03 PROCEDURE — 77290 THER RAD SIMULAJ FIELD CPLX: CPT | Performed by: RADIOLOGY

## 2023-03-03 PROCEDURE — 77336 RADIATION PHYSICS CONSULT: CPT | Performed by: RADIOLOGY

## 2023-03-15 ENCOUNTER — NURSE ONLY (OUTPATIENT)
Dept: UROLOGY | Age: 88
End: 2023-03-15
Payer: MEDICARE

## 2023-03-15 DIAGNOSIS — N39.46 MIXED STRESS AND URGE URINARY INCONTINENCE: ICD-10-CM

## 2023-03-15 DIAGNOSIS — N32.81 OAB (OVERACTIVE BLADDER): Primary | ICD-10-CM

## 2023-03-15 PROCEDURE — 64566 NEUROELTRD STIM POST TIBIAL: CPT | Performed by: NURSE PRACTITIONER

## 2023-03-15 NOTE — PROGRESS NOTES
Treatment # maintenance    Improvement of Symptoms? yes    OAB/Urologic Medications? none      Uroplasty Procedure:    1. Patient is seated with the left treatment leg elevated. 2. 34 gauge fine needle electrode is inserted into lower inner aspect of the leg. Slightly cephalad to the medial malleolus. 3. Surface electrode pad is placed over the medial aspect of the calcaneus on the same leg. 4.Needle electrode is connected to the external pulse generator. 5.The pulse generator is turned on and the millivolts used are 4. 6.Patient is treated for 30 minutes. 7.The needle and pad are removed. Patient will follow up in 2 weeks for next treatment.

## 2023-03-22 ENCOUNTER — TELEPHONE (OUTPATIENT)
Dept: CARDIOLOGY CLINIC | Age: 88
End: 2023-03-22

## 2023-03-23 ENCOUNTER — OFFICE VISIT (OUTPATIENT)
Dept: CARDIOLOGY CLINIC | Age: 88
End: 2023-03-23
Payer: MEDICARE

## 2023-03-23 VITALS
WEIGHT: 118 LBS | BODY MASS INDEX: 23.16 KG/M2 | HEIGHT: 60 IN | DIASTOLIC BLOOD PRESSURE: 60 MMHG | SYSTOLIC BLOOD PRESSURE: 120 MMHG | OXYGEN SATURATION: 97 % | HEART RATE: 68 BPM

## 2023-03-23 DIAGNOSIS — I49.5 SA NODE DYSFUNCTION (HCC): ICD-10-CM

## 2023-03-23 DIAGNOSIS — I10 ESSENTIAL HYPERTENSION: Primary | ICD-10-CM

## 2023-03-23 DIAGNOSIS — Z95.0 CARDIAC PACEMAKER: ICD-10-CM

## 2023-03-23 DIAGNOSIS — I35.8 AORTIC VALVE SCLEROSIS: ICD-10-CM

## 2023-03-23 DIAGNOSIS — I63.9 CRYPTOGENIC STROKE (HCC): ICD-10-CM

## 2023-03-23 PROCEDURE — 1090F PRES/ABSN URINE INCON ASSESS: CPT | Performed by: NURSE PRACTITIONER

## 2023-03-23 PROCEDURE — G8420 CALC BMI NORM PARAMETERS: HCPCS | Performed by: NURSE PRACTITIONER

## 2023-03-23 PROCEDURE — 99214 OFFICE O/P EST MOD 30 MIN: CPT | Performed by: NURSE PRACTITIONER

## 2023-03-23 PROCEDURE — 93280 PM DEVICE PROGR EVAL DUAL: CPT | Performed by: NURSE PRACTITIONER

## 2023-03-23 PROCEDURE — 1123F ACP DISCUSS/DSCN MKR DOCD: CPT | Performed by: NURSE PRACTITIONER

## 2023-03-23 PROCEDURE — G8427 DOCREV CUR MEDS BY ELIG CLIN: HCPCS | Performed by: NURSE PRACTITIONER

## 2023-03-23 PROCEDURE — G8484 FLU IMMUNIZE NO ADMIN: HCPCS | Performed by: NURSE PRACTITIONER

## 2023-03-23 PROCEDURE — 1036F TOBACCO NON-USER: CPT | Performed by: NURSE PRACTITIONER

## 2023-03-23 NOTE — PATIENT INSTRUCTIONS
your hear, lungs, blood vessels and skeleton. You give yourself the gift of active living, you lower your blood pressure and help yourself feel better. 7) Stop smoking - cigarette smokers have a higher risk of developing cardiovascular disease. If  You smoke, quitting is the best thing you can do for your health. Check American Heart Association on line for more information on Life's Simple 7 and tips for healthy living. A Healthy Heart: Care Instructions  Your Care Instructions     Coronary artery disease, also called heart disease, occurs when a substance called plaque builds up in the vessels that supply oxygen-rich blood to your heart muscle. This can narrow the blood vessels and reduce blood flow. A heart attack happens when blood flow is completely blocked. A high-fat diet, smoking, and other factors increase the risk of heart disease. Your doctor has found that you have a chance of having heart disease. You can do lots of things to keep your heart healthy. It may not be easy, but you can change your diet, exercise more, and quit smoking. These steps really work to lower your chance of heart disease. Follow-up care is a key part of your treatment and safety. Be sure to make and go to all appointments, and call your doctor if you are having problems. It's also a good idea to know your test results and keep a list of the medicines you take. How can you care for yourself at home? Diet  Use less salt when you cook and eat. This helps lower your blood pressure. Taste food before salting. Add only a little salt when you think you need it. With time, your taste buds will adjust to less salt. Eat fewer snack items, fast foods, canned soups, and other high-salt, high-fat, processed foods. Read food labels and try to avoid saturated and trans fats. They increase your risk of heart disease by raising cholesterol levels. Limit the amount of solid fat-butter, margarine, and shortening-you eat.  Use olive,

## 2023-03-23 NOTE — PROGRESS NOTES
15 mg by mouth daily      hydrALAZINE (APRESOLINE) 50 MG tablet Take 50 mg by mouth 2 times daily DO NOT TAKE IN THE AM DUE TO VASAL VAGAL RESPONSE---25 at 4:30pm and at bed time 50mg      dicyclomine (BENTYL) 10 MG capsule Take 10 mg by mouth as needed      Thiamine HCl (VITAMIN B-1 PO) Take 100 mg by mouth daily Takes at 2:00 PM       fluticasone (FLONASE) 50 MCG/ACT nasal spray 1 spray by Each Nostril route 2 times daily       cloNIDine (CATAPRES) 0.1 MG tablet Take 1 tablet by mouth 2 times daily as needed for High Blood Pressure (for BP greater than 160/100) 30 tablet 2    acetaminophen (TYLENOL) 500 MG tablet Take 1,000 mg by mouth 3 times daily       donepezil (ARICEPT) 10 MG tablet Take 1 tablet by mouth nightly 90 tablet 3    Docosanol (ABREVA EX) Apply topically as needed       simethicone (MYLICON) 077 MG chewable tablet Take 125 mg by mouth 3 times daily 2 PO 6:30 AM, 2 at 10:00 AM, 1 at 5:30 PM otherwise prn      butalbital-acetaminophen-caffeine (FIORICET, ESGIC) -40 MG per tablet Take 1 tablet by mouth every 6 hours as needed for Headaches       diclofenac sodium 1 % GEL Apply 2 g topically 3 times daily as needed for Pain       sodium chloride 1 g tablet Take 1 g by mouth 3 times daily Indications: takes 9am, 3pm, and 9pm       lidocaine 4 % external patch Place 1 patch onto the skin daily as needed (LEFT LOWER BACK PAIN)       valACYclovir (VALTREX) 1 g tablet as needed       azelastine (ASTELIN) 0.1 % nasal spray 2 sprays by Nasal route 2 times daily Indications: takes 9am and 9pm       Cholecalciferol (VITAMIN D3) 5000 units TABS Take 5,000 Units by mouth daily Indications: AT 2:30 PM       sodium chloride (OCEAN, BABY AYR) 0.65 % nasal spray 1 spray by Nasal route as needed for Congestion       Lutein 20 MG TABS Take 20 mg by mouth daily Indications: 2 PM DAILY      camphor-menthol (SARNA) 0.5-0.5 % lotion Apply 0.5 mLs topically 2 times daily as needed for Itching Apply topically as

## 2023-03-28 ENCOUNTER — TELEPHONE (OUTPATIENT)
Dept: GASTROENTEROLOGY | Age: 88
End: 2023-03-28

## 2023-03-28 NOTE — TELEPHONE ENCOUNTER
Patient: Nicolas Guillaume    YOB: 1935      Clearance was received on March 28, 2023. for Endoscopy  scheduled for: 4/27/23    Patient may discontinue the use of PERSANTINE for 5  days prior to the procedure.     IS Lovenox required:  NO    PATIENT NOTIFIED ON:  3/28/23      Clearance scanned into media

## 2023-03-29 ENCOUNTER — NURSE ONLY (OUTPATIENT)
Dept: UROLOGY | Age: 88
End: 2023-03-29
Payer: MEDICARE

## 2023-03-29 DIAGNOSIS — N32.81 OAB (OVERACTIVE BLADDER): Primary | ICD-10-CM

## 2023-03-29 DIAGNOSIS — N39.46 MIXED STRESS AND URGE URINARY INCONTINENCE: ICD-10-CM

## 2023-03-29 PROCEDURE — 64566 NEUROELTRD STIM POST TIBIAL: CPT | Performed by: NURSE PRACTITIONER

## 2023-03-29 NOTE — PROGRESS NOTES
Treatment # maintenance     Improvement of Symptoms? yes    OAB/Urologic Medications? none      Uroplasty Procedure:    1. Patient is seated with the left treatment leg elevated. 2. 34 gauge fine needle electrode is inserted into lower inner aspect of the leg. Slightly cephalad to the medial malleolus. 3. Surface electrode pad is placed over the medial aspect of the calcaneus on the same leg. 4.Needle electrode is connected to the external pulse generator. 5.The pulse generator is turned on and the millivolts used are 7. 6.Patient is treated for 30 minutes. 7.The needle and pad are removed. Patient will follow up in 2 weeks for next treatment.

## 2023-04-09 VITALS
SYSTOLIC BLOOD PRESSURE: 142 MMHG | DIASTOLIC BLOOD PRESSURE: 65 MMHG | RESPIRATION RATE: 18 BRPM | TEMPERATURE: 98.8 F | OXYGEN SATURATION: 99 % | HEART RATE: 78 BPM

## 2023-04-09 LAB
B PARAP IS1001 DNA NPH QL NAA+NON-PROBE: NOT DETECTED
B PERT.PT PRMT NPH QL NAA+NON-PROBE: NOT DETECTED
C PNEUM DNA NPH QL NAA+NON-PROBE: NOT DETECTED
FLUAV RNA NPH QL NAA+NON-PROBE: NOT DETECTED
FLUBV RNA NPH QL NAA+NON-PROBE: NOT DETECTED
HADV DNA NPH QL NAA+NON-PROBE: NOT DETECTED
HCOV 229E RNA NPH QL NAA+NON-PROBE: NOT DETECTED
HCOV HKU1 RNA NPH QL NAA+NON-PROBE: NOT DETECTED
HCOV NL63 RNA NPH QL NAA+NON-PROBE: NOT DETECTED
HCOV OC43 RNA NPH QL NAA+NON-PROBE: NOT DETECTED
HMPV RNA NPH QL NAA+NON-PROBE: NOT DETECTED
HPIV1 RNA NPH QL NAA+NON-PROBE: NOT DETECTED
HPIV2 RNA NPH QL NAA+NON-PROBE: NOT DETECTED
HPIV3 RNA NPH QL NAA+NON-PROBE: NOT DETECTED
HPIV4 RNA NPH QL NAA+NON-PROBE: NOT DETECTED
M PNEUMO DNA NPH QL NAA+NON-PROBE: NOT DETECTED
RSV RNA NPH QL NAA+NON-PROBE: NOT DETECTED
RV+EV RNA NPH QL NAA+NON-PROBE: NOT DETECTED
SARS-COV-2 RNA NPH QL NAA+NON-PROBE: DETECTED

## 2023-04-09 PROCEDURE — 99283 EMERGENCY DEPT VISIT LOW MDM: CPT | Performed by: EMERGENCY MEDICINE

## 2023-04-09 PROCEDURE — 0202U NFCT DS 22 TRGT SARS-COV-2: CPT

## 2023-04-10 ENCOUNTER — HOSPITAL ENCOUNTER (EMERGENCY)
Age: 88
Discharge: HOME | End: 2023-04-10
Attending: EMERGENCY MEDICINE
Payer: MEDICARE

## 2023-04-10 DIAGNOSIS — U07.1 COVID-19 VIRUS INFECTION: Primary | ICD-10-CM

## 2023-04-10 RX ORDER — BENZONATATE 100 MG/1
100 CAPSULE ORAL 3 TIMES DAILY PRN
Qty: 30 CAPSULE | Refills: 0 | Status: SHIPPED | OUTPATIENT
Start: 2023-04-10 | End: 2023-04-20

## 2023-04-10 NOTE — ED PROVIDER NOTES
and Sexual Activity    Alcohol use: Never    Drug use: Never   Social History Narrative    CODE STATUS: DNR-CCA    HEALTH CARE PROXY / [employer] and Healthcare: her daughter, Mrs. Kena Gan, [phone]    AMBULATES: with walker during day, wheelchair at night    DOMICILED: lives in the [facility] assisted living facility, has no stairs or steps, has a cat, her daughter is there periodically       SCREENINGS    [place] Coma Scale  Eye Opening: Spontaneous  Best Verbal Response: Oriented  Best Motor Response: Obeys commands  Radha Coma Scale Score: 15        PHYSICAL EXAM    (up to 7 for level 4, 8 or more for level 5)     ED Triage Vitals [04/09/23 2238]   BP Temp Temp src Heart Rate Resp SpO2 Height Weight   (!) 142/65 98.8 °F (37.1 °C) -- 78 18 99 % -- --       Physical Exam    DIAGNOSTIC RESULTS     EKG: All EKG's areinterpreted by the Emergency Department Physician who either signs or Co-signs this chart in the absence of a cardiologist.    ***    RADIOLOGY:  Non-plain film images such as CT, Ultrasound and MRI are read by the radiologist. Plain radiographic images are visualized and preliminarily interpreted bythe emergency physician with the below findings:    ***      No orders to display           LABS:  Labs Reviewed   RESPIRATORY PANEL, MOLECULAR, WITH COVID-19 - Abnormal; Notable for the following components:       Result Value    SARS-CoV-2, PCR DETECTED (*)     All other components within normal limits       All other labs were within normal range or not returned as of this dictation. EMERGENCY DEPARTMENT COURSE and DIFFERENTIAL DIAGNOSIS/MDM:   Vitals:    Vitals:    04/09/23 2238   BP: (!) 142/65   Pulse: 78   Resp: 18   Temp: 98.8 °F (37.1 °C)   SpO2: 99%       MDM      Reassessment  ***    CONSULTS:  None    PROCEDURES:  Unless otherwise noted below, none     Procedures    FINAL IMPRESSION    No diagnosis found.       DISPOSITION/PLAN   DISPOSITION        PATIENT REFERRED TO:  No

## 2023-04-13 ENCOUNTER — TELEPHONE (OUTPATIENT)
Dept: PODIATRY | Facility: CLINIC | Age: 88
End: 2023-04-13
Payer: MEDICARE

## 2023-04-13 NOTE — TELEPHONE ENCOUNTER
Called patient regarding appt on 04/14/2023. Left message for patient to return call if any questions or concerns arise.

## 2023-04-19 ENCOUNTER — NURSE ONLY (OUTPATIENT)
Dept: UROLOGY | Age: 88
End: 2023-04-19

## 2023-04-19 DIAGNOSIS — N32.81 OAB (OVERACTIVE BLADDER): Primary | ICD-10-CM

## 2023-04-19 DIAGNOSIS — N39.46 MIXED STRESS AND URGE URINARY INCONTINENCE: ICD-10-CM

## 2023-04-19 DIAGNOSIS — N32.81 OVERACTIVE BLADDER: ICD-10-CM

## 2023-04-24 ASSESSMENT — ENCOUNTER SYMPTOMS
ABDOMINAL PAIN: 0
COUGH: 1
NAUSEA: 0
VOMITING: 0
DIARRHEA: 0

## 2023-05-03 ENCOUNTER — NURSE ONLY (OUTPATIENT)
Dept: UROLOGY | Age: 88
End: 2023-05-03
Payer: MEDICARE

## 2023-05-03 DIAGNOSIS — N32.81 OAB (OVERACTIVE BLADDER): Primary | ICD-10-CM

## 2023-05-03 DIAGNOSIS — N39.46 MIXED STRESS AND URGE URINARY INCONTINENCE: ICD-10-CM

## 2023-05-03 PROCEDURE — 64566 NEUROELTRD STIM POST TIBIAL: CPT | Performed by: NURSE PRACTITIONER

## 2023-05-11 ENCOUNTER — HOSPITAL ENCOUNTER (OUTPATIENT)
Age: 88
Setting detail: OUTPATIENT SURGERY
Discharge: HOME | End: 2023-05-11
Attending: INTERNAL MEDICINE | Admitting: INTERNAL MEDICINE
Payer: MEDICARE

## 2023-05-11 ENCOUNTER — ANESTHESIA (OUTPATIENT)
Dept: ENDOSCOPY | Age: 88
End: 2023-05-11
Payer: MEDICARE

## 2023-05-11 ENCOUNTER — ANESTHESIA EVENT (OUTPATIENT)
Dept: ENDOSCOPY | Age: 88
End: 2023-05-11
Payer: MEDICARE

## 2023-05-11 VITALS
WEIGHT: 112 LBS | OXYGEN SATURATION: 95 % | DIASTOLIC BLOOD PRESSURE: 80 MMHG | BODY MASS INDEX: 21.99 KG/M2 | RESPIRATION RATE: 16 BRPM | SYSTOLIC BLOOD PRESSURE: 177 MMHG | HEART RATE: 66 BPM | HEIGHT: 60 IN | TEMPERATURE: 96.9 F

## 2023-05-11 DIAGNOSIS — R13.10 DYSPHAGIA, UNSPECIFIED TYPE: ICD-10-CM

## 2023-05-11 PROCEDURE — 2500000003 HC RX 250 WO HCPCS: Performed by: NURSE ANESTHETIST, CERTIFIED REGISTERED

## 2023-05-11 PROCEDURE — 3609012400 HC EGD TRANSORAL BIOPSY SINGLE/MULTIPLE: Performed by: INTERNAL MEDICINE

## 2023-05-11 PROCEDURE — 88305 TISSUE EXAM BY PATHOLOGIST: CPT

## 2023-05-11 PROCEDURE — 3700000001 HC ADD 15 MINUTES (ANESTHESIA): Performed by: INTERNAL MEDICINE

## 2023-05-11 PROCEDURE — 7100000010 HC PHASE II RECOVERY - FIRST 15 MIN: Performed by: INTERNAL MEDICINE

## 2023-05-11 PROCEDURE — 2709999900 HC NON-CHARGEABLE SUPPLY: Performed by: INTERNAL MEDICINE

## 2023-05-11 PROCEDURE — 2580000003 HC RX 258: Performed by: INTERNAL MEDICINE

## 2023-05-11 PROCEDURE — 6360000002 HC RX W HCPCS: Performed by: NURSE ANESTHETIST, CERTIFIED REGISTERED

## 2023-05-11 PROCEDURE — 7100000011 HC PHASE II RECOVERY - ADDTL 15 MIN: Performed by: INTERNAL MEDICINE

## 2023-05-11 PROCEDURE — 88342 IMHCHEM/IMCYTCHM 1ST ANTB: CPT

## 2023-05-11 PROCEDURE — 43239 EGD BIOPSY SINGLE/MULTIPLE: CPT | Performed by: INTERNAL MEDICINE

## 2023-05-11 PROCEDURE — 2580000003 HC RX 258: Performed by: NURSE ANESTHETIST, CERTIFIED REGISTERED

## 2023-05-11 PROCEDURE — 3700000000 HC ANESTHESIA ATTENDED CARE: Performed by: INTERNAL MEDICINE

## 2023-05-11 RX ORDER — LIDOCAINE HYDROCHLORIDE 10 MG/ML
INJECTION, SOLUTION INFILTRATION; PERINEURAL PRN
Status: DISCONTINUED | OUTPATIENT
Start: 2023-05-11 | End: 2023-05-11 | Stop reason: SDUPTHER

## 2023-05-11 RX ORDER — SODIUM CHLORIDE, SODIUM LACTATE, POTASSIUM CHLORIDE, CALCIUM CHLORIDE 600; 310; 30; 20 MG/100ML; MG/100ML; MG/100ML; MG/100ML
INJECTION, SOLUTION INTRAVENOUS CONTINUOUS PRN
Status: DISCONTINUED | OUTPATIENT
Start: 2023-05-11 | End: 2023-05-11 | Stop reason: SDUPTHER

## 2023-05-11 RX ORDER — PROPOFOL 10 MG/ML
INJECTION, EMULSION INTRAVENOUS PRN
Status: DISCONTINUED | OUTPATIENT
Start: 2023-05-11 | End: 2023-05-11 | Stop reason: SDUPTHER

## 2023-05-11 RX ORDER — SODIUM CHLORIDE, SODIUM LACTATE, POTASSIUM CHLORIDE, CALCIUM CHLORIDE 600; 310; 30; 20 MG/100ML; MG/100ML; MG/100ML; MG/100ML
INJECTION, SOLUTION INTRAVENOUS CONTINUOUS
Status: DISCONTINUED | OUTPATIENT
Start: 2023-05-11 | End: 2023-05-11 | Stop reason: HOSPADM

## 2023-05-11 RX ADMIN — SODIUM CHLORIDE, POTASSIUM CHLORIDE, SODIUM LACTATE AND CALCIUM CHLORIDE: 600; 310; 30; 20 INJECTION, SOLUTION INTRAVENOUS at 13:23

## 2023-05-11 RX ADMIN — LIDOCAINE HYDROCHLORIDE 40 MG: 10 INJECTION, SOLUTION INFILTRATION; PERINEURAL at 13:54

## 2023-05-11 RX ADMIN — PROPOFOL 50 MG: 10 INJECTION, EMULSION INTRAVENOUS at 13:54

## 2023-05-11 RX ADMIN — SODIUM CHLORIDE, SODIUM LACTATE, POTASSIUM CHLORIDE, AND CALCIUM CHLORIDE: 600; 310; 30; 20 INJECTION, SOLUTION INTRAVENOUS at 13:48

## 2023-05-11 ASSESSMENT — PAIN - FUNCTIONAL ASSESSMENT: PAIN_FUNCTIONAL_ASSESSMENT: 0-10

## 2023-05-11 NOTE — ANESTHESIA POSTPROCEDURE EVALUATION
Department of Anesthesiology  Postprocedure Note    Patient: Oliver German  MRN: [id number]  YOB: 1935  Date of evaluation: 5/11/2023      Procedure Summary     Date: 05/11/23 Room / Location: [address]    Anesthesia Start: 4280 Anesthesia Stop:     Procedure: EGD BIOPSY (Abdomen) Diagnosis:       Dysphagia, unspecified type      (Dysphagia, unspecified type [R13.10])    Surgeons: Maurilio Martin MD Responsible Provider: LAYTON Dumont CRNA    Anesthesia Type: general, TIVA ASA Status: 4          Anesthesia Type: No value filed.     Ness Phase I: Ness Score: 10    Ness Phase II:        Anesthesia Post Evaluation    Patient location during evaluation: bedside  Patient participation: complete - patient participated  Level of consciousness: sleepy but conscious  Pain score: 0  Airway patency: patent  Nausea & Vomiting: no nausea and no vomiting  Complications: no  Cardiovascular status: hemodynamically stable and blood pressure returned to baseline  Respiratory status: acceptable and nasal cannula  Hydration status: stable

## 2023-05-11 NOTE — OP NOTE
Endoscopic Procedure Note    Patient: Alexi Moore : 1935  Med Rec#: [id number] Acc#: [id number]     Primary Care Provider Pieter Syed DO    Endoscopist: Dinora Márquez MD, MD    Date of Procedure:  2023    Procedure:   EGD with cold biopsies    Indications:       Esophageal dysphagia-history of esophageal balloon dilation to 20 mm in last year  History of Schatzki ring  Chronic GERD  History of presbyesophagus  History of COVID -19 infection 1 month ago, with residual intermittent cough    Anesthesia:  Sedation was administered by anesthesia who monitored the patient during the procedure. Estimated Blood Loss: minimal    Procedure:   After reviewing the patient's chart and obtaining informed consent, the patient was placed in the left lateral decubitus position. A forward-viewing Olympus endoscope was lubricated and inserted through the mouth into the oropharynx. Under direct visualization, the upper esophagus was intubated. The scope was advanced to the level of the third portion of duodenum. Scope was slowly withdrawn with careful inspection of the mucosal surfaces. The scope was retroflexed for inspection of the gastric fundus and incisura. Findings and maneuvers are listed in impression below. The patient tolerated the procedure well. The scope was removed. There were no immediate complications. Findings/IMPRESSION:  Esophagus: abnormal: Presbyesophagus-like changes noted throughout the esophagus. In the distal esophagus adjacent to the EG junction a widemouthed esophageal diverticulum was noted which is likely due to esophageal dysmotility and is contributing to some extent to the patient's dysphagia. Within this diverticulum a shallow 5 to 6 mm in diameter, triangular-shaped ulceration was seen without any bleeding stigmata. Cold biopsies were taken from the edges of this ulceration which likely is pill induced esophagitis.       NO erosions or ulcers or nodules

## 2023-05-11 NOTE — ANESTHESIA PRE PROCEDURE
Department of Anesthesiology  Preprocedure Note       Name:  Meliton Sellers   Age:  80 y.o.  :  1935                                          MRN:  [id number]         Date:  2023      Surgeon: Timi Tran):  Radha Baker MD    Procedure: Procedure(s):  EGD BIOPSY    Medications prior to admission:   Prior to Admission medications    Medication Sig Start Date End Date Taking? Authorizing Provider   gabapentin (NEURONTIN) 100 MG capsule TAKE 1 CAPSULE BY MOUTH 3 TIMES DAILY. 23  Hermes Mello MD   pantoprazole (PROTONIX) 40 MG tablet Take 1 tablet by mouth every morning (before breakfast) 23   LAYTON Pastor   white petrolatum-corn starch-lanolin (TRIPLE PASTE) 12.8 % ointment Apply topically as needed.  23   LAYTON Franks   amLODIPine (NORVASC) 5 MG tablet TAKE ONE TABLET BY MOUTH TWICE DAILY 22   Historical Provider, MD   dipyridamole (PERSANTINE) 50 MG tablet Take 2 tablets by mouth 2 times daily Indications: 9am and 9pm 10/31/22 3/23/23  Hermes Mello MD   levETIRAcetam (KEPPRA) 500 MG tablet Take 1 tablet by mouth 2 times daily Indications: takes 9am and 9pm 10/31/22 3/23/23  Hermes Mello MD   meloxicam (MOBIC) 15 MG tablet Take 15 mg by mouth daily    Historical Provider, MD   hydrALAZINE (APRESOLINE) 50 MG tablet Take 50 mg by mouth 2 times daily DO NOT TAKE IN THE AM DUE TO VASAL VAGAL RESPONSE---25 at 4:30pm and at bed time 50mg    Historical Provider, MD   dicyclomine (BENTYL) 10 MG capsule Take 10 mg by mouth as needed    Historical Provider, MD   Thiamine HCl (VITAMIN B-1 PO) Take 100 mg by mouth daily Takes at 2:00 PM     Historical Provider, MD   fluticasone (FLONASE) 50 MCG/ACT nasal spray 1 spray by Each Nostril route 2 times daily     Historical Provider, MD   cloNIDine (CATAPRES) 0.1 MG tablet Take 1 tablet by mouth 2 times daily as needed for High Blood Pressure (for BP greater than 160/100) 20   Sina Newsome

## 2023-05-17 ENCOUNTER — NURSE ONLY (OUTPATIENT)
Dept: UROLOGY | Age: 88
End: 2023-05-17
Payer: MEDICARE

## 2023-05-17 DIAGNOSIS — N39.46 MIXED STRESS AND URGE URINARY INCONTINENCE: ICD-10-CM

## 2023-05-17 DIAGNOSIS — N32.81 OAB (OVERACTIVE BLADDER): Primary | ICD-10-CM

## 2023-05-17 PROCEDURE — 64566 NEUROELTRD STIM POST TIBIAL: CPT | Performed by: NURSE PRACTITIONER

## 2023-05-17 NOTE — PROGRESS NOTES
Treatment # maintenance    Improvement of Symptoms? mildly    OAB/Urologic Medications? none      Uroplasty Procedure:    1. Patient is seated with the left treatment leg elevated. 2. 34 gauge fine needle electrode is inserted into lower inner aspect of the leg. Slightly cephalad to the medial malleolus. 3. Surface electrode pad is placed over the medial aspect of the calcaneus on the same leg. 4.Needle electrode is connected to the external pulse generator. 5.The pulse generator is turned on and the millivolts used are 7. 6.Patient is treated for 30 minutes. 7.The needle and pad are removed. Patient will follow up in 2 weeks for next treatment.

## 2023-05-31 ENCOUNTER — NURSE ONLY (OUTPATIENT)
Dept: UROLOGY | Age: 88
End: 2023-05-31
Payer: MEDICARE

## 2023-05-31 VITALS — WEIGHT: 112 LBS | BODY MASS INDEX: 21.99 KG/M2 | HEIGHT: 60 IN | TEMPERATURE: 97.2 F

## 2023-05-31 DIAGNOSIS — N32.81 OAB (OVERACTIVE BLADDER): Primary | ICD-10-CM

## 2023-05-31 DIAGNOSIS — N39.46 MIXED STRESS AND URGE URINARY INCONTINENCE: ICD-10-CM

## 2023-05-31 PROCEDURE — 64566 NEUROELTRD STIM POST TIBIAL: CPT | Performed by: NURSE PRACTITIONER

## 2023-05-31 NOTE — PROGRESS NOTES
Treatment #maintenance    Improvement of Symptoms? Yes    OAB/Urologic Medications? None      Uroplasty Procedure:    1. Patient is seated with the left treatment leg elevated. 2. 34 gauge fine needle electrode is inserted into lower inner aspect of the leg. Slightly cephalad to the medial malleolus. 3. Surface electrode pad is placed over the medial aspect of the calcaneus on the same leg. 4.Needle electrode is connected to the external pulse generator. 5.The pulse generator is turned on and the millivolts used are 11. 6.Patient is treated for 30 minutes. 7.The needle and pad are removed. Patient will follow up in 2 weeks for next treatment.

## 2023-06-06 PROBLEM — Z92.3 HISTORY OF RADIATION THERAPY: Status: ACTIVE | Noted: 2023-06-06

## 2023-06-06 NOTE — PROGRESS NOTES
[facility], .SLacyLacy Tillman MD      Jhony Pimentel ANGEL  ____________________________________________________________  [facility]  Department of Radiation Oncology  [address] [place] [id number]  Office:  [phone]  Fax: [phone]    DATE:  06/07/2023  PATIENT: Bel Powell  1935                         MEDICAL RECORD #:  [id number]                 Chief Complaint   Patient presents with    Uterine Cancer     Reason for Visit: Bel Powell is a very pleasant 87 y.o. female that has completed HDR treatment and returns to the clinic today for initial follow up exam. Reports dysuria and urgency with urination. Denies activity change, fatigue, cough, SOB, chest pain, nausea/vomiting, vaginal bleeding/discharge, vaginal pain, dizziness, weakness, and headaches.     History of Present Illness:  Diagnosed with recurrent endometrial carcinoma. She completed 5 internal brachytherapy treatments completed on 03/03/2023.     09/13/2019 - CT Abdomen/Pelvis due to right lower quadrant pain:  Cystic changes centrally within the uterus; a fluid distended endometrial cavity considered, GYN consultation suggested. Uterine fibroids.   Colonic diverticulosis without CT evidence of acute diverticulitis. Otherwise, No CT evidence of acute bowel pathology   Uncomplicated left-sided superior lumbar hernia.     09/18/2019 - CT Head without contrast:  No acute intracranial abnormality.     09/25/2019 - CT Head:  No acute intracranial abnormality.   Chronic ischemic and atrophic changes.     10/28/2019 - Endometrium, biopsy:  Primarily mucus.  Fragments of squamous mucosa.  Scant fragments of endocervical mucosa.  Extremely scant superficial fragments of endometrial glandular epithelium.  No evidence of atypia or malignancy.    12/23/2019 - Biopsies:  Endometrium, curettings:  Endometrioid adenocarcinoma, FIGO grade 1 arising in a background of complex atypical  hyperplasia.  Cervix, endocervical curettings:  Benign endocervical glands.      01/28/2020 - Robotic Total Hysterectomy/BSO per :   Right sentinel lymph node biopsy:  3 lymph nodes negative for tumor  Left pelvic sentinel lymph node biopsy:  Fibroadipose tissue with small fragment of lymph node showing cautery change and no evidence of metastatic tumor  Uterus, fallopian tubes and ovaries, hysterectomy with bilateral salpingo-oophorectomy:   Cervix and endocervix with no evidence of glandular and squamous dysplasia  Endometriod endometrial adenocarcinoma, villoglandular type, grade 1, with less than 1 mm of invasion (of 17 mm myometrial thickness)  Multiple leiomyomata uteri  No evidence of adenomyosis  Fallopian tubes with paratubal remnant cysts, negative for endometriosis and malignancy  Ovaries with no evidence of endometriosis or malignancy  Comment: Paraffin immunoperoxidase studies, utilizing appropriate positive and negative controls, are performed on the 2 sentinel lymph node specimens. These fail to demonstrate AE1/AE3 positive tumor cells in the lymph nodes.  TUMOR  Histologic type - endometrioid carcinoma, villoglandular variant  Histologic grade - FIGO grade 1  Myometrial invasion - present  Depth of invasion - 1 mm  Myometrial thickness in millimeters: 17  Percentage of myometrial invasion - 6%  Uterine serosa involvement - not identified  Cervical stromal involvement - not identified  Other tissue/organ involvement - not identified  Lymphovascular invasion - not identified  LYMPH NODES  Total number of pelvic nodes examined - 4  Number of pelvic sentinel lymph nodes -4  Laterality of pelvic nodes - right, left  Number of pelvic nodes with macrometastasis - 0  Number of pelvic nodes with micrometastasis - 0  Number of pelvic nodes with isolated tumor cells - 0    02/24/2020 - CT Abdomen/Pelvis:  Fluid-filled nondistended small bowel loops with mucosal enhancement and thickening suggest acute  enteritis. Further follow-up may be obtained.   The diverticulosis of the distal colon. No evidence of diverticulitis.   Moderately dilated distal common bile duct and intrahepatic biliary ducts is probably due to a prior cholecystectomy.   A stable left lumbar hernia containing a loop of colon without obstruction.     12/15/2020 - CT Abdomen/pelvis:  The stable insufficiency fractures of the sacrum bilaterally, similar to the previous study in February 2020.   No acute abnormality of the abdomen are pelvis, particularly, no evidence of traumatic involvement of the abdominal pelvic organs.   The diverticulosis of the colon. No evidence for diverticulitis.   Hardware fusion of the lumbar spine. The severe demineralization the bony structures.     05/25/2021 - CT Abdomen/Pelvis due to vomiting, pain:  This study is of limited diagnostic quality. This is secondary to the lack of intra-abdominal fat, lack of IV and oral contrast as well as the patient's inability to raise her arms over her head with extensive streaking artifact related to both upper extremities as well as postoperative changes within the mid and lower lumbar spine also contributing to streaking. If symptoms are persistent I would suggest that the study be repeated with both IV and oral contrast administration.   Cardiomegaly with a transvenous pacer in place. There is heavy atheromatous calcification of the distal descending thoracic aorta without evidence of aneurysm.   Multiple fluid-filled bowel loops. I do not see evidence of a discrete transition point. The appendix is not clearly identified and may be surgically absent. There are a few diverticula noted of the sigmoid colon without evidence of diverticulitis. No convincing evidence of bowel obstruction. No evidence of pneumoperitoneum.   I do not see evidence of nephrolithiasis. The ureters cannot be clearly trace but there is no convincing evidence of obstructive uropathy.   The patient has  undergone previous cholecystectomy and hysterectomy. The patient has also undergone fusion at the L3-L5 levels.    12/08/2022 - Appointment with :  Ms. Powell is a 88 y/o with Stage IA Grade 1 Endometrial Cancer who presents for an evaluation.   Exam showed possible granulation tissue. Removed and will sent to path and call with results and plan.   s/p robotic hyst/bso/staging on 1/29/20   no additional treatment required   IHC Negative   Surveillance Visit: q6months x 1 year (January 2021) then yearly for 5 years total (January 2025)  Ms. Powell will return in 12 months.    12/08/2022 - Vagina biopsy:  Low-grade endometrioid pattern adenocarcinoma    12/21/2022 - Appointment with Yolanda Kamara APRN - CNP - Urology:  ASSESSMENT/PLAN  Abnormality of urethral meatus  Patient with apparent abnormality of the urethral meatus as explained to her daughter by her obstetrical oncologist at an appointment last week. She requested a pelvic exam today. I did discuss with the daughter that I was uncertain if pelvic exam would provide any useful information.   However, I did offer to perform 1. I could not perform 1 at the time I was in the room because the patient was undergoing your plasty treatment and then could not move to the dorsolithotomy position.   Patient also with emergent ophthalmic appointment today regarding a possible detached retina. Patient daughter reports that they cannot miss that appointment and are already running late. Pelvic exam for today was deferred.   We will try to obtain some records from her oncologist to determine if there is anything in particular they need from us.    12/21/2022 - CT Abdomen/Pelvis with contrast:  Prior hysterectomy without evidence of metastatic disease in the abdomen or pelvis.    12/21/2022 - CT Chest with contrast:  No evidence of metastatic disease in the chest.   Hiatal hernia with thickened distal esophagus. Consider follow-up endoscopy.    Atherosclerotic disease.    01/16/2023 - Consult with :  After consideration of the diagnostic data and evaluation of the patient, I recommend obtaining a PET scan for further evaluation of disease progression. Should it reveal limited disease, could consider brachytherapy treatments vs. Whole pelvic radiation.   The patient and her family verbalize understanding of this discussion, voice no further questions and wish to proceed with recommendations. Continue ongoing management per primary care physician and other specialists.  Plan:  PET scan ordered, they will call you  Return in 2 weeks for further discussion of treatment recommendations    01/24/2023 - PET Scan:  No hypermetabolic soft tissue recurrence identified in the pelvis. There is only a small amount of bowel uptake identified in the rectum as well as excreted tracer activity in the ureters and urinary bladder.  No suspicious adenopathy or evidence of distant metastatic disease.    01/26/2023 - Appointment with :  Bel Powell returns to the clinic today to review PET scan result and discuss radiotherapy recommendations for recurrent endometrial carcinoma.   PET Scan completed on 01/24/2023 revealed no hypermetabolic soft tissue recurrence identified in the pelvis. There is only a small amount of bowel uptake identified in the rectum as well as excreted tracer activity in the ureters and urinary bladder. No suspicious adenopathy or evidence of distant metastatic disease.  Given the PET scan results, I recommend to treat with internal brachytherapy treatments.  We reviewed potential complications side effects of radiation include limited radiation-induced damage to the bowel or bladder.  Patient and daughter verbalized understanding distress and voices no questions and agreed to proceed with therapy.   Plan:  Plan on 4-5 internal treatments.     02/02/2023 - 03/03/2023 - Completed radiation course:  Received 5 HDR  treatments.      History obtained from  PATIENT, FAMILY, and CHART    PAST MEDICAL HISTORY  Past Medical History:   Diagnosis Date    Arthritis     OA    Dietary restriction     FLUID 48-52 OZ PER DR HACKETT    Endometrial thickening on ultrasound     Hypertension     Incontinence     Nocturia     PMB (postmenopausal bleeding)     Seizure     Sensory urge incontinence     Stroke     CODED     TIA (transient ischemic attack)     Uterine cancer     Uterine polyp     Vaginal atrophy     Vaginal bleeding     Weakness       PAST SURGICAL HISTORY  Past Surgical History:   Procedure Laterality Date    BACK SURGERY      BREAST SURGERY      LEFT BREAST CYST    CATARACT EXTRACTION      CHOLECYSTECTOMY      D & C HYSTEROSCOPY N/A 12/23/2019    Procedure: DILATATION AND CURETTAGE HYSTEROSCOPY;  Surgeon: Lynnette Andujar MD;  Location: [facility];  Service: Obstetrics/Gynecology    D & C HYSTEROSCOPY      x2    HYSTERECTOMY  01/28/2022    [facility] BSO for uterine cancer    PACEMAKER IMPLANTATION Left     2021  Dr. Mayer at [facility]    TONSILLECTOMY      WISDOM TOOTH EXTRACTION        FAMILY HISTORY  family history includes No Known Problems in her father and mother.    SOCIAL HISTORY  Social History     Tobacco Use    Smoking status: Never    Smokeless tobacco: Never   Substance Use Topics    Alcohol use: No     Comment: rare    Drug use: Never     ALLERGIES  Hydrocodone-acetaminophen, Levofloxacin, Memantine hcl, Pneumococcal vaccine, Quinolones, Yellow dye, Aspirin, Lortab [hydrocodone-acetaminophen], Meperidine, Mirabegron, Ondansetron, Penicillins, Spironolactone, Sulfa antibiotics, Codeine, and Yellow dye #11     MEDICATIONS    Current Outpatient Medications:     acetaminophen (TYLENOL) 500 MG tablet, Take 2 tablets by mouth Every 6 (Six) Hours As Needed for Moderate Pain., Disp: , Rfl:     alendronate (FOSAMAX) 70 MG tablet, 1 tablet Orally once a week for 30 day(s), Disp: , Rfl:     amLODIPine (NORVASC) 5 MG  tablet, Take 1 tablet by mouth Every Morning., Disp: , Rfl:     azelastine (ASTELIN) 0.1 % nasal spray, 2 sprays into the nostril(s) as directed by provider 2 (Two) Times a Day., Disp: , Rfl:     B-Complex, Folic Acid, tablet, Take 1 tablet by mouth Daily., Disp: , Rfl:     butalbital-acetaminophen-caffeine (FIORICET, ESGIC) -40 MG per tablet, Take 1 tablet by mouth Every 6 (Six) Hours As Needed for Headache., Disp: , Rfl:     calcium carb-cholecalciferol 600-800 MG-UNIT tablet, Take 1 tablet by mouth Daily., Disp: , Rfl:     camphor-menthol (SARNA) 0.5-0.5 % lotion, Apply 1 application topically to the appropriate area as directed 2 (Two) Times a Day., Disp: , Rfl:     cholecalciferol (VITAMIN D3) 1000 UNITS tablet, Take 1 tablet by mouth Daily., Disp: , Rfl:     cloNIDine (CATAPRES) 0.1 MG tablet, Take 1 tablet by mouth 2 (Two) Times a Day As Needed for High Blood Pressure (As needed if BP exceeds 160/100.). Half tablet, Disp: , Rfl:     diclofenac (VOLTAREN) 1 % gel gel, Apply 2 g topically to the appropriate area as directed 4 (Four) Times a Day As Needed (PAIN)., Disp: , Rfl:     dicyclomine (BENTYL) 10 MG capsule, Take 1 capsule by mouth 4 (Four) Times a Day Before Meals & at Bedtime. Tried med x 1, Disp: , Rfl:     dipyridamole (PERSANTINE) 50 MG tablet, Take 2 tablets by mouth 2 (Two) Times a Day., Disp: , Rfl:     donepezil (ARICEPT) 10 MG tablet, Take 1 tablet by mouth Every Night., Disp: , Rfl:     fluticasone (VERAMYST) 27.5 MCG/SPRAY nasal spray, 2 sprays into the nostril(s) as directed by provider Every Night., Disp: , Rfl:     gabapentin (NEURONTIN) 100 MG capsule, Take 1 capsule by mouth. Up to 3 times a day, Disp: , Rfl:     hydrALAZINE (APRESOLINE) 25 MG tablet, Take 1 tablet by mouth 3 (Three) Times a Day As Needed. 25mg in afternoon, in the evening 50 mg. Not to be given in the morning due to vagal response, Disp: , Rfl:     levETIRAcetam (KEPPRA) 500 MG tablet, Take 1 tablet by mouth 2  (Two) Times a Day., Disp: , Rfl:     Lidocaine 4 % patch, Place 1 patch on the skin as directed by provider 2 (Two) Times a Day., Disp: , Rfl:     Lutein 20 MG tablet, Take 1 tablet by mouth Daily., Disp: , Rfl:     meloxicam (MOBIC) 15 MG tablet, Take 1 tablet by mouth Daily As Needed., Disp: , Rfl:     Omega 3-6-9 Fatty Acids (OMEGA 3-6-9 COMPLEX PO), Take 1 capsule by mouth Daily., Disp: , Rfl:     pantoprazole (PROTONIX) 40 MG EC tablet, Take 1 tablet by mouth 2 (Two) Times a Day., Disp: , Rfl:     pravastatin (PRAVACHOL) 20 MG tablet, Take 1 tablet by mouth Daily., Disp: , Rfl:     promethazine (PHENERGAN) 25 MG tablet, Take 1 tablet by mouth Every 8 (Eight) Hours As Needed for Nausea or Vomiting., Disp: , Rfl:     ramipril (ALTACE) 10 MG capsule, Take 1 capsule by mouth Daily., Disp: , Rfl:     saline 0.65 % nasal solution (BABY AYR) 0.65 % solution, 1 spray into the nostril(s) as directed by provider Daily As Needed., Disp: , Rfl:     sennosides-docusate (PERICOLACE) 8.6-50 MG per tablet, Take 1 tablet by mouth 2 (Two) Times a Day., Disp: , Rfl:     simethicone (MYLICON) 125 MG chewable tablet, Every 8 (Eight) Hours. 6am at 2 9:30am, 1 6pm, Disp: , Rfl:     sodium chloride 1 g tablet, Take 1 tablet by mouth 3 (Three) Times a Day., Disp: , Rfl:     therapeutic multivitamin-minerals (THERAGRAN-M) tablet, Take 1 tablet by mouth Daily., Disp: , Rfl:     amLODIPine (NORVASC) 2.5 MG tablet, Take 1 tablet by mouth Every Evening. AT 7PM, Disp: , Rfl:     B Complex Vitamins (VITAMIN B COMPLEX PO), Take 1 tablet by mouth Daily., Disp: , Rfl:     benzonatate (TESSALON) 100 MG capsule, Take 2 capsules by mouth 3 (Three) Times a Day As Needed., Disp: , Rfl:     Diclofenac Sodium 2 % solution, shoulders, Disp: , Rfl:     INV ALLIANCE, ROSAS, KIT, Pharmacy Label,, Indications: dic 3%, lidocaine 2%, cyclo 2%, prilo 2% baclo 2% 1 to 2 pumps up to TID PRN, Disp: , Rfl:     promethazine (PHENERGAN) 25 MG tablet, Every 8 (Eight)  "Hours., Disp: , Rfl:     valACYclovir (VALTREX) 1000 MG tablet, Take 1,000 mg by mouth Daily As Needed (FEVER BLISTER). (Patient not taking: Reported on 6/7/2023), Disp: , Rfl:     Current outpatient and discharge medications have been reconciled for the patient.  Reviewed by: Vimal Tillman III, MD    The following portions of the patient's history were reviewed and updated as appropriate: allergies, current medications, past family history, past medical history, past social history, past surgical history and problem list.    REVIEW OF SYSTEMS  Review of Systems   Constitutional: Negative.  Negative for activity change and fatigue.   HENT: Negative.     Respiratory: Negative.  Negative for cough and shortness of breath.    Cardiovascular: Negative.  Negative for chest pain.   Gastrointestinal: Negative.    Genitourinary:  Positive for dysuria and urgency. Negative for vaginal bleeding, vaginal discharge and vaginal pain.   Musculoskeletal: Negative.    Skin: Negative.    Neurological: Negative.  Negative for dizziness and weakness.   Hematological: Negative.  Negative for adenopathy.   Psychiatric/Behavioral: Negative.     All other systems reviewed and are negative.    I have reviewed and confirmed the accuracy of the ROS as documented by the MA/LPN/RN Vimal Tillman III, MD    PHYSICAL EXAM  VITAL SIGNS:   Vitals:    06/07/23 1344   BP: 121/55   Pulse: 72   Resp: 18   SpO2: 98%   Weight: 51.7 kg (114 lb)   Height: 152.4 cm (60\")   PainSc: 0-No pain       Physical Exam  Vitals reviewed.   Constitutional:       Appearance: Normal appearance.   HENT:      Head: Normocephalic.   Cardiovascular:      Rate and Rhythm: Normal rate and regular rhythm.      Pulses: Normal pulses.   Pulmonary:      Effort: Pulmonary effort is normal. No respiratory distress.      Breath sounds: Normal breath sounds.   Abdominal:      General: Bowel sounds are normal.   Genitourinary:     Comments: No pelvic adenopathy noted;  Vaginal: " minimal scar tissue noted through out canal, no evidence of tumor recurrence.   Musculoskeletal:         General: Normal range of motion.      Cervical back: Normal range of motion and neck supple.   Lymphadenopathy:      Cervical: No cervical adenopathy.   Skin:     General: Skin is warm.      Capillary Refill: Capillary refill takes less than 2 seconds.   Neurological:      General: No focal deficit present.      Mental Status: She is alert and oriented to person, place, and time.   Psychiatric:         Mood and Affect: Mood normal.         Behavior: Behavior normal.         Performance Status: ECOG (2) Ambulatory and capable of self care, unable to carry out work activity, up and about > 50% or waking hours    Clinical Quality Measures  -Pain Documented by Standardized Tool, FPS Bel Powell reports a pain score of 0.  Given her pain assessment as noted, treatment options were discussed and the following options were decided upon as a follow-up plan to address the patient's pain:  No pain, no plan given .   Pain Medications               acetaminophen (TYLENOL) 500 MG tablet Take 2 tablets by mouth Every 6 (Six) Hours As Needed for Moderate Pain.    butalbital-acetaminophen-caffeine (FIORICET, ESGIC) -40 MG per tablet Take 1 tablet by mouth Every 6 (Six) Hours As Needed for Headache.    gabapentin (NEURONTIN) 100 MG capsule Take 1 capsule by mouth. Up to 3 times a day    levETIRAcetam (KEPPRA) 500 MG tablet Take 1 tablet by mouth 2 (Two) Times a Day.    meloxicam (MOBIC) 15 MG tablet Take 1 tablet by mouth Daily As Needed.          -Advanced Care Planning Advance Care Planning  ACP discussion was held with the patient during this visit. Patient has an advance directive in EMR which is still valid.     -Body Mass Index Screening and Follow-Up Plan BMI is within normal parameters. No other follow-up for BMI required.    -Tobacco Use: Screening and Cessation Intervention Social History    Tobacco Use       Smoking status: Never      Smokeless tobacco: Never    ASSESSMENT AND PLAN  1. Endometrial cancer    2. S/P hysterectomy with oophorectomy    3. History of radiation therapy    4. Non-smoker      No orders of the defined types were placed in this encounter.    RECOMMENDATIONS:    Bel Powell is status post completion of radiation therapy and presents to our clinic today for inital follow up exam.  Diagnosed with recurrent endometrial carcinoma. She completed 5 internal brachytherapy treatments completed on 03/03/2023.     I have reviewed the chart and other physicians records. she denies untoward side effects from treatment and without evidence for recurrent or metastatic disease on exam today. Pap smear obtained today. She will return in 4 months. Continue ongoing management per primary care physician and other specialists.    Patient Instructions   1) PAP smear today  2) Return in 4 months.     Vimal Tillman III, MD  06/07/2023

## 2023-06-07 ENCOUNTER — OFFICE VISIT (OUTPATIENT)
Dept: RADIATION ONCOLOGY | Facility: HOSPITAL | Age: 88
End: 2023-06-07
Payer: MEDICARE

## 2023-06-07 VITALS
WEIGHT: 114 LBS | RESPIRATION RATE: 18 BRPM | OXYGEN SATURATION: 98 % | DIASTOLIC BLOOD PRESSURE: 55 MMHG | SYSTOLIC BLOOD PRESSURE: 121 MMHG | BODY MASS INDEX: 22.38 KG/M2 | HEART RATE: 72 BPM | HEIGHT: 60 IN

## 2023-06-07 DIAGNOSIS — Z90.710 S/P HYSTERECTOMY WITH OOPHORECTOMY: ICD-10-CM

## 2023-06-07 DIAGNOSIS — Z90.721 S/P HYSTERECTOMY WITH OOPHORECTOMY: ICD-10-CM

## 2023-06-07 DIAGNOSIS — Z78.9 NON-SMOKER: ICD-10-CM

## 2023-06-07 DIAGNOSIS — Z92.3 HISTORY OF RADIATION THERAPY: ICD-10-CM

## 2023-06-07 DIAGNOSIS — C54.1 ENDOMETRIAL CANCER: Primary | ICD-10-CM

## 2023-06-07 PROCEDURE — 88142 CYTOPATH C/V THIN LAYER: CPT | Performed by: RADIOLOGY

## 2023-06-07 RX ORDER — MULTIVITAMIN/IRON/FOLIC ACID 18MG-0.4MG
1 TABLET ORAL DAILY
COMMUNITY
Start: 2023-05-31

## 2023-06-07 RX ORDER — DICYCLOMINE HYDROCHLORIDE 10 MG/1
10 CAPSULE ORAL
COMMUNITY

## 2023-06-09 LAB
GEN CATEG CVX/VAG CYTO-IMP: NORMAL
LAB AP CASE REPORT: NORMAL
LAB AP GYN ADDITIONAL INFORMATION: NORMAL
PATH INTERP SPEC-IMP: NORMAL
STAT OF ADQ CVX/VAG CYTO-IMP: NORMAL

## 2023-06-26 DIAGNOSIS — Z95.0 CARDIAC PACEMAKER: Primary | ICD-10-CM

## 2023-06-26 DIAGNOSIS — I49.5 SA NODE DYSFUNCTION (HCC): ICD-10-CM

## 2023-06-26 PROCEDURE — 93296 REM INTERROG EVL PM/IDS: CPT | Performed by: NURSE PRACTITIONER

## 2023-06-26 PROCEDURE — 93294 REM INTERROG EVL PM/LDLS PM: CPT | Performed by: NURSE PRACTITIONER

## 2023-06-26 NOTE — PROGRESS NOTES
OP HEMATOLOGY/ONCOLOGY CONSULTATION      Pt Name: Gigi Aparicio  YOB: 1935  MRN: [id number]  Referring provider: LAYTON Land  Requesting provider: Dr. Juan Agarwla   Reason for consultation:Iron Deficiency Anemia   Date of evaluation: 8/23/2023    History Obtained From:  patient, electronic medical record    CHIEF COMPLAINT:    Chief Complaint   Patient presents with    New Patient     Iron Deficiency anemia     HISTORY OF PRESENT ILLNESS:    Gigi Aparicio is a 80 y.o.  female referred to the clinic by Dr. Juan Agarwal for evaluation of Iron deficiency anemia. PMH significant for dementia, CVA, seizures, recurrent uterine cancer, urethral prolapse, urinary incontinence, overactive bladder treated with 0 plasty procedures per [facility] urology, iron deficiency anemia, ischemic colitis, IBS, HTN, hyperlipidemia, vasovagal response on a daily basis. Libby Bishop is brought to the clinic by her daughter, Juana Ley. According to Lauren Janette is sensitive to medications. She cannot tolerate oral iron due to GI upset and underlying IBS. Endoscopy 10/3/2022 by Dr. Rita Villalobos at [facility]: In the distal esophagus adjacent to the EG junction a widemouthed esophageal diverticulum was noted, Within this diverticulum a shallow 5 to 6 mm in diameter, triangular-shaped ulceration was seen without any bleeding stigmata. Cold biopsies were taken from the edges of this ulceration which likely is pill induced esophagitis. Also noted was a 5 cm hiatal hernia present with internal Maynor erosions. Juana Ley would like to consider parenteral iron replacement therapy.     Review prior CBCs  CBC 7/5/2022: WBC: 8.6, Hgb: 11.4, MCV: 95, Platelets: 775,705  CBC 5/16/2023: WBC: 4.4, Hgb: 11.2, MCV: 93, Platelets: 737,464  CBC 8/23/2023: WBC 5.48, Hgb 10.1, MCV 90.2, platelets 190,845    Labs 5/16/2023:  CMP: Creatinine 0.95, GFR 58, Calcium 9.5, Total Protein: 6.6  Iron: 35, TIBC: 369, Iron Saturation:

## 2023-06-27 DIAGNOSIS — D50.9 IRON DEFICIENCY ANEMIA, UNSPECIFIED IRON DEFICIENCY ANEMIA TYPE: Primary | ICD-10-CM

## 2023-06-28 ENCOUNTER — NURSE ONLY (OUTPATIENT)
Dept: UROLOGY | Age: 88
End: 2023-06-28
Payer: MEDICARE

## 2023-06-28 DIAGNOSIS — N39.46 MIXED STRESS AND URGE URINARY INCONTINENCE: ICD-10-CM

## 2023-06-28 DIAGNOSIS — N32.81 OAB (OVERACTIVE BLADDER): Primary | ICD-10-CM

## 2023-06-28 PROCEDURE — 64566 NEUROELTRD STIM POST TIBIAL: CPT | Performed by: NURSE PRACTITIONER

## 2023-07-13 ENCOUNTER — NURSE ONLY (OUTPATIENT)
Dept: UROLOGY | Age: 88
End: 2023-07-13
Payer: MEDICARE

## 2023-07-13 VITALS — TEMPERATURE: 98.2 F | WEIGHT: 112 LBS | BODY MASS INDEX: 21.99 KG/M2 | HEIGHT: 60 IN

## 2023-07-13 DIAGNOSIS — N39.46 MIXED STRESS AND URGE URINARY INCONTINENCE: ICD-10-CM

## 2023-07-13 DIAGNOSIS — N32.81 OAB (OVERACTIVE BLADDER): Primary | ICD-10-CM

## 2023-07-13 PROCEDURE — 64566 NEUROELTRD STIM POST TIBIAL: CPT | Performed by: NURSE PRACTITIONER

## 2023-07-20 ENCOUNTER — OFFICE VISIT (OUTPATIENT)
Dept: NEUROLOGY | Age: 88
End: 2023-07-20
Payer: MEDICARE

## 2023-07-20 VITALS
RESPIRATION RATE: 16 BRPM | WEIGHT: 112 LBS | BODY MASS INDEX: 21.99 KG/M2 | SYSTOLIC BLOOD PRESSURE: 139 MMHG | HEIGHT: 60 IN | HEART RATE: 77 BPM | DIASTOLIC BLOOD PRESSURE: 67 MMHG

## 2023-07-20 DIAGNOSIS — I63.9 SMALL VESSEL CEREBROVASCULAR ACCIDENT (CVA) (HCC): Primary | ICD-10-CM

## 2023-07-20 DIAGNOSIS — F01.50 VASCULAR DEMENTIA WITHOUT BEHAVIORAL DISTURBANCE (HCC): ICD-10-CM

## 2023-07-20 DIAGNOSIS — R55 VASOVAGAL SYNCOPE: ICD-10-CM

## 2023-07-20 DIAGNOSIS — M79.604 PAIN IN BOTH LOWER EXTREMITIES: ICD-10-CM

## 2023-07-20 DIAGNOSIS — G43.009 MIGRAINE WITHOUT AURA AND WITHOUT STATUS MIGRAINOSUS, NOT INTRACTABLE: ICD-10-CM

## 2023-07-20 DIAGNOSIS — G40.219 PARTIAL SYMPTOMATIC EPILEPSY WITH COMPLEX PARTIAL SEIZURES, INTRACTABLE, WITHOUT STATUS EPILEPTICUS (HCC): ICD-10-CM

## 2023-07-20 DIAGNOSIS — M79.605 PAIN IN BOTH LOWER EXTREMITIES: ICD-10-CM

## 2023-07-20 PROCEDURE — 1090F PRES/ABSN URINE INCON ASSESS: CPT | Performed by: PSYCHIATRY & NEUROLOGY

## 2023-07-20 PROCEDURE — 1036F TOBACCO NON-USER: CPT | Performed by: PSYCHIATRY & NEUROLOGY

## 2023-07-20 PROCEDURE — 99214 OFFICE O/P EST MOD 30 MIN: CPT | Performed by: PSYCHIATRY & NEUROLOGY

## 2023-07-20 PROCEDURE — 1123F ACP DISCUSS/DSCN MKR DOCD: CPT | Performed by: PSYCHIATRY & NEUROLOGY

## 2023-07-20 PROCEDURE — G8427 DOCREV CUR MEDS BY ELIG CLIN: HCPCS | Performed by: PSYCHIATRY & NEUROLOGY

## 2023-07-20 PROCEDURE — G8420 CALC BMI NORM PARAMETERS: HCPCS | Performed by: PSYCHIATRY & NEUROLOGY

## 2023-07-20 RX ORDER — DIPYRIDAMOLE 50 MG
100 TABLET ORAL 2 TIMES DAILY
Qty: 180 TABLET | Refills: 3 | Status: SHIPPED | OUTPATIENT
Start: 2023-07-20 | End: 2024-07-14

## 2023-07-21 ENCOUNTER — TELEPHONE (OUTPATIENT)
Dept: INFUSION THERAPY | Age: 88
End: 2023-07-21

## 2023-07-26 ENCOUNTER — NURSE ONLY (OUTPATIENT)
Dept: UROLOGY | Age: 88
End: 2023-07-26
Payer: MEDICARE

## 2023-07-26 DIAGNOSIS — N32.81 OAB (OVERACTIVE BLADDER): Primary | ICD-10-CM

## 2023-07-26 DIAGNOSIS — N39.46 MIXED STRESS AND URGE URINARY INCONTINENCE: ICD-10-CM

## 2023-07-26 PROCEDURE — 64566 NEUROELTRD STIM POST TIBIAL: CPT | Performed by: NURSE PRACTITIONER

## 2023-07-26 NOTE — PROGRESS NOTES
Treatment # maintenance    Improvement of Symptoms? mild    OAB/Urologic Medications? none      Uroplasty Procedure:    1. Patient is seated with the left treatment leg elevated. 2. 34 gauge fine needle electrode is inserted into lower inner aspect of the leg. Slightly cephalad to the medial malleolus. 3. Surface electrode pad is placed over the medial aspect of the calcaneus on the same leg. 4.Needle electrode is connected to the external pulse generator. 5.The pulse generator is turned on and the millivolts used are 8. 6.Patient is treated for 30 minutes. 7.The needle and pad are removed. Patient will follow up in 2 weeks for next treatment.

## 2023-07-30 RX ORDER — BUTALBITAL, ACETAMINOPHEN AND CAFFEINE 50; 325; 40 MG/1; MG/1; MG/1
1 TABLET ORAL EVERY 6 HOURS PRN
Qty: 90 TABLET | Refills: 1 | Status: SHIPPED | OUTPATIENT
Start: 2023-07-30 | End: 2024-01-26

## 2023-07-30 RX ORDER — LEVETIRACETAM 500 MG/1
500 TABLET ORAL 2 TIMES DAILY
Qty: 180 TABLET | Refills: 3 | Status: SHIPPED | OUTPATIENT
Start: 2023-07-30 | End: 2024-07-24

## 2023-08-01 NOTE — PROGRESS NOTES
[facility] - PODIATRY    Today's Date: 08/11/2023     Patient Name: Bel Powell  MRN: [id number]  CSN: [id number]  PCP: Horacio Campos DO  Referring Provider: Derrick Bourgeois DPM    SUBJECTIVE     Chief Complaint   Patient presents with    Establish Care     Horacio Campos DO 01/09/2023 NEW PATIENT INCOMING REFERRAL FOR ONYCHOMYCOSIS, ONYCHOGRYPHOSIS AND PAINFUL, ELONGATED NAILS.-pt states she is here today plantar fasciitis/history of broken foot 1990's-last injection for plantar fasciitis was march at dr dupree     HPI: Bel Powell, a 88 y.o.female, comes to clinic as a(n) new patient complaining of toenail/callus issues. Patient has h/o OA, HTN, incontinence, seizure, stroke, TIA, weakness, dementia .  Patient is here with a caregiver, daughter who gives majority of history.  Patient is non-diabetic.  Relates mild numbness and tingling in her feet.  Relates that she has previously seen Dr. Bourgeois and Dr. Dupree.  Relates that 3 to 4 months ago, she received nail care as well as a left foot injection for midfoot pain.  Notes that her nails are long, thick, and discolored.  She is unable to care for them herself.  Primarily gets around via wheelchair.  Lives at SNF.  Denies pain. Relates previous treatment(s) including foot care by podiatrist . Denies any constitutional symptoms. No other pedal complaints at this time.    Past Medical History:   Diagnosis Date    Arthritis     OA    Dietary restriction     FLUID 48-52 OZ PER DR HACKETT    Endometrial thickening on ultrasound     Hypertension     Incontinence     Nocturia     Plantar fasciitis     PMB (postmenopausal bleeding)     Seizure     Sensory urge incontinence     Stroke     CODED     TIA (transient ischemic attack)     Uterine cancer     Uterine polyp     Vaginal atrophy     Vaginal bleeding     Weakness      Past Surgical History:   Procedure Laterality Date    BACK SURGERY      BREAST SURGERY      LEFT BREAST  "CYST    CATARACT EXTRACTION      CHOLECYSTECTOMY      D & C HYSTEROSCOPY N/A 12/23/2019    Procedure: DILATATION AND CURETTAGE HYSTEROSCOPY;  Surgeon: Lynnette Andujar MD;  Location: [facility] OR;  Service: Obstetrics/Gynecology    D & C HYSTEROSCOPY      x2    HYSTERECTOMY  01/28/2022    Garry Saadia [facility] for uterine cancer    PACEMAKER IMPLANTATION Left     2021  Dr. Mayer at [facility]    TONSILLECTOMY      WISDOM TOOTH EXTRACTION       Family History   Problem Relation Age of Onset    No Known Problems Mother     No Known Problems Father      Social History     Socioeconomic History    Marital status:    Tobacco Use    Smoking status: Never     Passive exposure: Never    Smokeless tobacco: Never   Vaping Use    Vaping Use: Never used   Substance and Sexual Activity    Alcohol use: No     Comment: rare    Drug use: Never    Sexual activity: Defer     Allergies   Allergen Reactions    Hydrocodone-Acetaminophen Itching    Levofloxacin Other (See Comments)     Seizure like activity    Memantine Hcl Other (See Comments)     PUT HER IN A COMA    Pneumococcal Vaccine Swelling    Quinolones Other (See Comments), Seizure and Unknown - High Severity     Seizure     Yellow Dye Itching    Aspirin Itching and Rash    Lortab [Hydrocodone-Acetaminophen] Itching and Irritability    Meperidine Hallucinations and Unknown - High Severity    Mirabegron Other (See Comments)     INABILITY TO URINATE    Ondansetron Unknown - High Severity and Unknown (See Comments)     UNKNOWN REACTION    \"Makes her mean\"    Penicillins Delirium and GI Intolerance    Spironolactone Other (See Comments)     DR HACKETT  STATES SODIUM SENSITIVE DEHYDRATION    Sulfa Antibiotics Nausea And Vomiting    Codeine Irritability and Unknown - High Severity    Yellow Dye #11 Itching     Causes seizures.      Current Outpatient Medications   Medication Sig Dispense Refill    acetaminophen (TYLENOL) 500 MG tablet Take 2 tablets by mouth Every 6 (Six) Hours As " Needed for Moderate Pain. Do not exceed 3000 mg's daily      alendronate (FOSAMAX) 70 MG tablet 1 tablet Orally once a week for 30 day(s)      amLODIPine (NORVASC) 5 MG tablet Take 1 tablet by mouth Every Morning.      azelastine (ASTELIN) 0.1 % nasal spray 2 sprays into the nostril(s) as directed by provider 2 (Two) Times a Day.      B Complex Vitamins (VITAMIN B COMPLEX PO) Take 1 tablet by mouth Daily.      benzonatate (TESSALON) 100 MG capsule Take 2 capsules by mouth 3 (Three) Times a Day As Needed.      butalbital-acetaminophen-caffeine (FIORICET, ESGIC) -40 MG per tablet Take 1 tablet by mouth Every 6 (Six) Hours As Needed for Headache.      calcium carb-cholecalciferol 600-800 MG-UNIT tablet Take 1 tablet by mouth Daily.      camphor-menthol (SARNA) 0.5-0.5 % lotion Apply 1 application topically to the appropriate area as directed 2 (Two) Times a Day.      cholecalciferol (VITAMIN D3) 1000 UNITS tablet Take 1 tablet by mouth Daily.      cloNIDine (CATAPRES) 0.1 MG tablet Take 1 tablet by mouth 2 (Two) Times a Day As Needed for High Blood Pressure (As needed if BP exceeds 160/100.). Half tablet      diclofenac (VOLTAREN) 1 % gel gel Apply 2 g topically to the appropriate area as directed 4 (Four) Times a Day As Needed (PAIN).      dipyridamole (PERSANTINE) 50 MG tablet Take 2 tablets by mouth 2 (Two) Times a Day.      donepezil (ARICEPT) 10 MG tablet Take 1 tablet by mouth Every Night.      fluticasone (VERAMYST) 27.5 MCG/SPRAY nasal spray 2 sprays into the nostril(s) as directed by provider Every Night.      gabapentin (NEURONTIN) 100 MG capsule Take 1 capsule by mouth. Up to 3 times a day      hydrALAZINE (APRESOLINE) 25 MG tablet Take 1 tablet by mouth 3 (Three) Times a Day As Needed. 25mg in afternoon, in the evening 50 mg. Not to be given in the morning due to vagal response      levETIRAcetam (KEPPRA) 500 MG tablet Take 1 tablet by mouth 2 (Two) Times a Day.      Lidocaine 4 % patch Place 1 patch  on the skin as directed by provider 2 (Two) Times a Day.      Lutein 20 MG tablet Take 1 tablet by mouth Daily.      meloxicam (MOBIC) 15 MG tablet Take 1 tablet by mouth Daily As Needed.      Omega 3-6-9 Fatty Acids (OMEGA 3-6-9 COMPLEX PO) Take 1 capsule by mouth Daily.      pantoprazole (PROTONIX) 40 MG EC tablet Take 1 tablet by mouth 2 (Two) Times a Day.      pravastatin (PRAVACHOL) 20 MG tablet Take 1 tablet by mouth Daily.      promethazine (PHENERGAN) 25 MG tablet Take 1 tablet by mouth Every 8 (Eight) Hours As Needed for Nausea or Vomiting.      ramipril (ALTACE) 10 MG capsule Take 1 capsule by mouth Daily.      saline 0.65 % nasal solution (BABY AYR) 0.65 % solution 1 spray into the nostril(s) as directed by provider Daily As Needed.      simethicone (MYLICON) 125 MG chewable tablet Every 8 (Eight) Hours. 6am at 2 9:30am, 1 6pm      sodium chloride 1 g tablet Take 1 tablet by mouth 3 (Three) Times a Day.      therapeutic multivitamin-minerals (THERAGRAN-M) tablet Take 1 tablet by mouth Daily.      valACYclovir (VALTREX) 1000 MG tablet Take 1 tablet by mouth Daily As Needed (FEVER BLISTER).      dicyclomine (BENTYL) 10 MG capsule Take 1 capsule by mouth 4 (Four) Times a Day Before Meals & at Bedtime. Tried med x 1 (Patient not taking: Reported on 8/11/2023)      promethazine (PHENERGAN) 25 MG tablet Every 8 (Eight) Hours. (Patient not taking: Reported on 8/11/2023)      sennosides-docusate (PERICOLACE) 8.6-50 MG per tablet Take 1 tablet by mouth 2 (Two) Times a Day. (Patient not taking: Reported on 8/11/2023)       No current facility-administered medications for this visit.     Review of Systems   Constitutional:  Negative for chills and fever.   HENT:  Negative for congestion.    Respiratory:  Negative for shortness of breath.    Cardiovascular:  Negative for chest pain and leg swelling.   Gastrointestinal:  Negative for constipation, diarrhea, nausea and vomiting.   Musculoskeletal:  Positive for back  pain and gait problem.        Foot pain   Skin:  Negative for wound.   Neurological:  Positive for weakness. Negative for numbness.   Psychiatric/Behavioral:  Negative for agitation.      OBJECTIVE     Vitals:    08/11/23 1402   BP: 122/62   Pulse: 70   SpO2: 99%       PHYSICAL EXAM  GEN:   Accompanied by daughter.     Foot/Ankle Exam    GENERAL  Appearance:  elderly and frail  Orientation:  AAOx3 (moderate dementia)  Affect:  appropriate  Gait:  (unsteady)  Assistance:  wheelchair  Right shoe gear: casual shoe  Left shoe gear: casual shoe    VASCULAR     Right Foot Vascularity   Dorsalis pedis:  2+  Posterior tibial:  2+  Skin temperature:  warm  Edema grading:  Trace  CFT:  4  Pedal hair growth:  Present  Varicosities:  mild varicosities     Left Foot Vascularity   Dorsalis pedis:  2+  Posterior tibial:  2+  Skin temperature:  warm  Edema grading:  Trace  CFT:  4  Pedal hair growth:  Present  Varicosities:  mild varicosities     NEUROLOGIC     Right Foot Neurologic   Normal sensation    Light touch sensation: normal  Vibratory sensation: normal  Hot/Cold sensation: normal     Left Foot Neurologic   Normal sensation    Light touch sensation: normal  Vibratory sensation: normal  Hot/Cold sensation:  normal    MUSCULOSKELETAL     Right Foot Musculoskeletal   Ecchymosis:  none  Tenderness:  toenail problem    Arch:  Normal  Hallux valgus: Yes       Left Foot Musculoskeletal   Ecchymosis:  none  Tenderness:  toenail problem  Arch:  Normal  Hallux valgus: Yes      MUSCLE STRENGTH     Right Foot Muscle Strength   Foot dorsiflexion:  4  Foot plantar flexion:  4  Foot inversion:  4  Foot eversion:  4     Left Foot Muscle Strength   Foot dorsiflexion:  4  Foot plantar flexion:  4  Foot inversion:  4  Foot eversion:  4    RANGE OF MOTION     Right Foot Range of Motion   Foot and ankle ROM within normal limits       Left Foot Range of Motion   Foot and ankle ROM within normal limits      DERMATOLOGIC      Right Foot  Dermatologic   Skin  Positive for atrophy.   Nails  1.  Positive for elongated, abnormal thickness, subungual debris and ingrown toenail.  2.  Positive for elongated, abnormal thickness and subungual debris.  3.  Positive for elongated, abnormal thickness and subungual debris.  4.  Positive for elongated, abnormal thickness and subungual debris.  5.  Positive for elongated, abnormal thickness and subungual debris.     Left Foot Dermatologic   Skin  Positive for atrophy.   Nails  1.  Positive for elongated, abnormal thickness, subungual debris and ingrown toenail.  2.  Positive for elongated, abnormal thickness and subungual debris.  3.  Positive for elongated, abnormal thickness and subungual debris.  4.  Positive for elongated, abnormally thick and subungual debris.  5.  Positive for elongated, abnormally thick and subungual debris.    RADIOLOGY/NUCLEAR:  No results found.    LABORATORY/CULTURE RESULTS:      PATHOLOGY RESULTS:       ASSESSMENT/PLAN     Diagnoses and all orders for this visit:    1. Thickened nails (Primary)    2. Unsteady gait    3. Dementia, unspecified dementia severity, unspecified dementia type, unspecified whether behavioral, psychotic, or mood disturbance or anxiety    4. Foot pain, left  -     XR Foot 3+ View Left; Future    5. Pain around toenail    6. Hallux valgus, right    7. Hallux valgus, left      Comprehensive lower extremity examination and evaluation was performed.  Discussed findings and treatment plan including risks, benefits, and treatment options with patient in detail. Patient agreed with treatment plan.  After verbal consent obtained, nail(s) x10 debrided of length and thickness with nail nipper without incidence  Patient may maintain nails and calluses at home utilizing emery board or pumice stone between visits as needed   Continue use of walker and wheelchair.  Dispensed toe spacers to keep 1st and 2nd toes abutting each other.  Defer left foot injection due to being  asymptomatic today.  Xray of left foot to assess pathology.  An After Visit Summary was printed and given to the patient at discharge, including (if requested) any available informative/educational handouts regarding diagnosis, treatment, or medications. All questions were answered to patient/family satisfaction. Should symptoms fail to improve or worsen they agree to call or return to clinic or to go to the Emergency Department. Discussed the importance of following up with any needed screening tests/labs/specialist appointments and any requested follow-up recommended by me today. Importance of maintaining follow-up discussed and patient accepts that missed appointments can delay diagnosis and potentially lead to worsening of conditions.  Return in about 3 months (around 11/11/2023) for Schedule Foot Care Clinic, Follow-up with Podiatry Provider., or sooner if acute issues arise.    Lab Frequency Next Occurrence   Follow Anesthesia Guidelines / Standing Orders Once 12/04/2019   Chlorhexidine Skin Prep Once 12/09/2019       This document has been electronically signed by Baudilio Santiago DPM on August 11, 2023 14:38 CDT

## 2023-08-07 NOTE — TELEPHONE ENCOUNTER
Last visit CT aprn 1-6-23. No FU scheduled. EGD  5-11-23. [facility] called from [phone] asking for a RF on this patient's Pantoprazole 40 mg daily please. LRF was CT aprn 1-6-23 # 90 x 1. They also said this can be faxed to [phone] if you prefer. Order has been attached.  Nate pat

## 2023-08-08 RX ORDER — PANTOPRAZOLE SODIUM 40 MG/1
40 TABLET, DELAYED RELEASE ORAL
Qty: 90 TABLET | Refills: 1 | Status: SHIPPED | OUTPATIENT
Start: 2023-08-08 | End: 2023-08-08 | Stop reason: SDUPTHER

## 2023-08-08 RX ORDER — PANTOPRAZOLE SODIUM 40 MG/1
40 TABLET, DELAYED RELEASE ORAL
Qty: 90 TABLET | Refills: 3 | Status: SHIPPED | OUTPATIENT
Start: 2023-08-08

## 2023-08-09 ENCOUNTER — NURSE ONLY (OUTPATIENT)
Dept: UROLOGY | Age: 88
End: 2023-08-09
Payer: MEDICARE

## 2023-08-09 DIAGNOSIS — N32.81 OAB (OVERACTIVE BLADDER): Primary | ICD-10-CM

## 2023-08-09 DIAGNOSIS — N39.46 MIXED STRESS AND URGE URINARY INCONTINENCE: ICD-10-CM

## 2023-08-09 PROCEDURE — 64566 NEUROELTRD STIM POST TIBIAL: CPT | Performed by: NURSE PRACTITIONER

## 2023-08-10 ENCOUNTER — TELEPHONE (OUTPATIENT)
Dept: PODIATRY | Facility: CLINIC | Age: 88
End: 2023-08-10
Payer: MEDICARE

## 2023-08-10 NOTE — TELEPHONE ENCOUNTER
Called patient regarding appt on 08/11/2023. Left message for patient to return call if any questions or concerns arise.

## 2023-08-11 ENCOUNTER — PATIENT ROUNDING (BHMG ONLY) (OUTPATIENT)
Dept: PODIATRY | Facility: CLINIC | Age: 88
End: 2023-08-11

## 2023-08-11 ENCOUNTER — OFFICE VISIT (OUTPATIENT)
Dept: PODIATRY | Facility: CLINIC | Age: 88
End: 2023-08-11
Payer: MEDICARE

## 2023-08-11 VITALS
DIASTOLIC BLOOD PRESSURE: 62 MMHG | WEIGHT: 114 LBS | SYSTOLIC BLOOD PRESSURE: 122 MMHG | HEIGHT: 60 IN | BODY MASS INDEX: 22.38 KG/M2 | HEART RATE: 70 BPM | OXYGEN SATURATION: 99 %

## 2023-08-11 DIAGNOSIS — M20.12 HALLUX VALGUS, LEFT: ICD-10-CM

## 2023-08-11 DIAGNOSIS — R54 ADVANCED AGE: ICD-10-CM

## 2023-08-11 DIAGNOSIS — M79.672 FOOT PAIN, LEFT: ICD-10-CM

## 2023-08-11 DIAGNOSIS — L60.2 THICKENED NAILS: Primary | ICD-10-CM

## 2023-08-11 DIAGNOSIS — M79.676 PAIN AROUND TOENAIL: ICD-10-CM

## 2023-08-11 DIAGNOSIS — F03.90 DEMENTIA, UNSPECIFIED DEMENTIA SEVERITY, UNSPECIFIED DEMENTIA TYPE, UNSPECIFIED WHETHER BEHAVIORAL, PSYCHOTIC, OR MOOD DISTURBANCE OR ANXIETY: ICD-10-CM

## 2023-08-11 DIAGNOSIS — R26.81 UNSTEADY GAIT: ICD-10-CM

## 2023-08-11 DIAGNOSIS — M20.11 HALLUX VALGUS, RIGHT: ICD-10-CM

## 2023-08-11 NOTE — PROGRESS NOTES
August 11, 2023    Hello, may I speak with Bel Powell?    My name is JUNE      I am  [place] [facility]  [facility]  [address], [address]  [place] [address] [phone].    Before we get started may I verify your date of birth? 1935    I am calling to officially welcome you to our practice and ask about your recent visit. Is this a good time to talk? yes    Tell me about your visit with us. What things went well?  VISIT WAS GOOD       We're always looking for ways to make our patients' experiences even better. Do you have recommendations on ways we may improve?  no    Overall were you satisfied with your first visit to our practice? yes       I appreciate you taking the time to speak with me today. Is there anything else I can do for you? no      Thank you, and have a great day.

## 2023-08-22 ENCOUNTER — TELEPHONE (OUTPATIENT)
Dept: HEMATOLOGY | Age: 88
End: 2023-08-22

## 2023-08-23 ENCOUNTER — HOSPITAL ENCOUNTER (OUTPATIENT)
Dept: INFUSION THERAPY | Age: 88
Discharge: HOME | End: 2023-08-23
Payer: MEDICARE

## 2023-08-23 ENCOUNTER — OFFICE VISIT (OUTPATIENT)
Dept: HEMATOLOGY | Age: 88
End: 2023-08-23
Payer: MEDICARE

## 2023-08-23 VITALS
OXYGEN SATURATION: 96 % | HEIGHT: 60 IN | SYSTOLIC BLOOD PRESSURE: 120 MMHG | DIASTOLIC BLOOD PRESSURE: 62 MMHG | HEART RATE: 69 BPM | WEIGHT: 117.2 LBS | TEMPERATURE: 98 F | BODY MASS INDEX: 23.01 KG/M2

## 2023-08-23 DIAGNOSIS — D50.9 IRON DEFICIENCY ANEMIA, UNSPECIFIED IRON DEFICIENCY ANEMIA TYPE: ICD-10-CM

## 2023-08-23 DIAGNOSIS — D50.9 IRON DEFICIENCY ANEMIA, UNSPECIFIED IRON DEFICIENCY ANEMIA TYPE: Primary | ICD-10-CM

## 2023-08-23 DIAGNOSIS — K44.9 HIATAL HERNIA: ICD-10-CM

## 2023-08-23 DIAGNOSIS — Z87.19 HISTORY OF ESOPHAGITIS: ICD-10-CM

## 2023-08-23 DIAGNOSIS — Z71.89 CARE PLAN DISCUSSED WITH PATIENT: ICD-10-CM

## 2023-08-23 LAB
ERYTHROCYTE [DISTWIDTH] IN BLOOD BY AUTOMATED COUNT: 13.4 % (ref 11.7–14.4)
FERRITIN SERPL-MCNC: 24.3 NG/ML (ref 13–150)
HCT VFR BLD AUTO: 30.3 % (ref 34.1–44.9)
HGB BLD-MCNC: 10.1 G/DL (ref 11.2–15.7)
IRON SATN MFR SERPL: 7 % (ref 14–50)
IRON SERPL-MCNC: 28 UG/DL (ref 37–145)
LYMPHOCYTES # BLD: 1.05 K/UL (ref 1.18–3.74)
LYMPHOCYTES NFR BLD: 19.2 % (ref 19.3–53.1)
MCH RBC QN AUTO: 30.1 PG (ref 25.6–32.2)
MCHC RBC AUTO-ENTMCNC: 33.3 G/DL (ref 32.3–35.5)
MCV RBC AUTO: 90.2 FL (ref 79.4–94.8)
MONOCYTES # BLD: 0.46 K/UL (ref 0.24–0.82)
MONOCYTES NFR BLD: 8.4 % (ref 4.7–12.5)
NEUTROPHILS # BLD: 3.73 K/UL (ref 1.56–6.13)
NEUTS SEG NFR BLD: 68 % (ref 34–71.1)
PLATELET # BLD AUTO: 208 K/UL (ref 182–369)
PMV BLD AUTO: 10.4 FL (ref 7.4–10.4)
RBC # BLD AUTO: 3.36 M/UL (ref 3.93–5.22)
TIBC SERPL-MCNC: 397 UG/DL (ref 250–400)
VIT B12 SERPL-MCNC: 775 PG/ML (ref 211–946)
WBC # BLD AUTO: 5.48 K/UL (ref 3.98–10.04)

## 2023-08-23 PROCEDURE — 1036F TOBACCO NON-USER: CPT | Performed by: NURSE PRACTITIONER

## 2023-08-23 PROCEDURE — G8427 DOCREV CUR MEDS BY ELIG CLIN: HCPCS | Performed by: NURSE PRACTITIONER

## 2023-08-23 PROCEDURE — 1090F PRES/ABSN URINE INCON ASSESS: CPT | Performed by: NURSE PRACTITIONER

## 2023-08-23 PROCEDURE — 85025 COMPLETE CBC W/AUTO DIFF WBC: CPT

## 2023-08-23 PROCEDURE — 1123F ACP DISCUSS/DSCN MKR DOCD: CPT | Performed by: NURSE PRACTITIONER

## 2023-08-23 PROCEDURE — G8420 CALC BMI NORM PARAMETERS: HCPCS | Performed by: NURSE PRACTITIONER

## 2023-08-23 PROCEDURE — 99204 OFFICE O/P NEW MOD 45 MIN: CPT | Performed by: NURSE PRACTITIONER

## 2023-08-23 PROCEDURE — 99212 OFFICE O/P EST SF 10 MIN: CPT

## 2023-08-23 PROCEDURE — 36415 COLL VENOUS BLD VENIPUNCTURE: CPT

## 2023-08-23 ASSESSMENT — ENCOUNTER SYMPTOMS
EYE DISCHARGE: 0
DIARRHEA: 1
NAUSEA: 0
COUGH: 0
CONSTIPATION: 0
VOMITING: 0
EYE ITCHING: 0
SHORTNESS OF BREATH: 1
TROUBLE SWALLOWING: 0
ABDOMINAL PAIN: 1
WHEEZING: 0
BACK PAIN: 1
SORE THROAT: 0

## 2023-08-23 ASSESSMENT — PROMIS GLOBAL HEALTH SCALE
TO WHAT EXTENT ARE YOU ABLE TO CARRY OUT YOUR EVERYDAY PHYSICAL ACTIVITIES SUCH AS WALKING, CLIMBING STAIRS, CARRYING GROCERIES, OR MOVING A CHAIR [ON A SCALE OF 1 (NOT AT ALL) TO 5 (COMPLETELY)]?: 2
IN THE PAST 7 DAYS, HOW OFTEN HAVE YOU BEEN BOTHERED BY EMOTIONAL PROBLEMS, SUCH AS FEELING ANXIOUS, DEPRESSED, OR IRRITABLE [ON A SCALE FROM 1 (NEVER) TO 5 (ALWAYS)]?: 2
IN GENERAL, WOULD YOU SAY YOUR QUALITY OF LIFE IS...[ON A SCALE OF 1 (POOR) TO 5 (EXCELLENT)]: 1
SUM OF RESPONSES TO QUESTIONS 3, 6, 7, & 8: 14
IN THE PAST 7 DAYS, HOW WOULD YOU RATE YOUR FATIGUE ON AVERAGE [ON A SCALE FROM 1 (NONE) TO 5 (VERY SEVERE)]?: 3
SUM OF RESPONSES TO QUESTIONS 2, 4, 5, & 10: 5
IN THE PAST 7 DAYS, HOW WOULD YOU RATE YOUR PAIN ON AVERAGE [ON A SCALE FROM 0 (NO PAIN) TO 10 (WORST IMAGINABLE PAIN)]?: 7
IN GENERAL, HOW WOULD YOU RATE YOUR SATISFACTION WITH YOUR SOCIAL ACTIVITIES AND RELATIONSHIPS [ON A SCALE OF 1 (POOR) TO 5 (EXCELLENT)]?: 1
IN GENERAL, PLEASE RATE HOW WELL YOU CARRY OUT YOUR USUAL SOCIAL ACTIVITIES (INCLUDES ACTIVITIES AT HOME, AT WORK, AND IN YOUR COMMUNITY, AND RESPONSIBILITIES AS A PARENT, CHILD, SPOUSE, EMPLOYEE, FRIEND, ETC) [ON A SCALE OF 1 (POOR) TO 5 (EXCELLENT)]?: 1
IN GENERAL, HOW WOULD YOU RATE YOUR PHYSICAL HEALTH [ON A SCALE OF 1 (POOR) TO 5 (EXCELLENT)]?: 2
IN GENERAL, HOW WOULD YOU RATE YOUR MENTAL HEALTH, INCLUDING YOUR MOOD AND YOUR ABILITY TO THINK [ON A SCALE OF 1 (POOR) TO 5 (EXCELLENT)]?: 1
IN GENERAL, WOULD YOU SAY YOUR HEALTH IS...[ON A SCALE OF 1 (POOR) TO 5 (EXCELLENT)]: 2

## 2023-08-24 ENCOUNTER — NURSE ONLY (OUTPATIENT)
Dept: UROLOGY | Age: 88
End: 2023-08-24
Payer: MEDICARE

## 2023-08-24 DIAGNOSIS — N39.46 MIXED STRESS AND URGE URINARY INCONTINENCE: ICD-10-CM

## 2023-08-24 DIAGNOSIS — N32.81 OAB (OVERACTIVE BLADDER): Primary | ICD-10-CM

## 2023-08-24 DIAGNOSIS — K90.9 IRON MALABSORPTION: ICD-10-CM

## 2023-08-24 DIAGNOSIS — D50.8 OTHER IRON DEFICIENCY ANEMIA: ICD-10-CM

## 2023-08-24 PROBLEM — D50.9 IRON DEFICIENCY ANEMIA: Status: ACTIVE | Noted: 2023-08-24

## 2023-08-24 PROCEDURE — 64566 NEUROELTRD STIM POST TIBIAL: CPT | Performed by: NURSE PRACTITIONER

## 2023-08-24 RX ORDER — SODIUM CHLORIDE 9 MG/ML
INJECTION, SOLUTION INTRAVENOUS CONTINUOUS
Status: CANCELLED | OUTPATIENT
Start: 2023-08-31

## 2023-08-24 RX ORDER — DIPHENHYDRAMINE HYDROCHLORIDE 50 MG/ML
50 INJECTION INTRAMUSCULAR; INTRAVENOUS
Status: CANCELLED | OUTPATIENT
Start: 2023-08-31

## 2023-08-24 RX ORDER — ALBUTEROL SULFATE 90 UG/1
4 AEROSOL, METERED RESPIRATORY (INHALATION) PRN
Status: CANCELLED | OUTPATIENT
Start: 2023-08-31

## 2023-08-24 RX ORDER — FAMOTIDINE 10 MG/ML
20 INJECTION, SOLUTION INTRAVENOUS
Status: CANCELLED | OUTPATIENT
Start: 2023-08-31

## 2023-08-24 RX ORDER — SODIUM CHLORIDE 0.9 % (FLUSH) 0.9 %
5-40 SYRINGE (ML) INJECTION PRN
Status: CANCELLED | OUTPATIENT
Start: 2023-08-31

## 2023-08-24 RX ORDER — SODIUM CHLORIDE 9 MG/ML
5-250 INJECTION, SOLUTION INTRAVENOUS PRN
Status: CANCELLED | OUTPATIENT
Start: 2023-08-31

## 2023-08-24 RX ORDER — HEPARIN 100 UNIT/ML
500 SYRINGE INTRAVENOUS PRN
Status: CANCELLED | OUTPATIENT
Start: 2023-08-31

## 2023-08-24 NOTE — PROGRESS NOTES
Treatment # maintenance    Improvement of Symptoms? mild    OAB/Urologic Medications? none      Uroplasty Procedure:    1. Patient is seated with the left treatment leg elevated. 2. 34 gauge fine needle electrode is inserted into lower inner aspect of the leg. Slightly cephalad to the medial malleolus. 3. Surface electrode pad is placed over the medial aspect of the calcaneus on the same leg. 4.Needle electrode is connected to the external pulse generator. 5.The pulse generator is turned on and the millivolts used are 10. 6.Patient is treated for 30 minutes. 7.The needle and pad are removed. Patient will follow up in 2 weeks for next treatment.

## 2023-08-25 LAB — EPO SERPL-ACNC: 44 MU/ML (ref 4–27)

## 2023-08-29 ENCOUNTER — TELEPHONE (OUTPATIENT)
Dept: HEMATOLOGY | Age: 88
End: 2023-08-29

## 2023-08-29 NOTE — TELEPHONE ENCOUNTER
Yesyjana Manuelroney daughter called and asked multiple questions. Answered questions regarding iv iron infusions. All questions answered. Will discuss with brother to see if they want to start iron transfusion.

## 2023-09-05 ENCOUNTER — CLINICAL DOCUMENTATION (OUTPATIENT)
Facility: HOSPITAL | Age: 88
End: 2023-09-05

## 2023-09-05 ENCOUNTER — HOSPITAL ENCOUNTER (OUTPATIENT)
Dept: INFUSION THERAPY | Age: 88
Setting detail: INFUSION SERIES
Discharge: HOME | End: 2023-09-05
Payer: MEDICARE

## 2023-09-05 VITALS
OXYGEN SATURATION: 94 % | SYSTOLIC BLOOD PRESSURE: 141 MMHG | DIASTOLIC BLOOD PRESSURE: 70 MMHG | HEART RATE: 60 BPM | RESPIRATION RATE: 18 BRPM | TEMPERATURE: 98 F

## 2023-09-05 DIAGNOSIS — D50.8 OTHER IRON DEFICIENCY ANEMIA: Primary | ICD-10-CM

## 2023-09-05 DIAGNOSIS — K90.9 IRON MALABSORPTION: ICD-10-CM

## 2023-09-05 PROCEDURE — 96365 THER/PROPH/DIAG IV INF INIT: CPT

## 2023-09-05 PROCEDURE — 2580000003 HC RX 258: Performed by: NURSE PRACTITIONER

## 2023-09-05 PROCEDURE — 6360000002 HC RX W HCPCS: Performed by: NURSE PRACTITIONER

## 2023-09-05 RX ORDER — SODIUM CHLORIDE 0.9 % (FLUSH) 0.9 %
5-40 SYRINGE (ML) INJECTION PRN
Status: CANCELLED | OUTPATIENT
Start: 2023-09-12

## 2023-09-05 RX ORDER — SODIUM CHLORIDE 9 MG/ML
INJECTION, SOLUTION INTRAVENOUS CONTINUOUS
OUTPATIENT
Start: 2023-09-12

## 2023-09-05 RX ORDER — SODIUM CHLORIDE 9 MG/ML
5-250 INJECTION, SOLUTION INTRAVENOUS PRN
OUTPATIENT
Start: 2023-09-12

## 2023-09-05 RX ORDER — SODIUM CHLORIDE 0.9 % (FLUSH) 0.9 %
5-40 SYRINGE (ML) INJECTION PRN
Status: DISCONTINUED | OUTPATIENT
Start: 2023-09-05 | End: 2023-09-06 | Stop reason: HOSPADM

## 2023-09-05 RX ORDER — ALBUTEROL SULFATE 90 UG/1
4 AEROSOL, METERED RESPIRATORY (INHALATION) PRN
OUTPATIENT
Start: 2023-09-12

## 2023-09-05 RX ORDER — SODIUM CHLORIDE 9 MG/ML
5-250 INJECTION, SOLUTION INTRAVENOUS PRN
Status: DISCONTINUED | OUTPATIENT
Start: 2023-09-05 | End: 2023-09-06 | Stop reason: HOSPADM

## 2023-09-05 RX ORDER — DIPHENHYDRAMINE HYDROCHLORIDE 50 MG/ML
50 INJECTION INTRAMUSCULAR; INTRAVENOUS
OUTPATIENT
Start: 2023-09-12

## 2023-09-05 RX ORDER — HEPARIN 100 UNIT/ML
500 SYRINGE INTRAVENOUS PRN
OUTPATIENT
Start: 2023-09-12

## 2023-09-05 RX ORDER — SODIUM CHLORIDE 9 MG/ML
5-250 INJECTION, SOLUTION INTRAVENOUS PRN
Status: CANCELLED | OUTPATIENT
Start: 2023-09-12

## 2023-09-05 RX ADMIN — FERRIC CARBOXYMALTOSE INJECTION 750 MG: 50 INJECTION, SOLUTION INTRAVENOUS at 14:58

## 2023-09-05 RX ADMIN — SODIUM CHLORIDE 20 ML/HR: 9 INJECTION, SOLUTION INTRAVENOUS at 14:56

## 2023-09-05 NOTE — PROGRESS NOTES
Patient Assistance                   Additional notes: Reviewed chart and there is no assistance available.

## 2023-09-05 NOTE — PROGRESS NOTES
Patient Assistance                   Additional notes: Reviewed chart and no assistance is needed. Patient has Medicare A & B and no supplement but there is no assistance available.

## 2023-09-05 NOTE — DISCHARGE INSTRUCTIONS
apart. You may need frequent medical tests, even if you have no symptoms. What happens if I miss a dose? Call your doctor for instructions if you miss an appointment for your ferric carboxymaltose injection. What happens if I overdose? Seek emergency medical attention or call the Poison Help line at [phone]. Overdose symptoms may include pain, cough, wheezing, shortness of breath, coughing up blood, weight loss, or slowed growth in a child. What should I avoid after receiving ferric carboxymaltose? Do not take iron supplements or a vitamin/mineral supplement that your doctor has not prescribed or recommended. What are the possible side effects of ferric carboxymaltose? Get emergency medical help if you have signs of an allergic reaction: hives; feeling like you might pass out; wheezing, difficult breathing; swelling of your face, lips, tongue, or throat. Call your doctor at once if you have:  increased blood pressure --dizziness, nausea, sudden warmth or redness in your face, severe headache, pounding in your neck or ears;  low levels of phosphorus in your blood --confusion, bone pain, muscle weakness; or  high levels of iron stored in your body --feeling weak or tired, joint pain, finger pain, stomach pain, weight loss, irregular heartbeats, fluttering in your chest.  Common side effects may include:  nausea;  dizziness;  high blood pressure;  flushing (warmth, redness, or tingly feeling); or  low phosphorus levels. This is not a complete list of side effects and others may occur. Call your doctor for medical advice about side effects. You may report side effects to FDA at 8-839-TVB-2615. What other drugs will affect ferric carboxymaltose? Other drugs may affect ferric carboxymaltose, including prescription and over-the-counter medicines, vitamins, and herbal products. Tell your doctor about all other medicines you use. Where can I get more information?   Your pharmacist can provide more [employer] disclaims any warranty or liability for your use of this information.

## 2023-09-06 ENCOUNTER — NURSE ONLY (OUTPATIENT)
Dept: UROLOGY | Age: 88
End: 2023-09-06
Payer: MEDICARE

## 2023-09-06 VITALS — HEIGHT: 60 IN | BODY MASS INDEX: 22.97 KG/M2 | TEMPERATURE: 98.3 F | WEIGHT: 117 LBS

## 2023-09-06 DIAGNOSIS — N39.46 MIXED STRESS AND URGE URINARY INCONTINENCE: ICD-10-CM

## 2023-09-06 DIAGNOSIS — N32.81 OAB (OVERACTIVE BLADDER): ICD-10-CM

## 2023-09-06 DIAGNOSIS — R35.0 URINARY FREQUENCY: Primary | ICD-10-CM

## 2023-09-06 PROCEDURE — 99213 OFFICE O/P EST LOW 20 MIN: CPT | Performed by: NURSE PRACTITIONER

## 2023-09-06 PROCEDURE — 1123F ACP DISCUSS/DSCN MKR DOCD: CPT | Performed by: NURSE PRACTITIONER

## 2023-09-06 ASSESSMENT — ENCOUNTER SYMPTOMS
VOMITING: 0
ABDOMINAL PAIN: 0
ABDOMINAL DISTENTION: 0
NAUSEA: 0
BACK PAIN: 0

## 2023-09-08 DIAGNOSIS — N30.90 CYSTITIS: Primary | ICD-10-CM

## 2023-09-08 LAB
BACTERIA UR CULT: ABNORMAL
BACTERIA UR CULT: ABNORMAL
ORGANISM: ABNORMAL

## 2023-09-08 RX ORDER — CEFDINIR 300 MG/1
300 CAPSULE ORAL 2 TIMES DAILY
Qty: 14 CAPSULE | Refills: 0 | Status: SHIPPED | OUTPATIENT
Start: 2023-09-08 | End: 2023-09-15

## 2023-09-08 NOTE — RESULT ENCOUNTER NOTE
Please let patient know urine culture is positive for infection. I sent in Ansina x7 days to [facility] care pharmacy.

## 2023-09-12 ENCOUNTER — HOSPITAL ENCOUNTER (OUTPATIENT)
Dept: INFUSION THERAPY | Age: 88
Setting detail: INFUSION SERIES
Discharge: HOME | End: 2023-09-12
Payer: MEDICARE

## 2023-09-12 VITALS
RESPIRATION RATE: 18 BRPM | OXYGEN SATURATION: 98 % | SYSTOLIC BLOOD PRESSURE: 140 MMHG | DIASTOLIC BLOOD PRESSURE: 73 MMHG | TEMPERATURE: 97.6 F | HEART RATE: 62 BPM

## 2023-09-12 DIAGNOSIS — K90.9 IRON MALABSORPTION: ICD-10-CM

## 2023-09-12 DIAGNOSIS — D50.8 OTHER IRON DEFICIENCY ANEMIA: Primary | ICD-10-CM

## 2023-09-12 PROCEDURE — 2580000003 HC RX 258: Performed by: NURSE PRACTITIONER

## 2023-09-12 PROCEDURE — 6360000002 HC RX W HCPCS: Performed by: NURSE PRACTITIONER

## 2023-09-12 PROCEDURE — 96365 THER/PROPH/DIAG IV INF INIT: CPT

## 2023-09-12 RX ORDER — SODIUM CHLORIDE 9 MG/ML
INJECTION, SOLUTION INTRAVENOUS CONTINUOUS
OUTPATIENT
Start: 2023-09-12

## 2023-09-12 RX ORDER — DIPHENHYDRAMINE HYDROCHLORIDE 50 MG/ML
50 INJECTION INTRAMUSCULAR; INTRAVENOUS
OUTPATIENT
Start: 2023-09-12

## 2023-09-12 RX ORDER — SODIUM CHLORIDE 9 MG/ML
5-250 INJECTION, SOLUTION INTRAVENOUS PRN
Status: CANCELLED | OUTPATIENT
Start: 2023-09-12

## 2023-09-12 RX ORDER — SODIUM CHLORIDE 0.9 % (FLUSH) 0.9 %
5-40 SYRINGE (ML) INJECTION PRN
Status: DISCONTINUED | OUTPATIENT
Start: 2023-09-12 | End: 2023-09-13 | Stop reason: HOSPADM

## 2023-09-12 RX ORDER — ALBUTEROL SULFATE 90 UG/1
4 AEROSOL, METERED RESPIRATORY (INHALATION) PRN
OUTPATIENT
Start: 2023-09-12

## 2023-09-12 RX ORDER — SODIUM CHLORIDE 9 MG/ML
5-250 INJECTION, SOLUTION INTRAVENOUS PRN
OUTPATIENT
Start: 2023-09-12

## 2023-09-12 RX ORDER — HEPARIN 100 UNIT/ML
500 SYRINGE INTRAVENOUS PRN
OUTPATIENT
Start: 2023-09-12

## 2023-09-12 RX ORDER — SODIUM CHLORIDE 9 MG/ML
5-250 INJECTION, SOLUTION INTRAVENOUS PRN
Status: DISCONTINUED | OUTPATIENT
Start: 2023-09-12 | End: 2023-09-13 | Stop reason: HOSPADM

## 2023-09-12 RX ORDER — SODIUM CHLORIDE 0.9 % (FLUSH) 0.9 %
5-40 SYRINGE (ML) INJECTION PRN
OUTPATIENT
Start: 2023-09-12

## 2023-09-12 RX ADMIN — SODIUM CHLORIDE 20 ML/HR: 9 INJECTION, SOLUTION INTRAVENOUS at 14:59

## 2023-09-12 RX ADMIN — FERRIC CARBOXYMALTOSE INJECTION 750 MG: 50 INJECTION, SOLUTION INTRAVENOUS at 15:00

## 2023-09-12 NOTE — DISCHARGE INSTRUCTIONS
ferric carboxymaltose  Pronunciation:  PILI ik melissa BOX ee SUZANL edna  Brand:  Injectafer  What is the most important information I should know about ferric carboxymaltose? Use only as directed. Tell your doctor if you use other medicines or have other medical conditions or allergies. What is ferric carboxymaltose? Ferric carboxymaltose is an iron replacement product that is used in adults used to treat iron deficiency anemia (RONA), which is low red blood cells caused by a lack of iron in the body. Ferric carboxymaltose is given to adults with RONA and chronic kidney disease (not on dialysis), or to adults with RONA when iron taken by mouth is not effective. Ferric carboxymaltose may also be used for purposes not listed in this medication guide. What should I discuss with my healthcare provider before receiving ferric carboxymaltose? You should not use ferric carboxymaltose if you are allergic to it. Tell your doctor if you have ever had:  high blood pressure; or  an allergic reaction to iron injected into a vein. Tell your doctor if you are pregnant or plan to become pregnant. It is not known if ferric carboxymaltose will harm an unborn baby, but this medicine may cause severe reactions in the mother that could affect the baby's heartbeat. Having iron deficiency anemia during pregnancy may increase the risk of premature birth or low birth weight. The benefit of treating this condition with ferric carboxymaltose may outweigh any risks to the baby. If you are breastfeeding, tell your doctor if you notice diarrhea or constipation in the nursing baby. How is ferric carboxymaltose given? Ferric carboxymaltose is injected into a vein by a healthcare provider. Tell your medical caregivers if you feel any burning or pain when ferric carboxymaltose is injected. You will be watched for at least 30 minutes to make sure you do not have an allergic reaction.   This medicine is usually given in 2 doses, 7 days

## 2023-09-13 ENCOUNTER — TELEPHONE (OUTPATIENT)
Dept: HEMATOLOGY | Age: 88
End: 2023-09-13

## 2023-09-13 NOTE — TELEPHONE ENCOUNTER
MADDIE De La Rosa called patient to follow up from [facility] screening. Patient did not answer this call. SW left message encouraging the patient to return this call at their conveneince.

## 2023-09-14 ENCOUNTER — TELEPHONE (OUTPATIENT)
Dept: HEMATOLOGY | Age: 88
End: 2023-09-14

## 2023-09-15 ENCOUNTER — TELEPHONE (OUTPATIENT)
Dept: HEMATOLOGY | Age: 88
End: 2023-09-15

## 2023-09-15 NOTE — TELEPHONE ENCOUNTER
Discussed where to go from here. Wait for a few months and recheck the iron panel and ferritin. OB stools still needed.

## 2023-09-20 ENCOUNTER — NURSE ONLY (OUTPATIENT)
Dept: UROLOGY | Age: 88
End: 2023-09-20
Payer: MEDICARE

## 2023-09-20 DIAGNOSIS — R35.0 URINARY FREQUENCY: Primary | ICD-10-CM

## 2023-09-20 DIAGNOSIS — N39.46 MIXED STRESS AND URGE URINARY INCONTINENCE: ICD-10-CM

## 2023-09-20 DIAGNOSIS — N32.81 OAB (OVERACTIVE BLADDER): ICD-10-CM

## 2023-09-20 PROCEDURE — 64566 NEUROELTRD STIM POST TIBIAL: CPT | Performed by: NURSE PRACTITIONER

## 2023-09-20 PROCEDURE — 1123F ACP DISCUSS/DSCN MKR DOCD: CPT | Performed by: NURSE PRACTITIONER

## 2023-09-21 NOTE — PROGRESS NOTES
Treatment # maintenance    Improvement of Symptoms? Somewhat. Has just finished abx for UTI    OAB/Urologic Medications? None      Uroplasty Procedure:    1. Patient is seated with the left treatment leg elevated. 2. 34 gauge fine needle electrode is inserted into lower inner aspect of the leg. Slightly cephalad to the medial malleolus. 3. Surface electrode pad is placed over the medial aspect of the calcaneus on the same leg. 4.Needle electrode is connected to the external pulse generator. 5.The pulse generator is turned on and the millivolts used are 4. 6.Patient is treated for 30 minutes. 7.The needle and pad are removed. Patient will follow up in 2 weeks for next treatment. At least 30 minutes were spent on the day of the visit reviewing the patient's past medical records/imaging, speaking face to face with the patient, and charting in the post visit period.

## 2023-09-22 DIAGNOSIS — D50.9 IRON DEFICIENCY ANEMIA, UNSPECIFIED IRON DEFICIENCY ANEMIA TYPE: ICD-10-CM

## 2023-09-22 LAB
HEMOCCULT SP1 STL QL: NORMAL
HEMOCCULT SP2 STL QL: NORMAL
HEMOCCULT SP3 STL QL: NORMAL

## 2023-09-25 PROCEDURE — 93296 REM INTERROG EVL PM/IDS: CPT | Performed by: NURSE PRACTITIONER

## 2023-09-25 PROCEDURE — 93294 REM INTERROG EVL PM/LDLS PM: CPT | Performed by: NURSE PRACTITIONER

## 2023-09-27 DIAGNOSIS — Z95.0 CARDIAC PACEMAKER: Primary | ICD-10-CM

## 2023-09-27 DIAGNOSIS — I49.5 SA NODE DYSFUNCTION (HCC): ICD-10-CM

## 2023-10-04 ENCOUNTER — NURSE ONLY (OUTPATIENT)
Dept: UROLOGY | Age: 88
End: 2023-10-04
Payer: MEDICARE

## 2023-10-04 DIAGNOSIS — N32.81 OAB (OVERACTIVE BLADDER): ICD-10-CM

## 2023-10-04 DIAGNOSIS — N39.46 MIXED STRESS AND URGE URINARY INCONTINENCE: ICD-10-CM

## 2023-10-04 DIAGNOSIS — R35.0 URINARY FREQUENCY: Primary | ICD-10-CM

## 2023-10-04 PROCEDURE — 64566 NEUROELTRD STIM POST TIBIAL: CPT | Performed by: NURSE PRACTITIONER

## 2023-10-12 ENCOUNTER — HOSPITAL ENCOUNTER (OUTPATIENT)
Dept: RADIATION ONCOLOGY | Facility: HOSPITAL | Age: 88
Setting detail: RADIATION/ONCOLOGY SERIES
End: 2023-10-12
Payer: MEDICARE

## 2023-10-18 ENCOUNTER — NURSE ONLY (OUTPATIENT)
Dept: UROLOGY | Age: 88
End: 2023-10-18
Payer: MEDICARE

## 2023-10-18 DIAGNOSIS — N32.81 OAB (OVERACTIVE BLADDER): ICD-10-CM

## 2023-10-18 DIAGNOSIS — R35.0 URINARY FREQUENCY: Primary | ICD-10-CM

## 2023-10-18 DIAGNOSIS — N39.46 MIXED STRESS AND URGE URINARY INCONTINENCE: ICD-10-CM

## 2023-10-18 PROCEDURE — 64566 NEUROELTRD STIM POST TIBIAL: CPT | Performed by: NURSE PRACTITIONER

## 2023-10-18 NOTE — PROGRESS NOTES
Treatment # maintenance    Improvement of Symptoms? yes    OAB/Urologic Medications? none      Uroplasty Procedure:    1. Patient is seated with the left treatment leg elevated. 2. 34 gauge fine needle electrode is inserted into lower inner aspect of the leg. Slightly cephalad to the medial malleolus. 3. Surface electrode pad is placed over the medial aspect of the calcaneus on the same leg. 4.Needle electrode is connected to the external pulse generator. 5.The pulse generator is turned on and the millivolts used are 9. 6.Patient is treated for 30 minutes. 7.The needle and pad are removed. Patient will follow up in 2 weeks for next treatment.

## 2023-10-19 ENCOUNTER — APPOINTMENT (OUTPATIENT)
Dept: RADIATION ONCOLOGY | Facility: HOSPITAL | Age: 88
End: 2023-10-19
Payer: MEDICARE

## 2023-10-19 DIAGNOSIS — C54.1 ENDOMETRIAL CANCER: Primary | ICD-10-CM

## 2023-10-19 DIAGNOSIS — Z90.710 S/P HYSTERECTOMY WITH OOPHORECTOMY: ICD-10-CM

## 2023-10-19 DIAGNOSIS — Z92.3 HISTORY OF RADIATION THERAPY: ICD-10-CM

## 2023-10-19 DIAGNOSIS — Z90.721 S/P HYSTERECTOMY WITH OOPHORECTOMY: ICD-10-CM

## 2023-10-19 DIAGNOSIS — Z78.9 NON-SMOKER: ICD-10-CM

## 2023-10-19 NOTE — PROGRESS NOTES
[facility], .SLacyLacy Tillman MD      Jhony Pimentel APRN  ____________________________________________________________  [facility]  Department of Radiation Oncology  [address] [place] [id number]  Office:  [phone]  Fax: [phone]    DATE:  10/19/2023  PATIENT: Bel Powell  1935                         MEDICAL RECORD #:  [id number]                 No chief complaint on file.    Reason for Visit: Bel Powell is a very pleasant 88 y.o. female that has completed HDR treatment and returns to the clinic today for routine follow up exam.     History of Present Illness:  Diagnosed with recurrent endometrial carcinoma. She completed 5 internal brachytherapy treatments completed on 03/03/2023.     09/13/2019 - CT Abdomen/Pelvis due to right lower quadrant pain:  Cystic changes centrally within the uterus; a fluid distended endometrial cavity considered, GYN consultation suggested. Uterine fibroids.   Colonic diverticulosis without CT evidence of acute diverticulitis. Otherwise, No CT evidence of acute bowel pathology   Uncomplicated left-sided superior lumbar hernia.     09/18/2019 - CT Head without contrast:  No acute intracranial abnormality.     09/25/2019 - CT Head:  No acute intracranial abnormality.   Chronic ischemic and atrophic changes.     10/28/2019 - Endometrium, biopsy:  Primarily mucus.  Fragments of squamous mucosa.  Scant fragments of endocervical mucosa.  Extremely scant superficial fragments of endometrial glandular epithelium.  No evidence of atypia or malignancy.    12/23/2019 - Biopsies:  Endometrium, curettings:  Endometrioid adenocarcinoma, FIGO grade 1 arising in a background of complex atypical hyperplasia.  Cervix, endocervical curettings:  Benign endocervical glands.      01/28/2020 - Robotic Total Hysterectomy/BSO per :   Right sentinel lymph node biopsy:  3 lymph nodes negative for tumor  Left pelvic sentinel lymph  node biopsy:  Fibroadipose tissue with small fragment of lymph node showing cautery change and no evidence of metastatic tumor  Uterus, fallopian tubes and ovaries, hysterectomy with bilateral salpingo-oophorectomy:   Cervix and endocervix with no evidence of glandular and squamous dysplasia  Endometriod endometrial adenocarcinoma, villoglandular type, grade 1, with less than 1 mm of invasion (of 17 mm myometrial thickness)  Multiple leiomyomata uteri  No evidence of adenomyosis  Fallopian tubes with paratubal remnant cysts, negative for endometriosis and malignancy  Ovaries with no evidence of endometriosis or malignancy  Comment: Paraffin immunoperoxidase studies, utilizing appropriate positive and negative controls, are performed on the 2 sentinel lymph node specimens. These fail to demonstrate AE1/AE3 positive tumor cells in the lymph nodes.  TUMOR  Histologic type - endometrioid carcinoma, villoglandular variant  Histologic grade - FIGO grade 1  Myometrial invasion - present  Depth of invasion - 1 mm  Myometrial thickness in millimeters: 17  Percentage of myometrial invasion - 6%  Uterine serosa involvement - not identified  Cervical stromal involvement - not identified  Other tissue/organ involvement - not identified  Lymphovascular invasion - not identified  LYMPH NODES  Total number of pelvic nodes examined - 4  Number of pelvic sentinel lymph nodes -4  Laterality of pelvic nodes - right, left  Number of pelvic nodes with macrometastasis - 0  Number of pelvic nodes with micrometastasis - 0  Number of pelvic nodes with isolated tumor cells - 0    02/24/2020 - CT Abdomen/Pelvis:  Fluid-filled nondistended small bowel loops with mucosal enhancement and thickening suggest acute enteritis. Further follow-up may be obtained.   The diverticulosis of the distal colon. No evidence of diverticulitis.   Moderately dilated distal common bile duct and intrahepatic biliary ducts is probably due to a prior cholecystectomy.    A stable left lumbar hernia containing a loop of colon without obstruction.     12/15/2020 - CT Abdomen/pelvis:  The stable insufficiency fractures of the sacrum bilaterally, similar to the previous study in February 2020.   No acute abnormality of the abdomen are pelvis, particularly, no evidence of traumatic involvement of the abdominal pelvic organs.   The diverticulosis of the colon. No evidence for diverticulitis.   Hardware fusion of the lumbar spine. The severe demineralization the bony structures.     05/25/2021 - CT Abdomen/Pelvis due to vomiting, pain:  This study is of limited diagnostic quality. This is secondary to the lack of intra-abdominal fat, lack of IV and oral contrast as well as the patient's inability to raise her arms over her head with extensive streaking artifact related to both upper extremities as well as postoperative changes within the mid and lower lumbar spine also contributing to streaking. If symptoms are persistent I would suggest that the study be repeated with both IV and oral contrast administration.   Cardiomegaly with a transvenous pacer in place. There is heavy atheromatous calcification of the distal descending thoracic aorta without evidence of aneurysm.   Multiple fluid-filled bowel loops. I do not see evidence of a discrete transition point. The appendix is not clearly identified and may be surgically absent. There are a few diverticula noted of the sigmoid colon without evidence of diverticulitis. No convincing evidence of bowel obstruction. No evidence of pneumoperitoneum.   I do not see evidence of nephrolithiasis. The ureters cannot be clearly trace but there is no convincing evidence of obstructive uropathy.   The patient has undergone previous cholecystectomy and hysterectomy. The patient has also undergone fusion at the L3-L5 levels.    12/08/2022 - Appointment with :  Ms. Powell is a 86 y/o with Stage IA Grade 1 Endometrial Cancer who presents for an  evaluation.   Exam showed possible granulation tissue. Removed and will sent to path and call with results and plan.   s/p robotic hyst/bso/staging on 1/29/20   no additional treatment required   IHC Negative   Surveillance Visit: q6months x 1 year (January 2021) then yearly for 5 years total (January 2025)  Ms. Powell will return in 12 months.    12/08/2022 - Vagina biopsy:  Low-grade endometrioid pattern adenocarcinoma    12/21/2022 - Appointment with Yolanda Kamara APRN - CNP - Urology:  ASSESSMENT/PLAN  Abnormality of urethral meatus  Patient with apparent abnormality of the urethral meatus as explained to her daughter by her obstetrical oncologist at an appointment last week. She requested a pelvic exam today. I did discuss with the daughter that I was uncertain if pelvic exam would provide any useful information.   However, I did offer to perform 1. I could not perform 1 at the time I was in the room because the patient was undergoing your plasty treatment and then could not move to the dorsolithotomy position.   Patient also with emergent ophthalmic appointment today regarding a possible detached retina. Patient daughter reports that they cannot miss that appointment and are already running late. Pelvic exam for today was deferred.   We will try to obtain some records from her oncologist to determine if there is anything in particular they need from us.    12/21/2022 - CT Abdomen/Pelvis with contrast:  Prior hysterectomy without evidence of metastatic disease in the abdomen or pelvis.    12/21/2022 - CT Chest with contrast:  No evidence of metastatic disease in the chest.   Hiatal hernia with thickened distal esophagus. Consider follow-up endoscopy.   Atherosclerotic disease.    01/16/2023 - Consult with :  After consideration of the diagnostic data and evaluation of the patient, I recommend obtaining a PET scan for further evaluation of disease progression. Should it reveal limited disease,  could consider brachytherapy treatments vs. Whole pelvic radiation.   The patient and her family verbalize understanding of this discussion, voice no further questions and wish to proceed with recommendations. Continue ongoing management per primary care physician and other specialists.  Plan:  PET scan ordered, they will call you  Return in 2 weeks for further discussion of treatment recommendations    01/24/2023 - PET Scan:  No hypermetabolic soft tissue recurrence identified in the pelvis. There is only a small amount of bowel uptake identified in the rectum as well as excreted tracer activity in the ureters and urinary bladder.  No suspicious adenopathy or evidence of distant metastatic disease.    01/26/2023 - Appointment with :  Bel Powell returns to the clinic today to review PET scan result and discuss radiotherapy recommendations for recurrent endometrial carcinoma.   PET Scan completed on 01/24/2023 revealed no hypermetabolic soft tissue recurrence identified in the pelvis. There is only a small amount of bowel uptake identified in the rectum as well as excreted tracer activity in the ureters and urinary bladder. No suspicious adenopathy or evidence of distant metastatic disease.  Given the PET scan results, I recommend to treat with internal brachytherapy treatments.  We reviewed potential complications side effects of radiation include limited radiation-induced damage to the bowel or bladder.  Patient and daughter verbalized understanding distress and voices no questions and agreed to proceed with therapy.   Plan:  Plan on 4-5 internal treatments.     02/02/2023 - 03/03/2023 - Completed radiation course:  Received 5 HDR treatments.    06/07/2023 - Appointment with :  PAP smear today  Return in 4 months.     06/07/2023 - Pap smear per :  No interpretation due to specimen adequacy     History obtained from  PATIENT, FAMILY, and CHART    PAST MEDICAL HISTORY  Past Medical  History:   Diagnosis Date    Arthritis     OA    Dietary restriction     FLUID 48-52 OZ PER DR HACKETT    Endometrial thickening on ultrasound     Hypertension     Incontinence     Nocturia     Plantar fasciitis     PMB (postmenopausal bleeding)     Seizure     Sensory urge incontinence     Stroke     CODED     TIA (transient ischemic attack)     Uterine cancer     Uterine polyp     Vaginal atrophy     Vaginal bleeding     Weakness       PAST SURGICAL HISTORY  Past Surgical History:   Procedure Laterality Date    BACK SURGERY      BREAST SURGERY      LEFT BREAST CYST    CATARACT EXTRACTION      CHOLECYSTECTOMY      D & C HYSTEROSCOPY N/A 12/23/2019    Procedure: DILATATION AND CURETTAGE HYSTEROSCOPY;  Surgeon: Lynnette Andujar MD;  Location: [facility] OR;  Service: Obstetrics/Gynecology    D & C HYSTEROSCOPY      x2    HYSTERECTOMY  01/28/2022    [facility] BSO for uterine cancer    PACEMAKER IMPLANTATION Left     2021  Dr. Mayer at [facility]    TONSILLECTOMY      WISDOM TOOTH EXTRACTION        FAMILY HISTORY  family history includes No Known Problems in her father and mother.    SOCIAL HISTORY  Social History     Tobacco Use    Smoking status: Never     Passive exposure: Never    Smokeless tobacco: Never   Vaping Use    Vaping Use: Never used   Substance Use Topics    Alcohol use: No     Comment: rare    Drug use: Never     ALLERGIES  Hydrocodone-acetaminophen, Levofloxacin, Memantine hcl, Pneumococcal vaccine, Quinolones, Yellow dye, Aspirin, Lortab [hydrocodone-acetaminophen], Meperidine, Mirabegron, Ondansetron, Penicillins, Spironolactone, Sulfa antibiotics, Codeine, and Yellow dye #11     MEDICATIONS    Current Outpatient Medications:     acetaminophen (TYLENOL) 500 MG tablet, Take 2 tablets by mouth Every 6 (Six) Hours As Needed for Moderate Pain. Do not exceed 3000 mg's daily, Disp: , Rfl:     alendronate (FOSAMAX) 70 MG tablet, 1 tablet Orally once a week for 30 day(s), Disp: , Rfl:     amLODIPine  (NORVASC) 5 MG tablet, Take 1 tablet by mouth Every Morning., Disp: , Rfl:     azelastine (ASTELIN) 0.1 % nasal spray, 2 sprays into the nostril(s) as directed by provider 2 (Two) Times a Day., Disp: , Rfl:     B Complex Vitamins (VITAMIN B COMPLEX PO), Take 1 tablet by mouth Daily., Disp: , Rfl:     benzonatate (TESSALON) 100 MG capsule, Take 2 capsules by mouth 3 (Three) Times a Day As Needed., Disp: , Rfl:     butalbital-acetaminophen-caffeine (FIORICET, ESGIC) -40 MG per tablet, Take 1 tablet by mouth Every 6 (Six) Hours As Needed for Headache., Disp: , Rfl:     calcium carb-cholecalciferol 600-800 MG-UNIT tablet, Take 1 tablet by mouth Daily., Disp: , Rfl:     camphor-menthol (SARNA) 0.5-0.5 % lotion, Apply 1 application topically to the appropriate area as directed 2 (Two) Times a Day., Disp: , Rfl:     cholecalciferol (VITAMIN D3) 1000 UNITS tablet, Take 1 tablet by mouth Daily., Disp: , Rfl:     cloNIDine (CATAPRES) 0.1 MG tablet, Take 1 tablet by mouth 2 (Two) Times a Day As Needed for High Blood Pressure (As needed if BP exceeds 160/100.). Half tablet, Disp: , Rfl:     diclofenac (VOLTAREN) 1 % gel gel, Apply 2 g topically to the appropriate area as directed 4 (Four) Times a Day As Needed (PAIN)., Disp: , Rfl:     dicyclomine (BENTYL) 10 MG capsule, Take 1 capsule by mouth 4 (Four) Times a Day Before Meals & at Bedtime. Tried med x 1 (Patient not taking: Reported on 8/11/2023), Disp: , Rfl:     dipyridamole (PERSANTINE) 50 MG tablet, Take 2 tablets by mouth 2 (Two) Times a Day., Disp: , Rfl:     donepezil (ARICEPT) 10 MG tablet, Take 1 tablet by mouth Every Night., Disp: , Rfl:     fluticasone (VERAMYST) 27.5 MCG/SPRAY nasal spray, 2 sprays into the nostril(s) as directed by provider Every Night., Disp: , Rfl:     gabapentin (NEURONTIN) 100 MG capsule, Take 1 capsule by mouth. Up to 3 times a day, Disp: , Rfl:     hydrALAZINE (APRESOLINE) 25 MG tablet, Take 1 tablet by mouth 3 (Three) Times a Day As  Needed. 25mg in afternoon, in the evening 50 mg. Not to be given in the morning due to vagal response, Disp: , Rfl:     levETIRAcetam (KEPPRA) 500 MG tablet, Take 1 tablet by mouth 2 (Two) Times a Day., Disp: , Rfl:     Lidocaine 4 % patch, Place 1 patch on the skin as directed by provider 2 (Two) Times a Day., Disp: , Rfl:     Lutein 20 MG tablet, Take 1 tablet by mouth Daily., Disp: , Rfl:     meloxicam (MOBIC) 15 MG tablet, Take 1 tablet by mouth Daily As Needed., Disp: , Rfl:     Omega 3-6-9 Fatty Acids (OMEGA 3-6-9 COMPLEX PO), Take 1 capsule by mouth Daily., Disp: , Rfl:     pantoprazole (PROTONIX) 40 MG EC tablet, Take 1 tablet by mouth 2 (Two) Times a Day., Disp: , Rfl:     pravastatin (PRAVACHOL) 20 MG tablet, Take 1 tablet by mouth Daily., Disp: , Rfl:     promethazine (PHENERGAN) 25 MG tablet, Take 1 tablet by mouth Every 8 (Eight) Hours As Needed for Nausea or Vomiting., Disp: , Rfl:     promethazine (PHENERGAN) 25 MG tablet, Every 8 (Eight) Hours. (Patient not taking: Reported on 8/11/2023), Disp: , Rfl:     ramipril (ALTACE) 10 MG capsule, Take 1 capsule by mouth Daily., Disp: , Rfl:     saline 0.65 % nasal solution (BABY AYR) 0.65 % solution, 1 spray into the nostril(s) as directed by provider Daily As Needed., Disp: , Rfl:     sennosides-docusate (PERICOLACE) 8.6-50 MG per tablet, Take 1 tablet by mouth 2 (Two) Times a Day. (Patient not taking: Reported on 8/11/2023), Disp: , Rfl:     simethicone (MYLICON) 125 MG chewable tablet, Every 8 (Eight) Hours. 6am at 2 9:30am, 1 6pm, Disp: , Rfl:     sodium chloride 1 g tablet, Take 1 tablet by mouth 3 (Three) Times a Day., Disp: , Rfl:     therapeutic multivitamin-minerals (THERAGRAN-M) tablet, Take 1 tablet by mouth Daily., Disp: , Rfl:     valACYclovir (VALTREX) 1000 MG tablet, Take 1 tablet by mouth Daily As Needed (FEVER BLISTER)., Disp: , Rfl:     Current outpatient and discharge medications have been reconciled for the patient.  Reviewed by: Nakia  MARIA A Arreaga    The following portions of the patient's history were reviewed and updated as appropriate: allergies, current medications, past family history, past medical history, past social history, past surgical history and problem list.    REVIEW OF SYSTEMS  Review of Systems    I have reviewed and confirmed the accuracy of the ROS as documented by the MA/MODESTON/RN Nakia Arreaga LPN    PHYSICAL EXAM  VITAL SIGNS:   There were no vitals filed for this visit.      Physical Exam      Performance Status: ECOG (2) Ambulatory and capable of self care, unable to carry out work activity, up and about > 50% or waking hours    Clinical Quality Measures  -Pain Documented by Standardized Tool, FPS Bel Powell reports a pain score of .  Given her pain assessment as noted, treatment options were discussed and the following options were decided upon as a follow-up plan to address the patient's pain:  No pain, no plan given .   Pain Medications               acetaminophen (TYLENOL) 500 MG tablet Take 2 tablets by mouth Every 6 (Six) Hours As Needed for Moderate Pain. Do not exceed 3000 mg's daily    butalbital-acetaminophen-caffeine (FIORICET, ESGIC) -40 MG per tablet Take 1 tablet by mouth Every 6 (Six) Hours As Needed for Headache.    gabapentin (NEURONTIN) 100 MG capsule Take 1 capsule by mouth. Up to 3 times a day    levETIRAcetam (KEPPRA) 500 MG tablet Take 1 tablet by mouth 2 (Two) Times a Day.    meloxicam (MOBIC) 15 MG tablet Take 1 tablet by mouth Daily As Needed.          -Advanced Care Planning Advance Care Planning  ACP discussion was held with the patient during this visit. Patient has an advance directive in EMR which is still valid.     -Body Mass Index Screening and Follow-Up Plan BMI is within normal parameters. No other follow-up for BMI required.    -Tobacco Use: Screening and Cessation Intervention Social History    Tobacco Use      Smoking status: Never      Smokeless tobacco: Never    ASSESSMENT  AND PLAN  1. Endometrial cancer    2. S/P hysterectomy with oophorectomy    3. History of radiation therapy    4. Non-smoker      No orders of the defined types were placed in this encounter.    RECOMMENDATIONS:    Bel Powell is status post completion of radiation therapy and presents to our clinic today for routine follow up exam.  Diagnosed with recurrent endometrial carcinoma. She completed 5 internal brachytherapy treatments completed on 03/03/2023.     I have reviewed the chart and other physicians records. she denies untoward side effects from treatment and without evidence for recurrent or metastatic disease on exam today. She will return in ***. Continue ongoing management per primary care physician and other specialists.    There are no Patient Instructions on file for this visit.    Nakia Arreaga LPN  06/07/2023

## 2023-10-20 NOTE — PROGRESS NOTES
[facility], .SLacyLacy Tlilman MD      Jhony Pimentel ANGEL  ____________________________________________________________  [facility]  Department of Radiation Oncology  [address] [place] [id number]  Office:  [phone]  Fax: [phone]    DATE:  10/23/2023  PATIENT: Bel Powell  1935                         MEDICAL RECORD #:  [id number]                 Chief Complaint   Patient presents with    Uterine Cancer     Reason for Visit: Bel Powell is a very pleasant 88 y.o. female that has completed HDR treatment and returns to the clinic today for routine follow up exam. Denies activity change, appetite change, unexpected weight change, nasuea/vomiting, diarrhea, light-headedness, weakness, and headaches.     History of Present Illness:  Diagnosed with recurrent endometrial carcinoma. She completed 5 internal brachytherapy treatments completed on 03/03/2023.     09/13/2019 - CT Abdomen/Pelvis due to right lower quadrant pain:  Cystic changes centrally within the uterus; a fluid distended endometrial cavity considered, GYN consultation suggested. Uterine fibroids.   Colonic diverticulosis without CT evidence of acute diverticulitis. Otherwise, No CT evidence of acute bowel pathology   Uncomplicated left-sided superior lumbar hernia.     09/18/2019 - CT Head without contrast:  No acute intracranial abnormality.     09/25/2019 - CT Head:  No acute intracranial abnormality.   Chronic ischemic and atrophic changes.     10/28/2019 - Endometrium, biopsy:  Primarily mucus.  Fragments of squamous mucosa.  Scant fragments of endocervical mucosa.  Extremely scant superficial fragments of endometrial glandular epithelium.  No evidence of atypia or malignancy.    12/23/2019 - Biopsies:  Endometrium, curettings:  Endometrioid adenocarcinoma, FIGO grade 1 arising in a background of complex atypical hyperplasia.  Cervix, endocervical curettings:  Benign endocervical  glands.      01/28/2020 - Robotic Total Hysterectomy/BSO per :   Right sentinel lymph node biopsy:  3 lymph nodes negative for tumor  Left pelvic sentinel lymph node biopsy:  Fibroadipose tissue with small fragment of lymph node showing cautery change and no evidence of metastatic tumor  Uterus, fallopian tubes and ovaries, hysterectomy with bilateral salpingo-oophorectomy:   Cervix and endocervix with no evidence of glandular and squamous dysplasia  Endometriod endometrial adenocarcinoma, villoglandular type, grade 1, with less than 1 mm of invasion (of 17 mm myometrial thickness)  Multiple leiomyomata uteri  No evidence of adenomyosis  Fallopian tubes with paratubal remnant cysts, negative for endometriosis and malignancy  Ovaries with no evidence of endometriosis or malignancy  Comment: Paraffin immunoperoxidase studies, utilizing appropriate positive and negative controls, are performed on the 2 sentinel lymph node specimens. These fail to demonstrate AE1/AE3 positive tumor cells in the lymph nodes.  TUMOR  Histologic type - endometrioid carcinoma, villoglandular variant  Histologic grade - FIGO grade 1  Myometrial invasion - present  Depth of invasion - 1 mm  Myometrial thickness in millimeters: 17  Percentage of myometrial invasion - 6%  Uterine serosa involvement - not identified  Cervical stromal involvement - not identified  Other tissue/organ involvement - not identified  Lymphovascular invasion - not identified  LYMPH NODES  Total number of pelvic nodes examined - 4  Number of pelvic sentinel lymph nodes -4  Laterality of pelvic nodes - right, left  Number of pelvic nodes with macrometastasis - 0  Number of pelvic nodes with micrometastasis - 0  Number of pelvic nodes with isolated tumor cells - 0    02/24/2020 - CT Abdomen/Pelvis:  Fluid-filled nondistended small bowel loops with mucosal enhancement and thickening suggest acute enteritis. Further follow-up may be obtained.   The diverticulosis  of the distal colon. No evidence of diverticulitis.   Moderately dilated distal common bile duct and intrahepatic biliary ducts is probably due to a prior cholecystectomy.   A stable left lumbar hernia containing a loop of colon without obstruction.     12/15/2020 - CT Abdomen/pelvis:  The stable insufficiency fractures of the sacrum bilaterally, similar to the previous study in February 2020.   No acute abnormality of the abdomen are pelvis, particularly, no evidence of traumatic involvement of the abdominal pelvic organs.   The diverticulosis of the colon. No evidence for diverticulitis.   Hardware fusion of the lumbar spine. The severe demineralization the bony structures.     05/25/2021 - CT Abdomen/Pelvis due to vomiting, pain:  This study is of limited diagnostic quality. This is secondary to the lack of intra-abdominal fat, lack of IV and oral contrast as well as the patient's inability to raise her arms over her head with extensive streaking artifact related to both upper extremities as well as postoperative changes within the mid and lower lumbar spine also contributing to streaking. If symptoms are persistent I would suggest that the study be repeated with both IV and oral contrast administration.   Cardiomegaly with a transvenous pacer in place. There is heavy atheromatous calcification of the distal descending thoracic aorta without evidence of aneurysm.   Multiple fluid-filled bowel loops. I do not see evidence of a discrete transition point. The appendix is not clearly identified and may be surgically absent. There are a few diverticula noted of the sigmoid colon without evidence of diverticulitis. No convincing evidence of bowel obstruction. No evidence of pneumoperitoneum.   I do not see evidence of nephrolithiasis. The ureters cannot be clearly trace but there is no convincing evidence of obstructive uropathy.   The patient has undergone previous cholecystectomy and hysterectomy. The patient has  also undergone fusion at the L3-L5 levels.    12/08/2022 - Appointment with :  Ms. Powell is a 88 y/o with Stage IA Grade 1 Endometrial Cancer who presents for an evaluation.   Exam showed possible granulation tissue. Removed and will sent to path and call with results and plan.   s/p robotic hyst/bso/staging on 1/29/20   no additional treatment required   IHC Negative   Surveillance Visit: q6months x 1 year (January 2021) then yearly for 5 years total (January 2025)  Ms. Powell will return in 12 months.    12/08/2022 - Vagina biopsy:  Low-grade endometrioid pattern adenocarcinoma    12/21/2022 - Appointment with Yolanda Kamara, APRN - CNP - Urology:  ASSESSMENT/PLAN  Abnormality of urethral meatus  Patient with apparent abnormality of the urethral meatus as explained to her daughter by her obstetrical oncologist at an appointment last week. She requested a pelvic exam today. I did discuss with the daughter that I was uncertain if pelvic exam would provide any useful information.   However, I did offer to perform 1. I could not perform 1 at the time I was in the room because the patient was undergoing your plasty treatment and then could not move to the dorsolithotomy position.   Patient also with emergent ophthalmic appointment today regarding a possible detached retina. Patient daughter reports that they cannot miss that appointment and are already running late. Pelvic exam for today was deferred.   We will try to obtain some records from her oncologist to determine if there is anything in particular they need from us.    12/21/2022 - CT Abdomen/Pelvis with contrast:  Prior hysterectomy without evidence of metastatic disease in the abdomen or pelvis.    12/21/2022 - CT Chest with contrast:  No evidence of metastatic disease in the chest.   Hiatal hernia with thickened distal esophagus. Consider follow-up endoscopy.   Atherosclerotic disease.    01/16/2023 - Consult with :  After  consideration of the diagnostic data and evaluation of the patient, I recommend obtaining a PET scan for further evaluation of disease progression. Should it reveal limited disease, could consider brachytherapy treatments vs. Whole pelvic radiation.   The patient and her family verbalize understanding of this discussion, voice no further questions and wish to proceed with recommendations. Continue ongoing management per primary care physician and other specialists.  Plan:  PET scan ordered, they will call you  Return in 2 weeks for further discussion of treatment recommendations    01/24/2023 - PET Scan:  No hypermetabolic soft tissue recurrence identified in the pelvis. There is only a small amount of bowel uptake identified in the rectum as well as excreted tracer activity in the ureters and urinary bladder.  No suspicious adenopathy or evidence of distant metastatic disease.    01/26/2023 - Appointment with :  Bel Powell returns to the clinic today to review PET scan result and discuss radiotherapy recommendations for recurrent endometrial carcinoma.   PET Scan completed on 01/24/2023 revealed no hypermetabolic soft tissue recurrence identified in the pelvis. There is only a small amount of bowel uptake identified in the rectum as well as excreted tracer activity in the ureters and urinary bladder. No suspicious adenopathy or evidence of distant metastatic disease.  Given the PET scan results, I recommend to treat with internal brachytherapy treatments.  We reviewed potential complications side effects of radiation include limited radiation-induced damage to the bowel or bladder.  Patient and daughter verbalized understanding distress and voices no questions and agreed to proceed with therapy.   Plan:  Plan on 4-5 internal treatments.     02/02/2023 - 03/03/2023 - Completed radiation course:  Received 5 HDR treatments.    06/07/2023 - Appointment with :  PAP smear today  Return in 4  months.     06/07/2023 - Pap smear per :  No interpretation due to specimen adequacy     History obtained from  PATIENT, FAMILY, and CHART    PAST MEDICAL HISTORY  Past Medical History:   Diagnosis Date    Arthritis     OA    Dietary restriction     FLUID 48-52 OZ PER DR HACKETT    Endometrial thickening on ultrasound     Hypertension     Incontinence     Nocturia     Plantar fasciitis     PMB (postmenopausal bleeding)     Seizure     Sensory urge incontinence     Stroke     CODED     TIA (transient ischemic attack)     Uterine cancer     Uterine polyp     Vaginal atrophy     Vaginal bleeding     Weakness       PAST SURGICAL HISTORY  Past Surgical History:   Procedure Laterality Date    BACK SURGERY      BREAST SURGERY      LEFT BREAST CYST    CATARACT EXTRACTION      CHOLECYSTECTOMY      D & C HYSTEROSCOPY N/A 12/23/2019    Procedure: DILATATION AND CURETTAGE HYSTEROSCOPY;  Surgeon: Lynnette Andujar MD;  Location: [facility];  Service: Obstetrics/Gynecology    D & C HYSTEROSCOPY      x2    HYSTERECTOMY  01/28/2022    [facility]SO for uterine cancer    PACEMAKER IMPLANTATION Left     2021  Dr. Mayer at [facility]    TONSILLECTOMY      WISDOM TOOTH EXTRACTION        FAMILY HISTORY  family history includes No Known Problems in her father and mother.    SOCIAL HISTORY  Social History     Tobacco Use    Smoking status: Never     Passive exposure: Never    Smokeless tobacco: Never   Vaping Use    Vaping Use: Never used   Substance Use Topics    Alcohol use: No     Comment: rare    Drug use: Never     ALLERGIES  Hydrocodone-acetaminophen, Levofloxacin, Memantine hcl, Pneumococcal vaccine, Quinolones, Yellow dye, Aspirin, Lortab [hydrocodone-acetaminophen], Meperidine, Mirabegron, Ondansetron, Penicillins, Spironolactone, Sulfa antibiotics, Memantine, Codeine, and Yellow dye #11     MEDICATIONS    Current Outpatient Medications:     acetaminophen (TYLENOL) 500 MG tablet, Take 2 tablets by mouth Every 6 (Six)  Hours As Needed for Moderate Pain. Do not exceed 3000 mg's daily, Disp: , Rfl:     alendronate (FOSAMAX) 70 MG tablet, 1 tablet Orally once a week for 30 day(s), Disp: , Rfl:     amLODIPine (NORVASC) 5 MG tablet, Take 1 tablet by mouth Every Morning., Disp: , Rfl:     azelastine (ASTELIN) 0.1 % nasal spray, 2 sprays into the nostril(s) as directed by provider 2 (Two) Times a Day., Disp: , Rfl:     B Complex Vitamins (VITAMIN B COMPLEX PO), Take 1 tablet by mouth Daily., Disp: , Rfl:     benzonatate (TESSALON) 100 MG capsule, Take 2 capsules by mouth 3 (Three) Times a Day As Needed., Disp: , Rfl:     butalbital-acetaminophen-caffeine (FIORICET, ESGIC) -40 MG per tablet, Take 1 tablet by mouth Every 6 (Six) Hours As Needed for Headache., Disp: , Rfl:     calcium carb-cholecalciferol 600-800 MG-UNIT tablet, Take 1 tablet by mouth Daily., Disp: , Rfl:     camphor-menthol (SARNA) 0.5-0.5 % lotion, Apply 1 application topically to the appropriate area as directed 2 (Two) Times a Day., Disp: , Rfl:     cholecalciferol (VITAMIN D3) 1000 UNITS tablet, Take 1 tablet by mouth Daily., Disp: , Rfl:     cloNIDine (CATAPRES) 0.1 MG tablet, Take 1 tablet by mouth 2 (Two) Times a Day As Needed for High Blood Pressure (As needed if BP exceeds 160/100.). Half tablet, Disp: , Rfl:     diclofenac (VOLTAREN) 1 % gel gel, Apply 2 g topically to the appropriate area as directed 4 (Four) Times a Day As Needed (PAIN)., Disp: , Rfl:     dicyclomine (BENTYL) 10 MG capsule, Take 1 capsule by mouth 4 (Four) Times a Day Before Meals & at Bedtime. Tried med x 1, Disp: , Rfl:     dipyridamole (PERSANTINE) 50 MG tablet, Take 2 tablets by mouth 2 (Two) Times a Day., Disp: , Rfl:     donepezil (ARICEPT) 10 MG tablet, Take 1 tablet by mouth Every Night., Disp: , Rfl:     fluticasone (VERAMYST) 27.5 MCG/SPRAY nasal spray, 2 sprays into the nostril(s) as directed by provider Every Night., Disp: , Rfl:     gabapentin (NEURONTIN) 100 MG capsule, Take  1 capsule by mouth. Up to 3 times a day, Disp: , Rfl:     hydrALAZINE (APRESOLINE) 25 MG tablet, Take 1 tablet by mouth 3 (Three) Times a Day As Needed. 25mg in afternoon, in the evening 50 mg. Not to be given in the morning due to vagal response, Disp: , Rfl:     levETIRAcetam (KEPPRA) 500 MG tablet, Take 1 tablet by mouth 2 (Two) Times a Day., Disp: , Rfl:     Lidocaine 4 % patch, Place 1 patch on the skin as directed by provider 2 (Two) Times a Day., Disp: , Rfl:     Lutein 20 MG tablet, Take 1 tablet by mouth Daily., Disp: , Rfl:     meloxicam (MOBIC) 15 MG tablet, Take 1 tablet by mouth Daily As Needed., Disp: , Rfl:     Omega 3-6-9 Fatty Acids (OMEGA 3-6-9 COMPLEX PO), Take 1 capsule by mouth Daily., Disp: , Rfl:     pantoprazole (PROTONIX) 40 MG EC tablet, Take 1 tablet by mouth 2 (Two) Times a Day., Disp: , Rfl:     pravastatin (PRAVACHOL) 20 MG tablet, Take 1 tablet by mouth Daily., Disp: , Rfl:     promethazine (PHENERGAN) 25 MG tablet, Take 1 tablet by mouth Every 8 (Eight) Hours As Needed for Nausea or Vomiting., Disp: , Rfl:     promethazine (PHENERGAN) 25 MG tablet, Every 8 (Eight) Hours., Disp: , Rfl:     ramipril (ALTACE) 10 MG capsule, Take 1 capsule by mouth Daily., Disp: , Rfl:     saline 0.65 % nasal solution (BABY AYR) 0.65 % solution, 1 spray into the nostril(s) as directed by provider Daily As Needed., Disp: , Rfl:     sennosides-docusate (PERICOLACE) 8.6-50 MG per tablet, Take 1 tablet by mouth 2 (Two) Times a Day., Disp: , Rfl:     simethicone (MYLICON) 125 MG chewable tablet, Every 8 (Eight) Hours. 6am at 2 9:30am, 1 6pm, Disp: , Rfl:     sodium chloride 1 g tablet, Take 1 tablet by mouth 3 (Three) Times a Day., Disp: , Rfl:     therapeutic multivitamin-minerals (THERAGRAN-M) tablet, Take 1 tablet by mouth Daily., Disp: , Rfl:     valACYclovir (VALTREX) 1000 MG tablet, Take 1 tablet by mouth Daily As Needed (FEVER BLISTER)., Disp: , Rfl:     Current outpatient and discharge medications have  "been reconciled for the patient.  Reviewed by: Vimal Tillman III, MD    The following portions of the patient's history were reviewed and updated as appropriate: allergies, current medications, past family history, past medical history, past social history, past surgical history and problem list.    REVIEW OF SYSTEMS  Review of Systems    I have reviewed and confirmed the accuracy of the ROS as documented by the MA/LPN/RN Vimal Tillman III, MD    PHYSICAL EXAM  VITAL SIGNS:   Vitals:    10/23/23 1355   BP: 126/48   Weight: 46.3 kg (102 lb)   Height: 152.4 cm (60\")   PainSc: 0-No pain     Physical Exam      Pelvic exam reveals normal external genitalia.  Speculum exam reveals no suspicious lesions identified at the apex of the vagina.  Bimanual exam likewise revealed no evidence of locally recurrent tumor.  Pap smear was obtained today.    Performance Status: ECOG (2) Ambulatory and capable of self care, unable to carry out work activity, up and about > 50% or waking hours    Clinical Quality Measures  -Pain Documented by Standardized Tool, FPS Bel Powell reports a pain score of 0.  Given her pain assessment as noted, treatment options were discussed and the following options were decided upon as a follow-up plan to address the patient's pain:  No pain, no plan given .   Pain Medications               acetaminophen (TYLENOL) 500 MG tablet Take 2 tablets by mouth Every 6 (Six) Hours As Needed for Moderate Pain. Do not exceed 3000 mg's daily    butalbital-acetaminophen-caffeine (FIORICET, ESGIC) -40 MG per tablet Take 1 tablet by mouth Every 6 (Six) Hours As Needed for Headache.    gabapentin (NEURONTIN) 100 MG capsule Take 1 capsule by mouth. Up to 3 times a day    levETIRAcetam (KEPPRA) 500 MG tablet Take 1 tablet by mouth 2 (Two) Times a Day.    meloxicam (MOBIC) 15 MG tablet Take 1 tablet by mouth Daily As Needed.          -Advanced Care Planning Advance Care Planning  ACP discussion was held " with the patient during this visit. Patient has an advance directive in EMR which is still valid.     -Body Mass Index Screening and Follow-Up Plan BMI is within normal parameters. No other follow-up for BMI required.    -Tobacco Use: Screening and Cessation Intervention Social History    Tobacco Use      Smoking status: Never      Smokeless tobacco: Never    ASSESSMENT AND PLAN  1. Endometrial cancer    2. S/P hysterectomy with oophorectomy    3. History of radiation therapy    4. Non-smoker      No orders of the defined types were placed in this encounter.    RECOMMENDATIONS:    Bel Powell is status post completion of radiation therapy and presents to our clinic today for routine follow up exam.  Diagnosed with recurrent endometrial carcinoma. She completed 5 internal brachytherapy treatments completed on 03/03/2023.     I have reviewed the chart and other physicians records. she denies untoward side effects from treatment and without evidence for recurrent or metastatic disease on exam today. Pap smear completed today. Will call with results. She will return in 4 months. Continue ongoing management per primary care physician and other specialists.  Today's visit included 35 minutes of time reviewing medical records, reviewing with patient and personal physical exam.    Patient Instructions   1)  Return to Dr. Tillman in 4 months for follow up    Vimal Tillman III, MD  10/23/2023

## 2023-10-23 ENCOUNTER — OFFICE VISIT (OUTPATIENT)
Dept: RADIATION ONCOLOGY | Facility: HOSPITAL | Age: 88
End: 2023-10-23
Payer: MEDICARE

## 2023-10-23 VITALS
SYSTOLIC BLOOD PRESSURE: 126 MMHG | HEIGHT: 60 IN | DIASTOLIC BLOOD PRESSURE: 48 MMHG | WEIGHT: 102 LBS | BODY MASS INDEX: 20.03 KG/M2

## 2023-10-23 DIAGNOSIS — Z90.710 S/P HYSTERECTOMY WITH OOPHORECTOMY: ICD-10-CM

## 2023-10-23 DIAGNOSIS — Z78.9 NON-SMOKER: ICD-10-CM

## 2023-10-23 DIAGNOSIS — Z92.3 HISTORY OF RADIATION THERAPY: ICD-10-CM

## 2023-10-23 DIAGNOSIS — Z90.721 S/P HYSTERECTOMY WITH OOPHORECTOMY: ICD-10-CM

## 2023-10-23 DIAGNOSIS — C54.1 ENDOMETRIAL CANCER: Primary | ICD-10-CM

## 2023-10-23 PROCEDURE — 88142 CYTOPATH C/V THIN LAYER: CPT | Performed by: RADIOLOGY

## 2023-10-23 PROCEDURE — G0463 HOSPITAL OUTPT CLINIC VISIT: HCPCS

## 2023-10-25 LAB
LAB AP CASE REPORT: NORMAL
LAB AP GYN ADDITIONAL INFORMATION: NORMAL
PATH INTERP SPEC-IMP: NORMAL
STAT OF ADQ CVX/VAG CYTO-IMP: NORMAL

## 2023-11-01 ENCOUNTER — NURSE ONLY (OUTPATIENT)
Dept: UROLOGY | Age: 88
End: 2023-11-01
Payer: MEDICARE

## 2023-11-01 ENCOUNTER — HOSPITAL ENCOUNTER (OUTPATIENT)
Dept: INFUSION THERAPY | Age: 88
Discharge: HOME | End: 2023-11-01
Payer: MEDICARE

## 2023-11-01 DIAGNOSIS — N32.81 OAB (OVERACTIVE BLADDER): ICD-10-CM

## 2023-11-01 DIAGNOSIS — R35.0 URINARY FREQUENCY: Primary | ICD-10-CM

## 2023-11-01 DIAGNOSIS — D50.9 IRON DEFICIENCY ANEMIA, UNSPECIFIED IRON DEFICIENCY ANEMIA TYPE: ICD-10-CM

## 2023-11-01 DIAGNOSIS — N39.46 MIXED STRESS AND URGE URINARY INCONTINENCE: ICD-10-CM

## 2023-11-01 LAB
FERRITIN SERPL-MCNC: 805.9 NG/ML (ref 13–150)
IRON SATN MFR SERPL: 21 % (ref 14–50)
IRON SERPL-MCNC: 58 UG/DL (ref 37–145)
TIBC SERPL-MCNC: 270 UG/DL (ref 250–400)

## 2023-11-01 PROCEDURE — 36415 COLL VENOUS BLD VENIPUNCTURE: CPT

## 2023-11-01 PROCEDURE — 85025 COMPLETE CBC W/AUTO DIFF WBC: CPT

## 2023-11-01 PROCEDURE — 64566 NEUROELTRD STIM POST TIBIAL: CPT | Performed by: NURSE PRACTITIONER

## 2023-11-01 NOTE — PROGRESS NOTES
Treatment #maintenance    Improvement of Symptoms? Mild    OAB/Urologic Medications? None      Uroplasty Procedure:    1. Patient is seated with the left treatment leg elevated. 2. 34 gauge fine needle electrode is inserted into lower inner aspect of the leg. Slightly cephalad to the medial malleolus. 3. Surface electrode pad is placed over the medial aspect of the calcaneus on the same leg. 4.Needle electrode is connected to the external pulse generator. 5.The pulse generator is turned on and the millivolts used are 8. 6.Patient is treated for 30 minutes. 7.The needle and pad are removed. Patient will follow up in 2 weeks for next treatment.

## 2023-11-03 ENCOUNTER — TELEPHONE (OUTPATIENT)
Dept: HEMATOLOGY | Age: 88
End: 2023-11-03

## 2023-11-03 NOTE — TELEPHONE ENCOUNTER
Called patient today for their upcoming appointment on 11/06/23. Patient aware of their appointment and date and time was given. Patient confirmed their appointment.

## 2023-11-06 ENCOUNTER — TELEPHONE (OUTPATIENT)
Dept: HEMATOLOGY | Age: 88
End: 2023-11-06

## 2023-11-08 ENCOUNTER — TELEPHONE (OUTPATIENT)
Dept: HEMATOLOGY | Age: 88
End: 2023-11-08

## 2023-11-08 NOTE — TELEPHONE ENCOUNTER
Spoke to Tasha Henderson (daughter) in regards to her moms labs all stable at this time. Jacqueline Jeniffer does not feel shortness of breath is related to iron level. Will plan on seeing on 12/26. Verbalizes understand. No questions at this time.

## 2023-11-09 LAB
BASOPHILS # BLD: 0.03 K/UL (ref 0.01–0.08)
BASOPHILS NFR BLD: 0.7 % (ref 0.1–1.2)
EOSINOPHIL # BLD: 0.13 K/UL (ref 0.04–0.54)
EOSINOPHIL NFR BLD: 3 % (ref 0.7–7)
ERYTHROCYTE [DISTWIDTH] IN BLOOD BY AUTOMATED COUNT: 15.5 % (ref 11.7–14.4)
HCT VFR BLD AUTO: 36.8 % (ref 34.1–44.9)
HGB BLD-MCNC: 12.5 G/DL (ref 11.2–15.7)
LYMPHOCYTES # BLD: 1.13 K/UL (ref 1.18–3.74)
LYMPHOCYTES NFR BLD: 26 % (ref 19.3–53.1)
MCH RBC QN AUTO: 32.9 PG (ref 25.6–32.2)
MCHC RBC AUTO-ENTMCNC: 34 G/DL (ref 32.3–35.5)
MCV RBC AUTO: 96.8 FL (ref 79.4–94.8)
MONOCYTES # BLD: 0.36 K/UL (ref 0.24–0.82)
MONOCYTES NFR BLD: 8.3 % (ref 4.7–12.5)
NEUTROPHILS # BLD: 2.68 K/UL (ref 1.56–6.13)
NEUTS SEG NFR BLD: 61.8 % (ref 34–71.1)
PLATELET # BLD AUTO: 183 K/UL (ref 182–369)
PMV BLD AUTO: 9.9 FL (ref 7.4–10.4)
RBC # BLD AUTO: 3.8 M/UL (ref 3.93–5.22)
WBC # BLD AUTO: 4.34 K/UL (ref 3.98–10.04)

## 2023-11-15 ENCOUNTER — NURSE ONLY (OUTPATIENT)
Dept: UROLOGY | Age: 88
End: 2023-11-15
Payer: MEDICARE

## 2023-11-15 DIAGNOSIS — N39.46 MIXED STRESS AND URGE URINARY INCONTINENCE: ICD-10-CM

## 2023-11-15 DIAGNOSIS — R35.0 URINARY FREQUENCY: Primary | ICD-10-CM

## 2023-11-15 DIAGNOSIS — N32.81 OAB (OVERACTIVE BLADDER): ICD-10-CM

## 2023-11-15 PROCEDURE — 64566 NEUROELTRD STIM POST TIBIAL: CPT | Performed by: NURSE PRACTITIONER

## 2023-11-15 NOTE — PROGRESS NOTES
Treatment #maintenance    Improvement of Symptoms? Yes. Due to an increase when approaching her 2-week rudy. OAB/Urologic Medications? None      Uroplasty Procedure:    1. Patient is seated with the left treatment leg elevated. 2. 34 gauge fine needle electrode is inserted into lower inner aspect of the leg. Slightly cephalad to the medial malleolus. 3. Surface electrode pad is placed over the medial aspect of the calcaneus on the same leg. 4.Needle electrode is connected to the external pulse generator. 5.The pulse generator is turned on and the millivolts used are 7. 6.Patient is treated for 30 minutes. 7.The needle and pad are removed. Patient will follow up in 2 weeks for next treatment.

## 2023-11-24 NOTE — DISCHARGE SUMMARY
Achilles Tendon Stretching Exercises    A) Standing Gastrocnemius stretch  Place hands on wall or chair. If using wall, put your hands at eye level. Step the leg you want to stretch behind you. Keep your back heel on the floor and point your toes straight ahead or slightly inward towards the heel of the opposite foot. Bend your knee toward the wall while keeping your back leg straight. Lean toward the wall until you feel a gentle stretch in you calf of the straight leg. Don't lean so far that you feel pain. Hold for 15 seconds. Complete 3 reps. B) Standing soleus stretch  Place your hands on the wall or chair. If using wall, put your hands at eye level. Step the leg you want to stretch behind you (your back foot will need to be closer to the front foot than the above stretch). Keep your back heel on the floor and point your toes straight ahead or slightly inward towards the heel of the opposite foot. Bend both your front and back knee at the same time (may help to stick your butt out). You do not need to lean towards the wall, just bend the knees. Lean toward the wall until you feel a gentle stretch in you calf of the straight leg. Don't lean so far that you feel pain. Hold for 15 seconds. Complete 3 reps. Keep these tips and tricks in mind to get the most out of your stretching; Take your time - move slowly, whether you are deepening into a stretch or changing positions. This will limit the risk of injury & discomfort. Avoid bouncing - quick sudden movements will only worsen achilles tendon issues. Stay relaxed during stretch. Keep your heel down and toes straight ahead or slightly inward - this will allow the achilles tendon to stretch properly. Stop if you feel pain - Don't strain or force your muscle. If you feel sharp pain, stop immediately. Foot Pain Home Therapy    Stretch. Place painful foot in back with heel on ground and lean against wall. Do not lift heel off of ground. Physician Discharge Summary     Patient ID:  Parker Cobos  [id number]  31 y.o.  1935    Admit date: 3/2/2018    Discharge date and time: 3/7/2018     Discharge Physician: Gomez Mercer MD    Admission Diagnoses: UTI (urinary tract infection) [N39.0]    Discharge Diagnoses:     Acute cystitis with hematuria growing VRE  Hyponatremia - improved   Seizures- controlled   Metabolic encephalopathy - resolved   Dehydration -resolved   Hyperkalemia - resolved  Nicole Loser- pt/ot  GUERO - resolved     Discharged Condition: stable    Indication for Admission: dysuria/seizure    Hospital Course:     80 y.o. female presented complaining of dysuria and suprapubic cramping associated with fatigue and lethargy. She was recently discharged on 02/23 on a 7 day course of Bactrim for recurrent urinary symptoms. She does have history of incontinence as well as urgency and is followed as an outpatient by Urology and has a follow up appointment. She was started on Rocephin in the ED for urinary tract infection. She was also found to have hyponatremia and mild hyperkalemia. Daughter had concern for recurrent seizure like activity. She was evaluated by neurologists. He believed her hyponatremia could have been partially causative for her recent episode. She should avoid the quinolones. Keppra was increased to  1000 mg twice a day. She had no further seizures activities. Her urine cultures grew VRE and she was started on zyvox per cultures. Daughter initially requested SNF but now she is changing her mind and taking mother back home with . ? Consults: neurology    Significant Diagnostic Studies:     US RENAL COMPLETE [[id number]] Resulted: 03/03/18 1429      Order Status: Completed Updated: 03/03/18 1332     Narrative:       EXAMINATION: US RENAL COMPLETE 3/3/2018 1:28 PM  HISTORY: Acute renal failure.   Report: Sonographic images of the kidneys were obtained, correlation  is made with CT of the abdomen and pelvis 2/20/2018. The right kidney measures 9.8 x 3.8 x 3.5 cm and has normal morphology  and echogenicity, no mass or hydronephrosis is identified. The left  kidney measures 9.3 x 4.1 x 3.9 cm and appears normal. The bladder  appears unremarkable.     Impression:       Normal ultrasound of the kidneys. Signed by Dr Julee Jerry on 3/3/2018 1:29 PM     XR CHEST STANDARD (2 VW) [[id number]] Resulted: 03/03/18 1200     Order Status: Completed Specimen: Chest Updated: 03/03/18 1102     Narrative:       EXAMINATION:  XR CHEST (2 VW)  3/3/2018 10:58 AM  HISTORY: Cough, shortness of breath. COMPARISON: 2/12/2018  FINDINGS:  The lungs are free of infiltrates, there is mild  interstitial prominence which appears chronic and stable. A few  calcified granulomas are noted bilaterally. Heart size is normal. No  pneumothorax or pleural effusion is identified. There is a healed  fracture of the posterior left fourth rib. Moderate degenerative  changes of both shoulder joints is identified. There is diffuse  endplate spurring throughout the thoracic spine. There is evidence of  previous lumbar fusion surgery.     Impression:       Stable chest, no acute abnormality is identified. Chronic  findings as described. Signed by Dr Julee Jerry on 3/3/2018 11:00 AM         Discharge Exam:  BP (!) 148/65   Pulse 54   Temp 97.9 °F (36.6 °C) (Temporal)   Resp 20   Ht 5' 1\" (1.549 m)   Wt 120 lb 4 oz (54.5 kg)   SpO2 94%   BMI 22.72 kg/m²     General appearance: alert, appears stated age and cooperative  Skin: Skin color, texture, turgor normal.   HEENT: Head: Normocephalic, no lesions, without obvious abnormality.   Neck: no adenopathy, no carotid bruit, no JVD and supple, symmetrical, trachea midline  Lungs: clear to auscultation bilaterally  Heart: regular rate and rhythm, S1, S2 normal, no murmur, click, rub or gallop  Abdomen: soft, non-tender; bowel sounds normal; no masses,  no organomegaly  Extremities: extremities You will feel the stretch in the calf muscles and possibly the heel. Hold the stretch for 20-30 seconds. Do this at least 5-6 times per day. It is best done after exercise when your muscles are warm. Wear supportive shoes at all times. Avoid flip-flops, flat sandals, barefoot walking (never walk barefoot, even at home). Generally avoid shoes that are too flexible and bend in the arch. Your shoes should only slightly bend in the toe area, not the middle. Running sneakers are often the best choice. Supportive sneaker brands: Carol, On [address] [facility], [facility], [address] [facility], [facility], [facility] (discount if mention Dr referred)  [address] [facility] [address] [place]  Supportive daily shoe brands: Vionic, SUND, [place], Dansko, [place], [place], [place], [employer]  Supportive home shoes: Maribeth Ordoñez (recovery slides)  Purchase over the counter topical pain creams such as Voltarin gel, biofreeze, or CBD cream - will need to apply 2-3 times per day for benefit. + deep tissue massage. Budin split, hammertoe/bunion spacer, Dancer's pad. Look in to over the counter shoe inserts/arch supports such as  Dananberg arch supports. These are all available on [employer] as well as their individual website's. [employer]: Vasyli+Dananberg 1st Ray Orthotic, Medium, 1st Ray Function, Medium Density, Full-Length Insole, Heat Molding Optional, Best All Around Orthotic, Functional Biomechanical Control for Pain Relief, Black Yellow (62742) :  Health & Household normal, atraumatic, no cyanosis or edema  Lymphatic: No significant lymph node enlargement papable  Neurologic: Mental status: Alert, oriented, thought content appropriate    Discharge Medications:       Sissy Lopez   Home Medication Instructions [id number]    Printed on:03/07/18 5585   Medication Information                      acetaminophen (TYLENOL) 500 MG tablet  Take 2 tablets by mouth every 6 hours as needed for Pain Do not exceed 4 g of acetaminophen daily (patient is also on Fioricet PRN)             albuterol sulfate  (90 Base) MCG/ACT inhaler  Inhale 2 puffs into the lungs every 4 hours as needed for Wheezing             alendronate (FOSAMAX) 70 MG tablet  Take 70 mg by mouth every 7 days Takes every Sunday               amLODIPine (NORVASC) 10 MG tablet  Take 5 mg by mouth daily              Ascorbic Acid (VITAMIN C PO)  Take 1,000 mg by mouth Daily with lunch Breakfast and lunch              b complex vitamins capsule  Take 1 capsule by mouth Daily with lunch B 100             benzonatate (TESSALON) 100 MG capsule  Take 200 mg by mouth 3 times daily as needed for Cough             butalbital-acetaminophen-caffeine (ESGIC PLUS) -40 MG per tablet  Take 1 tablet by mouth every 6 hours as needed for Headaches             BYSTOLIC 10 MG tablet  Take 10 mg by mouth nightly              Calcium Carb-Cholecalciferol (CALCIUM 600+D) 600-800 MG-UNIT TABS  Take 1 tablet by mouth Daily with lunch             camphor-menthol (SARNA) 0.5-0.5 % lotion  Apply 0.5 mLs topically 2 times daily as needed for Itching Apply topically as needed.              Cholecalciferol (VITAMIN D3) 5000 units TABS  Take 5,000 Units by mouth Daily with lunch             cloNIDine (CATAPRES) 0.1 MG tablet  Take 1 tablet by mouth 2 times daily             dipyridamole (PERSANTINE) 75 MG tablet  75 mg 2 times daily              docusate sodium (COLACE) 100 MG capsule  Take 100 mg by mouth nightly donepezil (ARICEPT) 10 MG tablet  Take 1 tablet by mouth nightly             fluticasone (VERAMYST) 27.5 MCG/SPRAY nasal spray  2 sprays by Nasal route daily as needed for Rhinitis              hydrALAZINE (APRESOLINE) 25 MG tablet  Take 25 mg by mouth 2 times daily             levETIRAcetam (KEPPRA) 1000 MG tablet  Take 1 tablet by mouth 2 times daily             linezolid (ZYVOX) 600 MG tablet  Take 1 tablet by mouth every 12 hours for 10 days             Lutein 20 MG TABS  Take 20 mg by mouth Daily with lunch             meloxicam (MOBIC) 15 MG tablet  Take 15 mg by mouth nightly              MICRONIZED COLESTIPOL HCL 1 G tablet  Take 2 g by mouth as needed 2 tablets daily prn               Multiple Vitamins-Minerals (THERAPEUTIC MULTIVITAMIN-MINERALS) tablet  Take 1 tablet by mouth Daily with lunch              Omega 3-6-9 Fatty Acids (OMEGA 3-6-9 COMPLEX PO)  Take 2 tablets by mouth Daily with lunch              oxybutynin (DITROPAN) 5 MG tablet  Take 10 mg by mouth 2 times daily              pantoprazole (PROTONIX) 20 MG tablet  Take 20 mg by mouth daily              phenazopyridine (PYRIDIUM) 100 MG tablet  Take 100 mg by mouth 3 times daily as needed for Pain             polyvinyl alcohol-povidone (HYPOTEARS) 1.4-0.6 % ophthalmic solution  Place 1-2 drops into both eyes as needed             pravastatin (PRAVACHOL) 20 MG tablet  Take 20 mg by mouth every morning              promethazine (PHENERGAN) 25 MG tablet  Take 25 mg by mouth 3 times daily as needed for Nausea             ramipril (ALTACE) 5 MG capsule  Take 10 mg by mouth 2 times daily              simethicone (MYLICON) 80 MG chewable tablet  Take 125 mg by mouth every 6 hours as needed for Flatulence             sodium chloride (OCEAN, BABY AYR) 0.65 % nasal spray  1 spray by Nasal route as needed for Congestion             SODIUM CHLORIDE PO  Take 1 g by mouth Breakfast lunch and dinner.              spironolactone (ALDACTONE) 25 MG tablet  50 mg

## 2023-11-29 ENCOUNTER — NURSE ONLY (OUTPATIENT)
Dept: UROLOGY | Age: 88
End: 2023-11-29
Payer: MEDICARE

## 2023-11-29 DIAGNOSIS — N39.46 MIXED STRESS AND URGE URINARY INCONTINENCE: ICD-10-CM

## 2023-11-29 DIAGNOSIS — R35.0 URINARY FREQUENCY: Primary | ICD-10-CM

## 2023-11-29 DIAGNOSIS — N32.81 OAB (OVERACTIVE BLADDER): ICD-10-CM

## 2023-11-29 PROCEDURE — 64566 NEUROELTRD STIM POST TIBIAL: CPT | Performed by: NURSE PRACTITIONER

## 2023-11-29 NOTE — PROGRESS NOTES
Treatment # maintenance    Improvement of Symptoms? Yes    OAB/Urologic Medications? None      Uroplasty Procedure:    1. Patient is seated with the left treatment leg elevated. 2. 34 gauge fine needle electrode is inserted into lower inner aspect of the leg. Slightly cephalad to the medial malleolus. 3. Surface electrode pad is placed over the medial aspect of the calcaneus on the same leg. 4.Needle electrode is connected to the external pulse generator. 5.The pulse generator is turned on and the millivolts used are 10. 6.Patient is treated for 30 minutes. 7.The needle and pad are removed. Patient will follow up in 2 weeks for next treatment.

## 2023-11-30 ENCOUNTER — TELEPHONE (OUTPATIENT)
Dept: RADIATION ONCOLOGY | Facility: HOSPITAL | Age: 88
End: 2023-11-30
Payer: MEDICARE

## 2023-11-30 NOTE — TELEPHONE ENCOUNTER
"Daughter called and left message: \"mom is bleeding\"                \"I understand this means the cancer is back\"     I sent message to Dr. Tillman. Who asked that we ask that she contact Dr. TONYA Abad re: exam and visit.    I contacted daughter to inform her of contacting Dr. TONYA Abad re:exam and biopsy.    Daughter verbalized understanding.  She also noted that they are having transportation issues.     Patient has seen Dr. Rogel here in town and that depending on circumstances, they may be able to help.    She said she would contact us to let us know details.        "

## 2023-12-13 ENCOUNTER — NURSE ONLY (OUTPATIENT)
Dept: UROLOGY | Age: 88
End: 2023-12-13
Payer: MEDICARE

## 2023-12-13 DIAGNOSIS — N32.81 OAB (OVERACTIVE BLADDER): ICD-10-CM

## 2023-12-13 DIAGNOSIS — R35.0 URINARY FREQUENCY: Primary | ICD-10-CM

## 2023-12-13 DIAGNOSIS — N39.46 MIXED STRESS AND URGE URINARY INCONTINENCE: ICD-10-CM

## 2023-12-13 PROCEDURE — 64566 NEUROELTRD STIM POST TIBIAL: CPT | Performed by: NURSE PRACTITIONER

## 2023-12-26 DIAGNOSIS — D50.9 IRON DEFICIENCY ANEMIA, UNSPECIFIED IRON DEFICIENCY ANEMIA TYPE: Primary | ICD-10-CM

## 2023-12-27 ENCOUNTER — NURSE ONLY (OUTPATIENT)
Dept: UROLOGY | Age: 88
End: 2023-12-27
Payer: MEDICARE

## 2023-12-27 ENCOUNTER — HOSPITAL ENCOUNTER (OUTPATIENT)
Dept: INFUSION THERAPY | Age: 88
Discharge: HOME | End: 2023-12-27
Payer: MEDICARE

## 2023-12-27 DIAGNOSIS — N32.81 OAB (OVERACTIVE BLADDER): ICD-10-CM

## 2023-12-27 DIAGNOSIS — N39.46 MIXED STRESS AND URGE URINARY INCONTINENCE: ICD-10-CM

## 2023-12-27 DIAGNOSIS — D50.9 IRON DEFICIENCY ANEMIA, UNSPECIFIED IRON DEFICIENCY ANEMIA TYPE: ICD-10-CM

## 2023-12-27 DIAGNOSIS — R35.0 URINARY FREQUENCY: Primary | ICD-10-CM

## 2023-12-27 LAB
BASOPHILS # BLD: 0.04 K/UL (ref 0.01–0.08)
BASOPHILS NFR BLD: 0.8 % (ref 0.1–1.2)
EOSINOPHIL # BLD: 0.17 K/UL (ref 0.04–0.54)
EOSINOPHIL NFR BLD: 3.2 % (ref 0.7–7)
ERYTHROCYTE [DISTWIDTH] IN BLOOD BY AUTOMATED COUNT: 11.8 % (ref 11.7–14.4)
FERRITIN SERPL-MCNC: 656.4 NG/ML (ref 13–150)
HCT VFR BLD AUTO: 35.8 % (ref 34.1–44.9)
HGB BLD-MCNC: 12.1 G/DL (ref 11.2–15.7)
IRON SATN MFR SERPL: 21 % (ref 14–50)
IRON SERPL-MCNC: 56 UG/DL (ref 37–145)
LYMPHOCYTES # BLD: 1.8 K/UL (ref 1.18–3.74)
LYMPHOCYTES NFR BLD: 34.1 % (ref 19.3–53.1)
MCH RBC QN AUTO: 34.1 PG (ref 25.6–32.2)
MCHC RBC AUTO-ENTMCNC: 33.8 G/DL (ref 32.3–35.5)
MCV RBC AUTO: 100.8 FL (ref 79.4–94.8)
MONOCYTES # BLD: 0.53 K/UL (ref 0.24–0.82)
MONOCYTES NFR BLD: 10 % (ref 4.7–12.5)
NEUTROPHILS # BLD: 2.72 K/UL (ref 1.56–6.13)
NEUTS SEG NFR BLD: 51.5 % (ref 34–71.1)
PLATELET # BLD AUTO: 244 K/UL (ref 182–369)
PMV BLD AUTO: 9.6 FL (ref 7.4–10.4)
RBC # BLD AUTO: 3.55 M/UL (ref 3.93–5.22)
TIBC SERPL-MCNC: 261 UG/DL (ref 250–400)
WBC # BLD AUTO: 5.28 K/UL (ref 3.98–10.04)

## 2023-12-27 PROCEDURE — 36415 COLL VENOUS BLD VENIPUNCTURE: CPT

## 2023-12-27 PROCEDURE — 64566 NEUROELTRD STIM POST TIBIAL: CPT | Performed by: NURSE PRACTITIONER

## 2023-12-27 PROCEDURE — 85025 COMPLETE CBC W/AUTO DIFF WBC: CPT

## 2023-12-27 NOTE — PROGRESS NOTES
Treatment # maintenance    Improvement of Symptoms? Yes    OAB/Urologic Medications? None      Uroplasty Procedure:    1. Patient is seated with the left treatment leg elevated. 2. 34 gauge fine needle electrode is inserted into lower inner aspect of the leg. Slightly cephalad to the medial malleolus. 3. Surface electrode pad is placed over the medial aspect of the calcaneus on the same leg. 4.Needle electrode is connected to the external pulse generator. 5.The pulse generator is turned on and the millivolts used are 8. 6.Patient is treated for 30 minutes. 7.The needle and pad are removed. Patient will follow up in 2 weeks for next treatment.

## 2023-12-28 DIAGNOSIS — Z95.0 CARDIAC PACEMAKER: Primary | ICD-10-CM

## 2023-12-28 DIAGNOSIS — I49.5 SA NODE DYSFUNCTION (HCC): ICD-10-CM

## 2024-01-05 ENCOUNTER — TELEPHONE (OUTPATIENT)
Dept: HEMATOLOGY | Age: 89
End: 2024-01-05

## 2024-01-05 NOTE — TELEPHONE ENCOUNTER
All labs have improved. Scheduled for follow up on Feb 8th at 2 pm.  Verbalizes understand.  No questions at this time.

## 2024-01-10 NOTE — PROGRESS NOTES
Treatment # maintenance    Improvement of Symptoms?  Yes    OAB/Urologic Medications?  None      Uroplasty Procedure:    1. Patient is seated with the left treatment leg elevated.  2. 34 gauge fine needle electrode is inserted into lower inner aspect of the leg. Slightly cephalad to the medial malleolus.  3. Surface electrode pad is placed over the medial aspect of the calcaneus on the same leg.  4.Needle electrode is connected to the external pulse generator.  5.The pulse generator is turned on and the millivolts used are 15.  6.Patient is treated for 30 minutes.  7.The needle and pad are removed.    Patient will follow up in 2-week for next treatment.       Patient's daughter, Cesilia, accompanies her and does note that she has had a recurrence of intermittent abnormal vaginal bleeding.  This is in the setting of a prior hysterectomy for endometrial carcinoma with recurrence in late 2022 and radiation early 2023.  She states she is unable to follow-up with her regular gynecological oncologist who practices in [place].  She reports her mother cannot sustain the trip.  She was previously established with Dr. George's, but cannot get into their office without a referral as it has been greater than 3 years since she has been seen.  They are requesting referral to their office today and I will place that order.    At least 30 minutes were spent on the day of the visit reviewing the patient's past medical records/imaging, speaking face to face with the patient, and charting in the post visit period.

## 2024-01-11 ENCOUNTER — NURSE ONLY (OUTPATIENT)
Dept: UROLOGY | Age: 89
End: 2024-01-11
Payer: MEDICARE

## 2024-01-11 DIAGNOSIS — R35.0 URINARY FREQUENCY: Primary | ICD-10-CM

## 2024-01-11 DIAGNOSIS — N39.46 MIXED STRESS AND URGE URINARY INCONTINENCE: ICD-10-CM

## 2024-01-11 DIAGNOSIS — N93.9 ABNORMAL VAGINAL BLEEDING: ICD-10-CM

## 2024-01-11 DIAGNOSIS — N32.81 OAB (OVERACTIVE BLADDER): ICD-10-CM

## 2024-01-11 PROCEDURE — 99214 OFFICE O/P EST MOD 30 MIN: CPT | Performed by: NURSE PRACTITIONER

## 2024-01-11 PROCEDURE — 1123F ACP DISCUSS/DSCN MKR DOCD: CPT | Performed by: NURSE PRACTITIONER

## 2024-01-24 ENCOUNTER — NURSE ONLY (OUTPATIENT)
Dept: UROLOGY | Age: 89
End: 2024-01-24
Payer: MEDICARE

## 2024-01-24 DIAGNOSIS — N39.46 MIXED STRESS AND URGE URINARY INCONTINENCE: ICD-10-CM

## 2024-01-24 DIAGNOSIS — R35.0 URINARY FREQUENCY: ICD-10-CM

## 2024-01-24 DIAGNOSIS — N39.0 RECURRENT UTI: Primary | ICD-10-CM

## 2024-01-24 PROCEDURE — 99214 OFFICE O/P EST MOD 30 MIN: CPT | Performed by: NURSE PRACTITIONER

## 2024-01-24 PROCEDURE — 1123F ACP DISCUSS/DSCN MKR DOCD: CPT | Performed by: NURSE PRACTITIONER

## 2024-01-24 PROCEDURE — 64566 NEUROELTRD STIM POST TIBIAL: CPT | Performed by: NURSE PRACTITIONER

## 2024-01-24 RX ORDER — NITROFURANTOIN 25; 75 MG/1; MG/1
CAPSULE ORAL
COMMUNITY
Start: 2024-01-19

## 2024-01-24 RX ORDER — NITROFURANTOIN MACROCRYSTALS 50 MG/1
50 CAPSULE ORAL NIGHTLY
Qty: 90 CAPSULE | Refills: 3 | Status: SHIPPED | OUTPATIENT
Start: 2024-01-24 | End: 2025-01-23

## 2024-01-24 RX ORDER — SPIRONOLACTONE 25 MG
1 TABLET ORAL DAILY
COMMUNITY
Start: 2023-12-29

## 2024-01-24 NOTE — PROGRESS NOTES
Treatment # maintenance    Improvement of Symptoms?  Mild    OAB/Urologic Medications?  None      Uroplasty Procedure:    1. Patient is seated with the left treatment leg elevated.  2. 34 gauge fine needle electrode is inserted into lower inner aspect of the leg. Slightly cephalad to the medial malleolus.  3. Surface electrode pad is placed over the medial aspect of the calcaneus on the same leg.  4.Needle electrode is connected to the external pulse generator.  5.The pulse generator is turned on and the millivolts used are 5.  6.Patient is treated for 30 minutes.  7.The needle and pad are removed.    Patient will follow up in 2 weeks for next treatment.      Patient's daughter, Cesilia, reports that she was diagnosed with a urinary tract infection by her primary care provider.  She is concerned as the dementia is progressing and she is unable to watch her mother every second of the day and she is continuing to have issues with wiping from back to front as well as difficulty changing her incontinence pads/briefs.  She is wondering about possibly reinitiating a daily antibiotic with Macrobid.  Patient was on this therapy for a couple of years without any issues.  She did have a chest x-ray after coming off the medication which did not reveal any evidence of pulmonary fibrosis.    Also voices some concern as they have not been scheduled for appointment with OB/GYN for abnormal vaginal bleeding which was discussed at her previous visit.  I will have our front office staff call to check the status of her referral.    ASSESSMENT/PLAN  1. Recurrent UTI  I did explain to the patient and her daughter that at this point I did not know that the Macrobid would make a significant difference, but if they were willing to assume the risk, then I could prescribe a low dose nightly medication for her.  - nitrofurantoin (MACRODANTIN) 50 MG capsule; Take 1 capsule by mouth nightly  Dispense: 90 capsule; Refill: 3    2. Urinary

## 2024-01-25 ENCOUNTER — TELEPHONE (OUTPATIENT)
Dept: OBSTETRICS AND GYNECOLOGY | Facility: CLINIC | Age: 89
End: 2024-01-25
Payer: MEDICARE

## 2024-01-25 ENCOUNTER — TELEPHONE (OUTPATIENT)
Dept: OBSTETRICS AND GYNECOLOGY | Age: 89
End: 2024-01-25

## 2024-01-25 DIAGNOSIS — G40.219 PARTIAL SYMPTOMATIC EPILEPSY WITH COMPLEX PARTIAL SEIZURES, INTRACTABLE, WITHOUT STATUS EPILEPTICUS (HCC): Primary | ICD-10-CM

## 2024-01-25 NOTE — TELEPHONE ENCOUNTER
Caller: Jessica Powell    Relationship to patient: Emergency Contact    Best call back number: [phone]    Chief complaint: PATIENT HAS BEEN DX WITH UTERINE CANCER AGAIN AND THE RADIOLOGIST ONCOLOGIST SAID THEY NEED HER OBGYN TO DO A NEW BIOPSY THAT SHOWS THAT THE CANCER HAS RETURNED  SO THAT THEY CAN PROCEED WITH TREATMENT SO INSURANCE CAN OVER IT//  Type of visit: PROCEDURE VISIT    Requested date: ASAP- TO MOVE FORWARD WITH CANCER TREATMENT     Additional notes:PLEASE FOLLOW UP//DR FRANCISCO ZENDEJAS NEEDS THE BIOPSY TO START TREATMENT

## 2024-01-25 NOTE — TELEPHONE ENCOUNTER
Caller: Jessica Powell    Relationship: Emergency Contact    Best call back number: [phone]    What was the call regarding: RETURNING A MISSED CALL

## 2024-01-26 RX ORDER — GABAPENTIN 100 MG/1
100 CAPSULE ORAL 3 TIMES DAILY
Qty: 270 CAPSULE | Refills: 1 | Status: SHIPPED | OUTPATIENT
Start: 2024-01-26 | End: 2024-07-24

## 2024-01-29 ENCOUNTER — OFFICE VISIT (OUTPATIENT)
Dept: OBSTETRICS AND GYNECOLOGY | Age: 89
End: 2024-01-29
Payer: MEDICARE

## 2024-01-29 VITALS
DIASTOLIC BLOOD PRESSURE: 68 MMHG | SYSTOLIC BLOOD PRESSURE: 106 MMHG | HEIGHT: 60 IN | BODY MASS INDEX: 20.81 KG/M2 | WEIGHT: 106 LBS

## 2024-01-29 DIAGNOSIS — Z78.9 NON-SMOKER: ICD-10-CM

## 2024-01-29 DIAGNOSIS — C54.1 ENDOMETRIAL CANCER: Primary | ICD-10-CM

## 2024-01-29 PROCEDURE — 1159F MED LIST DOCD IN RCRD: CPT | Performed by: OBSTETRICS & GYNECOLOGY

## 2024-01-29 PROCEDURE — 99213 OFFICE O/P EST LOW 20 MIN: CPT | Performed by: OBSTETRICS & GYNECOLOGY

## 2024-01-29 PROCEDURE — 1160F RVW MEDS BY RX/DR IN RCRD: CPT | Performed by: OBSTETRICS & GYNECOLOGY

## 2024-01-29 RX ORDER — FLUTICASONE PROPIONATE 50 MCG
SPRAY, SUSPENSION (ML) NASAL
COMMUNITY
Start: 2024-01-04

## 2024-01-29 RX ORDER — HYDRALAZINE HYDROCHLORIDE 25 MG/1
25 TABLET, FILM COATED ORAL 3 TIMES DAILY
COMMUNITY

## 2024-01-29 RX ORDER — MULTIVITAMIN/IRON/FOLIC ACID 18MG-0.4MG
1 TABLET ORAL DAILY
COMMUNITY
Start: 2023-11-30

## 2024-01-29 RX ORDER — NITROFURANTOIN MACROCRYSTALS 50 MG/1
1 CAPSULE ORAL NIGHTLY
COMMUNITY
Start: 2024-01-24 | End: 2025-01-24

## 2024-01-29 NOTE — PROGRESS NOTES
"Chief Complaint  Follow-up (Pt has hx of endometrial cancer and is here to discuss bx and has to have general anesthesia, daughter reports episode of heavy vaginal bleeding Thanksgiving weekend that lasted two weeks)    Subjective        Bel Powell presents to [facility]  History of Present Illness    Patient seen as a consultation from radiation oncology  She underwent a da Mane hysterectomy with BSO for endometrial cancer several years ago  She had a recurrence and then received brachytherapy  She has had a couple of urinary tract infections lately as well as some vaginal bleeding in the past 2 months  She has no pain  She denies vaginal bleeding today    Objective   Vital Signs:  /68 (BP Location: Right arm, Patient Position: Sitting, Cuff Size: Adult)   Ht 152.4 cm (60\")   Wt 48.1 kg (106 lb)   BMI 20.70 kg/m²   Estimated body mass index is 20.7 kg/m² as calculated from the following:    Height as of this encounter: 152.4 cm (60\").    Weight as of this encounter: 48.1 kg (106 lb).       BMI is within normal parameters. No other follow-up for BMI required.      Physical Exam  Genitourinary:     Comments: Vaginal vault is shortened consistent with previous radiation therapy.  Speculum exam was well-tolerated.  There are no lesions noted.  Palpation shows no abnormality.  I placed a swab inside the vaginal vault, at the apex, and removed it and there was no evidence of any blood on the swab.       Result Review :                     Assessment and Plan     Diagnoses and all orders for this visit:    1. Endometrial cancer (Primary)    2. Non-smoker             Follow Up     Return if symptoms worsen or fail to improve.  Patient was given instructions and counseling regarding her condition or for health maintenance advice. Please see specific information pulled into the AVS if appropriate.     Of note, the patient also complains of constant urinary incontinence.  Given the " concomitant presence of urinary tract infection, I wonder if this is blood mixed with urine and the source is coming from the bladder.  She does have a urologist at [facility].  I encouraged her to reach out to them to see if they can arrange a cystoscopic exam.    Fran Hooper MD

## 2024-01-29 NOTE — LETTER
"January 29, 2024       No Recipients    Patient: Bel Powell   YOB: 1935   Date of Visit: 1/29/2024     Dear Vimal Tillman III, MD:       Thank you for referring Bel Powell to me for evaluation. Below are the relevant portions of my assessment and plan of care.    If you have questions, please do not hesitate to call me. I look forward to following Bel along with you.         Sincerely,        Fran Hooper MD        CC:   No Recipients    Fran Hooper MD  01/29/24 1623  Sign when Signing Visit  Chief Complaint  Follow-up (Pt has hx of endometrial cancer and is here to discuss bx and has to have general anesthesia, daughter reports episode of heavy vaginal bleeding Thanksgiving weekend that lasted two weeks)    Subjective       Bel Powell presents to [facility]  History of Present Illness    Patient seen as a consultation from radiation oncology  She underwent a da Mane hysterectomy with BSO for endometrial cancer several years ago  She had a recurrence and then received brachytherapy  She has had a couple of urinary tract infections lately as well as some vaginal bleeding in the past 2 months  She has no pain  She denies vaginal bleeding today    Objective  Vital Signs:  /68 (BP Location: Right arm, Patient Position: Sitting, Cuff Size: Adult)   Ht 152.4 cm (60\")   Wt 48.1 kg (106 lb)   BMI 20.70 kg/m²   Estimated body mass index is 20.7 kg/m² as calculated from the following:    Height as of this encounter: 152.4 cm (60\").    Weight as of this encounter: 48.1 kg (106 lb).       BMI is within normal parameters. No other follow-up for BMI required.      Physical Exam  Genitourinary:     Comments: Vaginal vault is shortened consistent with previous radiation therapy.  Speculum exam was well-tolerated.  There are no lesions noted.  Palpation shows no abnormality.  I placed a swab inside the vaginal vault, at the apex, and removed it " and there was no evidence of any blood on the swab.       Result Review:                     Assessment and Plan     Diagnoses and all orders for this visit:    1. Endometrial cancer (Primary)    2. Non-smoker             Follow Up     Return if symptoms worsen or fail to improve.  Patient was given instructions and counseling regarding her condition or for health maintenance advice. Please see specific information pulled into the AVS if appropriate.     Of note, the patient also complains of constant urinary incontinence.  Given the concomitant presence of urinary tract infection, I wonder if this is blood mixed with urine and the source is coming from the bladder.  She does have a urologist at [facility].  I encouraged her to reach out to them to see if they can arrange a cystoscopic exam.    Fran Hooper MD

## 2024-01-30 ENCOUNTER — DOCUMENTATION (OUTPATIENT)
Dept: RADIATION ONCOLOGY | Facility: HOSPITAL | Age: 89
End: 2024-01-30
Payer: MEDICARE

## 2024-01-30 NOTE — PROGRESS NOTES
Dr. Hooper called me yesterday regarding this patient.  She was in the office with her daughter and the daughter stated to him that I was wanting a biopsy to proceed with additional radiotherapy treatments.  I was somewhat baffled by this statement as the best of my recollection last time she was seen in our office there was no evidence of recurrent disease.  We also did not recommend that she have a biopsy since there is no lesion evident on physical exam to perform a biopsy.    Upon further review the referral to OB/GYN was performed by Dr. Wilmer Francisco in urology at [facility].  We did not make a referral to OB/GYN and did not request a biopsy to be performed.    The patient's daughter reports that the patient is having some bleeding, but upon my last exam there was no lesion that would be a likely cause of bleeding from recurrent tumor.  Likewise, Dr. Hooper said upon exam he could not find a lesion to perform a biopsy.  At this time Dr. Hooper referred the patient back to urology to perhaps consider cystoscopy if the patient is indeed bleeding.

## 2024-02-01 ENCOUNTER — OFFICE VISIT (OUTPATIENT)
Dept: NEUROLOGY | Age: 89
End: 2024-02-01
Payer: MEDICARE

## 2024-02-01 VITALS
BODY MASS INDEX: 21.36 KG/M2 | SYSTOLIC BLOOD PRESSURE: 109 MMHG | WEIGHT: 108.8 LBS | HEART RATE: 63 BPM | RESPIRATION RATE: 18 BRPM | HEIGHT: 60 IN | DIASTOLIC BLOOD PRESSURE: 65 MMHG

## 2024-02-01 DIAGNOSIS — I63.9 SMALL VESSEL CEREBROVASCULAR ACCIDENT (CVA) (HCC): ICD-10-CM

## 2024-02-01 DIAGNOSIS — G43.009 MIGRAINE WITHOUT AURA AND WITHOUT STATUS MIGRAINOSUS, NOT INTRACTABLE: ICD-10-CM

## 2024-02-01 DIAGNOSIS — G40.219 PARTIAL SYMPTOMATIC EPILEPSY WITH COMPLEX PARTIAL SEIZURES, INTRACTABLE, WITHOUT STATUS EPILEPTICUS (HCC): Primary | ICD-10-CM

## 2024-02-01 DIAGNOSIS — R55 VASOVAGAL SYNCOPE: ICD-10-CM

## 2024-02-01 DIAGNOSIS — F01.50 VASCULAR DEMENTIA WITHOUT BEHAVIORAL DISTURBANCE (HCC): ICD-10-CM

## 2024-02-01 PROCEDURE — G8420 CALC BMI NORM PARAMETERS: HCPCS | Performed by: PSYCHIATRY & NEUROLOGY

## 2024-02-01 PROCEDURE — 1036F TOBACCO NON-USER: CPT | Performed by: PSYCHIATRY & NEUROLOGY

## 2024-02-01 PROCEDURE — G8484 FLU IMMUNIZE NO ADMIN: HCPCS | Performed by: PSYCHIATRY & NEUROLOGY

## 2024-02-01 PROCEDURE — G8427 DOCREV CUR MEDS BY ELIG CLIN: HCPCS | Performed by: PSYCHIATRY & NEUROLOGY

## 2024-02-01 PROCEDURE — 99213 OFFICE O/P EST LOW 20 MIN: CPT | Performed by: PSYCHIATRY & NEUROLOGY

## 2024-02-01 PROCEDURE — 1090F PRES/ABSN URINE INCON ASSESS: CPT | Performed by: PSYCHIATRY & NEUROLOGY

## 2024-02-01 PROCEDURE — 1123F ACP DISCUSS/DSCN MKR DOCD: CPT | Performed by: PSYCHIATRY & NEUROLOGY

## 2024-02-01 NOTE — PROGRESS NOTES
suregry, adn cataract with IOL b/l    HYSTERECTOMY (CERVIX STATUS UNKNOWN)  2020    ovaries and tubes    INSERTABLE CARDIAC MONITOR      LUMBAR FUSION       90 days after the spine surgeries    OTHER SURGICAL HISTORY      inner lymph nodes    OTHER SURGICAL HISTORY  2020    Loop recorder     PACEMAKER INSERTION      PACEMAKER PLACEMENT  2021    SPINE SURGERY      L3,4, and 5   done in     TONSILLECTOMY AND ADENOIDECTOMY      as a child at age 7     UPPER GASTROINTESTINAL ENDOSCOPY  2003    DR WILSON:  Duodenal scarring but no evidence of active ulcer disease.    UPPER GASTROINTESTINAL ENDOSCOPY N/A 01/10/2022    Dr Parr-Schatzki ring dilated up to 18mm with balloon, Presbyesophagus-like esophageal changes, 3-4cm HH, NEG Hpylori, NEG Barretts    UPPER GASTROINTESTINAL ENDOSCOPY N/A 10/31/2022    Dr Parr W 18 mm dil, Presbyesophagus like changes, widemouthed esoph diverticulum dital esoph Schatzki ring near 35cm, 5 cm hh wo maynor erosions    UPPER GASTROINTESTINAL ENDOSCOPY  10/31/2022    Dr Parr, 18mm balloon Dilation    UPPER GASTROINTESTINAL ENDOSCOPY N/A 2023    Dr Parr, Presbyesophagus like changes throughout esoph, wide mouthed esoph diverticulum likely due to esoph dysmotility contributing to dysphagia, 5-6 mm ulceration wo bleeding-likely daily to pill induced esophagitis, 5 cm hh w internal Maynor erosions, no h pylori       Recent Hospitalizations  None    Significant Injuries  None    Habits  Livia Arora reports that she has never smoked. She has never used smokeless tobacco. She reports that she does not drink alcohol and does not use drugs.    Family History   Problem Relation Age of Onset    Heart Defect Mother     Hypertension Mother     Heart Attack Father         x3 within 24 h and then , abused ciggarettes    No Known Problems Daughter     No Known Problems Son     No Known Problems Son     Colon Cancer Neg Hx     Colon

## 2024-02-05 RX ORDER — BUTALBITAL, ACETAMINOPHEN AND CAFFEINE 50; 325; 40 MG/1; MG/1; MG/1
1 TABLET ORAL EVERY 6 HOURS PRN
Qty: 50 TABLET | Refills: 2 | Status: SHIPPED | OUTPATIENT
Start: 2024-02-05 | End: 2024-08-03

## 2024-02-06 ENCOUNTER — TELEPHONE (OUTPATIENT)
Dept: HEMATOLOGY | Age: 89
End: 2024-02-06

## 2024-02-06 ENCOUNTER — NURSE ONLY (OUTPATIENT)
Dept: UROLOGY | Age: 89
End: 2024-02-06
Payer: MEDICARE

## 2024-02-06 DIAGNOSIS — N32.81 OAB (OVERACTIVE BLADDER): ICD-10-CM

## 2024-02-06 DIAGNOSIS — N39.46 MIXED STRESS AND URGE URINARY INCONTINENCE: Primary | ICD-10-CM

## 2024-02-06 PROCEDURE — 64566 NEUROELTRD STIM POST TIBIAL: CPT | Performed by: NURSE PRACTITIONER

## 2024-02-06 NOTE — TELEPHONE ENCOUNTER
Called pt. to remind them of appointment on 2/8/2024 and had to leave a detailed voicemail with appointment date and time.

## 2024-02-08 ENCOUNTER — OFFICE VISIT (OUTPATIENT)
Dept: HEMATOLOGY | Age: 89
End: 2024-02-08
Payer: MEDICARE

## 2024-02-08 ENCOUNTER — HOSPITAL ENCOUNTER (OUTPATIENT)
Dept: INFUSION THERAPY | Age: 89
Discharge: HOME | End: 2024-02-08
Payer: MEDICARE

## 2024-02-08 VITALS
SYSTOLIC BLOOD PRESSURE: 108 MMHG | TEMPERATURE: 97.6 F | HEART RATE: 64 BPM | HEIGHT: 60 IN | BODY MASS INDEX: 21.28 KG/M2 | WEIGHT: 108.4 LBS | DIASTOLIC BLOOD PRESSURE: 64 MMHG | OXYGEN SATURATION: 97 %

## 2024-02-08 DIAGNOSIS — Z71.89 CARE PLAN DISCUSSED WITH PATIENT: ICD-10-CM

## 2024-02-08 DIAGNOSIS — D50.9 IRON DEFICIENCY ANEMIA, UNSPECIFIED IRON DEFICIENCY ANEMIA TYPE: ICD-10-CM

## 2024-02-08 DIAGNOSIS — D50.9 IRON DEFICIENCY ANEMIA, UNSPECIFIED IRON DEFICIENCY ANEMIA TYPE: Primary | ICD-10-CM

## 2024-02-08 DIAGNOSIS — D72.819 LEUKOPENIA, UNSPECIFIED TYPE: ICD-10-CM

## 2024-02-08 LAB
BASOPHILS # BLD: 0.02 K/UL (ref 0.01–0.08)
BASOPHILS NFR BLD: 0.6 % (ref 0.1–1.2)
EOSINOPHIL # BLD: 0.1 K/UL (ref 0.04–0.54)
EOSINOPHIL NFR BLD: 2.8 % (ref 0.7–7)
ERYTHROCYTE [DISTWIDTH] IN BLOOD BY AUTOMATED COUNT: 11.3 % (ref 11.7–14.4)
FERRITIN SERPL-MCNC: 492.3 NG/ML (ref 13–150)
HCT VFR BLD AUTO: 35.5 % (ref 34.1–44.9)
HGB BLD-MCNC: 12.3 G/DL (ref 11.2–15.7)
IRON SATN MFR SERPL: 38 % (ref 14–50)
IRON SERPL-MCNC: 100 UG/DL (ref 37–145)
LYMPHOCYTES # BLD: 1.27 K/UL (ref 1.18–3.74)
LYMPHOCYTES NFR BLD: 35.1 % (ref 19.3–53.1)
MCH RBC QN AUTO: 34.3 PG (ref 25.6–32.2)
MCHC RBC AUTO-ENTMCNC: 34.6 G/DL (ref 32.3–35.5)
MCV RBC AUTO: 98.9 FL (ref 79.4–94.8)
MONOCYTES # BLD: 0.38 K/UL (ref 0.24–0.82)
MONOCYTES NFR BLD: 10.5 % (ref 4.7–12.5)
NEUTROPHILS # BLD: 1.84 K/UL (ref 1.56–6.13)
NEUTS SEG NFR BLD: 50.7 % (ref 34–71.1)
PLATELET # BLD AUTO: 180 K/UL (ref 182–369)
PMV BLD AUTO: 10.2 FL (ref 7.4–10.4)
RBC # BLD AUTO: 3.59 M/UL (ref 3.93–5.22)
TIBC SERPL-MCNC: 264 UG/DL (ref 250–400)
WBC # BLD AUTO: 3.62 K/UL (ref 3.98–10.04)

## 2024-02-08 PROCEDURE — G8420 CALC BMI NORM PARAMETERS: HCPCS | Performed by: NURSE PRACTITIONER

## 2024-02-08 PROCEDURE — 1036F TOBACCO NON-USER: CPT | Performed by: NURSE PRACTITIONER

## 2024-02-08 PROCEDURE — 1123F ACP DISCUSS/DSCN MKR DOCD: CPT | Performed by: NURSE PRACTITIONER

## 2024-02-08 PROCEDURE — 99212 OFFICE O/P EST SF 10 MIN: CPT

## 2024-02-08 PROCEDURE — 85025 COMPLETE CBC W/AUTO DIFF WBC: CPT

## 2024-02-08 PROCEDURE — 36415 COLL VENOUS BLD VENIPUNCTURE: CPT

## 2024-02-08 PROCEDURE — 1090F PRES/ABSN URINE INCON ASSESS: CPT | Performed by: NURSE PRACTITIONER

## 2024-02-08 PROCEDURE — G8484 FLU IMMUNIZE NO ADMIN: HCPCS | Performed by: NURSE PRACTITIONER

## 2024-02-08 PROCEDURE — 99213 OFFICE O/P EST LOW 20 MIN: CPT | Performed by: NURSE PRACTITIONER

## 2024-02-08 PROCEDURE — G8427 DOCREV CUR MEDS BY ELIG CLIN: HCPCS | Performed by: NURSE PRACTITIONER

## 2024-02-08 RX ORDER — NITROFURANTOIN 25; 75 MG/1; MG/1
100 CAPSULE ORAL DAILY
COMMUNITY

## 2024-02-08 NOTE — PROGRESS NOTES
from the edges of this ulceration which likely is pill induced esophagitis.  Pathology revealed fragments of benign stratified squamous esophageal mucosa that matures appropriately. NO erosions or ulcers or nodules or strictures or webs or mass lesions or extrinsic compression or diverticula noted. Stomach:  abnormal: There is a 5 cm in length hiatal hernia present with internal Maynor erosions. Pathology revealed all negative.     Return in about 6 months (around 8/8/2024) for follow up with LAYTON Marie.    I have seen, examined and reviewed this patient medication list, appropriate labs and imaging studies. I reviewed relevant medical records and others physician’s notes. I discussed the plan of care with the patient. I answered all questions to the patient’s satisfaction. I have also reviewed the chief complaint (CC) and part of the history (History of Present Illness (HPI), Past Family Social History (PFSH), or Review of Systems (ROS) and made changes when appropriate.  Progress note completed by Dr. White and Dr. Hurley reviewed.    Dictated utilizing Dragon transcription software.        LAYTON Holly  12:22 PM  2/11/2024

## 2024-02-16 ENCOUNTER — HOSPITAL ENCOUNTER (OUTPATIENT)
Dept: RADIATION ONCOLOGY | Facility: HOSPITAL | Age: 89
Setting detail: RADIATION/ONCOLOGY SERIES
End: 2024-02-16
Payer: MEDICARE

## 2024-02-19 ENCOUNTER — OFFICE VISIT (OUTPATIENT)
Dept: RADIATION ONCOLOGY | Facility: HOSPITAL | Age: 89
End: 2024-02-19
Payer: MEDICARE

## 2024-02-19 ENCOUNTER — APPOINTMENT (OUTPATIENT)
Dept: RADIATION ONCOLOGY | Facility: HOSPITAL | Age: 89
End: 2024-02-19
Payer: MEDICARE

## 2024-02-19 VITALS
SYSTOLIC BLOOD PRESSURE: 125 MMHG | RESPIRATION RATE: 16 BRPM | BODY MASS INDEX: 20.81 KG/M2 | HEIGHT: 60 IN | DIASTOLIC BLOOD PRESSURE: 57 MMHG | OXYGEN SATURATION: 98 % | HEART RATE: 64 BPM | WEIGHT: 106 LBS

## 2024-02-19 DIAGNOSIS — Z90.710 S/P HYSTERECTOMY WITH OOPHORECTOMY: ICD-10-CM

## 2024-02-19 DIAGNOSIS — Z78.9 NON-SMOKER: ICD-10-CM

## 2024-02-19 DIAGNOSIS — C54.1 ENDOMETRIAL CANCER: Primary | ICD-10-CM

## 2024-02-19 DIAGNOSIS — Z90.721 S/P HYSTERECTOMY WITH OOPHORECTOMY: ICD-10-CM

## 2024-02-19 DIAGNOSIS — Z92.3 HISTORY OF RADIATION THERAPY: ICD-10-CM

## 2024-02-19 PROCEDURE — G0463 HOSPITAL OUTPT CLINIC VISIT: HCPCS | Performed by: RADIOLOGY

## 2024-02-19 PROCEDURE — 88142 CYTOPATH C/V THIN LAYER: CPT | Performed by: RADIOLOGY

## 2024-02-19 PROCEDURE — 88108 CYTOPATH CONCENTRATE TECH: CPT | Performed by: RADIOLOGY

## 2024-02-19 NOTE — PROGRESS NOTES
[facility], .SLacyLacy Tillman MD      Jhony Pimentel APRN  ____________________________________________________________  [facility]  Department of Radiation Oncology  [address] [place] [id number]  Office:  [phone]  Fax: [phone]    DATE: 02/19/2024  PATIENT: Bel Pwoell  1935                         MEDICAL RECORD #:  [id number]                 No chief complaint on file.    Reason for Visit: Bel Powell is a very pleasant 88 y.o. female that has completed HDR treatment and returns to the clinic today for routine follow up exam.     History of Present Illness:  Diagnosed with recurrent endometrial carcinoma. She completed 5 internal brachytherapy treatments completed on 03/03/2023.     09/13/2019 - CT Abdomen/Pelvis due to right lower quadrant pain:  Cystic changes centrally within the uterus; a fluid distended endometrial cavity considered, GYN consultation suggested. Uterine fibroids.   Colonic diverticulosis without CT evidence of acute diverticulitis. Otherwise, No CT evidence of acute bowel pathology   Uncomplicated left-sided superior lumbar hernia.     09/18/2019 - CT Head without contrast:  No acute intracranial abnormality.     09/25/2019 - CT Head:  No acute intracranial abnormality.   Chronic ischemic and atrophic changes.     10/28/2019 - Endometrium, biopsy:  Primarily mucus.  Fragments of squamous mucosa.  Scant fragments of endocervical mucosa.  Extremely scant superficial fragments of endometrial glandular epithelium.  No evidence of atypia or malignancy.    12/23/2019 - Biopsies:  Endometrium, curettings:  Endometrioid adenocarcinoma, FIGO grade 1 arising in a background of complex atypical hyperplasia.  Cervix, endocervical curettings:  Benign endocervical glands.      01/28/2020 - Robotic Total Hysterectomy/BSO per :   Right sentinel lymph node biopsy:  3 lymph nodes negative for tumor  Left pelvic sentinel lymph  node biopsy:  Fibroadipose tissue with small fragment of lymph node showing cautery change and no evidence of metastatic tumor  Uterus, fallopian tubes and ovaries, hysterectomy with bilateral salpingo-oophorectomy:   Cervix and endocervix with no evidence of glandular and squamous dysplasia  Endometriod endometrial adenocarcinoma, villoglandular type, grade 1, with less than 1 mm of invasion (of 17 mm myometrial thickness)  Multiple leiomyomata uteri  No evidence of adenomyosis  Fallopian tubes with paratubal remnant cysts, negative for endometriosis and malignancy  Ovaries with no evidence of endometriosis or malignancy  Comment: Paraffin immunoperoxidase studies, utilizing appropriate positive and negative controls, are performed on the 2 sentinel lymph node specimens. These fail to demonstrate AE1/AE3 positive tumor cells in the lymph nodes.  TUMOR  Histologic type - endometrioid carcinoma, villoglandular variant  Histologic grade - FIGO grade 1  Myometrial invasion - present  Depth of invasion - 1 mm  Myometrial thickness in millimeters: 17  Percentage of myometrial invasion - 6%  Uterine serosa involvement - not identified  Cervical stromal involvement - not identified  Other tissue/organ involvement - not identified  Lymphovascular invasion - not identified  LYMPH NODES  Total number of pelvic nodes examined - 4  Number of pelvic sentinel lymph nodes -4  Laterality of pelvic nodes - right, left  Number of pelvic nodes with macrometastasis - 0  Number of pelvic nodes with micrometastasis - 0  Number of pelvic nodes with isolated tumor cells - 0    02/24/2020 - CT Abdomen/Pelvis:  Fluid-filled nondistended small bowel loops with mucosal enhancement and thickening suggest acute enteritis. Further follow-up may be obtained.   The diverticulosis of the distal colon. No evidence of diverticulitis.   Moderately dilated distal common bile duct and intrahepatic biliary ducts is probably due to a prior cholecystectomy.    A stable left lumbar hernia containing a loop of colon without obstruction.     12/15/2020 - CT Abdomen/pelvis:  The stable insufficiency fractures of the sacrum bilaterally, similar to the previous study in February 2020.   No acute abnormality of the abdomen are pelvis, particularly, no evidence of traumatic involvement of the abdominal pelvic organs.   The diverticulosis of the colon. No evidence for diverticulitis.   Hardware fusion of the lumbar spine. The severe demineralization the bony structures.     05/25/2021 - CT Abdomen/Pelvis due to vomiting, pain:  This study is of limited diagnostic quality. This is secondary to the lack of intra-abdominal fat, lack of IV and oral contrast as well as the patient's inability to raise her arms over her head with extensive streaking artifact related to both upper extremities as well as postoperative changes within the mid and lower lumbar spine also contributing to streaking. If symptoms are persistent I would suggest that the study be repeated with both IV and oral contrast administration.   Cardiomegaly with a transvenous pacer in place. There is heavy atheromatous calcification of the distal descending thoracic aorta without evidence of aneurysm.   Multiple fluid-filled bowel loops. I do not see evidence of a discrete transition point. The appendix is not clearly identified and may be surgically absent. There are a few diverticula noted of the sigmoid colon without evidence of diverticulitis. No convincing evidence of bowel obstruction. No evidence of pneumoperitoneum.   I do not see evidence of nephrolithiasis. The ureters cannot be clearly trace but there is no convincing evidence of obstructive uropathy.   The patient has undergone previous cholecystectomy and hysterectomy. The patient has also undergone fusion at the L3-L5 levels.    12/08/2022 - Appointment with :  Ms. Powell is a 88 y/o with Stage IA Grade 1 Endometrial Cancer who presents for an  evaluation.   Exam showed possible granulation tissue. Removed and will sent to path and call with results and plan.   s/p robotic hyst/bso/staging on 1/29/20   no additional treatment required   IHC Negative   Surveillance Visit: q6months x 1 year (January 2021) then yearly for 5 years total (January 2025)  Ms. Powell will return in 12 months.    12/08/2022 - Vagina biopsy:  Low-grade endometrioid pattern adenocarcinoma    12/21/2022 - Appointment with Yolanda Kamara APRN - CNP - Urology:  ASSESSMENT/PLAN  Abnormality of urethral meatus  Patient with apparent abnormality of the urethral meatus as explained to her daughter by her obstetrical oncologist at an appointment last week. She requested a pelvic exam today. I did discuss with the daughter that I was uncertain if pelvic exam would provide any useful information.   However, I did offer to perform 1. I could not perform 1 at the time I was in the room because the patient was undergoing your plasty treatment and then could not move to the dorsolithotomy position.   Patient also with emergent ophthalmic appointment today regarding a possible detached retina. Patient daughter reports that they cannot miss that appointment and are already running late. Pelvic exam for today was deferred.   We will try to obtain some records from her oncologist to determine if there is anything in particular they need from us.    12/21/2022 - CT Abdomen/Pelvis with contrast:  Prior hysterectomy without evidence of metastatic disease in the abdomen or pelvis.    12/21/2022 - CT Chest with contrast:  No evidence of metastatic disease in the chest.   Hiatal hernia with thickened distal esophagus. Consider follow-up endoscopy.   Atherosclerotic disease.    01/16/2023 - Consult with :  After consideration of the diagnostic data and evaluation of the patient, I recommend obtaining a PET scan for further evaluation of disease progression. Should it reveal limited disease,  could consider brachytherapy treatments vs. Whole pelvic radiation.   The patient and her family verbalize understanding of this discussion, voice no further questions and wish to proceed with recommendations. Continue ongoing management per primary care physician and other specialists.  Plan:  PET scan ordered, they will call you  Return in 2 weeks for further discussion of treatment recommendations    01/24/2023 - PET Scan:  No hypermetabolic soft tissue recurrence identified in the pelvis. There is only a small amount of bowel uptake identified in the rectum as well as excreted tracer activity in the ureters and urinary bladder.  No suspicious adenopathy or evidence of distant metastatic disease.    01/26/2023 - Appointment with :  Bel Powell returns to the clinic today to review PET scan result and discuss radiotherapy recommendations for recurrent endometrial carcinoma.   PET Scan completed on 01/24/2023 revealed no hypermetabolic soft tissue recurrence identified in the pelvis. There is only a small amount of bowel uptake identified in the rectum as well as excreted tracer activity in the ureters and urinary bladder. No suspicious adenopathy or evidence of distant metastatic disease.  Given the PET scan results, I recommend to treat with internal brachytherapy treatments.  We reviewed potential complications side effects of radiation include limited radiation-induced damage to the bowel or bladder.  Patient and daughter verbalized understanding distress and voices no questions and agreed to proceed with therapy.   Plan:  Plan on 4-5 internal treatments.     02/02/2023 - 03/03/2023 - Completed radiation course:  Received 5 HDR treatments.    06/07/2023 - Appointment with :  PAP smear today  Return in 4 months.     06/07/2023 - Pap smear per :  No interpretation due to specimen adequacy     10/23/2023 - Appointment with :  Return to Dr. Tillman in 4 months for follow  up     10/23/2023 - Pap smear per :  No interpretation due to specimen adequacy     01/29/2024 - Appointment with :  Of note, the patient also complains of constant urinary incontinence.  Given the concomitant presence of urinary tract infection, I wonder if this is blood mixed with urine and the source is coming from the bladder.  She does have a urologist at [facility].  I encouraged her to reach out to them to see if they can arrange a cystoscopic exam.     01/30/2024 - Documentation per :  Dr. Hooper called me yesterday regarding this patient.  She was in the office with her daughter and the daughter stated to him that I was wanting a biopsy to proceed with additional radiotherapy treatments.  I was somewhat baffled by this statement as the best of my recollection last time she was seen in our office there was no evidence of recurrent disease.  We also did not recommend that she have a biopsy since there is no lesion evident on physical exam to perform a biopsy.  Upon further review the referral to OB/GYN was performed by Dr. Wilmer Francisco in urology at [facility].  We did not make a referral to OB/GYN and did not request a biopsy to be performed.  The patient's daughter reports that the patient is having some bleeding, but upon my last exam there was no lesion that would be a likely cause of bleeding from recurrent tumor.  Likewise, Dr. Hooper said upon exam he could not find a lesion to perform a biopsy.  At this time Dr. Hooper referred the patient back to urology to perhaps consider cystoscopy if the patient is indeed bleeding.    History obtained from  PATIENT, FAMILY, and CHART    PAST MEDICAL HISTORY  Past Medical History:   Diagnosis Date    Arthritis     OA    Dietary restriction     FLUID 48-52 OZ PER DR HACKETT    Endometrial thickening on ultrasound     Hypertension     Incontinence     Nocturia     Plantar fasciitis     PMB (postmenopausal bleeding)     Seizure     Sensory urge  incontinence     Stroke     CODED     TIA (transient ischemic attack)     Uterine cancer     Uterine polyp     Vaginal atrophy     Vaginal bleeding     Weakness       PAST SURGICAL HISTORY  Past Surgical History:   Procedure Laterality Date    BACK SURGERY      BREAST SURGERY      LEFT BREAST CYST    CATARACT EXTRACTION      CHOLECYSTECTOMY      D & C HYSTEROSCOPY N/A 12/23/2019    Procedure: DILATATION AND CURETTAGE HYSTEROSCOPY;  Surgeon: Lynnette Andujar MD;  Location: [facility] OR;  Service: Obstetrics/Gynecology    D & C HYSTEROSCOPY      x2    HYSTERECTOMY  01/28/2022    [facility] BSO for uterine cancer    PACEMAKER IMPLANTATION Left     2021  Dr. Mayer at [facility]    TONSILLECTOMY      WISDOM TOOTH EXTRACTION        FAMILY HISTORY  family history includes No Known Problems in her father and mother.    SOCIAL HISTORY  Social History     Tobacco Use    Smoking status: Never     Passive exposure: Never    Smokeless tobacco: Never   Vaping Use    Vaping Use: Never used   Substance Use Topics    Alcohol use: No     Comment: rare    Drug use: Never     ALLERGIES  Hydrocodone-acetaminophen, Levofloxacin, Memantine hcl, Pneumococcal vaccine, Quinolones, Yellow dye, Aspirin, Lortab [hydrocodone-acetaminophen], Meperidine, Mirabegron, Ondansetron, Penicillins, Spironolactone, Sulfa antibiotics, Memantine, Codeine, and Yellow dye #11     MEDICATIONS    Current Outpatient Medications:     acetaminophen (TYLENOL) 500 MG tablet, Take 2 tablets by mouth Every 6 (Six) Hours As Needed for Moderate Pain. Do not exceed 3000 mg's daily, Disp: , Rfl:     alendronate (FOSAMAX) 70 MG tablet, 1 tablet Orally once a week for 30 day(s), Disp: , Rfl:     amLODIPine (NORVASC) 5 MG tablet, Take 1 tablet by mouth Every Morning., Disp: , Rfl:     azelastine (ASTELIN) 0.1 % nasal spray, 2 sprays into the nostril(s) as directed by provider 2 (Two) Times a Day., Disp: , Rfl:     B Complex Vitamins (VITAMIN B COMPLEX PO), Take 1 tablet  by mouth Daily., Disp: , Rfl:     B-Complex, Folic Acid, tablet, Take 1 tablet by mouth Daily., Disp: , Rfl:     butalbital-acetaminophen-caffeine (FIORICET, ESGIC) -40 MG per tablet, Take 1 tablet by mouth Every 6 (Six) Hours As Needed for Headache., Disp: , Rfl:     camphor-menthol (SARNA) 0.5-0.5 % lotion, Apply 1 application topically to the appropriate area as directed 2 (Two) Times a Day., Disp: , Rfl:     cholecalciferol (VITAMIN D3) 1000 UNITS tablet, Take 1 tablet by mouth Daily., Disp: , Rfl:     cloNIDine (CATAPRES) 0.1 MG tablet, Take 1 tablet by mouth 2 (Two) Times a Day As Needed for High Blood Pressure (As needed if BP exceeds 160/100.). Half tablet, Disp: , Rfl:     diclofenac (VOLTAREN) 1 % gel gel, Apply 2 g topically to the appropriate area as directed 4 (Four) Times a Day As Needed (PAIN)., Disp: , Rfl:     dicyclomine (BENTYL) 10 MG capsule, Take 1 capsule by mouth 4 (Four) Times a Day Before Meals & at Bedtime. Tried med x 1, Disp: , Rfl:     dipyridamole (PERSANTINE) 50 MG tablet, Take 2 tablets by mouth 2 (Two) Times a Day., Disp: , Rfl:     donepezil (ARICEPT) 10 MG tablet, Take 1 tablet by mouth Every Night., Disp: , Rfl:     fluticasone (FLONASE) 50 MCG/ACT nasal spray, administer ONE SPRAY in EACH nostril TWICE DAILY AS DIRECTED, Disp: , Rfl:     fluticasone (VERAMYST) 27.5 MCG/SPRAY nasal spray, 2 sprays into the nostril(s) as directed by provider Every Night., Disp: , Rfl:     gabapentin (NEURONTIN) 100 MG capsule, Take 1 capsule by mouth. Up to 3 times a day, Disp: , Rfl:     hydrALAZINE (APRESOLINE) 25 MG tablet, Take 1 tablet by mouth 3 (Three) Times a Day. Due to vasovagal responses, only in the afternoon if BP is 180/80, and at bedtime, Disp: , Rfl:     levETIRAcetam (KEPPRA) 500 MG tablet, Take 1 tablet by mouth 2 (Two) Times a Day., Disp: , Rfl:     Lidocaine 4 % patch, Place 1 patch on the skin as directed by provider 2 (Two) Times a Day., Disp: , Rfl:     Lutein 20 MG  tablet, Take 1 tablet by mouth Daily., Disp: , Rfl:     meloxicam (MOBIC) 15 MG tablet, Take 1 tablet by mouth Daily As Needed., Disp: , Rfl:     nitrofurantoin (MACRODANTIN) 50 MG capsule, Take 1 capsule by mouth Every Night., Disp: , Rfl:     pantoprazole (PROTONIX) 40 MG EC tablet, Take 1 tablet by mouth 2 (Two) Times a Day., Disp: , Rfl:     pravastatin (PRAVACHOL) 20 MG tablet, Take 1 tablet by mouth Daily., Disp: , Rfl:     promethazine (PHENERGAN) 25 MG tablet, Take 1 tablet by mouth Every 8 (Eight) Hours As Needed for Nausea or Vomiting., Disp: , Rfl:     ramipril (ALTACE) 10 MG capsule, Take 1 capsule by mouth Daily., Disp: , Rfl:     saline 0.65 % nasal solution (BABY AYR) 0.65 % solution, 1 spray into the nostril(s) as directed by provider Daily As Needed., Disp: , Rfl:     simethicone (MYLICON) 125 MG chewable tablet, Every 8 (Eight) Hours. 6am at 2 9:30am, 1 6pm, Disp: , Rfl:     sodium chloride 1 g tablet, Take 1 tablet by mouth 3 (Three) Times a Day., Disp: , Rfl:     therapeutic multivitamin-minerals (THERAGRAN-M) tablet, Take 1 tablet by mouth Daily., Disp: , Rfl:     valACYclovir (VALTREX) 1000 MG tablet, Take 1 tablet by mouth Daily As Needed (FEVER BLISTER)., Disp: , Rfl:     Current outpatient and discharge medications have been reconciled for the patient.  Reviewed by: Nakia Arreaga LPN    The following portions of the patient's history were reviewed and updated as appropriate: allergies, current medications, past family history, past medical history, past social history, past surgical history and problem list.    REVIEW OF SYSTEMS  Review of Systems    I have reviewed and confirmed the accuracy of the ROS as documented by the MA/MARIA A/RN Nakia Arreaga LPN    PHYSICAL EXAM  VITAL SIGNS:   There were no vitals filed for this visit.    Physical Exam      Performance Status: ECOG (2) Ambulatory and capable of self care, unable to carry out work activity, up and about > 50% or waking hours    Clinical  Quality Measures  -Pain Documented by Standardized Tool, FPS Bel Powell reports a pain score of .  Given her pain assessment as noted, treatment options were discussed and the following options were decided upon as a follow-up plan to address the patient's pain:  No pain, no plan given .   Pain Medications               acetaminophen (TYLENOL) 500 MG tablet Take 2 tablets by mouth Every 6 (Six) Hours As Needed for Moderate Pain. Do not exceed 3000 mg's daily    butalbital-acetaminophen-caffeine (FIORICET, ESGIC) -40 MG per tablet Take 1 tablet by mouth Every 6 (Six) Hours As Needed for Headache.    gabapentin (NEURONTIN) 100 MG capsule Take 1 capsule by mouth. Up to 3 times a day    levETIRAcetam (KEPPRA) 500 MG tablet Take 1 tablet by mouth 2 (Two) Times a Day.    meloxicam (MOBIC) 15 MG tablet Take 1 tablet by mouth Daily As Needed.          -Advanced Care Planning Advance Care Planning  ACP discussion was held with the patient during this visit. Patient has an advance directive in EMR which is still valid.     -Body Mass Index Screening and Follow-Up Plan BMI is within normal parameters. No other follow-up for BMI required.    -Tobacco Use: Screening and Cessation Intervention Social History    Tobacco Use      Smoking status: Never      Smokeless tobacco: Never    ASSESSMENT AND PLAN  1. Endometrial cancer    2. S/P hysterectomy with oophorectomy    3. History of radiation therapy    4. Non-smoker      No orders of the defined types were placed in this encounter.    RECOMMENDATIONS:    Bel Powell is status post completion of radiation therapy and presents to our clinic today for routine follow up exam.  Diagnosed with recurrent endometrial carcinoma. She completed 5 internal brachytherapy treatments completed on 03/03/2023.     I have reviewed the chart and other physicians records. she denies untoward side effects from treatment and without evidence for recurrent or metastatic disease on  exam today. Pap smear completed today. Will call with results. She will return in *** Continue ongoing management per primary care physician and other specialists.      Today's visit included 35 minutes of time reviewing medical records, reviewing with patient and personal physical exam.    There are no Patient Instructions on file for this visit.    Nakia Arreaga LPN  02/19/2024

## 2024-02-20 NOTE — PROGRESS NOTES
[facility], [facility] Sl[facility] HospitalLacyLacy Tillman MD      Jhony Pimentel ANGEL  ____________________________________________________________  [facility]  Department of Radiation Oncology  [address] [place] [id number]  Office:  [phone]  Fax: [phone]    DATE: 02/19/2024  PATIENT: Bel Powell  1935                         MEDICAL RECORD #:  [id number]                 Chief Complaint   Patient presents with    Uterine Cancer     Reason for Visit: Bel Powell is a very pleasant 88 y.o. female that has completed HDR treatment and returns to the clinic today for routine follow up exam. Denies appetite change, expected weight change, nasuea/vomiting, diarrhea, light-headedness, weakness, and headaches.     History of Present Illness:  Diagnosed with recurrent endometrial carcinoma. She completed 5 internal brachytherapy treatments completed on 03/03/2023.     09/13/2019 - CT Abdomen/Pelvis due to right lower quadrant pain:  Cystic changes centrally within the uterus; a fluid distended endometrial cavity considered, GYN consultation suggested. Uterine fibroids.   Colonic diverticulosis without CT evidence of acute diverticulitis. Otherwise, No CT evidence of acute bowel pathology   Uncomplicated left-sided superior lumbar hernia.     09/18/2019 - CT Head without contrast:  No acute intracranial abnormality.     09/25/2019 - CT Head:  No acute intracranial abnormality.   Chronic ischemic and atrophic changes.     10/28/2019 - Endometrium, biopsy:  Primarily mucus.  Fragments of squamous mucosa.  Scant fragments of endocervical mucosa.  Extremely scant superficial fragments of endometrial glandular epithelium.  No evidence of atypia or malignancy.    12/23/2019 - Biopsies:  Endometrium, curettings:  Endometrioid adenocarcinoma, FIGO grade 1 arising in a background of complex atypical hyperplasia.  Cervix, endocervical curettings:  Benign endocervical glands.      01/28/2020 -  Robotic Total Hysterectomy/BSO per :   Right sentinel lymph node biopsy:  3 lymph nodes negative for tumor  Left pelvic sentinel lymph node biopsy:  Fibroadipose tissue with small fragment of lymph node showing cautery change and no evidence of metastatic tumor  Uterus, fallopian tubes and ovaries, hysterectomy with bilateral salpingo-oophorectomy:   Cervix and endocervix with no evidence of glandular and squamous dysplasia  Endometriod endometrial adenocarcinoma, villoglandular type, grade 1, with less than 1 mm of invasion (of 17 mm myometrial thickness)  Multiple leiomyomata uteri  No evidence of adenomyosis  Fallopian tubes with paratubal remnant cysts, negative for endometriosis and malignancy  Ovaries with no evidence of endometriosis or malignancy  Comment: Paraffin immunoperoxidase studies, utilizing appropriate positive and negative controls, are performed on the 2 sentinel lymph node specimens. These fail to demonstrate AE1/AE3 positive tumor cells in the lymph nodes.  TUMOR  Histologic type - endometrioid carcinoma, villoglandular variant  Histologic grade - FIGO grade 1  Myometrial invasion - present  Depth of invasion - 1 mm  Myometrial thickness in millimeters: 17  Percentage of myometrial invasion - 6%  Uterine serosa involvement - not identified  Cervical stromal involvement - not identified  Other tissue/organ involvement - not identified  Lymphovascular invasion - not identified  LYMPH NODES  Total number of pelvic nodes examined - 4  Number of pelvic sentinel lymph nodes -4  Laterality of pelvic nodes - right, left  Number of pelvic nodes with macrometastasis - 0  Number of pelvic nodes with micrometastasis - 0  Number of pelvic nodes with isolated tumor cells - 0    02/24/2020 - CT Abdomen/Pelvis:  Fluid-filled nondistended small bowel loops with mucosal enhancement and thickening suggest acute enteritis. Further follow-up may be obtained.   The diverticulosis of the distal colon. No  evidence of diverticulitis.   Moderately dilated distal common bile duct and intrahepatic biliary ducts is probably due to a prior cholecystectomy.   A stable left lumbar hernia containing a loop of colon without obstruction.     12/15/2020 - CT Abdomen/pelvis:  The stable insufficiency fractures of the sacrum bilaterally, similar to the previous study in February 2020.   No acute abnormality of the abdomen are pelvis, particularly, no evidence of traumatic involvement of the abdominal pelvic organs.   The diverticulosis of the colon. No evidence for diverticulitis.   Hardware fusion of the lumbar spine. The severe demineralization the bony structures.     05/25/2021 - CT Abdomen/Pelvis due to vomiting, pain:  This study is of limited diagnostic quality. This is secondary to the lack of intra-abdominal fat, lack of IV and oral contrast as well as the patient's inability to raise her arms over her head with extensive streaking artifact related to both upper extremities as well as postoperative changes within the mid and lower lumbar spine also contributing to streaking. If symptoms are persistent I would suggest that the study be repeated with both IV and oral contrast administration.   Cardiomegaly with a transvenous pacer in place. There is heavy atheromatous calcification of the distal descending thoracic aorta without evidence of aneurysm.   Multiple fluid-filled bowel loops. I do not see evidence of a discrete transition point. The appendix is not clearly identified and may be surgically absent. There are a few diverticula noted of the sigmoid colon without evidence of diverticulitis. No convincing evidence of bowel obstruction. No evidence of pneumoperitoneum.   I do not see evidence of nephrolithiasis. The ureters cannot be clearly trace but there is no convincing evidence of obstructive uropathy.   The patient has undergone previous cholecystectomy and hysterectomy. The patient has also undergone fusion at  the L3-L5 levels.    12/08/2022 - Appointment with :  Ms. Powell is a 88 y/o with Stage IA Grade 1 Endometrial Cancer who presents for an evaluation.   Exam showed possible granulation tissue. Removed and will sent to path and call with results and plan.   s/p robotic hyst/bso/staging on 1/29/20   no additional treatment required   IHC Negative   Surveillance Visit: q6months x 1 year (January 2021) then yearly for 5 years total (January 2025)  Ms. Powell will return in 12 months.    12/08/2022 - Vagina biopsy:  Low-grade endometrioid pattern adenocarcinoma    12/21/2022 - Appointment with Yolanda Kamara APRN - CNP - Urology:  ASSESSMENT/PLAN  Abnormality of urethral meatus  Patient with apparent abnormality of the urethral meatus as explained to her daughter by her obstetrical oncologist at an appointment last week. She requested a pelvic exam today. I did discuss with the daughter that I was uncertain if pelvic exam would provide any useful information.   However, I did offer to perform 1. I could not perform 1 at the time I was in the room because the patient was undergoing your plasty treatment and then could not move to the dorsolithotomy position.   Patient also with emergent ophthalmic appointment today regarding a possible detached retina. Patient daughter reports that they cannot miss that appointment and are already running late. Pelvic exam for today was deferred.   We will try to obtain some records from her oncologist to determine if there is anything in particular they need from us.    12/21/2022 - CT Abdomen/Pelvis with contrast:  Prior hysterectomy without evidence of metastatic disease in the abdomen or pelvis.    12/21/2022 - CT Chest with contrast:  No evidence of metastatic disease in the chest.   Hiatal hernia with thickened distal esophagus. Consider follow-up endoscopy.   Atherosclerotic disease.    01/16/2023 - Consult with :  After consideration of the diagnostic  data and evaluation of the patient, I recommend obtaining a PET scan for further evaluation of disease progression. Should it reveal limited disease, could consider brachytherapy treatments vs. Whole pelvic radiation.   The patient and her family verbalize understanding of this discussion, voice no further questions and wish to proceed with recommendations. Continue ongoing management per primary care physician and other specialists.  Plan:  PET scan ordered, they will call you  Return in 2 weeks for further discussion of treatment recommendations    01/24/2023 - PET Scan:  No hypermetabolic soft tissue recurrence identified in the pelvis. There is only a small amount of bowel uptake identified in the rectum as well as excreted tracer activity in the ureters and urinary bladder.  No suspicious adenopathy or evidence of distant metastatic disease.    01/26/2023 - Appointment with :  Bel Powell returns to the clinic today to review PET scan result and discuss radiotherapy recommendations for recurrent endometrial carcinoma.   PET Scan completed on 01/24/2023 revealed no hypermetabolic soft tissue recurrence identified in the pelvis. There is only a small amount of bowel uptake identified in the rectum as well as excreted tracer activity in the ureters and urinary bladder. No suspicious adenopathy or evidence of distant metastatic disease.  Given the PET scan results, I recommend to treat with internal brachytherapy treatments.  We reviewed potential complications side effects of radiation include limited radiation-induced damage to the bowel or bladder.  Patient and daughter verbalized understanding distress and voices no questions and agreed to proceed with therapy.   Plan:  Plan on 4-5 internal treatments.     02/02/2023 - 03/03/2023 - Completed radiation course:  Received 5 HDR treatments.    06/07/2023 - Appointment with :  PAP smear today  Return in 4 months.     06/07/2023 - Pap smear  per :  No interpretation due to specimen adequacy     10/23/2023 - Appointment with :  Return to Dr. Tillman in 4 months for follow up     10/23/2023 - Pap smear per :  No interpretation due to specimen adequacy     01/29/2024 - Appointment with :  Of note, the patient also complains of constant urinary incontinence.  Given the concomitant presence of urinary tract infection, I wonder if this is blood mixed with urine and the source is coming from the bladder.  She does have a urologist at [facility].  I encouraged her to reach out to them to see if they can arrange a cystoscopic exam.     01/30/2024 - Documentation per :  Dr. Hooper called me yesterday regarding this patient.  She was in the office with her daughter and the daughter stated to him that I was wanting a biopsy to proceed with additional radiotherapy treatments.  I was somewhat baffled by this statement as the best of my recollection last time she was seen in our office there was no evidence of recurrent disease.  We also did not recommend that she have a biopsy since there is no lesion evident on physical exam to perform a biopsy.  Upon further review the referral to OB/GYN was performed by Dr. Wilmer Francisco in urology at [facility].  We did not make a referral to OB/GYN and did not request a biopsy to be performed.  The patient's daughter reports that the patient is having some bleeding, but upon my last exam there was no lesion that would be a likely cause of bleeding from recurrent tumor.  Likewise, Dr. Hooper said upon exam he could not find a lesion to perform a biopsy.  At this time Dr. Hooper referred the patient back to urology to perhaps consider cystoscopy if the patient is indeed bleeding.    History obtained from  PATIENT, FAMILY, and CHART    PAST MEDICAL HISTORY  Past Medical History:   Diagnosis Date    Arthritis     OA    Dietary restriction     FLUID 48-52 OZ PER DR HACKETT    Endometrial thickening  on ultrasound     Hypertension     Incontinence     Nocturia     Plantar fasciitis     PMB (postmenopausal bleeding)     Seizure     Sensory urge incontinence     Stroke     CODED     TIA (transient ischemic attack)     Uterine cancer     Uterine polyp     Vaginal atrophy     Vaginal bleeding     Weakness       PAST SURGICAL HISTORY  Past Surgical History:   Procedure Laterality Date    BACK SURGERY      BREAST SURGERY      LEFT BREAST CYST    CATARACT EXTRACTION      CHOLECYSTECTOMY      D & C HYSTEROSCOPY N/A 12/23/2019    Procedure: DILATATION AND CURETTAGE HYSTEROSCOPY;  Surgeon: Lynnette Andujar MD;  Location: [facility] OR;  Service: Obstetrics/Gynecology    D & C HYSTEROSCOPY      x2    HYSTERECTOMY  01/28/2022    [facility] BSO for uterine cancer    PACEMAKER IMPLANTATION Left     2021  Dr. Mayer at [facility]    TONSILLECTOMY      WISDOM TOOTH EXTRACTION        FAMILY HISTORY  family history includes No Known Problems in her father and mother.    SOCIAL HISTORY  Social History     Tobacco Use    Smoking status: Never     Passive exposure: Never    Smokeless tobacco: Never   Vaping Use    Vaping Use: Never used   Substance Use Topics    Alcohol use: No     Comment: rare    Drug use: Never     ALLERGIES  Hydrocodone-acetaminophen, Levofloxacin, Memantine hcl, Pneumococcal vaccine, Quinolones, Yellow dye, Aspirin, Lortab [hydrocodone-acetaminophen], Meperidine, Mirabegron, Ondansetron, Penicillins, Spironolactone, Sulfa antibiotics, Codeine, and Yellow dye #11     MEDICATIONS    Current Outpatient Medications:     acetaminophen (TYLENOL) 500 MG tablet, Take 2 tablets by mouth Every 6 (Six) Hours As Needed for Moderate Pain. Do not exceed 3000 mg's daily, Disp: , Rfl:     alendronate (FOSAMAX) 70 MG tablet, 1 tablet Orally once a week for 30 day(s), Disp: , Rfl:     amLODIPine (NORVASC) 5 MG tablet, Take 1 tablet by mouth Every Morning., Disp: , Rfl:     azelastine (ASTELIN) 0.1 % nasal spray, 2 sprays into  the nostril(s) as directed by provider 2 (Two) Times a Day., Disp: , Rfl:     B Complex Vitamins (VITAMIN B COMPLEX PO), Take 1 tablet by mouth Daily., Disp: , Rfl:     butalbital-acetaminophen-caffeine (FIORICET, ESGIC) -40 MG per tablet, Take 1 tablet by mouth Every 6 (Six) Hours As Needed for Headache., Disp: , Rfl:     camphor-menthol (SARNA) 0.5-0.5 % lotion, Apply 1 application topically to the appropriate area as directed 2 (Two) Times a Day., Disp: , Rfl:     cholecalciferol (VITAMIN D3) 1000 UNITS tablet, Take 1 tablet by mouth Daily., Disp: , Rfl:     cloNIDine (CATAPRES) 0.1 MG tablet, Take 1 tablet by mouth 2 (Two) Times a Day As Needed for High Blood Pressure (As needed if BP exceeds 160/100.). Half tablet, Disp: , Rfl:     diclofenac (VOLTAREN) 1 % gel gel, Apply 2 g topically to the appropriate area as directed 4 (Four) Times a Day As Needed (PAIN)., Disp: , Rfl:     dicyclomine (BENTYL) 10 MG capsule, Take 1 capsule by mouth 4 (Four) Times a Day Before Meals & at Bedtime. Tried med x 1, Disp: , Rfl:     dipyridamole (PERSANTINE) 50 MG tablet, Take 2 tablets by mouth 2 (Two) Times a Day., Disp: , Rfl:     donepezil (ARICEPT) 10 MG tablet, Take 1 tablet by mouth Every Night., Disp: , Rfl:     fluticasone (FLONASE) 50 MCG/ACT nasal spray, administer ONE SPRAY in EACH nostril TWICE DAILY AS DIRECTED, Disp: , Rfl:     gabapentin (NEURONTIN) 100 MG capsule, Take 1 capsule by mouth. Up to 3 times a day, Disp: , Rfl:     hydrALAZINE (APRESOLINE) 25 MG tablet, Take 1 tablet by mouth 3 (Three) Times a Day. Due to vasovagal responses, only in the afternoon if BP is 180/80, and at bedtime, Disp: , Rfl:     levETIRAcetam (KEPPRA) 500 MG tablet, Take 1 tablet by mouth 2 (Two) Times a Day., Disp: , Rfl:     Lidocaine 4 % patch, Place 1 patch on the skin as directed by provider 2 (Two) Times a Day., Disp: , Rfl:     Lutein 20 MG tablet, Take 1 tablet by mouth Daily., Disp: , Rfl:     meloxicam (MOBIC) 15 MG  tablet, Take 1 tablet by mouth Daily As Needed., Disp: , Rfl:     nitrofurantoin (MACRODANTIN) 50 MG capsule, Take 1 capsule by mouth Every Night., Disp: , Rfl:     pantoprazole (PROTONIX) 40 MG EC tablet, Take 1 tablet by mouth 2 (Two) Times a Day., Disp: , Rfl:     pravastatin (PRAVACHOL) 20 MG tablet, Take 1 tablet by mouth Daily., Disp: , Rfl:     promethazine (PHENERGAN) 25 MG tablet, Take 1 tablet by mouth Every 8 (Eight) Hours As Needed for Nausea or Vomiting., Disp: , Rfl:     ramipril (ALTACE) 10 MG capsule, Take 1 capsule by mouth Daily., Disp: , Rfl:     saline 0.65 % nasal solution (BABY AYR) 0.65 % solution, 1 spray into the nostril(s) as directed by provider Daily As Needed., Disp: , Rfl:     simethicone (MYLICON) 125 MG chewable tablet, Every 8 (Eight) Hours. 6am at 2 9:30am, 1 6pm, Disp: , Rfl:     sodium chloride 1 g tablet, Take 1 tablet by mouth 3 (Three) Times a Day., Disp: , Rfl:     therapeutic multivitamin-minerals (THERAGRAN-M) tablet, Take 1 tablet by mouth Daily., Disp: , Rfl:     valACYclovir (VALTREX) 1000 MG tablet, Take 1 tablet by mouth Daily As Needed (FEVER BLISTER)., Disp: , Rfl:     B-Complex, Folic Acid, tablet, Take 1 tablet by mouth Daily., Disp: , Rfl:     fluticasone (VERAMYST) 27.5 MCG/SPRAY nasal spray, 2 sprays into the nostril(s) as directed by provider Every Night., Disp: , Rfl:     Current outpatient and discharge medications have been reconciled for the patient.  Reviewed by: Vimal Tillman III, MD    The following portions of the patient's history were reviewed and updated as appropriate: allergies, current medications, past family history, past medical history, past social history, past surgical history and problem list.    REVIEW OF SYSTEMS  Review of Systems    I have reviewed and confirmed the accuracy of the ROS as documented by the MA/LPN/RN Vimal Tillman III, MD    PHYSICAL EXAM  VITAL SIGNS:   Vitals:    02/19/24 1513   BP: 125/57   Pulse: 64   Resp: 16  "  SpO2: 98%   Weight: 48.1 kg (106 lb)   Height: 152.4 cm (60\")   PainSc: 0-No pain       Physical Exam      Performance Status: ECOG (2) Ambulatory and capable of self care, unable to carry out work activity, up and about > 50% or waking hours    Clinical Quality Measures  -Pain Documented by Standardized Tool, FPS Bel Powell reports a pain score of 0.  Given her pain assessment as noted, treatment options were discussed and the following options were decided upon as a follow-up plan to address the patient's pain:  No pain, no plan given .   Pain Medications               acetaminophen (TYLENOL) 500 MG tablet Take 2 tablets by mouth Every 6 (Six) Hours As Needed for Moderate Pain. Do not exceed 3000 mg's daily    butalbital-acetaminophen-caffeine (FIORICET, ESGIC) -40 MG per tablet Take 1 tablet by mouth Every 6 (Six) Hours As Needed for Headache.    gabapentin (NEURONTIN) 100 MG capsule Take 1 capsule by mouth. Up to 3 times a day    levETIRAcetam (KEPPRA) 500 MG tablet Take 1 tablet by mouth 2 (Two) Times a Day.    meloxicam (MOBIC) 15 MG tablet Take 1 tablet by mouth Daily As Needed.          -Advanced Care Planning Advance Care Planning  ACP discussion was held with the patient during this visit. Patient has an advance directive in EMR which is still valid.     -Body Mass Index Screening and Follow-Up Plan BMI is within normal parameters. No other follow-up for BMI required.    -Tobacco Use: Screening and Cessation Intervention Social History    Tobacco Use      Smoking status: Never      Smokeless tobacco: Never    ASSESSMENT AND PLAN  1. Endometrial cancer    2. S/P hysterectomy with oophorectomy    3. History of radiation therapy      No orders of the defined types were placed in this encounter.    RECOMMENDATIONS:    Bel Powell is status post completion of radiation therapy and presents to our clinic today for routine follow up exam.  Diagnosed with recurrent endometrial carcinoma. " She completed 5 internal brachytherapy treatments completed on 03/03/2023.     I have reviewed the chart and other physicians records. she denies untoward side effects from treatment and without evidence for recurrent or metastatic disease on exam today. Pap smear completed today. Will call with results. She will return in 6 months Continue ongoing management per primary care physician and other specialists.      Today's visit included 35 minutes of time reviewing medical records, reviewing with patient and personal physical exam.    Patient Instructions   1) Pap smear today  2) Return in 6 months    Vimal Tillman III, MD  02/19/2024

## 2024-02-21 ENCOUNTER — TELEPHONE (OUTPATIENT)
Dept: RADIATION ONCOLOGY | Facility: HOSPITAL | Age: 89
End: 2024-02-21
Payer: MEDICARE

## 2024-02-21 NOTE — TELEPHONE ENCOUNTER
----- Message from Vimal Tillman III, MD sent at 2/21/2024 11:52 AM CST -----  Please notify her of results    ----- Message -----  From: Lab, Background User  Sent: 2/21/2024   9:28 AM CST  To: Vimal Tillman III, MD

## 2024-02-21 NOTE — TELEPHONE ENCOUNTER
Spoke with patients daughter, Jessica, and relayed pap smear results to her. She verbalized understanding.

## 2024-02-23 ENCOUNTER — TELEPHONE (OUTPATIENT)
Dept: UROLOGY | Age: 89
End: 2024-02-23

## 2024-02-23 NOTE — TELEPHONE ENCOUNTER
Livia's daughter Cesilia called to reschedule an appointment for  2 week Uroplasty . Livia was scheduled on 2/21/24 but office was out of kits. [facility] was unable to accommodate in the time frame needed. Please be advised that the best time to call Anytime. Please contact Cesilia to advise.     Thank you.

## 2024-02-23 NOTE — TELEPHONE ENCOUNTER
Contacted Cesilia and let her know that we do not have Uroplasty kits but that we would give her a call once we received the kits.

## 2024-02-27 ENCOUNTER — NURSE ONLY (OUTPATIENT)
Dept: UROLOGY | Age: 89
End: 2024-02-27
Payer: MEDICARE

## 2024-02-27 DIAGNOSIS — N32.81 OAB (OVERACTIVE BLADDER): ICD-10-CM

## 2024-02-27 DIAGNOSIS — N39.46 MIXED STRESS AND URGE URINARY INCONTINENCE: Primary | ICD-10-CM

## 2024-02-27 PROCEDURE — 64566 NEUROELTRD STIM POST TIBIAL: CPT | Performed by: NURSE PRACTITIONER

## 2024-02-27 NOTE — PROGRESS NOTES
Treatment # maintenance    Improvement of Symptoms?  Mild    OAB/Urologic Medications?  None      Uroplasty Procedure:    1. Patient is seated with the left treatment leg elevated.  2. 34 gauge fine needle electrode is inserted into lower inner aspect of the leg. Slightly cephalad to the medial malleolus.  3. Surface electrode pad is placed over the medial aspect of the calcaneus on the same leg.  4.Needle electrode is connected to the external pulse generator.  5.The pulse generator is turned on and the millivolts used are 15.  6.Patient is treated for 30 minutes.  7.The needle and pad are removed.    Patient will follow up in 2 weeks for next treatment.

## 2024-02-28 ENCOUNTER — TELEPHONE (OUTPATIENT)
Dept: CARDIOLOGY CLINIC | Age: 89
End: 2024-02-28

## 2024-02-28 NOTE — TELEPHONE ENCOUNTER
Pt daughter called stating pt had a vagal response and she can't make apt. It takes pt over an hour to recover. Pt would like to reschedule apt. Pls call daughter with as soon as you can get her in.

## 2024-03-13 ENCOUNTER — NURSE ONLY (OUTPATIENT)
Dept: UROLOGY | Age: 89
End: 2024-03-13
Payer: MEDICARE

## 2024-03-13 DIAGNOSIS — N39.46 MIXED STRESS AND URGE URINARY INCONTINENCE: Primary | ICD-10-CM

## 2024-03-13 DIAGNOSIS — N32.81 OAB (OVERACTIVE BLADDER): ICD-10-CM

## 2024-03-13 PROCEDURE — 64566 NEUROELTRD STIM POST TIBIAL: CPT | Performed by: NURSE PRACTITIONER

## 2024-03-13 NOTE — PROGRESS NOTES
Treatment # maintenance    Improvement of Symptoms?  Yes    OAB/Urologic Medications?  None      Uroplasty Procedure:    1. Patient is seated with the left treatment leg elevated.  2. 34 gauge fine needle electrode is inserted into lower inner aspect of the leg. Slightly cephalad to the medial malleolus.  3. Surface electrode pad is placed over the medial aspect of the calcaneus on the same leg.  4.Needle electrode is connected to the external pulse generator.  5.The pulse generator is turned on and the millivolts used are 3.  6.Patient is treated for 30 minutes.  7.The needle and pad are removed.    Patient will follow up in 2 weeks for next treatment.      1. Mixed stress and urge urinary incontinence  2. OAB (overactive bladder)  Doing well continue current treatment.       No orders of the defined types were placed in this encounter.       Return in about 2 weeks (around 3/27/2024) for uroplasty, with LAYTON Guadarrama.

## 2024-03-27 ENCOUNTER — NURSE ONLY (OUTPATIENT)
Dept: UROLOGY | Age: 89
End: 2024-03-27
Payer: MEDICARE

## 2024-03-27 DIAGNOSIS — N32.81 OAB (OVERACTIVE BLADDER): ICD-10-CM

## 2024-03-27 DIAGNOSIS — N39.46 MIXED STRESS AND URGE URINARY INCONTINENCE: Primary | ICD-10-CM

## 2024-03-27 DIAGNOSIS — N39.0 RECURRENT UTI: ICD-10-CM

## 2024-03-27 PROCEDURE — 64566 NEUROELTRD STIM POST TIBIAL: CPT | Performed by: NURSE PRACTITIONER

## 2024-03-27 RX ORDER — NITROFURANTOIN MACROCRYSTALS 50 MG/1
50 CAPSULE ORAL NIGHTLY
Qty: 90 CAPSULE | Refills: 3 | Status: SHIPPED | OUTPATIENT
Start: 2024-03-27 | End: 2025-03-27

## 2024-03-27 NOTE — PROGRESS NOTES
Treatment # maintenance    Improvement of Symptoms? mild    OAB/Urologic Medications? none      Uroplasty Procedure:    1. Patient is seated with the left treatment leg elevated.  2. 34 gauge fine needle electrode is inserted into lower inner aspect of the leg. Slightly cephalad to the medial malleolus.  3. Surface electrode pad is placed over the medial aspect of the calcaneus on the same leg.  4.Needle electrode is connected to the external pulse generator.  5.The pulse generator is turned on and the millivolts used are 7.  6.Patient is treated for 30 minutes.  7.The needle and pad are removed.    Patient will follow up in 2 weeks for next treatment.      1. Mixed stress and urge urinary incontinence  2. OAB (overactive bladder)  Stable.  Continue PTNS.  Follow-up 2 weeks.    3. Recurrent UTI  Stable.  Requesting refill of daily Macrodantin sent to different pharmacy.  - nitrofurantoin (MACRODANTIN) 50 MG capsule; Take 1 capsule by mouth nightly  Dispense: 90 capsule; Refill: 3       No orders of the defined types were placed in this encounter.       Return in about 2 weeks (around 4/10/2024).

## 2024-03-28 DIAGNOSIS — Z95.0 PACEMAKER: Primary | ICD-10-CM

## 2024-03-28 DIAGNOSIS — I49.5 SA NODE DYSFUNCTION (HCC): ICD-10-CM

## 2024-04-10 ENCOUNTER — NURSE ONLY (OUTPATIENT)
Dept: UROLOGY | Age: 89
End: 2024-04-10
Payer: MEDICARE

## 2024-04-10 VITALS — HEIGHT: 60 IN | WEIGHT: 108 LBS | BODY MASS INDEX: 21.2 KG/M2 | TEMPERATURE: 98.6 F

## 2024-04-10 DIAGNOSIS — N32.81 OAB (OVERACTIVE BLADDER): ICD-10-CM

## 2024-04-10 DIAGNOSIS — R15.9 INCONTINENCE OF FECES, UNSPECIFIED FECAL INCONTINENCE TYPE: ICD-10-CM

## 2024-04-10 DIAGNOSIS — N39.46 MIXED STRESS AND URGE URINARY INCONTINENCE: Primary | ICD-10-CM

## 2024-04-10 PROCEDURE — 1123F ACP DISCUSS/DSCN MKR DOCD: CPT | Performed by: NURSE PRACTITIONER

## 2024-04-10 PROCEDURE — 99213 OFFICE O/P EST LOW 20 MIN: CPT | Performed by: NURSE PRACTITIONER

## 2024-04-11 NOTE — PROGRESS NOTES
Treatment # Maintenance    Improvement of Symptoms? mild    OAB/Urologic Medications? none      Uroplasty Procedure:    1. Patient is seated with the left treatment leg elevated.  2. 34 gauge fine needle electrode is inserted into lower inner aspect of the leg. Slightly cephalad to the medial malleolus.  3. Surface electrode pad is placed over the medial aspect of the calcaneus on the same leg.  4.Needle electrode is connected to the external pulse generator.  5.The pulse generator is turned on and the millivolts used are 7.  6.Patient is treated for 30 minutes.  7.The needle and pad are removed.    Patient will follow up in 2 weeks for next treatment.    Patient's daughter also brings up concerns about new fecal incontinence and the possibility of developing UTI. She provides most of the history given the patient's dementia. She states the patient recently had an episode of unknown fecal incontinence which led to fecal matter being on and around the urethra. She questions about prevention of UTI.     1. Mixed stress and urge urinary incontinence  Stable. Return in 2 weeks for PTNS    2. OAB (overactive bladder)  Stable. Return in 2 weeks for PTNS    3. Incontinence of feces, unspecified fecal incontinence type  New problem. Encouraged use of preventative therapies such as cranberry and dmannose. She is already maintained on daily macrodantin     No orders of the defined types were placed in this encounter.       Return in about 2 weeks (around 4/24/2024) for uroplasty.

## 2024-04-22 ENCOUNTER — TELEPHONE (OUTPATIENT)
Dept: PODIATRY | Facility: CLINIC | Age: 89
End: 2024-04-22
Payer: MEDICARE

## 2024-04-24 ENCOUNTER — NURSE ONLY (OUTPATIENT)
Dept: UROLOGY | Age: 89
End: 2024-04-24
Payer: MEDICARE

## 2024-04-24 VITALS — TEMPERATURE: 98.5 F | HEIGHT: 60 IN | WEIGHT: 108 LBS | BODY MASS INDEX: 21.2 KG/M2

## 2024-04-24 DIAGNOSIS — N32.81 OAB (OVERACTIVE BLADDER): ICD-10-CM

## 2024-04-24 DIAGNOSIS — N39.46 MIXED STRESS AND URGE URINARY INCONTINENCE: Primary | ICD-10-CM

## 2024-04-24 PROCEDURE — 64566 NEUROELTRD STIM POST TIBIAL: CPT | Performed by: NURSE PRACTITIONER

## 2024-04-24 NOTE — PROGRESS NOTES
Treatment # maintenance    Improvement of Symptoms?  Yes    OAB/Urologic Medications?  None      Uroplasty Procedure:    1. Patient is seated with the left treatment leg elevated.  2. 34 gauge fine needle electrode is inserted into lower inner aspect of the leg. Slightly cephalad to the medial malleolus.  3. Surface electrode pad is placed over the medial aspect of the calcaneus on the same leg.  4.Needle electrode is connected to the external pulse generator.  5.The pulse generator is turned on and the millivolts used are 7.  6.Patient is treated for 30 minutes.  7.The needle and pad are removed.    Patient will follow up in 2 weeks for next treatment.      1. Mixed stress and urge urinary incontinence  2. OAB (overactive bladder)  Stable.  Follow-up in 2 weeks for retreatment.       No orders of the defined types were placed in this encounter.       Return in about 2 weeks (around 5/8/2024) for uroplasty.

## 2024-05-09 ENCOUNTER — NURSE ONLY (OUTPATIENT)
Dept: UROLOGY | Age: 89
End: 2024-05-09

## 2024-05-09 ENCOUNTER — HOSPITAL ENCOUNTER (OUTPATIENT)
Dept: INFUSION THERAPY | Age: 89
Discharge: HOME | End: 2024-05-09
Payer: MEDICARE

## 2024-05-09 DIAGNOSIS — N39.46 MIXED STRESS AND URGE URINARY INCONTINENCE: Primary | ICD-10-CM

## 2024-05-09 DIAGNOSIS — D50.9 IRON DEFICIENCY ANEMIA, UNSPECIFIED IRON DEFICIENCY ANEMIA TYPE: ICD-10-CM

## 2024-05-09 DIAGNOSIS — N32.81 OAB (OVERACTIVE BLADDER): ICD-10-CM

## 2024-05-09 LAB
BASOPHILS # BLD: 0.04 K/UL (ref 0.01–0.08)
BASOPHILS NFR BLD: 1 % (ref 0.1–1.2)
EOSINOPHIL # BLD: 0.1 K/UL (ref 0.04–0.54)
EOSINOPHIL NFR BLD: 2.6 % (ref 0.7–7)
ERYTHROCYTE [DISTWIDTH] IN BLOOD BY AUTOMATED COUNT: 11.7 % (ref 11.7–14.4)
HCT VFR BLD AUTO: 36.9 % (ref 34.1–44.9)
HGB BLD-MCNC: 12.6 G/DL (ref 11.2–15.7)
IRON SATN MFR SERPL: 21 % (ref 14–50)
IRON SERPL-MCNC: 58 UG/DL (ref 37–145)
LYMPHOCYTES # BLD: 1.09 K/UL (ref 1.18–3.74)
LYMPHOCYTES NFR BLD: 27.9 % (ref 19.3–53.1)
MCH RBC QN AUTO: 34.4 PG (ref 25.6–32.2)
MCHC RBC AUTO-ENTMCNC: 34.1 G/DL (ref 32.3–35.5)
MCV RBC AUTO: 100.8 FL (ref 79.4–94.8)
MONOCYTES # BLD: 0.34 K/UL (ref 0.24–0.82)
MONOCYTES NFR BLD: 8.7 % (ref 4.7–12.5)
NEUTROPHILS # BLD: 2.32 K/UL (ref 1.56–6.13)
NEUTS SEG NFR BLD: 59.5 % (ref 34–71.1)
PLATELET # BLD AUTO: 189 K/UL (ref 182–369)
PMV BLD AUTO: 10.2 FL (ref 7.4–10.4)
RBC # BLD AUTO: 3.66 M/UL (ref 3.93–5.22)
TIBC SERPL-MCNC: 273 UG/DL (ref 250–400)
WBC # BLD AUTO: 3.9 K/UL (ref 3.98–10.04)

## 2024-05-09 PROCEDURE — 36415 COLL VENOUS BLD VENIPUNCTURE: CPT

## 2024-05-09 PROCEDURE — 85025 COMPLETE CBC W/AUTO DIFF WBC: CPT

## 2024-05-09 NOTE — PROGRESS NOTES
Treatment # maintenance     Improvement of Symptoms?  Mild     OAB/Urologic Medications? none          Uroplasty Procedure:     1. Patient is seated with the left treatment leg elevated.  2. 34 gauge fine needle electrode is inserted into lower inner aspect of the leg. Slightly cephalad to the medial malleolus.  3. Surface electrode pad is placed over the medial aspect of the calcaneus on the same leg.  4.Needle electrode is connected to the external pulse generator.  5.The pulse generator is turned on and the millivolts used are 3.  6.Patient is treated for 30 minutes.  7.The needle and pad are removed.         1. Mixed stress and urge urinary incontinence  2. OAB (overactive bladder)  Patient will follow up in 2 weeks for next treatment.  Also some concern for foul-smelling urine.  Explained that this was typically nothing to worry about.  Patient is already on low-dose Macrobid.  Would not recommend culture or any further evaluation of this issue.       No orders of the defined types were placed in this encounter.       No follow-ups on file.

## 2024-05-10 LAB — FERRITIN SERPL-MCNC: 410.4 NG/ML (ref 13–150)

## 2024-05-13 ENCOUNTER — TELEPHONE (OUTPATIENT)
Dept: CARDIOLOGY CLINIC | Age: 89
End: 2024-05-13

## 2024-05-13 NOTE — TELEPHONE ENCOUNTER
----- Message from Nupur Whitehead sent at 5/9/2024  2:35 PM CDT -----  Regarding: Appt request  Patient and daughter came in requesting an appt with Dr. Bello. Patient canceled last appt due to having a vagal response and other times our office has canceled. Patients daughter stated they are unaware of any current cardiac issues. Patient needs an appt with Dr. Bello on Wednesday only 2 pm or later due to patients other health issues. Patient requests call from Dr. Bello's nurse.

## 2024-05-15 ENCOUNTER — OFFICE VISIT (OUTPATIENT)
Dept: CARDIOLOGY CLINIC | Age: 89
End: 2024-05-15
Payer: MEDICARE

## 2024-05-15 VITALS
DIASTOLIC BLOOD PRESSURE: 68 MMHG | WEIGHT: 111 LBS | SYSTOLIC BLOOD PRESSURE: 126 MMHG | BODY MASS INDEX: 21.79 KG/M2 | HEART RATE: 68 BPM | HEIGHT: 60 IN | OXYGEN SATURATION: 96 %

## 2024-05-15 DIAGNOSIS — R00.1 SYMPTOMATIC BRADYCARDIA: ICD-10-CM

## 2024-05-15 DIAGNOSIS — Z95.0 PACEMAKER: Primary | ICD-10-CM

## 2024-05-15 PROCEDURE — 1036F TOBACCO NON-USER: CPT | Performed by: INTERNAL MEDICINE

## 2024-05-15 PROCEDURE — G8427 DOCREV CUR MEDS BY ELIG CLIN: HCPCS | Performed by: INTERNAL MEDICINE

## 2024-05-15 PROCEDURE — 99214 OFFICE O/P EST MOD 30 MIN: CPT | Performed by: INTERNAL MEDICINE

## 2024-05-15 PROCEDURE — 1123F ACP DISCUSS/DSCN MKR DOCD: CPT | Performed by: INTERNAL MEDICINE

## 2024-05-15 PROCEDURE — 93280 PM DEVICE PROGR EVAL DUAL: CPT | Performed by: INTERNAL MEDICINE

## 2024-05-15 PROCEDURE — G8420 CALC BMI NORM PARAMETERS: HCPCS | Performed by: INTERNAL MEDICINE

## 2024-05-15 PROCEDURE — 1090F PRES/ABSN URINE INCON ASSESS: CPT | Performed by: INTERNAL MEDICINE

## 2024-05-15 ASSESSMENT — ENCOUNTER SYMPTOMS
BACK PAIN: 0
BLOOD IN STOOL: 0
EYE DISCHARGE: 0
ABDOMINAL DISTENTION: 0
COUGH: 0
CONSTIPATION: 0
SHORTNESS OF BREATH: 0
WHEEZING: 0
DIARRHEA: 0
VOMITING: 0

## 2024-05-15 NOTE — PROGRESS NOTES
Pacemaker interrogation  Presenting rhythm: AP VS, AP 34.4%,  <0.1%  Battery status: 11.5 years  Lead status: lead impedance within range and stable  Sensing: P waves 2.3 mV,  R waves 12.5 mV  Thresholds:  Atrial 0.5 V @ 0.4ms, ventricular 1.25 @ 0.4ms  Observations:  1 monitored SVT on 5/3/24 duration 2 seconds  See scanned report for details  Reprogramming for sensing and thresholds  Next remote home check scheduled for 8/19/24        
bleeding in November but this resolved.  She does have episodic vasovagal syncopal episodes but no significant injury.  Daughter is with her.  No chest pain reported.  Occasional minimal shortness of breath especially with activity but this is stable.  No leg swelling of significance.    REVIEW OF SYSTEMS:  Review of Systems   Constitutional:  Negative for activity change, fatigue and fever.   HENT:  Negative for ear pain, hearing loss and tinnitus.    Eyes:  Negative for discharge and visual disturbance.   Respiratory:  Negative for cough, shortness of breath and wheezing.    Cardiovascular:  Negative for chest pain, palpitations and leg swelling.   Gastrointestinal:  Negative for abdominal distention, blood in stool, constipation, diarrhea and vomiting.   Endocrine: Negative for cold intolerance, heat intolerance, polydipsia and polyuria.   Genitourinary:  Negative for dysuria and hematuria.   Musculoskeletal:  Negative for arthralgias, back pain and myalgias.   Skin:  Negative for pallor and rash.   Neurological:  Negative for seizures, syncope, weakness and headaches.   Psychiatric/Behavioral:  Negative for behavioral problems and dysphoric mood.        Past Medical History:      Diagnosis Date    Age-related macular degeneration, wet, right eye (HCC)     Anemia     Back pain     Bilateral carotid artery stenosis 02/23/2021    Chronic bilateral low back pain with left-sided sciatica 12/19/2019    Colitis, ischemic (HCC)     Dementia ([facility])     Diverticulosis     Hyperlipidemia     Hypertension     Osteoarthritis     Palliative care patient 09/17/2019    Risk for falls 09/13/2019    Seizures ([facility])     last one 2021    Spastic colon     Status post placement of implantable loop recorder 10/27/2020    Stroke syndrome     CVA's/ TIA's    Urinary incontinence     Uterine cancer (HCC)     Uterine cancer (HCC)     Vagal reaction     wet age-related macular degeneration of left eye     bilateral       Past Surgical

## 2024-05-22 ENCOUNTER — NURSE ONLY (OUTPATIENT)
Dept: UROLOGY | Age: 89
End: 2024-05-22
Payer: MEDICARE

## 2024-05-22 DIAGNOSIS — N32.81 OAB (OVERACTIVE BLADDER): ICD-10-CM

## 2024-05-22 DIAGNOSIS — N39.46 MIXED STRESS AND URGE URINARY INCONTINENCE: Primary | ICD-10-CM

## 2024-05-22 PROCEDURE — 64566 NEUROELTRD STIM POST TIBIAL: CPT | Performed by: NURSE PRACTITIONER

## 2024-05-22 NOTE — PROGRESS NOTES
Treatment # maintenance    Improvement of Symptoms?  Mild    OAB/Urologic Medications? none        Uroplasty Procedure:    1. Patient is seated with the left treatment leg elevated.  2. 34 gauge fine needle electrode is inserted into lower inner aspect of the leg. Slightly cephalad to the medial malleolus.  3. Surface electrode pad is placed over the medial aspect of the calcaneus on the same leg.  4.Needle electrode is connected to the external pulse generator.  5.The pulse generator is turned on and the millivolts used are 7.  6.Patient is treated for 30 minutes.  7.The needle and pad are removed.    Patient will follow up in 2 weeks for next treatment.      1. Mixed stress and urge urinary incontinence  2. OAB (overactive bladder)  Continue PTNS.  Follow-up in 2 weeks.       No orders of the defined types were placed in this encounter.       Return in about 2 weeks (around 6/5/2024) for uroplasty.

## 2024-05-24 ENCOUNTER — APPOINTMENT (OUTPATIENT)
Dept: CT IMAGING | Age: 89
End: 2024-05-24
Payer: MEDICARE

## 2024-05-24 ENCOUNTER — HOSPITAL ENCOUNTER (EMERGENCY)
Age: 89
Discharge: HOME | End: 2024-05-25
Attending: EMERGENCY MEDICINE
Payer: MEDICARE

## 2024-05-24 VITALS
BODY MASS INDEX: 21.6 KG/M2 | RESPIRATION RATE: 18 BRPM | TEMPERATURE: 98.3 F | HEIGHT: 60 IN | SYSTOLIC BLOOD PRESSURE: 162 MMHG | WEIGHT: 110 LBS | OXYGEN SATURATION: 100 % | HEART RATE: 77 BPM | DIASTOLIC BLOOD PRESSURE: 74 MMHG

## 2024-05-24 DIAGNOSIS — R10.9 ABDOMINAL PAIN, UNSPECIFIED ABDOMINAL LOCATION: Primary | ICD-10-CM

## 2024-05-24 LAB
ALBUMIN SERPL-MCNC: 4.5 G/DL (ref 3.5–5.2)
ALP SERPL-CCNC: 71 U/L (ref 35–104)
ALT SERPL-CCNC: 10 U/L (ref 5–33)
ANION GAP SERPL CALCULATED.3IONS-SCNC: 17 MMOL/L (ref 7–19)
AST SERPL-CCNC: 17 U/L (ref 5–32)
BASOPHILS # BLD: 0 K/UL (ref 0–0.2)
BASOPHILS NFR BLD: 0.3 % (ref 0–1)
BILIRUB SERPL-MCNC: 0.8 MG/DL (ref 0.2–1.2)
BILIRUB UR QL STRIP: NEGATIVE
BUN SERPL-MCNC: 11 MG/DL (ref 8–23)
CALCIUM SERPL-MCNC: 9.6 MG/DL (ref 8.8–10.2)
CHLORIDE SERPL-SCNC: 94 MMOL/L (ref 98–111)
CLARITY UR: CLEAR
CO2 SERPL-SCNC: 21 MMOL/L (ref 22–29)
COLOR UR: YELLOW
CREAT SERPL-MCNC: 0.5 MG/DL (ref 0.5–0.9)
EOSINOPHIL # BLD: 0 K/UL (ref 0–0.6)
EOSINOPHIL NFR BLD: 0.1 % (ref 0–5)
ERYTHROCYTE [DISTWIDTH] IN BLOOD BY AUTOMATED COUNT: 11.5 % (ref 11.5–14.5)
GLUCOSE SERPL-MCNC: 119 MG/DL (ref 74–109)
GLUCOSE UR STRIP.AUTO-MCNC: NEGATIVE MG/DL
HCT VFR BLD AUTO: 38.1 % (ref 37–47)
HGB BLD-MCNC: 13.3 G/DL (ref 12–16)
HGB UR STRIP.AUTO-MCNC: NEGATIVE MG/L
IMM GRANULOCYTES # BLD: 0 K/UL
KETONES UR STRIP.AUTO-MCNC: ABNORMAL MG/DL
LEUKOCYTE ESTERASE UR QL STRIP.AUTO: NEGATIVE
LIPASE SERPL-CCNC: 27 U/L (ref 13–60)
LYMPHOCYTES # BLD: 0.6 K/UL (ref 1.1–4.5)
LYMPHOCYTES NFR BLD: 5.7 % (ref 20–40)
MCH RBC QN AUTO: 35.1 PG (ref 27–31)
MCHC RBC AUTO-ENTMCNC: 34.9 G/DL (ref 33–37)
MCV RBC AUTO: 100.5 FL (ref 81–99)
MONOCYTES # BLD: 0.6 K/UL (ref 0–0.9)
MONOCYTES NFR BLD: 5.2 % (ref 0–10)
NEUTROPHILS # BLD: 9.9 K/UL (ref 1.5–7.5)
NEUTS SEG NFR BLD: 88.4 % (ref 50–65)
NITRITE UR QL STRIP.AUTO: NEGATIVE
PH UR STRIP.AUTO: 7.5 [PH] (ref 5–8)
PLATELET # BLD AUTO: 172 K/UL (ref 130–400)
PMV BLD AUTO: 10.3 FL (ref 9.4–12.3)
POTASSIUM SERPL-SCNC: 3.4 MMOL/L (ref 3.5–5)
PROT SERPL-MCNC: 7 G/DL (ref 6.6–8.7)
PROT UR STRIP.AUTO-MCNC: NEGATIVE MG/DL
RBC # BLD AUTO: 3.79 M/UL (ref 4.2–5.4)
SODIUM SERPL-SCNC: 132 MMOL/L (ref 136–145)
SP GR UR STRIP.AUTO: 1.02 (ref 1–1.03)
UROBILINOGEN UR STRIP.AUTO-MCNC: 1 E.U./DL
WBC # BLD AUTO: 11.2 K/UL (ref 4.8–10.8)

## 2024-05-24 PROCEDURE — 36415 COLL VENOUS BLD VENIPUNCTURE: CPT

## 2024-05-24 PROCEDURE — 6360000002 HC RX W HCPCS: Performed by: EMERGENCY MEDICINE

## 2024-05-24 PROCEDURE — 6360000004 HC RX CONTRAST MEDICATION: Performed by: EMERGENCY MEDICINE

## 2024-05-24 PROCEDURE — 2580000003 HC RX 258: Performed by: EMERGENCY MEDICINE

## 2024-05-24 PROCEDURE — 99285 EMERGENCY DEPT VISIT HI MDM: CPT

## 2024-05-24 PROCEDURE — 74177 CT ABD & PELVIS W/CONTRAST: CPT

## 2024-05-24 PROCEDURE — 96374 THER/PROPH/DIAG INJ IV PUSH: CPT

## 2024-05-24 PROCEDURE — 80053 COMPREHEN METABOLIC PANEL: CPT

## 2024-05-24 PROCEDURE — 85025 COMPLETE CBC W/AUTO DIFF WBC: CPT

## 2024-05-24 PROCEDURE — 83690 ASSAY OF LIPASE: CPT

## 2024-05-24 RX ORDER — SODIUM CHLORIDE, SODIUM LACTATE, POTASSIUM CHLORIDE, AND CALCIUM CHLORIDE .6; .31; .03; .02 G/100ML; G/100ML; G/100ML; G/100ML
500 INJECTION, SOLUTION INTRAVENOUS ONCE
Status: COMPLETED | OUTPATIENT
Start: 2024-05-24 | End: 2024-05-25

## 2024-05-24 RX ORDER — FENTANYL CITRATE 50 UG/ML
25 INJECTION, SOLUTION INTRAMUSCULAR; INTRAVENOUS ONCE
Status: COMPLETED | OUTPATIENT
Start: 2024-05-24 | End: 2024-05-24

## 2024-05-24 RX ADMIN — IOPAMIDOL 70 ML: 755 INJECTION, SOLUTION INTRAVENOUS at 23:09

## 2024-05-24 RX ADMIN — SODIUM CHLORIDE, POTASSIUM CHLORIDE, SODIUM LACTATE AND CALCIUM CHLORIDE 500 ML: 600; 310; 30; 20 INJECTION, SOLUTION INTRAVENOUS at 22:30

## 2024-05-24 RX ADMIN — FENTANYL CITRATE 25 MCG: 50 INJECTION INTRAMUSCULAR; INTRAVENOUS at 23:03

## 2024-05-24 ASSESSMENT — ENCOUNTER SYMPTOMS
SHORTNESS OF BREATH: 0
DIARRHEA: 0
VOMITING: 0
RHINORRHEA: 0
COUGH: 0
BACK PAIN: 0
ABDOMINAL PAIN: 1
NAUSEA: 1
SORE THROAT: 0

## 2024-05-25 PROCEDURE — 6360000002 HC RX W HCPCS: Performed by: EMERGENCY MEDICINE

## 2024-05-25 PROCEDURE — 81003 URINALYSIS AUTO W/O SCOPE: CPT

## 2024-05-25 PROCEDURE — 96376 TX/PRO/DX INJ SAME DRUG ADON: CPT

## 2024-05-25 RX ORDER — FENTANYL CITRATE 50 UG/ML
25 INJECTION, SOLUTION INTRAMUSCULAR; INTRAVENOUS ONCE
Status: COMPLETED | OUTPATIENT
Start: 2024-05-25 | End: 2024-05-25

## 2024-05-25 RX ADMIN — FENTANYL CITRATE 25 MCG: 50 INJECTION INTRAMUSCULAR; INTRAVENOUS at 00:36

## 2024-05-25 NOTE — ED PROVIDER NOTES
[facility] EMERGENCY DEPT  eMERGENCY dEPARTMENT eNCOUnter      Pt Name: Livia Arora  MRN: [id number]  Birthdate 1935  Date of evaluation: 5/24/2024  Provider: COLLEEN CARROLL MD    CHIEF COMPLAINT       Chief Complaint   Patient presents with    Abdominal Pain     Lower abdominal pain onset today, on abx for UTI    Nausea         HISTORY OF PRESENT ILLNESS   (Location/Symptom, Timing/Onset,Context/Setting, Quality, Duration, Modifying Factors, Severity)  Note limiting factors.   Livia Arora is a 88 y.o. female who presents to the emergency department for lower abdominal pain.  Patient began complaining of lower abdominal discomfort earlier today.  History of somewhat recurrent UTI and is on Macrobid 50 mg daily prophylaxis followed by urology here at [facility].  Patient has urinary incontinence at baseline.  Patient reported some nausea earlier no vomiting or diarrhea.  Patient has recurrent vagal reactions that have been going on for many years even since approximately the 1960s per her daughter.  She states today she was sitting on the toilet and was mid bowel movement when she had a presyncopal event but she was there and laid her down did not sustain any trauma.  This occurs multiple times a week sometimes I will go a period of time with no issues.  She does have a pacemaker and states they just saw Dr. Bello recently told everything was functioning appropriately.  She denies any chest pain or shortness of breath.  She does have dementia and history is provided by daughter mostly.    The history is provided by the patient and a relative.       NursingNotes were reviewed.    REVIEW OF SYSTEMS    (2-9 systems for level 4, 10 or more for level 5)     Review of Systems   Constitutional:  Negative for chills and fever.   HENT:  Negative for rhinorrhea and sore throat.    Respiratory:  Negative for cough and shortness of breath.    Cardiovascular:  Negative for chest pain and leg swelling.   Gastrointestinal:    Mouth/Throat:      Mouth: Mucous membranes are moist.   Eyes:      Conjunctiva/sclera: Conjunctivae normal.   Neck:      Trachea: No tracheal deviation.   Cardiovascular:      Rate and Rhythm: Normal rate and regular rhythm.      Heart sounds: Normal heart sounds. No murmur heard.  Pulmonary:      Effort: No respiratory distress.      Breath sounds: Normal breath sounds. No wheezing or rales.   Abdominal:      Palpations: Abdomen is soft.      Tenderness: There is abdominal tenderness in the right lower quadrant, suprapubic area and left lower quadrant. There is no right CVA tenderness or left CVA tenderness.   Musculoskeletal:         General: Normal range of motion.      Cervical back: Normal range of motion.   Skin:     General: Skin is warm and dry.   Neurological:      Mental Status: She is alert.      GCS: GCS eye subscore is 4. GCS verbal subscore is 4. GCS motor subscore is 6.         DIAGNOSTIC RESULTS     EKG: All EKG's areinterpreted by the Emergency Department Physician who either signs or Co-signs this chart in the absence of a cardiologist.    76 atrial paced complexes no obvious ST changes QTc 473 nondiagnostic EKG    RADIOLOGY:  Non-plain film images such as CT, Ultrasound and MRI are read by the radiologist. Plain radiographic images are visualized and preliminarily interpreted bythe emergency physician with the below findings:          CT ABDOMEN PELVIS W IV CONTRAST Additional Contrast? None   Final Result   Impression:       No inflammatory process, bowel or urinary obstruction       Moderate size hiatus hernia without complication        All CT scans are performed using dose optimization techniques as appropriate to the performed exam and include    at least one of the following: Automated exposure control, adjustment of the mA and/or kV according to size, and the use of iterative reconstruction technique.        ______________________________________    Electronically signed by: ANUJ

## 2024-05-30 LAB
EKG P AXIS: NORMAL DEGREES
EKG P-R INTERVAL: NORMAL MS
EKG Q-T INTERVAL: 434 MS
EKG QRS DURATION: 102 MS
EKG QTC CALCULATION (BAZETT): 451 MS
EKG T AXIS: 10 DEGREES

## 2024-06-05 ENCOUNTER — NURSE ONLY (OUTPATIENT)
Dept: UROLOGY | Age: 89
End: 2024-06-05
Payer: MEDICARE

## 2024-06-05 DIAGNOSIS — N32.81 OAB (OVERACTIVE BLADDER): ICD-10-CM

## 2024-06-05 DIAGNOSIS — N39.46 MIXED STRESS AND URGE URINARY INCONTINENCE: Primary | ICD-10-CM

## 2024-06-05 PROCEDURE — 64566 NEUROELTRD STIM POST TIBIAL: CPT | Performed by: NURSE PRACTITIONER

## 2024-06-05 NOTE — PROGRESS NOTES
Treatment # maintenance    Improvement of Symptoms?  Mild    OAB/Urologic Medications?  None      Uroplasty Procedure:    1. Patient is seated with the left treatment leg elevated.  2. 34 gauge fine needle electrode is inserted into lower inner aspect of the leg. Slightly cephalad to the medial malleolus.  3. Surface electrode pad is placed over the medial aspect of the calcaneus on the same leg.  4.Needle electrode is connected to the external pulse generator.  5.The pulse generator is turned on and the millivolts used are 8.  6.Patient is treated for 30 minutes.  7.The needle and pad are removed.    Patient will follow up in 2 weeks for next treatment.      1. Mixed stress and urge urinary incontinence  2. OAB (overactive bladder)  Continue PTNS treatments every 2 weeks.       No orders of the defined types were placed in this encounter.       No follow-ups on file.

## 2024-06-19 ENCOUNTER — NURSE ONLY (OUTPATIENT)
Dept: UROLOGY | Age: 89
End: 2024-06-19
Payer: MEDICARE

## 2024-06-19 VITALS — WEIGHT: 110 LBS | TEMPERATURE: 98.6 F | HEIGHT: 60 IN | BODY MASS INDEX: 21.6 KG/M2

## 2024-06-19 DIAGNOSIS — N32.81 OAB (OVERACTIVE BLADDER): ICD-10-CM

## 2024-06-19 DIAGNOSIS — N39.46 MIXED STRESS AND URGE URINARY INCONTINENCE: Primary | ICD-10-CM

## 2024-06-19 PROCEDURE — 64566 NEUROELTRD STIM POST TIBIAL: CPT | Performed by: NURSE PRACTITIONER

## 2024-06-19 NOTE — PROGRESS NOTES
Treatment # maintenance    Improvement of Symptoms?  Mild    OAB/Urologic Medications?  None      Uroplasty Procedure:    1. Patient is seated with the left treatment leg elevated.  2. 34 gauge fine needle electrode is inserted into lower inner aspect of the leg. Slightly cephalad to the medial malleolus.  3. Surface electrode pad is placed over the medial aspect of the calcaneus on the same leg.  4.Needle electrode is connected to the external pulse generator.  5.The pulse generator is turned on and the millivolts used are 4.  6.Patient is treated for 30 minutes.  7.The needle and pad are removed.    Patient will follow up in 2 weeks for next treatment.      1. Mixed stress and urge urinary incontinence  2. OAB (overactive bladder)  Follow-up in 2 weeks for PTNS.       No orders of the defined types were placed in this encounter.       Return in about 2 weeks (around 7/3/2024) for uroplasty.

## 2024-07-03 ENCOUNTER — NURSE ONLY (OUTPATIENT)
Dept: UROLOGY | Age: 89
End: 2024-07-03
Payer: MEDICARE

## 2024-07-03 DIAGNOSIS — N39.46 MIXED STRESS AND URGE URINARY INCONTINENCE: Primary | ICD-10-CM

## 2024-07-03 DIAGNOSIS — N32.81 OAB (OVERACTIVE BLADDER): ICD-10-CM

## 2024-07-03 PROCEDURE — 64566 NEUROELTRD STIM POST TIBIAL: CPT | Performed by: NURSE PRACTITIONER

## 2024-07-03 NOTE — PROGRESS NOTES
Treatment # maintenance    Improvement of Symptoms?  Mild    OAB/Urologic Medications?  None      Uroplasty Procedure:    1. Patient is seated with the left treatment leg elevated.  2. 34 gauge fine needle electrode is inserted into lower inner aspect of the leg. Slightly cephalad to the medial malleolus.  3. Surface electrode pad is placed over the medial aspect of the calcaneus on the same leg.  4.Needle electrode is connected to the external pulse generator.  5.The pulse generator is turned on and the millivolts used are 2.  6.Patient is treated for 30 minutes.  7.The needle and pad are removed.    Patient will follow up in 2 weeks for next treatment.      1. Mixed stress and urge urinary incontinence  2. OAB (overactive bladder)  Continue PTNS every 2 weeks       No orders of the defined types were placed in this encounter.       Return in about 2 weeks (around 7/17/2024).

## 2024-07-17 ENCOUNTER — NURSE ONLY (OUTPATIENT)
Dept: UROLOGY | Age: 89
End: 2024-07-17
Payer: MEDICARE

## 2024-07-17 DIAGNOSIS — N39.46 MIXED STRESS AND URGE URINARY INCONTINENCE: Primary | ICD-10-CM

## 2024-07-17 DIAGNOSIS — N32.81 OAB (OVERACTIVE BLADDER): ICD-10-CM

## 2024-07-17 PROCEDURE — 64566 NEUROELTRD STIM POST TIBIAL: CPT | Performed by: NURSE PRACTITIONER

## 2024-07-17 NOTE — PROGRESS NOTES
Treatment # maintenance    Improvement of Symptoms?  Mild    OAB/Urologic Medications?  None      Uroplasty Procedure:    1. Patient is seated with the left treatment leg elevated.  2. 34 gauge fine needle electrode is inserted into lower inner aspect of the leg. Slightly cephalad to the medial malleolus.  3. Surface electrode pad is placed over the medial aspect of the calcaneus on the same leg.  4.Needle electrode is connected to the external pulse generator.  5.The pulse generator is turned on and the millivolts used are 8.  6.Patient is treated for 30 minutes.  7.The needle and pad are removed.    Patient will follow up in 2 weeks for next treatment.      1. Mixed stress and urge urinary incontinence  2. OAB (overactive bladder)  Follow-up in 2 weeks for next uroplasty.       No orders of the defined types were placed in this encounter.       Return for uroplasty.

## 2024-07-31 ENCOUNTER — NURSE ONLY (OUTPATIENT)
Dept: UROLOGY | Age: 89
End: 2024-07-31
Payer: MEDICARE

## 2024-07-31 DIAGNOSIS — N32.81 OAB (OVERACTIVE BLADDER): ICD-10-CM

## 2024-07-31 DIAGNOSIS — N39.46 MIXED STRESS AND URGE URINARY INCONTINENCE: Primary | ICD-10-CM

## 2024-07-31 PROCEDURE — 64566 NEUROELTRD STIM POST TIBIAL: CPT | Performed by: NURSE PRACTITIONER

## 2024-07-31 NOTE — PROGRESS NOTES
Treatment # maintenance     Improvement of Symptoms? mild    OAB/Urologic Medications? none      Uroplasty Procedure:    1. Patient is seated with the left treatment leg elevated.  2. 34 gauge fine needle electrode is inserted into lower inner aspect of the leg. Slightly cephalad to the medial malleolus.  3. Surface electrode pad is placed over the medial aspect of the calcaneus on the same leg.  4.Needle electrode is connected to the external pulse generator.  5.The pulse generator is turned on and the millivolts used are 7.  6.Patient is treated for 30 minutes.  7.The needle and pad are removed.    Patient will follow up in 2 weeks for next treatment.      1. Mixed stress and urge urinary incontinence  2. OAB (overactive bladder)  F/U 2 wks for next uroplasty       No orders of the defined types were placed in this encounter.       Return in about 2 weeks (around 8/14/2024) for with LAYTON Guadarrama, uroplasty.

## 2024-08-05 ENCOUNTER — TELEPHONE (OUTPATIENT)
Dept: HEMATOLOGY | Age: 89
End: 2024-08-05

## 2024-08-05 NOTE — TELEPHONE ENCOUNTER
Spoke to Cesilia the child and she does not want this appt for 08/07/24 cancelled but would love to have the  to call to see what other dates and times Allison had to see if those would work for her schedule better. I sent  a message and had them to call patient back as requested. This was sent to  staff to take care of. I also let Allison's Nurse know as well.

## 2024-08-06 NOTE — PROGRESS NOTES
uroplasty treatment which is completed every 2 weeks at Dr. Nina's office. Order entered.  Continue to replace parenteral iron as warranted.  Mild, intermittent leukopenia-monitor  Care plan discussed with patient's daughter, Cesilia.  All questions answered    Orders Placed This Encounter   Procedures    CBC with Auto Differential    Ferritin    Iron and TIBC    Ferritin    Iron and TIBC    CBC with Auto Differential    Iron and TIBC    Ferritin     Tumor Screening:  Endoscopy 10/3/2022 by Dr. Parr at [facility]: Presbyesophagus-like changes noted throughout the esophagus.  In the distal esophagus adjacent to the EG junction widemouthed esophageal diverticulum was noted which is likely due to esophageal dysmotility and is contributing to some extent to the patient's dysphagia.  Within this diverticulum a shallow 5 to 6 mm in diameter, triangular-shaped ulceration was seen without any bleeding stigmata.  Cold biopsies were taken from the edges of this ulceration which likely is pill induced esophagitis.  Pathology revealed fragments of benign stratified squamous esophageal mucosa that matures appropriately. NO erosions or ulcers or nodules or strictures or webs or mass lesions or extrinsic compression or diverticula noted. Stomach:  abnormal: There is a 5 cm in length hiatal hernia present with internal Maynor erosions. Pathology revealed all negative.   Pap Smear 2/19/2024: negative for intraepithelial lesion or malignancy    Return in about 5 months (around 1/2/2025) for follow up with LAYTON Marie at 2:30 pm.    I have seen, examined and reviewed this patient medication list, appropriate labs and imaging studies. I reviewed relevant medical records and others physician’s notes. I discussed the plan of care with the patient. I answered all questions to the patient’s satisfaction. I have also reviewed the chief complaint (CC) and part of the history (History of Present Illness (HPI), Past Family Social

## 2024-08-07 ENCOUNTER — TELEPHONE (OUTPATIENT)
Dept: HEMATOLOGY | Age: 89
End: 2024-08-07

## 2024-08-07 NOTE — TELEPHONE ENCOUNTER
Called Patient and reminded patient of their appointment on 08/12/2024 and patient confirmed they would be here. Reminded patient to just come at appointment time, and to not come at the lab appointment time. Reminded patient that we will not check them in any more than 30 minutes before appointment time.  We have now moved to the [facility] [place] that is located between our old office and the ER at the \[facility]\""

## 2024-08-12 ENCOUNTER — HOSPITAL ENCOUNTER (OUTPATIENT)
Dept: INFUSION THERAPY | Age: 89
Discharge: HOME | End: 2024-08-12
Payer: MEDICARE

## 2024-08-12 ENCOUNTER — OFFICE VISIT (OUTPATIENT)
Dept: HEMATOLOGY | Age: 89
End: 2024-08-12
Payer: MEDICARE

## 2024-08-12 VITALS
DIASTOLIC BLOOD PRESSURE: 70 MMHG | TEMPERATURE: 98 F | OXYGEN SATURATION: 98 % | WEIGHT: 108 LBS | HEIGHT: 60 IN | BODY MASS INDEX: 21.2 KG/M2 | SYSTOLIC BLOOD PRESSURE: 128 MMHG | HEART RATE: 85 BPM

## 2024-08-12 DIAGNOSIS — D72.819 LEUKOPENIA, UNSPECIFIED TYPE: ICD-10-CM

## 2024-08-12 DIAGNOSIS — D50.9 IRON DEFICIENCY ANEMIA, UNSPECIFIED IRON DEFICIENCY ANEMIA TYPE: ICD-10-CM

## 2024-08-12 DIAGNOSIS — D50.9 IRON DEFICIENCY ANEMIA, UNSPECIFIED IRON DEFICIENCY ANEMIA TYPE: Primary | ICD-10-CM

## 2024-08-12 DIAGNOSIS — Z71.89 CARE PLAN DISCUSSED WITH PATIENT: ICD-10-CM

## 2024-08-12 LAB
BASOPHILS # BLD: 0.03 K/UL (ref 0.01–0.08)
BASOPHILS NFR BLD: 0.9 % (ref 0.1–1.2)
EOSINOPHIL # BLD: 0.11 K/UL (ref 0.04–0.54)
EOSINOPHIL NFR BLD: 3.1 % (ref 0.7–7)
ERYTHROCYTE [DISTWIDTH] IN BLOOD BY AUTOMATED COUNT: 11.9 % (ref 11.7–14.4)
FERRITIN SERPL-MCNC: 284.6 NG/ML (ref 13–150)
HCT VFR BLD AUTO: 37.7 % (ref 34.1–44.9)
HGB BLD-MCNC: 12.7 G/DL (ref 11.2–15.7)
IRON SATN MFR SERPL: 27 % (ref 14–50)
IRON SERPL-MCNC: 71 UG/DL (ref 37–145)
LYMPHOCYTES # BLD: 1.31 K/UL (ref 1.18–3.74)
LYMPHOCYTES NFR BLD: 37.4 % (ref 19.3–53.1)
MCH RBC QN AUTO: 34.3 PG (ref 25.6–32.2)
MCHC RBC AUTO-ENTMCNC: 33.7 G/DL (ref 32.3–35.5)
MCV RBC AUTO: 101.9 FL (ref 79.4–94.8)
MONOCYTES # BLD: 0.29 K/UL (ref 0.24–0.82)
MONOCYTES NFR BLD: 8.3 % (ref 4.7–12.5)
NEUTROPHILS # BLD: 1.75 K/UL (ref 1.56–6.13)
NEUTS SEG NFR BLD: 50 % (ref 34–71.1)
PLATELET # BLD AUTO: 208 K/UL (ref 182–369)
PMV BLD AUTO: 10.3 FL (ref 7.4–10.4)
RBC # BLD AUTO: 3.7 M/UL (ref 3.93–5.22)
TIBC SERPL-MCNC: 260 UG/DL (ref 250–400)
WBC # BLD AUTO: 3.5 K/UL (ref 3.98–10.04)

## 2024-08-12 PROCEDURE — 1123F ACP DISCUSS/DSCN MKR DOCD: CPT | Performed by: NURSE PRACTITIONER

## 2024-08-12 PROCEDURE — G8420 CALC BMI NORM PARAMETERS: HCPCS | Performed by: NURSE PRACTITIONER

## 2024-08-12 PROCEDURE — 85025 COMPLETE CBC W/AUTO DIFF WBC: CPT

## 2024-08-12 PROCEDURE — 36415 COLL VENOUS BLD VENIPUNCTURE: CPT

## 2024-08-12 PROCEDURE — 1090F PRES/ABSN URINE INCON ASSESS: CPT | Performed by: NURSE PRACTITIONER

## 2024-08-12 PROCEDURE — 99214 OFFICE O/P EST MOD 30 MIN: CPT | Performed by: NURSE PRACTITIONER

## 2024-08-12 PROCEDURE — 99212 OFFICE O/P EST SF 10 MIN: CPT

## 2024-08-12 PROCEDURE — 1036F TOBACCO NON-USER: CPT | Performed by: NURSE PRACTITIONER

## 2024-08-12 PROCEDURE — G8427 DOCREV CUR MEDS BY ELIG CLIN: HCPCS | Performed by: NURSE PRACTITIONER

## 2024-08-12 ASSESSMENT — ENCOUNTER SYMPTOMS
CONSTIPATION: 0
VOMITING: 0
DIARRHEA: 1
BACK PAIN: 1
ABDOMINAL PAIN: 1
NAUSEA: 0
COUGH: 0
TROUBLE SWALLOWING: 0
SHORTNESS OF BREATH: 1
WHEEZING: 0
SORE THROAT: 0
EYE DISCHARGE: 0
EYE ITCHING: 0

## 2024-08-14 ENCOUNTER — NURSE ONLY (OUTPATIENT)
Dept: UROLOGY | Age: 89
End: 2024-08-14
Payer: MEDICARE

## 2024-08-14 DIAGNOSIS — N32.81 OAB (OVERACTIVE BLADDER): ICD-10-CM

## 2024-08-14 DIAGNOSIS — N32.81 OVERACTIVE BLADDER: ICD-10-CM

## 2024-08-14 DIAGNOSIS — N39.46 MIXED STRESS AND URGE URINARY INCONTINENCE: Primary | ICD-10-CM

## 2024-08-14 PROCEDURE — 64566 NEUROELTRD STIM POST TIBIAL: CPT | Performed by: NURSE PRACTITIONER

## 2024-08-14 NOTE — PROGRESS NOTES
Treatment # maintenance    Improvement of Symptoms?  Mild    OAB/Urologic Medications?  None      Uroplasty Procedure:    1. Patient is seated with the left treatment leg elevated.  2. 34 gauge fine needle electrode is inserted into lower inner aspect of the leg. Slightly cephalad to the medial malleolus.  3. Surface electrode pad is placed over the medial aspect of the calcaneus on the same leg.  4.Needle electrode is connected to the external pulse generator.  5.The pulse generator is turned on and the millivolts used are 4.  6.Patient is treated for 30 minutes.  7.The needle and pad are removed.      1. Mixed stress and urge urinary incontinence  2. OAB (overactive bladder)  3. Overactive bladder  Patient will follow up in 2 weeks for next treatment.       No orders of the defined types were placed in this encounter.       Return in about 2 weeks (around 8/28/2024) for uroplasty.

## 2024-08-15 ENCOUNTER — OFFICE VISIT (OUTPATIENT)
Dept: NEUROLOGY | Age: 89
End: 2024-08-15
Payer: MEDICARE

## 2024-08-15 VITALS
HEART RATE: 78 BPM | SYSTOLIC BLOOD PRESSURE: 130 MMHG | WEIGHT: 108.8 LBS | BODY MASS INDEX: 21.36 KG/M2 | DIASTOLIC BLOOD PRESSURE: 69 MMHG | HEIGHT: 60 IN

## 2024-08-15 DIAGNOSIS — G40.219 PARTIAL SYMPTOMATIC EPILEPSY WITH COMPLEX PARTIAL SEIZURES, INTRACTABLE, WITHOUT STATUS EPILEPTICUS (HCC): ICD-10-CM

## 2024-08-15 DIAGNOSIS — G43.009 MIGRAINE WITHOUT AURA AND WITHOUT STATUS MIGRAINOSUS, NOT INTRACTABLE: ICD-10-CM

## 2024-08-15 DIAGNOSIS — I67.9 CEREBROVASCULAR SMALL VESSEL DISEASE: ICD-10-CM

## 2024-08-15 DIAGNOSIS — R55 VASOVAGAL SYNCOPE: ICD-10-CM

## 2024-08-15 DIAGNOSIS — F01.C11 SEVERE VASCULAR DEMENTIA WITH AGITATION (HCC): Primary | ICD-10-CM

## 2024-08-15 PROCEDURE — 1036F TOBACCO NON-USER: CPT | Performed by: PSYCHIATRY & NEUROLOGY

## 2024-08-15 PROCEDURE — 99214 OFFICE O/P EST MOD 30 MIN: CPT | Performed by: PSYCHIATRY & NEUROLOGY

## 2024-08-15 PROCEDURE — 1123F ACP DISCUSS/DSCN MKR DOCD: CPT | Performed by: PSYCHIATRY & NEUROLOGY

## 2024-08-15 PROCEDURE — G8427 DOCREV CUR MEDS BY ELIG CLIN: HCPCS | Performed by: PSYCHIATRY & NEUROLOGY

## 2024-08-15 PROCEDURE — G8420 CALC BMI NORM PARAMETERS: HCPCS | Performed by: PSYCHIATRY & NEUROLOGY

## 2024-08-15 PROCEDURE — 1090F PRES/ABSN URINE INCON ASSESS: CPT | Performed by: PSYCHIATRY & NEUROLOGY

## 2024-08-15 RX ORDER — LEVETIRACETAM 250 MG/1
250 TABLET ORAL 2 TIMES DAILY
Qty: 180 TABLET | Refills: 3 | Status: SHIPPED | OUTPATIENT
Start: 2024-08-15

## 2024-08-15 NOTE — PROGRESS NOTES
[facility]  [address], [address]  [place] [address]  Phone [phone]  Fax [phone]     [facility] Follow Up Encounter  8/15/24 2:08 PM CDT    Information:   Patient Name: Livia Arora  :   1935  Age:   89 y.o.  MRN:   [id number]  Account #:  [id number]  Today:  8/15/24    Provider: Bon Hoyos M.D.     Chief Complaint:   Chief Complaint   Patient presents with    Seizures     Strokes, Dementia,Headaches       Subjective:   Livia Arora is a 89 y.o. woman with small vessel cerebrovascular disease, dementia, syncope, migraines who is following up.  Her dementia is worsening.  She requires assistance with personal care.  She has behavioral problems.  She has some GI issues.  She is incontinent.  She resides with her daughter.  She still passes out when defecating often.  Ms. Arora gets irritable, agitated, and aggressive at times, often in the evening.  She can hardly communicate any longer.  She is ambulatory.  She has had no definite seizures.  She atakes Aricept 10 mg q HS gabapentin 100mg TID, and Keppra 500 mg BID.        Objective:     Past Medical History:  Past Medical History:   Diagnosis Date    Age-related macular degeneration, wet, right eye (HCC)     Anemia     Back pain     Bilateral carotid artery stenosis 2021    Chronic bilateral low back pain with left-sided sciatica 2019    Colitis, ischemic (HCC)     Dementia (HCC)     Diverticulosis     Hyperlipidemia     Hypertension     Osteoarthritis     Palliative care patient 2019    Risk for falls 2019    Seizures (HCC)     last one     Spastic colon     Status post placement of implantable loop recorder 10/27/2020    Stroke syndrome     CVA's/ TIA's    Urinary incontinence     Uterine cancer (HCC)     Uterine cancer (HCC)     Vagal reaction     wet age-related macular degeneration of left eye     bilateral       Past Surgical History:   Procedure Laterality Date

## 2024-08-19 DIAGNOSIS — I49.5 SA NODE DYSFUNCTION (HCC): ICD-10-CM

## 2024-08-19 DIAGNOSIS — Z95.0 PACEMAKER: Primary | ICD-10-CM

## 2024-08-19 PROCEDURE — 93294 REM INTERROG EVL PM/LDLS PM: CPT | Performed by: CLINICAL NURSE SPECIALIST

## 2024-08-19 PROCEDURE — 93296 REM INTERROG EVL PM/IDS: CPT | Performed by: CLINICAL NURSE SPECIALIST

## 2024-08-28 ENCOUNTER — NURSE ONLY (OUTPATIENT)
Dept: UROLOGY | Age: 89
End: 2024-08-28
Payer: MEDICARE

## 2024-08-28 DIAGNOSIS — N32.81 OAB (OVERACTIVE BLADDER): ICD-10-CM

## 2024-08-28 DIAGNOSIS — N39.46 MIXED STRESS AND URGE URINARY INCONTINENCE: Primary | ICD-10-CM

## 2024-08-28 PROCEDURE — 64566 NEUROELTRD STIM POST TIBIAL: CPT | Performed by: NURSE PRACTITIONER

## 2024-08-28 NOTE — PROGRESS NOTES
Treatment # maintenance    Improvement of Symptoms?  Mild    OAB/Urologic Medications?  None      Uroplasty Procedure:    1. Patient is seated with the left treatment leg elevated.  2. 34 gauge fine needle electrode is inserted into lower inner aspect of the leg. Slightly cephalad to the medial malleolus.  3. Surface electrode pad is placed over the medial aspect of the calcaneus on the same leg.  4.Needle electrode is connected to the external pulse generator.  5.The pulse generator is turned on and the millivolts used are 8.  6.Patient is treated for 30 minutes.  7.The needle and pad are removed.      1. Mixed stress and urge urinary incontinence  2. OAB (overactive bladder)  Patient will follow up in 2 weeks for next treatment.         No orders of the defined types were placed in this encounter.       Return in about 2 weeks (around 9/11/2024).

## 2024-09-10 NOTE — PROGRESS NOTES
[facility]  Radiation Oncology Clinic   Vimal Ortiz MD, FACR  Jhony ORTIZ  _______________________________________________  [facility]  Department of Radiation Oncology  [address] [place] [id number]  Office: [phone]  Fax: [phone]    DATE: 09/12/2024  PATIENT: Bel Powell  1935                         MEDICAL RECORD #: [id number]    1. Endometrial cancer    2. S/P hysterectomy with oophorectomy    3. History of radiation therapy    4. Non-smoker                                          REASON FOR VISIT:    No chief complaint on file.    Reason for Visit: Bel Powell is a very pleasant 89 y.o. female that has completed radiation therapy and returns to the clinic today for routine follow up exam. Reports fatigue and confusion. Denies appetite change, unexpected weight change, nasuea/vomiting, diarrhea, light-headedness, weakness, and headaches.     History of Present Illness:  Diagnosed with recurrent endometrial carcinoma. She completed 5 internal brachytherapy treatments completed on 03/03/2023.     09/13/2019 - CT Abdomen/Pelvis due to right lower quadrant pain:  Cystic changes centrally within the uterus; a fluid distended endometrial cavity considered, GYN consultation suggested. Uterine fibroids.   Colonic diverticulosis without CT evidence of acute diverticulitis. Otherwise, No CT evidence of acute bowel pathology   Uncomplicated left-sided superior lumbar hernia.     09/18/2019 - CT Head without contrast:  No acute intracranial abnormality.     09/25/2019 - CT Head:  No acute intracranial abnormality.   Chronic ischemic and atrophic changes.     10/28/2019 - Endometrium, biopsy:  Primarily mucus.  Fragments of squamous mucosa.  Scant fragments of endocervical mucosa.  Extremely scant superficial fragments of endometrial glandular epithelium.  No evidence of atypia or malignancy.    12/23/2019 -  Biopsies:  Endometrium, curettings:  Endometrioid adenocarcinoma, FIGO grade 1 arising in a background of complex atypical hyperplasia.  Cervix, endocervical curettings:  Benign endocervical glands.      01/28/2020 - Robotic Total Hysterectomy/BSO per :   Right sentinel lymph node biopsy:  3 lymph nodes negative for tumor  Left pelvic sentinel lymph node biopsy:  Fibroadipose tissue with small fragment of lymph node showing cautery change and no evidence of metastatic tumor  Uterus, fallopian tubes and ovaries, hysterectomy with bilateral salpingo-oophorectomy:   Cervix and endocervix with no evidence of glandular and squamous dysplasia  Endometriod endometrial adenocarcinoma, villoglandular type, grade 1, with less than 1 mm of invasion (of 17 mm myometrial thickness)  Multiple leiomyomata uteri  No evidence of adenomyosis  Fallopian tubes with paratubal remnant cysts, negative for endometriosis and malignancy  Ovaries with no evidence of endometriosis or malignancy  Comment: Paraffin immunoperoxidase studies, utilizing appropriate positive and negative controls, are performed on the 2 sentinel lymph node specimens. These fail to demonstrate AE1/AE3 positive tumor cells in the lymph nodes.  TUMOR  Histologic type - endometrioid carcinoma, villoglandular variant  Histologic grade - FIGO grade 1  Myometrial invasion - present  Depth of invasion - 1 mm  Myometrial thickness in millimeters: 17  Percentage of myometrial invasion - 6%  Uterine serosa involvement - not identified  Cervical stromal involvement - not identified  Other tissue/organ involvement - not identified  Lymphovascular invasion - not identified  LYMPH NODES  Total number of pelvic nodes examined - 4  Number of pelvic sentinel lymph nodes -4  Laterality of pelvic nodes - right, left  Number of pelvic nodes with macrometastasis - 0  Number of pelvic nodes with micrometastasis - 0  Number of pelvic nodes with isolated tumor cells -  0    02/24/2020 - CT Abdomen/Pelvis:  Fluid-filled nondistended small bowel loops with mucosal enhancement and thickening suggest acute enteritis. Further follow-up may be obtained.   The diverticulosis of the distal colon. No evidence of diverticulitis.   Moderately dilated distal common bile duct and intrahepatic biliary ducts is probably due to a prior cholecystectomy.   A stable left lumbar hernia containing a loop of colon without obstruction.     12/15/2020 - CT Abdomen/pelvis:  The stable insufficiency fractures of the sacrum bilaterally, similar to the previous study in February 2020.   No acute abnormality of the abdomen are pelvis, particularly, no evidence of traumatic involvement of the abdominal pelvic organs.   The diverticulosis of the colon. No evidence for diverticulitis.   Hardware fusion of the lumbar spine. The severe demineralization the bony structures.     05/25/2021 - CT Abdomen/Pelvis due to vomiting, pain:  This study is of limited diagnostic quality. This is secondary to the lack of intra-abdominal fat, lack of IV and oral contrast as well as the patient's inability to raise her arms over her head with extensive streaking artifact related to both upper extremities as well as postoperative changes within the mid and lower lumbar spine also contributing to streaking. If symptoms are persistent I would suggest that the study be repeated with both IV and oral contrast administration.   Cardiomegaly with a transvenous pacer in place. There is heavy atheromatous calcification of the distal descending thoracic aorta without evidence of aneurysm.   Multiple fluid-filled bowel loops. I do not see evidence of a discrete transition point. The appendix is not clearly identified and may be surgically absent. There are a few diverticula noted of the sigmoid colon without evidence of diverticulitis. No convincing evidence of bowel obstruction. No evidence of pneumoperitoneum.   I do not see evidence of  nephrolithiasis. The ureters cannot be clearly trace but there is no convincing evidence of obstructive uropathy.   The patient has undergone previous cholecystectomy and hysterectomy. The patient has also undergone fusion at the L3-L5 levels.    12/08/2022 - Appointment with :  Ms. Powell is a 88 y/o with Stage IA Grade 1 Endometrial Cancer who presents for an evaluation.   Exam showed possible granulation tissue. Removed and will sent to path and call with results and plan.   s/p robotic hyst/bso/staging on 1/29/20   no additional treatment required   IHC Negative   Surveillance Visit: q6months x 1 year (January 2021) then yearly for 5 years total (January 2025)  Ms. Powell will return in 12 months.    12/08/2022 - Vagina biopsy:  Low-grade endometrioid pattern adenocarcinoma    12/21/2022 - Appointment with Yolanda Kamara APRN - CNP - Urology:  ASSESSMENT/PLAN  Abnormality of urethral meatus  Patient with apparent abnormality of the urethral meatus as explained to her daughter by her obstetrical oncologist at an appointment last week. She requested a pelvic exam today. I did discuss with the daughter that I was uncertain if pelvic exam would provide any useful information.   However, I did offer to perform 1. I could not perform 1 at the time I was in the room because the patient was undergoing your plasty treatment and then could not move to the dorsolithotomy position.   Patient also with emergent ophthalmic appointment today regarding a possible detached retina. Patient daughter reports that they cannot miss that appointment and are already running late. Pelvic exam for today was deferred.   We will try to obtain some records from her oncologist to determine if there is anything in particular they need from us.    12/21/2022 - CT Abdomen/Pelvis with contrast:  Prior hysterectomy without evidence of metastatic disease in the abdomen or pelvis.    12/21/2022 - CT Chest with contrast:  No  evidence of metastatic disease in the chest.   Hiatal hernia with thickened distal esophagus. Consider follow-up endoscopy.   Atherosclerotic disease.    01/16/2023 - Consult with :  After consideration of the diagnostic data and evaluation of the patient, I recommend obtaining a PET scan for further evaluation of disease progression. Should it reveal limited disease, could consider brachytherapy treatments vs. Whole pelvic radiation.   The patient and her family verbalize understanding of this discussion, voice no further questions and wish to proceed with recommendations. Continue ongoing management per primary care physician and other specialists.  Plan:  PET scan ordered, they will call you  Return in 2 weeks for further discussion of treatment recommendations    01/24/2023 - PET Scan:  No hypermetabolic soft tissue recurrence identified in the pelvis. There is only a small amount of bowel uptake identified in the rectum as well as excreted tracer activity in the ureters and urinary bladder.  No suspicious adenopathy or evidence of distant metastatic disease.    01/26/2023 - Appointment with :  Bel Powell returns to the clinic today to review PET scan result and discuss radiotherapy recommendations for recurrent endometrial carcinoma.   PET Scan completed on 01/24/2023 revealed no hypermetabolic soft tissue recurrence identified in the pelvis. There is only a small amount of bowel uptake identified in the rectum as well as excreted tracer activity in the ureters and urinary bladder. No suspicious adenopathy or evidence of distant metastatic disease.  Given the PET scan results, I recommend to treat with internal brachytherapy treatments.  We reviewed potential complications side effects of radiation include limited radiation-induced damage to the bowel or bladder.  Patient and daughter verbalized understanding distress and voices no questions and agreed to proceed with therapy.    Plan:  Plan on 4-5 internal treatments.     02/02/2023 - 03/03/2023 - Completed radiation course:  Received 5 HDR treatments.    06/07/2023 - Appointment with :  PAP smear today  Return in 4 months.     06/07/2023 - Pap smear per :  No interpretation due to specimen adequacy     10/23/2023 - Appointment with :  Return to Dr. Tillman in 4 months for follow up     10/23/2023 - Pap smear per :  No interpretation due to specimen adequacy     01/29/2024 - Appointment with :  Of note, the patient also complains of constant urinary incontinence.  Given the concomitant presence of urinary tract infection, I wonder if this is blood mixed with urine and the source is coming from the bladder.  She does have a urologist at [facility].  I encouraged her to reach out to them to see if they can arrange a cystoscopic exam.     01/30/2024 - Documentation per :  Dr. Hooper called me yesterday regarding this patient.  She was in the office with her daughter and the daughter stated to him that I was wanting a biopsy to proceed with additional radiotherapy treatments.  I was somewhat baffled by this statement as the best of my recollection last time she was seen in our office there was no evidence of recurrent disease.  We also did not recommend that she have a biopsy since there is no lesion evident on physical exam to perform a biopsy.  Upon further review the referral to OB/GYN was performed by Dr. Wilmer Francisco in urology at [facility].  We did not make a referral to OB/GYN and did not request a biopsy to be performed.  The patient's daughter reports that the patient is having some bleeding, but upon my last exam there was no lesion that would be a likely cause of bleeding from recurrent tumor.  Likewise, Dr. Hooper said upon exam he could not find a lesion to perform a biopsy.  At this time Dr. Hooper referred the patient back to urology to perhaps consider cystoscopy if the patient  is indeed bleeding.    02/19/2024 - Appointment with :  I have reviewed the chart and other physicians records. she denies untoward side effects from treatment and without evidence for recurrent or metastatic disease on exam today. Pap smear completed today. Will call with results. She will return in 6 months Continue ongoing management per primary care physician and other specialists.    Plan:  Pap smear today  Return in 6 months    05/25/2024 - CT abdomen/Pelvis with contrast:  No inflammatory process, bowel or urinary obstruction   Moderate size hiatus hernia without complication       History obtained from  PATIENT, FAMILY, and CHART    PAST MEDICAL HISTORY  Past Medical History:   Diagnosis Date    Arthritis     OA    Dietary restriction     FLUID 48-52 OZ PER DR HACKETT    Endometrial thickening on ultrasound     Hypertension     Incontinence     Nocturia     Plantar fasciitis     PMB (postmenopausal bleeding)     Seizure     Sensory urge incontinence     Stroke     CODED     TIA (transient ischemic attack)     Uterine cancer     Uterine polyp     Vaginal atrophy     Vaginal bleeding     Weakness       PAST SURGICAL HISTORY  Past Surgical History:   Procedure Laterality Date    BACK SURGERY      BREAST SURGERY      LEFT BREAST CYST    CATARACT EXTRACTION      CHOLECYSTECTOMY      D & C HYSTEROSCOPY N/A 12/23/2019    Procedure: DILATATION AND CURETTAGE HYSTEROSCOPY;  Surgeon: Lynnette Andujar MD;  Location: [facility];  Service: Obstetrics/Gynecology    D & C HYSTEROSCOPY      x2    HYSTERECTOMY  01/28/2022    [facility] BSO for uterine cancer    PACEMAKER IMPLANTATION Left     2021  Dr. Mayer at [facility]    TONSILLECTOMY      WISDOM TOOTH EXTRACTION        FAMILY HISTORY  family history includes No Known Problems in her father and mother.    SOCIAL HISTORY  Social History     Tobacco Use    Smoking status: Never     Passive exposure: Never    Smokeless tobacco: Never   Vaping Use    Vaping  status: Never Used   Substance Use Topics    Alcohol use: No     Comment: rare    Drug use: Never     ALLERGIES  Hydrocodone-acetaminophen, Levofloxacin, Memantine hcl, Pneumococcal vaccine, Quinolones, Yellow dye, Aspirin, Lortab [hydrocodone-acetaminophen], Meperidine, Mirabegron, Ondansetron, Penicillins, Spironolactone, Sulfa antibiotics, Codeine, and Yellow dye #11     MEDICATIONS    Current Outpatient Medications:     acetaminophen (TYLENOL) 500 MG tablet, Take 2 tablets by mouth Every 6 (Six) Hours As Needed for Moderate Pain. Do not exceed 3000 mg's daily, Disp: , Rfl:     alendronate (FOSAMAX) 70 MG tablet, 1 tablet Orally once a week for 30 day(s), Disp: , Rfl:     amLODIPine (NORVASC) 5 MG tablet, Take 1 tablet by mouth Every Morning., Disp: , Rfl:     azelastine (ASTELIN) 0.1 % nasal spray, 2 sprays into the nostril(s) as directed by provider 2 (Two) Times a Day., Disp: , Rfl:     B Complex Vitamins (VITAMIN B COMPLEX PO), Take 1 tablet by mouth Daily., Disp: , Rfl:     B-Complex, Folic Acid, tablet, Take 1 tablet by mouth Daily., Disp: , Rfl:     butalbital-acetaminophen-caffeine (FIORICET, ESGIC) -40 MG per tablet, Take 1 tablet by mouth Every 6 (Six) Hours As Needed for Headache., Disp: , Rfl:     camphor-menthol (SARNA) 0.5-0.5 % lotion, Apply 1 application topically to the appropriate area as directed 2 (Two) Times a Day., Disp: , Rfl:     cholecalciferol (VITAMIN D3) 1000 UNITS tablet, Take 1 tablet by mouth Daily., Disp: , Rfl:     cloNIDine (CATAPRES) 0.1 MG tablet, Take 1 tablet by mouth 2 (Two) Times a Day As Needed for High Blood Pressure (As needed if BP exceeds 160/100.). Half tablet, Disp: , Rfl:     diclofenac (VOLTAREN) 1 % gel gel, Apply 2 g topically to the appropriate area as directed 4 (Four) Times a Day As Needed (PAIN)., Disp: , Rfl:     dicyclomine (BENTYL) 10 MG capsule, Take 1 capsule by mouth 4 (Four) Times a Day Before Meals & at Bedtime. Tried med x 1, Disp: , Rfl:      dipyridamole (PERSANTINE) 50 MG tablet, Take 2 tablets by mouth 2 (Two) Times a Day., Disp: , Rfl:     donepezil (ARICEPT) 10 MG tablet, Take 1 tablet by mouth Every Night., Disp: , Rfl:     fluticasone (FLONASE) 50 MCG/ACT nasal spray, administer ONE SPRAY in EACH nostril TWICE DAILY AS DIRECTED, Disp: , Rfl:     fluticasone (VERAMYST) 27.5 MCG/SPRAY nasal spray, 2 sprays into the nostril(s) as directed by provider Every Night., Disp: , Rfl:     gabapentin (NEURONTIN) 100 MG capsule, Take 1 capsule by mouth. Up to 3 times a day, Disp: , Rfl:     hydrALAZINE (APRESOLINE) 25 MG tablet, Take 1 tablet by mouth 3 (Three) Times a Day. Due to vasovagal responses, only in the afternoon if BP is 180/80, and at bedtime, Disp: , Rfl:     levETIRAcetam (KEPPRA) 250 MG tablet, Take 1 tablet by mouth 2 (Two) Times a Day., Disp: , Rfl:     Lidocaine 4 % patch, Place 1 patch on the skin as directed by provider 2 (Two) Times a Day., Disp: , Rfl:     Lutein 20 MG tablet, Take 1 tablet by mouth Daily., Disp: , Rfl:     meloxicam (MOBIC) 15 MG tablet, Take 1 tablet by mouth Daily As Needed., Disp: , Rfl:     nitrofurantoin (MACRODANTIN) 50 MG capsule, Take 1 capsule by mouth Every Night., Disp: , Rfl:     pantoprazole (PROTONIX) 40 MG EC tablet, Take 1 tablet by mouth 2 (Two) Times a Day., Disp: , Rfl:     pravastatin (PRAVACHOL) 20 MG tablet, Take 1 tablet by mouth Daily., Disp: , Rfl:     promethazine (PHENERGAN) 25 MG tablet, Take 1 tablet by mouth Every 8 (Eight) Hours As Needed for Nausea or Vomiting., Disp: , Rfl:     ramipril (ALTACE) 10 MG capsule, Take 1 capsule by mouth Daily., Disp: , Rfl:     saline 0.65 % nasal solution (BABY AYR) 0.65 % solution, 1 spray into the nostril(s) as directed by provider Daily As Needed., Disp: , Rfl:     simethicone (MYLICON) 125 MG chewable tablet, Every 8 (Eight) Hours. 6am at 2 9:30am, 1 6pm, Disp: , Rfl:     sodium chloride 1 g tablet, Take 1 tablet by mouth 3 (Three) Times a Day., Disp: ,  "Rfl:     therapeutic multivitamin-minerals (THERAGRAN-M) tablet, Take 1 tablet by mouth Daily., Disp: , Rfl:     valACYclovir (VALTREX) 1000 MG tablet, Take 1 tablet by mouth Daily As Needed (FEVER BLISTER)., Disp: , Rfl:     The following portions of the patient's history were reviewed and updated as appropriate: allergies, current medications, past family history, past medical history, past social history, past surgical history and problem list.    Current outpatient and discharge medications have been reconciled for the patient.  Reviewed by: Vimal Tillman III, MD    REVIEW OF SYSTEMS  Review of Systems   Constitutional:  Positive for fatigue.   HENT: Negative.     Eyes:         Glasses   Respiratory: Negative.     Cardiovascular: Negative.    Gastrointestinal: Negative.    Genitourinary: Negative.    Musculoskeletal: Negative.    Skin: Negative.    Allergic/Immunologic: Negative.    Neurological: Negative.    Hematological: Negative.    Psychiatric/Behavioral:  Positive for confusion (dementia).      PHYSICAL EXAM  VITAL SIGNS:   Vitals:    09/12/24 1445   BP: 137/72   Pulse: 69   SpO2: 97%  Comment: room air   Weight: 48.1 kg (106 lb)   Height: 152.4 cm (60\")   PainSc: 0-No pain     Physical Exam  Vitals reviewed.   Constitutional:       Appearance: Normal appearance.   HENT:      Head: Normocephalic.      Nose: Nose normal.   Eyes:      Pupils: Pupils are equal, round, and reactive to light.   Cardiovascular:      Rate and Rhythm: Normal rate and regular rhythm.      Pulses: Normal pulses.      Heart sounds: Normal heart sounds.   Pulmonary:      Effort: Pulmonary effort is normal. No respiratory distress.      Breath sounds: Normal breath sounds. No wheezing.   Abdominal:      General: Bowel sounds are normal.      Palpations: There is no mass.   Genitourinary:     Comments: Pelvic exam reveals normal external genitalia.  Speculum exam reveals no evidence of tumor and a Pap smear was obtained.  Bimanual exam " likewise reveals no evidence of tumor palpable.  Musculoskeletal:         General: Normal range of motion.      Cervical back: Normal range of motion and neck supple. No tenderness.   Lymphadenopathy:      Cervical: No cervical adenopathy.   Skin:     General: Skin is warm and dry.      Capillary Refill: Capillary refill takes less than 2 seconds.   Neurological:      General: No focal deficit present.      Mental Status: She is alert and oriented to person, place, and time.      Motor: No weakness.   Psychiatric:         Mood and Affect: Mood normal.         Behavior: Behavior normal.       Performance Status: ECOG (3) Capable of limited self-care, confined to bed or chair > 50% of waking hours    Clinical Quality Measures  - Pain Documented by Standardized Tool, FPS Bel Powell reports a pain score of 0. Given her pain assessment as noted, treatment options were discussed and the following options were decided upon as a follow-up plan to address the patient's pain:  No pain, no plan given .  Pain Medications               acetaminophen (TYLENOL) 500 MG tablet Take 2 tablets by mouth Every 6 (Six) Hours As Needed for Moderate Pain. Do not exceed 3000 mg's daily    butalbital-acetaminophen-caffeine (FIORICET, ESGIC) -40 MG per tablet Take 1 tablet by mouth Every 6 (Six) Hours As Needed for Headache.    gabapentin (NEURONTIN) 100 MG capsule Take 1 capsule by mouth. Up to 3 times a day    levETIRAcetam (KEPPRA) 250 MG tablet Take 1 tablet by mouth 2 (Two) Times a Day.    meloxicam (MOBIC) 15 MG tablet Take 1 tablet by mouth Daily As Needed.          - Body Mass Index Screening and Follow-Up Plan  BMI is within normal parameters. No other follow-up for BMI required.    - Tobacco Use: Screening and Cessation Intervention  Social History    Tobacco Use      Smoking status: Never        Passive exposure: Never      Smokeless tobacco: Never    - Advanced Care Planning Advance Care Planning   ACP discussion  was held with the patient during this visit. Patient has an advance directive in EMR which is still valid.     - Depression screening - PHQ-9 Total Score: 0    ASSESSMENT AND PLAN  1. Endometrial cancer    2. S/P hysterectomy with oophorectomy    3. History of radiation therapy    4. Non-smoker      No orders of the defined types were placed in this encounter.    RECOMMENDATIONS:    Bel Powell is status post completion of radiation therapy and presents to our clinic today for routine follow up exam. Diagnosed with recurrent endometrial carcinoma. She completed 5 internal brachytherapy treatments completed on 03/03/2023.     Abdomen/Pelvis CT on 05/25/2024 revealed no inflammatory process, bowel or urinary obstruction. Moderate size hiatus hernia without complication     I have reviewed the chart and other physicians records. she denies untoward side effects from treatment and without evidence for recurrent or metastatic disease on exam today. Pap smear completed - will call with results. Continue ongoing management per primary care physician and other specialists.    Patient Instructions   1)  Return to Dr. Tillman in 6 months  2)  PAP smear today, will call with results    Return in about 6 months (around 3/12/2025) for Office Visit- PLEASE ARRIVE 30 MINUTES EARLY.    Time Spent: I spent 40 minutes caring for Bel on this date of service. This time includes time spent by me in the following activities: preparing for the visit, reviewing tests, obtaining and/or reviewing a separately obtained history, performing a medically appropriate examination and/or evaluation, counseling and educating the patient/family/caregiver, ordering medications, tests, or procedures, and documenting information in the medical record.   Vimal Tillman III, MD  09/12/2024

## 2024-09-11 ENCOUNTER — HOSPITAL ENCOUNTER (OUTPATIENT)
Dept: RADIATION ONCOLOGY | Facility: HOSPITAL | Age: 89
Setting detail: RADIATION/ONCOLOGY SERIES
End: 2024-09-11
Payer: MEDICARE

## 2024-09-11 ENCOUNTER — NURSE ONLY (OUTPATIENT)
Dept: UROLOGY | Age: 89
End: 2024-09-11
Payer: MEDICARE

## 2024-09-11 DIAGNOSIS — N39.46 MIXED STRESS AND URGE URINARY INCONTINENCE: ICD-10-CM

## 2024-09-11 DIAGNOSIS — G30.1 LATE ONSET ALZHEIMER'S DISEASE WITHOUT BEHAVIORAL DISTURBANCE (HCC): Primary | ICD-10-CM

## 2024-09-11 DIAGNOSIS — N32.81 OAB (OVERACTIVE BLADDER): ICD-10-CM

## 2024-09-11 DIAGNOSIS — F02.80 LATE ONSET ALZHEIMER'S DISEASE WITHOUT BEHAVIORAL DISTURBANCE (HCC): Primary | ICD-10-CM

## 2024-09-11 PROCEDURE — 1123F ACP DISCUSS/DSCN MKR DOCD: CPT | Performed by: NURSE PRACTITIONER

## 2024-09-11 PROCEDURE — 99214 OFFICE O/P EST MOD 30 MIN: CPT | Performed by: NURSE PRACTITIONER

## 2024-09-12 ENCOUNTER — OFFICE VISIT (OUTPATIENT)
Age: 89
End: 2024-09-12
Payer: MEDICARE

## 2024-09-12 VITALS
WEIGHT: 106 LBS | BODY MASS INDEX: 20.81 KG/M2 | OXYGEN SATURATION: 97 % | DIASTOLIC BLOOD PRESSURE: 72 MMHG | HEIGHT: 60 IN | SYSTOLIC BLOOD PRESSURE: 137 MMHG | HEART RATE: 69 BPM

## 2024-09-12 DIAGNOSIS — Z92.3 HISTORY OF RADIATION THERAPY: ICD-10-CM

## 2024-09-12 DIAGNOSIS — C54.1 ENDOMETRIAL CANCER: Primary | ICD-10-CM

## 2024-09-12 DIAGNOSIS — Z90.710 S/P HYSTERECTOMY WITH OOPHORECTOMY: ICD-10-CM

## 2024-09-12 DIAGNOSIS — Z90.721 S/P HYSTERECTOMY WITH OOPHORECTOMY: ICD-10-CM

## 2024-09-12 DIAGNOSIS — Z78.9 NON-SMOKER: ICD-10-CM

## 2024-09-12 PROCEDURE — G0463 HOSPITAL OUTPT CLINIC VISIT: HCPCS | Performed by: RADIOLOGY

## 2024-09-12 RX ORDER — LEVETIRACETAM 250 MG/1
250 TABLET ORAL 2 TIMES DAILY
COMMUNITY

## 2024-09-24 ENCOUNTER — NURSE ONLY (OUTPATIENT)
Dept: UROLOGY | Age: 89
End: 2024-09-24
Payer: MEDICARE

## 2024-09-24 DIAGNOSIS — N32.81 OVERACTIVE BLADDER: Primary | ICD-10-CM

## 2024-09-24 DIAGNOSIS — N39.46 MIXED STRESS AND URGE URINARY INCONTINENCE: ICD-10-CM

## 2024-09-24 PROCEDURE — 64566 NEUROELTRD STIM POST TIBIAL: CPT | Performed by: NURSE PRACTITIONER

## 2024-09-25 ENCOUNTER — TELEPHONE (OUTPATIENT)
Age: 89
End: 2024-09-25
Payer: MEDICARE

## 2024-09-30 DIAGNOSIS — G40.219 PARTIAL SYMPTOMATIC EPILEPSY WITH COMPLEX PARTIAL SEIZURES, INTRACTABLE, WITHOUT STATUS EPILEPTICUS (HCC): Primary | ICD-10-CM

## 2024-10-01 RX ORDER — GABAPENTIN 100 MG/1
100 CAPSULE ORAL 3 TIMES DAILY
Qty: 270 CAPSULE | Refills: 1 | Status: SHIPPED | OUTPATIENT
Start: 2024-10-01 | End: 2025-03-30

## 2024-10-09 ENCOUNTER — NURSE ONLY (OUTPATIENT)
Dept: UROLOGY | Age: 89
End: 2024-10-09

## 2024-10-09 VITALS — BODY MASS INDEX: 21.25 KG/M2 | HEIGHT: 60 IN

## 2024-10-09 DIAGNOSIS — N32.81 OVERACTIVE BLADDER: Primary | ICD-10-CM

## 2024-10-09 DIAGNOSIS — N39.46 MIXED STRESS AND URGE URINARY INCONTINENCE: ICD-10-CM

## 2024-10-09 NOTE — PROGRESS NOTES
Treatment # maintenance     Improvement of Symptoms? Yes    OAB/Urologic Medications? None      Uroplasty Procedure:    1. Patient is seated with the right treatment leg elevated.  2. 34 gauge fine needle electrode is inserted into lower inner aspect of the leg. Slightly cephalad to the medial malleolus.  3. Surface electrode pad is placed over the medial aspect of the calcaneus on the same leg.  4.Needle electrode is connected to the external pulse generator.  5.The pulse generator is turned on and the millivolts used are 3.  6.Patient is treated for 30 minutes.  7.The needle and pad are removed.    Patient will follow up in 2 weeks for next treatment.      1. Overactive bladder  2. Mixed stress and urge urinary incontinence  Continue every 2-week uroplasty treatments.  Mild to moderate benefit to the patient.       No orders of the defined types were placed in this encounter.       Return in about 2 weeks (around 10/23/2024) for with LAYTON Guadarrama, uroplasty.

## 2024-10-23 ENCOUNTER — NURSE ONLY (OUTPATIENT)
Dept: UROLOGY | Age: 89
End: 2024-10-23

## 2024-10-23 DIAGNOSIS — N30.90 CYSTITIS: ICD-10-CM

## 2024-10-23 DIAGNOSIS — N39.46 MIXED STRESS AND URGE URINARY INCONTINENCE: ICD-10-CM

## 2024-10-23 DIAGNOSIS — N32.81 OVERACTIVE BLADDER: Primary | ICD-10-CM

## 2024-10-23 RX ORDER — CEFDINIR 300 MG/1
300 CAPSULE ORAL 2 TIMES DAILY PRN
Qty: 30 CAPSULE | Refills: 0 | Status: SHIPPED | OUTPATIENT
Start: 2024-10-23

## 2024-10-23 NOTE — PROGRESS NOTES
Treatment # maintenance    Improvement of Symptoms? mild    OAB/Urologic Medications? none      Uroplasty Procedure:    1. Patient is seated with the right treatment leg elevated.  2. 34 gauge fine needle electrode is inserted into lower inner aspect of the leg. Slightly cephalad to the medial malleolus.  3. Surface electrode pad is placed over the medial aspect of the calcaneus on the same leg.  4.Needle electrode is connected to the external pulse generator.  5.The pulse generator is turned on and the millivolts used are 6.  6.Patient is treated for 30 minutes.  7.The needle and pad are removed.      1. Overactive bladder  2. Mixed stress and urge urinary incontinence  Patient will follow up in 2 weeks for next treatment.       Livia Arora is a 89 y.o., female, Established patient who presents today   Chief Complaint   Patient presents with    Procedure     Patient presents with her daughter for uroplasty treatment.  Patient has issues with dementia, so her daughter provides her history.  Patient is maintained on daily Macrodantin.  Has had 2 positive cultures for E. coli.  Most recent urine culture collected by her primary care provider revealed 25-50,000 colonies of GBS.  She was treated with 3 days of Omnicef.  Symptoms at the time were worsening mental status.  Daughter does report patient had a significant turnaround in her mental status after this short course of antibiotics.    REVIEW OF SYSTEMS:  Review of Systems   Unable to perform ROS: Dementia       PHYSICAL EXAM:  There were no vitals taken for this visit.  Physical Exam  Vitals and nursing note reviewed.   Constitutional:       General: She is not in acute distress.     Appearance: Normal appearance. She is not ill-appearing.   Pulmonary:      Effort: Pulmonary effort is normal. No respiratory distress.   Abdominal:      General: There is no distension.      Tenderness: There is no abdominal tenderness. There is no right CVA tenderness or

## 2024-11-06 ENCOUNTER — NURSE ONLY (OUTPATIENT)
Dept: UROLOGY | Age: 89
End: 2024-11-06
Payer: MEDICARE

## 2024-11-06 DIAGNOSIS — N39.46 MIXED STRESS AND URGE URINARY INCONTINENCE: ICD-10-CM

## 2024-11-06 DIAGNOSIS — N32.81 OVERACTIVE BLADDER: Primary | ICD-10-CM

## 2024-11-06 PROCEDURE — 64566 NEUROELTRD STIM POST TIBIAL: CPT | Performed by: NURSE PRACTITIONER

## 2024-11-06 NOTE — PROGRESS NOTES
Treatment # maintenance    Improvement of Symptoms?  Yes    OAB/Urologic Medications?  None      Uroplasty Procedure:    1. Patient is seated with the right treatment leg elevated.  2. 34 gauge fine needle electrode is inserted into lower inner aspect of the leg. Slightly cephalad to the medial malleolus.  3. Surface electrode pad is placed over the medial aspect of the calcaneus on the same leg.  4.Needle electrode is connected to the external pulse generator.  5.The pulse generator is turned on and the millivolts used are 8.  6.Patient is treated for 30 minutes.  7.The needle and pad are removed.        1. Overactive bladder  2. Mixed stress and urge urinary incontinence  Patient will follow up in 2 weeks for next treatment.       No orders of the defined types were placed in this encounter.       Return in about 2 weeks (around 11/20/2024) for uroplasty.

## 2024-11-19 NOTE — PROGRESS NOTES
Treatment # Maintenance    Improvement of Symptoms? Yes    OAB/Urologic Medications? None      Uroplasty Procedure:    1. Patient is seated with the right treatment leg elevated.  2. 34 gauge fine needle electrode is inserted into lower inner aspect of the leg. Slightly cephalad to the medial malleolus.  3. Surface electrode pad is placed over the medial aspect of the calcaneus on the same leg.  4.Needle electrode is connected to the external pulse generator.  5.The pulse generator is turned on and the millivolts used are 2-4.  6.Patient is treated for 30 minutes.  7.The needle and pad are removed.        1. Overactive bladder  2. Mixed stress and urge urinary incontinence  Patient will follow up in 2 weeks for next treatment.       No orders of the defined types were placed in this encounter.       Return in about 2 weeks (around 12/4/2024) for uroplasty.

## 2024-11-20 ENCOUNTER — NURSE ONLY (OUTPATIENT)
Dept: UROLOGY | Age: 89
End: 2024-11-20
Payer: MEDICARE

## 2024-11-20 DIAGNOSIS — Z95.0 PACEMAKER: Primary | ICD-10-CM

## 2024-11-20 DIAGNOSIS — I49.5 SA NODE DYSFUNCTION (HCC): ICD-10-CM

## 2024-11-20 DIAGNOSIS — N32.81 OVERACTIVE BLADDER: Primary | ICD-10-CM

## 2024-11-20 DIAGNOSIS — N39.46 MIXED STRESS AND URGE URINARY INCONTINENCE: ICD-10-CM

## 2024-11-20 DIAGNOSIS — R00.1 SYMPTOMATIC BRADYCARDIA: ICD-10-CM

## 2024-11-20 PROCEDURE — 64566 NEUROELTRD STIM POST TIBIAL: CPT | Performed by: NURSE PRACTITIONER

## 2024-12-02 ENCOUNTER — TELEPHONE (OUTPATIENT)
Dept: CARDIOLOGY CLINIC | Age: 88
End: 2024-12-02

## 2024-12-05 ENCOUNTER — NURSE ONLY (OUTPATIENT)
Dept: UROLOGY | Age: 88
End: 2024-12-05
Payer: MEDICARE

## 2024-12-05 DIAGNOSIS — N39.0 RECURRENT UTI: Primary | ICD-10-CM

## 2024-12-05 DIAGNOSIS — N32.81 OVERACTIVE BLADDER: ICD-10-CM

## 2024-12-05 DIAGNOSIS — N39.46 MIXED STRESS AND URGE URINARY INCONTINENCE: ICD-10-CM

## 2024-12-05 DIAGNOSIS — F01.511 VASCULAR DEMENTIA WITH AGITATION, UNSPECIFIED DEMENTIA SEVERITY (HCC): ICD-10-CM

## 2024-12-05 PROCEDURE — 1123F ACP DISCUSS/DSCN MKR DOCD: CPT | Performed by: NURSE PRACTITIONER

## 2024-12-05 PROCEDURE — 99214 OFFICE O/P EST MOD 30 MIN: CPT | Performed by: NURSE PRACTITIONER

## 2024-12-05 PROCEDURE — 64566 NEUROELTRD STIM POST TIBIAL: CPT | Performed by: NURSE PRACTITIONER

## 2024-12-05 PROCEDURE — P9612 CATHETERIZE FOR URINE SPEC: HCPCS | Performed by: NURSE PRACTITIONER

## 2024-12-06 NOTE — PROGRESS NOTES
Treatment # maintenance    Improvement of Symptoms? Mild, but recently having significantly worsening incontinence, frequency, urgency, etc    OAB/Urologic Medications? none      Uroplasty Procedure:    1. Patient is seated with the right treatment leg elevated.  2. 34 gauge fine needle electrode is inserted into lower inner aspect of the leg. Slightly cephalad to the medial malleolus.  3. Surface electrode pad is placed over the medial aspect of the calcaneus on the same leg.  4.Needle electrode is connected to the external pulse generator.  5.The pulse generator is turned on and the millivolts used are 19.  6.Patient is treated for 30 minutes.  7.The needle and pad are removed.    PROCEDURE NOTE  After obtaining consent from patient. Patient was then placed in the dorsal lithotomy position. Using sterile technique patient is carefully prepped with Hibiclens around the urethra. A pediatric catheterization kit is used which contains an approximate 5 Kiswahili catheter. Urethral Catheter is introduced into the urinary bladder. Urine specimen is obtained and sent to the lab for culture and sensitivity. Patient tolerated procedure well.  [employer] vaginal swab also collected.         Livia Arora is a 89 y.o., female, Established patient who presents today   Chief Complaint   Patient presents with    Follow-up     I am here today for my uroplasty.        Patient presents with her daughter for PTNS therapy.  She does have issues with dementia, so her daughter does provide most of her history.  Earlier this year, she was noted to have a low colony count of GBS and a urine culture.  She was treated with a 3-day course of Omnicef.  Symptoms at the time included worsening mental status.  Daughter reports significant improvement in mental status changes after short course of antibiotics.  Over the last few days, she reports her mother has become more aggressive, going through more than 10-20 diapers per day, experiencing

## 2024-12-07 LAB — BACTERIA UR CULT: NORMAL

## 2024-12-09 RX ORDER — FLUCONAZOLE 150 MG/1
150 TABLET ORAL ONCE
Qty: 1 TABLET | Refills: 0 | Status: SHIPPED | OUTPATIENT
Start: 2024-12-09 | End: 2024-12-09

## 2024-12-15 ENCOUNTER — APPOINTMENT (OUTPATIENT)
Dept: CT IMAGING | Age: 88
End: 2024-12-15
Payer: MEDICARE

## 2024-12-15 ENCOUNTER — HOSPITAL ENCOUNTER (EMERGENCY)
Age: 88
Discharge: HOME | End: 2024-12-15
Attending: EMERGENCY MEDICINE
Payer: MEDICARE

## 2024-12-15 VITALS
WEIGHT: 101 LBS | HEART RATE: 84 BPM | BODY MASS INDEX: 19.73 KG/M2 | SYSTOLIC BLOOD PRESSURE: 125 MMHG | TEMPERATURE: 98.4 F | RESPIRATION RATE: 20 BRPM | OXYGEN SATURATION: 96 % | DIASTOLIC BLOOD PRESSURE: 77 MMHG

## 2024-12-15 DIAGNOSIS — S09.90XA CLOSED HEAD INJURY, INITIAL ENCOUNTER: Primary | ICD-10-CM

## 2024-12-15 PROCEDURE — 99284 EMERGENCY DEPT VISIT MOD MDM: CPT

## 2024-12-15 PROCEDURE — 72125 CT NECK SPINE W/O DYE: CPT

## 2024-12-15 PROCEDURE — 70450 CT HEAD/BRAIN W/O DYE: CPT

## 2024-12-15 ASSESSMENT — PAIN - FUNCTIONAL ASSESSMENT: PAIN_FUNCTIONAL_ASSESSMENT: ADULT NONVERBAL PAIN SCALE (NPVS)

## 2024-12-15 NOTE — ED PROVIDER NOTES
[facility] EMERGENCY DEPT  eMERGENCY dEPARTMENT eNCOUnter      Pt Name: Livia Arora  MRN: [id number]  Birthdate 1935  Date of evaluation: 12/15/2024  Provider: COLLEEN CARROLL MD    CHIEF COMPLAINT       Chief Complaint   Patient presents with    Fall     Fall tonight. Family reports pt fell and hit furniture.     Head Injury     Laceration to back of head.          HISTORY OF PRESENT ILLNESS   (Location/Symptom, Timing/Onset,Context/Setting, Quality, Duration, Modifying Factors, Severity)  Note limiting factors.   Livia Arora is a 89 y.o. female who presents to the emergency department after a fall.  Patient got up in the night and accidentally fell striking the occipital region of her head.  She has known dementia.  Not on anticoagulants.  She is awake alert at her mental baseline per her daughter at bedside.  All history obtained from daughter.    [facility]    NursingNotes were reviewed.    REVIEW OF SYSTEMS    (2-9 systems for level 4, 10 or more for level 5)     Review of Systems   Unable to perform ROS: Dementia            PAST MEDICALHISTORY     Past Medical History:   Diagnosis Date    Age-related macular degeneration, wet, right eye (HCC)     Anemia     Back pain     Bilateral carotid artery stenosis 02/23/2021    Chronic bilateral low back pain with left-sided sciatica 12/19/2019    Colitis, ischemic (HCC)     Dementia (HCC)     Diverticulosis     Hyperlipidemia     Hypertension     Osteoarthritis     Palliative care patient 09/17/2019    Risk for falls 09/13/2019    Seizures (HCC)     last one 2021    Spastic colon     Status post placement of implantable loop recorder 10/27/2020    Stroke syndrome     CVA's/ TIA's    Urinary incontinence     Uterine cancer (HCC)     Uterine cancer (HCC)     Vagal reaction     wet age-related macular degeneration of left eye     bilateral         SURGICAL HISTORY       Past Surgical History:   Procedure Laterality Date    BREAST SURGERY Right     FNA Right Breast \"oh

## 2024-12-18 ENCOUNTER — NURSE ONLY (OUTPATIENT)
Dept: UROLOGY | Age: 88
End: 2024-12-18
Payer: MEDICARE

## 2024-12-18 DIAGNOSIS — N39.46 MIXED STRESS AND URGE URINARY INCONTINENCE: Primary | ICD-10-CM

## 2024-12-18 DIAGNOSIS — N32.81 OVERACTIVE BLADDER: ICD-10-CM

## 2024-12-18 PROCEDURE — 64566 NEUROELTRD STIM POST TIBIAL: CPT | Performed by: NURSE PRACTITIONER

## 2024-12-18 NOTE — PROGRESS NOTES
Treatment # maintenance    Improvement of Symptoms? Mild, still with several episodes of incontinence    OAB/Urologic Medications? none      Uroplasty Procedure:    1. Patient is seated with the right treatment leg elevated.  2. 34 gauge fine needle electrode is inserted into lower inner aspect of the leg. Slightly cephalad to the medial malleolus.  3. Surface electrode pad is placed over the medial aspect of the calcaneus on the same leg.  4.Needle electrode is connected to the external pulse generator.  5.The pulse generator is turned on and the millivolts used are 11.  6.Patient is treated for 30 minutes.  7.The needle and pad are removed.        1. Mixed stress and urge urinary incontinence  2. Overactive bladder  Patient will follow up in approximately 2 weeks for next treatment.       No orders of the defined types were placed in this encounter.       Return in about 2 weeks (around 1/1/2025).

## 2024-12-30 ENCOUNTER — TELEPHONE (OUTPATIENT)
Dept: HEMATOLOGY | Age: 88
End: 2024-12-30

## 2024-12-30 NOTE — TELEPHONE ENCOUNTER
I called patient and reminded patient of their appt on 01/02/2025 and patient informed me they needed to reschedule their appointment to another date. I have let the  know of this as well as the provider and nurses.

## 2024-12-31 ENCOUNTER — NURSE ONLY (OUTPATIENT)
Dept: UROLOGY | Age: 88
End: 2024-12-31
Payer: MEDICARE

## 2024-12-31 VITALS — TEMPERATURE: 97.2 F | BODY MASS INDEX: 19.83 KG/M2 | WEIGHT: 101 LBS | HEIGHT: 60 IN

## 2024-12-31 DIAGNOSIS — N39.46 MIXED STRESS AND URGE URINARY INCONTINENCE: Primary | ICD-10-CM

## 2024-12-31 DIAGNOSIS — N32.81 OVERACTIVE BLADDER: ICD-10-CM

## 2024-12-31 PROCEDURE — 64566 NEUROELTRD STIM POST TIBIAL: CPT | Performed by: NURSE PRACTITIONER

## 2024-12-31 NOTE — PROGRESS NOTES
Treatment # maintenance    Improvement of Symptoms? mild    OAB/Urologic Medications? none      Uroplasty Procedure:    1. Patient is seated with the right treatment leg elevated.  2. 34 gauge fine needle electrode is inserted into lower inner aspect of the leg. Slightly cephalad to the medial malleolus.  3. Surface electrode pad is placed over the medial aspect of the calcaneus on the same leg.  4.Needle electrode is connected to the external pulse generator.  5.The pulse generator is turned on and the millivolts used are 8.  6.Patient is treated for 30 minutes.  7.The needle and pad are removed.        1. Mixed stress and urge urinary incontinence  2. Overactive bladder  Patient will follow up in 2 weeks for next treatment.       No orders of the defined types were placed in this encounter.       Return in about 2 weeks (around 1/14/2025).

## 2025-01-15 ENCOUNTER — NURSE ONLY (OUTPATIENT)
Dept: UROLOGY | Age: 89
End: 2025-01-15
Payer: MEDICARE

## 2025-01-15 DIAGNOSIS — N39.46 MIXED STRESS AND URGE URINARY INCONTINENCE: ICD-10-CM

## 2025-01-15 DIAGNOSIS — N32.81 OVERACTIVE BLADDER: Primary | ICD-10-CM

## 2025-01-15 PROCEDURE — 64566 NEUROELTRD STIM POST TIBIAL: CPT | Performed by: NURSE PRACTITIONER

## 2025-01-15 NOTE — PROGRESS NOTES
Treatment # maintenance    Improvement of Symptoms?  Mild    OAB/Urologic Medications?  None      Uroplasty Procedure:    1. Patient is seated with the right treatment leg elevated.  2. 34 gauge fine needle electrode is inserted into lower inner aspect of the leg. Slightly cephalad to the medial malleolus.  3. Surface electrode pad is placed over the medial aspect of the calcaneus on the same leg.  4.Needle electrode is connected to the external pulse generator.  5.The pulse generator is turned on and the millivolts used are 8.  6.Patient is treated for 30 minutes.  7.The needle and pad are removed.      1. Overactive bladder  2. Mixed stress and urge urinary incontinence  Patient will follow up in 2 weeks for next treatment.       No orders of the defined types were placed in this encounter.       Return in about 2 weeks (around 1/29/2025) for uroplasty.

## 2025-01-28 NOTE — PROGRESS NOTES
Treatment # maintenance    Improvement of Symptoms?  Mild    OAB/Urologic Medications?  None      Uroplasty Procedure:    1. Patient is seated with the right treatment leg elevated.  2. 34 gauge fine needle electrode is inserted into lower inner aspect of the leg. Slightly cephalad to the medial malleolus.  3. Surface electrode pad is placed over the medial aspect of the calcaneus on the same leg.  4.Needle electrode is connected to the external pulse generator.  5.The pulse generator is turned on and the millivolts used are 13.  6.Patient is treated for 30 minutes.  7.The needle and pad are removed.        1. OAB (overactive bladder)  Patient will follow up in 2 weeks for next treatment.       No orders of the defined types were placed in this encounter.       Return in about 2 weeks (around 2/12/2025).

## 2025-01-29 ENCOUNTER — NURSE ONLY (OUTPATIENT)
Dept: UROLOGY | Age: 89
End: 2025-01-29
Payer: MEDICARE

## 2025-01-29 DIAGNOSIS — N32.81 OAB (OVERACTIVE BLADDER): Primary | ICD-10-CM

## 2025-01-29 PROCEDURE — 64566 NEUROELTRD STIM POST TIBIAL: CPT | Performed by: NURSE PRACTITIONER

## 2025-02-12 ENCOUNTER — OFFICE VISIT (OUTPATIENT)
Dept: CARDIOLOGY CLINIC | Age: 89
End: 2025-02-12

## 2025-02-12 VITALS
OXYGEN SATURATION: 97 % | SYSTOLIC BLOOD PRESSURE: 112 MMHG | HEIGHT: 60 IN | DIASTOLIC BLOOD PRESSURE: 62 MMHG | HEART RATE: 79 BPM | BODY MASS INDEX: 18.85 KG/M2 | WEIGHT: 96 LBS | RESPIRATION RATE: 16 BRPM

## 2025-02-12 DIAGNOSIS — I49.5 SA NODE DYSFUNCTION (HCC): ICD-10-CM

## 2025-02-12 DIAGNOSIS — Z95.0 PACEMAKER: Primary | ICD-10-CM

## 2025-02-12 DIAGNOSIS — R55 VASOVAGAL SYNCOPE: ICD-10-CM

## 2025-02-12 DIAGNOSIS — R00.1 SYMPTOMATIC BRADYCARDIA: ICD-10-CM

## 2025-02-12 DIAGNOSIS — I10 ESSENTIAL HYPERTENSION: ICD-10-CM

## 2025-02-12 NOTE — PROGRESS NOTES
medications as prescribed  Stable cardiovascular status. No evidence of overt heart failure, angina or dysrhythmia.   30 minutes were spent preparing, reviewing and seeing patient.  All questions answered    Plan    Pacemaker will send reading in 3 months  Maintain good blood pressure control-goal<130/80 at rest  Maintain good cholesterol control LDL goal<70 with arterial disease  If you are diabetic work to keep/obtain hemoglobin A1c< 7    Follow up as needed   Call with any questions or concerns  Follow up with Jeffrey Browning,  for non cardiac problems  Report any new problems  Cardiovascular Fitness-Exercise as tolerated.    Fall precautions  Cardiac / Healthy Diet- Avoid processed high fat foods, maintain low sodium/salt   Continue current medications as directed  Continue plan of treatment  It is always recommended that you bring your medications bottles with you to each visit - this is for your safety!       LAYTON Baumann dragon/transcription disclaimer: Much of this encounter note is electronic transcription/translation of spoken language to printed tach. Electronic translation of spoken language may be erroneous, or at times, nonsensical words or phrases may be inadvertently transcribed. Although, I have reviewed the note for such errors, some may still exist.

## 2025-02-12 NOTE — PATIENT INSTRUCTIONS
Pacemaker will send reading in 3 months  Maintain good blood pressure control-goal<130/80 at rest  Maintain good cholesterol control LDL goal<70 with arterial disease  If you are diabetic work to keep/obtain hemoglobin A1c< 7    Follow up as needed   Call with any questions or concerns  Follow up with Jeffrey Browning,  for non cardiac problems  Report any new problems  Cardiovascular Fitness-Exercise as tolerated.    Fall precautions  Cardiac / Healthy Diet- Avoid processed high fat foods, maintain low sodium/salt   Continue current medications as directed  Continue plan of treatment  It is always recommended that you bring your medications bottles with you to each visit - this is for your safety!

## 2025-02-17 ENCOUNTER — TELEPHONE (OUTPATIENT)
Dept: UROLOGY | Age: 89
End: 2025-02-17

## 2025-02-17 NOTE — TELEPHONE ENCOUNTER
returned call to patients daughter. Patient will keep scheduled appt and was made aware if inclement weather does occur patients appt will be canceled.

## 2025-02-17 NOTE — TELEPHONE ENCOUNTER
Due to weather, Liiva is needing to reschedule an appointment for injection for  Uroplasty 2/19/25 . [facility] was unable to accommodate in the time frame needed. Please be advised that the best time to call daughter Cesilia is  Anytime. Cesilia would like to get her in before Wednesday if possible.     Thank you.

## 2025-02-24 ENCOUNTER — TELEPHONE (OUTPATIENT)
Dept: HEMATOLOGY | Age: 89
End: 2025-02-24

## 2025-02-24 NOTE — TELEPHONE ENCOUNTER
I called patient and reminded patient of their appt on 02/27/2025 and patient confirmed they would be here. I also let patient know that we have moved into our new cancer facility and asked patient if they were aware of where we were now located, and patient voiced understanding of our new location. Patient knows not to arrive early and that we will get labs at the time of the follow up appointment and not the lab appointment time. I also made patient aware to eat and drink plenty of water to hydrate properly before coming to these appointments because this will make their lab draw much easier. Daughter said that she has been really weak lately and she isn't sure if she will be able to make it but she isnt going to cancel it just yet because she may get better over the next few days. She said that she is going to take her to the dr and she will call and keep us updated if she decides to reschedule she will let us know.

## 2025-02-25 ENCOUNTER — APPOINTMENT (OUTPATIENT)
Dept: CT IMAGING | Age: 89
End: 2025-02-25
Payer: MEDICARE

## 2025-02-25 ENCOUNTER — HOSPITAL ENCOUNTER (INPATIENT)
Age: 89
LOS: 3 days | Discharge: HOME | End: 2025-02-28
Attending: EMERGENCY MEDICINE
Payer: MEDICARE

## 2025-02-25 ENCOUNTER — APPOINTMENT (OUTPATIENT)
Dept: GENERAL RADIOLOGY | Age: 89
End: 2025-02-25
Payer: MEDICARE

## 2025-02-25 DIAGNOSIS — R41.82 ALTERED MENTAL STATUS, UNSPECIFIED ALTERED MENTAL STATUS TYPE: ICD-10-CM

## 2025-02-25 DIAGNOSIS — R29.6 MULTIPLE FALLS: Primary | ICD-10-CM

## 2025-02-25 DIAGNOSIS — J90 PLEURAL EFFUSION: ICD-10-CM

## 2025-02-25 DIAGNOSIS — E87.1 HYPONATREMIA: ICD-10-CM

## 2025-02-25 DIAGNOSIS — R53.1 GENERAL WEAKNESS: ICD-10-CM

## 2025-02-25 DIAGNOSIS — S22.49XA CLOSED FRACTURE OF MULTIPLE RIBS, UNSPECIFIED LATERALITY, INITIAL ENCOUNTER: ICD-10-CM

## 2025-02-25 DIAGNOSIS — E87.6 HYPOKALEMIA: ICD-10-CM

## 2025-02-25 PROBLEM — S22.41XA: Status: ACTIVE | Noted: 2025-02-25

## 2025-02-25 LAB
25(OH)D3 SERPL-MCNC: 27.8 NG/ML
ALBUMIN SERPL-MCNC: 3.9 G/DL (ref 3.5–5.2)
ALP SERPL-CCNC: 118 U/L (ref 35–104)
ALT SERPL-CCNC: 13 U/L (ref 5–33)
ANION GAP SERPL CALCULATED.3IONS-SCNC: 13 MMOL/L (ref 8–16)
AST SERPL-CCNC: 21 U/L (ref 5–32)
B PARAP IS1001 DNA NPH QL NAA+NON-PROBE: NOT DETECTED
B PERT.PT PRMT NPH QL NAA+NON-PROBE: NOT DETECTED
BACTERIA #/AREA URNS HPF: ABNORMAL /HPF
BASE EXCESS VENOUS: 3 MMOL/L
BASOPHILS # BLD: 0 K/UL (ref 0–0.2)
BASOPHILS NFR BLD: 0.3 % (ref 0–1)
BILIRUB SERPL-MCNC: 0.6 MG/DL (ref 0.2–1.2)
BILIRUB UR QL STRIP: NEGATIVE
BUN SERPL-MCNC: 13 MG/DL (ref 8–23)
C PNEUM DNA NPH QL NAA+NON-PROBE: NOT DETECTED
CALCIUM SERPL-MCNC: 9.2 MG/DL (ref 8.8–10.2)
CARBOXYHEMOGLOBIN: 3.2 %
CHLORIDE SERPL-SCNC: 84 MMOL/L (ref 98–107)
CLARITY UR: ABNORMAL
CO2 SERPL-SCNC: 27 MMOL/L (ref 22–29)
COLOR UR: YELLOW
CREAT SERPL-MCNC: 0.7 MG/DL (ref 0.5–0.9)
CRYSTALS URNS MICRO: ABNORMAL /HPF
EOSINOPHIL # BLD: 0.1 K/UL (ref 0–0.6)
EOSINOPHIL NFR BLD: 0.8 % (ref 0–5)
ERYTHROCYTE [DISTWIDTH] IN BLOOD BY AUTOMATED COUNT: 12.6 % (ref 11.5–14.5)
FLUAV RNA NPH QL NAA+NON-PROBE: NOT DETECTED
FLUBV RNA NPH QL NAA+NON-PROBE: NOT DETECTED
GLUCOSE BLD-MCNC: 92 MG/DL (ref 70–99)
GLUCOSE BLD-MCNC: 94 MG/DL
GLUCOSE SERPL-MCNC: 98 MG/DL (ref 70–99)
GLUCOSE UR STRIP.AUTO-MCNC: NEGATIVE MG/DL
HADV DNA NPH QL NAA+NON-PROBE: NOT DETECTED
HCO3 VENOUS: 28 MMOL/L (ref 23–29)
HCOV 229E RNA NPH QL NAA+NON-PROBE: NOT DETECTED
HCOV HKU1 RNA NPH QL NAA+NON-PROBE: NOT DETECTED
HCOV NL63 RNA NPH QL NAA+NON-PROBE: NOT DETECTED
HCOV OC43 RNA NPH QL NAA+NON-PROBE: NOT DETECTED
HCT VFR BLD AUTO: 34.3 % (ref 37–47)
HGB BLD-MCNC: 11.9 G/DL (ref 12–16)
HGB UR STRIP.AUTO-MCNC: ABNORMAL MG/L
HMPV RNA NPH QL NAA+NON-PROBE: NOT DETECTED
HPIV1 RNA NPH QL NAA+NON-PROBE: NOT DETECTED
HPIV2 RNA NPH QL NAA+NON-PROBE: NOT DETECTED
HPIV3 RNA NPH QL NAA+NON-PROBE: NOT DETECTED
HPIV4 RNA NPH QL NAA+NON-PROBE: NOT DETECTED
IMM GRANULOCYTES # BLD: 0 K/UL
KETONES UR STRIP.AUTO-MCNC: NEGATIVE MG/DL
LACTATE BLDV-SCNC: 1.7 MMOL/L (ref 0.5–1.9)
LEUKOCYTE ESTERASE UR QL STRIP.AUTO: ABNORMAL
LYMPHOCYTES # BLD: 1 K/UL (ref 1.1–4.5)
LYMPHOCYTES NFR BLD: 15.9 % (ref 20–40)
M PNEUMO DNA NPH QL NAA+NON-PROBE: NOT DETECTED
MCH RBC QN AUTO: 32.7 PG (ref 27–31)
MCHC RBC AUTO-ENTMCNC: 34.7 G/DL (ref 33–37)
MCV RBC AUTO: 94.2 FL (ref 81–99)
METHEMOGLOBIN VENOUS: 1 %
MONOCYTES # BLD: 0.5 K/UL (ref 0–0.9)
MONOCYTES NFR BLD: 8.6 % (ref 0–10)
MUCOUS THREADS URNS QL MICRO: ABNORMAL /LPF
NEUTROPHILS # BLD: 4.7 K/UL (ref 1.5–7.5)
NEUTS SEG NFR BLD: 73.9 % (ref 50–65)
NITRITE UR QL STRIP.AUTO: NEGATIVE
O2 CONTENT, VEN: 12 ML/DL
O2 SAT, VEN: 78 %
O2 THERAPY: NORMAL
PCO2 VENOUS: 45 MMHG (ref 40–50)
PERFORMED ON: NORMAL
PH UR STRIP.AUTO: 6 [PH] (ref 5–8)
PH VENOUS: 7.4 (ref 7.35–7.45)
PLATELET # BLD AUTO: 216 K/UL (ref 130–400)
PMV BLD AUTO: 10.1 FL (ref 9.4–12.3)
PO2 VENOUS: 45 MMHG
POTASSIUM SERPL-SCNC: 3.2 MMOL/L (ref 3.5–5.1)
PROT SERPL-MCNC: 6.2 G/DL (ref 6.4–8.3)
PROT UR STRIP.AUTO-MCNC: NEGATIVE MG/DL
RBC # BLD AUTO: 3.64 M/UL (ref 4.2–5.4)
RBC #/AREA URNS HPF: ABNORMAL /HPF (ref 0–2)
RSV RNA NPH QL NAA+NON-PROBE: NOT DETECTED
RV+EV RNA NPH QL NAA+NON-PROBE: NOT DETECTED
SARS-COV-2 RNA NPH QL NAA+NON-PROBE: NOT DETECTED
SODIUM SERPL-SCNC: 124 MMOL/L (ref 136–145)
SP GR UR STRIP.AUTO: 1.02 (ref 1–1.03)
SQUAMOUS #/AREA URNS HPF: ABNORMAL /HPF
TROPONIN, HIGH SENSITIVITY: 26 NG/L (ref 0–14)
UROBILINOGEN UR STRIP.AUTO-MCNC: 0.2 E.U./DL
WBC # BLD AUTO: 6.3 K/UL (ref 4.8–10.8)
WBC #/AREA URNS HPF: ABNORMAL /HPF (ref 0–5)
YEAST #/AREA URNS HPF: PRESENT /HPF

## 2025-02-25 PROCEDURE — 81001 URINALYSIS AUTO W/SCOPE: CPT

## 2025-02-25 PROCEDURE — 70450 CT HEAD/BRAIN W/O DYE: CPT

## 2025-02-25 PROCEDURE — 82803 BLOOD GASES ANY COMBINATION: CPT

## 2025-02-25 PROCEDURE — 99285 EMERGENCY DEPT VISIT HI MDM: CPT

## 2025-02-25 PROCEDURE — 83605 ASSAY OF LACTIC ACID: CPT

## 2025-02-25 PROCEDURE — 71260 CT THORAX DX C+: CPT

## 2025-02-25 PROCEDURE — 84484 ASSAY OF TROPONIN QUANT: CPT

## 2025-02-25 PROCEDURE — 82962 GLUCOSE BLOOD TEST: CPT

## 2025-02-25 PROCEDURE — 93005 ELECTROCARDIOGRAM TRACING: CPT

## 2025-02-25 PROCEDURE — 71045 X-RAY EXAM CHEST 1 VIEW: CPT

## 2025-02-25 PROCEDURE — 87086 URINE CULTURE/COLONY COUNT: CPT

## 2025-02-25 PROCEDURE — 80053 COMPREHEN METABOLIC PANEL: CPT

## 2025-02-25 PROCEDURE — 74177 CT ABD & PELVIS W/CONTRAST: CPT

## 2025-02-25 PROCEDURE — 82306 VITAMIN D 25 HYDROXY: CPT

## 2025-02-25 PROCEDURE — 6360000004 HC RX CONTRAST MEDICATION

## 2025-02-25 PROCEDURE — 82800 BLOOD PH: CPT

## 2025-02-25 PROCEDURE — 1200000000 HC SEMI PRIVATE

## 2025-02-25 PROCEDURE — 72125 CT NECK SPINE W/O DYE: CPT

## 2025-02-25 PROCEDURE — 36415 COLL VENOUS BLD VENIPUNCTURE: CPT

## 2025-02-25 PROCEDURE — 85025 COMPLETE CBC W/AUTO DIFF WBC: CPT

## 2025-02-25 PROCEDURE — 0202U NFCT DS 22 TRGT SARS-COV-2: CPT

## 2025-02-25 PROCEDURE — 87077 CULTURE AEROBIC IDENTIFY: CPT

## 2025-02-25 RX ORDER — SODIUM CHLORIDE 9 MG/ML
INJECTION, SOLUTION INTRAVENOUS PRN
Status: DISCONTINUED | OUTPATIENT
Start: 2025-02-25 | End: 2025-02-28 | Stop reason: HOSPADM

## 2025-02-25 RX ORDER — NITROFURANTOIN MACROCRYSTALS 50 MG/1
50 CAPSULE ORAL NIGHTLY
Status: DISCONTINUED | OUTPATIENT
Start: 2025-02-25 | End: 2025-02-28 | Stop reason: HOSPADM

## 2025-02-25 RX ORDER — MECOBALAMIN 5000 MCG
5 TABLET,DISINTEGRATING ORAL NIGHTLY PRN
Status: DISCONTINUED | OUTPATIENT
Start: 2025-02-25 | End: 2025-02-28 | Stop reason: HOSPADM

## 2025-02-25 RX ORDER — SIMETHICONE 80 MG
125 TABLET,CHEWABLE ORAL 3 TIMES DAILY
Status: DISCONTINUED | OUTPATIENT
Start: 2025-02-25 | End: 2025-02-28 | Stop reason: HOSPADM

## 2025-02-25 RX ORDER — SPIRONOLACTONE 25 MG
1 TABLET ORAL DAILY
Status: DISCONTINUED | OUTPATIENT
Start: 2025-02-26 | End: 2025-02-25

## 2025-02-25 RX ORDER — FAMOTIDINE 20 MG/1
20 TABLET, FILM COATED ORAL 2 TIMES DAILY
Status: DISCONTINUED | OUTPATIENT
Start: 2025-02-25 | End: 2025-02-26 | Stop reason: DRUGHIGH

## 2025-02-25 RX ORDER — IOPAMIDOL 755 MG/ML
70 INJECTION, SOLUTION INTRAVASCULAR
Status: COMPLETED | OUTPATIENT
Start: 2025-02-25 | End: 2025-02-25

## 2025-02-25 RX ORDER — M-VIT,TX,IRON,MINS/CALC/FOLIC 27MG-0.4MG
1 TABLET ORAL DAILY
Status: DISCONTINUED | OUTPATIENT
Start: 2025-02-26 | End: 2025-02-28 | Stop reason: HOSPADM

## 2025-02-25 RX ORDER — SODIUM CHLORIDE 1 G/1
1 TABLET ORAL 3 TIMES DAILY
Status: DISCONTINUED | OUTPATIENT
Start: 2025-02-25 | End: 2025-02-28

## 2025-02-25 RX ORDER — POTASSIUM CHLORIDE 1500 MG/1
40 TABLET, EXTENDED RELEASE ORAL PRN
Status: DISCONTINUED | OUTPATIENT
Start: 2025-02-25 | End: 2025-02-28 | Stop reason: HOSPADM

## 2025-02-25 RX ORDER — POTASSIUM CHLORIDE 1500 MG/1
20 TABLET, EXTENDED RELEASE ORAL ONCE
Status: COMPLETED | OUTPATIENT
Start: 2025-02-25 | End: 2025-02-26

## 2025-02-25 RX ORDER — LEVETIRACETAM 500 MG/1
250 TABLET ORAL 2 TIMES DAILY
Status: DISCONTINUED | OUTPATIENT
Start: 2025-02-25 | End: 2025-02-28 | Stop reason: HOSPADM

## 2025-02-25 RX ORDER — GAUZE BANDAGE 2" X 2"
100 BANDAGE TOPICAL DAILY
Status: DISCONTINUED | OUTPATIENT
Start: 2025-02-26 | End: 2025-02-28 | Stop reason: HOSPADM

## 2025-02-25 RX ORDER — AMLODIPINE BESYLATE 5 MG/1
5 TABLET ORAL 2 TIMES DAILY
Status: DISCONTINUED | OUTPATIENT
Start: 2025-02-25 | End: 2025-02-28 | Stop reason: HOSPADM

## 2025-02-25 RX ORDER — CALCIUM CARBONATE 500 MG/1
500 TABLET, CHEWABLE ORAL 3 TIMES DAILY PRN
Status: DISCONTINUED | OUTPATIENT
Start: 2025-02-25 | End: 2025-02-28 | Stop reason: HOSPADM

## 2025-02-25 RX ORDER — MAGNESIUM SULFATE IN WATER 40 MG/ML
2000 INJECTION, SOLUTION INTRAVENOUS PRN
Status: DISCONTINUED | OUTPATIENT
Start: 2025-02-25 | End: 2025-02-28 | Stop reason: HOSPADM

## 2025-02-25 RX ORDER — PRAVASTATIN SODIUM 20 MG
20 TABLET ORAL NIGHTLY
Status: DISCONTINUED | OUTPATIENT
Start: 2025-02-25 | End: 2025-02-28 | Stop reason: HOSPADM

## 2025-02-25 RX ORDER — LIDOCAINE 4 G/G
1 PATCH TOPICAL DAILY
Status: DISCONTINUED | OUTPATIENT
Start: 2025-02-25 | End: 2025-02-28 | Stop reason: HOSPADM

## 2025-02-25 RX ORDER — DONEPEZIL HYDROCHLORIDE 10 MG/1
10 TABLET, FILM COATED ORAL NIGHTLY
Status: DISCONTINUED | OUTPATIENT
Start: 2025-02-25 | End: 2025-02-28 | Stop reason: HOSPADM

## 2025-02-25 RX ORDER — SODIUM CHLORIDE 0.9 % (FLUSH) 0.9 %
5-40 SYRINGE (ML) INJECTION PRN
Status: DISCONTINUED | OUTPATIENT
Start: 2025-02-25 | End: 2025-02-28 | Stop reason: HOSPADM

## 2025-02-25 RX ORDER — DICYCLOMINE HYDROCHLORIDE 10 MG/1
10 CAPSULE ORAL PRN
Status: DISCONTINUED | OUTPATIENT
Start: 2025-02-25 | End: 2025-02-28 | Stop reason: HOSPADM

## 2025-02-25 RX ORDER — DIPYRIDAMOLE 25 MG
100 TABLET ORAL 2 TIMES DAILY
Status: DISCONTINUED | OUTPATIENT
Start: 2025-02-26 | End: 2025-02-28 | Stop reason: HOSPADM

## 2025-02-25 RX ORDER — MULTIVITAMIN WITH IRON
1 TABLET ORAL DAILY
Status: DISCONTINUED | OUTPATIENT
Start: 2025-02-26 | End: 2025-02-28 | Stop reason: HOSPADM

## 2025-02-25 RX ORDER — FLUTICASONE PROPIONATE 50 MCG
1 SPRAY, SUSPENSION (ML) NASAL 2 TIMES DAILY PRN
Status: DISCONTINUED | OUTPATIENT
Start: 2025-02-25 | End: 2025-02-28 | Stop reason: HOSPADM

## 2025-02-25 RX ORDER — ENOXAPARIN SODIUM 100 MG/ML
30 INJECTION SUBCUTANEOUS DAILY
Status: DISCONTINUED | OUTPATIENT
Start: 2025-02-26 | End: 2025-02-28 | Stop reason: HOSPADM

## 2025-02-25 RX ORDER — ACETAMINOPHEN 500 MG
1000 TABLET ORAL 3 TIMES DAILY
Status: DISCONTINUED | OUTPATIENT
Start: 2025-02-25 | End: 2025-02-28 | Stop reason: HOSPADM

## 2025-02-25 RX ORDER — POTASSIUM CHLORIDE 7.45 MG/ML
10 INJECTION INTRAVENOUS PRN
Status: DISCONTINUED | OUTPATIENT
Start: 2025-02-25 | End: 2025-02-28 | Stop reason: HOSPADM

## 2025-02-25 RX ORDER — PROMETHAZINE HYDROCHLORIDE 25 MG/1
12.5 TABLET ORAL EVERY 6 HOURS PRN
Status: DISCONTINUED | OUTPATIENT
Start: 2025-02-25 | End: 2025-02-26

## 2025-02-25 RX ORDER — SODIUM CHLORIDE 0.9 % (FLUSH) 0.9 %
5-40 SYRINGE (ML) INJECTION EVERY 12 HOURS SCHEDULED
Status: DISCONTINUED | OUTPATIENT
Start: 2025-02-25 | End: 2025-02-28 | Stop reason: HOSPADM

## 2025-02-25 RX ORDER — POLYETHYLENE GLYCOL 3350 17 G/17G
17 POWDER, FOR SOLUTION ORAL DAILY PRN
Status: DISCONTINUED | OUTPATIENT
Start: 2025-02-25 | End: 2025-02-28 | Stop reason: HOSPADM

## 2025-02-25 RX ADMIN — IOPAMIDOL 70 ML: 755 INJECTION, SOLUTION INTRAVENOUS at 18:48

## 2025-02-25 NOTE — ED PROVIDER NOTES
[facility] EMERGENCY DEPARTMENT  EMERGENCY DEPARTMENT ENCOUNTER      Pt Name: Livia Arora  MRN: [id number]  Birthdate 7/4/1935  Date of evaluation: 2/25/2025  Provider: Clarita Aviles PA-C    CHIEF COMPLAINT       Chief Complaint   Patient presents with    Failure To Thrive     Pt has dementia and has taken a decline recently daughter states          HISTORY OF PRESENT ILLNESS   (Location/Symptom, Timing/Onset,Context/Setting, Quality, Duration, Modifying Factors, Severity)  Note limiting factors.   HPI    Livia Arora is a 89 y.o. female with a past medical history significant for vascular dementia, chronic kidney disease, physical deconditioning, anemia, arthritis, vasovagal syncope, seizures, behavioral disturbance secondary to dementia who presents to the emergency department with a chief complaint of generalized failure to thrive.  Patient presents with her daughter who provides the majority of history.  Daughter states that for the last month her mother has been steadily declining.  She is more irritable, not communicating like she was before, she is not urinating as much as she used to, she has been lethargic, sleeping more, and her nights and days are all turned around.  Daughter reports that her mother falls constantly, this is not a new issue, but she has vascular dementia, does not understand her own physical limitations, and will get up quickly out of her wheelchair and start walking, falls multiple times a day.  Her impulsivity has worsened.  She fell a couple of days ago and started complaining of some right rib pain.  She has not been eating or drinking, she is intermittently not taking her medications.  She was supposed to follow-up with her neurologist earlier today, but they missed the appointment because she had a vasovagal episode at home.  Daughter states that she typically vasovagal's multiple times throughout the day and has to take several hours in the morning to wake up    URINALYSIS WITH REFLEX TO CULTURE   POCT GLUCOSE       All other labs were within normal range or not returned as of this dictation.    Medications - No data to display    EMERGENCY DEPARTMENT COURSE and DIFFERENTIALDIAGNOSIS/MDM:   Vitals:    Vitals:    02/25/25 1558   BP: (!) 111/59   Pulse: 72   Resp: 18   Temp: 98.3 °F (36.8 °C)   SpO2: 97%   Weight: 41.7 kg (92 lb)   Height: 1.524 m (5')       MDM      Patient presented for evaluation of generalized failure to thrive and worsening behavioral disturbances in the setting of known vascular dementia.  Differential diagnosis associated with this patient's presentation includes metabolic encephalopathy, electrolyte abnormality, dehydration, urinary tract infection, and recent falls considered head injury, C-spine injury, acute traumatic injury to the chest wall, or abdomen.  Considered uremia, electrolyte abnormality, urinary tract infection, thyroid dysregulation, among other causes of altered mental status.  Also considered is expected decline in the setting of known vascular dementia.    Patient awakens easily to voice, answers questions, is pleasant during my interaction with her.  She is in no acute distress.  She is denying pain.  Vital signs are stable with a soft blood pressure of 111/59, which appears to be a chronic issue for her.     Workup consisted of ordering and reviewing laboratory evaluation, respiratory pathogen panel, VBG, point-of-care glucose, CBC, CMP, lactic acid, TSH, and scans of the head, neck, chest abdomen and pelvis given her recent and recurrent falls.    Discussed possible hospice consultation with the patient's daughter and she states that she is just not ready yet.  They have discussed this in the past, and she is considering hospice, but is not yet ready to make that decision, and states that she does not know enough about it.  May benefit from hospice consult    Laboratory evaluation and imaging is pending at the end my shift.

## 2025-02-25 NOTE — ED PROVIDER NOTES
[facility]  eMERGENCYdEPARTMENT eNCOUnter      Pt Name: Livia Arora  MRN: [id number]  Birthdate 1935  Date of evaluation: 2/25/2025  Provider:Pancho Santo MD    CHIEF COMPLAINT       Chief Complaint   Patient presents with    Failure To Thrive     Pt has dementia and has taken a decline recently daughter states      Care assumed from Clarita Aviles.  89-year-old female with past medical history of vascular dementia with behavior disturbances, CKD dementia and seizures.  Over the last month patient's been steadily going downhill.  Less active and sleeping more.  Multiple falls.  Has been complaining of right rib pain since her falls.  Decreased intake and output.  Overall more confused.  This has been a gradual progression over the last several weeks but much worse over the past several days.  Patient is resting comfortably on exam and in no distress.  Lungs are grossly clear.  Has some mild tenderness right ribs.  No abdominal pain or tenderness.  No obvious tenderness anywhere else.  Patient has no complaints but is pleasantly confused similar to baseline    PHYSICAL EXAM    (up to 7 for level 4, 8 or more for level 5)     ED Triage Vitals [02/25/25 1558]   BP Systolic BP Percentile Diastolic BP Percentile Temp Temp src Pulse Respirations SpO2   (!) 111/59 -- -- 98.3 °F (36.8 °C) -- 72 18 97 %      Height Weight - Scale         1.524 m (5') 41.7 kg (92 lb)             Physical Exam  Vitals reviewed.   Constitutional:       General: She is not in acute distress.     Appearance: She is well-developed.   HENT:      Head: Normocephalic and atraumatic.   Eyes:      General: No scleral icterus.     Pupils: Pupils are equal, round, and reactive to light.   Neck:      Vascular: No JVD.   Cardiovascular:      Rate and Rhythm: Normal rate and regular rhythm.      Heart sounds: Normal heart sounds.   Pulmonary:      Effort: Pulmonary effort is normal. No respiratory distress.

## 2025-02-26 ENCOUNTER — TELEPHONE (OUTPATIENT)
Dept: UROLOGY | Age: 89
End: 2025-02-26

## 2025-02-26 ENCOUNTER — TELEPHONE (OUTPATIENT)
Dept: NEUROLOGY | Age: 89
End: 2025-02-26

## 2025-02-26 PROBLEM — N39.0 UTI (URINARY TRACT INFECTION): Status: ACTIVE | Noted: 2025-02-26

## 2025-02-26 PROBLEM — F03.918 DEMENTIA WITH BEHAVIORAL PROBLEM (HCC): Status: ACTIVE | Noted: 2025-02-26

## 2025-02-26 PROBLEM — F03.90 DEMENTIA (HCC): Status: ACTIVE | Noted: 2025-02-26

## 2025-02-26 LAB
ANION GAP SERPL CALCULATED.3IONS-SCNC: 12 MMOL/L (ref 8–16)
BASOPHILS # BLD: 0 K/UL (ref 0–0.2)
BASOPHILS NFR BLD: 0.5 % (ref 0–1)
BUN SERPL-MCNC: 11 MG/DL (ref 8–23)
CALCIUM SERPL-MCNC: 8.7 MG/DL (ref 8.8–10.2)
CHLORIDE SERPL-SCNC: 87 MMOL/L (ref 98–107)
CO2 SERPL-SCNC: 27 MMOL/L (ref 22–29)
CREAT SERPL-MCNC: 0.6 MG/DL (ref 0.5–0.9)
EKG P AXIS: 65 DEGREES
EKG P-R INTERVAL: 164 MS
EKG Q-T INTERVAL: 424 MS
EKG QRS DURATION: 88 MS
EKG QTC CALCULATION (BAZETT): 441 MS
EKG T AXIS: 15 DEGREES
EOSINOPHIL # BLD: 0 K/UL (ref 0–0.6)
EOSINOPHIL NFR BLD: 1.1 % (ref 0–5)
ERYTHROCYTE [DISTWIDTH] IN BLOOD BY AUTOMATED COUNT: 12.7 % (ref 11.5–14.5)
GLUCOSE SERPL-MCNC: 103 MG/DL (ref 70–99)
HCT VFR BLD AUTO: 32.4 % (ref 37–47)
HGB BLD-MCNC: 11.4 G/DL (ref 12–16)
IMM GRANULOCYTES # BLD: 0 K/UL
LYMPHOCYTES # BLD: 1.1 K/UL (ref 1.1–4.5)
LYMPHOCYTES NFR BLD: 30 % (ref 20–40)
MAGNESIUM SERPL-MCNC: 1.9 MG/DL (ref 1.6–2.4)
MCH RBC QN AUTO: 32.7 PG (ref 27–31)
MCHC RBC AUTO-ENTMCNC: 35.2 G/DL (ref 33–37)
MCV RBC AUTO: 92.8 FL (ref 81–99)
MONOCYTES # BLD: 0.4 K/UL (ref 0–0.9)
MONOCYTES NFR BLD: 10.3 % (ref 0–10)
NEUTROPHILS # BLD: 2.2 K/UL (ref 1.5–7.5)
NEUTS SEG NFR BLD: 57.8 % (ref 50–65)
PLATELET # BLD AUTO: 174 K/UL (ref 130–400)
PMV BLD AUTO: 9.9 FL (ref 9.4–12.3)
POTASSIUM SERPL-SCNC: 3.3 MMOL/L (ref 3.5–5)
RBC # BLD AUTO: 3.49 M/UL (ref 4.2–5.4)
SODIUM SERPL-SCNC: 126 MMOL/L (ref 136–145)
WBC # BLD AUTO: 3.8 K/UL (ref 4.8–10.8)

## 2025-02-26 PROCEDURE — 6370000000 HC RX 637 (ALT 250 FOR IP): Performed by: EMERGENCY MEDICINE

## 2025-02-26 PROCEDURE — 6370000000 HC RX 637 (ALT 250 FOR IP): Performed by: NURSE PRACTITIONER

## 2025-02-26 PROCEDURE — 6360000002 HC RX W HCPCS: Performed by: NURSE PRACTITIONER

## 2025-02-26 PROCEDURE — 2500000003 HC RX 250 WO HCPCS: Performed by: HOSPITALIST

## 2025-02-26 PROCEDURE — 6370000000 HC RX 637 (ALT 250 FOR IP): Performed by: HOSPITALIST

## 2025-02-26 PROCEDURE — 85025 COMPLETE CBC W/AUTO DIFF WBC: CPT

## 2025-02-26 PROCEDURE — 80048 BASIC METABOLIC PNL TOTAL CA: CPT

## 2025-02-26 PROCEDURE — 36415 COLL VENOUS BLD VENIPUNCTURE: CPT

## 2025-02-26 PROCEDURE — 2500000003 HC RX 250 WO HCPCS: Performed by: NURSE PRACTITIONER

## 2025-02-26 PROCEDURE — 1200000000 HC SEMI PRIVATE

## 2025-02-26 PROCEDURE — 6360000002 HC RX W HCPCS: Performed by: HOSPITALIST

## 2025-02-26 PROCEDURE — 83735 ASSAY OF MAGNESIUM: CPT

## 2025-02-26 PROCEDURE — 93010 ELECTROCARDIOGRAM REPORT: CPT | Performed by: INTERNAL MEDICINE

## 2025-02-26 RX ORDER — VALACYCLOVIR HYDROCHLORIDE 1 G/1
1000 TABLET, FILM COATED ORAL 3 TIMES DAILY PRN
COMMUNITY

## 2025-02-26 RX ORDER — FAMOTIDINE 20 MG/1
20 TABLET, FILM COATED ORAL NIGHTLY
Status: DISCONTINUED | OUTPATIENT
Start: 2025-02-26 | End: 2025-02-28 | Stop reason: HOSPADM

## 2025-02-26 RX ORDER — GABAPENTIN 100 MG/1
100 CAPSULE ORAL 3 TIMES DAILY
Status: DISCONTINUED | OUTPATIENT
Start: 2025-02-26 | End: 2025-02-28 | Stop reason: HOSPADM

## 2025-02-26 RX ADMIN — NITROFURANTOIN MACROCRYSTALS 50 MG: 50 CAPSULE ORAL at 00:25

## 2025-02-26 RX ADMIN — SODIUM CHLORIDE, PRESERVATIVE FREE 10 ML: 5 INJECTION INTRAVENOUS at 22:16

## 2025-02-26 RX ADMIN — DONEPEZIL HYDROCHLORIDE 10 MG: 10 TABLET, FILM COATED ORAL at 22:15

## 2025-02-26 RX ADMIN — WATER 1000 MG: 1 INJECTION INTRAMUSCULAR; INTRAVENOUS; SUBCUTANEOUS at 09:18

## 2025-02-26 RX ADMIN — SODIUM CHLORIDE, PRESERVATIVE FREE 10 ML: 5 INJECTION INTRAVENOUS at 00:38

## 2025-02-26 RX ADMIN — POTASSIUM CHLORIDE 20 MEQ: 1500 TABLET, EXTENDED RELEASE ORAL at 00:24

## 2025-02-26 RX ADMIN — ACETAMINOPHEN 1000 MG: 500 TABLET ORAL at 16:19

## 2025-02-26 RX ADMIN — Medication 1 TABLET: at 09:15

## 2025-02-26 RX ADMIN — FAMOTIDINE 20 MG: 20 TABLET, FILM COATED ORAL at 00:24

## 2025-02-26 RX ADMIN — Medication 100 MG: at 09:16

## 2025-02-26 RX ADMIN — SIMETHICONE 120 MG: 80 TABLET, CHEWABLE ORAL at 09:14

## 2025-02-26 RX ADMIN — DONEPEZIL HYDROCHLORIDE 10 MG: 10 TABLET, FILM COATED ORAL at 00:24

## 2025-02-26 RX ADMIN — SIMETHICONE 120 MG: 80 TABLET, CHEWABLE ORAL at 00:24

## 2025-02-26 RX ADMIN — LEVETIRACETAM 250 MG: 500 TABLET, FILM COATED ORAL at 22:13

## 2025-02-26 RX ADMIN — DIPYRIDAMOLE 100 MG: 25 TABLET, FILM COATED ORAL at 00:23

## 2025-02-26 RX ADMIN — LEVETIRACETAM 250 MG: 500 TABLET, FILM COATED ORAL at 00:25

## 2025-02-26 RX ADMIN — Medication 5000 UNITS: at 09:15

## 2025-02-26 RX ADMIN — DIPYRIDAMOLE 100 MG: 25 TABLET, FILM COATED ORAL at 09:17

## 2025-02-26 RX ADMIN — SODIUM CHLORIDE, PRESERVATIVE FREE 10 ML: 5 INJECTION INTRAVENOUS at 09:18

## 2025-02-26 RX ADMIN — AMLODIPINE BESYLATE 5 MG: 5 TABLET ORAL at 22:15

## 2025-02-26 RX ADMIN — PRAVASTATIN SODIUM 20 MG: 20 TABLET ORAL at 00:24

## 2025-02-26 RX ADMIN — DIPYRIDAMOLE 100 MG: 25 TABLET, FILM COATED ORAL at 22:16

## 2025-02-26 RX ADMIN — SODIUM CHLORIDE 1 G: 1 TABLET ORAL at 16:20

## 2025-02-26 RX ADMIN — FAMOTIDINE 20 MG: 20 TABLET ORAL at 22:15

## 2025-02-26 RX ADMIN — ACETAMINOPHEN 1000 MG: 500 TABLET ORAL at 00:24

## 2025-02-26 RX ADMIN — LEVETIRACETAM 250 MG: 500 TABLET, FILM COATED ORAL at 09:15

## 2025-02-26 RX ADMIN — SODIUM CHLORIDE 1 G: 1 TABLET ORAL at 22:15

## 2025-02-26 RX ADMIN — PRAVASTATIN SODIUM 20 MG: 20 TABLET ORAL at 22:15

## 2025-02-26 RX ADMIN — SIMETHICONE 120 MG: 80 TABLET, CHEWABLE ORAL at 16:20

## 2025-02-26 RX ADMIN — AMLODIPINE BESYLATE 5 MG: 5 TABLET ORAL at 09:16

## 2025-02-26 RX ADMIN — GABAPENTIN 100 MG: 100 CAPSULE ORAL at 22:15

## 2025-02-26 RX ADMIN — AMLODIPINE BESYLATE 5 MG: 5 TABLET ORAL at 00:24

## 2025-02-26 RX ADMIN — SODIUM CHLORIDE 1 G: 1 TABLET ORAL at 09:15

## 2025-02-26 RX ADMIN — ACETAMINOPHEN 1000 MG: 500 TABLET ORAL at 09:15

## 2025-02-26 RX ADMIN — SIMETHICONE 120 MG: 80 TABLET, CHEWABLE ORAL at 22:13

## 2025-02-26 NOTE — PROGRESS NOTES
Pharmacy Adjustment per [facility] protocol    Livia Arora is a 89 y.o. female. Pharmacy has adjusted medications per [facility] protocol.    Recent Labs     02/25/25  1735 02/26/25  0013   BUN 13 11       Recent Labs     02/25/25  1735 02/26/25  0013   CREATININE 0.7 0.6       Estimated Creatinine Clearance: 42 mL/min (based on SCr of 0.6 mg/dL).    Height:   Ht Readings from Last 1 Encounters:   02/25/25 1.524 m (5')     Weight:  Wt Readings from Last 1 Encounters:   02/25/25 41.7 kg (92 lb)    BMI:  BMI Readings from Last 1 Encounters:   02/25/25 17.97 kg/m²         Plan: Adjust the following medications based on [facility] protocol:           Famotidine 20 my orally twice daily adjusted to Famotidine 20 mg orally daily for CrCl < 50.     Electronically signed by Parisa Laguna RP on 2/26/2025 at 7:26 AM

## 2025-02-26 NOTE — PROGRESS NOTES
[facility] Hospitalists      Progress Note    Patient:  Livia Arora  YOB: 1935  Date of Service: 2/26/2025  MRN: [id number]   Acct: [id number]   Primary Care Physician: Jeffrey Browning DO  Advance Directive: Full Code  Admit Date: 2/25/2025       Hospital Day: 1    Portions of this note have been copied forward, however, updated to reflect the most current clinical status of this patient.     CHIEF COMPLAINT frequent falls and altered mental status    SUBJECTIVE: Drowsy difficult to arouse      CUMULATIVE HOSPITAL COURSE:   Patient is an 89-year-old female with past medical history of dementia, hypertension, hyperlipidemia, seizures and uterine malignancy.  Patient is with her daughter who gives a history of declining functional ability at home.  She is no longer able to walk.  She sleeps during the day and awake at night.  Has not slept for 3 days prior to coming to the ED.  Patient falls frequently because she will not stay in her wheelchair.  She complained of right-sided chest pain.  ER eval sodium 124 creatinine 0.7 BUN 13 CT of the abdomen and pelvis displaced fracture of the posterior rib on the left CT of the chest mildly displaced fractures of the right fourth fifth and sixth ribs.   Patient admitted to hospitalist service.  Palliative care consulted.  Patient had not voided all night . On assessment this morning show bladder was scanned and she had 450 catheter was placed when nursing reported that the urine was very thick.  Three-way was placed in case we needed to do CBI.          Objective:   VITALS:  /62   Pulse 72   Temp 97.5 °F (36.4 °C) (Temporal)   Resp 16   Ht 1.524 m (5')   Wt 41.7 kg (92 lb)   SpO2 96%   BMI 17.97 kg/m²   24HR INTAKE/OUTPUT:  No intake or output data in the 24 hours ending 02/26/25 1557        Physical Exam  Vitals and nursing note reviewed.   Constitutional:       Appearance: She is ill-appearing.   HENT:      Head: Normocephalic and atraumatic.       *    Antibiotic: rocephin     DVT Prophylaxis: lovenox     GI prophylaxis:  pepcifd     Discharge planning: Patient would benefit from SNF placement palliative consult      Further Orders per Clinical course/attending.     Electronically signed by LAYTON Masters CNP on 2/26/2025 at 3:57 PM       EMR Dragon/Transcription disclaimer:   Much of this encounter note is an electronic transcription/translation of spoken language to printed text. The electronic translation of spoken language may permit erroneous, or at times, nonsensical words or phrases to be inadvertently transcribed; although attempts have made to review the note for such errors, some may still exist.

## 2025-02-26 NOTE — PROGRESS NOTES
PHARMACY NOTE  Livia Arora was ordered Sarna lotion, lutein, omega 3. Per the [facility] Formulary Committee, this medication is non-formulary and not stocked by pharmacy.  It has been discontinued. The medication can be reordered at discharge.     Electronically signed by Ivis Escoto RPH on 2/25/2025 at 11:32 PM

## 2025-02-26 NOTE — ED NOTES
Pt's daughter repeatedly out of room. This time daughter states \"Someone come help me my mother has collapsed.\" When RN into room, pt is in bed, sleeping, VSS, NAD. When pt's daughter goes to stimulate pt by touching her and stating \"mother\", pt opens eyes and states \"Would you just let me sleep?\". BP taken again. Pt repositioned upon pt's daughter request. Pillow placed under head and under left side.    3L O2 via nasal cannula/cardiac precautions/fall precautions/oxygen therapy device and L/min

## 2025-02-26 NOTE — PLAN OF CARE
SUBJECTIVE:    Patient has dementia, daughter has been pushing water intake and patient has become hyponatremic. Has of note not been eating as per her baseline. Also was noted to have three rib fractures on right. We have been asked to medically manage as per the Na level and fractures. She was noted to be on salt tabs at home.     BUN/Cr ratio <20 so is not dehydrated. Will try to use famotidine rather than once daily PPI as well as to restrict fluid intake. Will monitor for now. Will liberalize diet to encourage PO intake. Would further consider additional PRN Pain medication as per course.    Has a plethora of allergies listed which includes FDC yellow #5 which is in many medications including some listed home medication, no reaction is documented.       OBJECTIVE:    BP (!) 111/59   Pulse 72   Temp 98.3 °F (36.8 °C)   Resp 18   Ht 1.524 m (5')   Wt 41.7 kg (92 lb)   SpO2 97%   BMI 17.97 kg/m²       ASSESSMENTS & PLANS:    Acute on Chronic Hyponatremia: Na level 124 Present on Arrival, last was 132  No IVF for now as not dehydrated  No PPI - will sub famotidine 20mg PO BID  No ACEi/ARB Noted that she has Ramipril on her list, will defer and use PRN antihyperrtensives if needed  No SSRI , No SNRI, No Trazodone, No TCA, No Loop Diuretics, No HCTZ, No Aldactone  No other medication causes noted  Would restruct to 2L per day at most, if/as daughter pushing fluids she may restrict herself enough if daughter no lonmger encouraging PO fluids  NaCl 1g tabs PO TIDWM as per hoem regimen  BMP daily, will get a repeat Na level at 23:59, 04:00, and 08:00  Nephrology Consultation deferred    Multiple Traumatic Non-Displaced Closed Right Rib Fractures (of 4, 5, and 6):  Falls:  Check a vitamin D Level  Bed alarm  Fall precautions  PT & OT Evals with Txx as indicated  Already on supplemental Ca and Vit D  Already has tylenol and Lidocaine patch  Will plan to add a pain scale if needed  Has basal pain control with  tylenol    Chronic Medical Problems:  Continue home regimen as indicated    Supportive and Prophylactic Txx:  DVT PPx: lovenox SQ  GI (PUD) PPx: not indicated acutely but covered as per home regimen  PT: as per above      Case flagged by ED provider for consultation / anticipated admission  Case d/w EP  Chart reviewed   Orders entered by me with CPOE  Case d/w NP swing shift hospitlaist in detail

## 2025-02-26 NOTE — TELEPHONE ENCOUNTER
Patient's daughter called from the ER asking if her Uroplasty could be done down there, I told her that is an in office treatment. She had several questions and I told her unfortunately she would need to discuss these things with the providers down there since they know the patient's current condition. She voiced understanding. I did get patient's uroplasty rescheduled for 3/4 at 2:00.

## 2025-02-26 NOTE — H&P
[facility]      Hospitalist - History & Physical      PCP: Jeffrey Browning DO    Date of Admission: 2/25/2025    Date of Service: 2/25/2025    Chief Complaint: Frequent falls      History Of Present Illness:   The patient is a 89 y.o. female who presented to [facility] ED for evaluation of frequent falls. Pt has history of dementia, hypertension, hyperlipidemia, seizures and uterine malignancy.    Pt is unable to provide history she his here with her daughter who relates that patient has had general decline in functional capacity at home. She is no longer able to get out of bed to walk. She has been sleeping during day and awake at night. Pt's daughter who is patient's POA and caregiver tells me that she has not slept in the past 3 days due to mom's behavior.  She has had reported to daughter pain along right side of chest since yesterday. Pt has had no fevers or recent illness. Pt's daughter wants us to know that mom hasn't ate food today.     In ED, sodium 124, creatinine 0.7/bun 13, CT abd/pelvis-Displaced fracture of the posterior 10th rib on the left. CT chest- There are mildly displaced fractures of the lateral right fourth, fifth and sixth ribs.  No chest wall thickening or peripheral soft tissues hematoma. CT head-No acute intracranial abnormality. Pt is admitted inpatient to hospitalist.    Past Medical History:        Diagnosis Date    Age-related macular degeneration, wet, right eye (HCC)     Anemia     Back pain     Bilateral carotid artery stenosis 02/23/2021    Chronic bilateral low back pain with left-sided sciatica 12/19/2019    Colitis, ischemic     Dementia (HCC)     Diverticulosis     Hyperlipidemia     Hypertension     Osteoarthritis     Palliative care patient 09/17/2019    Risk for falls 09/13/2019    Seizures (HCC)     last one 2021    Spastic colon     Status post placement of implantable loop recorder 10/27/2020    Stroke syndrome     CVA's/ TIA's    Urinary incontinence     Uterine

## 2025-02-26 NOTE — CONSULTS
Consult received.  Will place Palliative care evaluation and Palliative RN will initiate discussions. We will follow along and assist as needed. Thank you for allowing us to participate in the care of this patient.    Marva Victor PA-C      History Of Present Illness:   The patient is a 89 y.o. female with PMH advanced Alzheimer's dementia, recurrent endometrial carcinoma s/p hysterectomy w/ oophorectomy and completion of 5 internal brachytherapy treatments, HTN, symptomatic bradycardia s/p pacemaker placement 1/2021, CVA, CKD, overactive bladder, migraines, partial symptomatic epilepsy who presented to [facility] ED on 2/25/2025 with concern for multiple falls causing right rib pain, decreased oral intake, increasing confusion.  CT head reported no acute intracranial abnormality.  CT cervical spine reported no acute fracture or subluxation, severe degenerative disc disease between C2-C3 and C6-C7, multilevel central canal and neuroforamen stenosis.  CT abdomen and pelvis reported large hiatal hernia, bilateral effusions with atelectasis, postoperative changes of the spine with multilevel degenerative change, displaced fracture of the posterior 10th rib on the left.  CT chest reported mildly displaced fractures of the lateral right fourth, fifth and sixth ribs, small right pleural effusion, no pneumothorax, mild discoid consolidations at the bases.  She has been admitted to hospitalist service for hyponatremia with sodium level 124 on arrival.  She has multiple traumatic nondisplaced rib fractures and is being treated with Tylenol and lidocaine patch.  Palliative care has been consulted to assist with goals of care.    Electronically signed by Marva Victor PA-C on 2/27/2025 at 7:42 AM    (Please note that portions of this note were completed with a voice recognition program.  Efforts were made to edit the dictations but occasionally words are mis-transcribed.)

## 2025-02-26 NOTE — PROGRESS NOTES
Physical Therapy  Name: Livia Arora  MRN:  [id number]  Date of service:  2/26/2025    Pt. too lethargic to participate in therapy. RN, Malika present and pt's daughter. Will f/u at a later time.    Electronically signed by Madelin Atkinson, PT on 2/26/2025 at 3:45 PM

## 2025-02-26 NOTE — PROGRESS NOTES
4 Eyes Skin Assessment     NAME:  Livia Arora  YOB: 1935  MEDICAL RECORD NUMBER:  [id number]    The patient is being assessed for  Admission    I agree that at least one RN has performed a thorough Head to Toe Skin Assessment on the patient. ALL assessment sites listed below have been assessed.      Areas assessed by both nurses:    Head, Face, Ears, Shoulders, Back, Chest, Arms, Elbows, Hands, Sacrum. Buttock, Coccyx, Ischium, Legs. Feet and Heels, and Under Medical Devices         Does the Patient have a Wound? Yes wound(s) were present on assessment. LDA wound assessment was Initiated and completed by RN       Mino Prevention initiated by RN: Yes  Wound Care Orders initiated by RN: Yes    Pressure Injury (Stage 3,4, Unstageable, DTI, NWPT, and Complex wounds) if present, place Wound referral order by RN under : Yes    New Ostomies, if present place, Ostomy referral order under : No     Nurse 1 eSignature: Electronically signed by Malika Pérez RN on 2/26/25 at 5:12 PM CST    **SHARE this note so that the co-signing nurse can place an eSignature**    Nurse 2 eSignature: Electronically signed by Darby Mo RN on 2/26/25 at 7:20 PM CST

## 2025-02-27 ENCOUNTER — TELEPHONE (OUTPATIENT)
Dept: NEUROLOGY | Age: 89
End: 2025-02-27

## 2025-02-27 PROBLEM — E61.1 IRON DEFICIENCY: Status: ACTIVE | Noted: 2025-02-27

## 2025-02-27 PROBLEM — E43 SEVERE MALNUTRITION: Chronic | Status: ACTIVE | Noted: 2025-02-27

## 2025-02-27 LAB
ANION GAP SERPL CALCULATED.3IONS-SCNC: 11 MMOL/L (ref 8–16)
BACTERIA UR CULT: ABNORMAL
BACTERIA UR CULT: ABNORMAL
BASOPHILS # BLD: 0 K/UL (ref 0–0.2)
BASOPHILS NFR BLD: 0.6 % (ref 0–1)
BUN SERPL-MCNC: 16 MG/DL (ref 8–23)
CALCIUM SERPL-MCNC: 8.3 MG/DL (ref 8.8–10.2)
CHLORIDE SERPL-SCNC: 91 MMOL/L (ref 98–107)
CO2 SERPL-SCNC: 26 MMOL/L (ref 22–29)
CREAT SERPL-MCNC: 0.7 MG/DL (ref 0.5–0.9)
EOSINOPHIL # BLD: 0.1 K/UL (ref 0–0.6)
EOSINOPHIL NFR BLD: 0.8 % (ref 0–5)
ERYTHROCYTE [DISTWIDTH] IN BLOOD BY AUTOMATED COUNT: 12.7 % (ref 11.5–14.5)
GLUCOSE SERPL-MCNC: 95 MG/DL (ref 70–99)
HCT VFR BLD AUTO: 29.6 % (ref 37–47)
HGB BLD-MCNC: 9.9 G/DL (ref 12–16)
IMM GRANULOCYTES # BLD: 0 K/UL
IRON SATN MFR SERPL: 14 % (ref 15–50)
IRON SERPL-MCNC: 30 UG/DL (ref 37–145)
LYMPHOCYTES # BLD: 1.2 K/UL (ref 1.1–4.5)
LYMPHOCYTES NFR BLD: 19.1 % (ref 20–40)
MCH RBC QN AUTO: 32.5 PG (ref 27–31)
MCHC RBC AUTO-ENTMCNC: 33.4 G/DL (ref 33–37)
MCV RBC AUTO: 97 FL (ref 81–99)
MONOCYTES # BLD: 0.5 K/UL (ref 0–0.9)
MONOCYTES NFR BLD: 7.6 % (ref 0–10)
NEUTROPHILS # BLD: 4.6 K/UL (ref 1.5–7.5)
NEUTS SEG NFR BLD: 71.4 % (ref 50–65)
ORGANISM: ABNORMAL
PLATELET # BLD AUTO: 204 K/UL (ref 130–400)
PMV BLD AUTO: 10.1 FL (ref 9.4–12.3)
POTASSIUM SERPL-SCNC: 4 MMOL/L (ref 3.5–5)
RBC # BLD AUTO: 3.05 M/UL (ref 4.2–5.4)
SODIUM SERPL-SCNC: 128 MMOL/L (ref 136–145)
TIBC SERPL-MCNC: 212 UG/DL (ref 250–400)
WBC # BLD AUTO: 6.5 K/UL (ref 4.8–10.8)

## 2025-02-27 PROCEDURE — 6360000002 HC RX W HCPCS: Performed by: HOSPITALIST

## 2025-02-27 PROCEDURE — 97110 THERAPEUTIC EXERCISES: CPT

## 2025-02-27 PROCEDURE — 83540 ASSAY OF IRON: CPT

## 2025-02-27 PROCEDURE — 97530 THERAPEUTIC ACTIVITIES: CPT

## 2025-02-27 PROCEDURE — 97165 OT EVAL LOW COMPLEX 30 MIN: CPT

## 2025-02-27 PROCEDURE — 6370000000 HC RX 637 (ALT 250 FOR IP): Performed by: HOSPITALIST

## 2025-02-27 PROCEDURE — 6370000000 HC RX 637 (ALT 250 FOR IP): Performed by: NURSE PRACTITIONER

## 2025-02-27 PROCEDURE — 2500000003 HC RX 250 WO HCPCS: Performed by: HOSPITALIST

## 2025-02-27 PROCEDURE — 6370000000 HC RX 637 (ALT 250 FOR IP): Performed by: EMERGENCY MEDICINE

## 2025-02-27 PROCEDURE — 97162 PT EVAL MOD COMPLEX 30 MIN: CPT

## 2025-02-27 PROCEDURE — 85025 COMPLETE CBC W/AUTO DIFF WBC: CPT

## 2025-02-27 PROCEDURE — 1200000000 HC SEMI PRIVATE

## 2025-02-27 PROCEDURE — 83550 IRON BINDING TEST: CPT

## 2025-02-27 PROCEDURE — 2580000003 HC RX 258: Performed by: HOSPITALIST

## 2025-02-27 PROCEDURE — 2580000003 HC RX 258: Performed by: NURSE PRACTITIONER

## 2025-02-27 PROCEDURE — 80048 BASIC METABOLIC PNL TOTAL CA: CPT

## 2025-02-27 PROCEDURE — 36415 COLL VENOUS BLD VENIPUNCTURE: CPT

## 2025-02-27 PROCEDURE — 6360000002 HC RX W HCPCS: Performed by: NURSE PRACTITIONER

## 2025-02-27 PROCEDURE — 2500000003 HC RX 250 WO HCPCS: Performed by: NURSE PRACTITIONER

## 2025-02-27 PROCEDURE — 94760 N-INVAS EAR/PLS OXIMETRY 1: CPT

## 2025-02-27 RX ADMIN — ACETAMINOPHEN 1000 MG: 500 TABLET ORAL at 09:31

## 2025-02-27 RX ADMIN — SODIUM CHLORIDE 1 G: 1 TABLET ORAL at 09:31

## 2025-02-27 RX ADMIN — LEVETIRACETAM 250 MG: 500 TABLET, FILM COATED ORAL at 21:16

## 2025-02-27 RX ADMIN — DIPYRIDAMOLE 100 MG: 25 TABLET, FILM COATED ORAL at 11:41

## 2025-02-27 RX ADMIN — WATER 1000 MG: 1 INJECTION INTRAMUSCULAR; INTRAVENOUS; SUBCUTANEOUS at 09:32

## 2025-02-27 RX ADMIN — SIMETHICONE 120 MG: 80 TABLET, CHEWABLE ORAL at 21:16

## 2025-02-27 RX ADMIN — SODIUM CHLORIDE: 9 INJECTION, SOLUTION INTRAVENOUS at 18:24

## 2025-02-27 RX ADMIN — Medication 1 TABLET: at 09:31

## 2025-02-27 RX ADMIN — PRAVASTATIN SODIUM 20 MG: 20 TABLET ORAL at 21:15

## 2025-02-27 RX ADMIN — ACETAMINOPHEN 1000 MG: 500 TABLET ORAL at 21:16

## 2025-02-27 RX ADMIN — SIMETHICONE 120 MG: 80 TABLET, CHEWABLE ORAL at 09:30

## 2025-02-27 RX ADMIN — FAMOTIDINE 20 MG: 20 TABLET ORAL at 21:16

## 2025-02-27 RX ADMIN — SODIUM CHLORIDE, PRESERVATIVE FREE 10 ML: 5 INJECTION INTRAVENOUS at 21:15

## 2025-02-27 RX ADMIN — SIMETHICONE 120 MG: 80 TABLET, CHEWABLE ORAL at 17:37

## 2025-02-27 RX ADMIN — ACETAMINOPHEN 1000 MG: 500 TABLET ORAL at 17:37

## 2025-02-27 RX ADMIN — DIPYRIDAMOLE 100 MG: 25 TABLET, FILM COATED ORAL at 21:15

## 2025-02-27 RX ADMIN — DONEPEZIL HYDROCHLORIDE 10 MG: 10 TABLET, FILM COATED ORAL at 21:16

## 2025-02-27 RX ADMIN — ENOXAPARIN SODIUM 30 MG: 100 INJECTION SUBCUTANEOUS at 09:31

## 2025-02-27 RX ADMIN — IRON SUCROSE 400 MG: 20 INJECTION, SOLUTION INTRAVENOUS at 18:24

## 2025-02-27 RX ADMIN — GABAPENTIN 100 MG: 100 CAPSULE ORAL at 21:15

## 2025-02-27 RX ADMIN — SODIUM CHLORIDE 1 G: 1 TABLET ORAL at 17:37

## 2025-02-27 RX ADMIN — LEVETIRACETAM 250 MG: 500 TABLET, FILM COATED ORAL at 09:31

## 2025-02-27 RX ADMIN — SODIUM CHLORIDE 1 G: 1 TABLET ORAL at 21:16

## 2025-02-27 RX ADMIN — Medication 1 TABLET: at 09:30

## 2025-02-27 RX ADMIN — Medication 5000 UNITS: at 09:31

## 2025-02-27 RX ADMIN — COLLAGENASE SANTYL: 250 OINTMENT TOPICAL at 17:38

## 2025-02-27 RX ADMIN — Medication 100 MG: at 09:31

## 2025-02-27 RX ADMIN — GABAPENTIN 100 MG: 100 CAPSULE ORAL at 09:31

## 2025-02-27 RX ADMIN — AMLODIPINE BESYLATE 5 MG: 5 TABLET ORAL at 21:17

## 2025-02-27 NOTE — PROGRESS NOTES
Occupational Therapy  Facility/Department: [facility] SURG SERVICES  Occupational Therapy Initial Assessment    Name: Livia Arora  : 1935  MRN: [id number]  Date of Service: 2025    Discharge Recommendations:  Patient would benefit from continued therapy after discharge          Patient Diagnosis(es): The primary encounter diagnosis was Multiple falls. Diagnoses of General weakness, Closed fracture of multiple ribs, unspecified laterality, initial encounter, Pleural effusion, Altered mental status, unspecified altered mental status type, Hyponatremia, and Hypokalemia were also pertinent to this visit.  Past Medical History:  has a past medical history of Age-related macular degeneration, wet, right eye (HCC), Anemia, Back pain, Bilateral carotid artery stenosis, Chronic bilateral low back pain with left-sided sciatica, Colitis, ischemic, Dementia (HCC), Diverticulosis, Hyperlipidemia, Hypertension, Osteoarthritis, Palliative care patient, Risk for falls, Seizures (HCC), Spastic colon, Status post placement of implantable loop recorder, Stroke syndrome, Urinary incontinence, Uterine cancer (HCC), Uterine cancer (HCC), Vagal reaction, and wet age-related macular degeneration of left eye.  Past Surgical History:  has a past surgical history that includes Colonoscopy (2003); Colonoscopy (2003); eye surgery; Tonsillectomy and adenoidectomy; Cholecystectomy; Breast surgery (Right); Spine surgery; lumbar fusion; Dilation and curettage of uterus; Upper gastrointestinal endoscopy (2003); Hysterectomy (2020); other surgical history; Insertable Cardiac Monitor; pacemaker placement (2021); other surgical history (2020); Pacemaker insertion; Upper gastrointestinal endoscopy (N/A, 01/10/2022); Upper gastrointestinal endoscopy (N/A, 10/31/2022); Upper gastrointestinal endoscopy (10/31/2022); and Upper gastrointestinal endoscopy (N/A, 2023).    Treatment Diagnosis: Traumatic closed  Perform transfers with CGA  Short Term Goal 2: Perform dressing with CGA  Short Term Goal 3: Perform toileting with CGA  Short Term Goal 4: Independent with therapeutic activity recommendations, safety, and positioning recommendations  Long Term Goals  Long Term Goal 1: Upgrade as tolerated    Tx initiated: initiated education on beginning-level therapeutic activities (30 min)      Leny Browning OT  Electronically signed by Leny Browning OT on 2/27/2025 at 3:48 PM

## 2025-02-27 NOTE — PROGRESS NOTES
Physical Therapy  Facility/Department: [facility] SURG SERVICES  Physical Therapy Initial Assessment    Name: Livia Arora  : 1935  MRN: [id number]  Date of Service: 2025    Discharge Recommendations:  Continue to assess pending progress, 24 hour supervision or assist, Patient would benefit from continued therapy after discharge          Patient Diagnosis(es): The primary encounter diagnosis was Multiple falls. Diagnoses of General weakness, Closed fracture of multiple ribs, unspecified laterality, initial encounter, Pleural effusion, Altered mental status, unspecified altered mental status type, Hyponatremia, and Hypokalemia were also pertinent to this visit.  Past Medical History:  has a past medical history of Age-related macular degeneration, wet, right eye (HCC), Anemia, Back pain, Bilateral carotid artery stenosis, Chronic bilateral low back pain with left-sided sciatica, Colitis, ischemic, Dementia (HCC), Diverticulosis, Hyperlipidemia, Hypertension, Osteoarthritis, Palliative care patient, Risk for falls, Seizures (HCC), Spastic colon, Status post placement of implantable loop recorder, Stroke syndrome, Urinary incontinence, Uterine cancer (HCC), Uterine cancer (HCC), Vagal reaction, and wet age-related macular degeneration of left eye.  Past Surgical History:  has a past surgical history that includes Colonoscopy (2003); Colonoscopy (2003); eye surgery; Tonsillectomy and adenoidectomy; Cholecystectomy; Breast surgery (Right); Spine surgery; lumbar fusion; Dilation and curettage of uterus; Upper gastrointestinal endoscopy (2003); Hysterectomy (2020); other surgical history; Insertable Cardiac Monitor; pacemaker placement (2021); other surgical history (2020); Pacemaker insertion; Upper gastrointestinal endoscopy (N/A, 01/10/2022); Upper gastrointestinal endoscopy (N/A, 10/31/2022); Upper gastrointestinal endoscopy (10/31/2022); and Upper gastrointestinal endoscopy (N/A,  05/11/2023).    Assessment  Body Structures, Functions, Activity Limitations Requiring Skilled Therapeutic Intervention: Decreased functional mobility ;Decreased ADL status;Decreased ROM;Decreased safe awareness;Decreased strength;Decreased balance;Decreased endurance;Decreased posture;Decreased coordination;Decreased cognition  Assessment: Pt. will benefit from cont. PT to decrease impairments. Pt. needs 24 hr care. Pt. able to step to chair with 2A. The longer pt sat on EOB, the better her balance got. She tolerated mobility fairly well with PT/OT, but she was fatigued after PT. Will work on progressive mobility as pt is able. Could likely begin mobility with RW, but she is limited with command follow. Pt's daughter states pt will respond like she understands by saying \"uh huh\" but pt may not readily follow commands.  Treatment Diagnosis: impaired gait and mobility  Therapy Prognosis: Fair  Decision Making: Medium Complexity  Barriers to Learning: cognition  Requires PT Follow-Up: Yes  Activity Tolerance  Activity Tolerance: Patient limited by fatigue;Treatment limited secondary to decreased cognition    Plan  Physical Therapy Plan  General Plan: 6-7 times per week  Therapy Duration: 2 Weeks  Current Treatment Recommendations: Strengthening, ROM, Balance training, Transfer training, Gait training, Endurance training, Functional mobility training, Safety education & training, Positioning, Equipment evaluation, education, & procurement, Therapeutic activities, Patient/Caregiver education & training  Safety Devices  Type of Devices: Call light within reach, Heels elevated for pressure relief, Left in chair, Chair alarm in place, Nurse notified, Patient at risk for falls, Gait belt (daughter present and wound care RN. Pt. reclined, pillow under BLEs)    Restrictions  Restrictions/Precautions  Restrictions/Precautions: Fall Risk, Contact Precautions  Activity Level: Up with Assist  Required Braces or Orthoses?: No      Subjective  General  Chart Reviewed: Yes  Patient assessed for rehabilitation services?: Yes  Response To Previous Treatment: Not applicable  Family/Caregiver Present: Yes (daughter)  Referring Practitioner: Tj Bustamante MD  Referral Date : 02/25/25  Diagnosis: multiple falls, generalized weakness, closed fx multiple ribs, pleural effusion, AMS, hyponatremia, hypokalemia  Follows Commands: Impaired  Other (Comment): needs simple v. and tactile cues  General  General Comments: RNMalika okayed PT.  Subjective  Subjective: Pt. agreed to get up to the chair.         Social/Functional History  Social/Functional History  Lives With: Daughter  Type of Home: House  Home Layout: Two level  Home Access: Stairs to enter with rails  Entrance Stairs - Number of Steps: 12  Bathroom Shower/Tub: Tub/Shower unit  Bathroom Toilet: Standard  Bathroom Equipment: None  Bathroom Accessibility: Accessible  Home Equipment: Wheelchair - Manual, Walker - Rolling  Receives Help From: Family  Prior Level of Assist for ADLs: Needs assistance  Toileting: Needs assistance  Prior Level of Assist for Homemaking: Needs assistance  Homemaking Responsibilities: No  Prior Level of Assist for Transfers: Needs assistance  Active : No  Patient's  Info: daughter  Mode of Transportation: Car  Occupation: Retired  Vision/Hearing  Vision  Vision: Impaired  Vision Exceptions: Wears glasses for reading  Hearing  Hearing: Within functional limits    Cognition   Cognition  Overall Cognitive Status: Exceptions  Arousal/Alertness: Inconsistent responses to stimuli  Following Commands: Inconsistently follows commands;Follows one step commands with repetition;Follows one step commands with increased time  Attention Span: Difficulty attending to directions;Difficulty dividing attention  Memory: Impaired  Safety Judgement: Decreased awareness of need for assistance;Decreased awareness of need for safety  Problem Solving: Impaired  Insights:

## 2025-02-27 NOTE — PROGRESS NOTES
Comprehensive Nutrition Assessment    Type and Reason for Visit:  Initial, Positive nutrition screen    Nutrition Recommendations/Plan:   Add Severe Malnutrition to the Problem List  Consider an appetite stimulant     Malnutrition Assessment:  Malnutrition Status:  Severe malnutrition (02/27/25 1008)    Context:  Chronic Illness     Findings of the 6 clinical characteristics of malnutrition:  Energy Intake:  75% or less estimated energy requirements for 1 month or longer  Weight Loss:  Greater than 10% over 6 months       Nutrition Assessment:    Pt is receiving care at time of visit with chago odonnell. Pt meets the criteria for Severe Malnutrition AEB <75% energy intake compared to estimated energy needs for 1 month with significant wt loss. Progress notes also indicate abnormal muscle tone and cachexia. Pt is currently on a Gluten Free diet. Her sodium is 128. She is urinating. Last BM noted 2/26. Will monitor intakes and implement nutrition intervention as needed. MD may want to consider an appetite stimulant if intake does not improve.    Current Nutrition Intake & Therapies:    ADULT DIET; Regular; Gluten Free; gluten intolerance    Anthropometric Measures:  Height: 152.4 cm (5')  Ideal Body Weight (IBW): 100 lbs (45 kg)    Current Body Weight: 41.7 kg (91 lb 14.9 oz)  Current BMI (kg/m2): 18  BMI Categories: Underweight (BMI less than 22) age over 65    Estimated Daily Nutrient Needs:  Energy Requirements Based On: Kcal/kg  Weight Used for Energy Requirements: Current  Energy (kcal/day): 9751-7447 kcals/day  Weight Used for Protein Requirements: Current  Protein (g/day): 62-83 g/protein/day  Method Used for Fluid Requirements: 1 ml/kcal  Fluid (ml/day): 9533-2904 mL/day    Nutrition Diagnosis:   Severe malnutrition related to other (in context of chronic illness) as evidenced by criteria as identified in malnutrition assessment    Nutrition Interventions:   Food and/or Nutrient Delivery: Continue Current  Diet  Coordination of Nutrition Care: Continue to monitor while inpatient    Goals:  Goals: PO intake 50% or greater    Nutrition Monitoring and Evaluation:   Food/Nutrient Intake Outcomes: Food and Nutrient Intake  Physical Signs/Symptoms Outcomes: Biochemical Data, Nutrition Focused Physical Findings, Weight    Melissa Hughes MS, RD, LD  Contact: [phone]

## 2025-02-27 NOTE — ACP (ADVANCE CARE PLANNING)
Advance Care Planning     Advance Care Planning Inpatient Note  [facility] Department    Today's Date: 2/27/2025  Unit: [facility] SURG SERVICES    Received request from Other: Palliative care .  Upon review of chart and communication with care team, Pt has dementia and her daughter is making medical decisions. Patient and Child/Children was/were present in the room during visit.    Goals of ACP Conversation:  Discuss advance care planning documents    Health Care Decision Makers:       Primary Decision Maker (Active): Cesilia Arora - Child - [phone]  Summary:  Verified Documents    Advance Care Planning Documents (Patient Wishes):  Living Will/Advance Directive     Assessment:  Pt is an 89 year old female with vascular dementia. She lives with her daughter Cesilia and her goal is to return home with her daughter. Pt's daughter is her primary caregiver.     Interventions:  Confirmed pt's LW, decision maker and code status.    Care Preferences Communicated:     Hospitalization:  If the patient's health worsens and it becomes clear that the chance of recovery is unlikely,     the patient wants hospitalization.    Ventilation:   If the patient, in their present state of health, suddenly became very ill and unable to breathe on their own,     the patient would NOT desire the use of a ventilator (breathing machine).    If their health worsens and it becomes clear that the change of recovery is unlikely,     the patient would NOT desire the use of a ventilator (breathing machine).    Resuscitation:  In the event the patient's heart stopped as a result of an underlying serious health condition, the patient communicates a preference for      a natural death (no CPR).    Outcomes/Plan:  ACP Discussion: Completed    Electronically signed by Mary Behrens, Chaplain on 2/27/2025 at 11:34 AM

## 2025-02-27 NOTE — TELEPHONE ENCOUNTER
Daughter called about rescheduling appointment. She has many questions for Dr cid. I got her scheduled and daughter has verified that day is okay.

## 2025-02-27 NOTE — PROGRESS NOTES
[facility]  Nurse  Consult Note       NAME:  Livia Arora  MEDICAL RECORD NUMBER:  [id number]  AGE: 89 y.o.   GENDER: female  : 1935  TODAY'S DATE:  2025    Subjective   Reason for Wound Nurse Evaluation and Assessment: abrasion to lower back, stage 3 pressure injury to left hip      Livia Arora is a 89 y.o. female referred by:   [] Physician  [] Nursing  [] Other:     Wound Identification:  Wound Type: pressure  Contributing Factors: chronic pressure and decreased mobility    Wound History: Patient presented to the hospital with a stage 3 pressure injury to her left hip. The wound bed is slough covered. The patient lives at home with her daughter who is her caregiver. She typically favors her left side.     The patient lower back has an abrasion present from a recent fall at home per her daughter.      Educated daughter on importance of turning at least every 2 hours and offloading pressure areas.       Current Wound Care Treatment:      SACRUM: Cover with silicone border. Change every 3 days and PRN. Peel back dressing daily to assess skin.    LEFT HIP: Cleanse wounds with soap and water with each dressing change. Apply Santyl, nickel thick, to open areas only. Cover with vaseline gauze and silicone border. Change daily and PRN      Turn patient at least every 2 hours  HOB 30 degrees or less unless eating or drinking  NO briefs  Use only paper pads under the patient  Offload heels at all times while in bed or chair      Patient Goal of Care:  [x] Wound Healing  [] Odor Control  [] Palliative Care  [] Pain Control   [] Other:         PAST MEDICAL HISTORY        Diagnosis Date    Age-related macular degeneration, wet, right eye (HCC)     Anemia     Back pain     Bilateral carotid artery stenosis 2021    Chronic bilateral low back pain with left-sided sciatica 2019    Colitis, ischemic     Dementia (HCC)     Diverticulosis     Hyperlipidemia     Hypertension     Osteoarthritis  / 10  Quality of pain: N/A  Wound Pain Timing/Severity: none  Premedicated: N/A    Plan   Plan of Care:      Specialty Bed Required : N/A   [] Low Air Loss   [] Pressure Redistribution  [] Fluid Immersion  [] Bariatric  [] Total Pressure Relief  [] Other:     Current Diet: ADULT DIET; Regular; Gluten Free; gluten intolerance  Dietician consult:  N/A    Discharge Plan:  Placement for patient upon discharge: home with support    Patient appropriate for Outpatient Wound Care Center: Yes    Referrals:  []   [x] Home Health Care  [] Supplies  [] Other    Patient/Caregiver Teaching:  Level of patient/caregiver understanding able to: daughter educated  [] Indicates understanding       [] Needs reinforcement  [] Unsuccessful      [x] Verbal Understanding  [] Demonstrated understanding       [] No evidence of learning  [] Refused teaching         [] N/A       Electronically signed by   Saba Wade RN 2/27/2025

## 2025-02-27 NOTE — PROGRESS NOTES
[facility] Hospitalists      Progress Note    Patient:  Livia Arora  YOB: 1935  Date of Service: 2/27/2025  MRN: [id number]   Acct: [id number]   Primary Care Physician: Jeffrey Browning DO  Advance Directive: DNR  Admit Date: 2/25/2025       Hospital Day: 2    Portions of this note have been copied forward, however, updated to reflect the most current clinical status of this patient.     CHIEF COMPLAINT frequent falls and altered mental status    SUBJECTIVE: More alert today      CUMULATIVE HOSPITAL COURSE:   Patient is an 89-year-old female with past medical history of dementia, hypertension, hyperlipidemia, seizures and uterine malignancy.  Patient is with her daughter who gives a history of declining functional ability at home.  She is no longer able to walk.  She sleeps during the day and awake at night.  Has not slept for 3 days prior to coming to the ED.  Patient falls frequently because she will not stay in her wheelchair.  She complained of right-sided chest pain.  ER eval sodium 124 creatinine 0.7 BUN 13 CT of the abdomen and pelvis displaced fracture of the posterior rib on the left CT of the chest mildly displaced fractures of the right fourth fifth and sixth ribs.   Patient admitted to hospitalist service.  Palliative care consulted.  Patient had not voided all night . On assessment this morning show bladder was scanned and she had 450 catheter was placed when nursing reported that the urine was very thick.  Three-way was placed in case we needed to do CBI.    Patient was later discontinued.  Sodium is improved overnight.    Daughter wanted to know plan of care.  Over the told her we will wait for the urine culture to make sure the antibiotic was appropriate.  Actinobaculum schaalii creatinine culture.  Sensitive to Rocephin.  When I told patient's daughter that she would probably be able to discharge tomorrow she immediately tells me that now we had to figure out why she became  mild disc spur complex effaces the thecal sac with mild central canal stenosis.  The facets and uncovertebral joints are hypertrophic with severe bilateral for stenosis.  C7-T1: No significant disc herniation, central canal stenosis or neural foramen stenosis.       - No acute fracture or subluxation. - Severe degenerative disc disease between C2-C3 and C6-C7. - Multilevel central canal and neural foramen stenosis as detailed.  .  All CT scans are performed using dose optimization techniques as appropriate to the performed exam and include at least one of the following: Automated exposure control, adjustment of the mA and/or kV according to size, and the use of iterative reconstruction technique.  ______________________________________ Electronically signed by: ANUJ GUTIERREZ D.O. Date:     02/25/2025 Time:    19:03     CT HEAD WO CONTRAST    Result Date: 2/25/2025    EXAM: CT HEAD WITHOUT CONTRAST  HISTORY: Altered mental status  COMPARISON:  12/15/2024  TECHNIQUE:  Serial axial images of the brain were obtained from the skull base to the vertex without IV contrast.  FINDINGS:  No acute intracranial hemorrhage.  No hydrocephalus.  No midline shift.  Parenchymal volume loss.  Chronic microangiopathy.  The calvarium is intact.         No acute intracranial abnormality.  All CT scans are performed using dose optimization techniques as appropriate to the performed exam and include at least one of the following: Automated exposure control, adjustment of the mA and/or kV according to size, and the use of iterative reconstruction technique.  ______________________________________ Electronically signed by: ARTURO ERNST D.O. Date:     02/25/2025 Time:    19:03     XR CHEST PORTABLE    Result Date: 2/25/2025  EXAM:  FRONTAL VIEW OF THE CHEST.  HISTORY:  Altered mental status.  COMPARISON:  None.  FINDINGS: Pacing device left side.  Cardiac silhouette is enlarged.  Basilar atelectasis.  No pneumothorax.  Right-sided

## 2025-02-27 NOTE — PROGRESS NOTES
02/27/25 1123   Encounter Summary   Encounter Overview/Reason Initial Encounter;Spiritual/Emotional Needs   Encounter Code  Assessment by  services   Service Provided For Patient;Family   Referral/Consult From Palliative Care   Support System Children;Family members   Complexity of Encounter Moderate   Begin Time 0950   End Time  1030   Total Time Calculated 40 min   Spiritual/Emotional needs   Type Spiritual Support   Palliative Care   Type Palliative Care, Initial/Spiritual Assessment   Advance Care Planning   Type Care Preferences Addressed   Assessment/Intervention/Outcome   Assessment Compromised coping;Concerns with suffering   Intervention Active listening;Prayer (assurance of)/[place];Sustaining Presence/Ministry of presence   Outcome Acceptance;Expressed Gratitude;Receptive   Plan and Referrals   Plan/Referrals Continue to visit, (comment);Continue Support (comment)   Does the patient have a [facility] PCP? No         Palliative Care initiation: This  visited with pt and pt's daughter to initiate palliative care and provide spiritual care. Pt's daughter says pt has broken ribs, vascular dementia, and seizures. Pt is a Yazidism and has studied different faiths and Bahai, per her daughter. Her debbie helps her with her daily decisions. Pt is known to palliative care.         Advance Directives: Pt has a LW and her daughter Cesilia is her primary only decision maker. Pt is a DNR per her daughter. Pt does not want CPR or Ventilator. SEE ACP NOTE.         Pain/other symptoms: Pt is grimacing and has pain per her daughter. Pt is receiving pain medication.       Spiritual: Pt is a Yazidism.         Pt/family discussion r/t goals: Pt lives with her daughter Cesilia and was previously able to transfer to a wheel chair and use a walker to ambulate. She also was able to feed herself. The goal is for pt to improve enough to return home with her daughter for her care.     Provided spiritual care with  sustaining presence, nurtured hope, words of encouragement, and prayer. Pt participated in the prayer and pt's daughter expressed gratitude for spiritual care.         Spiritual Health History and Assessment/Progress Note  [facility]    (P) Initial Encounter, Spiritual/Emotional Needs,  ,  ,      Name: Livia Arora MRN: [id number]    Age: 89 y.o.     Sex: female   Language: English   Baptist: Yarsanism   Traumatic closed nondisplaced fracture of multiple ribs, right, initial encounter     Date: 2/27/2025            Total Time Calculated: (P) 40 min              Spiritual Assessment began in [facility] SURG SERVICES        Referral/Consult From: (P) Palliative Care   Encounter Overview/Reason: (P) Initial Encounter, Spiritual/Emotional Needs  Service Provided For: (P) Patient, Family    Mari, Belief, Meaning:   Patient identifies as spiritual and is connected with a mari tradition or spiritual practice  Family/Friends Other: Daughter did not discuss her mari.      Importance and Influence:  Patient has spiritual/personal beliefs that influence decisions regarding their health Daughter says her mari is in her.  Family/Friends Other: Did not discuss.    Community:  Patient Other: Pt is a Yarsanism/Believer, is not currently able to attend a mari community.  Family/Friends Other: did not discuss    Assessment and Plan of Care:     Patient Interventions include: Affirmed coping skills/support systems and Provided sacramental/Uatsdin ritual  Family/Friends Interventions include: Provided sacramental/Uatsdin ritual    Patient Plan of Care: Spiritual Care available upon further referral  Family/Friends Plan of Care: Spiritual Care available upon further referral    Electronically signed by Mary Behrens, Chaplain on 2/27/2025 at 11:31 AM

## 2025-02-27 NOTE — CARE COORDINATION
Case Management Assessment  Initial Evaluation    Date/Time of Evaluation: 2/27/2025 10:25 AM  Assessment Completed by: Diane Davis    If patient is discharged prior to next notation, then this note serves as note for discharge by case management.    Patient Name: Livia Arora                   YOB: 1935  Diagnosis: Hypokalemia [E87.6]  Hyponatremia [E87.1]  Pleural effusion [J90]  General weakness [R53.1]  Multiple falls [R29.6]  Traumatic closed nondisplaced fracture of multiple ribs, right, initial encounter [S22.41XA]  Closed fracture of multiple ribs, unspecified laterality, initial encounter [S22.49XA]  Altered mental status, unspecified altered mental status type [R41.82]                   Date / Time: 2/25/2025  4:32 PM    Patient Admission Status: Inpatient   Readmission Risk (Low < 19, Mod (19-27), High > 27): Readmission Risk Score: 17.4    Current PCP: Jeffrey Browning, DO  PCP verified by CM? (P) Yes    Chart Reviewed: Yes      History Provided by: (P) Child/Family  Patient Orientation: (P) Other (see comment) (dementia)    Patient Cognition: (P) Dementia / Early Alzheimer's    Hospitalization in the last 30 days (Readmission):  No    If yes, Readmission Assessment in CM Navigator will be completed.    Advance Directives:      Code Status: Full Code   Patient's Primary Decision Maker is: (P) Legal Next of Kin    Primary Decision Maker (Active): Cesilia Arora - Child - [phone]    Discharge Planning:    Patient lives with: (P) Children Type of Home: (P) House  Primary Care Giver: (P) Family  Patient Support Systems include: (P) Children   Current Financial resources: (P) Medicare  Current community resources: (P) None  Current services prior to admission: (P) None            Current DME:              Type of Home Care services:  (P) None    ADLS  Prior functional level: (P) Assistance with the following:, Bathing, Dressing, Mobility  Current functional level: (P) Assistance with

## 2025-02-28 VITALS
RESPIRATION RATE: 18 BRPM | DIASTOLIC BLOOD PRESSURE: 72 MMHG | OXYGEN SATURATION: 94 % | SYSTOLIC BLOOD PRESSURE: 139 MMHG | HEART RATE: 72 BPM | BODY MASS INDEX: 18.06 KG/M2 | WEIGHT: 92 LBS | TEMPERATURE: 97.2 F | HEIGHT: 60 IN

## 2025-02-28 LAB
ANION GAP SERPL CALCULATED.3IONS-SCNC: 7 MMOL/L (ref 8–16)
BASOPHILS # BLD: 0.1 K/UL (ref 0–0.2)
BASOPHILS NFR BLD: 0.9 % (ref 0–1)
BUN SERPL-MCNC: 17 MG/DL (ref 8–23)
CALCIUM SERPL-MCNC: 8.4 MG/DL (ref 8.8–10.2)
CHLORIDE SERPL-SCNC: 95 MMOL/L (ref 98–107)
CO2 SERPL-SCNC: 26 MMOL/L (ref 22–29)
CREAT SERPL-MCNC: 0.6 MG/DL (ref 0.5–0.9)
EOSINOPHIL # BLD: 0.1 K/UL (ref 0–0.6)
EOSINOPHIL NFR BLD: 2 % (ref 0–5)
ERYTHROCYTE [DISTWIDTH] IN BLOOD BY AUTOMATED COUNT: 12.9 % (ref 11.5–14.5)
GLUCOSE SERPL-MCNC: 99 MG/DL (ref 70–99)
HCT VFR BLD AUTO: 32.3 % (ref 37–47)
HGB BLD-MCNC: 10.8 G/DL (ref 12–16)
IMM GRANULOCYTES # BLD: 0 K/UL
LYMPHOCYTES # BLD: 0.9 K/UL (ref 1.1–4.5)
LYMPHOCYTES NFR BLD: 16.1 % (ref 20–40)
MCH RBC QN AUTO: 32.4 PG (ref 27–31)
MCHC RBC AUTO-ENTMCNC: 33.4 G/DL (ref 33–37)
MCV RBC AUTO: 97 FL (ref 81–99)
MONOCYTES # BLD: 0.5 K/UL (ref 0–0.9)
MONOCYTES NFR BLD: 8.5 % (ref 0–10)
NEUTROPHILS # BLD: 4 K/UL (ref 1.5–7.5)
NEUTS SEG NFR BLD: 72 % (ref 50–65)
PLATELET # BLD AUTO: 211 K/UL (ref 130–400)
PMV BLD AUTO: 10.2 FL (ref 9.4–12.3)
POTASSIUM SERPL-SCNC: 4.4 MMOL/L (ref 3.5–5)
RBC # BLD AUTO: 3.33 M/UL (ref 4.2–5.4)
SODIUM SERPL-SCNC: 128 MMOL/L (ref 136–145)
WBC # BLD AUTO: 5.5 K/UL (ref 4.8–10.8)

## 2025-02-28 PROCEDURE — 80048 BASIC METABOLIC PNL TOTAL CA: CPT

## 2025-02-28 PROCEDURE — 85025 COMPLETE CBC W/AUTO DIFF WBC: CPT

## 2025-02-28 PROCEDURE — 36415 COLL VENOUS BLD VENIPUNCTURE: CPT

## 2025-02-28 PROCEDURE — 6370000000 HC RX 637 (ALT 250 FOR IP): Performed by: NURSE PRACTITIONER

## 2025-02-28 PROCEDURE — 94760 N-INVAS EAR/PLS OXIMETRY 1: CPT

## 2025-02-28 PROCEDURE — 2500000003 HC RX 250 WO HCPCS: Performed by: HOSPITALIST

## 2025-02-28 PROCEDURE — 6370000000 HC RX 637 (ALT 250 FOR IP): Performed by: EMERGENCY MEDICINE

## 2025-02-28 PROCEDURE — 97530 THERAPEUTIC ACTIVITIES: CPT

## 2025-02-28 PROCEDURE — 6360000002 HC RX W HCPCS: Performed by: NURSE PRACTITIONER

## 2025-02-28 PROCEDURE — 6360000002 HC RX W HCPCS: Performed by: HOSPITALIST

## 2025-02-28 PROCEDURE — 2500000003 HC RX 250 WO HCPCS: Performed by: NURSE PRACTITIONER

## 2025-02-28 PROCEDURE — 6370000000 HC RX 637 (ALT 250 FOR IP): Performed by: HOSPITALIST

## 2025-02-28 RX ORDER — SODIUM CHLORIDE 1 G/1
1 TABLET ORAL
Status: DISCONTINUED | OUTPATIENT
Start: 2025-02-28 | End: 2025-02-28 | Stop reason: HOSPADM

## 2025-02-28 RX ADMIN — COLLAGENASE SANTYL: 250 OINTMENT TOPICAL at 08:14

## 2025-02-28 RX ADMIN — SIMETHICONE 120 MG: 80 TABLET, CHEWABLE ORAL at 16:51

## 2025-02-28 RX ADMIN — LEVETIRACETAM 250 MG: 500 TABLET, FILM COATED ORAL at 08:11

## 2025-02-28 RX ADMIN — Medication 5000 UNITS: at 08:12

## 2025-02-28 RX ADMIN — ENOXAPARIN SODIUM 30 MG: 100 INJECTION SUBCUTANEOUS at 08:13

## 2025-02-28 RX ADMIN — SODIUM CHLORIDE 1 G: 1 TABLET ORAL at 11:35

## 2025-02-28 RX ADMIN — DIPYRIDAMOLE 100 MG: 25 TABLET, FILM COATED ORAL at 08:13

## 2025-02-28 RX ADMIN — Medication 100 MG: at 08:13

## 2025-02-28 RX ADMIN — WATER 1000 MG: 1 INJECTION INTRAMUSCULAR; INTRAVENOUS; SUBCUTANEOUS at 08:15

## 2025-02-28 RX ADMIN — Medication 1 TABLET: at 08:11

## 2025-02-28 RX ADMIN — Medication 1 TABLET: at 08:12

## 2025-02-28 RX ADMIN — ACETAMINOPHEN 1000 MG: 500 TABLET ORAL at 16:51

## 2025-02-28 RX ADMIN — ACETAMINOPHEN 1000 MG: 500 TABLET ORAL at 08:11

## 2025-02-28 RX ADMIN — SIMETHICONE 120 MG: 80 TABLET, CHEWABLE ORAL at 08:14

## 2025-02-28 RX ADMIN — GABAPENTIN 100 MG: 100 CAPSULE ORAL at 08:11

## 2025-02-28 RX ADMIN — SODIUM CHLORIDE 1 G: 1 TABLET ORAL at 16:51

## 2025-02-28 RX ADMIN — AMLODIPINE BESYLATE 5 MG: 5 TABLET ORAL at 08:12

## 2025-02-28 RX ADMIN — SODIUM CHLORIDE, PRESERVATIVE FREE 10 ML: 5 INJECTION INTRAVENOUS at 08:15

## 2025-02-28 ASSESSMENT — PAIN DESCRIPTION - ONSET: ONSET: ON-GOING

## 2025-02-28 ASSESSMENT — PAIN - FUNCTIONAL ASSESSMENT: PAIN_FUNCTIONAL_ASSESSMENT: PREVENTS OR INTERFERES WITH MANY ACTIVE NOT PASSIVE ACTIVITIES

## 2025-02-28 ASSESSMENT — PAIN DESCRIPTION - DESCRIPTORS: DESCRIPTORS: PATIENT UNABLE TO DESCRIBE

## 2025-02-28 ASSESSMENT — PAIN DESCRIPTION - PAIN TYPE: TYPE: ACUTE PAIN

## 2025-02-28 ASSESSMENT — PAIN DESCRIPTION - FREQUENCY: FREQUENCY: CONTINUOUS

## 2025-02-28 ASSESSMENT — PAIN SCALES - WONG BAKER: WONGBAKER_NUMERICALRESPONSE: HURTS WHOLE LOT

## 2025-02-28 ASSESSMENT — PAIN DESCRIPTION - LOCATION: LOCATION: BUTTOCKS;GENERALIZED

## 2025-02-28 NOTE — DISCHARGE SUMMARY
Discharge Summary    NAME: Livia Arora  :  1935  MRN:  [id number]    Admit date:  2025  Discharge date:  2025    Admitting Physician:  No admitting provider for patient encounter.    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 *      Significant Diagnostic Studies:   CT ABDOMEN PELVIS W IV CONTRAST Additional Contrast? None    Result Date: 2025  EXAM:  CT OF THE ABDOMEN AND PELVIS WITH CONTRAST  TECHNIQUE: CT of the abdomen and pelvis was performed with contrast.  Multiplanar reformats were performed.    HISTORY:  Altered mental status.  Multiple falls.  Trauma.  COMPARISON:  2024.  FINDINGS: Imaged lower thorax: Prominent hiatal hernia.  Bilateral pleural effusions with bibasilar atelectasis.    Liver: Unremarkable.  Gallbladder/Bile Ducts: No biliary dilation. Absent gallbladder..  Spleen: Unremarkable.  Pancreas: Normal.  Adrenals: No discrete lesions.  Kidneys/Ureters: No acute findings.  Bowel/mesentery/peritoneum: No bowel obstruction. . No free fluid or free air..  Abominal Wall: No defects.  Retroperitoneum/vessels: No aortic aneurysm. No adenopathy.  Pelvis: Normal bladder wall contour.Bladder is distended..  Bones: Postoperative changes lumbar spine.  Multilevel degenerative disc disease.  Degenerative change hips and pelvis.Tenth rib fracture left side.  2       1. Large hiatal hernia 2. Bilateral effusions with atelectasis. 3. Postoperative  tablet  Commonly known as: VALTREX            STOP taking these medications      ABREVA EX     alendronate 70 MG tablet  Commonly known as: FOSAMAX     azelastine 0.1 % nasal spray  Commonly known as: ASTELIN     b complex vitamins capsule     calcium carbonate-cholecalciferol 600-800 MG-UNIT Tabs     camphor-menthol 0.5-0.5 % lotion  Commonly known as: SARNA     cefdinir 300 MG capsule  Commonly known as: OMNICEF     cloNIDine 0.1 MG tablet  Commonly known as: CATAPRES     fluticasone 50 MCG/ACT nasal spray  Commonly known as: FLONASE     gabapentin 100 MG capsule  Commonly known as: NEURONTIN     lidocaine 4 % external patch     nitrofurantoin 50 MG capsule  Commonly known as: Macrodantin     promethazine 25 MG tablet  Commonly known as: PHENERGAN     sodium chloride 0.65 % nasal spray  Commonly known as: OCEAN, BABY AYR     therapeutic multivitamin-minerals tablet     VITAMIN B-1 PO     vitamin D3 125 MCG (5000 UT) Tabs tablet  Commonly known as: CHOLECALCIFEROL              Discharge Instructions:   Follow up with Jeffrey Browning DO in 3-7 days.  Take medications as directed.  Resume activity as tolerated.    Diet: ADULT DIET; Regular; Gluten Free; gluten intolerance     Disposition: Patient is medically stable and will be discharged home.    Time spent on discharge 60.    Signed:  LAYTON Masters CNP  2/28/2025 3:52 PM

## 2025-02-28 NOTE — CARE COORDINATION
Randolph has met with pts daughter multiple times to discuss discharge plan. Pt has asked for education on hospice and wants to speak with jossue again to make sure she is taking the right course of action.  Randolph called [facility] to come speak with pts daughter on the education of hospice. Randolph also discussed the education on how pt would get into a skilled nursing facility if that's the plan for discharge.   Awaiting for jossue to discuss more with pts daughter for final decision of discharge plan.  Electronically signed by Diane Davis on 2/28/2025 at 3:08 PM

## 2025-02-28 NOTE — PLAN OF CARE
Problem: Discharge Planning  Goal: Discharge to home or other facility with appropriate resources  2/27/2025 2325 by Zhanna Cordova RN  Outcome: Progressing  Flowsheets (Taken 2/27/2025 2130)  Discharge to home or other facility with appropriate resources:   Identify barriers to discharge with patient and caregiver   Arrange for needed discharge resources and transportation as appropriate   Identify discharge learning needs (meds, wound care, etc)   Refer to discharge planning if patient needs post-hospital services based on physician order or complex needs related to functional status, cognitive ability or social support system  2/27/2025 1240 by Malika Pérez RN  Outcome: Progressing  Flowsheets (Taken 2/27/2025 0931)  Discharge to home or other facility with appropriate resources:   Identify barriers to discharge with patient and caregiver   Arrange for needed discharge resources and transportation as appropriate   Refer to discharge planning if patient needs post-hospital services based on physician order or complex needs related to functional status, cognitive ability or social support system   Identify discharge learning needs (meds, wound care, etc)     Problem: Safety - Adult  Goal: Free from fall injury  2/27/2025 2325 by Zhanna Cordova RN  Outcome: Progressing  Flowsheets (Taken 2/27/2025 2323)  Free From Fall Injury: Instruct family/caregiver on patient safety  2/27/2025 1240 by Malika Pérez RN  Outcome: Progressing     Problem: Chronic Conditions and Co-morbidities  Goal: Patient's chronic conditions and co-morbidity symptoms are monitored and maintained or improved  2/27/2025 2325 by Zhanna Cordova RN  Outcome: Progressing  Flowsheets (Taken 2/27/2025 2130)  Care Plan - Patient's Chronic Conditions and Co-Morbidity Symptoms are Monitored and Maintained or Improved:   Monitor and assess patient's chronic conditions and comorbid symptoms for stability, deterioration, or improvement   Collaborate with

## 2025-02-28 NOTE — CARE COORDINATION
02/28/25 1032   IMM Letter   IMM Letter given to Patient/Family/Significant other/Guardian/POA/by: thierry dhillon sw   IMM Letter date given: 02/28/25   IMM Letter time given: 0949     Second IMM given to patient and explained with patient verbalizing understanding.  All questions and concerns addressed     Signed letter placed in pt soft chart   Patient declined waiting 4 hr period prior to discharge.   Electronically signed by Thierry Dhillon on 2/28/2025 at 10:32 AM

## 2025-02-28 NOTE — DISCHARGE INSTR - DIET

## 2025-02-28 NOTE — PROGRESS NOTES
Occupational Therapy     02/28/25 1200   Subjective   Subjective Nursing states that daughter wants therapy to see the patient before arrival. Pt in bed asleep with daughter present. Daughter very talkative explaining her own personal medical history.   Pain Assessment   Pain Assessment Mann-Baker FACES   Mann-Baker Pain Rating 8   Pain Location Buttocks;Generalized   Pain Descriptors Patient unable to describe   Functional Pain Assessment Prevents or interferes with many active not passive activities   Pain Type Acute pain   Pain Frequency Continuous   Pain Onset On-going   Non-Pharmaceutical Pain Intervention(s) Ambulation/Increased Activity;Repositioned;Rest;Nurse notified (comment)   Cognition   Overall Cognitive Status Exceptions   Cognition Comment Non-verbal. Limited by fatigue, arousal and pain.   Orientation   Overall Orientation Status X   Bed Mobility Training   Bed Mobility Training Yes   Overall Level of Assistance Substantial/Maximal assistance   Interventions Verbal cues;Tactile cues;Manual cues   Rolling Substantial/Maximal assistance   Supine to Sit Substantial/Maximal assistance   Sit to Supine Substantial/Maximal assistance   Scooting Substantial/Maximal assistance   Transfer Training   Transfer Training No   Balance   Sitting Impaired   Sitting - Static Poor (constant support)   Sitting - Dynamic Poor (constant support)   Standing Impaired   Standing - Static Not tested   Standing - Dynamic Not tested   ADL   Feeding Minimal assistance   Grooming Minimal assistance   UE Bathing Maximum assistance   LE Bathing Maximum assistance   UE Dressing Maximum assistance   LE Dressing Maximum assistance   Putting On/Taking Off Footwear Maximum assistance;Dependent/Total   Toileting Dependent/Total   Toileting Skilled Clinical Factors Incontinent of bowel and bladder.   Functional Mobility Maximum assistance;Dependent/Total   Functional Mobility Skilled Clinical Factors Unable to maintain sitting balance at  EOB.   Assessment   Assessment Tx focused on bed mobility including rolling side to side during bowel incontinence clean up. Pt instructed on sitting EOB requiring Max (TD) assist. Pt instructed to hold bed rails and sit EOB to access sitting balance. Pt unable to maintain sitting balance and wanted to lay back down. Pt assisted with bed repositioning with Max assist.   Activity Tolerance Patient limited by fatigue;Patient limited by pain   Discharge Recommendations 24 hour supervision or assist;Patient would benefit from continued therapy after discharge  (Daughter states she wont be able to manage her mother if her mother can't assist in her care. Daughter also states she is worried about her own medical needs.)   Occupational Therapy Plan   Times Per Week 3-5   Times Per Day Once a day   OT Plan of Care   Friday X     Electronically signed by MACR Wright on 2/28/2025 at 12:46 PM

## 2025-02-28 NOTE — PROGRESS NOTES
Physical Therapy     02/28/25 1453   Restrictions/Precautions   Restrictions/Precautions Fall Risk;Contact Precautions   Activity Level Up with Assist   Required Braces or Orthoses? No   General   Diagnosis multiple falls, generalized weakness, closed fx multiple ribs, pleural effusion, AMS, hyponatremia, hypokalemia   Subjective   Subjective tx before and post lunch. initially pt non verbal and after lunch pt responding to simple cues/questions with single word answers   Cognition   Overall Cognitive Status Exceptions   Arousal/Alertness Inconsistent responses to stimuli   Following Commands Inconsistently follows commands;Follows one step commands with repetition;Follows one step commands with increased time   Attention Span Difficulty attending to directions;Difficulty dividing attention   Memory Impaired   Safety Judgement Decreased awareness of need for assistance;Decreased awareness of need for safety   Problem Solving Impaired   Insights Impaired   Initiation Requires cues for all   Sequencing Impaired   Cognition Comment Difficulty following commands   Bed mobility   Rolling to Left Partial/Moderate assistance;Minimal assistance   Rolling to Right Partial/Moderate assistance;Minimal assistance   Supine to Sit Partial/Moderate assistance;Minimal assistance   Sit to Supine Partial/Moderate assistance;Minimal assistance   Scooting Substantial/Maximal assistance;Partial/Moderate assistance   Bed Mobility Comments amount of assistance varied due to cognition. maxA for clean up of bowel movement in bed.   Transfers   Sit to Stand Minimal Assistance   Stand to Sit Minimal Assistance   Bed to Chair Minimal assistance   Stand Pivot Transfers Minimal Assistance   Comment HHA stand step pivot from EOB<>recliner with cues for weight shifting and standing tall   Short Term Goals   Time Frame for Short Term Goals 2 wks   Short Term Goal 1 supine to sit indep   Short Term Goal 2 sit to stand indep   Short Term Goal 3 amb.

## 2025-02-28 NOTE — PROGRESS NOTES
Occupational Therapy  Facility/Department: [facility] SURG SERVICES  Daily Treatment Note  NAME: Livia Arora  : 1935  MRN: [id number]    Date of Service: 2025    Discharge Recommendations:  24 hour supervision or assist, Patient would benefit from continued therapy after discharge         Patient Diagnosis(es): The primary encounter diagnosis was Multiple falls. Diagnoses of General weakness, Closed fracture of multiple ribs, unspecified laterality, initial encounter, Pleural effusion, Altered mental status, unspecified altered mental status type, Hyponatremia, and Hypokalemia were also pertinent to this visit.     Assessment   Assessment: Tx focused on bed mobility and transfer training. Pt required min-mod assist for supine>sit this session and performed sit>stand t/f with min A and HH assist. Pt required cues for positioning hands and feet during transfer.  Activity Tolerance: Patient tolerated treatment well  Discharge Recommendations: 24 hour supervision or assist;Patient would benefit from continued therapy after discharge     Plan  Occupational Therapy Plan  Times Per Week: 3-5  Times Per Day: Once a day    Restrictions  Restrictions/Precautions  Restrictions/Precautions: Fall Risk;Contact Precautions  Activity Level: Up with Assist  Required Braces or Orthoses?: No    Subjective  Subjective  Subjective: Pt in bed upon arrival eating lunch with daughter present.  Pain: No pain reported  Orientation  Overall Orientation Status: Impaired  Pain: No pain reported  Cognition  Overall Cognitive Status: Exceptions  Arousal/Alertness: Inconsistent responses to stimuli  Following Commands: Inconsistently follows commands;Follows one step commands with repetition;Follows one step commands with increased time  Attention Span: Difficulty attending to directions;Difficulty dividing attention  Memory: Impaired  Safety Judgement: Decreased awareness of need for assistance;Decreased awareness of need for  Goal 1: Perform transfers with CGA  Short Term Goal 2: Perform dressing with CGA  Short Term Goal 3: Perform toileting with CGA  Short Term Goal 4: Independent with therapeutic activity recommendations, safety, and positioning recommendations  Long Term Goals  Long Term Goal 1: Upgrade as tolerated      Therapy Time   Individual Concurrent Group Co-treatment   Time In           Time Out           Minutes  15                 Leny Browning OT  Electronically signed by Leny Browning OT on 2/28/2025 at 3:25 PM

## 2025-02-28 NOTE — CARE COORDINATION
[facility]  Patient has been set up with [facility].  Please fax discharge summary/AVS at time of discharge.  [facility]  P:  [phone]  F:  [phone]

## 2025-03-03 DIAGNOSIS — G40.219 PARTIAL SYMPTOMATIC EPILEPSY WITH COMPLEX PARTIAL SEIZURES, INTRACTABLE, WITHOUT STATUS EPILEPTICUS (HCC): ICD-10-CM

## 2025-03-03 RX ORDER — GABAPENTIN 100 MG/1
100 CAPSULE ORAL 3 TIMES DAILY
Qty: 270 CAPSULE | Refills: 1 | Status: SHIPPED | OUTPATIENT
Start: 2025-03-03 | End: 2025-08-30

## 2025-03-03 NOTE — TELEPHONE ENCOUNTER
Requested Prescriptions     Pending Prescriptions Disp Refills    gabapentin (NEURONTIN) 100 MG capsule 270 capsule 1     Sig: Take 1 capsule by mouth 3 times daily for 180 days. Intended supply: 90 days Max Daily Amount: 300 mg       Last Office Visit:  8/15/2024  Next Office Visit:  4/24/2025  Last Medication Refill: 1/26/25   Mike up to date:  3/3/25    *RX updated to reflect   3/3/25  fill date*

## 2025-03-04 ENCOUNTER — OFFICE VISIT (OUTPATIENT)
Dept: UROLOGY | Age: 89
End: 2025-03-04
Payer: MEDICARE

## 2025-03-04 VITALS — HEIGHT: 60 IN | BODY MASS INDEX: 18.06 KG/M2 | WEIGHT: 92 LBS

## 2025-03-04 DIAGNOSIS — N32.81 OAB (OVERACTIVE BLADDER): Primary | ICD-10-CM

## 2025-03-04 DIAGNOSIS — N39.46 MIXED STRESS AND URGE URINARY INCONTINENCE: ICD-10-CM

## 2025-03-04 DIAGNOSIS — F01.511 VASCULAR DEMENTIA WITH AGITATION, UNSPECIFIED DEMENTIA SEVERITY (HCC): ICD-10-CM

## 2025-03-04 DIAGNOSIS — G43.009 MIGRAINE WITHOUT AURA AND WITHOUT STATUS MIGRAINOSUS, NOT INTRACTABLE: Primary | ICD-10-CM

## 2025-03-04 DIAGNOSIS — N39.0 RECURRENT UTI: ICD-10-CM

## 2025-03-04 DIAGNOSIS — F01.C11 SEVERE VASCULAR DEMENTIA WITH AGITATION (HCC): ICD-10-CM

## 2025-03-04 DIAGNOSIS — G40.219 PARTIAL SYMPTOMATIC EPILEPSY WITH COMPLEX PARTIAL SEIZURES, INTRACTABLE, WITHOUT STATUS EPILEPTICUS (HCC): ICD-10-CM

## 2025-03-04 PROCEDURE — 1111F DSCHRG MED/CURRENT MED MERGE: CPT | Performed by: NURSE PRACTITIONER

## 2025-03-04 PROCEDURE — 0509F URINE INCON PLAN DOCD: CPT | Performed by: NURSE PRACTITIONER

## 2025-03-04 PROCEDURE — 1160F RVW MEDS BY RX/DR IN RCRD: CPT | Performed by: NURSE PRACTITIONER

## 2025-03-04 PROCEDURE — 99215 OFFICE O/P EST HI 40 MIN: CPT | Performed by: NURSE PRACTITIONER

## 2025-03-04 PROCEDURE — 51798 US URINE CAPACITY MEASURE: CPT | Performed by: NURSE PRACTITIONER

## 2025-03-04 PROCEDURE — 1090F PRES/ABSN URINE INCON ASSESS: CPT | Performed by: NURSE PRACTITIONER

## 2025-03-04 PROCEDURE — 1036F TOBACCO NON-USER: CPT | Performed by: NURSE PRACTITIONER

## 2025-03-04 PROCEDURE — G8427 DOCREV CUR MEDS BY ELIG CLIN: HCPCS | Performed by: NURSE PRACTITIONER

## 2025-03-04 PROCEDURE — G8419 CALC BMI OUT NRM PARAM NOF/U: HCPCS | Performed by: NURSE PRACTITIONER

## 2025-03-04 PROCEDURE — 1123F ACP DISCUSS/DSCN MKR DOCD: CPT | Performed by: NURSE PRACTITIONER

## 2025-03-04 PROCEDURE — 1159F MED LIST DOCD IN RCRD: CPT | Performed by: NURSE PRACTITIONER

## 2025-03-04 PROCEDURE — G2212 PROLONG OUTPT/OFFICE VIS: HCPCS | Performed by: NURSE PRACTITIONER

## 2025-03-04 RX ORDER — GABAPENTIN 100 MG/1
100 CAPSULE ORAL 3 TIMES DAILY
Qty: 90 CAPSULE | Refills: 5 | Status: SHIPPED | OUTPATIENT
Start: 2025-03-04 | End: 2025-08-31

## 2025-03-04 ASSESSMENT — ENCOUNTER SYMPTOMS
ABDOMINAL PAIN: 0
ABDOMINAL DISTENTION: 0
VOMITING: 0
NAUSEA: 0

## 2025-03-04 NOTE — PROGRESS NOTES
Livia Arora is a 89 y.o., female, Established patient who presents today   Chief Complaint   Patient presents with    Follow-up     I am here to discuss my urinary issues     Patient presents for follow-up after recent admission to the hospital with basically failure to thrive.  She began declining on 2/23 and after patient showed no significant improvement on 2/25, she presented to the emergency room for further evaluation.  Based on her presentation, her daughter, who provides information for the patient was thinking she might have had a TIA.  Here in the ER, it was noted the patient had no urine output.  On 2/26/2025, bladder scan revealed about 425 mL of urine within the bladder.  Her daughter reports that time of catheterization, they were having some significant issues with getting the catheter to drain from what sounds like some thick, mucousy urine.  Catheter was left in place for about 24 hours.  After its discontinuation, patient was able to void on her own.  Urine culture was obtained revealing an atypical urinary bacteria, however, she was treated with Rocephin and seemed to improve prior to discharge.  Today, postvoid residual remains elevated above 200 mL.  Patient is typically seen in the office every couple of weeks for PTNS therapy for overactive bladder and incontinence.  She has failed several therapies in the past secondary to side effects.    REVIEW OF SYSTEMS:  Review of Systems   Constitutional:  Positive for fatigue. Negative for chills and fever.   Gastrointestinal:  Negative for abdominal distention, abdominal pain, nausea and vomiting.   Genitourinary:  Negative for flank pain and hematuria.   Neurological:  Positive for weakness.   Psychiatric/Behavioral:  Negative for agitation.        PHYSICAL EXAM:  Ht 1.524 m (5')   Wt 41.7 kg (92 lb)   BMI 17.97 kg/m²   Physical Exam  Vitals and nursing note reviewed.   Constitutional:       General: She is not in acute distress.

## 2025-03-04 NOTE — TELEPHONE ENCOUNTER
Requested Prescriptions     Pending Prescriptions Disp Refills    gabapentin (NEURONTIN) 100 MG capsule 90 capsule 0     Sig: Take 1 capsule by mouth 3 times daily. Max Daily Amount: 300 mg       Last Office Visit:  8/15/2024  Next Office Visit:  4/24/2025  Last Medication Refill:  8/30/25  Mike up to date:  3/20/25    *RX updated to reflect   3/4/25  fill date*   IT WAS SENT TO WRONG PHARMACY

## 2025-03-05 DIAGNOSIS — G43.009 MIGRAINE WITHOUT AURA AND WITHOUT STATUS MIGRAINOSUS, NOT INTRACTABLE: ICD-10-CM

## 2025-03-05 NOTE — TELEPHONE ENCOUNTER
Livia VI Arora has requested a refill on her medication.      Last office visit : 8/15/2024   Next office visit : 4/24/2025   Last medication refill : 2/5/2024  Mike : 3/3/2025      Requested Prescriptions     Pending Prescriptions Disp Refills    butalbital-acetaminophen-caffeine (FIORICET, ESGIC) -40 MG per tablet [Pharmacy Med Name: butalbital-acetaminophen-caffeine 50 mg-325 mg-40 mg tablet] 50 tablet 2     Sig: TAKE ONE TABLET BY MOUTH EVERY 6 HOURS AS NEEDED FOR headaches. Max Daily AMOUNT: 4 TABLETS

## 2025-03-06 RX ORDER — BUTALBITAL, ACETAMINOPHEN AND CAFFEINE 50; 325; 40 MG/1; MG/1; MG/1
TABLET ORAL
Qty: 50 TABLET | Refills: 2 | Status: SHIPPED | OUTPATIENT
Start: 2025-03-06

## 2025-03-10 ENCOUNTER — HOSPITAL ENCOUNTER (OUTPATIENT)
Dept: RADIATION ONCOLOGY | Facility: HOSPITAL | Age: OVER 89
Setting detail: RADIATION/ONCOLOGY SERIES
End: 2025-03-10
Payer: MEDICARE

## 2025-03-11 ENCOUNTER — OFFICE VISIT (OUTPATIENT)
Age: OVER 89
End: 2025-03-11
Payer: MEDICARE

## 2025-03-11 VITALS
WEIGHT: 89 LBS | HEART RATE: 68 BPM | RESPIRATION RATE: 18 BRPM | OXYGEN SATURATION: 97 % | HEIGHT: 60 IN | BODY MASS INDEX: 17.47 KG/M2 | DIASTOLIC BLOOD PRESSURE: 62 MMHG | SYSTOLIC BLOOD PRESSURE: 114 MMHG

## 2025-03-11 DIAGNOSIS — Z90.721 S/P HYSTERECTOMY WITH OOPHORECTOMY: ICD-10-CM

## 2025-03-11 DIAGNOSIS — Z92.3 HISTORY OF RADIATION THERAPY: ICD-10-CM

## 2025-03-11 DIAGNOSIS — Z78.9 NON-SMOKER: ICD-10-CM

## 2025-03-11 DIAGNOSIS — Z90.710 S/P HYSTERECTOMY WITH OOPHORECTOMY: ICD-10-CM

## 2025-03-11 DIAGNOSIS — C54.1 ENDOMETRIAL CANCER: Primary | ICD-10-CM

## 2025-03-11 PROCEDURE — 88142 CYTOPATH C/V THIN LAYER: CPT | Performed by: RADIOLOGY

## 2025-03-11 PROCEDURE — G0463 HOSPITAL OUTPT CLINIC VISIT: HCPCS | Performed by: RADIOLOGY

## 2025-03-11 PROCEDURE — G0463 HOSPITAL OUTPT CLINIC VISIT: HCPCS

## 2025-03-11 NOTE — PROGRESS NOTES
"  Subjective     HISTORY OF PRESENT ILLNESS:     Uterine Cancer  Symptoms include fatigue and weakness.    Pertinent negative symptoms include no cough.     ***    Past Medical History, Past Surgical History, Social History, Family History have been reviewed and are without significant changes except as mentioned.    Review of Systems   Constitutional:  Positive for fatigue.   HENT: Negative.     Eyes: Negative.    Respiratory:  Positive for shortness of breath. Negative for apnea, cough, choking, chest tightness, wheezing and stridor.    Cardiovascular: Negative.         Pacemaker  No defibrillator   Gastrointestinal: Negative.    Endocrine: Negative.         No CGM   Genitourinary: Negative.         Incontinence   Musculoskeletal: Negative.    Skin: Negative.    Allergic/Immunologic: Negative.    Neurological:  Positive for dizziness, seizures and weakness.   Hematological: Negative.    Psychiatric/Behavioral:  Positive for confusion.       A comprehensive 14 point review of systems was performed and was negative except as mentioned.    Medications:  The current medication list was reviewed in the EMR    ALLERGIES:    Allergies   Allergen Reactions    Hydrocodone-Acetaminophen Itching and Irritability    Levofloxacin Other (See Comments)     Seizure like activity    Memantine Hcl Other (See Comments)     PUT HER IN A COMA    Pneumococcal Vaccine Swelling    Quinolones Other (See Comments), Seizure and Unknown - High Severity     Seizure     Yellow Dye Itching    Aspirin Itching and Rash    Lortab [Hydrocodone-Acetaminophen] Itching and Irritability    Meperidine Hallucinations and Unknown - High Severity    Mirabegron Other (See Comments)     INABILITY TO URINATE    Ondansetron Unknown - High Severity and Unknown (See Comments)     UNKNOWN REACTION    \"Makes her mean\"    Penicillins Delirium and GI Intolerance    Spironolactone Other (See Comments)     DR HACKETT  STATES SODIUM SENSITIVE DEHYDRATION    Sulfa " "Antibiotics Nausea And Vomiting    Codeine Irritability and Unknown - High Severity    Yellow Dye #11 Itching     Causes seizures.        Objective      Vitals:    03/11/25 1504   BP: 114/62   Pulse: 68   Resp: 18   SpO2: 97%   Weight: 40.4 kg (89 lb)   Height: 152.4 cm (60\")   PainSc: 0-No pain          No data to display                Physical Exam  ***    RECENT LABS:  Hematology WBC   Date Value Ref Range Status   02/28/2025 5.5 4.8 - 10.8 K/uL Final     RBC   Date Value Ref Range Status   02/28/2025 3.33 (L) 4.20 - 5.40 M/uL Final     Hemoglobin   Date Value Ref Range Status   02/28/2025 10.8 (L) 12.0 - 16.0 g/dL Final     Hematocrit   Date Value Ref Range Status   02/28/2025 32.3 (L) 37.0 - 47.0 % Final     Platelets   Date Value Ref Range Status   02/28/2025 211 130 - 400 K/uL Final              Assessment & Plan   ***                  3/11/2025      CC:          "

## 2025-03-11 NOTE — PROGRESS NOTES
[facility]  Radiation Oncology Clinic   Vimal Ortiz MD, FACR  Jhony ORTIZ  _______________________________________________  [facility]  Department of Radiation Oncology  [address] [place] [id number]  Office: [phone]  Fax: [phone]    DATE: 03/11/2025  PATIENT: Bel Powell  1935                         MEDICAL RECORD #: [id number]    1. Endometrial cancer    2. S/P hysterectomy with oophorectomy    3. History of radiation therapy    4. Non-smoker                                           REASON FOR VISIT:    Chief Complaint   Patient presents with    Uterine Cancer     EOT 03- GYN brachy x 5     Reason for Visit: Bel Powell is a very pleasant 89 y.o. female that has completed radiation therapy and returns to the clinic today for routine follow up exam.    History of Present Illness:  Diagnosed with recurrent endometrial carcinoma. She completed 5 internal brachytherapy treatments completed on 03/03/2023.     09/13/2019 - CT Abdomen/Pelvis due to right lower quadrant pain:  Cystic changes centrally within the uterus; a fluid distended endometrial cavity considered, GYN consultation suggested. Uterine fibroids.   Colonic diverticulosis without CT evidence of acute diverticulitis. Otherwise, No CT evidence of acute bowel pathology   Uncomplicated left-sided superior lumbar hernia.     09/18/2019 - CT Head without contrast:  No acute intracranial abnormality.     09/25/2019 - CT Head:  No acute intracranial abnormality.   Chronic ischemic and atrophic changes.     10/28/2019 - Endometrium, biopsy:  Primarily mucus.  Fragments of squamous mucosa.  Scant fragments of endocervical mucosa.  Extremely scant superficial fragments of endometrial glandular epithelium.  No evidence of atypia or malignancy.    12/23/2019 - Biopsies:  Endometrium, curettings:  Endometrioid adenocarcinoma, FIGO grade 1 arising in a  background of complex atypical hyperplasia.  Cervix, endocervical curettings:  Benign endocervical glands.      01/28/2020 - Robotic Total Hysterectomy/BSO per :   Right sentinel lymph node biopsy:  3 lymph nodes negative for tumor  Left pelvic sentinel lymph node biopsy:  Fibroadipose tissue with small fragment of lymph node showing cautery change and no evidence of metastatic tumor  Uterus, fallopian tubes and ovaries, hysterectomy with bilateral salpingo-oophorectomy:   Cervix and endocervix with no evidence of glandular and squamous dysplasia  Endometriod endometrial adenocarcinoma, villoglandular type, grade 1, with less than 1 mm of invasion (of 17 mm myometrial thickness)  Multiple leiomyomata uteri  No evidence of adenomyosis  Fallopian tubes with paratubal remnant cysts, negative for endometriosis and malignancy  Ovaries with no evidence of endometriosis or malignancy  Comment: Paraffin immunoperoxidase studies, utilizing appropriate positive and negative controls, are performed on the 2 sentinel lymph node specimens. These fail to demonstrate AE1/AE3 positive tumor cells in the lymph nodes.  TUMOR  Histologic type - endometrioid carcinoma, villoglandular variant  Histologic grade - FIGO grade 1  Myometrial invasion - present  Depth of invasion - 1 mm  Myometrial thickness in millimeters: 17  Percentage of myometrial invasion - 6%  Uterine serosa involvement - not identified  Cervical stromal involvement - not identified  Other tissue/organ involvement - not identified  Lymphovascular invasion - not identified  LYMPH NODES  Total number of pelvic nodes examined - 4  Number of pelvic sentinel lymph nodes -4  Laterality of pelvic nodes - right, left  Number of pelvic nodes with macrometastasis - 0  Number of pelvic nodes with micrometastasis - 0  Number of pelvic nodes with isolated tumor cells - 0    02/24/2020 - CT Abdomen/Pelvis:  Fluid-filled nondistended small bowel loops with mucosal  enhancement and thickening suggest acute enteritis. Further follow-up may be obtained.   The diverticulosis of the distal colon. No evidence of diverticulitis.   Moderately dilated distal common bile duct and intrahepatic biliary ducts is probably due to a prior cholecystectomy.   A stable left lumbar hernia containing a loop of colon without obstruction.     12/15/2020 - CT Abdomen/pelvis:  The stable insufficiency fractures of the sacrum bilaterally, similar to the previous study in February 2020.   No acute abnormality of the abdomen are pelvis, particularly, no evidence of traumatic involvement of the abdominal pelvic organs.   The diverticulosis of the colon. No evidence for diverticulitis.   Hardware fusion of the lumbar spine. The severe demineralization the bony structures.     05/25/2021 - CT Abdomen/Pelvis due to vomiting, pain:  This study is of limited diagnostic quality. This is secondary to the lack of intra-abdominal fat, lack of IV and oral contrast as well as the patient's inability to raise her arms over her head with extensive streaking artifact related to both upper extremities as well as postoperative changes within the mid and lower lumbar spine also contributing to streaking. If symptoms are persistent I would suggest that the study be repeated with both IV and oral contrast administration.   Cardiomegaly with a transvenous pacer in place. There is heavy atheromatous calcification of the distal descending thoracic aorta without evidence of aneurysm.   Multiple fluid-filled bowel loops. I do not see evidence of a discrete transition point. The appendix is not clearly identified and may be surgically absent. There are a few diverticula noted of the sigmoid colon without evidence of diverticulitis. No convincing evidence of bowel obstruction. No evidence of pneumoperitoneum.   I do not see evidence of nephrolithiasis. The ureters cannot be clearly trace but there is no convincing evidence of  obstructive uropathy.   The patient has undergone previous cholecystectomy and hysterectomy. The patient has also undergone fusion at the L3-L5 levels.    12/08/2022 - Appointment with :  Ms. Powell is a 86 y/o with Stage IA Grade 1 Endometrial Cancer who presents for an evaluation.   Exam showed possible granulation tissue. Removed and will sent to path and call with results and plan.   s/p robotic hyst/bso/staging on 1/29/20   no additional treatment required   IHC Negative   Surveillance Visit: q6months x 1 year (January 2021) then yearly for 5 years total (January 2025)  Ms. Powell will return in 12 months.    12/08/2022 - Vagina biopsy:  Low-grade endometrioid pattern adenocarcinoma    12/21/2022 - Appointment with Yolanda Kamara APRN - CNP - Urology:  ASSESSMENT/PLAN  Abnormality of urethral meatus  Patient with apparent abnormality of the urethral meatus as explained to her daughter by her obstetrical oncologist at an appointment last week. She requested a pelvic exam today. I did discuss with the daughter that I was uncertain if pelvic exam would provide any useful information.   However, I did offer to perform 1. I could not perform 1 at the time I was in the room because the patient was undergoing your plasty treatment and then could not move to the dorsolithotomy position.   Patient also with emergent ophthalmic appointment today regarding a possible detached retina. Patient daughter reports that they cannot miss that appointment and are already running late. Pelvic exam for today was deferred.   We will try to obtain some records from her oncologist to determine if there is anything in particular they need from us.    12/21/2022 - CT Abdomen/Pelvis with contrast:  Prior hysterectomy without evidence of metastatic disease in the abdomen or pelvis.    12/21/2022 - CT Chest with contrast:  No evidence of metastatic disease in the chest.   Hiatal hernia with thickened distal esophagus.  Consider follow-up endoscopy.   Atherosclerotic disease.    01/16/2023 - Consult with :  After consideration of the diagnostic data and evaluation of the patient, I recommend obtaining a PET scan for further evaluation of disease progression. Should it reveal limited disease, could consider brachytherapy treatments vs. Whole pelvic radiation.   The patient and her family verbalize understanding of this discussion, voice no further questions and wish to proceed with recommendations. Continue ongoing management per primary care physician and other specialists.  Plan:  PET scan ordered, they will call you  Return in 2 weeks for further discussion of treatment recommendations    01/24/2023 - PET Scan:  No hypermetabolic soft tissue recurrence identified in the pelvis. There is only a small amount of bowel uptake identified in the rectum as well as excreted tracer activity in the ureters and urinary bladder.  No suspicious adenopathy or evidence of distant metastatic disease.    01/26/2023 - Appointment with :  Bel Powell returns to the clinic today to review PET scan result and discuss radiotherapy recommendations for recurrent endometrial carcinoma.   PET Scan completed on 01/24/2023 revealed no hypermetabolic soft tissue recurrence identified in the pelvis. There is only a small amount of bowel uptake identified in the rectum as well as excreted tracer activity in the ureters and urinary bladder. No suspicious adenopathy or evidence of distant metastatic disease.  Given the PET scan results, I recommend to treat with internal brachytherapy treatments.  We reviewed potential complications side effects of radiation include limited radiation-induced damage to the bowel or bladder.  Patient and daughter verbalized understanding distress and voices no questions and agreed to proceed with therapy.   Plan:  Plan on 4-5 internal treatments.     02/02/2023 - 03/03/2023 - Completed radiation  course:  Received 5 HDR treatments.    06/07/2023 - Appointment with :  PAP smear today  Return in 4 months.     06/07/2023 - Pap smear per :  No interpretation due to specimen adequacy     10/23/2023 - Appointment with :  Return to Dr. Tillman in 4 months for follow up     10/23/2023 - Pap smear per :  No interpretation due to specimen adequacy     01/29/2024 - Appointment with :  Of note, the patient also complains of constant urinary incontinence.  Given the concomitant presence of urinary tract infection, I wonder if this is blood mixed with urine and the source is coming from the bladder.  She does have a urologist at [facility].  I encouraged her to reach out to them to see if they can arrange a cystoscopic exam.     01/30/2024 - Documentation per :  Dr. Hooper called me yesterday regarding this patient.  She was in the office with her daughter and the daughter stated to him that I was wanting a biopsy to proceed with additional radiotherapy treatments.  I was somewhat baffled by this statement as the best of my recollection last time she was seen in our office there was no evidence of recurrent disease.  We also did not recommend that she have a biopsy since there is no lesion evident on physical exam to perform a biopsy.  Upon further review the referral to OB/GYN was performed by Dr. Wilmer Francisco in urology at [facility].  We did not make a referral to OB/GYN and did not request a biopsy to be performed.  The patient's daughter reports that the patient is having some bleeding, but upon my last exam there was no lesion that would be a likely cause of bleeding from recurrent tumor.  Likewise, Dr. Hooper said upon exam he could not find a lesion to perform a biopsy.  At this time Dr. Hooper referred the patient back to urology to perhaps consider cystoscopy if the patient is indeed bleeding.    02/19/2024 - Appointment with :  I have reviewed the chart  and other physicians records. she denies untoward side effects from treatment and without evidence for recurrent or metastatic disease on exam today. Pap smear completed today. Will call with results. She will return in 6 months Continue ongoing management per primary care physician and other specialists.    Plan:  Pap smear today  Return in 6 months    05/25/2024 - CT abdomen/Pelvis with contrast:  No inflammatory process, bowel or urinary obstruction   Moderate size hiatus hernia without complication     09/12/2024 - Appointment with :  Follow up in 6 months     09/12/2024 - Pap smear per :  Negative for intraepithelial lesion or malignancy     02/25/2025 - CT Chest with contrast:  There are mildly displaced fractures of the lateral right fourth, fifth and sixth ribs.  No chest wall thickening or peripheral soft tissues hematoma.   There is a small right pleural effusion.  No pneumothorax.  Mild discoid consolidations are seen in the bases likely representing atelectasis.  Pneumonia less likely although not excluded, correlate clinically.   Moderately severe atherosclerotic disease.  Coronary artery calcifications.   There is a moderate hiatal hernia.     02/25/2025 - CT abdomen/pelvis with contrast:  Large hiatal hernia   Bilateral effusions with atelectasis.   Postoperative changes of the spine with multilevel degenerative change.   Displaced fracture of the posterior 10th rib on the left.     02/25/2025 - CT Cervical spine without contrast:  No acute fracture or subluxation.   Severe degenerative disc disease between C2-C3 and C6-C7.   Multilevel central canal and neural foramen stenosis as detailed.     02/25/2025 - CT head without contrast:  No acute intracranial abnormality.           History obtained from  PATIENT, FAMILY, and CHART    PAST MEDICAL HISTORY  Past Medical History:   Diagnosis Date    Arthritis     OA    Dietary restriction     FLUID 48-52 OZ PER DR ROXANN Etienne  thickening on ultrasound     Hypertension     Incontinence     Nocturia     Plantar fasciitis     PMB (postmenopausal bleeding)     Seizure     Sensory urge incontinence     Stroke     CODED     TIA (transient ischemic attack)     Uterine cancer     Uterine polyp     Vaginal atrophy     Vaginal bleeding     Weakness       PAST SURGICAL HISTORY  Past Surgical History:   Procedure Laterality Date    BACK SURGERY      BREAST SURGERY      LEFT BREAST CYST    CATARACT EXTRACTION      CHOLECYSTECTOMY      D & C HYSTEROSCOPY N/A 12/23/2019    Procedure: DILATATION AND CURETTAGE HYSTEROSCOPY;  Surgeon: Lynnette Andujar MD;  Location: [facility] OR;  Service: Obstetrics/Gynecology    D & C HYSTEROSCOPY      x2    HYSTERECTOMY  01/28/2022    [facility] for uterine cancer    PACEMAKER IMPLANTATION Left     2021  Dr. Mayer at [facility]    TONSILLECTOMY      WISDOM TOOTH EXTRACTION        FAMILY HISTORY  family history includes No Known Problems in her father and mother.    SOCIAL HISTORY  Social History     Tobacco Use    Smoking status: Never     Passive exposure: Never    Smokeless tobacco: Never   Vaping Use    Vaping status: Never Used   Substance Use Topics    Alcohol use: No     Comment: rare    Drug use: Never     ALLERGIES  Hydrocodone-acetaminophen, Levofloxacin, Memantine hcl, Pneumococcal vaccine, Quinolones, Yellow dye, Aspirin, Lortab [hydrocodone-acetaminophen], Meperidine, Mirabegron, Ondansetron, Penicillins, Spironolactone, Sulfa antibiotics, Codeine, and Yellow dye #11     MEDICATIONS    Current Outpatient Medications:     acetaminophen (TYLENOL) 500 MG tablet, Take 2 tablets by mouth Every 6 (Six) Hours As Needed for Moderate Pain. Do not exceed 3000 mg's daily, Disp: , Rfl:     amLODIPine (NORVASC) 5 MG tablet, Take 1 tablet by mouth Every Morning., Disp: , Rfl:     B Complex Vitamins (VITAMIN B COMPLEX PO), Take 1 tablet by mouth Daily., Disp: , Rfl:     butalbital-acetaminophen-caffeine (FIORICET,  ESGIC) -40 MG per tablet, Take 1 tablet by mouth Every 6 (Six) Hours As Needed for Headache., Disp: , Rfl:     cloNIDine (CATAPRES) 0.1 MG tablet, Take 1 tablet by mouth 2 (Two) Times a Day As Needed for High Blood Pressure (As needed if BP exceeds 160/100.). Half tablet, Disp: , Rfl:     diclofenac (VOLTAREN) 1 % gel gel, Apply 2 g topically to the appropriate area as directed 4 (Four) Times a Day As Needed (PAIN)., Disp: , Rfl:     dicyclomine (BENTYL) 10 MG capsule, Take 1 capsule by mouth 4 (Four) Times a Day Before Meals & at Bedtime. Tried med x 1, Disp: , Rfl:     dipyridamole (PERSANTINE) 50 MG tablet, Take 2 tablets by mouth 2 (Two) Times a Day., Disp: , Rfl:     donepezil (ARICEPT) 10 MG tablet, Take 1 tablet by mouth Every Night., Disp: , Rfl:     fluticasone (VERAMYST) 27.5 MCG/SPRAY nasal spray, Administer 2 sprays into the nostril(s) as directed by provider Every Night., Disp: , Rfl:     gabapentin (NEURONTIN) 100 MG capsule, Take 1 capsule by mouth. Up to 3 times a day, Disp: , Rfl:     hydrALAZINE (APRESOLINE) 25 MG tablet, Take 1 tablet by mouth 3 (Three) Times a Day. Due to vasovagal responses, only in the afternoon if BP is 180/80, and at bedtime, Disp: , Rfl:     levETIRAcetam (KEPPRA) 250 MG tablet, Take 1 tablet by mouth 2 (Two) Times a Day., Disp: , Rfl:     Lidocaine 4 % patch, Place 1 patch on the skin as directed by provider 2 (Two) Times a Day., Disp: , Rfl:     Lutein 20 MG tablet, Take 1 tablet by mouth Daily., Disp: , Rfl:     meloxicam (MOBIC) 15 MG tablet, Take 1 tablet by mouth Daily As Needed., Disp: , Rfl:     pantoprazole (PROTONIX) 40 MG EC tablet, Take 1 tablet by mouth 2 (Two) Times a Day., Disp: , Rfl:     pravastatin (PRAVACHOL) 20 MG tablet, Take 1 tablet by mouth Daily., Disp: , Rfl:     promethazine (PHENERGAN) 25 MG tablet, Take 1 tablet by mouth Every 8 (Eight) Hours As Needed for Nausea or Vomiting., Disp: , Rfl:     ramipril (ALTACE) 10 MG capsule, Take 1 capsule  "by mouth Daily., Disp: , Rfl:     simethicone (MYLICON) 125 MG chewable tablet, Every 8 (Eight) Hours. 6am at 2 9:30am, 1 6pm, Disp: , Rfl:     sodium chloride 1 g tablet, Take 1 tablet by mouth 3 (Three) Times a Day., Disp: , Rfl:     valACYclovir (VALTREX) 1000 MG tablet, Take 1 tablet by mouth Daily As Needed (FEVER BLISTER)., Disp: , Rfl:     alendronate (FOSAMAX) 70 MG tablet, 1 tablet Orally once a week for 30 day(s), Disp: , Rfl:     azelastine (ASTELIN) 0.1 % nasal spray, Administer 2 sprays into the nostril(s) as directed by provider 2 (Two) Times a Day., Disp: , Rfl:     B-Complex, Folic Acid, tablet, Take 1 tablet by mouth Daily., Disp: , Rfl:     camphor-menthol (SARNA) 0.5-0.5 % lotion, Apply 1 application topically to the appropriate area as directed 2 (Two) Times a Day., Disp: , Rfl:     cholecalciferol (VITAMIN D3) 1000 UNITS tablet, Take 1 tablet by mouth Daily., Disp: , Rfl:     fluticasone (FLONASE) 50 MCG/ACT nasal spray, administer ONE SPRAY in EACH nostril TWICE DAILY AS DIRECTED, Disp: , Rfl:     saline 0.65 % nasal solution (BABY AYR) 0.65 % solution, Administer 1 spray into the nostril(s) as directed by provider Daily As Needed., Disp: , Rfl:     therapeutic multivitamin-minerals (THERAGRAN-M) tablet, Take 1 tablet by mouth Daily., Disp: , Rfl:     The following portions of the patient's history were reviewed and updated as appropriate: allergies, current medications, past family history, past medical history, past social history, past surgical history and problem list.    Current outpatient and discharge medications have been reconciled for the patient.  Reviewed by: Vimal Tillman III, MD    REVIEW OF SYSTEMS  Review of Systems    PHYSICAL EXAM  VITAL SIGNS:   Vitals:    03/11/25 1504   BP: 114/62   Pulse: 68   Resp: 18   SpO2: 97%   Weight: 40.4 kg (89 lb)   Height: 152.4 cm (60\")   PainSc: 0-No pain     Physical Exam  Genitourinary:     Comments: She has extensive external hemorrhoids.  " Normal external genitalia.  Speculum exam reveals scarring and stenosis but no obvious tumor.  Pap smear is obtained with spatula.  Bimanual exam reveals no obvious palpable tumor mass.        Performance Status: ECOG (4) Completely disabled, unable to carry out self-care.  Totally confined to bed or chair    Clinical Quality Measures  - Pain Documented by Standardized Tool, FPS Bel Powell reports a pain score of 0. Given her pain assessment as noted, treatment options were discussed and the following options were decided upon as a follow-up plan to address the patient's pain:  No pain, no plan given .  Pain Medications              acetaminophen (TYLENOL) 500 MG tablet Take 2 tablets by mouth Every 6 (Six) Hours As Needed for Moderate Pain. Do not exceed 3000 mg's daily    butalbital-acetaminophen-caffeine (FIORICET, ESGIC) -40 MG per tablet Take 1 tablet by mouth Every 6 (Six) Hours As Needed for Headache.    gabapentin (NEURONTIN) 100 MG capsule Take 1 capsule by mouth. Up to 3 times a day    levETIRAcetam (KEPPRA) 250 MG tablet Take 1 tablet by mouth 2 (Two) Times a Day.    meloxicam (MOBIC) 15 MG tablet Take 1 tablet by mouth Daily As Needed.          - Body Mass Index Screening and Follow-Up Plan  BMI is below normal parameters (malnutrition). Recommendations: treating the underlying disease process    - Tobacco Use: Screening and Cessation Intervention  Social History    Tobacco Use      Smoking status: Never        Passive exposure: Never      Smokeless tobacco: Never    - Advanced Care Planning Advance Care Planning   ACP discussion was held with the patient during this visit. Patient has an advance directive in EMR which is still valid.     - PHQ-2 Depression Screening:  Little interest or pleasure in doing things? Not at all   Feeling down, depressed, or hopeless? Not at all   PHQ-2 Total Score 0     ASSESSMENT AND PLAN  1. Endometrial cancer    2. S/P hysterectomy with oophorectomy    3.  History of radiation therapy    4. Non-smoker        RECOMMENDATIONS: Bel Powell is status post completion of radiation therapy and presents to our clinic today for routine follow up exam. Diagnosed with recurrent endometrial carcinoma. She completed 5 internal brachytherapy treatments completed on 03/03/2023.     CT Chest completed on 02/25/2025 revealed there are mildly displaced fractures of the lateral right fourth, fifth and sixth ribs.  No chest wall thickening or peripheral soft tissues hematoma. There is a small right pleural effusion.  No pneumothorax.  Mild discoid consolidations are seen in the bases likely representing atelectasis.  Pneumonia less likely although not excluded, correlate clinically. Moderately severe atherosclerotic disease.  Coronary artery calcifications. There is a moderate hiatal hernia.     Abdomen/Pelvis CT completed on 02/25/2025 revealed large hiatal hernia, Bilateral effusions with atelectasis, Postoperative changes of the spine with multilevel degenerative change. Displaced fracture of the posterior 10th rib on the left.     Cervical spine completed on 02/25/2025 revealed no acute fracture or subluxation. Severe degenerative disc disease between C2-C3 and C6-C7.  Multilevel central canal and neural foramen stenosis as detailed.     Head CT completed 02/25/2025 revealed no acute intracranial abnormality.     I have reviewed the chart and other physicians records. she denies untoward side effects from treatment and without evidence for recurrent or metastatic disease on exam today. Continue ongoing management per primary care physician and other specialists.    Patient Instructions   1)  No evidence of cancer at this time  2)  Return to Dr. Tillman in 6 months    Time Spent: I spent 45 minutes caring for Bel on this date of service. This time includes time spent by me in the following activities: preparing for the visit, reviewing tests, obtaining and/or reviewing a  separately obtained history, performing a medically appropriate examination and/or evaluation, counseling and educating the patient/family/caregiver, ordering medications, tests, or procedures, and documenting information in the medical record.   Vimal Tillman III, MD  03/11/2025

## 2025-03-13 ENCOUNTER — TELEPHONE (OUTPATIENT)
Dept: NEUROLOGY | Age: 89
End: 2025-03-13

## 2025-03-13 ENCOUNTER — OFFICE VISIT (OUTPATIENT)
Dept: NEUROLOGY | Age: 89
End: 2025-03-13
Payer: MEDICARE

## 2025-03-13 VITALS
HEART RATE: 74 BPM | RESPIRATION RATE: 16 BRPM | WEIGHT: 89 LBS | HEIGHT: 60 IN | DIASTOLIC BLOOD PRESSURE: 77 MMHG | BODY MASS INDEX: 17.47 KG/M2 | SYSTOLIC BLOOD PRESSURE: 123 MMHG

## 2025-03-13 DIAGNOSIS — R55 VASOVAGAL SYNCOPE: ICD-10-CM

## 2025-03-13 DIAGNOSIS — I63.9 SMALL VESSEL CEREBROVASCULAR ACCIDENT (CVA) (HCC): ICD-10-CM

## 2025-03-13 DIAGNOSIS — G40.219 PARTIAL SYMPTOMATIC EPILEPSY WITH COMPLEX PARTIAL SEIZURES, INTRACTABLE, WITHOUT STATUS EPILEPTICUS: ICD-10-CM

## 2025-03-13 DIAGNOSIS — Z79.899 ON LONG TERM DRUG THERAPY: Primary | ICD-10-CM

## 2025-03-13 DIAGNOSIS — F01.C11 SEVERE VASCULAR DEMENTIA WITH AGITATION (HCC): Primary | ICD-10-CM

## 2025-03-13 PROCEDURE — 1111F DSCHRG MED/CURRENT MED MERGE: CPT | Performed by: PSYCHIATRY & NEUROLOGY

## 2025-03-13 PROCEDURE — 1159F MED LIST DOCD IN RCRD: CPT | Performed by: PSYCHIATRY & NEUROLOGY

## 2025-03-13 PROCEDURE — G8427 DOCREV CUR MEDS BY ELIG CLIN: HCPCS | Performed by: PSYCHIATRY & NEUROLOGY

## 2025-03-13 PROCEDURE — G8419 CALC BMI OUT NRM PARAM NOF/U: HCPCS | Performed by: PSYCHIATRY & NEUROLOGY

## 2025-03-13 PROCEDURE — 99214 OFFICE O/P EST MOD 30 MIN: CPT | Performed by: PSYCHIATRY & NEUROLOGY

## 2025-03-13 PROCEDURE — 1090F PRES/ABSN URINE INCON ASSESS: CPT | Performed by: PSYCHIATRY & NEUROLOGY

## 2025-03-13 PROCEDURE — 1036F TOBACCO NON-USER: CPT | Performed by: PSYCHIATRY & NEUROLOGY

## 2025-03-13 PROCEDURE — 1123F ACP DISCUSS/DSCN MKR DOCD: CPT | Performed by: PSYCHIATRY & NEUROLOGY

## 2025-03-13 NOTE — PROGRESS NOTES
for - chest pain and palpitations  Gastrointestinal: negative for - blood in stools, constipation, diarrhea, nausea, and vomiting  Genito-Urinary: negative for - hematuria, urinary frequency, urinary urgency, and urinary retention  Musculoskeletal: positive for - joint pain, joint stiffness, and joint swelling  Hematological and Lymphatic: negative for - bleeding problems, abnormal bruising, and swollen lymph nodes  Endocrine:  negative for - polydipsia and polyphagia  Allergy/Immunology:  negative for - rhinorrhea, sinus congestion, hives  Integument:  negative for - negative for - rash, change in moles, new or changing lesions  Psychological: negative for - anxiety and depression  Neurological: positive for - memory loss  negative for - numbness/tingling, and weakness     PHYSICAL EXAMINATION:  Vitals:  /77   Pulse 74   Resp 16   Ht 1.524 m (5')   Wt 40.4 kg (89 lb)   BMI 17.38 kg/m²   General appearance:  Alert, well developed, well nourished, in no distress  HEENT:  normocephalic, atraumatic, sclera appear normal, no nasal abnormalities, no rhinorrhea, Ears appear normal, oral mucous membranes are moist without erythema, trachea midline, thyroid is normal, no lymphadenopathy or neck mass.  Cardiovascular:  Regular rate and rhythm without murmer.  No peripheral edema, No cyanosis or clubbing.  No carotid bruits.  Pulmonary:  Lungs are clear to auscultation.  Breathing appears normal, good expansion, normal effort without use of accessory muscles  Musculoskeletal:  Joints are osteoarthritic  Integument:  No rash, erythema, or pallor  Psychiatric:  Mood, affect, and behavior appear normal      NEUROLOGIC EXAMINATION:  Mental Status:  She is an elderly thin woman who talks little and appears confused.  Speech:  Clear without dysarthria or dysphonia  Language:  Fluent without aphasia  Cranial Nerves:              II          Visual fields are full to confrontation              III,IV, VI

## 2025-03-13 NOTE — TELEPHONE ENCOUNTER
Called pt back to [facility] to do med contract while waiting for appointment time, no other patients were in hallway. Pt due for UDS, attempted to due oral swab for a while, swab never turned to blue. I discarded the test and we will try again at next appointment.

## 2025-03-17 ENCOUNTER — TELEPHONE (OUTPATIENT)
Age: OVER 89
End: 2025-03-17
Payer: MEDICARE

## 2025-03-28 PROBLEM — N39.0 UTI (URINARY TRACT INFECTION): Status: RESOLVED | Noted: 2025-02-26 | Resolved: 2025-03-28

## 2025-05-05 ENCOUNTER — TELEPHONE (OUTPATIENT)
Dept: HEMATOLOGY | Age: 89
End: 2025-05-05

## 2025-05-05 NOTE — TELEPHONE ENCOUNTER

## 2025-05-07 NOTE — PROGRESS NOTES
Never done    Lipids  09/03/2020    COVID-19 Vaccine (3 - 2024-25 season) 09/01/2024    Annual Wellness Visit (Medicare)  Never done    Flu vaccine  Completed    Hepatitis A vaccine  Aged Out    Hepatitis B vaccine  Aged Out    Hib vaccine  Aged Out    Polio vaccine  Aged Out    Meningococcal (ACWY) vaccine  Aged Out    Meningococcal B vaccine  Aged Out     Subjective   Review of Systems   Constitutional:  Positive for fatigue.   Gastrointestinal:  Positive for diarrhea (Frequent).        Incontinent of bowel.  Recurrent C. difficile infection   Genitourinary:         Urinary incontinence   Musculoskeletal:  Positive for arthralgias.   Hematological:         Anemia   Psychiatric/Behavioral:  Positive for confusion.         Dementia, \"nonverbal\"     Objective   Physical Exam  Constitutional:       Comments: Not interactive for the most part, though does answer questions to her ability when asked directly   Cardiovascular:      Rate and Rhythm: Normal rate.      Comments: Occasional ectopy  Pulmonary:      Effort: Pulmonary effort is normal.      Breath sounds: Normal breath sounds.   Abdominal:      General: There is no distension.      Tenderness: There is no abdominal tenderness. There is no guarding.   Musculoskeletal:      Comments: Presents in a wheelchair, weak, muscle wasting   Skin:     Coloration: Skin is pale.      Findings: Bruising (BUE) present.   Neurological:      Comments: Oriented to person       /68 (BP Site: Right Upper Arm, Patient Position: Sitting, BP Cuff Size: Small Adult)   Pulse 68   Temp 97.7 °F (36.5 °C) (Temporal)   Ht 1.524 m (5')   Wt 42.2 kg (93 lb)   SpO2 97%   BMI 18.16 kg/m²   Wt Readings from Last 3 Encounters:   06/13/25 42.2 kg (93 lb)   03/13/25 40.4 kg (89 lb)   03/04/25 41.7 kg (92 lb)     Labs reviewed/interpreted by me:  Hospital Outpatient Visit on 06/13/2025   Component Date Value Ref Range Status    Iron 06/13/2025 39  37 - 145 ug/dL Final    TIBC 06/13/2025

## 2025-05-15 ENCOUNTER — RESULTS FOLLOW-UP (OUTPATIENT)
Dept: CARDIOLOGY CLINIC | Age: 89
End: 2025-05-15

## 2025-05-15 DIAGNOSIS — Z95.0 PACEMAKER: Primary | ICD-10-CM

## 2025-05-15 DIAGNOSIS — I49.5 SA NODE DYSFUNCTION (HCC): ICD-10-CM

## 2025-05-15 DIAGNOSIS — R00.1 SYMPTOMATIC BRADYCARDIA: ICD-10-CM

## 2025-06-09 ENCOUNTER — TELEPHONE (OUTPATIENT)
Dept: HEMATOLOGY | Age: 89
End: 2025-06-09

## 2025-06-09 NOTE — TELEPHONE ENCOUNTER
I called patient and left detailed voicemail about their appointment on 06/13/2025. I made patient aware please DO NOT arrive any earlier than the appointment time (That appointment time was given) and to come at the time of the follow up and NOT the time of the lab appointment if it is different than the follow up appt time. I also made patient aware they are allowed to eat if needed (Our labs are not fasting labs) however we need them to drink plenty of water to hydrate their veins properly before coming to these appointments because this will make their lab draw much easier for them and the phlebotomist. I also made patient aware that we are now located at the [facility] at [address]. Located between [facility] and the [facility]. Front entrance faces [facility]. Left office number for patient to call if they had any questions or needed to reschedule their appointment. I advised patient that starting June 9th, our office will be implementing a phone tree system when they call our office to please listen to all prompts and select the prompt they need and leave a voicemail if no one answers and someone in office will return their call before the end of the business day. I also made them aware to please not leave multiple voicemails because this will cause more delay in returning their call in a timely manner.

## 2025-06-13 ENCOUNTER — HOSPITAL ENCOUNTER (OUTPATIENT)
Dept: INFUSION THERAPY | Age: 89
Discharge: HOME | End: 2025-06-13
Payer: MEDICARE

## 2025-06-13 ENCOUNTER — OFFICE VISIT (OUTPATIENT)
Dept: HEMATOLOGY | Age: 89
End: 2025-06-13
Payer: MEDICARE

## 2025-06-13 VITALS
WEIGHT: 93 LBS | OXYGEN SATURATION: 97 % | SYSTOLIC BLOOD PRESSURE: 118 MMHG | HEIGHT: 60 IN | HEART RATE: 68 BPM | TEMPERATURE: 97.7 F | BODY MASS INDEX: 18.26 KG/M2 | DIASTOLIC BLOOD PRESSURE: 68 MMHG

## 2025-06-13 DIAGNOSIS — D50.9 IRON DEFICIENCY ANEMIA, UNSPECIFIED IRON DEFICIENCY ANEMIA TYPE: ICD-10-CM

## 2025-06-13 DIAGNOSIS — Z71.89 CARE PLAN DISCUSSED WITH PATIENT: ICD-10-CM

## 2025-06-13 DIAGNOSIS — D72.819 LEUKOPENIA, UNSPECIFIED TYPE: ICD-10-CM

## 2025-06-13 DIAGNOSIS — D50.9 IRON DEFICIENCY ANEMIA, UNSPECIFIED IRON DEFICIENCY ANEMIA TYPE: Primary | ICD-10-CM

## 2025-06-13 DIAGNOSIS — E87.6 HYPOKALEMIA: ICD-10-CM

## 2025-06-13 LAB
ALBUMIN SERPL-MCNC: 4 G/DL (ref 3.5–5.2)
ALP SERPL-CCNC: 115 U/L (ref 35–104)
ALT SERPL-CCNC: 10 U/L (ref 5–33)
ANION GAP SERPL CALCULATED.3IONS-SCNC: 16 MMOL/L (ref 7–19)
AST SERPL-CCNC: 16 U/L (ref 5–32)
BASOPHILS # BLD: 0.03 K/UL (ref 0–0.2)
BASOPHILS NFR BLD: 0.6 % (ref 0–1)
BILIRUB SERPL-MCNC: 0.3 MG/DL (ref 0–1.2)
BUN SERPL-MCNC: 9 MG/DL (ref 8–23)
CALCIUM SERPL-MCNC: 9.4 MG/DL (ref 8.8–10.2)
CHLORIDE SERPL-SCNC: 97 MMOL/L (ref 98–107)
CO2 SERPL-SCNC: 23 MMOL/L (ref 22–29)
CREAT SERPL-MCNC: 0.8 MG/DL (ref 0.5–0.9)
EOSINOPHIL # BLD: 0.05 K/UL (ref 0–0.6)
EOSINOPHIL NFR BLD: 1 % (ref 0–5)
ERYTHROCYTE [DISTWIDTH] IN BLOOD BY AUTOMATED COUNT: 13 % (ref 11.5–14.5)
FERRITIN SERPL-MCNC: 295 NG/ML (ref 13–150)
GLUCOSE SERPL-MCNC: 74 MG/DL (ref 70–99)
HCT VFR BLD AUTO: 36.3 % (ref 37–47)
HGB BLD-MCNC: 12.2 G/DL (ref 12–16)
IRON SATN MFR SERPL: 15 % (ref 15–50)
IRON SERPL-MCNC: 39 UG/DL (ref 37–145)
LYMPHOCYTES # BLD: 0.79 K/UL (ref 1.1–4.5)
LYMPHOCYTES NFR BLD: 16 % (ref 20–40)
MCH RBC QN AUTO: 33.4 PG (ref 27–31)
MCHC RBC AUTO-ENTMCNC: 33.6 G/DL (ref 33–37)
MCV RBC AUTO: 99.5 FL (ref 81–99)
MONOCYTES # BLD: 0.45 K/UL (ref 0–0.9)
MONOCYTES NFR BLD: 9.1 % (ref 1–10)
NEUTROPHILS # BLD: 3.61 K/UL (ref 1.5–7.5)
NEUTS SEG NFR BLD: 72.9 % (ref 50–65)
PLATELET # BLD AUTO: 255 K/UL (ref 130–400)
PMV BLD AUTO: 9.8 FL (ref 9.4–12.3)
POTASSIUM SERPL-SCNC: 2.8 MMOL/L (ref 3.5–5.1)
PROT SERPL-MCNC: 6.1 G/DL (ref 6.4–8.3)
RBC # BLD AUTO: 3.65 M/UL (ref 4.2–5.4)
SODIUM SERPL-SCNC: 136 MMOL/L (ref 136–145)
TIBC SERPL-MCNC: 260 UG/DL (ref 250–400)
WBC # BLD AUTO: 4.95 K/UL (ref 4.8–10.8)

## 2025-06-13 PROCEDURE — 1036F TOBACCO NON-USER: CPT | Performed by: NURSE PRACTITIONER

## 2025-06-13 PROCEDURE — 36415 COLL VENOUS BLD VENIPUNCTURE: CPT

## 2025-06-13 PROCEDURE — 83550 IRON BINDING TEST: CPT

## 2025-06-13 PROCEDURE — G8419 CALC BMI OUT NRM PARAM NOF/U: HCPCS | Performed by: NURSE PRACTITIONER

## 2025-06-13 PROCEDURE — 82728 ASSAY OF FERRITIN: CPT

## 2025-06-13 PROCEDURE — 99212 OFFICE O/P EST SF 10 MIN: CPT

## 2025-06-13 PROCEDURE — 1123F ACP DISCUSS/DSCN MKR DOCD: CPT | Performed by: NURSE PRACTITIONER

## 2025-06-13 PROCEDURE — 83540 ASSAY OF IRON: CPT

## 2025-06-13 PROCEDURE — 99214 OFFICE O/P EST MOD 30 MIN: CPT | Performed by: NURSE PRACTITIONER

## 2025-06-13 PROCEDURE — 1159F MED LIST DOCD IN RCRD: CPT | Performed by: NURSE PRACTITIONER

## 2025-06-13 PROCEDURE — 85025 COMPLETE CBC W/AUTO DIFF WBC: CPT

## 2025-06-13 PROCEDURE — 1126F AMNT PAIN NOTED NONE PRSNT: CPT | Performed by: NURSE PRACTITIONER

## 2025-06-13 PROCEDURE — 80053 COMPREHEN METABOLIC PANEL: CPT

## 2025-06-13 PROCEDURE — G8427 DOCREV CUR MEDS BY ELIG CLIN: HCPCS | Performed by: NURSE PRACTITIONER

## 2025-06-13 PROCEDURE — 1090F PRES/ABSN URINE INCON ASSESS: CPT | Performed by: NURSE PRACTITIONER

## 2025-06-14 ASSESSMENT — ENCOUNTER SYMPTOMS: DIARRHEA: 1

## 2025-06-17 ENCOUNTER — OFFICE VISIT (OUTPATIENT)
Dept: UROLOGY | Age: 89
End: 2025-06-17

## 2025-06-17 VITALS — TEMPERATURE: 97.7 F | HEIGHT: 60 IN | WEIGHT: 93 LBS | BODY MASS INDEX: 18.26 KG/M2

## 2025-06-17 DIAGNOSIS — N32.81 OAB (OVERACTIVE BLADDER): Primary | ICD-10-CM

## 2025-06-17 DIAGNOSIS — B37.31 VAGINAL YEAST INFECTION: ICD-10-CM

## 2025-06-17 DIAGNOSIS — N39.0 RECURRENT UTI: ICD-10-CM

## 2025-06-17 DIAGNOSIS — N39.46 MIXED STRESS AND URGE URINARY INCONTINENCE: ICD-10-CM

## 2025-06-17 RX ORDER — NYSTATIN 100000 U/G
OINTMENT TOPICAL
Qty: 30 G | Refills: 3 | Status: SHIPPED | OUTPATIENT
Start: 2025-06-17

## 2025-06-17 RX ORDER — FLUCONAZOLE 150 MG/1
150 TABLET ORAL DAILY PRN
Qty: 14 TABLET | Refills: 0 | Status: SHIPPED | OUTPATIENT
Start: 2025-06-17

## 2025-06-17 NOTE — PROGRESS NOTES
Livia Arora is a 89 y.o. female who presents today   Chief Complaint   Patient presents with    Follow-up     I am here for a follow up  per patient's daughter she may have either a UTI or a Vaginal infection.        Patient is an 89-year-old female presents to clinic today with brown urine.  DaughterCesilia is present today with patient and gives history.  Patient with history of dementia that has declined over the last 6 months.  Daughter reports milky brown urine over the last 3 weeks.  She is concerned for a UTI.  Patient is incontinent requiring depends.  Daughter reports she gave her 1 dose of Macrodantin and the patient woke up the next day seemingly better therefore she gave her about 4 doses total daily.  She also gave her Diflucan she had on hand for a vaginal yeast infection.  She also reports red and cracked labia.  Please see APRN's previous note regarding her history.  Ms. Alvarez has declined over the last 6 months after her hospitalization with basically earlier to thrive.  At that time urine culture positive.  She was previously utilizing PTNS for overactive bladder and incontinence and this has been discontinued due to incomplete bladder emptying.  Her last PVR around 200.  Today's PVR has improved at 156.  Daughter does state continued incontinence.  She is also wondering if she needs to be taking a daily antibiotic.    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

## 2025-07-21 ENCOUNTER — TELEPHONE (OUTPATIENT)
Dept: CARDIOLOGY CLINIC | Age: 89
End: 2025-07-21

## 2025-07-21 ENCOUNTER — TELEPHONE (OUTPATIENT)
Dept: NEUROLOGY | Age: 89
End: 2025-07-21

## 2025-07-21 DIAGNOSIS — G40.219 PARTIAL SYMPTOMATIC EPILEPSY WITH COMPLEX PARTIAL SEIZURES, INTRACTABLE, WITHOUT STATUS EPILEPTICUS (HCC): ICD-10-CM

## 2025-07-21 NOTE — TELEPHONE ENCOUNTER
Patient needs refill of Gabapentin 100 mg   Only needs 90 day supply.  [facility] pharmacy  Please call Cesilia (daughter) when refilled. [phone]

## 2025-07-21 NOTE — TELEPHONE ENCOUNTER
Patient Daughter called to reschedule appt on 11/26. Daughter ask for appt to be in January after 1:00 pm.  Please return her call.    Thank you!

## 2025-07-21 NOTE — TELEPHONE ENCOUNTER
Requested Prescriptions     Pending Prescriptions Disp Refills    gabapentin (NEURONTIN) 100 MG capsule 270 capsule 1     Sig: Take 1 capsule by mouth 3 times daily for 180 days. Intended supply: 90 days Max Daily Amount: 300 mg       Last Office Visit: 3/13/2025  Next Office Visit: 9/25/2025  Last Medication Refill:3/3/2025

## 2025-07-23 RX ORDER — GABAPENTIN 100 MG/1
100 CAPSULE ORAL 3 TIMES DAILY
Qty: 270 CAPSULE | Refills: 1 | Status: SHIPPED | OUTPATIENT
Start: 2025-07-23 | End: 2026-01-19

## 2025-07-23 NOTE — TELEPHONE ENCOUNTER
Called to let Cesilia know that script had been sent to [facility] due to it was a controlled substance and we are not to send out of state. I also let her know that she can have the pharmacy fill however many pill she wants but it will void the rest of the script. Cesilia thanked me for the call and the call ended.

## 2025-08-19 DIAGNOSIS — I49.5 SA NODE DYSFUNCTION (HCC): ICD-10-CM

## 2025-08-19 DIAGNOSIS — Z95.0 PACEMAKER: Primary | ICD-10-CM

## 2025-08-27 ENCOUNTER — TELEPHONE (OUTPATIENT)
Dept: HEMATOLOGY | Age: 89
End: 2025-08-27

## (undated) DEVICE — CYSTO/BLADDER IRRIGATION SET, REGULATING CLAMP

## (undated) DEVICE — CAP CONN RED

## (undated) DEVICE — GLV SURG SENSICARE W/ALOE PF LF SZ6 STRL

## (undated) DEVICE — FORCEPS BX L240CM JAW DIA2.4MM ORNG L CAP W/ NDL DISP RAD

## (undated) DEVICE — ENDO KIT,LOURDES HOSPITAL: Brand: MEDLINE INDUSTRIES, INC.

## (undated) DEVICE — PK TURNOVER RM ADV

## (undated) DEVICE — SYRINGE INFL 60ML DISP ALLIANCE II

## (undated) DEVICE — ESOPHAGEAL BALLOON DILATATION CATHETER: Brand: CRE FIXED WIRE

## (undated) DEVICE — PAD D&C: Brand: MEDLINE INDUSTRIES, INC.